# Patient Record
Sex: MALE | Race: OTHER | HISPANIC OR LATINO
[De-identification: names, ages, dates, MRNs, and addresses within clinical notes are randomized per-mention and may not be internally consistent; named-entity substitution may affect disease eponyms.]

---

## 2017-02-03 ENCOUNTER — APPOINTMENT (OUTPATIENT)
Dept: CARDIOLOGY | Facility: CLINIC | Age: 62
End: 2017-02-03

## 2017-02-03 ENCOUNTER — NON-APPOINTMENT (OUTPATIENT)
Age: 62
End: 2017-02-03

## 2017-02-03 VITALS
WEIGHT: 179 LBS | SYSTOLIC BLOOD PRESSURE: 101 MMHG | BODY MASS INDEX: 30.56 KG/M2 | OXYGEN SATURATION: 96 % | HEIGHT: 64 IN | DIASTOLIC BLOOD PRESSURE: 68 MMHG | HEART RATE: 84 BPM

## 2017-02-14 ENCOUNTER — APPOINTMENT (OUTPATIENT)
Dept: CARDIOLOGY | Facility: CLINIC | Age: 62
End: 2017-02-14

## 2017-02-16 ENCOUNTER — APPOINTMENT (OUTPATIENT)
Dept: CARDIOLOGY | Facility: CLINIC | Age: 62
End: 2017-02-16

## 2017-02-17 ENCOUNTER — OUTPATIENT (OUTPATIENT)
Dept: OUTPATIENT SERVICES | Facility: HOSPITAL | Age: 62
LOS: 1 days | End: 2017-02-17
Payer: COMMERCIAL

## 2017-02-17 DIAGNOSIS — I25.10 ATHEROSCLEROTIC HEART DISEASE OF NATIVE CORONARY ARTERY WITHOUT ANGINA PECTORIS: ICD-10-CM

## 2017-02-17 DIAGNOSIS — Z95.1 PRESENCE OF AORTOCORONARY BYPASS GRAFT: Chronic | ICD-10-CM

## 2017-02-17 DIAGNOSIS — R07.9 CHEST PAIN, UNSPECIFIED: ICD-10-CM

## 2017-02-17 DIAGNOSIS — R06.02 SHORTNESS OF BREATH: ICD-10-CM

## 2017-02-17 PROCEDURE — 93016 CV STRESS TEST SUPVJ ONLY: CPT

## 2017-02-17 PROCEDURE — 78452 HT MUSCLE IMAGE SPECT MULT: CPT

## 2017-02-17 PROCEDURE — 78452 HT MUSCLE IMAGE SPECT MULT: CPT | Mod: 26

## 2017-02-17 PROCEDURE — 93018 CV STRESS TEST I&R ONLY: CPT

## 2017-02-17 PROCEDURE — A9500: CPT

## 2017-02-17 PROCEDURE — 93017 CV STRESS TEST TRACING ONLY: CPT

## 2017-02-17 PROCEDURE — T1013: CPT

## 2017-02-20 DIAGNOSIS — I25.10 ATHEROSCLEROTIC HEART DISEASE OF NATIVE CORONARY ARTERY WITHOUT ANGINA PECTORIS: ICD-10-CM

## 2017-03-23 ENCOUNTER — APPOINTMENT (OUTPATIENT)
Dept: CARDIOLOGY | Facility: CLINIC | Age: 62
End: 2017-03-23

## 2017-04-29 ENCOUNTER — APPOINTMENT (OUTPATIENT)
Dept: ULTRASOUND IMAGING | Facility: CLINIC | Age: 62
End: 2017-04-29

## 2017-04-29 ENCOUNTER — OUTPATIENT (OUTPATIENT)
Dept: OUTPATIENT SERVICES | Facility: HOSPITAL | Age: 62
LOS: 1 days | End: 2017-04-29
Payer: COMMERCIAL

## 2017-04-29 DIAGNOSIS — N18.3 CHRONIC KIDNEY DISEASE, STAGE 3 (MODERATE): ICD-10-CM

## 2017-04-29 DIAGNOSIS — Z00.8 ENCOUNTER FOR OTHER GENERAL EXAMINATION: ICD-10-CM

## 2017-04-29 DIAGNOSIS — Z95.1 PRESENCE OF AORTOCORONARY BYPASS GRAFT: Chronic | ICD-10-CM

## 2017-04-29 PROCEDURE — 76775 US EXAM ABDO BACK WALL LIM: CPT

## 2017-08-08 ENCOUNTER — APPOINTMENT (OUTPATIENT)
Dept: CARDIOLOGY | Facility: CLINIC | Age: 62
End: 2017-08-08
Payer: MEDICARE

## 2017-08-08 ENCOUNTER — NON-APPOINTMENT (OUTPATIENT)
Age: 62
End: 2017-08-08

## 2017-08-08 VITALS
BODY MASS INDEX: 29.88 KG/M2 | OXYGEN SATURATION: 94 % | WEIGHT: 175 LBS | HEART RATE: 77 BPM | DIASTOLIC BLOOD PRESSURE: 72 MMHG | HEIGHT: 64 IN | SYSTOLIC BLOOD PRESSURE: 110 MMHG

## 2017-08-08 PROCEDURE — 99215 OFFICE O/P EST HI 40 MIN: CPT | Mod: 25

## 2017-08-08 PROCEDURE — 93000 ELECTROCARDIOGRAM COMPLETE: CPT

## 2017-08-21 ENCOUNTER — APPOINTMENT (OUTPATIENT)
Dept: CARDIOLOGY | Facility: CLINIC | Age: 62
End: 2017-08-21
Payer: MEDICARE

## 2017-08-21 PROCEDURE — 93880 EXTRACRANIAL BILAT STUDY: CPT

## 2018-02-06 ENCOUNTER — APPOINTMENT (OUTPATIENT)
Dept: CARDIOLOGY | Facility: CLINIC | Age: 63
End: 2018-02-06
Payer: MEDICARE

## 2018-02-06 ENCOUNTER — NON-APPOINTMENT (OUTPATIENT)
Age: 63
End: 2018-02-06

## 2018-02-06 VITALS
OXYGEN SATURATION: 94 % | BODY MASS INDEX: 30.56 KG/M2 | DIASTOLIC BLOOD PRESSURE: 67 MMHG | WEIGHT: 179 LBS | SYSTOLIC BLOOD PRESSURE: 130 MMHG | HEART RATE: 70 BPM | HEIGHT: 64 IN

## 2018-02-06 PROCEDURE — 93000 ELECTROCARDIOGRAM COMPLETE: CPT

## 2018-02-06 PROCEDURE — 99215 OFFICE O/P EST HI 40 MIN: CPT

## 2018-02-26 ENCOUNTER — APPOINTMENT (OUTPATIENT)
Dept: CARDIOLOGY | Facility: CLINIC | Age: 63
End: 2018-02-26

## 2018-03-05 ENCOUNTER — APPOINTMENT (OUTPATIENT)
Dept: PULMONOLOGY | Facility: CLINIC | Age: 63
End: 2018-03-05
Payer: MEDICARE

## 2018-03-05 VITALS
OXYGEN SATURATION: 95 % | BODY MASS INDEX: 30.56 KG/M2 | DIASTOLIC BLOOD PRESSURE: 78 MMHG | SYSTOLIC BLOOD PRESSURE: 130 MMHG | WEIGHT: 179 LBS | HEIGHT: 64 IN | HEART RATE: 74 BPM

## 2018-03-05 PROCEDURE — 99213 OFFICE O/P EST LOW 20 MIN: CPT

## 2018-04-05 ENCOUNTER — APPOINTMENT (OUTPATIENT)
Dept: CARDIOLOGY | Facility: CLINIC | Age: 63
End: 2018-04-05
Payer: MEDICARE

## 2018-04-05 PROCEDURE — 93306 TTE W/DOPPLER COMPLETE: CPT

## 2018-07-25 ENCOUNTER — EMERGENCY (EMERGENCY)
Facility: HOSPITAL | Age: 63
LOS: 1 days | Discharge: HOME | End: 2018-07-25
Attending: EMERGENCY MEDICINE
Payer: MEDICARE

## 2018-07-25 VITALS
OXYGEN SATURATION: 95 % | SYSTOLIC BLOOD PRESSURE: 126 MMHG | TEMPERATURE: 98 F | DIASTOLIC BLOOD PRESSURE: 76 MMHG | RESPIRATION RATE: 20 BRPM | HEART RATE: 67 BPM

## 2018-07-25 VITALS — WEIGHT: 179.9 LBS | HEIGHT: 65 IN

## 2018-07-25 DIAGNOSIS — Z95.1 PRESENCE OF AORTOCORONARY BYPASS GRAFT: Chronic | ICD-10-CM

## 2018-07-25 PROCEDURE — 99283 EMERGENCY DEPT VISIT LOW MDM: CPT | Mod: 25

## 2018-07-25 PROCEDURE — 96372 THER/PROPH/DIAG INJ SC/IM: CPT

## 2018-07-25 PROCEDURE — 99283 EMERGENCY DEPT VISIT LOW MDM: CPT

## 2018-07-25 RX ORDER — PRAMOXINE HCL/ZINC ACETATE 1 %-0.1 %
1 LOTION (ML) TOPICAL
Qty: 177 | Refills: 0
Start: 2018-07-25 | End: 2018-07-31

## 2018-07-25 RX ORDER — DIPHENHYDRAMINE HCL 50 MG
25 CAPSULE ORAL ONCE
Qty: 0 | Refills: 0 | Status: COMPLETED | OUTPATIENT
Start: 2018-07-25 | End: 2018-07-25

## 2018-07-25 RX ORDER — DIPHENHYDRAMINE HCL 50 MG
1 CAPSULE ORAL
Qty: 28 | Refills: 0
Start: 2018-07-25 | End: 2018-07-31

## 2018-07-25 RX ADMIN — Medication 25 MILLIGRAM(S): at 20:52

## 2018-07-25 NOTE — ED PROVIDER NOTE - CARE PLAN
Principal Discharge DX:	Contact dermatitis, unspecified contact dermatitis type, unspecified trigger

## 2018-07-25 NOTE — ED PROVIDER NOTE - OBJECTIVE STATEMENT
62 y/o M presents to the ED c/o rash which onset 4 days ago. Pt states that he was at a BBQ at his daughter's house and was outside when he thought something bit him. He notes that there was poison ivy in the area. Pt is currently experiencing itchiness or his arms and redness here at the ED. He denies fevers or chills. No further complaints at this time.

## 2018-07-25 NOTE — ED ADULT TRIAGE NOTE - CHIEF COMPLAINT QUOTE
Pt with rash to b/l arms since Saturday. Pt was outside at this daughters and thinks he may have been bit by something. No fevers, no other complaints.

## 2018-07-25 NOTE — ED PROVIDER NOTE - PMH
Acute on chronic congestive heart failure, unspecified congestive heart failure type    DM (diabetes mellitus)    High cholesterol    HTN (hypertension)    Myocardial infarction  (Coronary angiogram 2014)

## 2018-07-25 NOTE — ED PROVIDER NOTE - SKIN, MLM
Multiple erythematous papular lesions in linear distribution, some scarred and excoriated from scratching. No pus, redness, swelling, vesicles, or drainage.

## 2018-08-28 ENCOUNTER — NON-APPOINTMENT (OUTPATIENT)
Age: 63
End: 2018-08-28

## 2018-08-28 ENCOUNTER — APPOINTMENT (OUTPATIENT)
Dept: CARDIOLOGY | Facility: CLINIC | Age: 63
End: 2018-08-28
Payer: MEDICARE

## 2018-08-28 VITALS
BODY MASS INDEX: 30.9 KG/M2 | HEART RATE: 74 BPM | OXYGEN SATURATION: 90 % | DIASTOLIC BLOOD PRESSURE: 81 MMHG | HEIGHT: 64 IN | SYSTOLIC BLOOD PRESSURE: 167 MMHG | WEIGHT: 181 LBS

## 2018-08-28 VITALS — SYSTOLIC BLOOD PRESSURE: 162 MMHG | DIASTOLIC BLOOD PRESSURE: 90 MMHG

## 2018-08-28 PROCEDURE — 99215 OFFICE O/P EST HI 40 MIN: CPT

## 2018-08-28 PROCEDURE — 93000 ELECTROCARDIOGRAM COMPLETE: CPT

## 2018-09-04 ENCOUNTER — APPOINTMENT (OUTPATIENT)
Dept: PULMONOLOGY | Facility: CLINIC | Age: 63
End: 2018-09-04
Payer: MEDICARE

## 2018-09-04 VITALS
OXYGEN SATURATION: 94 % | HEART RATE: 71 BPM | DIASTOLIC BLOOD PRESSURE: 82 MMHG | BODY MASS INDEX: 31.07 KG/M2 | SYSTOLIC BLOOD PRESSURE: 138 MMHG | WEIGHT: 181 LBS

## 2018-09-04 DIAGNOSIS — E66.9 OBESITY, UNSPECIFIED: ICD-10-CM

## 2018-09-04 PROCEDURE — 99213 OFFICE O/P EST LOW 20 MIN: CPT

## 2018-09-10 ENCOUNTER — APPOINTMENT (OUTPATIENT)
Dept: CARDIOLOGY | Facility: CLINIC | Age: 63
End: 2018-09-10
Payer: MEDICARE

## 2018-09-10 PROCEDURE — 78452 HT MUSCLE IMAGE SPECT MULT: CPT

## 2018-09-10 PROCEDURE — 93015 CV STRESS TEST SUPVJ I&R: CPT

## 2018-09-10 PROCEDURE — A9500: CPT

## 2019-03-12 ENCOUNTER — APPOINTMENT (OUTPATIENT)
Dept: CARDIOLOGY | Facility: CLINIC | Age: 64
End: 2019-03-12

## 2019-03-26 ENCOUNTER — APPOINTMENT (OUTPATIENT)
Dept: NEUROLOGY | Facility: CLINIC | Age: 64
End: 2019-03-26
Payer: MEDICARE

## 2019-03-26 VITALS
DIASTOLIC BLOOD PRESSURE: 90 MMHG | HEIGHT: 64 IN | SYSTOLIC BLOOD PRESSURE: 140 MMHG | WEIGHT: 185 LBS | BODY MASS INDEX: 31.58 KG/M2

## 2019-03-26 DIAGNOSIS — G31.84 MILD COGNITIVE IMPAIRMENT, SO STATED: ICD-10-CM

## 2019-03-26 PROCEDURE — 99204 OFFICE O/P NEW MOD 45 MIN: CPT

## 2019-04-16 ENCOUNTER — APPOINTMENT (OUTPATIENT)
Dept: NEUROLOGY | Facility: CLINIC | Age: 64
End: 2019-04-16

## 2019-04-23 ENCOUNTER — APPOINTMENT (OUTPATIENT)
Dept: NEUROLOGY | Facility: CLINIC | Age: 64
End: 2019-04-23
Payer: MEDICARE

## 2019-04-23 PROCEDURE — 95819 EEG AWAKE AND ASLEEP: CPT

## 2019-04-23 PROCEDURE — 93880 EXTRACRANIAL BILAT STUDY: CPT

## 2019-04-23 PROCEDURE — 93890: CPT

## 2019-04-23 PROCEDURE — 93886 INTRACRANIAL COMPLETE STUDY: CPT

## 2019-04-23 PROCEDURE — 93040 RHYTHM ECG WITH REPORT: CPT

## 2019-04-23 NOTE — PROCEDURE
[FreeTextEntry3] : EEG   Dr. Butler\par \par Date of study:  4/23/19\par \par This tracing was performed digital 20 channel machine with additional channel devoted to monitoring of EKG rhythm. Standard 10-20 electrode placement system was used.\par \par Waking background showed well-developed posterior  Hz alpha activity along with some low voltage frontally dominant faster beta rhythms. Slowing was seen during drowsiness. There were no focal background abnormalities or epileptiform discharges seen.\par \par Photic stimulation was performed and failed to induce any abnormalities.\par \par Hyperventilation was not performed.\par \par Impression: Normal EEG without any evidence of seizure-like activity or focal background abnormalities.\par \par \par

## 2019-04-23 NOTE — PROCEDURE
[FreeTextEntry3] : Carotid duplex  Dr. Butler\par \par Date of study: 4/23/19\par \par Real-time color duplex ultrasound examination of the carotid system was performed including spectral waveform analysis and color-flow evaluation.\par \par Some plaque was seen near the bulbs bilaterally. Doppler flow characteristics showed no significant peak systolic or end-diastolic velocity elevations in either internal carotid artery to suggest any hemodynamically significant stenosis.\par \par Impression: Some plaque near the bulbs bilaterally. No ultrasound evidence for any hemodynamically significant stenosis in either internal carotid artery.\par \par Transcranial Doppler normal as well.\par

## 2019-04-30 ENCOUNTER — NON-APPOINTMENT (OUTPATIENT)
Age: 64
End: 2019-04-30

## 2019-04-30 ENCOUNTER — APPOINTMENT (OUTPATIENT)
Dept: CARDIOLOGY | Facility: CLINIC | Age: 64
End: 2019-04-30
Payer: MEDICARE

## 2019-04-30 VITALS
HEART RATE: 72 BPM | DIASTOLIC BLOOD PRESSURE: 97 MMHG | WEIGHT: 180 LBS | HEIGHT: 64 IN | OXYGEN SATURATION: 97 % | SYSTOLIC BLOOD PRESSURE: 194 MMHG | BODY MASS INDEX: 30.73 KG/M2

## 2019-04-30 VITALS — DIASTOLIC BLOOD PRESSURE: 108 MMHG | SYSTOLIC BLOOD PRESSURE: 210 MMHG

## 2019-04-30 PROCEDURE — 99215 OFFICE O/P EST HI 40 MIN: CPT

## 2019-04-30 PROCEDURE — 93000 ELECTROCARDIOGRAM COMPLETE: CPT

## 2019-04-30 NOTE — REASON FOR VISIT
[Follow-Up - Clinic] : a clinic follow-up of [FreeTextEntry2] : Multivessel CAD, ICMP, HTN, CVA [FreeTextEntry1] : Multivessel CAD, ICMP, HTN, CVA

## 2019-04-30 NOTE — DISCUSSION/SUMMARY
[Patient] : the patient [Risks] : risks [Benefits] : benefits [Alternatives] : alternatives [___ Week(s)] : [unfilled] week(s) [With ___] : with [unfilled] [FreeTextEntry1] : This is a 59 M with history of smoking, HTN,DM (on insulin), HLD, found to have 3 vessel disease and TIA post cath: s/p CABG recent PNA and now with facial swelling,.\par 1) PVD: right PTA and bilateral atherosclerosis: Walking and ct antiplatelets. life style modification. diabetes control.  aspirin and lipitor.  no intervention or testing needed. \par 2) Multivessel CAD: s/p CABG2D echo- unremarkable.   Continue Coreg 25 Q12, .ASA 81 mg,  lipitor 40 daily.\par 2) HFpEF: Stable weight. NYHA class I.. Ct lasix. f/u with Nephrologist. Dr. price. \par 3) HTN:  continue coreg 25 Q12 ct losartan 100-25 Blood pressure not ocntorllee.d unclear meds list. f/u in 1 week .initaite amldoipne 10 mg daily. \par 4) DM: HBA1C 10.1 \par 5) CKD: creatinine stable.  f/u nephrology. DM control. patient not seen the nephrologist.\par 6) palpitations: resolved. \par 7) carotid artery disease: carotid atheroscelrosuis. no stenosis. \par

## 2019-04-30 NOTE — REVIEW OF SYSTEMS
[Shortness Of Breath] : shortness of breath [Dyspnea on exertion] : dyspnea during exertion [Lower Ext Edema] : lower extremity edema [Palpitations] : no palpitations [see HPI] : see HPI [Change In The Stool] : change in stool [Negative] : Heme/Lymph

## 2019-04-30 NOTE — PHYSICAL EXAM
[General Appearance - Well Developed] : well developed [Normal Appearance] : normal appearance [Well Groomed] : well groomed [General Appearance - Well Nourished] : well nourished [No Deformities] : no deformities [General Appearance - In No Acute Distress] : no acute distress [Normal Conjunctiva] : the conjunctiva exhibited no abnormalities [Eyelids - No Xanthelasma] : the eyelids demonstrated no xanthelasmas [Normal Oral Mucosa] : normal oral mucosa [No Oral Pallor] : no oral pallor [No Oral Cyanosis] : no oral cyanosis [Normal Jugular Venous A Waves Present] : normal jugular venous A waves present [Normal Jugular Venous V Waves Present] : normal jugular venous V waves present [No Jugular Venous Mahan A Waves] : no jugular venous mahan A waves [Respiration, Rhythm And Depth] : normal respiratory rhythm and effort [Exaggerated Use Of Accessory Muscles For Inspiration] : no accessory muscle use [Auscultation Breath Sounds / Voice Sounds] : lungs were clear to auscultation bilaterally [Heart Rate And Rhythm] : heart rate and rhythm were normal [Heart Sounds] : normal S1 and S2 [Murmurs] : no murmurs present [Abdomen Soft] : soft [Abdomen Tenderness] : non-tender [Abdomen Mass (___ Cm)] : no abdominal mass palpated [Abnormal Walk] : normal gait [Gait - Sufficient For Exercise Testing] : the gait was sufficient for exercise testing [Nail Clubbing] : no clubbing of the fingernails [Cyanosis, Localized] : no localized cyanosis [Petechial Hemorrhages (___cm)] : no petechial hemorrhages [Skin Color & Pigmentation] : normal skin color and pigmentation [] : no rash [No Venous Stasis] : no venous stasis [Skin Lesions] : no skin lesions [No Skin Ulcers] : no skin ulcer [No Xanthoma] : no  xanthoma was observed [Oriented To Time, Place, And Person] : oriented to person, place, and time [Affect] : the affect was normal [Mood] : the mood was normal [No Anxiety] : not feeling anxious

## 2019-04-30 NOTE — CARDIOLOGY SUMMARY
[No Ischemia] : no Ischemia [LVEF ___%] : LVEF [unfilled]% [Enlarged] : enlarged LA size [None] : no mitral regurgitation [___] : [unfilled] [de-identified] : NANETTE: Left 0.7 right : 1.4 \par US of LE arterial: sub total occlusion right PTA. mdoerate atherosclerosis bilaterally.

## 2019-04-30 NOTE — HISTORY OF PRESENT ILLNESS
[FreeTextEntry1] : Multivessel CAD, HTN, CVA, HF pEF\par does not feel good. Lost balance. Unsteady gait.  diozziness.\par his primary doctor sent him for the carotid US and TCD ,. \par fatigeu and tired. \par \par \par old noteL: feels  good . no problem. compliant with meds. \par no smoking. need script for coreg. patient did not get the stress test done. Will order stress test agiain. checked hospital records. no stress test in [facility]. \par \par \par old note: no chest pain. no dypsnea. no headaches. feels good . complikant with meds. \par last time got NANETTE done that was abnormal. but US arterial showed run off disease on the right side,. tibiaperoneal. \par \par \par old note: No chest pain.  No dyspnea.  No LE edema.  Compliant with meds. Does not exercise.\par got NANETTE and US of LE.  found to have abnormal; NANETTE on the left and calcification on the right. US of LE arterial shows right PTA occlusion. patient does complain of feet pain on the right side. No pain on the left.

## 2019-05-07 ENCOUNTER — APPOINTMENT (OUTPATIENT)
Dept: CARDIOLOGY | Facility: CLINIC | Age: 64
End: 2019-05-07

## 2019-05-14 ENCOUNTER — APPOINTMENT (OUTPATIENT)
Dept: CARDIOLOGY | Facility: CLINIC | Age: 64
End: 2019-05-14
Payer: MEDICARE

## 2019-05-14 VITALS
HEIGHT: 64 IN | DIASTOLIC BLOOD PRESSURE: 78 MMHG | SYSTOLIC BLOOD PRESSURE: 156 MMHG | BODY MASS INDEX: 32.1 KG/M2 | WEIGHT: 188 LBS | OXYGEN SATURATION: 97 % | HEART RATE: 78 BPM

## 2019-05-14 VITALS — SYSTOLIC BLOOD PRESSURE: 150 MMHG | DIASTOLIC BLOOD PRESSURE: 79 MMHG

## 2019-05-14 PROCEDURE — 93000 ELECTROCARDIOGRAM COMPLETE: CPT

## 2019-05-14 PROCEDURE — 99214 OFFICE O/P EST MOD 30 MIN: CPT

## 2019-05-20 ENCOUNTER — NON-APPOINTMENT (OUTPATIENT)
Age: 64
End: 2019-05-20

## 2019-06-11 ENCOUNTER — APPOINTMENT (OUTPATIENT)
Dept: CARDIOLOGY | Facility: CLINIC | Age: 64
End: 2019-06-11
Payer: MEDICARE

## 2019-06-11 VITALS
OXYGEN SATURATION: 96 % | SYSTOLIC BLOOD PRESSURE: 150 MMHG | BODY MASS INDEX: 31.16 KG/M2 | WEIGHT: 187 LBS | HEIGHT: 65 IN | DIASTOLIC BLOOD PRESSURE: 78 MMHG | HEART RATE: 71 BPM

## 2019-06-11 PROCEDURE — 99215 OFFICE O/P EST HI 40 MIN: CPT

## 2019-06-11 NOTE — PHYSICAL EXAM
[Well Groomed] : well groomed [General Appearance - Well Developed] : well developed [General Appearance - Well Nourished] : well nourished [Normal Appearance] : normal appearance [General Appearance - In No Acute Distress] : no acute distress [No Deformities] : no deformities [Normal Conjunctiva] : the conjunctiva exhibited no abnormalities [Eyelids - No Xanthelasma] : the eyelids demonstrated no xanthelasmas [No Oral Pallor] : no oral pallor [Normal Oral Mucosa] : normal oral mucosa [No Oral Cyanosis] : no oral cyanosis [Normal Jugular Venous A Waves Present] : normal jugular venous A waves present [No Jugular Venous Mahan A Waves] : no jugular venous mahan A waves [Normal Jugular Venous V Waves Present] : normal jugular venous V waves present [Exaggerated Use Of Accessory Muscles For Inspiration] : no accessory muscle use [Auscultation Breath Sounds / Voice Sounds] : lungs were clear to auscultation bilaterally [Respiration, Rhythm And Depth] : normal respiratory rhythm and effort [Murmurs] : no murmurs present [Heart Sounds] : normal S1 and S2 [Heart Rate And Rhythm] : heart rate and rhythm were normal [Abdomen Soft] : soft [Abdomen Tenderness] : non-tender [Abdomen Mass (___ Cm)] : no abdominal mass palpated [Abnormal Walk] : normal gait [Cyanosis, Localized] : no localized cyanosis [Gait - Sufficient For Exercise Testing] : the gait was sufficient for exercise testing [Nail Clubbing] : no clubbing of the fingernails [Petechial Hemorrhages (___cm)] : no petechial hemorrhages [FreeTextEntry1] : 2+ LE edema.  [] : no rash [Skin Color & Pigmentation] : normal skin color and pigmentation [No Venous Stasis] : no venous stasis [No Skin Ulcers] : no skin ulcer [Skin Lesions] : no skin lesions [No Xanthoma] : no  xanthoma was observed [Oriented To Time, Place, And Person] : oriented to person, place, and time [Affect] : the affect was normal [Mood] : the mood was normal [No Anxiety] : not feeling anxious

## 2019-06-11 NOTE — REVIEW OF SYSTEMS
[Dyspnea on exertion] : dyspnea during exertion [Shortness Of Breath] : shortness of breath [Lower Ext Edema] : lower extremity edema [Palpitations] : no palpitations [see HPI] : see HPI [Change In The Stool] : change in stool [Negative] : Endocrine

## 2019-06-11 NOTE — CARDIOLOGY SUMMARY
[No Ischemia] : no Ischemia [LVEF ___%] : LVEF [unfilled]% [None] : no mitral regurgitation [Enlarged] : enlarged LA size [___] : [unfilled] [de-identified] : NANETTE: Left 0.7 right : 1.4 \par US of LE arterial: sub total occlusion right PTA. mdoerate atherosclerosis bilaterally.

## 2019-06-11 NOTE — HISTORY OF PRESENT ILLNESS
[FreeTextEntry1] : Multivessel CAD, HTN, CVA, HF pEF\par patient has been having increaese weight gain. he has been drinking too much water also as he feels its good for his kidneys. He drinks atleast 10 bottles a day.\par Compliant with  meds.  +_ dyspnea one xertion 2 + LE edema. \par \par \par \par old note: does not feel good. Lost balance. Unsteady gait.  diozziness.\par his primary doctor sent him for the carotid US and TCD ,. \par fatigeu and tired. \par \par \par old noteL: feels  good . no problem. compliant with meds. \par no smoking. need script for coreg. patient did not get the stress test done. Will order stress test agiain. checked hospital records. no stress test in [facility]. \par \par \par old note: no chest pain. no dypsnea. no headaches. feels good . complikant with meds. \par last time got NANETTE done that was abnormal. but US arterial showed run off disease on the right side,. tibiaperoneal. \par \par \par old note: No chest pain.  No dyspnea.  No LE edema.  Compliant with meds. Does not exercise.\par got NANETTE and US of LE.  found to have abnormal; NANETTE on the left and calcification on the right. US of LE arterial shows right PTA occlusion. patient does complain of feet pain on the right side. No pain on the left.

## 2019-06-11 NOTE — DISCUSSION/SUMMARY
[Patient] : the patient [___ Week(s)] : [unfilled] week(s) [Alternatives] : alternatives [Risks] : risks [Benefits] : benefits [With ___] : with [unfilled] [FreeTextEntry1] : This is a 59 M with history of smoking, HTN,DM (on insulin), HLD, found to have 3 vessel disease and TIA post cath: s/p CABG recent PNA and now with facial swelling,.\par 1) LE edema weight gain:  Elevaed legs. Compressions stockings.  CKD and Diastollic heart failure. repeat echo . decrease PO water intake. 1 L /day.  no beer. ct lasix 40 Q12,. CMP, BNP check. F/u with nephrology. lasix TID on weekends. Others days Q12. \par 2) PVD: right PTA and bilateral atherosclerosis: Walking and ct antiplatelets. life style modification. diabetes control.  aspirin and lipitor.  no intervention or testing needed. \par 3) Multivessel CAD: s/p CABG2D echo- unremarkable.   Continue Coreg 25 Q12, .ASA 81 mg,  lipitor 40 daily.\par 2) HFpEF: Stable weight. NYHA class I.. Ct lasix. f/u with Nephrologist. Dr. price. \par 3) HTN:  continue coreg 25 Q12 ct losartan 100-25 Blood pressure not ocntorllee.d unclear meds list. ct  amldoipne 5 mg daily.  low Dose due to LE edema.  will add bidil low dose for BP control. 1/2 tablet Q12. if tolerated then 1 tablet Q12. \par 4) DM: HBA1C 10.1 \par 5) CKD: creatinine stable.  f/u nephrology. DM control. patient not seen the nephrologist.\par 6) palpitations: resolved. \par 7) carotid artery disease: carotid atheroscelrosuis. no stenosis. \par

## 2019-06-25 ENCOUNTER — APPOINTMENT (OUTPATIENT)
Dept: CARDIOLOGY | Facility: CLINIC | Age: 64
End: 2019-06-25
Payer: MEDICARE

## 2019-06-25 PROCEDURE — 93970 EXTREMITY STUDY: CPT

## 2019-06-26 ENCOUNTER — APPOINTMENT (OUTPATIENT)
Dept: CARDIOLOGY | Facility: CLINIC | Age: 64
End: 2019-06-26
Payer: MEDICARE

## 2019-06-26 PROCEDURE — 93306 TTE W/DOPPLER COMPLETE: CPT

## 2019-07-03 ENCOUNTER — APPOINTMENT (OUTPATIENT)
Dept: CARDIOLOGY | Facility: CLINIC | Age: 64
End: 2019-07-03
Payer: MEDICARE

## 2019-07-03 VITALS
BODY MASS INDEX: 30.99 KG/M2 | HEART RATE: 72 BPM | SYSTOLIC BLOOD PRESSURE: 164 MMHG | DIASTOLIC BLOOD PRESSURE: 76 MMHG | OXYGEN SATURATION: 95 % | HEIGHT: 65 IN | WEIGHT: 186 LBS

## 2019-07-03 PROCEDURE — 99214 OFFICE O/P EST MOD 30 MIN: CPT

## 2019-07-03 PROCEDURE — 93000 ELECTROCARDIOGRAM COMPLETE: CPT

## 2019-07-05 ENCOUNTER — NON-APPOINTMENT (OUTPATIENT)
Age: 64
End: 2019-07-05

## 2019-07-09 LAB
ALBUMIN SERPL ELPH-MCNC: 3.7 G/DL
ALP BLD-CCNC: 109 U/L
ALT SERPL-CCNC: 16 U/L
ANION GAP SERPL CALC-SCNC: 13 MMOL/L
AST SERPL-CCNC: 14 U/L
BILIRUB SERPL-MCNC: 0.4 MG/DL
BUN SERPL-MCNC: 30 MG/DL
CALCIUM SERPL-MCNC: 8.7 MG/DL
CHLORIDE SERPL-SCNC: 104 MMOL/L
CO2 SERPL-SCNC: 23 MMOL/L
CREAT SERPL-MCNC: 2.63 MG/DL
GLUCOSE SERPL-MCNC: 291 MG/DL
MAGNESIUM SERPL-MCNC: 1.7 MG/DL
NT-PROBNP SERPL-MCNC: 1390 PG/ML
POTASSIUM SERPL-SCNC: 4.1 MMOL/L
PROT SERPL-MCNC: 6.2 G/DL
SODIUM SERPL-SCNC: 140 MMOL/L

## 2019-08-02 ENCOUNTER — APPOINTMENT (OUTPATIENT)
Dept: RADIOLOGY | Facility: CLINIC | Age: 64
End: 2019-08-02
Payer: MEDICARE

## 2019-08-02 ENCOUNTER — OUTPATIENT (OUTPATIENT)
Dept: OUTPATIENT SERVICES | Facility: HOSPITAL | Age: 64
LOS: 1 days | End: 2019-08-02
Payer: MEDICARE

## 2019-08-02 VITALS
HEART RATE: 64 BPM | SYSTOLIC BLOOD PRESSURE: 120 MMHG | WEIGHT: 189.25 LBS | OXYGEN SATURATION: 95 % | BODY MASS INDEX: 31.49 KG/M2 | DIASTOLIC BLOOD PRESSURE: 62 MMHG

## 2019-08-02 DIAGNOSIS — R06.02 SHORTNESS OF BREATH: ICD-10-CM

## 2019-08-02 DIAGNOSIS — Z00.00 ENCOUNTER FOR GENERAL ADULT MEDICAL EXAMINATION WITHOUT ABNORMAL FINDINGS: ICD-10-CM

## 2019-08-02 DIAGNOSIS — Z95.1 PRESENCE OF AORTOCORONARY BYPASS GRAFT: Chronic | ICD-10-CM

## 2019-08-02 LAB
ALBUMIN SERPL ELPH-MCNC: 3.5 G/DL
ALP BLD-CCNC: 90 U/L
ALT SERPL-CCNC: 13 U/L
ANION GAP SERPL CALC-SCNC: 12 MMOL/L
AST SERPL-CCNC: 10 U/L
BASOPHILS # BLD AUTO: 0.04 K/UL
BASOPHILS NFR BLD AUTO: 0.6 %
BILIRUB SERPL-MCNC: 0.4 MG/DL
BUN SERPL-MCNC: 61 MG/DL
CALCIUM SERPL-MCNC: 8.3 MG/DL
CHLORIDE SERPL-SCNC: 109 MMOL/L
CO2 SERPL-SCNC: 22 MMOL/L
CREAT SERPL-MCNC: 3.21 MG/DL
EOSINOPHIL # BLD AUTO: 0.15 K/UL
EOSINOPHIL NFR BLD AUTO: 2.1 %
GLUCOSE SERPL-MCNC: 171 MG/DL
HCT VFR BLD CALC: 34 %
HGB BLD-MCNC: 10.9 G/DL
IMM GRANULOCYTES NFR BLD AUTO: 0.1 %
LYMPHOCYTES # BLD AUTO: 1.82 K/UL
LYMPHOCYTES NFR BLD AUTO: 25.2 %
MAN DIFF?: NORMAL
MCHC RBC-ENTMCNC: 27.3 PG
MCHC RBC-ENTMCNC: 32.1 GM/DL
MCV RBC AUTO: 85 FL
MONOCYTES # BLD AUTO: 0.49 K/UL
MONOCYTES NFR BLD AUTO: 6.8 %
NEUTROPHILS # BLD AUTO: 4.72 K/UL
NEUTROPHILS NFR BLD AUTO: 65.2 %
NT-PROBNP SERPL-MCNC: 1225 PG/ML
PLATELET # BLD AUTO: 225 K/UL
POTASSIUM SERPL-SCNC: 3.8 MMOL/L
PROT SERPL-MCNC: 5.8 G/DL
RBC # BLD: 4 M/UL
RBC # FLD: 12.6 %
SODIUM SERPL-SCNC: 143 MMOL/L
WBC # FLD AUTO: 7.23 K/UL

## 2019-08-02 PROCEDURE — 71046 X-RAY EXAM CHEST 2 VIEWS: CPT | Mod: 26

## 2019-08-02 PROCEDURE — 71046 X-RAY EXAM CHEST 2 VIEWS: CPT

## 2019-09-03 ENCOUNTER — RX RENEWAL (OUTPATIENT)
Age: 64
End: 2019-09-03

## 2019-09-10 ENCOUNTER — APPOINTMENT (OUTPATIENT)
Dept: PULMONOLOGY | Facility: CLINIC | Age: 64
End: 2019-09-10

## 2019-10-02 ENCOUNTER — APPOINTMENT (OUTPATIENT)
Dept: CARDIOLOGY | Facility: CLINIC | Age: 64
End: 2019-10-02

## 2020-02-03 ENCOUNTER — APPOINTMENT (OUTPATIENT)
Dept: CARDIOLOGY | Facility: CLINIC | Age: 65
End: 2020-02-03
Payer: MEDICARE

## 2020-02-03 ENCOUNTER — NON-APPOINTMENT (OUTPATIENT)
Age: 65
End: 2020-02-03

## 2020-02-03 VITALS — DIASTOLIC BLOOD PRESSURE: 72 MMHG | SYSTOLIC BLOOD PRESSURE: 130 MMHG

## 2020-02-03 VITALS
WEIGHT: 180 LBS | OXYGEN SATURATION: 96 % | BODY MASS INDEX: 29.99 KG/M2 | SYSTOLIC BLOOD PRESSURE: 147 MMHG | HEART RATE: 71 BPM | HEIGHT: 65 IN | DIASTOLIC BLOOD PRESSURE: 74 MMHG

## 2020-02-03 DIAGNOSIS — G45.9 TRANSIENT CEREBRAL ISCHEMIC ATTACK, UNSPECIFIED: ICD-10-CM

## 2020-02-03 PROCEDURE — 93000 ELECTROCARDIOGRAM COMPLETE: CPT

## 2020-02-03 PROCEDURE — 99214 OFFICE O/P EST MOD 30 MIN: CPT

## 2020-02-03 RX ORDER — LOSARTAN POTASSIUM 100 MG/1
100 TABLET, FILM COATED ORAL DAILY
Qty: 1 | Refills: 3 | Status: DISCONTINUED | COMMUNITY
Start: 2019-05-14 | End: 2020-02-03

## 2020-08-04 ENCOUNTER — APPOINTMENT (OUTPATIENT)
Dept: CARDIOLOGY | Facility: CLINIC | Age: 65
End: 2020-08-04

## 2020-08-06 ENCOUNTER — INPATIENT (INPATIENT)
Facility: HOSPITAL | Age: 65
LOS: 0 days | Discharge: HOME | DRG: 291 | End: 2020-08-07
Attending: HOSPITALIST | Admitting: STUDENT IN AN ORGANIZED HEALTH CARE EDUCATION/TRAINING PROGRAM
Payer: MEDICARE

## 2020-08-06 VITALS
DIASTOLIC BLOOD PRESSURE: 76 MMHG | HEART RATE: 68 BPM | HEIGHT: 65 IN | OXYGEN SATURATION: 96 % | TEMPERATURE: 98 F | WEIGHT: 179.9 LBS | SYSTOLIC BLOOD PRESSURE: 158 MMHG | RESPIRATION RATE: 18 BRPM

## 2020-08-06 DIAGNOSIS — N17.9 ACUTE KIDNEY FAILURE, UNSPECIFIED: ICD-10-CM

## 2020-08-06 DIAGNOSIS — Z95.1 PRESENCE OF AORTOCORONARY BYPASS GRAFT: Chronic | ICD-10-CM

## 2020-08-06 LAB
ALBUMIN SERPL ELPH-MCNC: 3.8 G/DL — SIGNIFICANT CHANGE UP (ref 3.3–5.2)
ALP SERPL-CCNC: 108 U/L — SIGNIFICANT CHANGE UP (ref 40–120)
ALT FLD-CCNC: 20 U/L — SIGNIFICANT CHANGE UP
ANION GAP SERPL CALC-SCNC: 16 MMOL/L — SIGNIFICANT CHANGE UP (ref 5–17)
APTT BLD: 31.8 SEC — SIGNIFICANT CHANGE UP (ref 27.5–35.5)
AST SERPL-CCNC: 18 U/L — SIGNIFICANT CHANGE UP
BASOPHILS # BLD AUTO: 0.04 K/UL — SIGNIFICANT CHANGE UP (ref 0–0.2)
BASOPHILS NFR BLD AUTO: 0.4 % — SIGNIFICANT CHANGE UP (ref 0–2)
BILIRUB SERPL-MCNC: 0.3 MG/DL — LOW (ref 0.4–2)
BUN SERPL-MCNC: 63 MG/DL — HIGH (ref 8–20)
CALCIUM SERPL-MCNC: 8.9 MG/DL — SIGNIFICANT CHANGE UP (ref 8.6–10.2)
CHLORIDE SERPL-SCNC: 102 MMOL/L — SIGNIFICANT CHANGE UP (ref 98–107)
CO2 SERPL-SCNC: 23 MMOL/L — SIGNIFICANT CHANGE UP (ref 22–29)
CREAT SERPL-MCNC: 3.71 MG/DL — HIGH (ref 0.5–1.3)
EOSINOPHIL # BLD AUTO: 0.19 K/UL — SIGNIFICANT CHANGE UP (ref 0–0.5)
EOSINOPHIL NFR BLD AUTO: 2 % — SIGNIFICANT CHANGE UP (ref 0–6)
GLUCOSE BLDC GLUCOMTR-MCNC: 123 MG/DL — HIGH (ref 70–99)
GLUCOSE SERPL-MCNC: 87 MG/DL — SIGNIFICANT CHANGE UP (ref 70–99)
HCT VFR BLD CALC: 38.3 % — LOW (ref 39–50)
HGB BLD-MCNC: 12.5 G/DL — LOW (ref 13–17)
IMM GRANULOCYTES NFR BLD AUTO: 0.3 % — SIGNIFICANT CHANGE UP (ref 0–1.5)
INR BLD: 1 RATIO — SIGNIFICANT CHANGE UP (ref 0.88–1.16)
LYMPHOCYTES # BLD AUTO: 1.88 K/UL — SIGNIFICANT CHANGE UP (ref 1–3.3)
LYMPHOCYTES # BLD AUTO: 19.4 % — SIGNIFICANT CHANGE UP (ref 13–44)
MCHC RBC-ENTMCNC: 26.8 PG — LOW (ref 27–34)
MCHC RBC-ENTMCNC: 32.6 GM/DL — SIGNIFICANT CHANGE UP (ref 32–36)
MCV RBC AUTO: 82.2 FL — SIGNIFICANT CHANGE UP (ref 80–100)
MONOCYTES # BLD AUTO: 0.68 K/UL — SIGNIFICANT CHANGE UP (ref 0–0.9)
MONOCYTES NFR BLD AUTO: 7 % — SIGNIFICANT CHANGE UP (ref 2–14)
NEUTROPHILS # BLD AUTO: 6.86 K/UL — SIGNIFICANT CHANGE UP (ref 1.8–7.4)
NEUTROPHILS NFR BLD AUTO: 70.9 % — SIGNIFICANT CHANGE UP (ref 43–77)
NT-PROBNP SERPL-SCNC: 1512 PG/ML — HIGH (ref 0–300)
PLATELET # BLD AUTO: 269 K/UL — SIGNIFICANT CHANGE UP (ref 150–400)
POTASSIUM SERPL-MCNC: 4 MMOL/L — SIGNIFICANT CHANGE UP (ref 3.5–5.3)
POTASSIUM SERPL-SCNC: 4 MMOL/L — SIGNIFICANT CHANGE UP (ref 3.5–5.3)
PROT SERPL-MCNC: 6.9 G/DL — SIGNIFICANT CHANGE UP (ref 6.6–8.7)
PROTHROM AB SERPL-ACNC: 11.6 SEC — SIGNIFICANT CHANGE UP (ref 10.6–13.6)
RBC # BLD: 4.66 M/UL — SIGNIFICANT CHANGE UP (ref 4.2–5.8)
RBC # FLD: 13 % — SIGNIFICANT CHANGE UP (ref 10.3–14.5)
SODIUM SERPL-SCNC: 141 MMOL/L — SIGNIFICANT CHANGE UP (ref 135–145)
TROPONIN T SERPL-MCNC: 0.05 NG/ML — SIGNIFICANT CHANGE UP (ref 0–0.06)
WBC # BLD: 9.68 K/UL — SIGNIFICANT CHANGE UP (ref 3.8–10.5)
WBC # FLD AUTO: 9.68 K/UL — SIGNIFICANT CHANGE UP (ref 3.8–10.5)

## 2020-08-06 PROCEDURE — 99223 1ST HOSP IP/OBS HIGH 75: CPT

## 2020-08-06 PROCEDURE — 99285 EMERGENCY DEPT VISIT HI MDM: CPT

## 2020-08-06 PROCEDURE — 71045 X-RAY EXAM CHEST 1 VIEW: CPT | Mod: 26

## 2020-08-06 PROCEDURE — 93010 ELECTROCARDIOGRAM REPORT: CPT

## 2020-08-06 PROCEDURE — 99233 SBSQ HOSP IP/OBS HIGH 50: CPT

## 2020-08-06 RX ORDER — HEPARIN SODIUM 5000 [USP'U]/ML
5000 INJECTION INTRAVENOUS; SUBCUTANEOUS EVERY 8 HOURS
Refills: 0 | Status: DISCONTINUED | OUTPATIENT
Start: 2020-08-06 | End: 2020-08-07

## 2020-08-06 RX ORDER — GLUCAGON INJECTION, SOLUTION 0.5 MG/.1ML
1 INJECTION, SOLUTION SUBCUTANEOUS ONCE
Refills: 0 | Status: DISCONTINUED | OUTPATIENT
Start: 2020-08-06 | End: 2020-08-07

## 2020-08-06 RX ORDER — ASPIRIN/CALCIUM CARB/MAGNESIUM 324 MG
325 TABLET ORAL DAILY
Refills: 0 | Status: DISCONTINUED | OUTPATIENT
Start: 2020-08-06 | End: 2020-08-06

## 2020-08-06 RX ORDER — TAMSULOSIN HYDROCHLORIDE 0.4 MG/1
0.4 CAPSULE ORAL AT BEDTIME
Refills: 0 | Status: DISCONTINUED | OUTPATIENT
Start: 2020-08-06 | End: 2020-08-07

## 2020-08-06 RX ORDER — INSULIN LISPRO 100/ML
VIAL (ML) SUBCUTANEOUS
Refills: 0 | Status: DISCONTINUED | OUTPATIENT
Start: 2020-08-06 | End: 2020-08-07

## 2020-08-06 RX ORDER — DEXTROSE 50 % IN WATER 50 %
15 SYRINGE (ML) INTRAVENOUS ONCE
Refills: 0 | Status: DISCONTINUED | OUTPATIENT
Start: 2020-08-06 | End: 2020-08-07

## 2020-08-06 RX ORDER — DEXTROSE 50 % IN WATER 50 %
25 SYRINGE (ML) INTRAVENOUS ONCE
Refills: 0 | Status: DISCONTINUED | OUTPATIENT
Start: 2020-08-06 | End: 2020-08-07

## 2020-08-06 RX ORDER — ATORVASTATIN CALCIUM 80 MG/1
80 TABLET, FILM COATED ORAL AT BEDTIME
Refills: 0 | Status: DISCONTINUED | OUTPATIENT
Start: 2020-08-06 | End: 2020-08-07

## 2020-08-06 RX ORDER — INSULIN GLARGINE 100 [IU]/ML
20 INJECTION, SOLUTION SUBCUTANEOUS AT BEDTIME
Refills: 0 | Status: DISCONTINUED | OUTPATIENT
Start: 2020-08-06 | End: 2020-08-07

## 2020-08-06 RX ORDER — DEXTROSE 50 % IN WATER 50 %
12.5 SYRINGE (ML) INTRAVENOUS ONCE
Refills: 0 | Status: DISCONTINUED | OUTPATIENT
Start: 2020-08-06 | End: 2020-08-07

## 2020-08-06 RX ORDER — SODIUM CHLORIDE 9 MG/ML
1000 INJECTION, SOLUTION INTRAVENOUS
Refills: 0 | Status: DISCONTINUED | OUTPATIENT
Start: 2020-08-06 | End: 2020-08-07

## 2020-08-06 RX ORDER — CARVEDILOL PHOSPHATE 80 MG/1
25 CAPSULE, EXTENDED RELEASE ORAL EVERY 12 HOURS
Refills: 0 | Status: DISCONTINUED | OUTPATIENT
Start: 2020-08-06 | End: 2020-08-07

## 2020-08-06 RX ORDER — FUROSEMIDE 40 MG
80 TABLET ORAL
Refills: 0 | Status: COMPLETED | OUTPATIENT
Start: 2020-08-06 | End: 2020-08-07

## 2020-08-06 RX ORDER — PANTOPRAZOLE SODIUM 20 MG/1
40 TABLET, DELAYED RELEASE ORAL
Refills: 0 | Status: DISCONTINUED | OUTPATIENT
Start: 2020-08-06 | End: 2020-08-07

## 2020-08-06 RX ORDER — ASPIRIN/CALCIUM CARB/MAGNESIUM 324 MG
325 TABLET ORAL DAILY
Refills: 0 | Status: DISCONTINUED | OUTPATIENT
Start: 2020-08-06 | End: 2020-08-07

## 2020-08-06 RX ORDER — FUROSEMIDE 40 MG
40 TABLET ORAL ONCE
Refills: 0 | Status: COMPLETED | OUTPATIENT
Start: 2020-08-06 | End: 2020-08-06

## 2020-08-06 RX ORDER — HYDRALAZINE HCL 50 MG
25 TABLET ORAL THREE TIMES A DAY
Refills: 0 | Status: DISCONTINUED | OUTPATIENT
Start: 2020-08-06 | End: 2020-08-07

## 2020-08-06 RX ADMIN — Medication 80 MILLIGRAM(S): at 20:54

## 2020-08-06 RX ADMIN — INSULIN GLARGINE 20 UNIT(S): 100 INJECTION, SOLUTION SUBCUTANEOUS at 21:40

## 2020-08-06 RX ADMIN — ATORVASTATIN CALCIUM 80 MILLIGRAM(S): 80 TABLET, FILM COATED ORAL at 21:38

## 2020-08-06 RX ADMIN — Medication 25 MILLIGRAM(S): at 21:39

## 2020-08-06 RX ADMIN — TAMSULOSIN HYDROCHLORIDE 0.4 MILLIGRAM(S): 0.4 CAPSULE ORAL at 21:39

## 2020-08-06 RX ADMIN — HEPARIN SODIUM 5000 UNIT(S): 5000 INJECTION INTRAVENOUS; SUBCUTANEOUS at 21:37

## 2020-08-06 NOTE — H&P ADULT - HISTORY OF PRESENT ILLNESS
64 yo M w/ PMH of HTN, HLD, DM, CAD s/p CABG, HFpEF (LVEF of 60%), URVASHI, PVD, CKD presenting to the ED for chest pain. Patient states he started experiencing symptoms of chest pain starting yesteday night. He describes it as a R sided chest pain, non-radiating, 1-10, burning in sensation related to moving his body in the bed. He states hes never had the pain in the past. This morning he woke up with the pain, last for a few seconds so he presented to the ED. Endorses LE swelling and abdominal distension for a while. Also endorses orthopnea (2 pillows), denies palpitations, shortness of breath, cough. Reports compliance with medications. Reports making normal amount of urine,

## 2020-08-06 NOTE — ED PROVIDER NOTE - CLINICAL SUMMARY MEDICAL DECISION MAKING FREE TEXT BOX
66 y/o M Pt with hx of DM, HTN, HLD, CHF, CAD s/p stents x3 preseting with 1 day of r chest pain and increasing bilateral pitting edema. Will check labs, ekg, trop, cxr, bnp, 40 lasix, and sside cards consult and reeval.

## 2020-08-06 NOTE — ED ADULT NURSE NOTE - NSIMPLEMENTINTERV_GEN_ALL_ED
Implemented All Fall with Harm Risk Interventions:  [place] to call system. Call bell, personal items and telephone within reach. Instruct patient to call for assistance. Room bathroom lighting operational. Non-slip footwear when patient is off stretcher. Physically safe environment: no spills, clutter or unnecessary equipment. Stretcher in lowest position, wheels locked, appropriate side rails in place. Provide visual cue, wrist band, yellow gown, etc. Monitor gait and stability. Monitor for mental status changes and reorient to person, place, and time. Review medications for side effects contributing to fall risk. Reinforce activity limits and safety measures with patient and family. Provide visual clues: red socks.

## 2020-08-06 NOTE — CONSULT NOTE ADULT - ATTENDING COMMENTS
Pt is seen, examined, chart reviewed, d/w np/pa.  Management as outlined above.  Chest Pain: Serial CE, EKG  CHF Exacerbation: Echo   Echo 6/2019- LVEF-60%, grade II diastolic dysfunc. , mild mitral valve regurg, no pericardial effusion, mild LVH  Diuresis needed however BUN/Creatnin-63/3.71: As per nephrology team- Give Lasix 80mg IVP BID for 24hrs, will assess diuresis plan

## 2020-08-06 NOTE — H&P ADULT - NSHPREVIEWOFSYSTEMS_GEN_ALL_CORE
Review of Systems:  CONSTITUTIONAL: No weakness, fevers or chills  EYES/ENT: No visual changes;  No vertigo or throat pain   NECK: No pain or stiffness  RESPIRATORY: No cough, wheezing, hemoptysis; No shortness of breath,   CARDIOVASCULAR: No palpitations  GASTROINTESTINAL: No abdominal or epigastric pain. No nausea, vomiting, or hematemesis; No diarrhea or constipation.   GENITOURINARY: No dysuria, frequency or hematuria  NEUROLOGICAL: No numbness or weakness  SKIN: No itching, burning, rashes, or lesions   All other review of systems is negative unless indicated above

## 2020-08-06 NOTE — H&P ADULT - NSHPPHYSICALEXAM_GEN_ALL_CORE
Vital Signs Last 24 Hrs  T(F): 97.7 (06 Aug 2020 15:23), Max: 97.7 (06 Aug 2020 15:23)  HR: 68 (06 Aug 2020 15:23) (68 - 68)  BP: 158/76 (06 Aug 2020 15:23) (158/76 - 158/76)  RR: 18 (06 Aug 2020 15:23) (18 - 18)  SpO2: 96% (06 Aug 2020 15:23) (96% - 96%)    Physical Exam:  Constitutional: Obese, laying in bed comfortably, NAD, awake and alert, well-developed  HEENT: NC/AT, PERRL, EOMI  Respiratory: cta b/l no wheezing no rhonchi  Cardiovascular: Regular rate and rhyhtm, no murmurs, gallops, rubs  Gastrointestinal: Soft, distended, NT/ND, BS+  Vascular: 2+ peripheral pulses  Neurological: A/O x 3, no focal neurological deficits  Musculoskeletal: 5/5 strength b/l upper and lower extremities, 2 + edema bilaterally  Skin: normal  Psych: normal mood

## 2020-08-06 NOTE — CONSULT NOTE ADULT - ASSESSMENT
Pt is a 66 y/o male with medical history of HTN, HLD, DM, CAD s/p CABG (12/2014- LIMA-LAD reverse SVG to posterior lateral reverse SVG to OM), HFpEF(LVEF-60%), URVASHI, PVD, CKD who presents to [facility]ED for chest pain. Pt states that He woke this AM with right sided chest pain described as pressure, 1/10, intermittent, worse with movement of RUE, non-radiating, not reproduced on palpation but able to reproduce when RUE is moved. Pt also c/o of increased bilateral leg swelling over the past month, +SOB, +Orthopnea, and abd. distention. Pt states he has been compliant with medication, and has been compliant with low sodium diet. In ED, ECG- NSR HR @ 1 st degree AVB HR @ 66, BNP-1512, Trop#1-negative, BUN/Creatnin-63/3.71, Xray-no acute findings. Pt resting comfortably in stretcher, presents with bilateral lower extremity edema and bibasilar diminished breath sounds. Pt also has +abd distention.     Chest Pain  -ECG-NSR HR @ 1 st degree AVB HR @ 66  -Trop#1-negative   -Pt chest pain illicited by RUE movement, likely M/S etiology    CHF Exacerbation  -Xray-no acute findings  -BNP-1512  -Echo 6/2019- LVEF-60%, grade II diastolic dysfx. , mild mitral valve regurg, no pericardial effusion, mild LVH  -Echo-ordered and pending to assess fluid status and EF changes  -Diuresis needed however BUN/Creatnin-63/3.71   -Recommend nephrology recommendations for diuresis    Acute on Chronic Kidney Disease  -BUN/Creatnin-63/3.71  -Nephrology eval for diuresis plan in setting of MELISSA   -Pt sees Dr. Andersen outpatient Pt is a 64 y/o male with medical history of HTN, HLD, DM, CAD s/p CABG (12/2014- LIMA-LAD reverse SVG to posterior lateral reverse SVG to OM), HFpEF(LVEF-60%), URVASHI, PVD, CKD who presents [facility] for chest pain. Pt states that He woke this AM with right sided chest pain described as pressure, 1/10, intermittent, worse with movement of RUE, non-radiating, not reproduced on palpation but able to reproduce when RUE is moved. Pt also c/o of increased bilateral leg swelling over the past month, +SOB, +Orthopnea, and abd. distention. Pt states he has been compliant with medication, and has been compliant with low sodium diet. In ED, ECG- NSR HR @ 1 st degree AVB HR @ 66, BNP-1512, Trop#1-negative, BUN/Creatnin-63/3.71, Xray-no acute findings. Pt resting comfortably in stretcher, presents with bilateral lower extremity edema and bibasilar diminished breath sounds. Pt also has +abd distention.     Chest Pain  -ECG-NSR HR @ 1 st degree AVB HR @ 66  -Trop#1-negative   -Pt chest pain illicited by RUE movement, likely M/S etiology    CHF Exacerbation  -Xray-no acute findings  -BNP-1512  -Echo 6/2019- LVEF-60%, grade II diastolic dysfx. , mild mitral valve regurg, no pericardial effusion, mild LVH  -Echo-ordered and pending to assess fluid status and EF changes  -Diuresis needed however BUN/Creatnin-63/3.71   -As per nephrology team- Give Lasix 80mg IVP BID for 24hrs, will assess diuresis plan   -Monitor Creatnin    Acute on Chronic Kidney Disease  -BUN/Creatnin-63/3.71  -Nephrology eval  - As per nephrology team- Give Lasix 80mg IVP BID for 24hrs, will assess diuresis plan  -Pt sees Dr. Andersen outpatient

## 2020-08-06 NOTE — CONSULT NOTE ADULT - SUBJECTIVE AND OBJECTIVE BOX
[facility] DIVISION OF KIDNEY DISEASES AND HYPERTENSION -- INITIAL CONSULT NOTE  --------------------------------------------------------------------------------  HPI:        PAST HISTORY  --------------------------------------------------------------------------------  PAST MEDICAL & SURGICAL HISTORY:  Acute on chronic congestive heart failure, unspecified congestive heart failure type  Myocardial infarction: (Coronary angiogram 2014)  High cholesterol  HTN (hypertension)  DM (diabetes mellitus)  S/P coronary artery bypass with three autogenous grafts    FAMILY HISTORY:  Family history of heart disease (Sibling)    PAST SOCIAL HISTORY:    ALLERGIES & MEDICATIONS  --------------------------------------------------------------------------------  Allergies    No Known Allergies    Intolerances      Standing Inpatient Medications  aspirin 325 milliGRAM(s) Oral daily  atorvastatin 80 milliGRAM(s) Oral at bedtime  carvedilol 25 milliGRAM(s) Oral every 12 hours  furosemide   Injectable 80 milliGRAM(s) IV Push two times a day  hydrALAZINE 25 milliGRAM(s) Oral three times a day  multivitamin 1 Tablet(s) Oral daily  pantoprazole    Tablet 40 milliGRAM(s) Oral before breakfast  tamsulosin Oral Tab/Cap - Peds 0.4 milliGRAM(s) Oral at bedtime    PRN Inpatient Medications      REVIEW OF SYSTEMS  --------------------------------------------------------------------------------  Gen: No weight changes, fatigue, fevers/chills, weakness  Skin: No rashes  Head/Eyes/Ears/Mouth: No headache; Normal hearing; Normal vision w/o blurriness; No sinus pain/discomfort, sore throat  Respiratory: No dyspnea, cough, wheezing, hemoptysis  CV: No chest pain, PND, orthopnea  GI: No abdominal pain, diarrhea, constipation, nausea, vomiting, melena, hematochezia  : No increased frequency, dysuria, hematuria, nocturia  MSK: No joint pain/swelling; no back pain; no edema  Neuro: No dizziness/lightheadedness, weakness, seizures, numbness, tingling  Heme: No easy bruising or bleeding  Endo: No heat/cold intolerance  Psych: No significant nervousness, anxiety, stress, depression    All other systems were reviewed and are negative, except as noted.    VITALS/PHYSICAL EXAM  --------------------------------------------------------------------------------  T(C): 36.5 (08-06-20 @ 15:23), Max: 36.5 (08-06-20 @ 15:23)  HR: 68 (08-06-20 @ 15:23) (68 - 68)  BP: 158/76 (08-06-20 @ 15:23) (158/76 - 158/76)  RR: 18 (08-06-20 @ 15:23) (18 - 18)  SpO2: 96% (08-06-20 @ 15:23) (96% - 96%)  Wt(kg): --  Height (cm): 165.1 (08-06-20 @ 15:23)  Weight (kg): 81.6 (08-06-20 @ 15:23)  BMI (kg/m2): 29.9 (08-06-20 @ 15:23)  BSA (m2): 1.89 (08-06-20 @ 15:23)      Physical Exam:  	Gen: NAD, well-appearing  	HEENT: PERRL, supple neck, clear oropharynx  	Pulm: CTA B/L  	CV: RRR, S1S2; no rub  	Back: No spinal or CVA tenderness; no sacral edema  	Abd: +BS, soft, nontender/nondistended  	: No suprapubic tenderness  	UE: Warm, FROM, no clubbing, intact strength; no edema; no asterixis  	LE: Warm, FROM, no clubbing, intact strength; no edema  	Neuro: No focal deficits, intact gait  	Psych: Normal affect and mood  	Skin: Warm, without rashes  	Vascular access:    LABS/STUDIES  --------------------------------------------------------------------------------              12.5   9.68  >-----------<  269      [08-06-20 @ 16:25]              38.3     141  |  102  |  63.0  ----------------------------<  87      [08-06-20 @ 16:25]  4.0   |  23.0  |  3.71        Ca     8.9     [08-06-20 @ 16:25]    TPro  6.9  /  Alb  3.8  /  TBili  0.3  /  DBili  x   /  AST  18  /  ALT  20  /  AlkPhos  108  [08-06-20 @ 16:25]    PT/INR: PT 11.6 , INR 1.00       [08-06-20 @ 16:25]  PTT: 31.8       [08-06-20 @ 16:25]    Troponin 0.05      [08-06-20 @ 16:25]    Creatinine Trend:  SCr 3.71 [08-06 @ 16:25]    Urinalysis - [03-07-16 @ 14:43]      Color Yellow / Appearance Clear / SG 1.010 / pH 5.0      Gluc Negative / Ketone Negative  / Bili Negative / Urobili Negative       Blood Negative / Protein 30 / Leuk Est Negative / Nitrite Negative      RBC  / WBC 0-3 / Hyaline 0-2 / Gran  / Sq Epi  / Non Sq Epi Occasional / Bacteria       HbA1c 8.3      [03-07-16 @ 17:02]    HCV 0.09, Nonreact      [03-06-16 @ 13:31] [facility] DIVISION OF KIDNEY DISEASES AND HYPERTENSION -- INITIAL CONSULT NOTE  --------------------------------------------------------------------------------  HPI:    "65 year old man with PMH of HTN, HLD, DM, CAD s/p CABG, HFpEF (LVEF of 60%), URVASHI, PVD, CKD presenting to the ED for chest pain. Patient states he started experiencing symptoms of chest pain starting yesterday night. He describes it as a R sided chest pain, non-radiating, 1-10, burning in sensation related to moving his body in the bed. He states he's never had the pain in the past. This morning he woke up with the pain, last for a few seconds so he presented to the ED. Endorses LE swelling and abdominal distension for a while. Also endorses orthopnea (2 pillows), denies palpitations, shortness of breath, cough. Reports compliance with medications. Reports making normal amount of urine."    Pt seen and examined; reports he sees Dr. Andersen (Nephrology) and he was told he will be needing dialysis in the near future.  PAST HISTORY  --------------------------------------------------------------------------------  PAST MEDICAL & SURGICAL HISTORY:  Acute on chronic congestive heart failure, unspecified congestive heart failure type  Myocardial infarction: (Coronary angiogram 2014)  High cholesterol  HTN (hypertension)  DM (diabetes mellitus)  S/P coronary artery bypass with three autogenous grafts    FAMILY HISTORY:  Family history of heart disease (Sibling)    PAST SOCIAL HISTORY: lives at home    ALLERGIES & MEDICATIONS  --------------------------------------------------------------------------------  Allergies    No Known Allergies    Intolerances      Standing Inpatient Medications  aspirin 325 milliGRAM(s) Oral daily  atorvastatin 80 milliGRAM(s) Oral at bedtime  carvedilol 25 milliGRAM(s) Oral every 12 hours  furosemide   Injectable 80 milliGRAM(s) IV Push two times a day  hydrALAZINE 25 milliGRAM(s) Oral three times a day  multivitamin 1 Tablet(s) Oral daily  pantoprazole    Tablet 40 milliGRAM(s) Oral before breakfast  tamsulosin Oral Tab/Cap - Peds 0.4 milliGRAM(s) Oral at bedtime    PRN Inpatient Medications      REVIEW OF SYSTEMS  --------------------------------------------------------------------------------  Gen: No weight changes, fatigue, fevers/chills, weakness  Skin: No rashes  Head/Eyes/Ears/Mouth: No headache; Normal hearing; Normal vision w/o blurriness  Respiratory: No dyspnea, cough, wheezing, hemoptysis  CV:  chest pain+  GI: No abdominal pain, diarrhea, constipation, nausea, vomiting  : No increased frequency, dysuria, hematuria, nocturia  MSK: No joint pain/swelling; no back pain;  edema+  Neuro: No dizziness/lightheadedness, weakness  Heme: No easy bruising or bleeding  Endo: No heat/cold intolerance  Psych: No significant nervousness, anxiety, stress, depression    All other systems were reviewed and are negative, except as noted.    VITALS/PHYSICAL EXAM  --------------------------------------------------------------------------------  T(C): 36.5 (08-06-20 @ 15:23), Max: 36.5 (08-06-20 @ 15:23)  HR: 68 (08-06-20 @ 15:23) (68 - 68)  BP: 158/76 (08-06-20 @ 15:23) (158/76 - 158/76)  RR: 18 (08-06-20 @ 15:23) (18 - 18)  SpO2: 96% (08-06-20 @ 15:23) (96% - 96%)  Wt(kg): --  Height (cm): 165.1 (08-06-20 @ 15:23)  Weight (kg): 81.6 (08-06-20 @ 15:23)  BMI (kg/m2): 29.9 (08-06-20 @ 15:23)  BSA (m2): 1.89 (08-06-20 @ 15:23)      Physical Exam:  	Gen: NAD  	HEENT:  supple neck  	Pulm: decreased breath sounds +  	CV: RRR, S1S2; no rub  	Back: No spinal or CVA tenderness; no sacral edema  	Abd: +BS, soft, nontender/nondistended  	: No suprapubic tenderness  	UE: Warm, no edema  	LE: Warm,  edema+  	Neuro: No focal deficits, intact gait  	Psych: Normal affect and mood  	Skin: Warm, without rashes      LABS/STUDIES  --------------------------------------------------------------------------------              12.5   9.68  >-----------<  269      [08-06-20 @ 16:25]              38.3     141  |  102  |  63.0  ----------------------------<  87      [08-06-20 @ 16:25]  4.0   |  23.0  |  3.71        Ca     8.9     [08-06-20 @ 16:25]    TPro  6.9  /  Alb  3.8  /  TBili  0.3  /  DBili  x   /  AST  18  /  ALT  20  /  AlkPhos  108  [08-06-20 @ 16:25]    PT/INR: PT 11.6 , INR 1.00       [08-06-20 @ 16:25]  PTT: 31.8       [08-06-20 @ 16:25]    Troponin 0.05      [08-06-20 @ 16:25]    Creatinine Trend:  SCr 3.71 [08-06 @ 16:25]    Urinalysis - [03-07-16 @ 14:43]      Color Yellow / Appearance Clear / SG 1.010 / pH 5.0      Gluc Negative / Ketone Negative  / Bili Negative / Urobili Negative       Blood Negative / Protein 30 / Leuk Est Negative / Nitrite Negative      RBC  / WBC 0-3 / Hyaline 0-2 / Gran  / Sq Epi  / Non Sq Epi Occasional / Bacteria       HbA1c 8.3      [03-07-16 @ 17:02]    HCV 0.09, Nonreact      [03-06-16 @ 13:31]

## 2020-08-06 NOTE — ED PROVIDER NOTE - OBJECTIVE STATEMENT
66 y/o M pt with hx of HTN, HLD, DM, CAD s/p 3 stents, CHF presenting today with cc of R sided chest pain onset yesterday evening. Pt reports pain is non-exertional, non-pleuretic, and non-radiating. Notes increased swelling to bilateral legs. Has been taking his meds daily. Sees Dr. Pro for cards. Last visit was 4 months ago. Does not remember if he received a stress test then. Pt denies any dyspnea, fevers, n/v/d, abdominal pain, dysuria, headache, cough, congestion, sore throat, neck pain, back pain, weakness, numbness, tingling, dizziness, syncope, or other complaint.

## 2020-08-06 NOTE — H&P ADULT - NSICDXPASTMEDICALHX_GEN_ALL_CORE_FT
PAST MEDICAL HISTORY:  Acute on chronic congestive heart failure, unspecified congestive heart failure type     DM (diabetes mellitus)     High cholesterol     HTN (hypertension)     Myocardial infarction (Coronary angiogram 2014)

## 2020-08-06 NOTE — ED PROVIDER NOTE - NS_EDPROVIDERDISPOUSERTYPE_ED_A_ED
Alert & oriented; no sensory, motor or coordination deficits, normal reflexes Attending Attestation (For Attendings USE Only)...

## 2020-08-06 NOTE — H&P ADULT - NSHPLABSRESULTS_GEN_ALL_CORE
12.5   9.68  )-----------( 269      ( 06 Aug 2020 16:25 )             38.3   08-06    141  |  102  |  63.0<H>  ----------------------------<  87  4.0   |  23.0  |  3.71<H>    Ca    8.9      06 Aug 2020 16:25    TPro  6.9  /  Alb  3.8  /  TBili  0.3<L>  /  DBili  x   /  AST  18  /  ALT  20  /  AlkPhos  108  08-06

## 2020-08-06 NOTE — ED PROVIDER NOTE - ATTENDING CONTRIBUTION TO CARE
Lico: I performed a face to face evaluation of this patient and performed a full history and physical examination on the patient.  I agree with the resident's history, physical examination, and plan of the patient unless otherwise noted. My brief assessment is as follows: pmh as documented with 1 day right chest pain and worsening sob and b/l LE swelling. no f/c, no cough, no hx dvt/pe. follows with [facility] cards. ?Nl stress 4 months ago. non toxic, with htn, b/l crackles, rrr, abd benign, small edema b/l LE, neuro intact. ekg non ischemic. labs, cxr, lasix, cards, reassess.

## 2020-08-06 NOTE — ED PROVIDER NOTE - CARE PLAN
08/12/19 0900   Team Meeting   Meeting Type Daily Rounds   Team Members Present   Team Members Present Physician;Nurse;; Other (Discipline and Name)   Physician Team Member Dr Meldoy Elizalde Team Member Kandice Kenny RN   Care Management Team Member Brenda Mai   Other (Discipline and Name) Omar Beaver [place]; Soren Rather, [facility]     Patient presented attention seeking over the weekend  Somatic in complaints and still preoccupied with oxygen, stating she needed it after her shower, however, her pulse ox was WNL  Patient is isolative to her room but communicates with her roommate  Slept  Principal Discharge DX:	Acute on chronic kidney failure  Secondary Diagnosis:	CHF exacerbation

## 2020-08-06 NOTE — CONSULT NOTE ADULT - SUBJECTIVE AND OBJECTIVE BOX
[place] [facility] [place] [facility]                                                               Office:  [address] [address]                                                              Telephone: [phone]. Fax:[phone]                                                                        CARDIOLOGY CONSULTATION NOTE                                                                                             Consult requested by:  Dr. Chairez  Reason for Consultation: Chest Pain  History obtained by: Patient and medical record   obtained: No    Chief complaint:    Patient is a 65y old  Male who presents with a chief complaint of chest pain      HPI: Pt is a 66 y/o male with medical history of HTN, HLD, DM, CAD s/p CABG (12/2014- LIMA-LAD reverse SVG to posterior lateral reverse SVG to OM), HFpEF(LVEF-60%), URVASHI, PVD, CKD who presents to [facility]ED for chest pain.         REVIEW OF SYMPTOMS:     CONSTITUTIONAL: No fever, weight loss, or fatigue  ENMT:  No difficulty hearing, tinnitus, vertigo; No sinus or throat pain  NECK: No pain or stiffness  CARDIOVASCULAR: No chest pain, dyspnea, syncope, palpitations, dizziness, Orthopnea, Paroxsymal nocturnal dyspnea  RESPIRATORY: No Dyspnea on exertion, Shortness of breath, cough, wheezing  : No dysuria, no hematuria   GI: No dark color stool, no melena, no diarrhea, no constipation, no abdominal pain   NEURO: No headache, no dizziness, no slurred speech   MUSCULOSKELETAL: No joint pain or swelling; No muscle, back, or extremity pain  PSYCH: No agitation, no anxiety.    ALL OTHER REVIEW OF SYSTEMS ARE NEGATIVE.      PREVIOUS DIAGNOSTIC TESTING  ECHO FINDINGS: As per outpatient note 6/2019- LVEF-60%, grade II diastolic dysfx. , mild mitral valve regurg, no pericardial effusion, mild LVH      STRESS FINDINGS: As per outpatient note 2018- normal study, no ischemia/infarction, LVEF 75%      CATHETERIZATION FINDINGS: < from: Cardiac Cath Lab - Adult (10.31.14 @ 18:10) >  CORONARY VESSELS: The coronary circulation is right dominant.  LM:   --  Distal left main: There was a 20 % stenosis.  LAD:   --  Mid LAD: There was a 80 % stenosis. By IVUS  --  Distal LAD: There was a 70 % stenosis.  --  D2: There was a 80 % stenosis.  CX:   --  OM1: There was a 80 % stenosis.  --  OM2: There was a 90 % stenosis.  --  OM3: There was a 90 % stenosis.  RCA:   --  Mid RCA: There was a 50 % stenosis.  --  RPDA: There was a 90 % stenosis.  --  RPL3: There was a 90 % stenosis.  COMPLICATIONS: No complications occurred during the cath lab visit.  DIAGNOSTIC IMPRESSIONS: Severe multivessel disease.  Normal LV function.    < end of copied text >          ALLERGIES: Allergies    No Known Allergies    Intolerances          PAST MEDICAL HISTORY  Acute on chronic congestive heart failure, unspecified congestive heart failure type  Myocardial infarction  High cholesterol  HTN (hypertension)  DM (diabetes mellitus)      PAST SURGICAL HISTORY  S/P coronary artery bypass with three autogenous grafts  No significant past surgical history      FAMILY HISTORY:  Family history of heart disease (Sibling)      SOCIAL HISTORY:  Denies smoking/alcohol/drugs  CIGARETTES:     ALCOHOL:  DRUGS:      CURRENT MEDICATIONS:  furosemide   Injectable 40 milliGRAM(s) IV Push Once           HOME MEDICATIONS:    insulin glargine 100 units/mL subcutaneous solution: 30 unit(s) subcutaneous once a day (at bedtime) (06 Mar 2016 00:17)  insulin lispro 100 units/mL subcutaneous solution: 5 unit(s) subcutaneous 3 times a day (before meals) (17 Dec 2014 11:03)  multivitamin: 1 tab(s) orally once a day (11 Dec 2014 06:17)      Vital Signs Last 24 Hrs  T(C): 36.5 (06 Aug 2020 15:23), Max: 36.5 (06 Aug 2020 15:23)  T(F): 97.7 (06 Aug 2020 15:23), Max: 97.7 (06 Aug 2020 15:23)  HR: 68 (06 Aug 2020 15:23) (68 - 68)  BP: 158/76 (06 Aug 2020 15:23) (158/76 - 158/76)  RR: 18 (06 Aug 2020 15:23) (18 - 18)  SpO2: 96% (06 Aug 2020 15:23) (96% - 96%)      PHYSICAL EXAM:  Constitutional: Comfortable . No acute distress.   HEENT: Atraumatic and normocephalic , neck is supple . no JVD. No carotid bruit. PEERL   CNS: A&Ox3. No focal deficits. EOMI.   Lymph Nodes: Cervical : Not palpable.  Respiratory: CTAB  Cardiovascular: S1S2 RRR. No murmur/rubs or gallop.  Gastrointestinal: Soft non-tender and non distended . +Bowel sounds. negative Truong's sign.  Extremities: No edema.   Psychiatric: Calm . no agitation.  Skin: No skin rash/ulcers visualized to face, hands or feet.    Intake and output:     LABS:            ;p-BNP=        INTERPRETATION OF TELEMETRY:   ECG: SR HR @ 66, 1st degree AVB     RADIOLOGY & ADDITIONAL STUDIES:    X-ray: pending [place] [facility]                                                               Office:  [address] [address]                                                              Telephone: [phone]. Fax:[phone]                                                                        CARDIOLOGY CONSULTATION NOTE                                                                                             Consult requested by:  Dr. Chairez  Reason for Consultation: Chest Pain  History obtained by: Patient and medical record   obtained: No    Chief complaint:    Patient is a 65y old  Male who presents with a chief complaint of chest pain      HPI: Pt is a 64 y/o male with medical history of HTN, HLD, DM, CAD s/p CABG (12/2014- LIMA-LAD reverse SVG to posterior lateral reverse SVG to OM), HFpEF(LVEF-60%), URVASHI, PVD, CKD who presents to [facility]-ED for chest pain. Pt states that He woke this AM with right sided chest pain described as pressure, 1/10, intermittent, worse with movement of RUE, non-radiating, not reproduced on palpation but able to reproduce when RUE is moved. Pt also c/o of increased bilateral leg swelling over the past month, +SOB, +Orthopnea, and abd. distention. Pt states he has been compliant with medication, and has been compliant with low sodium diet. In ED, ECG- NSR HR @ 1 st degree AVB HR @ 66, BNP-1512, Trop#1-negative, BUN/Creatnin-63/3.71, Xray-no acute findings. Pt resting comfortably in stretcher, presents with bilateral lower extremity edema and bibasilar diminished breath sounds. Pt also has +adb distention.         REVIEW OF SYMPTOMS:     CONSTITUTIONAL: No fever, weight loss, or fatigue  ENMT:  No difficulty hearing, tinnitus, vertigo; No sinus or throat pain  NECK: No pain or stiffness  CARDIOVASCULAR: No chest pain, dyspnea, syncope, palpitations, dizziness, Orthopnea, Paroxsymal nocturnal dyspnea  RESPIRATORY: No Dyspnea on exertion, Shortness of breath, cough, wheezing  : No dysuria, no hematuria   GI: No dark color stool, no melena, no diarrhea, no constipation, no abdominal pain   NEURO: No headache, no dizziness, no slurred speech   MUSCULOSKELETAL: No joint pain or swelling; No muscle, back, or extremity pain  PSYCH: No agitation, no anxiety.    ALL OTHER REVIEW OF SYSTEMS ARE NEGATIVE.      PREVIOUS DIAGNOSTIC TESTING  ECHO FINDINGS: As per outpatient note 6/2019- LVEF-60%, grade II diastolic dysfx. , mild mitral valve regurg, no pericardial effusion, mild LVH      STRESS FINDINGS: As per outpatient note 2018- normal study, no ischemia/infarction, LVEF 75%      CATHETERIZATION FINDINGS: < from: Cardiac Cath Lab - Adult (10.31.14 @ 18:10) >  CORONARY VESSELS: The coronary circulation is right dominant.  LM:   --  Distal left main: There was a 20 % stenosis.  LAD:   --  Mid LAD: There was a 80 % stenosis. By IVUS  --  Distal LAD: There was a 70 % stenosis.  --  D2: There was a 80 % stenosis.  CX:   --  OM1: There was a 80 % stenosis.  --  OM2: There was a 90 % stenosis.  --  OM3: There was a 90 % stenosis.  RCA:   --  Mid RCA: There was a 50 % stenosis.--  RPDA: There was a 90 % stenosis.  --  RPL3: There was a 90 % stenosis.  COMPLICATIONS: No complications occurred during the cath lab visit.  DIAGNOSTIC IMPRESSIONS: Severe multivessel disease.  Normal LV function.    < end of copied text >          ALLERGIES: Allergies    No Known Allergies    Intolerances          PAST MEDICAL HISTORY  Acute on chronic congestive heart failure, unspecified congestive heart failure type  Myocardial infarction  High cholesterol  HTN (hypertension)  DM (diabetes mellitus)      PAST SURGICAL HISTORY  S/P coronary artery bypass with three autogenous grafts  No significant past surgical history      FAMILY HISTORY:  Family history of heart disease (Sibling)      SOCIAL HISTORY:  Denies smoking/alcohol/drugs  CIGARETTES:     ALCOHOL:  DRUGS:      CURRENT MEDICATIONS:  furosemide   Injectable 40 milliGRAM(s) IV Push Once           HOME MEDICATIONS:    insulin glargine 100 units/mL subcutaneous solution: 30 unit(s) subcutaneous once a day (at bedtime) (06 Mar 2016 00:17)  insulin lispro 100 units/mL subcutaneous solution: 5 unit(s) subcutaneous 3 times a day (before meals) (17 Dec 2014 11:03)  multivitamin: 1 tab(s) orally once a day (11 Dec 2014 06:17)      Vital Signs Last 24 Hrs  T(C): 36.5 (06 Aug 2020 15:23), Max: 36.5 (06 Aug 2020 15:23)  T(F): 97.7 (06 Aug 2020 15:23), Max: 97.7 (06 Aug 2020 15:23)  HR: 68 (06 Aug 2020 15:23) (68 - 68)  BP: 158/76 (06 Aug 2020 15:23) (158/76 - 158/76)  RR: 18 (06 Aug 2020 15:23) (18 - 18)  SpO2: 96% (06 Aug 2020 15:23) (96% - 96%)      PHYSICAL EXAM:  Constitutional: Comfortable . No acute distress.   HEENT: Atraumatic and normocephalic , neck is supple . no JVD. No carotid bruit. PEERL   CNS: A&Ox3. No focal deficits. EOMI.   Lymph Nodes: Cervical : Not palpable.  Respiratory: CTAB  Cardiovascular: S1S2 RRR. No murmur/rubs or gallop.  Gastrointestinal: Soft non-tender and non distended . +Bowel sounds. negative Truong's sign.  Extremities: No edema.   Psychiatric: Calm . no agitation.  Skin: No skin rash/ulcers visualized to face, hands or feet.    Intake and output:     LABS:            ;p-BNP=        INTERPRETATION OF TELEMETRY:   ECG: SR HR @ 66, 1st degree AVB     RADIOLOGY & ADDITIONAL STUDIES:    X-ray: pending [place] [facility]                                                               Office:  [address] [address]                                                              Telephone: [phone]. Fax:[phone]                                                                        CARDIOLOGY CONSULTATION NOTE                                                                                             Consult requested by:  Dr. Chairez  Reason for Consultation: Chest Pain  History obtained by: Patient and medical record   obtained: No    Chief complaint:    Patient is a 65y old  Male who presents with a chief complaint of chest pain      HPI: Pt is a 64 y/o male with medical history of HTN, HLD, DM, CAD s/p CABG (12/2014- LIMA-LAD reverse SVG to posterior lateral reverse SVG to OM), HFpEF(LVEF-60%), URVASHI, PVD, CKD who presents to [facility]-ED for chest pain. Pt states that He woke this AM with right sided chest pain described as pressure, 1/10, intermittent, worse with movement of RUE, non-radiating, not reproduced on palpation but able to reproduce when RUE is moved. Pt also c/o of increased bilateral leg swelling over the past month, +SOB, +Orthopnea, and abd. distention. Pt states he has been compliant with medication, and has been compliant with low sodium diet. In ED, ECG- NSR HR @ 1 st degree AVB HR @ 66, BNP-1512, Trop#1-negative, BUN/Creatnin-63/3.71, Xray-no acute findings. Pt resting comfortably in stretcher, presents with bilateral lower extremity edema and bibasilar diminished breath sounds. Pt also has +abd distention.         REVIEW OF SYMPTOMS:     CONSTITUTIONAL: No fever, weight loss, or fatigue  ENMT:  No difficulty hearing, tinnitus, vertigo; No sinus or throat pain  NECK: No pain or stiffness  CARDIOVASCULAR: No chest pain, dyspnea, syncope, palpitations, dizziness, Orthopnea, Paroxsymal nocturnal dyspnea  RESPIRATORY: No Dyspnea on exertion, Shortness of breath, cough, wheezing  : No dysuria, no hematuria   GI: No dark color stool, no melena, no diarrhea, no constipation, no abdominal pain   NEURO: No headache, no dizziness, no slurred speech   MUSCULOSKELETAL: No joint pain or swelling; No muscle, back, or extremity pain  PSYCH: No agitation, no anxiety.    ALL OTHER REVIEW OF SYSTEMS ARE NEGATIVE.      PREVIOUS DIAGNOSTIC TESTING  ECHO FINDINGS: As per outpatient note 6/2019- LVEF-60%, grade II diastolic dysfx. , mild mitral valve regurg, no pericardial effusion, mild LVH      STRESS FINDINGS: As per outpatient note 2018- normal study, no ischemia/infarction, LVEF 75%      CATHETERIZATION FINDINGS: < from: Cardiac Cath Lab - Adult (10.31.14 @ 18:10) >  CORONARY VESSELS: The coronary circulation is right dominant.  LM:   --  Distal left main: There was a 20 % stenosis.  LAD:   --  Mid LAD: There was a 80 % stenosis. By IVUS  --  Distal LAD: There was a 70 % stenosis.  --  D2: There was a 80 % stenosis.  CX:   --  OM1: There was a 80 % stenosis.  --  OM2: There was a 90 % stenosis.  --  OM3: There was a 90 % stenosis.  RCA:   --  Mid RCA: There was a 50 % stenosis.--  RPDA: There was a 90 % stenosis.  --  RPL3: There was a 90 % stenosis.  COMPLICATIONS: No complications occurred during the cath lab visit.  DIAGNOSTIC IMPRESSIONS: Severe multivessel disease.  Normal LV function.    < end of copied text >          ALLERGIES: Allergies    No Known Allergies    Intolerances          PAST MEDICAL HISTORY  Acute on chronic congestive heart failure, unspecified congestive heart failure type  Myocardial infarction  High cholesterol  HTN (hypertension)  DM (diabetes mellitus)      PAST SURGICAL HISTORY  S/P coronary artery bypass with three autogenous grafts  No significant past surgical history      FAMILY HISTORY:  Family history of heart disease (Sibling)      SOCIAL HISTORY:  Denies smoking/alcohol/drugs  CIGARETTES: former smoker quit 5 years ago    ALCOHOL: Denies  DRUGS: Denies      CURRENT MEDICATIONS:  furosemide   Injectable 40 milliGRAM(s) IV Push Once           HOME MEDICATIONS:    insulin glargine 100 units/mL subcutaneous solution: 30 unit(s) subcutaneous once a day (at bedtime) (06 Mar 2016 00:17)  insulin lispro 100 units/mL subcutaneous solution: 5 unit(s) subcutaneous 3 times a day (before meals) (17 Dec 2014 11:03)  multivitamin: 1 tab(s) orally once a day (11 Dec 2014 06:17)      Vital Signs Last 24 Hrs  T(C): 36.5 (06 Aug 2020 15:23), Max: 36.5 (06 Aug 2020 15:23)  T(F): 97.7 (06 Aug 2020 15:23), Max: 97.7 (06 Aug 2020 15:23)  HR: 68 (06 Aug 2020 15:23) (68 - 68)  BP: 158/76 (06 Aug 2020 15:23) (158/76 - 158/76)  RR: 18 (06 Aug 2020 15:23) (18 - 18)  SpO2: 96% (06 Aug 2020 15:23) (96% - 96%)      PHYSICAL EXAM:  Constitutional: Comfortable . No acute distress.   HEENT: Atraumatic and normocephalic , neck is supple . no JVD. No carotid bruit. PEERL   CNS: A&Ox3. No focal deficits. EOMI.   Lymph Nodes: Cervical : Not palpable.  Respiratory: bibasilar diminished breath sounds  Cardiovascular: S1S2 RRR. No murmur/rubs or gallop.  Gastrointestinal: Soft non-tender and +distended . +Bowel sounds. negative Truong's sign.  Extremities: bilateral lower extremity pitting +2/+2 edema.   Psychiatric: Calm . no agitation.  Skin: No skin rash/ulcers visualized to face, hands or feet.    Intake and output:     LABS:            ;p-BNP=        INTERPRETATION OF TELEMETRY: NSR HR @   ECG: SR HR @ 66, 1st degree AVB     RADIOLOGY & ADDITIONAL STUDIES:    X-ray: pending

## 2020-08-06 NOTE — H&P ADULT - ASSESSMENT
66 yo M w/ PMH of HTN, HLD, DM, CAD s/p CABG, HFpEF (LVEF of 60%), URVASHI, PVD, CKD presenting to the ED for chest pain. LE swelling likely 2/2 to worsening MELISSA on CKD and diastolic CHF exacerbation.     LE swelling 2/2 likely MELISSA on CKD and acute on chronic diastolic CHF  - Lasix 80 IV BID  - Urine studies  - Renal US  - I & Os, daily weights, salt restrict  - Nephrology consult    Atypical chest pain likely MSK as exacerbated by movement of RUE, hx of CAD s/p CABG, HFpEF  - Resolved  - EKG NSR, cycle EKG  - Troponin negative x 1, trend  - Cardiac monitoring  - C/w ASA    DM2, HLD, PVD  - Lantus 20u, sliding scale  - Lipitor 80 nightly    HTN  - C/w Coreg, Hydralazine    DVT ppx: Heparin sq 66 yo M w/ PMH of HTN, HLD, DM, CAD s/p CABG, HFpEF (LVEF of 60%), URVASHI, PVD, CKD presenting to the ED for chest pain. LE swelling likely 2/2 to worsening MELISSA on CKD and diastolic CHF exacerbation.     LE swelling 2/2 likely MELISSA on CKD and acute on chronic diastolic CHF  - Lasix 80 IV BID  - Urine studies  - Renal US  - I & Os, daily weights, salt restrict  - Nephrology consult    Atypical chest pain likely MSK as exacerbated by movement of RUE, hx of CAD s/p CABG, HFpEF  - Resolved  - EKG NSR, cycle EKG  - Troponin negative x 1, trend  - Cardiac monitoring  - C/w ASA    DM2, HLD, PVD  - Lantus 20u, sliding scale  - Lipitor 80 nightly    HTN  - C/w Coreg, Hydralazine    DVT ppx: Heparin sq  Activity: Up with assistance 64 yo M w/ PMH of HTN, HLD, DM, CAD s/p CABG, HFpEF (LVEF of 60%), URVASHI, PVD, CKD presenting to the ED for chest pain. LE swelling likely 2/2 to worsening MELISSA on CKD and diastolic CHF exacerbation.     LE swelling 2/2 likely MELISSA on CKD and acute on chronic diastolic CHF  - Lasix 80 IV BID  - Urine studies  - Renal US  - I & Os, daily weights, salt restrict  - Nephrology consult    Atypical chest pain likely MSK as exacerbated by movement of RUE, hx of CAD s/p CABG, HFpEF  - Resolved  - EKG NSR, cycle EKG  - Troponin negative x 1, trend  - Cardiac monitoring  - C/w ASA    DM2, HLD, PVD  - Lantus 20u, sliding scale  - Lipitor 80 nightly    HTN  - C/w Coreg, Hydralazine    DVT ppx: Heparin sq  Activity: Up with assistance 64 yo M w/ PMH of HTN, HLD, DM, CAD s/p CABG, HFpEF (LVEF of 60%), URVASHI, PVD, CKD presenting to the ED for chest pain. LE swelling likely 2/2 to worsening MELISSA on CKD and diastolic CHF exacerbation.     LE swelling 2/2 likely MELISSA on CKD and acute on chronic diastolic CHF  - Lasix 80 IV BID  - Urine studies  - I & Os, daily weights, salt restrict  - Nephrology consult    Atypical chest pain likely MSK as exacerbated by movement of RUE, hx of CAD s/p CABG, HFpEF  - Resolved  - EKG NSR, cycle EKG  - Troponin negative x 1, trend  - Cardiac monitoring  - C/w ASA  - Cardiology consulted, recommendations appreciated    DM2, HLD, PVD  - Lantus 20u, sliding scale  - Lipitor 80 nightly    HTN  - C/w Coreg, Hydralazine, hold ARB    DVT ppx: Heparin sq  Activity: Up as tolerated  Sanches: none  Advanced directives: Full code

## 2020-08-06 NOTE — ED ADULT NURSE NOTE - CHPI ED NUR SYMPTOMS NEG
no vomiting/no dizziness/no fever/no shortness of breath/no syncope/no nausea/no chills/no back pain/no congestion

## 2020-08-06 NOTE — CONSULT NOTE ADULT - ASSESSMENT
1) Adavnced CKD  2) CHF exacerbation    LAsix 80 mg IV bid for 24 hour and assess response  continue home dose of metolazone  Dr. nicholson to resume care from tomorrow  discussed with Cards 1) Advanced CKD  2) CHF exacerbation; Echo 6/2019- LVEF-60%, grade II diastolic dysfx. , mild mitral valve regurg, no pericardial effusion, mild LVH  3) CAD s/p CABG  4)HTN      Lasix 80 mg IV bid for 24 hour and assess response  continue home dose of metolazone  Dr. Mccarthy to resume care from tomorrow  discussed with Cards

## 2020-08-06 NOTE — ED ADULT NURSE NOTE - OBJECTIVE STATEMENT
a&ox4 c/o right chest pain non radiating onset last night while resting. pt reports similar episode 6 months ago and "never saw doctor", pt unsure of last stress test/echo - hx chf/DM.. no obvious signs of deformity, slight tenderness on right chest wall upon palpation  pt also states intermittent episodes "swelling in legs/feet", 1+ BLE edema noted   denies sob/cough/fever/sick contacts/dizziness/loc/headache/blurry vision/urinary symptoms/diarrhea/abd pain

## 2020-08-06 NOTE — ED PROVIDER NOTE - PROGRESS NOTE DETAILS
Reviewed all results with pt as well as plans for admission. Pt is comfortable with plan for admission. Questions answered. - Jose Roberto Chairez, PGY-2

## 2020-08-07 ENCOUNTER — TRANSCRIPTION ENCOUNTER (OUTPATIENT)
Age: 65
End: 2020-08-07

## 2020-08-07 VITALS
TEMPERATURE: 98 F | DIASTOLIC BLOOD PRESSURE: 74 MMHG | RESPIRATION RATE: 18 BRPM | OXYGEN SATURATION: 94 % | SYSTOLIC BLOOD PRESSURE: 146 MMHG | HEART RATE: 82 BPM

## 2020-08-07 DIAGNOSIS — E11.9 TYPE 2 DIABETES MELLITUS WITHOUT COMPLICATIONS: ICD-10-CM

## 2020-08-07 LAB
A1C WITH ESTIMATED AVERAGE GLUCOSE RESULT: 8 % — HIGH (ref 4–5.6)
ANION GAP SERPL CALC-SCNC: 17 MMOL/L — SIGNIFICANT CHANGE UP (ref 5–17)
BUN SERPL-MCNC: 67 MG/DL — HIGH (ref 8–20)
CALCIUM SERPL-MCNC: 9.2 MG/DL — SIGNIFICANT CHANGE UP (ref 8.6–10.2)
CHLORIDE SERPL-SCNC: 100 MMOL/L — SIGNIFICANT CHANGE UP (ref 98–107)
CO2 SERPL-SCNC: 25 MMOL/L — SIGNIFICANT CHANGE UP (ref 22–29)
CREAT SERPL-MCNC: 3.43 MG/DL — HIGH (ref 0.5–1.3)
ESTIMATED AVERAGE GLUCOSE: 183 MG/DL — HIGH (ref 68–114)
GLUCOSE BLDC GLUCOMTR-MCNC: 119 MG/DL — HIGH (ref 70–99)
GLUCOSE BLDC GLUCOMTR-MCNC: 76 MG/DL — SIGNIFICANT CHANGE UP (ref 70–99)
GLUCOSE SERPL-MCNC: 58 MG/DL — LOW (ref 70–99)
HCT VFR BLD CALC: 41.1 % — SIGNIFICANT CHANGE UP (ref 39–50)
HGB BLD-MCNC: 13.3 G/DL — SIGNIFICANT CHANGE UP (ref 13–17)
MCHC RBC-ENTMCNC: 26.8 PG — LOW (ref 27–34)
MCHC RBC-ENTMCNC: 32.4 GM/DL — SIGNIFICANT CHANGE UP (ref 32–36)
MCV RBC AUTO: 82.7 FL — SIGNIFICANT CHANGE UP (ref 80–100)
PHOSPHATE SERPL-MCNC: 3.2 MG/DL — SIGNIFICANT CHANGE UP (ref 2.4–4.7)
PLATELET # BLD AUTO: 264 K/UL — SIGNIFICANT CHANGE UP (ref 150–400)
POTASSIUM SERPL-MCNC: 3.5 MMOL/L — SIGNIFICANT CHANGE UP (ref 3.5–5.3)
POTASSIUM SERPL-SCNC: 3.5 MMOL/L — SIGNIFICANT CHANGE UP (ref 3.5–5.3)
RBC # BLD: 4.97 M/UL — SIGNIFICANT CHANGE UP (ref 4.2–5.8)
RBC # FLD: 13.3 % — SIGNIFICANT CHANGE UP (ref 10.3–14.5)
SARS-COV-2 IGG SERPL QL IA: NEGATIVE — SIGNIFICANT CHANGE UP
SARS-COV-2 IGM SERPL IA-ACNC: <0.1 INDEX — SIGNIFICANT CHANGE UP
SARS-COV-2 RNA SPEC QL NAA+PROBE: SIGNIFICANT CHANGE UP
SODIUM SERPL-SCNC: 142 MMOL/L — SIGNIFICANT CHANGE UP (ref 135–145)
TROPONIN T SERPL-MCNC: 0.05 NG/ML — SIGNIFICANT CHANGE UP (ref 0–0.06)
WBC # BLD: 9.47 K/UL — SIGNIFICANT CHANGE UP (ref 3.8–10.5)
WBC # FLD AUTO: 9.47 K/UL — SIGNIFICANT CHANGE UP (ref 3.8–10.5)

## 2020-08-07 PROCEDURE — 99233 SBSQ HOSP IP/OBS HIGH 50: CPT

## 2020-08-07 PROCEDURE — 99239 HOSP IP/OBS DSCHRG MGMT >30: CPT

## 2020-08-07 RX ORDER — HYDRALAZINE HCL 50 MG
5 TABLET ORAL EVERY 6 HOURS
Refills: 0 | Status: DISCONTINUED | OUTPATIENT
Start: 2020-08-07 | End: 2020-08-07

## 2020-08-07 RX ORDER — ACETAMINOPHEN 500 MG
650 TABLET ORAL EVERY 6 HOURS
Refills: 0 | Status: DISCONTINUED | OUTPATIENT
Start: 2020-08-07 | End: 2020-08-07

## 2020-08-07 RX ORDER — FUROSEMIDE 40 MG
1.5 TABLET ORAL
Qty: 90 | Refills: 0
Start: 2020-08-07 | End: 2020-09-05

## 2020-08-07 RX ORDER — FUROSEMIDE 40 MG
1 TABLET ORAL
Qty: 60 | Refills: 0
Start: 2020-08-07 | End: 2020-09-05

## 2020-08-07 RX ORDER — FUROSEMIDE 40 MG
80 TABLET ORAL EVERY 12 HOURS
Refills: 0 | Status: DISCONTINUED | OUTPATIENT
Start: 2020-08-07 | End: 2020-08-07

## 2020-08-07 RX ORDER — ONDANSETRON 8 MG/1
4 TABLET, FILM COATED ORAL EVERY 6 HOURS
Refills: 0 | Status: DISCONTINUED | OUTPATIENT
Start: 2020-08-07 | End: 2020-08-07

## 2020-08-07 RX ADMIN — PANTOPRAZOLE SODIUM 40 MILLIGRAM(S): 20 TABLET, DELAYED RELEASE ORAL at 05:03

## 2020-08-07 RX ADMIN — Medication 1 TABLET(S): at 13:44

## 2020-08-07 RX ADMIN — Medication 25 MILLIGRAM(S): at 13:44

## 2020-08-07 RX ADMIN — Medication 25 MILLIGRAM(S): at 05:03

## 2020-08-07 RX ADMIN — Medication 80 MILLIGRAM(S): at 08:55

## 2020-08-07 RX ADMIN — Medication 80 MILLIGRAM(S): at 05:03

## 2020-08-07 RX ADMIN — CARVEDILOL PHOSPHATE 25 MILLIGRAM(S): 80 CAPSULE, EXTENDED RELEASE ORAL at 05:05

## 2020-08-07 RX ADMIN — HEPARIN SODIUM 5000 UNIT(S): 5000 INJECTION INTRAVENOUS; SUBCUTANEOUS at 05:03

## 2020-08-07 RX ADMIN — HEPARIN SODIUM 5000 UNIT(S): 5000 INJECTION INTRAVENOUS; SUBCUTANEOUS at 13:43

## 2020-08-07 RX ADMIN — Medication 325 MILLIGRAM(S): at 13:44

## 2020-08-07 NOTE — DISCHARGE NOTE PROVIDER - CARE PROVIDERS DIRECT ADDRESSES
,DirectAddress_Unknown,[email or web address],DirectAddress_Unknown

## 2020-08-07 NOTE — DISCHARGE NOTE PROVIDER - NSDCFUSCHEDAPPT_GEN_ALL_CORE_FT
ANSHUL CUNNINGHAM ; 10/28/2020 ; [facility] ANSHUL CUNNINGHAM ; 10/28/2020 ; N[facility] Card[facility]

## 2020-08-07 NOTE — PROGRESS NOTE ADULT - ASSESSMENT
Patient is a 72 y.o. female with arthritis, COPD, HTN, osteoporosis, CKD presenting to thoracic clinic for follow-up for newly dx NSCLC (11/27/2018.) She was originally followed by Dr. Hager for a known RUL pulmonary nodule, noted since 11/2017 after presenting to the ED with bronchitis, due to repeat chest CT demonstrating an enlarging to 1.8 x 1.5 cm. Thus, a CT guided bx performed on 11/27/2018 with pathology concordant with squamous cell carcinoma in situ without invasion. She reports a 1 week period of weight loss (8 lb) but no shortness of breath, chest pain, hemoptysis, or continued weight loss. She remains active able to walk up a flight of stairs without stopping, METs > 4. She reports no history of chest surgeries or radiation, takes an aspirin 81 mg QD    PSH:  Hysterectomy, C/S x 3  Active tobacco use, currently in the process of quitting 57 years x < 1 PPD, retired x 19 years previously worked at [employer], as a [employer] employee, , no known exposure to asbestos.      
CKD(IV): Screat= 3.19mg/dL from May, 2019  Mild fluid overload  CXR clear  - avoid potential nephrotoxins  - adjust diuretics as needed==> will need to keep azotemic  - no ACE nor ARB
Pt is a 64 y/o male with medical history of HTN, HLD, DM, CAD s/p CABG (12/2014- LIMA-LAD reverse SVG to posterior lateral reverse SVG to OM), HFpEF(LVEF-60%), URVASHI, PVD, CKD who presents to [facility]-ED for chest pain. Pt states that He woke this AM with right sided chest pain described as pressure, 1/10, intermittent, worse with movement of RUE, non-radiating, not reproduced on palpation but able to reproduce when RUE is moved. Pt also c/o of increased bilateral leg swelling over the past month, +SOB, +Orthopnea, and abd. distention. Pt states he has been compliant with medication, and has been compliant with low sodium diet. In ED, ECG- NSR HR @ 1 st degree AVB HR @ 66, BNP-1512, Trop#1-negative, BUN/Creatnin-63/3.71, Xray-no acute findings. Pt resting comfortably in stretcher, presents with bilateral lower extremity edema and bibasilar diminished breath sounds. Pt also has +abd distention.     8/7: Pt denies chest pain, palpitations, SOB. Pt states he is feeling better and able to lie flat without feeling SOB. Pt still presents with lower extremity swelling, but resp. exam improved-clear to auscultation. Pt can be DC'd home from cardiology perspective with increased dose of Lasix on Saturday and Sunday. Pt verbalized understanding.           Chest Pain  -ECG-NSR HR @ 1 st degree AVB HR @ 66  -Trop#1-negative   -Pt chest pain illicited by RUE movement, likely M/S etiology    CHF Exacerbation  -Xray-no acute findings  -BNP-1512  -Echo 6/2019- LVEF-60%, grade II diastolic dysfx. , mild mitral valve regurg, no pericardial effusion, mild LVH  -Echo-EF 55-60%, moderate TR        Acute on Chronic Kidney Disease  -BUN/Creatnin-63/3.71  -Nephrology eval  -Pt s/p Lasix IVP 80 mg x2 doses-pt states he has been urinating frequently  -Pt will f/u Dr. Andersen outpatient

## 2020-08-07 NOTE — DISCHARGE NOTE NURSING/CASE MANAGEMENT/SOCIAL WORK - PATIENT PORTAL LINK FT
You can access the FollowMyHealth Patient Portal offered by [facility] by registering at the following website: [email or web address]. By joining [employer]’s FollowMyHealth portal, you will also be able to view your health information using other applications (apps) compatible with our system.

## 2020-08-07 NOTE — DISCHARGE NOTE PROVIDER - HOSPITAL COURSE
64 yo M w/ PMH of HTN, HLD, DM, CAD s/p CABG, HFpEF (LVEF of 60%), URVASHI, PVD, CKD (baseline CR 3.1-3.2) presenting to the ED for chest pain. Patient also Endorses LE swelling and abdominal distension for a while. Also endorses orthopnea (2 pillows), denies palpitations, shortness of breath, cough.         Patient admitted to medicine and started on iv lasix. Patient had a tte:                Summary:     1. Left ventricular ejection fraction, by visual estimation, is 55 to 60%.     2. Normal global left ventricular systolic function.     3. Mild mitral annular calcification.     4. Thickening of the anterior and posterior mitral valve leaflets.     5. Moderate tricuspid regurgitation.     6. Sclerotic aortic valve with normal opening.     7. Dilated Ao root at 3.9cm.     8. Estimated pulmonary artery systolic pressure is 35.3 mmHg assuming a right atrial pressure of 3 mmHg, which is consistent with borderline pulmonary hypertension.            Patients cr improved and cliniclly felt better. Patient cleared by renal and cardio for dc home        time spent on dc 34 minutes        pe    	Constitutional: Obese, laying in bed comfortably, NAD,     	HEENT: NC/AT, PERRL, EOMI    	Respiratory: cta b/l no wheezing no rhonchi    	Cardiovascular: Regular rate and rhyhtm, no murmurs, gallops, rubs    	Gastrointestinal: Soft, distended, NT/ND, BS+    	Vascular: 2+ peripheral pulses    	Neurological: A/O x 3, no focal neurological deficits    	Musculoskeletal: 5/5 strength b/l upper and lower extremities, 2 + edema bilaterally      Psych: normal mood

## 2020-08-07 NOTE — DISCHARGE NOTE PROVIDER - NSDCCPCAREPLAN_GEN_ALL_CORE_FT
PRINCIPAL DISCHARGE DIAGNOSIS  Diagnosis: Acute on chronic kidney failure  Assessment and Plan of Treatment:       SECONDARY DISCHARGE DIAGNOSES  Diagnosis: CHF exacerbation  Assessment and Plan of Treatment:

## 2020-08-07 NOTE — DISCHARGE NOTE PROVIDER - PROVIDER TOKENS
PROVIDER:[TOKEN:[[id number]]],PROVIDER:[TOKEN:[[id number]]],FREE:[LAST:[primary care],PHONE:[(   )    -],FAX:[(   )    -],ADDRESS:[pcp]]

## 2020-08-07 NOTE — PROGRESS NOTE ADULT - SUBJECTIVE AND OBJECTIVE BOX
NEPHROLOGY INTERVAL HPI/OVERNIGHT EVENTS:  pt comfortable lying flat  no acute distress noted    MEDICATIONS  (STANDING):  aspirin 325 milliGRAM(s) Oral daily  atorvastatin 80 milliGRAM(s) Oral at bedtime  carvedilol 25 milliGRAM(s) Oral every 12 hours  dextrose 5%. 1000 milliLiter(s) (50 mL/Hr) IV Continuous <Continuous>  dextrose 50% Injectable 12.5 Gram(s) IV Push once  dextrose 50% Injectable 25 Gram(s) IV Push once  dextrose 50% Injectable 25 Gram(s) IV Push once  furosemide   Injectable 80 milliGRAM(s) IV Push every 12 hours  heparin   Injectable 5000 Unit(s) SubCutaneous every 8 hours  hydrALAZINE 25 milliGRAM(s) Oral three times a day  insulin glargine Injectable (LANTUS) 20 Unit(s) SubCutaneous at bedtime  insulin lispro (HumaLOG) corrective regimen sliding scale   SubCutaneous three times a day before meals  multivitamin 1 Tablet(s) Oral daily  pantoprazole    Tablet 40 milliGRAM(s) Oral before breakfast  tamsulosin Oral Tab/Cap - Peds 0.4 milliGRAM(s) Oral at bedtime    MEDICATIONS  (PRN):  acetaminophen   Tablet .. 650 milliGRAM(s) Oral every 6 hours PRN Temp greater or equal to 38C (100.4F), Mild Pain (1 - 3)  dextrose 40% Gel 15 Gram(s) Oral once PRN Blood Glucose LESS THAN 70 milliGRAM(s)/deciliter  glucagon  Injectable 1 milliGRAM(s) IntraMuscular once PRN Glucose LESS THAN 70 milligrams/deciliter  hydrALAZINE Injectable 5 milliGRAM(s) IV Push every 6 hours PRN for sbp above 160 mmhg  ondansetron Injectable 4 milliGRAM(s) IV Push every 6 hours PRN Nausea and/or Vomiting      Allergies    No Known Allergies        Vital Signs Last 24 Hrs  T(C): 37 (07 Aug 2020 04:28), Max: 37 (07 Aug 2020 04:28)  T(F): 98.6 (07 Aug 2020 04:28), Max: 98.6 (07 Aug 2020 04:28)  HR: 73 (07 Aug 2020 04:28) (68 - 83)  BP: 133/53 (07 Aug 2020 04:28) (117/74 - 158/76)  BP(mean): --  RR: 18 (07 Aug 2020 04:28) (17 - 18)  SpO2: 98% (07 Aug 2020 04:28) (96% - 98%)    PHYSICAL EXAM:    GENERAL: NAD  EYES: No periorbital edema  ENMT: Dry mucous membranes  NECK: Supple, No JVD  NERVOUS SYSTEM:  Alert & Oriented X3, intact and symmetric  CHEST/LUNG: Clear to percussion bilaterally; No rales, rhonchi, wheezing, or rubs  HEART: Regular rate and rhythm; No rub  ABDOMEN: Soft, Nontender, Nondistended; +BS  EXTREMITIES:  2+ Peripheral Pulses; tr LE edema  SKIN: No rashes or lesions    LABS:                        13.3   9.47  )-----------( 264      ( 07 Aug 2020 08:42 )             41.1     08-07    142  |  100  |  67.0<H>  ----------------------------<  58<L>  3.5   |  25.0  |  3.43<H>    Ca    9.2      07 Aug 2020 08:42  Phos  3.2     08-07    TPro  6.9  /  Alb  3.8  /  TBili  0.3<L>  /  DBili  x   /  AST  18  /  ALT  20  /  AlkPhos  108  08-06    PT/INR - ( 06 Aug 2020 16:25 )   PT: 11.6 sec;   INR: 1.00 ratio         PTT - ( 06 Aug 2020 16:25 )  PTT:31.8 sec    Phosphorus Level, Serum: 3.2 mg/dL (08-07 @ 08:42)      RADIOLOGY & ADDITIONAL TESTS:  < from: Xray Chest 2 Views PA/Lat (08.02.19 @ 14:33) >  EXAM:  XR CHEST PA LAT 2V        PROCEDURE DATE:  08/02/2019           INTERPRETATION:  EXAMINATION: XR CHEST PA AND LATERAL    CLINICAL INDICATION: SOB    TECHNIQUE: PA and lateral radiographs of the chest were obtained.    COMPARISON: 3/5/2016.    FINDINGS:     Cardiac silhouette is enlarged. Sternotomy and CABG. Lungs are clear. No   pleural effusion or pneumothorax.    IMPRESSION:   Clear lungs.    < end of copied text >

## 2020-08-07 NOTE — PROGRESS NOTE ADULT - SUBJECTIVE AND OBJECTIVE BOX
[facility]                                                       [facility]                                                               Office: [address] [address]                                                              Telephone: [phone]. Fax:[phone]                                                                             PROGRESS NOTE  Reason for follow up: Renal Failure, CHF exacerbation  Overnight: No new events.   Update: 8/7: Pt denies chest pain, palpitations, SOB. Pt states he is feeling better and able to lie flat without feeling SOB. Pt still presents with lower extremity swelling, but resp. exam improved-clear to auscultation. Pt can be DC'd home from cardiology perspective with increased dose of Lasix on Saturday and Sunday. Pt verbalized understanding.     Subjective: "  I feel better than yesterday"      	  Vitals:  T(C): 36.2 (08-07-20 @ 12:26), Max: 37 (08-07-20 @ 04:28)  HR: 77 (08-07-20 @ 12:26) (68 - 83)  BP: 151/83 (08-07-20 @ 12:26) (117/74 - 158/76)  RR: 18 (08-07-20 @ 12:26) (17 - 18)  SpO2: 96% (08-07-20 @ 12:26) (96% - 98%)    I&O's Summary    Weight (kg): 81.6 (08-06 @ 15:23)      PHYSICAL EXAM:  Appearance: Comfortable. No acute distress  HEENT:  Head and neck: Atraumatic. Normocephalic.  Normal oral mucosa, PERRL, Neck is supple. No JVD, No carotid bruit.   Neurologic: A & O x 3, no focal deficits. EOMI.  Lymphatic: No cervical lymphadenopathy  Cardiovascular: Normal S1 S2, No murmur, rubs/gallops. No JVD, No edema  Respiratory: Lungs clear to auscultation  Gastrointestinal:  Soft, Non-tender, + BS, +abd distention  Lower Extremities: +2/+2 pitting edema  Psychiatry: Patient is calm. No agitation. Mood & affect appropriate  Skin: No rashes/ ecchymoses/cyanosis/ulcers visualized on the face, hands or feet      CURRENT MEDICATIONS:  carvedilol 25 milliGRAM(s) Oral every 12 hours  furosemide   Injectable 80 milliGRAM(s) IV Push every 12 hours  hydrALAZINE 25 milliGRAM(s) Oral three times a day  hydrALAZINE Injectable 5 milliGRAM(s) IV Push every 6 hours PRN  tamsulosin Oral Tab/Cap - Peds 0.4 milliGRAM(s) Oral at bedtime  aspirin  pantoprazole    Tablet  atorvastatin  dextrose 50% Injectable  dextrose 50% Injectable  dextrose 50% Injectable  insulin glargine Injectable (LANTUS)  insulin lispro (HumaLOG) corrective regimen sliding scale  dextrose 5%.  heparin   Injectable  multivitamin      DIAGNOSTIC TESTING:  [ ] Echocardiogram: < from: TTE Echo Complete w/o Contrast w/ Doppler (08.06.20 @ 18:42) >  Summary:   1. Left ventricular ejection fraction, by visual estimation, is 55 to 60%.   2. Normal global left ventricular systolic function.   3. Mild mitral annular calcification.   4. Thickening of the anterior and posterior mitral valve leaflets.   5. Moderate tricuspid regurgitation.   6. Sclerotic aortic valve with normal opening.   7. Dilated Ao root at 3.9cm.   8. Estimated pulmonary artery systolic pressure is 35.3 mmHg assuming a right atrial pressure of 3 mmHg, which is consistent with borderline pulmonary hypertension.    < end of copied text >    [ ]  Catheterization: < from: Cardiac Cath Lab - Adult (10.31.14 @ 18:10) >  CORONARY VESSELS: The coronary circulation is right dominant.  LM:   --  Distal left main: There was a 20 % stenosis.  LAD:   --  Mid LAD: There was a 80 % stenosis. By IVUS  --  Distal LAD: There was a 70 % stenosis.  --  D2: There was a 80 % stenosis.  CX:   --  OM1: There was a 80 % stenosis.  --  OM2: There was a 90 % stenosis.  --  OM3: There was a 90 % stenosis.  RCA:   --  Mid RCA: There was a 50 % stenosis.  --  RPDA: There was a 90 % stenosis.  --  RPL3: There was a 90 % stenosis.  COMPLICATIONS: No complications occurred during the cath lab visit.  DIAGNOSTIC IMPRESSIONS: Severe multivessel disease.  Normal LV function.    < end of copied text >    [ ] Stress Test:  < from: Nuclear Stress Test-Pharmacologic (02.17.17 @ 08:16) >  IMPRESSIONS:Normal Study  Unable to exercise due to weakness and unsteady gait.   No Symptom. No diagnostic ECG changes.  The left ventricle was normal LV size. No  ischemia/infarction.  Gated wall motion analysis shows normal wall motion with  LVEF of 73%.    < end of copied text >    LABS:	 	  CARDIAC MARKERS ( 07 Aug 2020 08:42 )  x     / 0.05 ng/mL / x     / x     / x      p-BNP 07 Aug 2020 08:42: x    , CARDIAC MARKERS ( 06 Aug 2020 16:25 )  x     / 0.05 ng/mL / x     / x     / x      p-BNP 06 Aug 2020 16:25: 1512 pg/mL                          13.3   9.47  )-----------( 264      ( 07 Aug 2020 08:42 )             41.1     08-07    142  |  100  |  67.0<H>  ----------------------------<  58<L>  3.5   |  25.0  |  3.43<H>    Ca    9.2      07 Aug 2020 08:42  Phos  3.2     08-07    TPro  6.9  /  Alb  3.8  /  TBili  0.3<L>  /  DBili  x   /  AST  18  /  ALT  20  /  AlkPhos  108  08-06    proBNP: Serum Pro-Brain Natriuretic Peptide: 1512 pg/mL (08-06 @ 16:25)          TELEMETRY: NSR HR @ 70s

## 2020-08-07 NOTE — PROGRESS NOTE ADULT - ATTENDING COMMENTS
left sided chest pain and dyspnea on exertion . improved with diruesis.  Stable cr.    increase direusis. add extra dose of lasix on weekends.   patietn is not compliant with diet.   last stress test in 2018. will get stress test .  ct aspirin statins,   No further in-patient cardiac work-up/management is needed.  Follow-up in cardiology office in 2 weeks.

## 2020-08-07 NOTE — DISCHARGE NOTE PROVIDER - NSDCMRMEDTOKEN_GEN_ALL_CORE_FT
aspirin 325 mg oral tablet: 1 tab(s) orally once a day  atorvastatin 80 mg oral tablet: 1 tab(s) orally once a day  Benadryl 25 mg oral capsule: 1 cap(s) orally every 6 hours MDD:4  Caladryl Clear 1%-0.1% topical lotion: Apply topically to affected area every 6 hours MDD:10  carvedilol 25 mg oral tablet: 1 tab(s) orally 2 times a day  furosemide 40 mg oral tablet: 1 tab(s) orally 2 times a day  hydrALAZINE 25 mg oral tablet: 1 tab(s) orally 3 times a day  insulin glargine 100 units/mL subcutaneous solution: 30 unit(s) subcutaneous once a day (at bedtime)  insulin lispro 100 units/mL subcutaneous solution: 5 unit(s) subcutaneous 3 times a day (before meals)  metolazone 2.5 mg oral tablet: 1 tab(s) orally once a day  multivitamin: 1 tab(s) orally once a day  pantoprazole 40 mg oral delayed release tablet: 1 tab(s) orally once a day (before a meal)  tamsulosin 0.4 mg oral capsule: 1 cap(s) orally once a day (at bedtime) aspirin 325 mg oral tablet: 1 tab(s) orally once a day  atorvastatin 80 mg oral tablet: 1 tab(s) orally once a day  Benadryl 25 mg oral capsule: 1 cap(s) orally every 6 hours MDD:4  Caladryl Clear 1%-0.1% topical lotion: Apply topically to affected area every 6 hours MDD:10  carvedilol 25 mg oral tablet: 1 tab(s) orally 2 times a day  hydrALAZINE 25 mg oral tablet: 1 tab(s) orally 3 times a day  insulin glargine 100 units/mL subcutaneous solution: 30 unit(s) subcutaneous once a day (at bedtime)  insulin lispro 100 units/mL subcutaneous solution: 5 unit(s) subcutaneous 3 times a day (before meals)  Lasix 40 mg oral tablet: 1.5 tab(s) orally 2 times a day please take only on Saturdays and Sundays   Lasix 40 mg oral tablet: 1 tab(s) orally 2 times a day daily Monday, tuesday, Wendsday, thrusday and Friday,  On Saturday and SUnday you will take 60mg Tiwce a day   multivitamin: 1 tab(s) orally once a day  pantoprazole 40 mg oral delayed release tablet: 1 tab(s) orally once a day (before a meal)  tamsulosin 0.4 mg oral capsule: 1 cap(s) orally once a day (at bedtime)

## 2020-08-07 NOTE — DISCHARGE NOTE PROVIDER - CARE PROVIDER_API CALL
Stuart Dickerson  NEPHROLOGY  [address] [place] [address]  Phone: [phone]  Fax: [phone]  Follow Up Time:     Siomara Pro  CARDIOLOGY  [address] [id number]  Phone: [phone]  Fax: [phone]  Follow Up Time:     primary care,   [facility]  Phone: (   )    -  Fax: (   )    -  Follow Up Time:

## 2020-08-13 ENCOUNTER — NON-APPOINTMENT (OUTPATIENT)
Age: 65
End: 2020-08-13

## 2020-08-13 PROCEDURE — 80048 BASIC METABOLIC PNL TOTAL CA: CPT

## 2020-08-13 PROCEDURE — 86769 SARS-COV-2 COVID-19 ANTIBODY: CPT

## 2020-08-13 PROCEDURE — U0003: CPT

## 2020-08-13 PROCEDURE — 83880 ASSAY OF NATRIURETIC PEPTIDE: CPT

## 2020-08-13 PROCEDURE — 93306 TTE W/DOPPLER COMPLETE: CPT

## 2020-08-13 PROCEDURE — 82962 GLUCOSE BLOOD TEST: CPT

## 2020-08-13 PROCEDURE — 84100 ASSAY OF PHOSPHORUS: CPT

## 2020-08-13 PROCEDURE — 93005 ELECTROCARDIOGRAM TRACING: CPT

## 2020-08-13 PROCEDURE — 85610 PROTHROMBIN TIME: CPT

## 2020-08-13 PROCEDURE — 99285 EMERGENCY DEPT VISIT HI MDM: CPT

## 2020-08-13 PROCEDURE — 83036 HEMOGLOBIN GLYCOSYLATED A1C: CPT

## 2020-08-13 PROCEDURE — 84484 ASSAY OF TROPONIN QUANT: CPT

## 2020-08-13 PROCEDURE — 85730 THROMBOPLASTIN TIME PARTIAL: CPT

## 2020-08-13 PROCEDURE — 85027 COMPLETE CBC AUTOMATED: CPT

## 2020-08-13 PROCEDURE — 80053 COMPREHEN METABOLIC PANEL: CPT

## 2020-08-13 PROCEDURE — 71045 X-RAY EXAM CHEST 1 VIEW: CPT

## 2020-08-13 PROCEDURE — 36415 COLL VENOUS BLD VENIPUNCTURE: CPT

## 2020-08-19 ENCOUNTER — NON-APPOINTMENT (OUTPATIENT)
Age: 65
End: 2020-08-19

## 2020-08-19 ENCOUNTER — APPOINTMENT (OUTPATIENT)
Dept: CARDIOLOGY | Facility: CLINIC | Age: 65
End: 2020-08-19
Payer: MEDICARE

## 2020-08-19 VITALS — SYSTOLIC BLOOD PRESSURE: 132 MMHG | DIASTOLIC BLOOD PRESSURE: 76 MMHG

## 2020-08-19 VITALS
OXYGEN SATURATION: 96 % | RESPIRATION RATE: 16 BRPM | SYSTOLIC BLOOD PRESSURE: 155 MMHG | DIASTOLIC BLOOD PRESSURE: 75 MMHG | HEIGHT: 65 IN | HEART RATE: 60 BPM | BODY MASS INDEX: 29.66 KG/M2 | WEIGHT: 178 LBS | TEMPERATURE: 97.9 F

## 2020-08-19 DIAGNOSIS — Z87.898 PERSONAL HISTORY OF OTHER SPECIFIED CONDITIONS: ICD-10-CM

## 2020-08-19 DIAGNOSIS — N42.9 DISORDER OF PROSTATE, UNSPECIFIED: ICD-10-CM

## 2020-08-19 DIAGNOSIS — Z86.69 PERSONAL HISTORY OF OTHER DISEASES OF THE NERVOUS SYSTEM AND SENSE ORGANS: ICD-10-CM

## 2020-08-19 DIAGNOSIS — Z86.79 PERSONAL HISTORY OF OTHER DISEASES OF THE CIRCULATORY SYSTEM: ICD-10-CM

## 2020-08-19 PROCEDURE — 99215 OFFICE O/P EST HI 40 MIN: CPT

## 2020-08-19 PROCEDURE — 93000 ELECTROCARDIOGRAM COMPLETE: CPT

## 2020-08-19 RX ORDER — TAMSULOSIN HYDROCHLORIDE 0.4 MG/1
0.4 CAPSULE ORAL
Qty: 30 | Refills: 1 | Status: DISCONTINUED | COMMUNITY
End: 2020-08-19

## 2020-08-19 RX ORDER — AMLODIPINE BESYLATE 10 MG/1
10 TABLET ORAL DAILY
Qty: 90 | Refills: 3 | Status: DISCONTINUED | COMMUNITY
Start: 2019-04-30 | End: 2020-08-19

## 2020-08-19 RX ORDER — ASPIRIN 325 MG/1
325 TABLET, FILM COATED ORAL
Qty: 90 | Refills: 0 | Status: DISCONTINUED | COMMUNITY
End: 2020-08-19

## 2020-08-19 RX ORDER — METHIMAZOLE 5 MG/1
5 TABLET ORAL
Refills: 0 | Status: DISCONTINUED | COMMUNITY
Start: 2019-05-14 | End: 2020-08-19

## 2020-08-19 NOTE — PHYSICAL EXAM
[General Appearance - Well Developed] : well developed [Normal Appearance] : normal appearance [Well Groomed] : well groomed [No Deformities] : no deformities [General Appearance - Well Nourished] : well nourished [Normal Conjunctiva] : the conjunctiva exhibited no abnormalities [General Appearance - In No Acute Distress] : no acute distress [No Oral Pallor] : no oral pallor [Eyelids - No Xanthelasma] : the eyelids demonstrated no xanthelasmas [Normal Oral Mucosa] : normal oral mucosa [No Oral Cyanosis] : no oral cyanosis [Normal Jugular Venous A Waves Present] : normal jugular venous A waves present [Normal Jugular Venous V Waves Present] : normal jugular venous V waves present [No Jugular Venous Mahan A Waves] : no jugular venous mahan A waves [Respiration, Rhythm And Depth] : normal respiratory rhythm and effort [Exaggerated Use Of Accessory Muscles For Inspiration] : no accessory muscle use [Heart Rate And Rhythm] : heart rate and rhythm were normal [Auscultation Breath Sounds / Voice Sounds] : lungs were clear to auscultation bilaterally [Murmurs] : no murmurs present [Heart Sounds] : normal S1 and S2 [Abdomen Tenderness] : non-tender [Abdomen Soft] : soft [Abdomen Mass (___ Cm)] : no abdominal mass palpated [Abnormal Walk] : normal gait [Gait - Sufficient For Exercise Testing] : the gait was sufficient for exercise testing [Nail Clubbing] : no clubbing of the fingernails [Cyanosis, Localized] : no localized cyanosis [Petechial Hemorrhages (___cm)] : no petechial hemorrhages [FreeTextEntry1] : 2+ LE edema.  [No Venous Stasis] : no venous stasis [Skin Color & Pigmentation] : normal skin color and pigmentation [] : no rash [No Xanthoma] : no  xanthoma was observed [Skin Lesions] : no skin lesions [No Skin Ulcers] : no skin ulcer [Oriented To Time, Place, And Person] : oriented to person, place, and time [Mood] : the mood was normal [Affect] : the affect was normal [No Anxiety] : not feeling anxious

## 2020-08-19 NOTE — DISCUSSION/SUMMARY
[Benefits] : benefits [Risks] : risks [Patient] : the patient [Alternatives] : alternatives [___ Month(s)] : [unfilled] month(s) [With ___] : with [unfilled] [FreeTextEntry1] : This is a 59 M with history of smoking, HTN,DM (on insulin), HLD, found to have 3 vessel disease and TIA post cath: s/p CABG recent PNA and now with facial swelling,.\par 1) LE edema weight gain: resolved.  CKD and Diastollic heart failure. repeat echo .   ct lasix 40 Q12,. CMP, BNP check.  \par 2) PVD: right PTA and bilateral atherosclerosis: Walking and ct antiplatelets. life style modification. diabetes control.  aspirin and lipitor.  no intervention or testing needed.  Repeat ABIO \par 3) Multivessel CAD: s/p CABG2D echo- unremarkable.   Continue Coreg 25 Q12, .ASA 81 mg,  lipitor 20 daily. nuclear stress \par 2) HFpEF: Stable weight. NYHA class I.. Ct lasix.  stress test. nuclear  takes 40 BID lasix on weekdays and extra one pill on weekends. \par 3) HTN:  continue coreg 25 Q12 ct losartan 100-25 Blood pressure not ocntorllee.d  bidil 20 Q12   amldoipne 10 mg daily. \par 4) DM: HBA1C \par 5) CKD: creatinine stable.  f/u nephrology. DM control. patient not seen the nephrologist.\par 7) carotid artery disease: carotid atheroscelrosuis. no stenosis.  asoirin statins.  \par

## 2020-08-19 NOTE — CARDIOLOGY SUMMARY
[No Ischemia] : no Ischemia [LVEF ___%] : LVEF [unfilled]% [Enlarged] : enlarged LA size [None] : no mitral regurgitation [___] : [unfilled] [de-identified] : NANETTE: Left 0.7 right : 1.4 \par US of LE arterial: sub total occlusion right PTA. mdoerate atherosclerosis bilaterally.

## 2020-08-19 NOTE — HISTORY OF PRESENT ILLNESS
[FreeTextEntry1] : Multivessel CAD, HTN, CVA, HF pEF\par \par HPI for today: : jeanette was in the hospital few days ago. he was fluid overload and LE edaem. responded to Iv lasix. CKD . chornic.  \par discharged the next day. stress test ordered and he got blood test done at Primary doctor yesterday. \par his echo was unremarkable in hospital \par \par \par old noteL: patient has been having increaese weight gain. he has been drinking too much water also as he feels its good for his kidneys. He drinks atleast 10 bottles a day.\par Compliant with  meds.  +_ dyspnea one xertion 2 + LE edema. \par \par \par \par old note: does not feel good. Lost balance. Unsteady gait.  diozziness.\par his primary doctor sent him for the carotid US and TCD ,. \par fatigeu and tired. \par \par \par old noteL: feels  good . no problem. compliant with meds. \par no smoking. need script for coreg. patient did not get the stress test done. Will order stress test agiain. checked hospital records. no stress test in sunrise. \par \par \par old note: no chest pain. no dypsnea. no headaches. feels good . complikant with meds. \par last time got NANETTE done that was abnormal. but US arterial showed run off disease on the right side,. tibiaperoneal. \par \par \par old note: No chest pain.  No dyspnea.  No LE edema.  Compliant with meds. Does not exercise.\par got NANETTE and US of LE.  found to have abnormal; NANETTE on the left and calcification on the right. US of LE arterial shows right PTA occlusion. patient does complain of feet pain on the right side. No pain on the left.

## 2020-08-19 NOTE — REVIEW OF SYSTEMS
[Shortness Of Breath] : shortness of breath [Dyspnea on exertion] : dyspnea during exertion [Lower Ext Edema] : lower extremity edema [Palpitations] : no palpitations [Change In The Stool] : change in stool [see HPI] : see HPI [Negative] : Heme/Lymph

## 2020-09-14 ENCOUNTER — APPOINTMENT (OUTPATIENT)
Dept: CARDIOLOGY | Facility: CLINIC | Age: 65
End: 2020-09-14
Payer: MEDICARE

## 2020-09-14 PROCEDURE — A9500: CPT

## 2020-09-14 PROCEDURE — 93880 EXTRACRANIAL BILAT STUDY: CPT

## 2020-09-14 PROCEDURE — 78452 HT MUSCLE IMAGE SPECT MULT: CPT

## 2020-09-14 PROCEDURE — 93015 CV STRESS TEST SUPVJ I&R: CPT

## 2020-09-21 ENCOUNTER — APPOINTMENT (OUTPATIENT)
Dept: CARDIOLOGY | Facility: CLINIC | Age: 65
End: 2020-09-21
Payer: MEDICARE

## 2020-09-21 PROCEDURE — 93923 UPR/LXTR ART STDY 3+ LVLS: CPT

## 2020-10-28 ENCOUNTER — APPOINTMENT (OUTPATIENT)
Dept: CARDIOLOGY | Facility: CLINIC | Age: 65
End: 2020-10-28
Payer: MEDICARE

## 2020-10-28 VITALS
HEIGHT: 65 IN | HEART RATE: 65 BPM | TEMPERATURE: 97.7 F | DIASTOLIC BLOOD PRESSURE: 69 MMHG | OXYGEN SATURATION: 95 % | WEIGHT: 179 LBS | BODY MASS INDEX: 29.82 KG/M2 | SYSTOLIC BLOOD PRESSURE: 132 MMHG

## 2020-10-28 PROCEDURE — 93000 ELECTROCARDIOGRAM COMPLETE: CPT

## 2020-10-28 PROCEDURE — 99214 OFFICE O/P EST MOD 30 MIN: CPT

## 2020-10-28 RX ORDER — HYDRALAZINE HYDROCHLORIDE 25 MG/1
25 TABLET ORAL 3 TIMES DAILY
Qty: 270 | Refills: 1 | Status: DISCONTINUED | COMMUNITY
End: 2020-10-28

## 2020-11-19 ENCOUNTER — NON-APPOINTMENT (OUTPATIENT)
Age: 65
End: 2020-11-19

## 2021-04-28 ENCOUNTER — APPOINTMENT (OUTPATIENT)
Dept: CARDIOLOGY | Facility: CLINIC | Age: 66
End: 2021-04-28
Payer: MEDICARE

## 2021-04-28 ENCOUNTER — NON-APPOINTMENT (OUTPATIENT)
Age: 66
End: 2021-04-28

## 2021-04-28 VITALS
HEART RATE: 74 BPM | OXYGEN SATURATION: 96 % | HEIGHT: 65 IN | SYSTOLIC BLOOD PRESSURE: 186 MMHG | TEMPERATURE: 98.9 F | BODY MASS INDEX: 29.16 KG/M2 | WEIGHT: 175 LBS | DIASTOLIC BLOOD PRESSURE: 86 MMHG

## 2021-04-28 PROCEDURE — 93000 ELECTROCARDIOGRAM COMPLETE: CPT

## 2021-04-28 PROCEDURE — 99072 ADDL SUPL MATRL&STAF TM PHE: CPT

## 2021-04-28 PROCEDURE — 99215 OFFICE O/P EST HI 40 MIN: CPT

## 2021-04-28 RX ORDER — ERGOCALCIFEROL 1.25 MG/1
1.25 MG CAPSULE, LIQUID FILLED ORAL
Refills: 0 | Status: DISCONTINUED | COMMUNITY
Start: 2019-06-11 | End: 2021-04-28

## 2021-04-28 RX ORDER — CAMPHOR 0.45 %
25 GEL (GRAM) TOPICAL
Refills: 0 | Status: DISCONTINUED | COMMUNITY
End: 2021-04-28

## 2021-04-28 RX ORDER — PANTOPRAZOLE SODIUM 40 MG/10ML
40 INJECTION, POWDER, FOR SOLUTION INTRAVENOUS
Refills: 0 | Status: DISCONTINUED | COMMUNITY
End: 2021-04-28

## 2021-04-28 RX ORDER — LINAGLIPTIN 5 MG/1
5 TABLET, FILM COATED ORAL DAILY
Refills: 0 | Status: DISCONTINUED | COMMUNITY
Start: 2019-05-14 | End: 2021-04-28

## 2021-04-28 NOTE — DISCUSSION/SUMMARY
[Patient] : the patient [Risks] : risks [Benefits] : benefits [Alternatives] : alternatives [___ Week(s)] : in [unfilled] week(s) [With ___] : with [unfilled] [FreeTextEntry1] : This is a 59 M with history of smoking, HTN,DM (on insulin), HLD, found to have 3 vessel disease and TIA post cath: s/p CABG recent PNA and now with facial swelling,.\par 1) LE edema weight gain: resolved.  CKD and Diastollic heart failure.    ct lasix 40 Q12,.   \par 2) PVD: right PTA and bilateral atherosclerosis: Walking and ct antiplatelets. life style modification. diabetes control.  aspirin and lipitor.  \par 3) Multivessel CAD: s/p CABG2D  Continue Coreg 25 Q12, .ASA 81 mg,  lipitor 20 daily.  \par 2) HFpEF: Stable weight. NYHA class I.. Ct lasix.   40 BID lasix on weekdays and extra one pill on weekends. \par 3) HTN:  continue coreg 25 Q12   bidil 20 Q12   amldoipne 10 mg daily.  \par 4) DM: HBA1C \par 5) CKD: creatinine stable.  f/u nephrology. DM control.  will reevalue for losartan if BP not controlled. \par 7) carotid artery disease: carotid atheroscelrosuis.   asoirin statins.  \par

## 2021-04-28 NOTE — CARDIOLOGY SUMMARY
[de-identified] : 4/28/2021  : Sinus  Rhythm \par First degree AV block. \par -Incomplete right bundle branch block. \par  -Old inferior infarct. \par \par ABNORMAL \par  [No Ischemia] : no Ischemia [LVEF ___%] : LVEF [unfilled]% [Enlarged] : enlarged LA size [None] : no mitral regurgitation [___] : [unfilled] [de-identified] : NANETTE: Left 0.7 right : 1.4 \par US of LE arterial: sub total occlusion right PTA. mdoerate atherosclerosis bilaterally. \par Carotid DUplexL: sept 2020  moderate atherslceorsi. no stenosis.

## 2021-04-28 NOTE — PHYSICAL EXAM
[General Appearance - Well Developed] : well developed [Normal Appearance] : normal appearance [Well Groomed] : well groomed [General Appearance - Well Nourished] : well nourished [No Deformities] : no deformities [General Appearance - In No Acute Distress] : no acute distress [Normal Conjunctiva] : the conjunctiva exhibited no abnormalities [Eyelids - No Xanthelasma] : the eyelids demonstrated no xanthelasmas [Normal Oral Mucosa] : normal oral mucosa [No Oral Pallor] : no oral pallor [No Oral Cyanosis] : no oral cyanosis [Normal Jugular Venous A Waves Present] : normal jugular venous A waves present [Normal Jugular Venous V Waves Present] : normal jugular venous V waves present [No Jugular Venous Mahan A Waves] : no jugular venous mahan A waves [Respiration, Rhythm And Depth] : normal respiratory rhythm and effort [Exaggerated Use Of Accessory Muscles For Inspiration] : no accessory muscle use [Auscultation Breath Sounds / Voice Sounds] : lungs were clear to auscultation bilaterally [Heart Rate And Rhythm] : heart rate and rhythm were normal [Heart Sounds] : normal S1 and S2 [Murmurs] : no murmurs present [Abdomen Soft] : soft [Abdomen Tenderness] : non-tender [Abdomen Mass (___ Cm)] : no abdominal mass palpated [Abnormal Walk] : normal gait [Gait - Sufficient For Exercise Testing] : the gait was sufficient for exercise testing [Nail Clubbing] : no clubbing of the fingernails [Cyanosis, Localized] : no localized cyanosis [Petechial Hemorrhages (___cm)] : no petechial hemorrhages [FreeTextEntry1] : 2+ LE edema.  [Skin Color & Pigmentation] : normal skin color and pigmentation [] : no rash [No Venous Stasis] : no venous stasis [Skin Lesions] : no skin lesions [No Skin Ulcers] : no skin ulcer [No Xanthoma] : no  xanthoma was observed [Oriented To Time, Place, And Person] : oriented to person, place, and time [Affect] : the affect was normal [Mood] : the mood was normal [No Anxiety] : not feeling anxious

## 2021-04-28 NOTE — REASON FOR VISIT
[FreeTextEntry1] : Multivessel CAD, ICMP, HTN, CVA [Follow-Up - Clinic] : a clinic follow-up of [FreeTextEntry2] : Multivessel CAD, ICMP, HTN, CVA

## 2021-04-28 NOTE — HISTORY OF PRESENT ILLNESS
[FreeTextEntry1] : Multivessel CAD, HTN, CVA, HF pEF\par \par \par HPI for today: : patient ran out of norvasc. he has not been taking it for 1 week.  \par he could not get the prescription\par no headhecs. no dizziness. No LE edema. He had stopped losartan due to CKD.  we put him on Bidil.\par he states when he takes his norvasc. his BP is good around 130. \par he has not made the appt with nephrologist yet. \par \par old note: : jeanette was in the hospital few days ago. he was fluid overload and LE edaem. responded to Iv lasix. CKD . chornic.  \par discharged the next day. stress test ordered and he got blood test done at [facility] yesterday. \par his echo was unremarkable in hospital \par \par \par old noteL: patient has been having increaese weight gain. he has been drinking too much water also as he feels its good for his kidneys. He drinks atleast 10 bottles a day.\par Compliant with  meds.  +_ dyspnea one xertion 2 + LE edema. \par \par \par \par old note: does not feel good. Lost balance. Unsteady gait.  diozziness.\par his primary doctor sent him for the carotid US and TCD ,. \par fatigeu and tired. \par \par \par old noteL: feels  good . no problem. compliant with meds. \par no smoking. need script for coreg. patient did not get the stress test done. Will order stress test agiain. checked hospital records. no stress test in sunrise. \par \par \par old note: no chest pain. no dypsnea. no headaches. feels good . complikant with meds. \par last time got NANETTE done that was abnormal. but US arterial showed run off disease on the right side,. tibiaperoneal. \par \par \par old note: No chest pain.  No dyspnea.  No LE edema.  Compliant with meds. Does not exercise.\par got NANETTE and US of LE.  found to have abnormal; NANETTE on the left and calcification on the right. US of LE arterial shows right PTA occlusion. patient does complain of feet pain on the right side. No pain on the left.

## 2021-07-30 NOTE — ED PROVIDER NOTE - PHYSICAL EXAMINATION
Gen: no acute distress  Head: normocephalic, atraumatic  Lung: no respiratory distress, crackles to bilateral bases, diminished breath sounds to R base  CV: normal s1/s2, rrr,   Abd: soft, non-tender, non-distended  MSK: pitting edema to BLE, no visible deformities, full range of motion in all 4 extremities  Neuro: No focal neurologic deficits  Skin: No rash   Psych: normal affect 30-Jul-2021 12:15

## 2021-10-19 ENCOUNTER — APPOINTMENT (OUTPATIENT)
Dept: CARDIOLOGY | Facility: CLINIC | Age: 66
End: 2021-10-19
Payer: MEDICARE

## 2021-10-19 ENCOUNTER — NON-APPOINTMENT (OUTPATIENT)
Age: 66
End: 2021-10-19

## 2021-10-19 VITALS
HEIGHT: 65 IN | HEART RATE: 73 BPM | WEIGHT: 177 LBS | TEMPERATURE: 98 F | DIASTOLIC BLOOD PRESSURE: 74 MMHG | OXYGEN SATURATION: 98 % | SYSTOLIC BLOOD PRESSURE: 140 MMHG | BODY MASS INDEX: 29.49 KG/M2

## 2021-10-19 PROCEDURE — 93000 ELECTROCARDIOGRAM COMPLETE: CPT

## 2021-10-19 PROCEDURE — 99215 OFFICE O/P EST HI 40 MIN: CPT

## 2021-10-19 NOTE — DISCUSSION/SUMMARY
[Patient] : the patient [Risks] : risks [Benefits] : benefits [Alternatives] : alternatives [With Me] : with me [___ Month(s)] : in [unfilled] month(s) [FreeTextEntry1] : This is a 59 M with history of smoking, HTN,DM (on insulin), HLD, found to have 3 vessel disease and TIA post cath: s/p CABG recent PNA and now with facial swelling,.\par 1) LE edema weight gain: resolved.  CKD and Diastollic heart failure.    ct lasix 40 Q12,.   \par 2) PVD:   bilateral atherosclerosis: Walking and ct antiplatelets. life style modification. diabetes control.  aspirin and lipitor.   repat US arterial Duplex. \par 3) Multivessel CAD: s/p CABG2D  Continue Coreg 25 Q12, .ASA 81 mg,  lipitor 20 daily.  \par 2) HFpEF: Stable weight. NYHA class I.. Ct lasix.   40 BID lasix on weekdays and extra one pill on weekends. \par 3) HTN:  continue coreg 25 Q12   bidil 20 Q12   amldoipne 10 mg daily.  \par 4) DM: HBA1C \par 5) CKD: creatinine stable.  f/u nephrology. DM control.  will re evaluate for losartan if BP not controlled. \par 7) carotid artery disease: carotid atheroscelrosuis.   aspirin statins.  \par Will order and review ECG for the above mentioned diagnosis/condition/symptoms \par

## 2021-10-19 NOTE — PHYSICAL EXAM
[General Appearance - Well Developed] : well developed [Normal Appearance] : normal appearance [Well Groomed] : well groomed [General Appearance - Well Nourished] : well nourished [No Deformities] : no deformities [General Appearance - In No Acute Distress] : no acute distress [Normal Conjunctiva] : the conjunctiva exhibited no abnormalities [Eyelids - No Xanthelasma] : the eyelids demonstrated no xanthelasmas [Normal Oral Mucosa] : normal oral mucosa [No Oral Pallor] : no oral pallor [No Oral Cyanosis] : no oral cyanosis [Normal Jugular Venous A Waves Present] : normal jugular venous A waves present [Normal Jugular Venous V Waves Present] : normal jugular venous V waves present [No Jugular Venous Mahan A Waves] : no jugular venous mahan A waves [Respiration, Rhythm And Depth] : normal respiratory rhythm and effort [Exaggerated Use Of Accessory Muscles For Inspiration] : no accessory muscle use [Auscultation Breath Sounds / Voice Sounds] : lungs were clear to auscultation bilaterally [Heart Sounds] : normal S1 and S2 [Heart Rate And Rhythm] : heart rate and rhythm were normal [Murmurs] : no murmurs present [Abdomen Soft] : soft [Abdomen Tenderness] : non-tender [Abdomen Mass (___ Cm)] : no abdominal mass palpated [Abnormal Walk] : normal gait [Gait - Sufficient For Exercise Testing] : the gait was sufficient for exercise testing [Nail Clubbing] : no clubbing of the fingernails [Cyanosis, Localized] : no localized cyanosis [Petechial Hemorrhages (___cm)] : no petechial hemorrhages [FreeTextEntry1] : 2+ LE edema.  [Skin Color & Pigmentation] : normal skin color and pigmentation [] : no rash [No Venous Stasis] : no venous stasis [Skin Lesions] : no skin lesions [No Skin Ulcers] : no skin ulcer [No Xanthoma] : no  xanthoma was observed [Oriented To Time, Place, And Person] : oriented to person, place, and time [Affect] : the affect was normal [Mood] : the mood was normal [No Anxiety] : not feeling anxious

## 2021-10-19 NOTE — CARDIOLOGY SUMMARY
[de-identified] : 10 19 2021 \par \par 4/28/2021  : Sinus  Rhythm \par First degree AV block. \par -Incomplete right bundle branch block. \par  -Old inferior infarct. \par \par ABNORMAL \par  [No Ischemia] : no Ischemia [LVEF ___%] : LVEF [unfilled]% [Enlarged] : enlarged LA size [None] : no mitral regurgitation [___] : [unfilled] [___] : [unfilled] [de-identified] : NANETTE: Left 0.7 right : 1.4 \par US of LE arterial: sub total occlusion right PTA. mdoerate atherosclerosis bilaterally. \par Carotid DUplexL: sept 2020  moderate atherslceorsi. no stenosis.

## 2021-10-19 NOTE — HISTORY OF PRESENT ILLNESS
[FreeTextEntry1] : Multivessel CAD, HTN, CVA, HF pEF\par \par \par HPI for today: :   no chest pain. no dyspnea.  feels good. complaitn with meds.  pain in the feet and legs. claudication\par \par \par old note: patient ran out of norvasc. he has not been taking it for 1 week.  \par he could not get the prescription\par no headhecs. no dizziness. No LE edema. He had stopped losartan due to CKD.  we put him on Bidil.\par he states when he takes his norvasc. his BP is good around 130. \par he has not made the appt with nephrologist yet. \par \par old note: : jeanette was in the hospital few days ago. he was fluid overload and LE edaem. responded to Iv lasix. CKD . chornic.  \par discharged the next day. stress test ordered and he got blood test done at [facility] yesterday. \par his echo was unremarkable in hospital \par \par \par old noteL: patient has been having increaese weight gain. he has been drinking too much water also as he feels its good for his kidneys. He drinks atleast 10 bottles a day.\par Compliant with  meds.  +_ dyspnea one xertion 2 + LE edema. \par \par \par \par old note: does not feel good. Lost balance. Unsteady gait.  diozziness.\par his primary doctor sent him for the carotid US and TCD ,. \par fatigeu and tired. \par \par \par old noteL: feels  good . no problem. compliant with meds. \par no smoking. need script for coreg. patient did not get the stress test done. Will order stress test agiain. checked hospital records. no stress test in sunrise. \par \par \par old note: no chest pain. no dypsnea. no headaches. feels good . complikant with meds. \par last time got NANETTE done that was abnormal. but US arterial showed run off disease on the right side,. tibiaperoneal. \par \par \par old note: No chest pain.  No dyspnea.  No LE edema.  Compliant with meds. Does not exercise.\par got NANETTE and US of LE.  found to have abnormal; NANETTE on the left and calcification on the right. US of LE arterial shows right PTA occlusion. patient does complain of feet pain on the right side. No pain on the left.

## 2021-11-01 ENCOUNTER — APPOINTMENT (OUTPATIENT)
Dept: CARDIOLOGY | Facility: CLINIC | Age: 66
End: 2021-11-01
Payer: MEDICARE

## 2021-11-01 PROCEDURE — 93925 LOWER EXTREMITY STUDY: CPT

## 2021-11-04 ENCOUNTER — NON-APPOINTMENT (OUTPATIENT)
Age: 66
End: 2021-11-04

## 2021-11-12 ENCOUNTER — APPOINTMENT (OUTPATIENT)
Dept: CARDIOLOGY | Facility: CLINIC | Age: 66
End: 2021-11-12

## 2021-11-30 ENCOUNTER — TRANSCRIPTION ENCOUNTER (OUTPATIENT)
Age: 66
End: 2021-11-30

## 2021-11-30 ENCOUNTER — OUTPATIENT (OUTPATIENT)
Dept: OUTPATIENT SERVICES | Facility: HOSPITAL | Age: 66
LOS: 1 days | Discharge: HOME | End: 2021-11-30
Payer: MEDICARE

## 2021-11-30 VITALS
HEIGHT: 65 IN | SYSTOLIC BLOOD PRESSURE: 149 MMHG | TEMPERATURE: 98 F | DIASTOLIC BLOOD PRESSURE: 75 MMHG | OXYGEN SATURATION: 100 % | RESPIRATION RATE: 17 BRPM | HEART RATE: 61 BPM | WEIGHT: 177.25 LBS

## 2021-11-30 VITALS — SYSTOLIC BLOOD PRESSURE: 168 MMHG | RESPIRATION RATE: 18 BRPM | HEART RATE: 79 BPM | DIASTOLIC BLOOD PRESSURE: 69 MMHG

## 2021-11-30 DIAGNOSIS — Z95.1 PRESENCE OF AORTOCORONARY BYPASS GRAFT: Chronic | ICD-10-CM

## 2021-11-30 DIAGNOSIS — I73.9 PERIPHERAL VASCULAR DISEASE, UNSPECIFIED: ICD-10-CM

## 2021-11-30 LAB
A1C WITH ESTIMATED AVERAGE GLUCOSE RESULT: 7.2 % — HIGH (ref 4–5.6)
ANION GAP SERPL CALC-SCNC: 16 MMOL/L — SIGNIFICANT CHANGE UP (ref 5–17)
BASOPHILS # BLD AUTO: 0.07 K/UL — SIGNIFICANT CHANGE UP (ref 0–0.2)
BASOPHILS NFR BLD AUTO: 0.7 % — SIGNIFICANT CHANGE UP (ref 0–2)
BLD GP AB SCN SERPL QL: SIGNIFICANT CHANGE UP
BUN SERPL-MCNC: 44.9 MG/DL — HIGH (ref 8–20)
CALCIUM SERPL-MCNC: 7.6 MG/DL — LOW (ref 8.6–10.2)
CHLORIDE SERPL-SCNC: 106 MMOL/L — SIGNIFICANT CHANGE UP (ref 98–107)
CHOLEST SERPL-MCNC: 133 MG/DL — SIGNIFICANT CHANGE UP
CO2 SERPL-SCNC: 21 MMOL/L — LOW (ref 22–29)
CREAT SERPL-MCNC: 3.99 MG/DL — HIGH (ref 0.5–1.3)
EOSINOPHIL # BLD AUTO: 0.22 K/UL — SIGNIFICANT CHANGE UP (ref 0–0.5)
EOSINOPHIL NFR BLD AUTO: 2.2 % — SIGNIFICANT CHANGE UP (ref 0–6)
ESTIMATED AVERAGE GLUCOSE: 160 MG/DL — HIGH (ref 68–114)
GLUCOSE SERPL-MCNC: 63 MG/DL — LOW (ref 70–99)
HCT VFR BLD CALC: 38.7 % — LOW (ref 39–50)
HDLC SERPL-MCNC: 27 MG/DL — LOW
HGB BLD-MCNC: 12.9 G/DL — LOW (ref 13–17)
IMM GRANULOCYTES NFR BLD AUTO: 0.3 % — SIGNIFICANT CHANGE UP (ref 0–1.5)
LIPID PNL WITH DIRECT LDL SERPL: 81 MG/DL — SIGNIFICANT CHANGE UP
LYMPHOCYTES # BLD AUTO: 1.45 K/UL — SIGNIFICANT CHANGE UP (ref 1–3.3)
LYMPHOCYTES # BLD AUTO: 14.4 % — SIGNIFICANT CHANGE UP (ref 13–44)
MCHC RBC-ENTMCNC: 26.9 PG — LOW (ref 27–34)
MCHC RBC-ENTMCNC: 33.3 GM/DL — SIGNIFICANT CHANGE UP (ref 32–36)
MCV RBC AUTO: 80.6 FL — SIGNIFICANT CHANGE UP (ref 80–100)
MONOCYTES # BLD AUTO: 0.54 K/UL — SIGNIFICANT CHANGE UP (ref 0–0.9)
MONOCYTES NFR BLD AUTO: 5.4 % — SIGNIFICANT CHANGE UP (ref 2–14)
NEUTROPHILS # BLD AUTO: 7.73 K/UL — HIGH (ref 1.8–7.4)
NEUTROPHILS NFR BLD AUTO: 77 % — SIGNIFICANT CHANGE UP (ref 43–77)
NON HDL CHOLESTEROL: 106 MG/DL — SIGNIFICANT CHANGE UP
PLATELET # BLD AUTO: 258 K/UL — SIGNIFICANT CHANGE UP (ref 150–400)
POTASSIUM SERPL-MCNC: 4.2 MMOL/L — SIGNIFICANT CHANGE UP (ref 3.5–5.3)
POTASSIUM SERPL-SCNC: 4.2 MMOL/L — SIGNIFICANT CHANGE UP (ref 3.5–5.3)
RBC # BLD: 4.8 M/UL — SIGNIFICANT CHANGE UP (ref 4.2–5.8)
RBC # FLD: 13.1 % — SIGNIFICANT CHANGE UP (ref 10.3–14.5)
SODIUM SERPL-SCNC: 142 MMOL/L — SIGNIFICANT CHANGE UP (ref 135–145)
TRIGL SERPL-MCNC: 127 MG/DL — SIGNIFICANT CHANGE UP
WBC # BLD: 10.04 K/UL — SIGNIFICANT CHANGE UP (ref 3.8–10.5)
WBC # FLD AUTO: 10.04 K/UL — SIGNIFICANT CHANGE UP (ref 3.8–10.5)

## 2021-11-30 PROCEDURE — 86901 BLOOD TYPING SEROLOGIC RH(D): CPT

## 2021-11-30 PROCEDURE — 93005 ELECTROCARDIOGRAM TRACING: CPT

## 2021-11-30 PROCEDURE — 36247 INS CATH ABD/L-EXT ART 3RD: CPT

## 2021-11-30 PROCEDURE — 86850 RBC ANTIBODY SCREEN: CPT

## 2021-11-30 PROCEDURE — 85025 COMPLETE CBC W/AUTO DIFF WBC: CPT

## 2021-11-30 PROCEDURE — 86900 BLOOD TYPING SEROLOGIC ABO: CPT

## 2021-11-30 PROCEDURE — 75716 ARTERY X-RAYS ARMS/LEGS: CPT | Mod: 26

## 2021-11-30 PROCEDURE — 75716 ARTERY X-RAYS ARMS/LEGS: CPT

## 2021-11-30 PROCEDURE — 93010 ELECTROCARDIOGRAM REPORT: CPT

## 2021-11-30 PROCEDURE — 80048 BASIC METABOLIC PNL TOTAL CA: CPT

## 2021-11-30 PROCEDURE — 80061 LIPID PANEL: CPT

## 2021-11-30 PROCEDURE — 83036 HEMOGLOBIN GLYCOSYLATED A1C: CPT

## 2021-11-30 PROCEDURE — C1894: CPT

## 2021-11-30 PROCEDURE — 36415 COLL VENOUS BLD VENIPUNCTURE: CPT

## 2021-11-30 PROCEDURE — C1887: CPT

## 2021-11-30 PROCEDURE — C1769: CPT

## 2021-11-30 RX ORDER — SODIUM CHLORIDE 9 MG/ML
250 INJECTION INTRAMUSCULAR; INTRAVENOUS; SUBCUTANEOUS ONCE
Refills: 0 | Status: COMPLETED | OUTPATIENT
Start: 2021-11-30 | End: 2021-11-30

## 2021-11-30 RX ORDER — CHLORHEXIDINE GLUCONATE 213 G/1000ML
1 SOLUTION TOPICAL ONCE
Refills: 0 | Status: DISCONTINUED | OUTPATIENT
Start: 2021-11-30 | End: 2021-12-14

## 2021-11-30 RX ORDER — SODIUM CHLORIDE 9 MG/ML
450 INJECTION INTRAMUSCULAR; INTRAVENOUS; SUBCUTANEOUS
Refills: 0 | Status: DISCONTINUED | OUTPATIENT
Start: 2021-11-30 | End: 2021-12-14

## 2021-11-30 RX ORDER — ASPIRIN/CALCIUM CARB/MAGNESIUM 324 MG
81 TABLET ORAL DAILY
Refills: 0 | Status: DISCONTINUED | OUTPATIENT
Start: 2021-11-30 | End: 2021-12-14

## 2021-11-30 RX ADMIN — SODIUM CHLORIDE 250 MILLILITER(S): 9 INJECTION INTRAMUSCULAR; INTRAVENOUS; SUBCUTANEOUS at 14:24

## 2021-11-30 RX ADMIN — SODIUM CHLORIDE 150 MILLILITER(S): 9 INJECTION INTRAMUSCULAR; INTRAVENOUS; SUBCUTANEOUS at 14:24

## 2021-11-30 RX ADMIN — Medication 81 MILLIGRAM(S): at 14:24

## 2021-11-30 NOTE — H&P PST ADULT - HISTORY OF PRESENT ILLNESS
Department of Cardiology                                                                  [facility]/[place] [address]                                                            Telephone: [phone]. Fax:[phone]                                                                             Pre- Procedure H + P/Progress Note      HPI:        Symptoms:        Angina (Class):        Ischemic Symptoms:     Heart Failure:        Systolic/Diastolic/Combined:        NYHA Class (within 2 weeks):     Assessment of LVEF:       EF:        Assessed by:        Date:     Prior Cardiac Interventions:         Noninvasive Testing:   Stress Test: Date:        Protocol:        Duration of Exercise:        Symptoms:        EKG Changes:        DTS:        Myocardial Imaging:        Risk Assessment (Low, Medium, High):     Echo:       Risk Assessments:  ASA:  Mallampati:  Bleeding Risk:  Creatinine:  GFR:    Associated Risk Factors:        Cerebrovascular Disease: N/A       Chronic Lung Disease: N/A       Peripheral Arterial Disease: N/A       Chronic Kidney Disease (if yes, what is GFR): N/A       Uncontrolled Diabetes (if yes, what is HgbA1C or FBS): N/A       Poorly Controlled Hypertension (if yes, what is SBP): N/A       Morbid Obesity (if yes, what is BMI): N/A       History of Recent Ventricular Arrhythmia: N/A       Inability to Ambulate Safely: N/A       Need for Therapeutic Anticoagulation: N/A       Antiplatelet or Contrast Allergy: N/A    Antianginal Therapies:        Beta Blockers:         Calcium Channel Blockers:        Long Acting Nitrates:        Ranexa:     	  MEDICATIONS:              chlorhexidine 4% Liquid 1 Application(s) Topical once        ROS: as stated above, otherwise negative    PHYSICAL EXAM:  Constitutional: A & O x 3  HEENT:   Normal oral mucosa, PERRL, EOMI	  Cardiovascular: Normal S1 S2, No JVD, No murmurs, No edema  Respiratory: Lungs clear to auscultation	  Gastrointestinal:  Soft, Non-tender, + BS	  Skin: No rashes, No ecchymoses, No cyanosis  Neurologic: Non-focal  Extremities: Normal range of motion, No clubbing, cyanosis or edema  Vascular: Peripheral pulses palpable 2+ bilaterally      T(C): 36.4 (11-30-21 @ 08:18), Max: 36.4 (11-30-21 @ 08:18)  HR: 61 (11-30-21 @ 08:18) (61 - 61)  BP: 149/75 (11-30-21 @ 08:18) (149/75 - 149/75)  RR: 17 (11-30-21 @ 08:18) (17 - 17)  SpO2: 100% (11-30-21 @ 08:18) (100% - 100%)  Wt(kg): --      I&O's Summary      Daily Height in cm: 165.1 (30 Nov 2021 08:18)    Daily     TELEMETRY: 	      ECG:  	    LABS:	 	                                    12.9   10.04 )-----------( 258      ( 30 Nov 2021 08:19 )             38.7             Tnl:    Lipid Profile:   TC  TG  LDL  HDL    HgA1c:     proBNP:     TSH:     Impression:      Plan:  -plan for LHC via RA vs FA  -patient seen and examined  -confirmed appropriate NPO duration  -ECG and Labs reviewed  -Aspirin 81mg po pre-cath  -procedure discussed with patient; risks and benefits explained, questions answered  -consent obtained by attending IC                                                                               Department of Cardiology                                                                  [facility]/[place] [address] [place] [address]                                                            Telephone: [phone]. Fax:[phone]                                                                             Pre- Procedure H + P/Progress Note      HPI:  67yo male with h/o HTN, HLD, DM, ex-smoker, TIA, CAD with prior 3V CABG (LIMA to LAD, SVG to PDA, SVG to OM), HFpEF, CKD stage 4-5, presents with c/o B/L LE claudication Arvind class IIb/[place] class 2, LE duplex (R severe 90-99%, L 50-74%), NANETTE R 0.74, L 0.68, now plan for B/L LE peripheral angio +/- PTA to be performed by Dr. Mohan.       Symptoms:        Arvind IIb       Brooke II    Heart Failure:        Systolic/Diastolic/Combined: HFpEF       NYHA Class (within 2 weeks): no    Assessment of LVEF:       EF: 66%       Assessed by: TTE       Date: 2020    Prior Cardiac Interventions:  3V CABG (LIMA to LAD, SVG to PDA, SVG to OM)         LE duplex 11/1/21:  B/L SFA disease, ST RSFA 90-99%, L 50-74%    NANETTE 9/2020:  R: 1.34/1.26/0.74  L: 1.39/1.08/0.68  Echo:       Risk Assessments:  ASA: 3  Mallampati: 2  Creatinine: 3.9  GFR: 14    Associated Risk Factors:        Cerebrovascular Disease: N/A       Chronic Lung Disease: N/A       Peripheral Arterial Disease: yes       Chronic Kidney Disease (if yes, what is GFR): yes, GFR 14       Uncontrolled Diabetes (if yes, what is HgbA1C or FBS): N/A       Poorly Controlled Hypertension (if yes, what is SBP): N/A       Morbid Obesity (if yes, what is BMI): N/A       History of Recent Ventricular Arrhythmia: N/A       Inability to Ambulate Safely: N/A       Need for Therapeutic Anticoagulation: N/A       Antiplatelet or Contrast Allergy: N/A      ROS: as stated above, otherwise negative    PHYSICAL EXAM:  Constitutional: A & O x 3  HEENT:   Normal oral mucosa, PERRL, EOMI	  Cardiovascular: Normal S1 S2, No JVD, No murmurs, No edema  Respiratory: Lungs clear to auscultation	  Gastrointestinal:  Soft, Non-tender, + BS	  Skin: No rashes, No ecchymoses, No cyanosis  Neurologic: Non-focal  Extremities: Normal range of motion, No clubbing, cyanosis or edema  Vascular: Peripheral pulses palpable + by doppler, FA pulses + bilaterally      T(C): 36.4 (11-30-21 @ 08:18), Max: 36.4 (11-30-21 @ 08:18)  HR: 61 (11-30-21 @ 08:18) (61 - 61)  BP: 149/75 (11-30-21 @ 08:18) (149/75 - 149/75)  RR: 17 (11-30-21 @ 08:18) (17 - 17)  SpO2: 100% (11-30-21 @ 08:18) (100% - 100%)    Daily Height in cm: 165.1 (30 Nov 2021 08:18)      TELEMETRY: SR 60's 	      ECG:  SR/1st degree AVB    LABS:	 	                   12.9   10.04 )-----------( 258      ( 30 Nov 2021 08:19 )             38.7             Tnl:    Lipid Profile:   TC  TG  LDL  HDL    HgA1c:     proBNP:     TSH:     Impression:      Plan:  -plan for LHC via RA vs FA  -patient seen and examined  -confirmed appropriate NPO duration  -ECG and Labs reviewed  -Aspirin 81mg po pre-cath  -procedure discussed with patient; risks and benefits explained, questions answered  -consent obtained by attending IC                                                                               Department of Cardiology                                                                  [facility]/[place] [address] [address]                                                            Telephone: [phone]. Fax:[phone]                                                                             Pre- Procedure H + P/Progress Note      HPI:  67yo male with h/o HTN, HLD, DM, ex-smoker, TIA, CAD with prior 3V CABG (LIMA to LAD, SVG to PDA, SVG to OM), HFpEF, CKD stage 4-5, presents with c/o B/L LE claudication L > R, Arvind class IIb/Charlo class 2, LE duplex (R severe 90-99%, L 50-74%), TBI R 0.74, L 0.68, now plan for B/L LE peripheral angio, if + PAD requiring intervention, will stage given CKD 4-5.        Symptoms:        Arvind IIb       Charlo II    Heart Failure:        Systolic/Diastolic/Combined: HFpEF       NYHA Class (within 2 weeks): no    Assessment of LVEF:       EF: 66%       Assessed by: TTE       Date: 2020    Prior Cardiac Interventions:  3V CABG (LIMA to LAD, SVG to PDA, SVG to OM)         LE duplex 11/1/21:  B/L SFA disease, ST RSFA 90-99%, L 50-74%    NANETTE 9/2020:  R: 1.34/1.26/0.74  L: 1.39/1.08/0.68    Echo: 7/2020  EF 66%      Risk Assessments:  ASA: 3  Mallampati: 2  Creatinine: 3.9  GFR: 14    Associated Risk Factors:        Cerebrovascular Disease: N/A       Chronic Lung Disease: N/A       Peripheral Arterial Disease: yes       Chronic Kidney Disease (if yes, what is GFR): yes, GFR 14       Uncontrolled Diabetes (if yes, what is HgbA1C or FBS): N/A       Poorly Controlled Hypertension (if yes, what is SBP): N/A       Morbid Obesity (if yes, what is BMI): N/A       History of Recent Ventricular Arrhythmia: N/A       Inability to Ambulate Safely: N/A       Need for Therapeutic Anticoagulation: N/A       Antiplatelet or Contrast Allergy: N/A      ROS: as stated above, otherwise negative    PHYSICAL EXAM:  Constitutional: A & O x 3  HEENT:   Normal oral mucosa, PERRL, EOMI	  Cardiovascular: Normal S1 S2, No JVD, No murmurs, No edema  Respiratory: Lungs clear to auscultation	  Gastrointestinal:  Soft, Non-tender, + BS	  Skin: No rashes, No ecchymoses, No cyanosis  Neurologic: Non-focal  Extremities: Normal range of motion, No clubbing, cyanosis or edema  Vascular: Peripheral pulses palpable + by doppler, FA pulses + bilaterally      T(C): 36.4 (11-30-21 @ 08:18), Max: 36.4 (11-30-21 @ 08:18)  HR: 61 (11-30-21 @ 08:18) (61 - 61)  BP: 149/75 (11-30-21 @ 08:18) (149/75 - 149/75)  RR: 17 (11-30-21 @ 08:18) (17 - 17)  SpO2: 100% (11-30-21 @ 08:18) (100% - 100%)    Daily Height in cm: 165.1 (30 Nov 2021 08:18)      TELEMETRY: SR 60's 	      ECG:  SR/1st degree AVB 61BPM    LABS:	 	                   12.9   10.04 )-----------( 258      ( 30 Nov 2021 08:19 )             38.7       Lipid Profile:       LDL 81  HDL 27    HgA1c: 7.2

## 2021-11-30 NOTE — DISCHARGE NOTE PROVIDER - NSDCCPTREATMENT_GEN_ALL_CORE_FT
PRINCIPAL PROCEDURE  Procedure: Angio fluoro peripheral vessels w contrast  Findings and Treatment: You do have some peripheral artery disease and this is more significant on the right lower extremity. We did not open any blockages at this time. You should follow up with Dr. Pro to determine if this should be treated with medical management or if you should return for an intervention to open the blockage.   No heavy lifting, driving, sex, tub baths, swimming, or any activity that submerges the lower half of the body in water for 48 hours.  Limited walking and stairs for 48 hours.    Change the bandaid after 24 hours and every 24 hours after that.  Keep the puncture site dry and covered with a bandaid until a scab forms.    Observe the site frequently.  If bleeding or a large lump (the size of a golf ball or bigger) occurs lie flat, apply continuous direct pressure just above the puncture site for at least 10 minutes, and notify your physician immediately.  If the bleeding cannot be controlled, call 911 immediately for assistance.  Notify your physician of pain, swelling or any drainage.    Notify your physician immediately if coldness, numbness, discoloration or pain in your foot occurs.

## 2021-11-30 NOTE — H&P PST ADULT - BIRTH SEX
- Try zezorb powder to keep the groin area dry    Please call central scheduling to schedule your mammogram and bone density scan :  [phone]    
Male

## 2021-11-30 NOTE — DISCHARGE NOTE NURSING/CASE MANAGEMENT/SOCIAL WORK - NSDCPEFALRISK_GEN_ALL_CORE
For information on Fall & Injury Prevention, visit: [email or web address] OR  [email or web address] OR  https://www.cdc.gov/steadi/patient.html

## 2021-11-30 NOTE — DISCHARGE NOTE PROVIDER - HOSPITAL COURSE
HPI:  65yo male with h/o HTN, HLD, DM, ex-smoker, TIA, CAD with prior 3V CABG (LIMA to LAD, SVG to PDA, SVG to OM), HFpEF, CKD stage 4-5, presents with c/o B/L LE claudication L > R, Arvind class IIb/[place] class 2, LE duplex (R severe 90-99%, L 50-74%), TBI R 0.74, L 0.68, now plan for B/L LE peripheral angio, if + PAD requiring intervention, will stage given CKD 4-5.        Plan:  -plan for peripheral angio via FA  -patient seen and examined  -confirmed appropriate NPO duration  -ECG and Labs reviewed  -Aspirin 81mg po pre-cath  -NS 250mL bolus for CKD 4-5 pre procedure  -procedure discussed with patient; risks and benefits explained, questions answered  -consent obtained by attending IC    Now s/p peripheral angio via RFA with LSFA ~50%, + below the knee disease, RSFA 80% lesion, no iliac/CFA/profunda disease, procedure performed by Dr. Mohan, recieved IV sedation intraprocedurally, arrived to recovery in NAD and HDS, RFA sheath removed, hemostasis achieved and no bleed/hematoma, distal pulse + by doppler, plan for D/C home after recovery period completed.      Plan:  -Formal cath report pending  -Post procedure management/monitoring per protocol  -Access site precautions  -Bedrest x 3hours post sheath removal  -NS 150mL/hr until D/C  -Continue current medical therapy  -Educated regarding post procedure management and care  -Discussed the importance of RF modification  -Repeat BMP 12/3/21 to monitor YVES/Cr level post contrast (results to be faxed to Dr. Pro)  -F/U outpt in 1-2 weeks with Cardiologist Dr. Pro   -Medical management vs stage PTA  -DISPO: Plan for D/C after recovery period completed in am if remains HDS, ECG and labs in am stable and without complications

## 2021-11-30 NOTE — DISCHARGE NOTE PROVIDER - NSDCFUADDINST_GEN_ALL_CORE_FT
You do have some peripheral artery disease and this is more significant on the right lower extremity. We did not open any blockages at this time. You should follow up with Dr. Pro to determine if this should be treated with medical management or if you should return for an intervention to open the blockage.  No heavy lifting, driving, sex, tub baths, swimming, or any activity that submerges the lower half of the body in water for 48 hours.  Limited walking and stairs for 48 hours.   Change the bandaid after 24 hours and every 24 hours after that.  Keep the puncture site dry and covered with a bandaid until a scab forms.   Observe the site frequently.  If bleeding or a large lump (the size of a golf ball or bigger) occurs lie flat, apply continuous direct pressure just above the puncture site for at least 10 minutes, and notify your physician immediately.  If the bleeding cannot be controlled, call 911 immediately for assistance.  Notify your physician of pain, swelling or any drainage.   Notify your physician immediately if coldness, numbness, discoloration or pain in your foot occurs.

## 2021-11-30 NOTE — DISCHARGE NOTE NURSING/CASE MANAGEMENT/SOCIAL WORK - PATIENT PORTAL LINK FT
You can access the FollowMyHealth Patient Portal offered by [facility] by registering at the following website: [email or web address]. By joining [employer]’s FollowMyHealth portal, you will also be able to view your health information using other applications (apps) compatible with our system.

## 2021-11-30 NOTE — DISCHARGE NOTE PROVIDER - NSDCCPCAREPLAN_GEN_ALL_CORE_FT
PRINCIPAL DISCHARGE DIAGNOSIS  Diagnosis: PAD (peripheral artery disease)  Assessment and Plan of Treatment: Peripheral artery disease is the buildup of plaque in the arteries that supply oxygen-rich blood to your body including your lower exteremties. Plaque causes a narrowing or blockage that could result in reduced blood flow to your body. Symptoms include cramping, burning, sharp pain or discomfort, numbness, tingling, pale or cool extremities,  that may occur with ambulation and could progress to resting pain. Sometimes these blockages require interventions such as balloooning or placement of a stent to open the blockage and restore blood flow.   Go to the ED with any acute onset of lower extremity pain, numbness, tingling, cold or pale color, chest pain, palpitations, shortness of breath or dizziness.   Managing risk factors will help keep your circulation open and prevent future blockages, risk factors may include: high blood pressure, high cholesterol, obesity, sedentary life style and smoking.    Your diet should be low in fat, cholesterol, salt and carbohydrates, increase fruits (caution if diabetic), vegetables and whole grains/fiber rich foods.   Take all your cardiac  medications as prescribed.    Exercise is a very important factor in heart health. Once your post  procedure restrictions have passed, you should engage in heart healthy, aerobic exercise. Be sure to have clearance from your cardiologist. Cardiac rehab programs could be extremely beneficial and your cardiologist could help set this up.   Follow up with your cardiologist within 1-2 weeks after your procedure.   Call your cardiologist or our unit ([phone]) with any questions or concerns that may arise. procedure restric      SECONDARY DISCHARGE DIAGNOSES  Diagnosis: HTN (hypertension)  Assessment and Plan of Treatment: Continue to take antihypertensive medications as prescribed. Obtain a home blood pressure monitor (at any pharmacy or medical supply store) and monitor blood pressure trends. Call your doctor if you note you blood pressure to be running much higher or lower than usual. Limit salt intake.    Diagnosis: HLD (hyperlipidemia)  Assessment and Plan of Treatment: Your diet should be low in fat, cholesterol, salt and carbohydrates, increase fruits (caution if diabetic), vegetables and whole grains/fiber rich foods. Take your cholesterol lowering medications as prescribed. Routine follow up lipid panels    Diagnosis: DM (diabetes mellitus)  Assessment and Plan of Treatment: Follow a carbohydrate controlled diet. Monitor your blood sugar levels before meals and at bedtime and keep a log of your values. Follow up for routine HbA1C levels which indicate your sugar control over the previous few months. You should ensure you have a provider that is closely monitoring your diabetes and keeping your sugar levels well controlled. Weight loss, carb controlled healthy diet and exercise can help improve your glucose control. It is also improtant to monitor for low sugar levels as levels too low (below 70) can be very dangerous. You should always carry sugar tablets or something to help bring the sugar level up if it drops too low. Symptoms of low sugar levls are: fatigue, lightheaded, mental confusion, fainting, sweating, shaking and excessive hunger.    Diagnosis: Stage 5 chronic kidney disease not on chronic dialysis  Assessment and Plan of Treatment: Repeat kidney levels on 12/3/21 to evaluate stability. Monitor urine output and notify your doctor of any changes. Routine follow up to monitor your kidney function. Avoid excessive use of ibuprofen, NSAIDs and other medications that can impact your kidney function where appropriate. Ensure good hydration before and after any dye/contrast studies and have follow up labs after these types of studies.    Diagnosis: CAD (coronary artery disease)  Assessment and Plan of Treatment: Coronary artery disease is the buildup of plaque in the arteries that supply oxygen-rich blood to your heart. Plaque causes a narrowing or blockage that could result in a heart attack. Symptoms include chest pain or discomfort, shortness of breath, dizziness, palpitations, fatigue or reduced exercise tolerance. .  You had prior bypass and your last stress text was normal. Continue to follow up routinely with your cardiologist.   Go to the ED with any acute onset of chest pain, palpitations, shortness of breath or dizziness.   Managing risk factors will help keep your stent open and prevent future blockages, risk factors may include: high blood pressure, high cholesterol, diabetes, obesity, sedentary life style and smoking.    Your diet should be low in fat, cholesterol, salt and carbohydrates, increase fruits (caution if diabetic), vegetables and whole grains/fiber rich foods.   Take all your cardiac  medications as prescribed.    Exercise is a very important factor in heart health. Once your post procedure restrictions have passed, you should engage in heart healthy, aerobic exercise. Be sure to have clearance from your cardiologist. Cardiac rehab programs could be extremely beneficial and your cardiologist could help set this up.   Follow up with your cardiologist within 1-2 weeks after your procedure.   Call your cardiologist or our unit ([phone]) with any questions or concerns that may arise.

## 2021-11-30 NOTE — DISCHARGE NOTE PROVIDER - CARE PROVIDER_API CALL
Siomara Pro)  Cardiology; Internal Medicine  [address] [place] [address], [address] [id number]  Phone: [phone]  Fax: [phone]  Follow Up Time:

## 2021-11-30 NOTE — DISCHARGE NOTE PROVIDER - NSDCMRMEDTOKEN_GEN_ALL_CORE_FT
amLODIPine 10 mg oral tablet: 1 tab(s) orally once a day  aspirin 81 mg oral tablet: orally once a day  atorvastatin 20 mg oral tablet: 1 tab(s) orally once a day  BiDil 20 mg-37.5 mg oral tablet: 1 tab(s) orally 3 times a day  BMP: 1 unit(s) intravenous once a day   carvedilol 25 mg oral tablet: 1 tab(s) orally 2 times a day  insulin glargine 100 units/mL subcutaneous solution: 30 unit(s) subcutaneous once a day (at bedtime)  Lasix 40 mg oral tablet: 1.5 tab(s) orally 2 times a day please take only on Saturdays and Sundays   Lasix 40 mg oral tablet: 1 tab(s) orally 2 times a day daily Monday, tuesday, Wendsday, thrusday and Friday,  On Saturday and SUnday you will take 60mg Tiwce a day

## 2021-11-30 NOTE — H&P PST ADULT - ASSESSMENT
Impression:  65yo male with multiple CVD risk factors, c/o B/L LE claudication L > R, Arvind class IIb/[place] class 2, LE duplex (R severe 90-99%, L 50-74%), TBI R 0.74, L 0.68, now plan for B/L LE peripheral angio, if + PAD requiring intervention, will stage given CKD 4-5.      Plan:  -plan for peripheral angio via FA  -patient seen and examined  -confirmed appropriate NPO duration  -ECG and Labs reviewed  -Aspirin 81mg po pre-cath  -procedure discussed with patient; risks and benefits explained, questions answered  -consent obtained by attending IC         Impression:  67yo male with multiple CVD risk factors, c/o B/L LE claudication L > R, Arvind class IIb/[place] class 2, LE duplex (R severe 90-99%, L 50-74%), TBI R 0.74, L 0.68, now plan for B/L LE peripheral angio, if + PAD requiring intervention, will stage given CKD 4-5.      Plan:  -plan for peripheral angio via FA  -patient seen and examined  -confirmed appropriate NPO duration  -ECG and Labs reviewed  -Aspirin 81mg po pre-cath  -NS bolus 250mL pre cath for CKD  -procedure discussed with patient; risks and benefits explained, questions answered  -consent obtained by attending IC

## 2022-01-11 ENCOUNTER — APPOINTMENT (OUTPATIENT)
Dept: CARDIOLOGY | Facility: CLINIC | Age: 67
End: 2022-01-11
Payer: MEDICARE

## 2022-01-11 ENCOUNTER — NON-APPOINTMENT (OUTPATIENT)
Age: 67
End: 2022-01-11

## 2022-01-11 VITALS
WEIGHT: 181 LBS | OXYGEN SATURATION: 95 % | SYSTOLIC BLOOD PRESSURE: 140 MMHG | HEART RATE: 70 BPM | DIASTOLIC BLOOD PRESSURE: 66 MMHG | TEMPERATURE: 97.4 F | RESPIRATION RATE: 18 BRPM | HEIGHT: 65 IN | BODY MASS INDEX: 30.16 KG/M2

## 2022-01-11 DIAGNOSIS — R06.02 SHORTNESS OF BREATH: ICD-10-CM

## 2022-01-11 DIAGNOSIS — R53.1 WEAKNESS: ICD-10-CM

## 2022-01-11 DIAGNOSIS — R53.83 OTHER FATIGUE: ICD-10-CM

## 2022-01-11 PROCEDURE — 99215 OFFICE O/P EST HI 40 MIN: CPT

## 2022-01-11 PROCEDURE — 93000 ELECTROCARDIOGRAM COMPLETE: CPT

## 2022-01-11 NOTE — REASON FOR VISIT
[Follow-Up - Clinic] : a clinic follow-up of [FreeTextEntry1] : Multivessel CAD, ICMP, HTN, CVA [FreeTextEntry2] : Multivessel CAD, ICMP, HTN, CVA

## 2022-01-11 NOTE — HISTORY OF PRESENT ILLNESS
[FreeTextEntry1] : Multivessel CAD, HTN, CVA, HF pEF\par \par \par HPI for today: :    he still have caludfication symptoms.    we never pursued intervention due to CKD.  \par if symptoms are persistent and worse, then jared plan for CO2 angiography\par no chest paion. no dyspnea. nfagitue and tired.\par \par \par old note:  no chest pain. no dyspnea.  feels good. complaitn with meds.  pain in the feet and legs. claudication\par \par \par old note: patient ran out of norvasc. he has not been taking it for 1 week.  \par he could not get the prescription\par no headhecs. no dizziness. No LE edema. He had stopped losartan due to CKD.  we put him on Bidil.\par he states when he takes his norvasc. his BP is good around 130. \par he has not made the appt with nephrologist yet. \par \par old note: : jeanette was in the hospital few days ago. he was fluid overload and LE edaem. responded to Iv lasix. CKD . chornic.  \par discharged the next day. stress test ordered and he got blood test done at Primary doctor yesterday. \par his echo was unremarkable in hospital \par \par \par old noteL: patient has been having increaese weight gain. he has been drinking too much water also as he feels its good for his kidneys. He drinks atleast 10 bottles a day.\par Compliant with  meds.  +_ dyspnea one xertion 2 + LE edema. \par \par \par \par old note: does not feel good. Lost balance. Unsteady gait.  diozziness.\par his primary doctor sent him for the carotid US and TCD ,. \par fatigeu and tired. \par \par \par old noteL: feels  good . no problem. compliant with meds. \par no smoking. need script for coreg. patient did not get the stress test done. Will order stress test agiain. checked hospital records. no stress test in sunrise. \par \par \par old note: no chest pain. no dypsnea. no headaches. feels good . complikant with meds. \par last time got NANETTE done that was abnormal. but US arterial showed run off disease on the right side,. tibiaperoneal. \par \par \par old note: No chest pain.  No dyspnea.  No LE edema.  Compliant with meds. Does not exercise.\par got NANETTE and US of LE.  found to have abnormal; NANETTE on the left and calcification on the right. US of LE arterial shows right PTA occlusion. patient does complain of feet pain on the right side. No pain on the left.

## 2022-01-11 NOTE — DISCUSSION/SUMMARY
[Patient] : the patient [Risks] : risks [Benefits] : benefits [Alternatives] : alternatives [With Me] : with me [___ Month(s)] : in [unfilled] month(s) [FreeTextEntry1] : This is a 59 M with history of smoking, HTN,DM (on insulin), HLD, found to have 3 vessel disease and TIA post cath: s/p CABG recent PNA and now with facial swelling,.\par 1) Peripheral vascular disease : bilateral sfa disease. right owrse then left. no intervention due to CKD. if symptoms persist or worsen then will plan fro CO2 angiography or stenting.\par 2) LE edema resolved.  CKD and Diastollic heart failure.    ct lasix 40 Q12,.   \par 3) PVD:   bilateral atherosclerosis: Walking and ct antiplatelets. life style modification. diabetes control.  aspirin and lipitor.   repat US arterial Duplex. \par 4) Multivessel CAD: s/p CABG2D  Continue Coreg 25 Q12, .ASA 81 mg,  lipitor 20 daily.  \par 5) HFpEF: Stable weight. NYHA class I.. Ct lasix.   40 BID lasix on weekdays and extra one pill on weekends. \par 6) HTN:  continue coreg 25 Q12   bidil 20 Q12   amldoipne 10 mg daily.  \par 7) DM: f/u PCP \par 8) CKD: creatinine stable.  f/u nephrology. dr. price.  DM control.  will re evaluate for losartan if BP not controlled. \par 9) carotid artery disease: carotid atheroscelrosuis.   aspirin statins.  \par Will order and review ECG for the above mentioned diagnosis/condition/symptoms  a\par

## 2022-01-11 NOTE — PHYSICAL EXAM
[General Appearance - Well Developed] : well developed [Normal Appearance] : normal appearance [Well Groomed] : well groomed [General Appearance - Well Nourished] : well nourished [No Deformities] : no deformities [General Appearance - In No Acute Distress] : no acute distress [Normal Conjunctiva] : the conjunctiva exhibited no abnormalities [Eyelids - No Xanthelasma] : the eyelids demonstrated no xanthelasmas [Normal Oral Mucosa] : normal oral mucosa [No Oral Pallor] : no oral pallor [No Oral Cyanosis] : no oral cyanosis [Normal Jugular Venous A Waves Present] : normal jugular venous A waves present [Normal Jugular Venous V Waves Present] : normal jugular venous V waves present [No Jugular Venous Mahan A Waves] : no jugular venous mahan A waves [Respiration, Rhythm And Depth] : normal respiratory rhythm and effort [Exaggerated Use Of Accessory Muscles For Inspiration] : no accessory muscle use [Auscultation Breath Sounds / Voice Sounds] : lungs were clear to auscultation bilaterally [Heart Rate And Rhythm] : heart rate and rhythm were normal [Heart Sounds] : normal S1 and S2 [Murmurs] : no murmurs present [Abdomen Soft] : soft [Abdomen Tenderness] : non-tender [Abdomen Mass (___ Cm)] : no abdominal mass palpated [Abnormal Walk] : normal gait [Gait - Sufficient For Exercise Testing] : the gait was sufficient for exercise testing [Nail Clubbing] : no clubbing of the fingernails [Cyanosis, Localized] : no localized cyanosis [Petechial Hemorrhages (___cm)] : no petechial hemorrhages [Skin Color & Pigmentation] : normal skin color and pigmentation [] : no rash [No Venous Stasis] : no venous stasis [Skin Lesions] : no skin lesions [No Skin Ulcers] : no skin ulcer [No Xanthoma] : no  xanthoma was observed [Oriented To Time, Place, And Person] : oriented to person, place, and time [Affect] : the affect was normal [Mood] : the mood was normal [No Anxiety] : not feeling anxious [FreeTextEntry1] : 2+ LE edema.

## 2022-01-11 NOTE — CARDIOLOGY SUMMARY
[No Ischemia] : no Ischemia [LVEF ___%] : LVEF [unfilled]% [Enlarged] : enlarged LA size [None] : no mitral regurgitation [___] : [unfilled] [de-identified] : 1 11 2022  Sinus  Rhythm  -First degree A-V block \par -Incomplete right bundle branch block. \par  \par \par ABNORMAL \par \par \par 10 19 2021 \par \par 4/28/2021  : Sinus  Rhythm \par First degree AV block. \par -Incomplete right bundle branch block. \par  -Old inferior infarct. \par \par ABNORMAL \par  [de-identified] : NANETTE: Left 0.7 right : 1.4 \par US of LE arterial: sub total occlusion right PTA. mdoerate atherosclerosis bilaterally. \par Carotid DUplexL: sept 2020  moderate atherslceorsi. no stenosis.

## 2022-01-13 ENCOUNTER — NON-APPOINTMENT (OUTPATIENT)
Age: 67
End: 2022-01-13

## 2022-01-13 ENCOUNTER — INPATIENT (INPATIENT)
Facility: HOSPITAL | Age: 67
LOS: 4 days | Discharge: HOME | DRG: 291 | End: 2022-01-18
Attending: STUDENT IN AN ORGANIZED HEALTH CARE EDUCATION/TRAINING PROGRAM | Admitting: FAMILY MEDICINE
Payer: MEDICARE

## 2022-01-13 VITALS
RESPIRATION RATE: 18 BRPM | WEIGHT: 175.05 LBS | HEART RATE: 78 BPM | TEMPERATURE: 97 F | DIASTOLIC BLOOD PRESSURE: 67 MMHG | OXYGEN SATURATION: 97 % | SYSTOLIC BLOOD PRESSURE: 146 MMHG | HEIGHT: 65 IN

## 2022-01-13 DIAGNOSIS — I25.10 ATHEROSCLEROTIC HEART DISEASE OF NATIVE CORONARY ARTERY WITHOUT ANGINA PECTORIS: ICD-10-CM

## 2022-01-13 DIAGNOSIS — E11.9 TYPE 2 DIABETES MELLITUS WITHOUT COMPLICATIONS: ICD-10-CM

## 2022-01-13 DIAGNOSIS — N17.9 ACUTE KIDNEY FAILURE, UNSPECIFIED: ICD-10-CM

## 2022-01-13 DIAGNOSIS — I50.33 ACUTE ON CHRONIC DIASTOLIC (CONGESTIVE) HEART FAILURE: ICD-10-CM

## 2022-01-13 DIAGNOSIS — I10 ESSENTIAL (PRIMARY) HYPERTENSION: ICD-10-CM

## 2022-01-13 DIAGNOSIS — Z95.1 PRESENCE OF AORTOCORONARY BYPASS GRAFT: Chronic | ICD-10-CM

## 2022-01-13 DIAGNOSIS — U07.1 COVID-19: ICD-10-CM

## 2022-01-13 DIAGNOSIS — E78.00 PURE HYPERCHOLESTEROLEMIA, UNSPECIFIED: ICD-10-CM

## 2022-01-13 DIAGNOSIS — I50.23 ACUTE ON CHRONIC SYSTOLIC (CONGESTIVE) HEART FAILURE: ICD-10-CM

## 2022-01-13 LAB
ALBUMIN SERPL ELPH-MCNC: 3.8 G/DL — SIGNIFICANT CHANGE UP (ref 3.3–5.2)
ALP SERPL-CCNC: 109 U/L — SIGNIFICANT CHANGE UP (ref 40–120)
ALT FLD-CCNC: 15 U/L — SIGNIFICANT CHANGE UP
ANION GAP SERPL CALC-SCNC: 15 MMOL/L — SIGNIFICANT CHANGE UP (ref 5–17)
AST SERPL-CCNC: 18 U/L — SIGNIFICANT CHANGE UP
BASOPHILS # BLD AUTO: 0.04 K/UL — SIGNIFICANT CHANGE UP (ref 0–0.2)
BASOPHILS NFR BLD AUTO: 0.5 % — SIGNIFICANT CHANGE UP (ref 0–2)
BILIRUB SERPL-MCNC: 0.3 MG/DL — LOW (ref 0.4–2)
BUN SERPL-MCNC: 64.2 MG/DL — HIGH (ref 8–20)
CALCIUM SERPL-MCNC: 8.4 MG/DL — LOW (ref 8.6–10.2)
CHLORIDE SERPL-SCNC: 105 MMOL/L — SIGNIFICANT CHANGE UP (ref 98–107)
CO2 SERPL-SCNC: 17 MMOL/L — LOW (ref 22–29)
CREAT SERPL-MCNC: 4.78 MG/DL — HIGH (ref 0.5–1.3)
EOSINOPHIL # BLD AUTO: 0.1 K/UL — SIGNIFICANT CHANGE UP (ref 0–0.5)
EOSINOPHIL NFR BLD AUTO: 1.2 % — SIGNIFICANT CHANGE UP (ref 0–6)
FLUAV AG NPH QL: SIGNIFICANT CHANGE UP
FLUBV AG NPH QL: SIGNIFICANT CHANGE UP
GLUCOSE BLDC GLUCOMTR-MCNC: 117 MG/DL — HIGH (ref 70–99)
GLUCOSE SERPL-MCNC: 140 MG/DL — HIGH (ref 70–99)
HCT VFR BLD CALC: 34.5 % — LOW (ref 39–50)
HGB BLD-MCNC: 11.1 G/DL — LOW (ref 13–17)
HIV 1 & 2 AB SERPL IA.RAPID: SIGNIFICANT CHANGE UP
IMM GRANULOCYTES NFR BLD AUTO: 0.4 % — SIGNIFICANT CHANGE UP (ref 0–1.5)
LYMPHOCYTES # BLD AUTO: 1.5 K/UL — SIGNIFICANT CHANGE UP (ref 1–3.3)
LYMPHOCYTES # BLD AUTO: 17.7 % — SIGNIFICANT CHANGE UP (ref 13–44)
MAGNESIUM SERPL-MCNC: 1.9 MG/DL — SIGNIFICANT CHANGE UP (ref 1.8–2.6)
MCHC RBC-ENTMCNC: 26.4 PG — LOW (ref 27–34)
MCHC RBC-ENTMCNC: 32.2 GM/DL — SIGNIFICANT CHANGE UP (ref 32–36)
MCV RBC AUTO: 82.1 FL — SIGNIFICANT CHANGE UP (ref 80–100)
MONOCYTES # BLD AUTO: 0.63 K/UL — SIGNIFICANT CHANGE UP (ref 0–0.9)
MONOCYTES NFR BLD AUTO: 7.4 % — SIGNIFICANT CHANGE UP (ref 2–14)
NEUTROPHILS # BLD AUTO: 6.17 K/UL — SIGNIFICANT CHANGE UP (ref 1.8–7.4)
NEUTROPHILS NFR BLD AUTO: 72.8 % — SIGNIFICANT CHANGE UP (ref 43–77)
NT-PROBNP SERPL-SCNC: 2874 PG/ML — HIGH (ref 0–300)
PLATELET # BLD AUTO: 289 K/UL — SIGNIFICANT CHANGE UP (ref 150–400)
POTASSIUM SERPL-MCNC: 4.3 MMOL/L — SIGNIFICANT CHANGE UP (ref 3.5–5.3)
POTASSIUM SERPL-SCNC: 4.3 MMOL/L — SIGNIFICANT CHANGE UP (ref 3.5–5.3)
PROT SERPL-MCNC: 6.9 G/DL — SIGNIFICANT CHANGE UP (ref 6.6–8.7)
RBC # BLD: 4.2 M/UL — SIGNIFICANT CHANGE UP (ref 4.2–5.8)
RBC # FLD: 13.8 % — SIGNIFICANT CHANGE UP (ref 10.3–14.5)
RSV RNA NPH QL NAA+NON-PROBE: SIGNIFICANT CHANGE UP
SARS-COV-2 RNA SPEC QL NAA+PROBE: DETECTED
SODIUM SERPL-SCNC: 137 MMOL/L — SIGNIFICANT CHANGE UP (ref 135–145)
TROPONIN T SERPL-MCNC: 0.05 NG/ML — SIGNIFICANT CHANGE UP (ref 0–0.06)
WBC # BLD: 8.47 K/UL — SIGNIFICANT CHANGE UP (ref 3.8–10.5)
WBC # FLD AUTO: 8.47 K/UL — SIGNIFICANT CHANGE UP (ref 3.8–10.5)

## 2022-01-13 PROCEDURE — 99223 1ST HOSP IP/OBS HIGH 75: CPT

## 2022-01-13 PROCEDURE — 99285 EMERGENCY DEPT VISIT HI MDM: CPT

## 2022-01-13 PROCEDURE — 93970 EXTREMITY STUDY: CPT | Mod: 26

## 2022-01-13 PROCEDURE — 71046 X-RAY EXAM CHEST 2 VIEWS: CPT | Mod: 26

## 2022-01-13 RX ORDER — SODIUM BICARBONATE 1 MEQ/ML
650 SYRINGE (ML) INTRAVENOUS ONCE
Refills: 0 | Status: COMPLETED | OUTPATIENT
Start: 2022-01-13 | End: 2022-01-13

## 2022-01-13 RX ORDER — DEXTROSE 50 % IN WATER 50 %
25 SYRINGE (ML) INTRAVENOUS ONCE
Refills: 0 | Status: DISCONTINUED | OUTPATIENT
Start: 2022-01-13 | End: 2022-01-18

## 2022-01-13 RX ORDER — SODIUM CHLORIDE 9 MG/ML
1000 INJECTION, SOLUTION INTRAVENOUS
Refills: 0 | Status: DISCONTINUED | OUTPATIENT
Start: 2022-01-13 | End: 2022-01-18

## 2022-01-13 RX ORDER — INSULIN GLARGINE 100 [IU]/ML
18 INJECTION, SOLUTION SUBCUTANEOUS AT BEDTIME
Refills: 0 | Status: DISCONTINUED | OUTPATIENT
Start: 2022-01-13 | End: 2022-01-13

## 2022-01-13 RX ORDER — CARVEDILOL PHOSPHATE 80 MG/1
25 CAPSULE, EXTENDED RELEASE ORAL EVERY 12 HOURS
Refills: 0 | Status: DISCONTINUED | OUTPATIENT
Start: 2022-01-13 | End: 2022-01-18

## 2022-01-13 RX ORDER — AMLODIPINE BESYLATE 2.5 MG/1
10 TABLET ORAL DAILY
Refills: 0 | Status: DISCONTINUED | OUTPATIENT
Start: 2022-01-13 | End: 2022-01-18

## 2022-01-13 RX ORDER — INSULIN LISPRO 100/ML
VIAL (ML) SUBCUTANEOUS
Refills: 0 | Status: DISCONTINUED | OUTPATIENT
Start: 2022-01-13 | End: 2022-01-18

## 2022-01-13 RX ORDER — ATORVASTATIN CALCIUM 80 MG/1
20 TABLET, FILM COATED ORAL AT BEDTIME
Refills: 0 | Status: DISCONTINUED | OUTPATIENT
Start: 2022-01-13 | End: 2022-01-18

## 2022-01-13 RX ORDER — DEXTROSE 50 % IN WATER 50 %
15 SYRINGE (ML) INTRAVENOUS ONCE
Refills: 0 | Status: DISCONTINUED | OUTPATIENT
Start: 2022-01-13 | End: 2022-01-18

## 2022-01-13 RX ORDER — GLUCAGON INJECTION, SOLUTION 0.5 MG/.1ML
1 INJECTION, SOLUTION SUBCUTANEOUS ONCE
Refills: 0 | Status: DISCONTINUED | OUTPATIENT
Start: 2022-01-13 | End: 2022-01-18

## 2022-01-13 RX ORDER — ASPIRIN/CALCIUM CARB/MAGNESIUM 324 MG
81 TABLET ORAL DAILY
Refills: 0 | Status: DISCONTINUED | OUTPATIENT
Start: 2022-01-13 | End: 2022-01-18

## 2022-01-13 RX ORDER — INSULIN LISPRO 100/ML
VIAL (ML) SUBCUTANEOUS AT BEDTIME
Refills: 0 | Status: DISCONTINUED | OUTPATIENT
Start: 2022-01-13 | End: 2022-01-18

## 2022-01-13 RX ORDER — HYDRALAZINE HCL 50 MG
25 TABLET ORAL EVERY 8 HOURS
Refills: 0 | Status: DISCONTINUED | OUTPATIENT
Start: 2022-01-13 | End: 2022-01-18

## 2022-01-13 RX ORDER — ALBUTEROL 90 UG/1
2 AEROSOL, METERED ORAL EVERY 6 HOURS
Refills: 0 | Status: DISCONTINUED | OUTPATIENT
Start: 2022-01-13 | End: 2022-01-18

## 2022-01-13 RX ORDER — FUROSEMIDE 40 MG
40 TABLET ORAL ONCE
Refills: 0 | Status: DISCONTINUED | OUTPATIENT
Start: 2022-01-13 | End: 2022-01-18

## 2022-01-13 RX ORDER — HEPARIN SODIUM 5000 [USP'U]/ML
5000 INJECTION INTRAVENOUS; SUBCUTANEOUS EVERY 8 HOURS
Refills: 0 | Status: DISCONTINUED | OUTPATIENT
Start: 2022-01-13 | End: 2022-01-18

## 2022-01-13 RX ORDER — ISOSORBIDE DINITRATE 5 MG/1
20 TABLET ORAL THREE TIMES A DAY
Refills: 0 | Status: DISCONTINUED | OUTPATIENT
Start: 2022-01-13 | End: 2022-01-18

## 2022-01-13 RX ORDER — INSULIN GLARGINE 100 [IU]/ML
12 INJECTION, SOLUTION SUBCUTANEOUS AT BEDTIME
Refills: 0 | Status: DISCONTINUED | OUTPATIENT
Start: 2022-01-13 | End: 2022-01-14

## 2022-01-13 RX ORDER — DEXTROSE 50 % IN WATER 50 %
12.5 SYRINGE (ML) INTRAVENOUS ONCE
Refills: 0 | Status: DISCONTINUED | OUTPATIENT
Start: 2022-01-13 | End: 2022-01-18

## 2022-01-13 RX ORDER — FUROSEMIDE 40 MG
40 TABLET ORAL ONCE
Refills: 0 | Status: COMPLETED | OUTPATIENT
Start: 2022-01-14 | End: 2022-01-14

## 2022-01-13 NOTE — H&P ADULT - HISTORY OF PRESENT ILLNESS
pt. is a 65 y/o male with h/o htn, CHF, hld, ex-smoker, CAD s/p CABG CKD presenting with 1 week of increasing leg edema and feels more water retention in his belly. no abd. pain. no cp. no n/v/d. pt. reports sob with activity ok at rest, orthopnea. Patient does not use home O2. Normally takes Lasix 40mg BID and reports taking medications as prescribed. Also reports low salt diet .After wife noticed periorbital edema she asked him to come to ED for evaluation. no recent sick contacts. Vaccinated for COVID but covid-19 positive in the ER. pt. does not have any cough and o2 sat 97 % RA. no fever.

## 2022-01-13 NOTE — H&P ADULT - PROBLEM SELECTOR PLAN 1
will keep on 40 mg iv lasix daily x 2 days and further as per clinical response and per renal function, discussed with cardiologist who is in agreement, wt. daily, monitor I/O. follow echo.

## 2022-01-13 NOTE — ED PROVIDER NOTE - PHYSICAL EXAMINATION
Gen: no acute distress  Head: normocephalic, atraumatic  EENT: EOMI  Lung: +b/l wheezing  CV: normal s1/s2, 2+ radial pulses b/l; left leg pitting edema  Abd: soft, non-tender, non-distended, no rebound tenderness or guarding  MSK: no visible deformities, full range of motion in all 4 extremities  Neuro: No focal neurologic deficits

## 2022-01-13 NOTE — CONSULT NOTE ADULT - ATTENDING COMMENTS
Pt is seen, examined, chart reviewed, d/w np/pa.  Management as outlined above.:  Acute on chronic diastolic congestive heart failure:  Lasix 40 mg IVP BID x 2 and re-assess. Pt will need a consult with nephrologist   TTE to assess functional and structural status. Ischemia NAVARRO.

## 2022-01-13 NOTE — CONSULT NOTE ADULT - PROBLEM SELECTOR RECOMMENDATION 9
EF 55-60%  Pt co SOSA, Orthopnea. Physical exam: +2 pitting edema L>R,  + crackles R>L at base, + distended Abdomen.   US duplex LE is negative for deep venous thrombosis b/l  p-BNP elevated: 2874  Continue tele-monitor overnight  Lasix 40 mg IVP BID x 2 and re-assess. Pt will need a consult with nephrologist   Continue BiDil 20 mg-37.5 mg oral tablet: 1 tab(s) orally 3 times a day and carvedilol 25 milliGRAM(s) Oral every 12 hours  Trend p-BNP  Maintain K+>4 and mag >2  DASH Diet  Daily weight and strict I&Os  TTE to assess functional and structural status   Elevate swollen legs

## 2022-01-13 NOTE — CONSULT NOTE ADULT - PROBLEM SELECTOR RECOMMENDATION 2
sp CABG >3 years ago. +SOSA. Denies chest pain  Trop x 1 negative. Trend trops  Stress test in AM  Continue carvedilol 25 milliGRAM(s) Oral every 12 hours  aspirin 81 mg oral tablet: orally once a day   atorvastatin 20 mg oral tablet: 1 tab(s) orally once a day. Monitor LFTs   TTE to assess functional and structural status   Continue DASH diet

## 2022-01-13 NOTE — CONSULT NOTE ADULT - PROBLEM SELECTOR RECOMMENDATION 4
Continue statin home dose. Monitor LFTs  DASH diet    Further recommendations base on above findings  Case discussed with Dr Montes

## 2022-01-13 NOTE — H&P ADULT - PROBLEM SELECTOR PLAN 2
h/o CAbg, no cp , trop negative x1. pt. has reported dyspnea, cardiologist plans to do nuclear stress test in am. continue b.blk, asa, statin. no

## 2022-01-13 NOTE — CONSULT NOTE ADULT - SUBJECTIVE AND OBJECTIVE BOX
[place] [facility]                                                        Office: [address] [address]                                                       Telephone: [phone]. Fax:[phone]    CARDIOLOGY CONSULTATION NOTE                                                                                             History obtained by: Patient and medical record   obtained: No    HPI: 65 y/o male with PMHx of HTN, CAD (s/p CABG), ex-smoker (quit 3-4 years ago) and CKD presenting to the ED co increasing leg edema and abdominal bloating x 1 week. Pt reports SOSA that improves at rest and orthopnea. Normally takes Lasix 40mg BID and reports taking medications as prescribed and having low salt diet.  After wife noticed periorbital edema she asked him to come to ED for evaluation. No recent sick contacts. Vaccinated for COVID but covid-19 positive in the ER. Pt does not have any cough and O2 sat 97 % RA. No fever. Denies CP, palpitations, syncope or dizziness.    Cardiologist: Dr Pro     REVIEW OF SYMPTOMS: See HPI.  Cardiovascular:  No chest pain, No fatigue, No syncope,  No palpitations, No dizziness, + Orthopnea, No Paroxsymal nocturnal dyspnea.  Respiratory:  +SOSA, No cough.  Genitourinary:  No dysuria, no hematuria.  Gastrointestinal: + abdominal bloating. No nausea, no vomiting. No diarrhea.  No abdominal pain.   Neurological: No headache, no dizziness, no slurred speech.  Psychiatric: No agitation, no anxiety.    ALL OTHER REVIEW OF SYSTEMS ARE NEGATIVE.    Allergies  No Known Allergies    CURRENT MEDICATIONS:  amLODIPine   Tablet 10 milliGRAM(s) Oral daily  carvedilol 25 milliGRAM(s) Oral every 12 hours  furosemide   Injectable 40 milliGRAM(s) IV Push Once  hydrALAZINE 25 milliGRAM(s) Oral every 8 hours  isosorbide   dinitrate Tablet (ISORDIL) 20 milliGRAM(s) Oral three times a day  aspirin enteric coated  atorvastatin  dextrose 40% Gel  dextrose 5%.  dextrose 50% Injectable  glucagon  Injectable  heparin   Injectable  insulin glargine Injectable (LANTUS)  insulin lispro (ADMELOG) corrective regimen sliding scale  insulin lispro (ADMELOG) corrective regimen sliding scale  sodium bicarbonate    Home Medications:  amLODIPine 10 mg oral tablet: 1 tab(s) orally once a day (30 Nov 2021 07:53)  Lasix 40 mg BID  carvedilol 25 milliGRAM(s) Oral every 12 hours  aspirin 81 mg oral tablet: orally once a day (30 Nov 2021 07:53)  atorvastatin 20 mg oral tablet: 1 tab(s) orally once a day (30 Nov 2021 07:53)  BiDil 20 mg-37.5 mg oral tablet: 1 tab(s) orally 3 times a day (30 Nov 2021 07:53)  insulin glargine 100 units/mL subcutaneous solution: 30 unit(s) subcutaneous once a day (at bedtime) (30 Nov 2021 07:53)    Past Medical History  DM (diabetes mellitus)  HTN (hypertension)  High cholesterol  Myocardial infarction  Acute on chronic congestive heart failure, unspecified congestive heart failure type    Past Surgical History  S/P coronary artery bypass with three autogenous grafts    FAMILY HISTORY:  Family history of heart disease (Sibling)    Social History:  + former smoker (quit 3-4 years ago). Denies alcohol or drugs use:    Vital Signs Last 24 Hrs  T(C): 36.3 (13 Jan 2022 14:54), Max: 36.3 (13 Jan 2022 14:54)  T(F): 97.4 (13 Jan 2022 14:54), Max: 97.4 (13 Jan 2022 14:54)  HR: 78 (13 Jan 2022 14:54) (78 - 78)  BP: 146/67 (13 Jan 2022 14:54) (146/67 - 146/67)  BP(mean): --  RR: 18 (13 Jan 2022 14:54) (18 - 18)  SpO2: 97% (13 Jan 2022 14:54) (97% - 97%)    PHYSICAL EXAM:  Constitutional: Comfortable . No acute distress.   HEENT: Atraumatic and normcephalic , neck is supple . no JVD.  PEERL   CNS: A&O x3. No focal neurologic deficits.   Respiratory: + crackles L.R at base.  No wheezing or rhonchi   Cardiovascular:  S1S2 RRR. No murmur or rubs  Gastrointestinal: Soft non-tender. + distended . +Bowel sounds.   Extremities: + 2 pitting  edema L>R  Psychiatric: Calm . No agitation.    LABS:                        11.1   8.47  )-----------( 289      ( 13 Jan 2022 16:18 )             34.5     01-13    137  |  105  |  64.2<H>  ----------------------------<  140<H>  4.3   |  17.0<L>  |  4.78<H>    Ca    8.4<L>      13 Jan 2022 16:18  Mg     1.9     01-13    TPro  6.9  /  Alb  3.8  /  TBili  0.3<L>  /  DBili  x   /  AST  18  /  ALT  15  /  AlkPhos  109  01-13    CARDIAC MARKERS ( 13 Jan 2022 16:18 )  x     / 0.05 ng/mL / x     / x     / x        ;p-BNP=Serum Pro-Brain Natriuretic Peptide: 2874 pg/mL (01-13 @ 16:18)    EKG: Still pending    RADIOLOGY & ADDITIONAL STUDIES:    US Duplex Venous Lower Ext Complete, Bilateral (01.13.22 @ 18:54) >  IMPRESSION:  No evidence of deep venous thrombosis in either lower extremity.  < end of copied text >    Xray Chest 2 Views PA/Lat (01.13.22 @ 16:23) >  IMPRESSION:  No acute radiographic findings and no change  < end of copied text >    Cardiac Catheterization (11.30.21 @ 09:56) >  DIAGNOSTIC IMPRESSIONS: Right SFA severe stenosis 80%  Left SFA moderate stenosis with 12 mmHg gradient  DIAGNOSTIC RECOMMENDATIONS: continue medical therapy for PVD for now  if patient has significant RLE claudication despite medical and exercise  therapy will consider PTA of the right SFA using CO2 angiography ( due to  CKD stage V)  Prepared and signed by  Jabari Mohan MD  < end of copied text >    TTE Echo Complete w/o Contrast w/ Doppler (08.06.20 @ 18:42) >  Summary:   1. Left ventricular ejection fraction, by visual estimation, is 55 to 60%.   2. Normal global left ventricular systolic function.   3. Mild mitral annular calcification.   4. Thickening of the anterior and posterior mitral valve leaflets.   5. Moderate tricuspid regurgitation.   6. Sclerotic aortic valve with normal opening.   7. Dilated Ao root at 3.9cm.   8. Estimated pulmonary artery systolic pressure is 35.3 mmHg assuming a right atrial pressure of 3 mmHg, which is consistent with borderline pulmonary hypertension.  MD Anna Electronically signed on 8/7/2020 at 11:16:33 AM  < end of copied text >    Preliminary evaluation, please await official recommendations     Assessment and recommendations are final when note is signed by the attending.                                                                      [place] [facility]                                                        Office: [address] [address]                                                       Telephone: [phone]. Fax:[phone]    CARDIOLOGY CONSULTATION NOTE                                                                                             History obtained by: Patient and medical record   obtained: No    HPI: 67 y/o male with PMHx of HTN, CAD (s/p CABG), ex-smoker (quit 3-4 years ago) and CKD presenting to the ED co increasing leg edema and abdominal bloating x 1 week. Pt reports SOSA that improves at rest and orthopnea. Normally takes Lasix 40mg BID and reports taking medications as prescribed and having low salt diet.  After wife noticed periorbital edema she asked him to come to ED for evaluation. No recent sick contacts. Vaccinated for COVID but covid-19 positive in the ER. Pt does not have any cough and O2 sat 97 % RA. No fever. Denies CP, palpitations, syncope or dizziness.    Cardiologist: Dr Pro     REVIEW OF SYMPTOMS: See HPI.  Cardiovascular:  No chest pain, No fatigue, No syncope,  No palpitations, No dizziness, + Orthopnea, No Paroxsymal nocturnal dyspnea.  Respiratory:  +SOSA, No cough.  Genitourinary:  No dysuria, no hematuria.  Gastrointestinal: + abdominal bloating. No nausea, no vomiting. No diarrhea.  No abdominal pain.   Neurological: No headache, no dizziness, no slurred speech.  Psychiatric: No agitation, no anxiety.    ALL OTHER REVIEW OF SYSTEMS ARE NEGATIVE.    Allergies  No Known Allergies    CURRENT MEDICATIONS:  amLODIPine   Tablet 10 milliGRAM(s) Oral daily  carvedilol 25 milliGRAM(s) Oral every 12 hours  furosemide   Injectable 40 milliGRAM(s) IV Push Once  hydrALAZINE 25 milliGRAM(s) Oral every 8 hours  isosorbide   dinitrate Tablet (ISORDIL) 20 milliGRAM(s) Oral three times a day  aspirin enteric coated  atorvastatin  dextrose 40% Gel  dextrose 5%.  dextrose 50% Injectable  glucagon  Injectable  heparin   Injectable  insulin glargine Injectable (LANTUS)  insulin lispro (ADMELOG) corrective regimen sliding scale  insulin lispro (ADMELOG) corrective regimen sliding scale  sodium bicarbonate    Home Medications:  amLODIPine 10 mg oral tablet: 1 tab(s) orally once a day (30 Nov 2021 07:53)  Lasix 40 mg BID  carvedilol 25 milliGRAM(s) Oral every 12 hours  aspirin 81 mg oral tablet: orally once a day (30 Nov 2021 07:53)  atorvastatin 20 mg oral tablet: 1 tab(s) orally once a day (30 Nov 2021 07:53)  BiDil 20 mg-37.5 mg oral tablet: 1 tab(s) orally 3 times a day (30 Nov 2021 07:53)  insulin glargine 100 units/mL subcutaneous solution: 30 unit(s) subcutaneous once a day (at bedtime) (30 Nov 2021 07:53)    Past Medical History  DM (diabetes mellitus)  HTN (hypertension)  High cholesterol  Myocardial infarction  Acute on chronic congestive heart failure, unspecified congestive heart failure type    Past Surgical History  S/P coronary artery bypass with three autogenous grafts    FAMILY HISTORY:  Family history of heart disease (Sibling)    Social History:  + former smoker (quit 3-4 years ago). Denies alcohol or drugs use:    Vital Signs Last 24 Hrs  T(C): 36.3 (13 Jan 2022 14:54), Max: 36.3 (13 Jan 2022 14:54)  T(F): 97.4 (13 Jan 2022 14:54), Max: 97.4 (13 Jan 2022 14:54)  HR: 78 (13 Jan 2022 14:54) (78 - 78)  BP: 146/67 (13 Jan 2022 14:54) (146/67 - 146/67)  BP(mean): --  RR: 18 (13 Jan 2022 14:54) (18 - 18)  SpO2: 97% (13 Jan 2022 14:54) (97% - 97%)    PHYSICAL EXAM:  Constitutional: Comfortable . No acute distress.   HEENT: Atraumatic and normcephalic , neck is supple . no JVD.  PEERL   CNS: A&O x3. No focal neurologic deficits.   Respiratory: + crackles R>L at base.  No wheezing or rhonchi   Cardiovascular:  S1S2 RRR. No murmur or rubs  Gastrointestinal: Soft non-tender. + distended . +Bowel sounds.   Extremities: + 2 pitting  edema L>R  Psychiatric: Calm . No agitation.    LABS:                        11.1   8.47  )-----------( 289      ( 13 Jan 2022 16:18 )             34.5     01-13    137  |  105  |  64.2<H>  ----------------------------<  140<H>  4.3   |  17.0<L>  |  4.78<H>    Ca    8.4<L>      13 Jan 2022 16:18  Mg     1.9     01-13    TPro  6.9  /  Alb  3.8  /  TBili  0.3<L>  /  DBili  x   /  AST  18  /  ALT  15  /  AlkPhos  109  01-13    CARDIAC MARKERS ( 13 Jan 2022 16:18 )  x     / 0.05 ng/mL / x     / x     / x        ;p-BNP=Serum Pro-Brain Natriuretic Peptide: 2874 pg/mL (01-13 @ 16:18)    EKG: Still pending    RADIOLOGY & ADDITIONAL STUDIES:    US Duplex Venous Lower Ext Complete, Bilateral (01.13.22 @ 18:54) >  IMPRESSION:  No evidence of deep venous thrombosis in either lower extremity.  < end of copied text >    Xray Chest 2 Views PA/Lat (01.13.22 @ 16:23) >  IMPRESSION:  No acute radiographic findings and no change  < end of copied text >    Cardiac Catheterization (11.30.21 @ 09:56) >  DIAGNOSTIC IMPRESSIONS: Right SFA severe stenosis 80%  Left SFA moderate stenosis with 12 mmHg gradient  DIAGNOSTIC RECOMMENDATIONS: continue medical therapy for PVD for now  if patient has significant RLE claudication despite medical and exercise  therapy will consider PTA of the right SFA using CO2 angiography ( due to  CKD stage V)  Prepared and signed by  Jabari Mohan MD  < end of copied text >    TTE Echo Complete w/o Contrast w/ Doppler (08.06.20 @ 18:42) >  Summary:   1. Left ventricular ejection fraction, by visual estimation, is 55 to 60%.   2. Normal global left ventricular systolic function.   3. Mild mitral annular calcification.   4. Thickening of the anterior and posterior mitral valve leaflets.   5. Moderate tricuspid regurgitation.   6. Sclerotic aortic valve with normal opening.   7. Dilated Ao root at 3.9cm.   8. Estimated pulmonary artery systolic pressure is 35.3 mmHg assuming a right atrial pressure of 3 mmHg, which is consistent with borderline pulmonary hypertension.  MD Anna Electronically signed on 8/7/2020 at 11:16:33 AM  < end of copied text >    Preliminary evaluation, please await official recommendations     Assessment and recommendations are final when note is signed by the attending.

## 2022-01-13 NOTE — ED PROVIDER NOTE - CLINICAL SUMMARY MEDICAL DECISION MAKING FREE TEXT BOX
3/14/2018       RE: Annamarie Oneil  [address] [facility] [address]     Dear Colleague,    Thank you for referring your patient, Annamarie Oneil, to the EYE CLINIC at [facility]. Please see a copy of my visit note below.    HPI  Annamarie Oneil is a 17 year old female here for evaluation of vision changes and optic disc drusen. She has been diagnosed with postural orthostatic tachycardia syndrome (POTS) possibly secondary to mast cell disorder. She has had a few episodes of pain behind the left eye when doing head stands during yoga. Her vision remains stable. Her nurse has also noted that in the morning, her right eye can be slower to open than the left. This is just on the first time when she wakes up. In addition, she has been fatigued and sometimes has a hard time keeping her eyes open because they are tired. Lastly, she has had conjunctivitis in both eyes, diagnosed by school nurse as viral, but she has had persistent crusting of the lashes and mild redness.    Assessment & Plan    (H47.323) Drusen of both optic discs  (primary encounter diagnosis)  Comment: Full visual field testing today. Drusen visible on exam and confirmed on previous autofluorescence. No signicant nerve fiber layer defects on prior nerve OCT.  Plan: Discussed that with crowded optic discs, she is at increased risk of decreased blood flow and ischemia of the optic nerve head, especially in the setting of hypotension and POTS. Recommend she continue treatment for this with her medical team and call the clinic if she develops any new blind spots or loss of vision. She should try to keep hydrated. Avoid nighttime alcohol or anti-hypertensives before bed.    (H01.021,  H01.022,  H01.024,  H01.025) Squamous blepharitis of upper and lower eyelids of both eyes  Comment: Likely underlying her eye redness and mild crusting.  Plan: Warm compresses OU BID, start ATs QAM and gel tears QHS    (H57.12) Pain around left  eye  Comment: No primary ocular cause of pain identified.  Plan: Offered reassurance that eye exam appears normal. If inversion during yoga triggers the pain, recommend avoiding this.     (H52.13) Myopia of both eyes  Comment: Very mild refractive error.  Plan: She is interested in possibly trying glasses. Given updated glasses Rx - optional to fill.      -----------------------------------------------------------------------------------    Patient disposition:   Return in about 1 year (around 3/14/2019). or sooner as needed.      Again, thank you for allowing me to participate in the care of your patient.      Sincerely,    Jena Mckeon MD       66yM htn, CHF, hld, ex-smoker, TIA, PVD, CAD s/p CABG x3, CKD presenting with 1 week of edema. Patient with b/l cardiac wheezing, lower leg edema and periorbital edema. Concern for acute on chronic heart failure despite lasix 40mg BID. [facility] consulted. Patient to be

## 2022-01-13 NOTE — ED PROVIDER NOTE - NSICDXFAMILYHX_GEN_ALL_CORE_FT
FAMILY HISTORY:  Sibling  Still living? Yes, Estimated age: Age Unknown  Family history of heart disease, Age at diagnosis: Age Unknown

## 2022-01-13 NOTE — ED PROVIDER NOTE - OBJECTIVE STATEMENT
66yM htn, CHF, hld, ex-smoker, TIA, PVD, CAD s/p CABG x3, CKD presenting with 1 week of edema. Patient with 1 week of dyspnea on exertion, orthopnea, and 2 days of periorbital edema. Patient does not use home O2. Normally takes Lasix 40mg BID and reports taking medications as prescribed. After wife noticed periorbital edema she asked him to come to ED for evaluation. Denies chest pain, nausea, vomiting, diarrhea, fevers, fatigue, body aches or recent sick contacts. Vaccinated for COVID.  Patient sees [facility] cards Clement and nephpetar Andersen.

## 2022-01-13 NOTE — H&P ADULT - PROBLEM SELECTOR PLAN 6
pt's covid-19 is positive but pt. is asymptomatic and not hypoxic. will keep on continuous pulses ox to monitor o2, prn albuterol. isolation.

## 2022-01-13 NOTE — H&P ADULT - PROBLEM SELECTOR PLAN 4
pt. will be npo after mid night for nuclear stress test, will use and admelog,  lantus 12 units at this point. adjust insulin as per response.

## 2022-01-13 NOTE — H&P ADULT - PROBLEM SELECTOR PLAN 3
close follow up on renal function while on iv lasix, , avoid nephrotoxic meds, nephrology consult dr. price called.

## 2022-01-13 NOTE — ED ADULT TRIAGE NOTE - CHIEF COMPLAINT QUOTE
pt states was told my cardiologist to come to ed for fluid retention. pt states worsening edema to BLE x 3 days. denies redness/drainage. ambulating independently

## 2022-01-13 NOTE — CONSULT NOTE ADULT - ASSESSMENT
DM (diabetes mellitus)  HTN (hypertension)  High cholesterol  Myocardial infarction  Acute on chronic congestive heart failure, unspecified congestive heart failure type    Home Medications:  amLODIPine 10 mg oral tablet: 1 tab(s) orally once a day (30 Nov 2021 07:53)  Lasix 40 mg BID  carvedilol 25 milliGRAM(s) Oral every 12 hours  aspirin 81 mg oral tablet: orally once a day (30 Nov 2021 07:53)  atorvastatin 20 mg oral tablet: 1 tab(s) orally once a day (30 Nov 2021 07:53)  BiDil 20 mg-37.5 mg oral tablet: 1 tab(s) orally 3 times a day (30 Nov 2021 07:53)  insulin glargine 100 units/mL subcutaneous solution: 30 unit(s) subcutaneous once a day (at bedtime) (30 Nov 2021 07:53)     Problem/Plan - 1:  ·  Problem: Acute on chronic diastolic congestive heart failure.   ·  Plan: will keep on 40 mg iv lasix daily x 2 days and further as per clinical response and per renal function, discussed with cardiologist who is in agreement, wt. daily, monitor I/O. follow echo.     Problem/Plan - 2:  ·  Problem: CAD (coronary artery disease).   ·  Plan: h/o CAbg, no cp , trop negative x1. pt. has reported dyspnea, cardiologist plans to do nuclear stress test in am. continue b.jimenezk, asa, statin.     67 y/o male presenting to the ED co increasing leg edema and abdominal bloating x 1 week. Pt reports SOSA that improves at rest and orthopnea.  In the ED pt was positive for covid-19

## 2022-01-13 NOTE — ED PROVIDER NOTE - ATTENDING CONTRIBUTION TO CARE
Lico: I performed a face to face evaluation of this patient and performed a full history and physical examination on the patient.  I agree with the resident's history, physical examination, and plan of the patient unless otherwise noted. My brief assessment is as follows: worsening edema, hx chf, some orthopnea. no chest pain, no f/c. no other complaints. sent by cardiology. +pitting edema L>R, expiratory wheeze, nl sats, rrr. abd benign. ekg, labs, cards and admission

## 2022-01-13 NOTE — H&P ADULT - NSHPPHYSICALEXAM_GEN_ALL_CORE
Vital Signs Last 24 Hrs  T(C): 36.3 (13 Jan 2022 14:54), Max: 36.3 (13 Jan 2022 14:54)  T(F): 97.4 (13 Jan 2022 14:54), Max: 97.4 (13 Jan 2022 14:54)  HR: 78 (13 Jan 2022 14:54) (78 - 78)  BP: 146/67 (13 Jan 2022 14:54) (146/67 - 146/67)  BP(mean): --  RR: 18 (13 Jan 2022 14:54) (18 - 18)  SpO2: 97% (13 Jan 2022 14:54) (97% - 97%)  General: pt. in bed not in distress.  HEENT: AT, NC. PERRL. intact EOM. no throat erythema or exudate.   Neck: supple. mild JVD.   Chest: basal crackles bilaterally. no inter costal retractions.  Heart: S1,S2. RRR. no heart murmur. + edema of lower ext.  Abdomen: soft. non-tender in all abd. quadrants, distended, + BS.   Ext: no calf tenderness. ROM of all ext. intact. distal pulses 2 +.  Neuro: AAO x3. no focal weakness. no speech disorder, cns ii to xii intact. m /r/s intact.  Skin: warm and dry, no obvious rash, no pallor.  lymphatic system : no lymphadenopathy.  psych : normal affect, no si/hi.

## 2022-01-13 NOTE — H&P ADULT - NSHPSOCIALHISTORY_GEN_ALL_CORE
stopped smoking 4 years ago , prior to that smoked 2 ppd for many years. stopped using etoh 4 years ago.

## 2022-01-14 LAB
ANION GAP SERPL CALC-SCNC: 16 MMOL/L — SIGNIFICANT CHANGE UP (ref 5–17)
BUN SERPL-MCNC: 63.4 MG/DL — HIGH (ref 8–20)
CALCIUM SERPL-MCNC: 8.7 MG/DL — SIGNIFICANT CHANGE UP (ref 8.6–10.2)
CHLORIDE SERPL-SCNC: 108 MMOL/L — HIGH (ref 98–107)
CO2 SERPL-SCNC: 18 MMOL/L — LOW (ref 22–29)
CREAT SERPL-MCNC: 5.08 MG/DL — HIGH (ref 0.5–1.3)
GLUCOSE BLDC GLUCOMTR-MCNC: 106 MG/DL — HIGH (ref 70–99)
GLUCOSE BLDC GLUCOMTR-MCNC: 56 MG/DL — LOW (ref 70–99)
GLUCOSE BLDC GLUCOMTR-MCNC: 67 MG/DL — LOW (ref 70–99)
GLUCOSE BLDC GLUCOMTR-MCNC: 73 MG/DL — SIGNIFICANT CHANGE UP (ref 70–99)
GLUCOSE BLDC GLUCOMTR-MCNC: 98 MG/DL — SIGNIFICANT CHANGE UP (ref 70–99)
GLUCOSE SERPL-MCNC: 80 MG/DL — SIGNIFICANT CHANGE UP (ref 70–99)
MAGNESIUM SERPL-MCNC: 1.8 MG/DL — SIGNIFICANT CHANGE UP (ref 1.6–2.6)
POTASSIUM SERPL-MCNC: 3.7 MMOL/L — SIGNIFICANT CHANGE UP (ref 3.5–5.3)
POTASSIUM SERPL-SCNC: 3.7 MMOL/L — SIGNIFICANT CHANGE UP (ref 3.5–5.3)
SODIUM SERPL-SCNC: 142 MMOL/L — SIGNIFICANT CHANGE UP (ref 135–145)

## 2022-01-14 PROCEDURE — 93010 ELECTROCARDIOGRAM REPORT: CPT

## 2022-01-14 PROCEDURE — 93306 TTE W/DOPPLER COMPLETE: CPT | Mod: 26

## 2022-01-14 PROCEDURE — 99233 SBSQ HOSP IP/OBS HIGH 50: CPT

## 2022-01-14 RX ORDER — FUROSEMIDE 40 MG
40 TABLET ORAL
Refills: 0 | Status: DISCONTINUED | OUTPATIENT
Start: 2022-01-14 | End: 2022-01-14

## 2022-01-14 RX ORDER — FUROSEMIDE 40 MG
80 TABLET ORAL DAILY
Refills: 0 | Status: DISCONTINUED | OUTPATIENT
Start: 2022-01-14 | End: 2022-01-16

## 2022-01-14 RX ORDER — FUROSEMIDE 40 MG
40 TABLET ORAL
Refills: 0 | Status: DISCONTINUED | OUTPATIENT
Start: 2022-01-14 | End: 2022-01-16

## 2022-01-14 RX ADMIN — Medication 650 MILLIGRAM(S): at 00:00

## 2022-01-14 RX ADMIN — ISOSORBIDE DINITRATE 20 MILLIGRAM(S): 5 TABLET ORAL at 07:20

## 2022-01-14 RX ADMIN — HEPARIN SODIUM 5000 UNIT(S): 5000 INJECTION INTRAVENOUS; SUBCUTANEOUS at 12:23

## 2022-01-14 RX ADMIN — Medication 80 MILLIGRAM(S): at 17:57

## 2022-01-14 RX ADMIN — Medication 25 MILLIGRAM(S): at 12:23

## 2022-01-14 RX ADMIN — ATORVASTATIN CALCIUM 20 MILLIGRAM(S): 80 TABLET, FILM COATED ORAL at 22:46

## 2022-01-14 RX ADMIN — HEPARIN SODIUM 5000 UNIT(S): 5000 INJECTION INTRAVENOUS; SUBCUTANEOUS at 07:21

## 2022-01-14 RX ADMIN — ATORVASTATIN CALCIUM 20 MILLIGRAM(S): 80 TABLET, FILM COATED ORAL at 00:01

## 2022-01-14 RX ADMIN — ISOSORBIDE DINITRATE 20 MILLIGRAM(S): 5 TABLET ORAL at 17:56

## 2022-01-14 RX ADMIN — CARVEDILOL PHOSPHATE 25 MILLIGRAM(S): 80 CAPSULE, EXTENDED RELEASE ORAL at 07:19

## 2022-01-14 RX ADMIN — INSULIN GLARGINE 12 UNIT(S): 100 INJECTION, SOLUTION SUBCUTANEOUS at 00:06

## 2022-01-14 RX ADMIN — HEPARIN SODIUM 5000 UNIT(S): 5000 INJECTION INTRAVENOUS; SUBCUTANEOUS at 22:52

## 2022-01-14 RX ADMIN — AMLODIPINE BESYLATE 10 MILLIGRAM(S): 2.5 TABLET ORAL at 07:19

## 2022-01-14 RX ADMIN — Medication 40 MILLIGRAM(S): at 00:02

## 2022-01-14 RX ADMIN — Medication 25 MILLIGRAM(S): at 22:47

## 2022-01-14 RX ADMIN — Medication 81 MILLIGRAM(S): at 12:24

## 2022-01-14 RX ADMIN — ISOSORBIDE DINITRATE 20 MILLIGRAM(S): 5 TABLET ORAL at 12:23

## 2022-01-14 RX ADMIN — HEPARIN SODIUM 5000 UNIT(S): 5000 INJECTION INTRAVENOUS; SUBCUTANEOUS at 00:05

## 2022-01-14 RX ADMIN — CARVEDILOL PHOSPHATE 25 MILLIGRAM(S): 80 CAPSULE, EXTENDED RELEASE ORAL at 17:55

## 2022-01-14 RX ADMIN — Medication 25 MILLIGRAM(S): at 00:01

## 2022-01-14 RX ADMIN — Medication 25 MILLIGRAM(S): at 07:19

## 2022-01-14 NOTE — CONSULT NOTE ADULT - ASSESSMENT
pt. is a 65 y/o male with h/o htn, CHF, hld, ex-smoker, CAD s/p CABG CKD presenting with 1 week of increasing leg edema and feels more water retention in his belly. no abd. pain. no cp. no n/v/d. pt. reports sob with activity ok at rest, orthopnea. Patient does not use home O2. Normally takes Lasix 40mg BID and reports taking medications as prescribed. Also reports low salt diet .After wife noticed periorbital edema she asked him to come to ED for evaluation. no recent sick contacts. Vaccinated for COVID but covid-19 positive in the ER. pt. does not have any cough and o2 sat 97 % RA. no fever.     Known CKD IV - DM / HTN  , baseline creat 3.5   Feels better with diuresis in the ER   Cardio planning on stress test and repeat ECHO   Check renal sonogram   Resume home Lasix dose 40 po bid   Hydral / Imdur ALR   Will need eventual AVF placed     Will follow

## 2022-01-14 NOTE — CONSULT NOTE ADULT - SUBJECTIVE AND OBJECTIVE BOX
Patient is a 66y old  Male who presents with a chief complaint of acute on chronic diastolic chf (13 Jan 2022 22:09)      HPI:  pt. is a 65 y/o male with h/o htn, CHF, hld, ex-smoker, CAD s/p CABG CKD presenting with 1 week of increasing leg edema and feels more water retention in his belly. no abd. pain. no cp. no n/v/d. pt. reports sob with activity ok at rest, orthopnea. Patient does not use home O2. Normally takes Lasix 40mg BID and reports taking medications as prescribed. Also reports low salt diet .After wife noticed periorbital edema she asked him to come to ED for evaluation. no recent sick contacts. Vaccinated for COVID but covid-19 positive in the ER. pt. does not have any cough and o2 sat 97 % RA. no fever.  (13 Jan 2022 18:29)      PAST MEDICAL & SURGICAL HISTORY:  DM (diabetes mellitus)    HTN (hypertension)    High cholesterol    Myocardial infarction  (Coronary angiogram 2014)    Acute on chronic congestive heart failure, unspecified congestive heart failure type    S/P coronary artery bypass with three autogenous grafts        FAMILY HISTORY:  Family history of heart disease (Sibling)        Social History:    MEDICATIONS  (STANDING):  amLODIPine   Tablet 10 milliGRAM(s) Oral daily  aspirin enteric coated 81 milliGRAM(s) Oral daily  atorvastatin 20 milliGRAM(s) Oral at bedtime  carvedilol 25 milliGRAM(s) Oral every 12 hours  dextrose 40% Gel 15 Gram(s) Oral once  dextrose 5%. 1000 milliLiter(s) (50 mL/Hr) IV Continuous <Continuous>  dextrose 5%. 1000 milliLiter(s) (100 mL/Hr) IV Continuous <Continuous>  dextrose 50% Injectable 25 Gram(s) IV Push once  dextrose 50% Injectable 12.5 Gram(s) IV Push once  dextrose 50% Injectable 25 Gram(s) IV Push once  furosemide   Injectable 40 milliGRAM(s) IV Push Once  glucagon  Injectable 1 milliGRAM(s) IntraMuscular once  heparin   Injectable 5000 Unit(s) SubCutaneous every 8 hours  hydrALAZINE 25 milliGRAM(s) Oral every 8 hours  insulin lispro (ADMELOG) corrective regimen sliding scale   SubCutaneous three times a day before meals  insulin lispro (ADMELOG) corrective regimen sliding scale   SubCutaneous at bedtime  isosorbide   dinitrate Tablet (ISORDIL) 20 milliGRAM(s) Oral three times a day    MEDICATIONS  (PRN):  ALBUTerol    90 MICROgram(s) HFA Inhaler 2 Puff(s) Inhalation every 6 hours PRN Shortness of Breath and/or Wheezing      Allergies    No Known Allergies    Intolerances          Vital Signs Last 24 Hrs  T(C): 36.4 (14 Jan 2022 12:16), Max: 37 (14 Jan 2022 05:03)  T(F): 97.5 (14 Jan 2022 12:16), Max: 98.6 (14 Jan 2022 05:03)  HR: 77 (14 Jan 2022 12:16) (73 - 80)  BP: 155/81 (14 Jan 2022 12:16) (133/71 - 163/78)  BP(mean): --  RR: 18 (14 Jan 2022 12:16) (18 - 18)  SpO2: 95% (14 Jan 2022 12:16) (95% - 96%)  Daily     Daily   I&O's Detail    I&O's Summary      PHYSICAL EXAM:    GENERAL: NAD,   HEAD:  Atraumatic, Normocephalic  EYES: EOMI, PERRLA, conjunctiva and sclera clear  ENMT: No tonsillar erythema, exudates, or enlargement; Moist mucous membranes, Good dentition, No lesions  NECK: Supple, No JVD, Normal thyroid  CHEST/LUNG: Clear to percussion bilaterally; No rales, rhonchi, wheezing, or rubs  HEART: Regular rate and rhythm; No murmurs, rubs, or gallops  ABDOMEN: Soft, Nontender, Nondistended; Bowel sounds present  EXTREMITIES:  2+ Peripheral Pulses, No clubbing, cyanosis, or edema      LABS:                        11.1   8.47  )-----------( 289      ( 13 Jan 2022 16:18 )             34.5     01-14    142  |  108<H>  |  63.4<H>  ----------------------------<  80  3.7   |  18.0<L>  |  5.08<H>    Ca    8.7      14 Jan 2022 03:10  Mg     1.8     01-14    TPro  6.9  /  Alb  3.8  /  TBili  0.3<L>  /  DBili  x   /  AST  18  /  ALT  15  /  AlkPhos  109  01-13        Magnesium, Serum: 1.8 mg/dL (01-14 @ 03:10)  Magnesium, Serum: 1.9 mg/dL (01-13 @ 16:18)        < from: TTE Echo Complete w/o Contrast w/ Doppler (08.06.20 @ 18:42) >    Summary:   1. Left ventricular ejection fraction, by visual estimation, is 55 to 60%.   2. Normal global left ventricular systolic function.   3. Mild mitral annular calcification.   4. Thickening of the anterior and posterior mitral valve leaflets.   5. Moderate tricuspid regurgitation.   6. Sclerotic aortic valve with normal opening.   7. Dilated Ao root at 3.9cm.   8. Estimated pulmonary artery systolic pressure is 35.3 mmHg assuming a right atrial pressure of 3 mmHg, which is consistent with borderline pulmonary hypertension.    MD Anna Electronically signed on 8/7/2020 at 11:16:33 AM          < end of copied text >                RADIOLOGY & ADDITIONAL TESTS:   Patient is a 66y old  Male who presents with a chief complaint of acute on chronic diastolic chf (13 Jan 2022 22:09)      HPI:  pt. is a 65 y/o male with h/o htn, CHF, hld, ex-smoker, CAD s/p CABG CKD presenting with 1 week of increasing leg edema and feels more water retention in his belly. no abd. pain. no cp. no n/v/d. pt. reports sob with activity ok at rest, orthopnea. Patient does not use home O2. Normally takes Lasix 40mg BID and reports taking medications as prescribed. Also reports low salt diet .After wife noticed periorbital edema she asked him to come to ED for evaluation. no recent sick contacts. Vaccinated for COVID but covid-19 positive in the ER. pt. does not have any cough and o2 sat 97 % RA. no fever.  (13 Jan 2022 18:29)    Known CKD IV -- Baseline creat @ 3.5   Follows with Dr Andersen   cardio follow up noted   Tested + Covid in ER   Sats 97 % on RA       PAST MEDICAL & SURGICAL HISTORY:  DM (diabetes mellitus)    HTN (hypertension)    High cholesterol    Myocardial infarction  (Coronary angiogram 2014)    Acute on chronic congestive heart failure, unspecified congestive heart failure type    S/P coronary artery bypass with three autogenous grafts        FAMILY HISTORY:  Family history of heart disease (Sibling)        Social History:    MEDICATIONS  (STANDING):  amLODIPine   Tablet 10 milliGRAM(s) Oral daily  aspirin enteric coated 81 milliGRAM(s) Oral daily  atorvastatin 20 milliGRAM(s) Oral at bedtime  carvedilol 25 milliGRAM(s) Oral every 12 hours  dextrose 40% Gel 15 Gram(s) Oral once  dextrose 5%. 1000 milliLiter(s) (50 mL/Hr) IV Continuous <Continuous>  dextrose 5%. 1000 milliLiter(s) (100 mL/Hr) IV Continuous <Continuous>  dextrose 50% Injectable 25 Gram(s) IV Push once  dextrose 50% Injectable 12.5 Gram(s) IV Push once  dextrose 50% Injectable 25 Gram(s) IV Push once  furosemide   Injectable 40 milliGRAM(s) IV Push Once  glucagon  Injectable 1 milliGRAM(s) IntraMuscular once  heparin   Injectable 5000 Unit(s) SubCutaneous every 8 hours  hydrALAZINE 25 milliGRAM(s) Oral every 8 hours  insulin lispro (ADMELOG) corrective regimen sliding scale   SubCutaneous three times a day before meals  insulin lispro (ADMELOG) corrective regimen sliding scale   SubCutaneous at bedtime  isosorbide   dinitrate Tablet (ISORDIL) 20 milliGRAM(s) Oral three times a day    MEDICATIONS  (PRN):  ALBUTerol    90 MICROgram(s) HFA Inhaler 2 Puff(s) Inhalation every 6 hours PRN Shortness of Breath and/or Wheezing      Allergies    No Known Allergies    Intolerances          Vital Signs Last 24 Hrs  T(C): 36.4 (14 Jan 2022 12:16), Max: 37 (14 Jan 2022 05:03)  T(F): 97.5 (14 Jan 2022 12:16), Max: 98.6 (14 Jan 2022 05:03)  HR: 77 (14 Jan 2022 12:16) (73 - 80)  BP: 155/81 (14 Jan 2022 12:16) (133/71 - 163/78)  BP(mean): --  RR: 18 (14 Jan 2022 12:16) (18 - 18)  SpO2: 95% (14 Jan 2022 12:16) (95% - 96%)  Daily     Daily   I&O's Detail    I&O's Summary      PHYSICAL EXAM:    GENERAL: NAD, laying flat , Sats 97 % on RA   HEAD:  no edema , anicteric   NECK: Supple, No JVD,   CHEST/LUNG: EAE , few basilar crackles   HEART: Regular rate and rhythm; No gallop or rub   ABDOMEN: Soft, Nontender,   EXTREMITIES: warm , no edema      LABS:                        11.1   8.47  )-----------( 289      ( 13 Jan 2022 16:18 )             34.5     01-14    142  |  108<H>  |  63.4<H>  ----------------------------<  80  3.7   |  18.0<L>  |  5.08<H>    Ca    8.7      14 Jan 2022 03:10  Mg     1.8     01-14    TPro  6.9  /  Alb  3.8  /  TBili  0.3<L>  /  DBili  x   /  AST  18  /  ALT  15  /  AlkPhos  109  01-13        Magnesium, Serum: 1.8 mg/dL (01-14 @ 03:10)  Magnesium, Serum: 1.9 mg/dL (01-13 @ 16:18)        < from: TTE Echo Complete w/o Contrast w/ Doppler (08.06.20 @ 18:42) >    Summary:   1. Left ventricular ejection fraction, by visual estimation, is 55 to 60%.   2. Normal global left ventricular systolic function.   3. Mild mitral annular calcification.   4. Thickening of the anterior and posterior mitral valve leaflets.   5. Moderate tricuspid regurgitation.   6. Sclerotic aortic valve with normal opening.   7. Dilated Ao root at 3.9cm.   8. Estimated pulmonary artery systolic pressure is 35.3 mmHg assuming a right atrial pressure of 3 mmHg, which is consistent with borderline pulmonary hypertension.    MD Anna Electronically signed on 8/7/2020 at 11:16:33 AM          < end of copied text >      < from: US Duplex Venous Lower Ext Complete, Bilateral (01.13.22 @ 18:54) >  ACC: [id number] EXAM:  US DPLX LWR EXT VEINS COMPL BI                          PROCEDURE DATE:  01/13/2022          INTERPRETATION:  CLINICAL INFORMATION: Bilateral leg swelling.    COMPARISON: None available.    TECHNIQUE: Duplex sonography of the BILATERAL LOWER extremity veins with   color and spectral Doppler, with and without compression.    FINDINGS:    RIGHT:  Normal compressibility of the RIGHT common femoral, femoral and popliteal   veins.  Doppler examination shows normal spontaneous and phasic flow.  No RIGHT calf vein thrombosis is detected.    LEFT:  Normal compressibility of the LEFT common femoral, femoral and popliteal   veins.  Doppler examination shows normal spontaneous and phasic flow.  No LEFT calf vein thrombosis is detected.    IMPRESSION:  No evidence of deep venous thrombosis in either lower extremity.          --- End of Report ---            KAIRS BROWN MD; Attending Radiologist  This document has been electronically signed. Jan 13 2022  7:22PM    < end of copied text >  < from: Xray Chest 2 Views PA/Lat (01.13.22 @ 16:23) >  ACC: [id number] EXAM:  XR CHEST PA LAT 2V                          PROCEDURE DATE:  01/13/2022          INTERPRETATION:  Clinical history: 66 year old male, chest pain.    Two views of the chest are compared to 8/2/2019 and demonstrate a normal   cardiac silhouette and normal pulmonary vasculature with no   consolidation, effusion, gross adenopathy, pneumothorax or acute osseous   finding.    Post sternotomy/CABG.    IMPRESSION:  No acute radiographic findings and no change    --- End of Report ---            PILAR XIONG DO; Attending Radiologist  This document has been electronically signed. Jan 13 2022  4:37PM    < end of copied text >            RADIOLOGY & ADDITIONAL TESTS:

## 2022-01-14 NOTE — PROGRESS NOTE ADULT - ASSESSMENT
65 y/o male presenting to the ED co increasing leg edema and abdominal bloating x 1 week. Pt reports SOSA that improves at rest and orthopnea.  In the ED pt was positive for covid-19      1) DYSPNEA:  multifactorial.  COVID + Diastolic dsyfunction underlying, coronary artery disease with ischemia.   stable symptoms. improved with diuresis.   BP control.  stress test deferred for outpatient due to covid positive status and patient has stable symptoms. medcial managemtn in the interim  increase PO diuresis from Lasix 40 BID and TID on weekends to lasix 80 in am and 40 in evening every tday.    Diet and BP counselling.  If BP not controlled then increase BIDIL to TID.  Since patient has congestive heart failure and renal disease without significant LE edema, will plan for cardiomems for better evaluation of underlyign left atrial pressures.  will be done as outpaitent    2) congestive heart failure : as above.   3) coronary artery disease : aspirin statins. antianaginal beta-blocker . isordil. (in bidil()   4) Hypertension : ct current meds.  Low salt diet.  increase bidil if needed lorena TID ct CCBs.  5) Deep venous thrombosis prophylaxis: heparin  SQ.   6) Hypoglycemia: to bee address by Hosopitalist  No further in-patient cardiac work-up/management is needed.  Follow-up in cardiology office in 2 weeks.   .disp[

## 2022-01-14 NOTE — PROGRESS NOTE ADULT - ASSESSMENT
66 year old male with PMH HTN, T2DM, CKD 4, CAD s/p CABG presented with leg and periorbital  swelling and noted to have SARS-CoV2 PCR(+).  Asymptomatic, diuresed with IV Lasix    CHFpEF, acute on chronic. Asymptomatic. Elevated BNP  - Monitor   - I/O, daily weight, restriction fluid and salt.  - Continue Furosemide  - TTE  - Cardiology noted    CKD4 with progression  - Monitor renal function  - Avoid Nephrotoxins  - ?HD for fluid homeostasis    COVID-19 (+); asymptomatic  - Monitor for hypoxia.  - consider acute phase reactants if symptoms    T2DM with hypoglycemia  - BGM with SSI  - Discontinued basal  - Monitor for hypo- and hyperglycemia    CAD s/p CABG. No symptoms. Unable to have stress test due to COVID(+).  - ASA, Statin, BB, Nitrate    HTN  - Antihypertensives as noted    VTEp - Heparin    Acutely ill; Pending clinical improvement

## 2022-01-15 LAB
ANION GAP SERPL CALC-SCNC: 17 MMOL/L — SIGNIFICANT CHANGE UP (ref 5–17)
BUN SERPL-MCNC: 71.1 MG/DL — HIGH (ref 8–20)
CALCIUM SERPL-MCNC: 8.5 MG/DL — LOW (ref 8.6–10.2)
CHLORIDE SERPL-SCNC: 106 MMOL/L — SIGNIFICANT CHANGE UP (ref 98–107)
CO2 SERPL-SCNC: 19 MMOL/L — LOW (ref 22–29)
CREAT SERPL-MCNC: 4.96 MG/DL — HIGH (ref 0.5–1.3)
GLUCOSE BLDC GLUCOMTR-MCNC: 109 MG/DL — HIGH (ref 70–99)
GLUCOSE BLDC GLUCOMTR-MCNC: 116 MG/DL — HIGH (ref 70–99)
GLUCOSE BLDC GLUCOMTR-MCNC: 146 MG/DL — HIGH (ref 70–99)
GLUCOSE BLDC GLUCOMTR-MCNC: 71 MG/DL — SIGNIFICANT CHANGE UP (ref 70–99)
GLUCOSE SERPL-MCNC: 73 MG/DL — SIGNIFICANT CHANGE UP (ref 70–99)
HCT VFR BLD CALC: 30.7 % — LOW (ref 39–50)
HGB BLD-MCNC: 9.7 G/DL — LOW (ref 13–17)
MAGNESIUM SERPL-MCNC: 2 MG/DL — SIGNIFICANT CHANGE UP (ref 1.6–2.6)
MCHC RBC-ENTMCNC: 26.1 PG — LOW (ref 27–34)
MCHC RBC-ENTMCNC: 31.6 GM/DL — LOW (ref 32–36)
MCV RBC AUTO: 82.7 FL — SIGNIFICANT CHANGE UP (ref 80–100)
NT-PROBNP SERPL-SCNC: 2233 PG/ML — HIGH (ref 0–300)
PLATELET # BLD AUTO: 227 K/UL — SIGNIFICANT CHANGE UP (ref 150–400)
POTASSIUM SERPL-MCNC: 3.9 MMOL/L — SIGNIFICANT CHANGE UP (ref 3.5–5.3)
POTASSIUM SERPL-SCNC: 3.9 MMOL/L — SIGNIFICANT CHANGE UP (ref 3.5–5.3)
RBC # BLD: 3.71 M/UL — LOW (ref 4.2–5.8)
RBC # FLD: 13.8 % — SIGNIFICANT CHANGE UP (ref 10.3–14.5)
SODIUM SERPL-SCNC: 142 MMOL/L — SIGNIFICANT CHANGE UP (ref 135–145)
WBC # BLD: 7.11 K/UL — SIGNIFICANT CHANGE UP (ref 3.8–10.5)
WBC # FLD AUTO: 7.11 K/UL — SIGNIFICANT CHANGE UP (ref 3.8–10.5)

## 2022-01-15 PROCEDURE — 99233 SBSQ HOSP IP/OBS HIGH 50: CPT

## 2022-01-15 RX ORDER — DEXAMETHASONE 0.5 MG/5ML
5 ELIXIR ORAL DAILY
Refills: 0 | Status: DISCONTINUED | OUTPATIENT
Start: 2022-01-15 | End: 2022-01-18

## 2022-01-15 RX ADMIN — Medication 25 MILLIGRAM(S): at 12:21

## 2022-01-15 RX ADMIN — ISOSORBIDE DINITRATE 20 MILLIGRAM(S): 5 TABLET ORAL at 12:15

## 2022-01-15 RX ADMIN — ATORVASTATIN CALCIUM 20 MILLIGRAM(S): 80 TABLET, FILM COATED ORAL at 22:01

## 2022-01-15 RX ADMIN — Medication 25 MILLIGRAM(S): at 05:54

## 2022-01-15 RX ADMIN — CARVEDILOL PHOSPHATE 25 MILLIGRAM(S): 80 CAPSULE, EXTENDED RELEASE ORAL at 05:54

## 2022-01-15 RX ADMIN — ISOSORBIDE DINITRATE 20 MILLIGRAM(S): 5 TABLET ORAL at 05:54

## 2022-01-15 RX ADMIN — CARVEDILOL PHOSPHATE 25 MILLIGRAM(S): 80 CAPSULE, EXTENDED RELEASE ORAL at 17:34

## 2022-01-15 RX ADMIN — AMLODIPINE BESYLATE 10 MILLIGRAM(S): 2.5 TABLET ORAL at 05:54

## 2022-01-15 RX ADMIN — ISOSORBIDE DINITRATE 20 MILLIGRAM(S): 5 TABLET ORAL at 17:34

## 2022-01-15 RX ADMIN — Medication 81 MILLIGRAM(S): at 12:26

## 2022-01-15 RX ADMIN — HEPARIN SODIUM 5000 UNIT(S): 5000 INJECTION INTRAVENOUS; SUBCUTANEOUS at 12:21

## 2022-01-15 RX ADMIN — Medication 5 MILLIGRAM(S): at 17:35

## 2022-01-15 RX ADMIN — Medication 80 MILLIGRAM(S): at 06:32

## 2022-01-15 RX ADMIN — HEPARIN SODIUM 5000 UNIT(S): 5000 INJECTION INTRAVENOUS; SUBCUTANEOUS at 05:57

## 2022-01-15 RX ADMIN — Medication 25 MILLIGRAM(S): at 22:01

## 2022-01-15 RX ADMIN — Medication 40 MILLIGRAM(S): at 17:36

## 2022-01-15 RX ADMIN — HEPARIN SODIUM 5000 UNIT(S): 5000 INJECTION INTRAVENOUS; SUBCUTANEOUS at 22:01

## 2022-01-15 NOTE — PROGRESS NOTE ADULT - ASSESSMENT
66 year old male with PMH HTN, T2DM, CKD 4, CAD s/p CABG presented with leg and periorbital  swelling and noted to have SARS-CoV2 PCR(+).  Asymptomatic, diuresed with IV Lasix    1. Acute on chronic CHFpEF exacerbation   On PO furosemide, dose adjusted   c/w Carvedilol, hydralazine and Imdur   Monitor daily  I/O, daily weight, restriction fluid and salt.   TTE done and results reviewed       2. MELISSA on CKD4 with progression  Baseline Cr 3.9-4  - Monitor renal function  - Avoid Nephrotoxins      3. COVID-19 (+)  - Monitor for hypoxia.  Started on Dexamethasone       4. T2DM with hypoglycemia  - BGM with SSI  - Discontinued basal  - Monitor for hypo- and hyperglycemia    5. CAD s/p CABG. No symptoms. Unable to have stress test due to COVID(+).  - ASA, Statin, BB, Nitrate    6. HTN  c/w Amlodipine, Carvedilol and Hydralazine and Imdur       VTEp - Heparin    Acutely ill; Pending clinical improvement

## 2022-01-15 NOTE — PROGRESS NOTE ADULT - ASSESSMENT
pt. is a 67 y/o male with h/o htn, CHF, hld, ex-smoker, CAD s/p CABG CKD presenting with 1 week of increasing leg edema and feels more water retention in his belly. no abd. pain. no cp. no n/v/d. pt. reports sob with activity ok at rest, orthopnea. Patient does not use home O2. Normally takes Lasix 40mg BID and reports taking medications as prescribed. Also reports low salt diet .After wife noticed periorbital edema she asked him to come to ED for evaluation. no recent sick contacts. Vaccinated for COVID but covid-19 positive in the ER. pt. does not have any cough and o2 sat 97 % RA. no fever.     Known CKD IV - DM / HTN  , baseline creat 3.5   Feels better with diuresis in the ER   Cardio planning on future Mems   Follow up renal sonogram   Oral Lasix adjusted   Hydral / Imdur ALR   Will need eventual AVF placed     Will follow

## 2022-01-16 LAB
ANION GAP SERPL CALC-SCNC: 20 MMOL/L — HIGH (ref 5–17)
BUN SERPL-MCNC: 80.1 MG/DL — HIGH (ref 8–20)
CALCIUM SERPL-MCNC: 9 MG/DL — SIGNIFICANT CHANGE UP (ref 8.6–10.2)
CHLORIDE SERPL-SCNC: 100 MMOL/L — SIGNIFICANT CHANGE UP (ref 98–107)
CO2 SERPL-SCNC: 16 MMOL/L — LOW (ref 22–29)
CREAT SERPL-MCNC: 5.5 MG/DL — HIGH (ref 0.5–1.3)
GLUCOSE BLDC GLUCOMTR-MCNC: 170 MG/DL — HIGH (ref 70–99)
GLUCOSE BLDC GLUCOMTR-MCNC: 182 MG/DL — HIGH (ref 70–99)
GLUCOSE BLDC GLUCOMTR-MCNC: 189 MG/DL — HIGH (ref 70–99)
GLUCOSE BLDC GLUCOMTR-MCNC: 214 MG/DL — HIGH (ref 70–99)
GLUCOSE SERPL-MCNC: 187 MG/DL — HIGH (ref 70–99)
POTASSIUM SERPL-MCNC: 4.6 MMOL/L — SIGNIFICANT CHANGE UP (ref 3.5–5.3)
POTASSIUM SERPL-SCNC: 4.6 MMOL/L — SIGNIFICANT CHANGE UP (ref 3.5–5.3)
SODIUM SERPL-SCNC: 136 MMOL/L — SIGNIFICANT CHANGE UP (ref 135–145)

## 2022-01-16 PROCEDURE — 99233 SBSQ HOSP IP/OBS HIGH 50: CPT

## 2022-01-16 PROCEDURE — 76775 US EXAM ABDO BACK WALL LIM: CPT | Mod: 26

## 2022-01-16 RX ADMIN — ISOSORBIDE DINITRATE 20 MILLIGRAM(S): 5 TABLET ORAL at 17:33

## 2022-01-16 RX ADMIN — Medication 81 MILLIGRAM(S): at 11:43

## 2022-01-16 RX ADMIN — ATORVASTATIN CALCIUM 20 MILLIGRAM(S): 80 TABLET, FILM COATED ORAL at 22:46

## 2022-01-16 RX ADMIN — CARVEDILOL PHOSPHATE 25 MILLIGRAM(S): 80 CAPSULE, EXTENDED RELEASE ORAL at 17:33

## 2022-01-16 RX ADMIN — ISOSORBIDE DINITRATE 20 MILLIGRAM(S): 5 TABLET ORAL at 05:37

## 2022-01-16 RX ADMIN — Medication 2: at 09:32

## 2022-01-16 RX ADMIN — CARVEDILOL PHOSPHATE 25 MILLIGRAM(S): 80 CAPSULE, EXTENDED RELEASE ORAL at 05:37

## 2022-01-16 RX ADMIN — HEPARIN SODIUM 5000 UNIT(S): 5000 INJECTION INTRAVENOUS; SUBCUTANEOUS at 22:45

## 2022-01-16 RX ADMIN — ISOSORBIDE DINITRATE 20 MILLIGRAM(S): 5 TABLET ORAL at 11:42

## 2022-01-16 RX ADMIN — Medication 4: at 17:33

## 2022-01-16 RX ADMIN — Medication 25 MILLIGRAM(S): at 22:46

## 2022-01-16 RX ADMIN — Medication 25 MILLIGRAM(S): at 05:38

## 2022-01-16 RX ADMIN — HEPARIN SODIUM 5000 UNIT(S): 5000 INJECTION INTRAVENOUS; SUBCUTANEOUS at 11:43

## 2022-01-16 RX ADMIN — Medication 2: at 13:24

## 2022-01-16 RX ADMIN — HEPARIN SODIUM 5000 UNIT(S): 5000 INJECTION INTRAVENOUS; SUBCUTANEOUS at 05:37

## 2022-01-16 RX ADMIN — AMLODIPINE BESYLATE 10 MILLIGRAM(S): 2.5 TABLET ORAL at 05:38

## 2022-01-16 RX ADMIN — Medication 40 MILLIGRAM(S): at 11:42

## 2022-01-16 RX ADMIN — Medication 80 MILLIGRAM(S): at 05:37

## 2022-01-16 RX ADMIN — Medication 25 MILLIGRAM(S): at 11:42

## 2022-01-16 RX ADMIN — Medication 5 MILLIGRAM(S): at 05:38

## 2022-01-16 NOTE — PROGRESS NOTE ADULT - ASSESSMENT
66 year old male with PMH HTN, T2DM, CKD 4, CAD s/p CABG presented with leg and periorbital  swelling and noted to have SARS-CoV2 PCR(+).  Asymptomatic, diuresed with IV Lasix    1. Acute on chronic CHFpEF exacerbation   On PO furosemide, dose adjusted   c/w Carvedilol, hydralazine and Imdur   Monitor daily  I/O, daily weight, restriction fluid and salt.   TTE done and results reviewed       2. MELISSA on CKD4 with progression  Baseline Cr 3.9-4  - Monitor renal function  - Avoid Nephrotoxins  Likely will need eventually HD        3. COVID-19 (+)  - Monitor for hypoxia.  c/w Dexamethasone for today   Check SpO2 on ambulation and on rest       4. T2DM with hypoglycemia  BG controlled now   - BGM with SSI  - Discontinued basal  - Monitor for hypo- and hyperglycemia    5. CAD s/p CABG. No symptoms. Unable to have stress test due to COVID(+).  - ASA, Statin, BB, Nitrate    6. HTN  c/w Amlodipine, Carvedilol and Hydralazine and Imdur       VTEp - Heparin    Discharge in AM         66 year old male with PMH HTN, T2DM, CKD 4, CAD s/p CABG presented with leg and periorbital  swelling and noted to have SARS-CoV2 PCR(+).  Asymptomatic, diuresed with IV Lasix    1. Acute on chronic CHFpEF exacerbation   On PO furosemide, dose adjusted   c/w Carvedilol, hydralazine and Imdur   Monitor daily  I/O, daily weight, restriction fluid and salt.   TTE done and results reviewed       2. MELISSA on CKD4 with progression  Baseline Cr 3.9-4  - Monitor renal function  - Avoid Nephrotoxins  Likely will need eventually HD    Hold Furosemide dose for today, worsening serum Cr.       3. COVID-19 (+)  - Monitor for hypoxia.  c/w Dexamethasone for today   Check SpO2 on ambulation and on rest       4. T2DM with hypoglycemia  BG controlled now   - BGM with SSI  - Discontinued basal  - Monitor for hypo- and hyperglycemia    5. CAD s/p CABG. No symptoms. Unable to have stress test due to COVID(+).  - ASA, Statin, BB, Nitrate    6. HTN  c/w Amlodipine, Carvedilol and Hydralazine and Imdur       VTEp - Heparin    Discharge in AM

## 2022-01-16 NOTE — PROGRESS NOTE ADULT - ASSESSMENT
pt. is a 67 y/o male with h/o htn, CHF, hld, ex-smoker, CAD s/p CABG CKD presenting with 1 week of increasing leg edema and feels more water retention in his belly. no abd. pain. no cp. no n/v/d. pt. reports sob with activity ok at rest, orthopnea. Patient does not use home O2. Normally takes Lasix 40mg BID and reports taking medications as prescribed. Also reports low salt diet .After wife noticed periorbital edema she asked him to come to ED for evaluation. no recent sick contacts. Vaccinated for COVID but covid-19 positive in the ER. pt. does not have any cough and o2 sat 97 % RA. no fever.     Known CKD IV - DM / HTN  , baseline creat 3.5   Feels better with diuresis   Cardio planning on future Mems   Follow up renal sonogram   Oral Lasix adjusted   Hydral / Imdur ALR   Will need eventual AVF placed     Anemia - follow up am labs   If Hgb < 10 , will add Epogen

## 2022-01-17 ENCOUNTER — TRANSCRIPTION ENCOUNTER (OUTPATIENT)
Age: 67
End: 2022-01-17

## 2022-01-17 LAB
ANION GAP SERPL CALC-SCNC: 18 MMOL/L — HIGH (ref 5–17)
BUN SERPL-MCNC: 99.3 MG/DL — HIGH (ref 8–20)
CALCIUM SERPL-MCNC: 8.5 MG/DL — LOW (ref 8.6–10.2)
CHLORIDE SERPL-SCNC: 99 MMOL/L — SIGNIFICANT CHANGE UP (ref 98–107)
CO2 SERPL-SCNC: 19 MMOL/L — LOW (ref 22–29)
CREAT SERPL-MCNC: 5.5 MG/DL — HIGH (ref 0.5–1.3)
GLUCOSE BLDC GLUCOMTR-MCNC: 152 MG/DL — HIGH (ref 70–99)
GLUCOSE BLDC GLUCOMTR-MCNC: 190 MG/DL — HIGH (ref 70–99)
GLUCOSE BLDC GLUCOMTR-MCNC: 202 MG/DL — HIGH (ref 70–99)
GLUCOSE BLDC GLUCOMTR-MCNC: 284 MG/DL — HIGH (ref 70–99)
GLUCOSE SERPL-MCNC: 177 MG/DL — HIGH (ref 70–99)
HCT VFR BLD CALC: 32.1 % — LOW (ref 39–50)
HGB BLD-MCNC: 10.4 G/DL — LOW (ref 13–17)
MAGNESIUM SERPL-MCNC: 2.2 MG/DL — SIGNIFICANT CHANGE UP (ref 1.8–2.6)
MCHC RBC-ENTMCNC: 26.5 PG — LOW (ref 27–34)
MCHC RBC-ENTMCNC: 32.4 GM/DL — SIGNIFICANT CHANGE UP (ref 32–36)
MCV RBC AUTO: 81.9 FL — SIGNIFICANT CHANGE UP (ref 80–100)
PLATELET # BLD AUTO: 252 K/UL — SIGNIFICANT CHANGE UP (ref 150–400)
POTASSIUM SERPL-MCNC: 4.7 MMOL/L — SIGNIFICANT CHANGE UP (ref 3.5–5.3)
POTASSIUM SERPL-SCNC: 4.7 MMOL/L — SIGNIFICANT CHANGE UP (ref 3.5–5.3)
RBC # BLD: 3.92 M/UL — LOW (ref 4.2–5.8)
RBC # FLD: 13.7 % — SIGNIFICANT CHANGE UP (ref 10.3–14.5)
SODIUM SERPL-SCNC: 136 MMOL/L — SIGNIFICANT CHANGE UP (ref 135–145)
WBC # BLD: 7.66 K/UL — SIGNIFICANT CHANGE UP (ref 3.8–10.5)
WBC # FLD AUTO: 7.66 K/UL — SIGNIFICANT CHANGE UP (ref 3.8–10.5)

## 2022-01-17 PROCEDURE — 99233 SBSQ HOSP IP/OBS HIGH 50: CPT

## 2022-01-17 RX ORDER — SODIUM CHLORIDE 9 MG/ML
1000 INJECTION INTRAMUSCULAR; INTRAVENOUS; SUBCUTANEOUS ONCE
Refills: 0 | Status: COMPLETED | OUTPATIENT
Start: 2022-01-17 | End: 2022-01-17

## 2022-01-17 RX ADMIN — Medication 5 MILLIGRAM(S): at 05:06

## 2022-01-17 RX ADMIN — SODIUM CHLORIDE 1000 MILLILITER(S): 9 INJECTION INTRAMUSCULAR; INTRAVENOUS; SUBCUTANEOUS at 11:39

## 2022-01-17 RX ADMIN — ATORVASTATIN CALCIUM 20 MILLIGRAM(S): 80 TABLET, FILM COATED ORAL at 22:17

## 2022-01-17 RX ADMIN — AMLODIPINE BESYLATE 10 MILLIGRAM(S): 2.5 TABLET ORAL at 05:04

## 2022-01-17 RX ADMIN — HEPARIN SODIUM 5000 UNIT(S): 5000 INJECTION INTRAVENOUS; SUBCUTANEOUS at 11:39

## 2022-01-17 RX ADMIN — Medication 2: at 17:38

## 2022-01-17 RX ADMIN — ISOSORBIDE DINITRATE 20 MILLIGRAM(S): 5 TABLET ORAL at 05:05

## 2022-01-17 RX ADMIN — Medication 25 MILLIGRAM(S): at 05:04

## 2022-01-17 RX ADMIN — CARVEDILOL PHOSPHATE 25 MILLIGRAM(S): 80 CAPSULE, EXTENDED RELEASE ORAL at 17:38

## 2022-01-17 RX ADMIN — Medication 2: at 08:43

## 2022-01-17 RX ADMIN — Medication 81 MILLIGRAM(S): at 11:39

## 2022-01-17 RX ADMIN — ISOSORBIDE DINITRATE 20 MILLIGRAM(S): 5 TABLET ORAL at 11:39

## 2022-01-17 RX ADMIN — Medication 25 MILLIGRAM(S): at 22:17

## 2022-01-17 RX ADMIN — CARVEDILOL PHOSPHATE 25 MILLIGRAM(S): 80 CAPSULE, EXTENDED RELEASE ORAL at 05:07

## 2022-01-17 RX ADMIN — HEPARIN SODIUM 5000 UNIT(S): 5000 INJECTION INTRAVENOUS; SUBCUTANEOUS at 05:06

## 2022-01-17 RX ADMIN — ISOSORBIDE DINITRATE 20 MILLIGRAM(S): 5 TABLET ORAL at 17:38

## 2022-01-17 RX ADMIN — HEPARIN SODIUM 5000 UNIT(S): 5000 INJECTION INTRAVENOUS; SUBCUTANEOUS at 22:16

## 2022-01-17 RX ADMIN — Medication 6: at 11:40

## 2022-01-17 RX ADMIN — Medication 25 MILLIGRAM(S): at 11:39

## 2022-01-17 NOTE — PROGRESS NOTE ADULT - ASSESSMENT
pt. is a 65 y/o male with h/o htn, CHF, hld, ex-smoker, CAD s/p CABG CKD presenting with 1 week of increasing leg edema and feels more water retention in his belly. no abd. pain. no cp. no n/v/d. pt. reports sob with activity ok at rest, orthopnea. Patient does not use home O2. Normally takes Lasix 40mg BID and reports taking medications as prescribed. Also reports low salt diet .After wife noticed periorbital edema she asked him to come to ED for evaluation. no recent sick contacts. Vaccinated for COVID but covid-19 positive in the ER. pt. does not have any cough and o2 sat 97 % RA. no fever.     Known CKD IV - DM / HTN  , baseline creat 3.5   Feels better with diuresis   Cardio planning on future Mems   Follow up renal sonogram 1/16 no hydro   Hydral / Imdur ALR   Lasix held today   Component  of elevated BUN related to steroids   Needs to be kept in some degree of augmented pre-renal azotemia or gets very fluid overloaded   Repeat labs in the am       Will need eventual AVF placed   I discussed with , he is agreeable in the future     Will follow

## 2022-01-17 NOTE — PROVIDER CONTACT NOTE (OTHER) - ACTION/TREATMENT ORDERED:
MD made aware of concerns. MD states to still give Bolus of NS because pt has an elevated Anion gap. Will correct HTN after. Hold discharge until tomorrow pending improved kidney labs.

## 2022-01-17 NOTE — DISCHARGE NOTE PROVIDER - NSDCMRMEDTOKEN_GEN_ALL_CORE_FT
amLODIPine 10 mg oral tablet: 1 tab(s) orally once a day  aspirin 81 mg oral tablet: orally once a day  atorvastatin 20 mg oral tablet: 1 tab(s) orally once a day  BiDil 20 mg-37.5 mg oral tablet: 1 tab(s) orally 3 times a day  BMP: 1 unit(s) intravenous once a day   carvedilol 25 mg oral tablet: 1 tab(s) orally 2 times a day  insulin glargine 100 units/mL subcutaneous solution: 30 unit(s) subcutaneous once a day (at bedtime)  Lasix 40 mg oral tablet: 1.5 tab(s) orally 2 times a day please take only on Saturdays and Sundays   Lasix 40 mg oral tablet: 1 tab(s) orally 2 times a day daily Monday, tuesday, Wendsday, thrusday and Friday,  On Saturday and SUnday you will take 60mg Tiwce a day    amLODIPine 10 mg oral tablet: 1 tab(s) orally once a day  aspirin 81 mg oral tablet: orally once a day  atorvastatin 20 mg oral tablet: 1 tab(s) orally once a day  BiDil 20 mg-37.5 mg oral tablet: 1 tab(s) orally 3 times a day  carvedilol 25 mg oral tablet: 1 tab(s) orally 2 times a day  hydrALAZINE 25 mg oral tablet: 1 tab(s) orally every 8 hours  insulin glargine 100 units/mL subcutaneous solution: 30 unit(s) subcutaneous once a day (at bedtime)  isosorbide dinitrate 20 mg oral tablet: 1 tab(s) orally 3 times a day  Lasix 40 mg oral tablet:    amLODIPine 10 mg oral tablet: 1 tab(s) orally once a day  aspirin 81 mg oral tablet: orally once a day  atorvastatin 20 mg oral tablet: 1 tab(s) orally once a day  BiDil 20 mg-37.5 mg oral tablet: 1 tab(s) orally 3 times a day  carvedilol 25 mg oral tablet: 1 tab(s) orally 2 times a day  hydrALAZINE 25 mg oral tablet: 1 tab(s) orally every 8 hours  insulin glargine 100 units/mL subcutaneous solution: 30 unit(s) subcutaneous once a day (at bedtime)  isosorbide dinitrate 20 mg oral tablet: 1 tab(s) orally 3 times a day  Lasix 40 mg oral tablet:   sodium bicarbonate 650 mg oral tablet: 1 tab(s) orally 3 times a day

## 2022-01-17 NOTE — DISCHARGE NOTE PROVIDER - NSDCCPCAREPLAN_GEN_ALL_CORE_FT
PRINCIPAL DISCHARGE DIAGNOSIS  Diagnosis: Acute on chronic systolic heart failure  Assessment and Plan of Treatment: - Improving with diuresis, low salt diet, fluid restriction.   - Echo showed a normal ejection fraction with mild pulmonary hypertension.   - Follow up outpatient with cardiology for stress test and cardiomems.      SECONDARY DISCHARGE DIAGNOSES  Diagnosis: Acute renal failure superimposed on chronic kidney disease  Assessment and Plan of Treatment: - Diuresis was held due to worsening kidney function.   - Renal ultrasound showed no evidence of acute pathology.   - Evaluated by nephrology, please follow up outpatient with both nephrology and cardiology for monitoring and testing.    Diagnosis: 2019 novel coronavirus disease (COVID-19)  Assessment and Plan of Treatment: - You tested positive for COVID.  - Remained stable on room air, did not require supplemental oxygen or antiviral medication.   - Treated and improved with steroids and symptomatic management.  - Quarantine per [facility] guidelines.    Diagnosis: DM (diabetes mellitus)  Assessment and Plan of Treatment: - Blood sugars closely monitored and medications optimized  - Please take all medications as prescribed and follow up with your PCP for further management.

## 2022-01-17 NOTE — DISCHARGE NOTE PROVIDER - CARE PROVIDER_API CALL
William Butler)  Emergency Medicine  [address]  [place] [address]  Phone: [phone]  Fax: [phone]  Follow Up Time: 1-3 days    Arcenio Neal (DO)  Medicine  [address], [place] [address]  Phone: [phone]  Fax: [phone]  Follow Up Time: 1 week    Siomara Pro)  Cardiology; Internal Medicine  [address], [address]  [place], [place] [id number]  Phone: [phone]  Fax: [phone]  Follow Up Time: 1 week   Arcenio Neal (DO)  Medicine  [address], [place] [address]  Phone: [phone]  Fax: [phone]  Follow Up Time: 1 week    Siomara Pro)  Cardiology; Internal Medicine  [address] [place], [address] [id number]  Phone: [phone]  Fax: [phone]  Follow Up Time: 1 week

## 2022-01-17 NOTE — DISCHARGE NOTE PROVIDER - CARE PROVIDERS DIRECT ADDRESSES
,DirectAddress_Unknown,DirectAddress_Unknown,[email or web address] ,[email or web address],[email or web address]

## 2022-01-17 NOTE — DISCHARGE NOTE PROVIDER - HOSPITAL COURSE
66 year old male with PMHx of HTN, T2DM, CKD 4, CAD s/p CABG who presented to [facility] ED with c/o leg and periorbital swelling. In the ED, noted to have SARS-CoV2 PCR(+) though was asymptomatic. Patient was diuresed with IV lasix and admitted for management of acute on chronic HFpEF exacerbation. Cardiology was consulted, patient underwent TTE which revealed LVEF of 55-60% with mild pHTN. Patient will f/u outpatient for stress test and cardiomems. Noted to have MELISSA on CKD 4 on admission. Evaluated by nephrology. Renal US without acute pathology or hydronephrosis. Lasix was held due to worsening serum Cr. Will likely require HD, will f/u with nephrology outpatient. Patient did not require supplemental O2, no indication for remdesivir. Continued on dexamethasone and symptomatic management. BGL closely monitored and corrected as needed.        66 year old male with PMHx of HTN, T2DM, CKD 4, CAD s/p CABG who presented to [facility] ED with c/o leg and periorbital swelling. In the ED, noted to have SARS-CoV2 PCR(+) though was asymptomatic. Patient was diuresed with IV lasix and admitted for management of acute on chronic HFpEF exacerbation. Cardiology was consulted, patient underwent TTE which revealed LVEF of 55-60% with mild pHTN. Patient will f/u outpatient for stress test and cardiomems. Noted to have MELISSA on CKD 4 on admission. Evaluated by nephrology. Renal US without acute pathology or hydronephrosis. Lasix was held due to worsening serum Cr. Will likely require HD in the future. will f/u with nephrology outpatient. Patient did not require supplemental O2, no indication for remdesivir. BGL closely monitored and corrected as needed.   Noted to have worsening kidney function, but asymptomatic  without uremic symptoms. He is stable for discharge and to follow up with his nephrologist Terri Andersen next week .

## 2022-01-17 NOTE — PROVIDER CONTACT NOTE (OTHER) - SITUATION
Pt ordered for a bolus of NS, PA and RN both unsure as to the reason why. Pt with a BP of 179/69.  Requesting clarification if MD still wants bolus to be administered.

## 2022-01-17 NOTE — DISCHARGE NOTE PROVIDER - PROVIDER TOKENS
PROVIDER:[TOKEN:[[id number]],FOLLOWUP:[1-3 days]],PROVIDER:[TOKEN:[[id number]],FOLLOWUP:[1 week]],PROVIDER:[TOKEN:[[id number]],FOLLOWUP:[1 week]] PROVIDER:[TOKEN:[[id number]],FOLLOWUP:[1 week]],PROVIDER:[TOKEN:[[id number]],FOLLOWUP:[1 week]]

## 2022-01-17 NOTE — PROGRESS NOTE ADULT - ASSESSMENT
66 year old male with PMH HTN, T2DM, CKD 4, CAD s/p CABG presented with leg and periorbital  swelling and noted to have SARS-CoV2 PCR(+).  Asymptomatic, diuresed with IV Lasix      1. Metabolic acidosis secondary to worsening kidney function   Furosemide on hold   GIve one bolus of IV fluids   Repeat CBC      1. Acute on chronic CHFpEF exacerbation   furosemide dose adjusted,  currently on hold   c/w Carvedilol, hydralazine and Imdur   Monitor daily  I/O, daily weight, restriction fluid and salt.   TTE done and results reviewed       2. MELISSA on CKD4 with progression  Baseline Cr 3.9-4  - Monitor renal function  - Avoid Nephrotoxins  Likely will need eventually HD    Hold Furosemide dose for today, worsening serum Cr.       3. COVID-19 (+)  - Monitor for hypoxia.  c/w Dexamethasone for today   Check SpO2 on ambulation and on rest       4. T2DM with hypoglycemia  BG controlled now   - BGM with SSI  - Discontinued basal  - Monitor for hypo- and hyperglycemia    5. CAD s/p CABG. No symptoms. Unable to have stress test due to COVID(+).  - ASA, Statin, BB, Nitrate    6. HTN  c/w Amlodipine, Carvedilol and Hydralazine and Imdur       VTEp - Heparin    Discharge postponed

## 2022-01-18 ENCOUNTER — TRANSCRIPTION ENCOUNTER (OUTPATIENT)
Age: 67
End: 2022-01-18

## 2022-01-18 VITALS
DIASTOLIC BLOOD PRESSURE: 72 MMHG | HEART RATE: 85 BPM | RESPIRATION RATE: 18 BRPM | OXYGEN SATURATION: 96 % | SYSTOLIC BLOOD PRESSURE: 163 MMHG | TEMPERATURE: 98 F

## 2022-01-18 LAB
ANION GAP SERPL CALC-SCNC: 20 MMOL/L — HIGH (ref 5–17)
BASOPHILS # BLD AUTO: 0 K/UL — SIGNIFICANT CHANGE UP (ref 0–0.2)
BASOPHILS NFR BLD AUTO: 0 % — SIGNIFICANT CHANGE UP (ref 0–2)
BUN SERPL-MCNC: 111.7 MG/DL — HIGH (ref 8–20)
CALCIUM SERPL-MCNC: 8.7 MG/DL — SIGNIFICANT CHANGE UP (ref 8.6–10.2)
CHLORIDE SERPL-SCNC: 100 MMOL/L — SIGNIFICANT CHANGE UP (ref 98–107)
CO2 SERPL-SCNC: 16 MMOL/L — LOW (ref 22–29)
CREAT SERPL-MCNC: 5.7 MG/DL — HIGH (ref 0.5–1.3)
EOSINOPHIL # BLD AUTO: 0 K/UL — SIGNIFICANT CHANGE UP (ref 0–0.5)
EOSINOPHIL NFR BLD AUTO: 0 % — SIGNIFICANT CHANGE UP (ref 0–6)
GIANT PLATELETS BLD QL SMEAR: PRESENT — SIGNIFICANT CHANGE UP
GLUCOSE BLDC GLUCOMTR-MCNC: 188 MG/DL — HIGH (ref 70–99)
GLUCOSE BLDC GLUCOMTR-MCNC: 205 MG/DL — HIGH (ref 70–99)
GLUCOSE SERPL-MCNC: 213 MG/DL — HIGH (ref 70–99)
HCT VFR BLD CALC: 33 % — LOW (ref 39–50)
HGB BLD-MCNC: 10.6 G/DL — LOW (ref 13–17)
LYMPHOCYTES # BLD AUTO: 0.36 K/UL — LOW (ref 1–3.3)
LYMPHOCYTES # BLD AUTO: 4.5 % — LOW (ref 13–44)
MAGNESIUM SERPL-MCNC: 2.3 MG/DL — SIGNIFICANT CHANGE UP (ref 1.6–2.6)
MANUAL SMEAR VERIFICATION: SIGNIFICANT CHANGE UP
MCHC RBC-ENTMCNC: 26.6 PG — LOW (ref 27–34)
MCHC RBC-ENTMCNC: 32.1 GM/DL — SIGNIFICANT CHANGE UP (ref 32–36)
MCV RBC AUTO: 82.7 FL — SIGNIFICANT CHANGE UP (ref 80–100)
METAMYELOCYTES # FLD: 0.9 % — HIGH (ref 0–0)
MONOCYTES # BLD AUTO: 0.44 K/UL — SIGNIFICANT CHANGE UP (ref 0–0.9)
MONOCYTES NFR BLD AUTO: 5.5 % — SIGNIFICANT CHANGE UP (ref 2–14)
MYELOCYTES NFR BLD: 0.9 % — HIGH (ref 0–0)
NEUTROPHILS # BLD AUTO: 7.04 K/UL — SIGNIFICANT CHANGE UP (ref 1.8–7.4)
NEUTROPHILS NFR BLD AUTO: 87.3 % — HIGH (ref 43–77)
PLAT MORPH BLD: NORMAL — SIGNIFICANT CHANGE UP
PLATELET # BLD AUTO: 255 K/UL — SIGNIFICANT CHANGE UP (ref 150–400)
POTASSIUM SERPL-MCNC: 4.6 MMOL/L — SIGNIFICANT CHANGE UP (ref 3.5–5.3)
POTASSIUM SERPL-SCNC: 4.6 MMOL/L — SIGNIFICANT CHANGE UP (ref 3.5–5.3)
RBC # BLD: 3.99 M/UL — LOW (ref 4.2–5.8)
RBC # FLD: 13.8 % — SIGNIFICANT CHANGE UP (ref 10.3–14.5)
RBC BLD AUTO: NORMAL — SIGNIFICANT CHANGE UP
SODIUM SERPL-SCNC: 136 MMOL/L — SIGNIFICANT CHANGE UP (ref 135–145)
VARIANT LYMPHS # BLD: 0.9 % — SIGNIFICANT CHANGE UP (ref 0–6)
WBC # BLD: 8.06 K/UL — SIGNIFICANT CHANGE UP (ref 3.8–10.5)
WBC # FLD AUTO: 8.06 K/UL — SIGNIFICANT CHANGE UP (ref 3.8–10.5)

## 2022-01-18 PROCEDURE — 93005 ELECTROCARDIOGRAM TRACING: CPT

## 2022-01-18 PROCEDURE — 99285 EMERGENCY DEPT VISIT HI MDM: CPT

## 2022-01-18 PROCEDURE — 85027 COMPLETE CBC AUTOMATED: CPT

## 2022-01-18 PROCEDURE — 36415 COLL VENOUS BLD VENIPUNCTURE: CPT

## 2022-01-18 PROCEDURE — 99239 HOSP IP/OBS DSCHRG MGMT >30: CPT

## 2022-01-18 PROCEDURE — 71046 X-RAY EXAM CHEST 2 VIEWS: CPT

## 2022-01-18 PROCEDURE — 87637 SARSCOV2&INF A&B&RSV AMP PRB: CPT

## 2022-01-18 PROCEDURE — 84484 ASSAY OF TROPONIN QUANT: CPT

## 2022-01-18 PROCEDURE — 80048 BASIC METABOLIC PNL TOTAL CA: CPT

## 2022-01-18 PROCEDURE — 93970 EXTREMITY STUDY: CPT

## 2022-01-18 PROCEDURE — 80053 COMPREHEN METABOLIC PANEL: CPT

## 2022-01-18 PROCEDURE — 86703 HIV-1/HIV-2 1 RESULT ANTBDY: CPT

## 2022-01-18 PROCEDURE — 93306 TTE W/DOPPLER COMPLETE: CPT

## 2022-01-18 PROCEDURE — 83880 ASSAY OF NATRIURETIC PEPTIDE: CPT

## 2022-01-18 PROCEDURE — 82962 GLUCOSE BLOOD TEST: CPT

## 2022-01-18 PROCEDURE — 85025 COMPLETE CBC W/AUTO DIFF WBC: CPT

## 2022-01-18 PROCEDURE — 83735 ASSAY OF MAGNESIUM: CPT

## 2022-01-18 PROCEDURE — 76775 US EXAM ABDO BACK WALL LIM: CPT

## 2022-01-18 RX ORDER — SODIUM BICARBONATE 1 MEQ/ML
1 SYRINGE (ML) INTRAVENOUS
Qty: 21 | Refills: 0
Start: 2022-01-18 | End: 2022-01-24

## 2022-01-18 RX ORDER — HYDRALAZINE HCL 50 MG
5 TABLET ORAL ONCE
Refills: 0 | Status: COMPLETED | OUTPATIENT
Start: 2022-01-18 | End: 2022-01-18

## 2022-01-18 RX ORDER — FUROSEMIDE 40 MG
1 TABLET ORAL
Qty: 60 | Refills: 0
Start: 2022-01-18 | End: 2022-02-16

## 2022-01-18 RX ORDER — ISOSORBIDE DINITRATE 5 MG/1
1 TABLET ORAL
Qty: 0 | Refills: 0 | DISCHARGE
Start: 2022-01-18

## 2022-01-18 RX ORDER — SODIUM BICARBONATE 1 MEQ/ML
650 SYRINGE (ML) INTRAVENOUS
Refills: 0 | Status: DISCONTINUED | OUTPATIENT
Start: 2022-01-18 | End: 2022-01-18

## 2022-01-18 RX ORDER — FUROSEMIDE 40 MG
1 TABLET ORAL
Qty: 0 | Refills: 0 | DISCHARGE
Start: 2022-01-18 | End: 2022-02-16

## 2022-01-18 RX ORDER — HYDRALAZINE HCL 50 MG
1 TABLET ORAL
Qty: 90 | Refills: 0
Start: 2022-01-18 | End: 2022-02-16

## 2022-01-18 RX ADMIN — Medication 25 MILLIGRAM(S): at 12:20

## 2022-01-18 RX ADMIN — Medication 5 MILLIGRAM(S): at 05:08

## 2022-01-18 RX ADMIN — ISOSORBIDE DINITRATE 20 MILLIGRAM(S): 5 TABLET ORAL at 05:08

## 2022-01-18 RX ADMIN — Medication 4: at 08:45

## 2022-01-18 RX ADMIN — ISOSORBIDE DINITRATE 20 MILLIGRAM(S): 5 TABLET ORAL at 16:27

## 2022-01-18 RX ADMIN — HEPARIN SODIUM 5000 UNIT(S): 5000 INJECTION INTRAVENOUS; SUBCUTANEOUS at 12:20

## 2022-01-18 RX ADMIN — Medication 5 MILLIGRAM(S): at 10:47

## 2022-01-18 RX ADMIN — CARVEDILOL PHOSPHATE 25 MILLIGRAM(S): 80 CAPSULE, EXTENDED RELEASE ORAL at 16:28

## 2022-01-18 RX ADMIN — Medication 81 MILLIGRAM(S): at 12:20

## 2022-01-18 RX ADMIN — Medication 650 MILLIGRAM(S): at 16:27

## 2022-01-18 RX ADMIN — HEPARIN SODIUM 5000 UNIT(S): 5000 INJECTION INTRAVENOUS; SUBCUTANEOUS at 05:08

## 2022-01-18 RX ADMIN — ISOSORBIDE DINITRATE 20 MILLIGRAM(S): 5 TABLET ORAL at 12:20

## 2022-01-18 RX ADMIN — CARVEDILOL PHOSPHATE 25 MILLIGRAM(S): 80 CAPSULE, EXTENDED RELEASE ORAL at 05:09

## 2022-01-18 RX ADMIN — AMLODIPINE BESYLATE 10 MILLIGRAM(S): 2.5 TABLET ORAL at 05:08

## 2022-01-18 RX ADMIN — Medication 2: at 12:21

## 2022-01-18 RX ADMIN — Medication 25 MILLIGRAM(S): at 05:08

## 2022-01-18 NOTE — PROGRESS NOTE ADULT - ASSESSMENT
66 year old male with PMH HTN, T2DM, CKD 4, CAD s/p CABG presented with leg and periorbital  swelling and noted to have SARS-CoV2 PCR(+).  Asymptomatic, diuresed with IV Lasix      1. Metabolic acidosis secondary to worsening kidney function   Asymptomatic, does not have uremic symptoms.    Furosemide on hold   s/p  bolus of IV fluids   Discussed with Dr. Neal, agree with his to discharge pt, he will follow up in the office next week.       1. Acute on chronic CHFpEF exacerbation   furosemide dose adjusted,  currently on hold   c/w Carvedilol, hydralazine and Imdur   Monitor daily  I/O, daily weight, restriction fluid and salt.   TTE done and results reviewed       2. MELISSA on CKD4 with progression  Baseline Cr 3.9-4  - Monitor renal function  - Avoid Nephrotoxins  Likely will need eventually HD    Hold Furosemide dose for today, worsening serum Cr.       3. COVID-19 (+), not on supplemental O2   - Monitor for hypoxia.  d/c Dexamethasone         4. T2DM with hypoglycemia  BG controlled now   - BGM with SSI  - Discontinued basal  - Monitor for hypo- and hyperglycemia    5. CAD s/p CABG. No symptoms. Unable to have stress test due to COVID(+).  - ASA, Statin, BB, Nitrate    6. HTN  c/w Amlodipine, Carvedilol and Hydralazine and Imdur       VTEp -   Stable for discharge

## 2022-01-18 NOTE — PROGRESS NOTE ADULT - NUTRITIONAL ASSESSMENT
pt. is a 67 y/o male with h/o htn, CHF, hld, ex-smoker, CAD s/p CABG CKD presenting with 1 week of increasing leg edema and feels more water retention in his belly. no abd. pain. no cp. no n/v/d. pt. reports sob with activity ok at rest, orthopnea. Patient does not use home O2. Normally takes Lasix 40mg BID and reports taking medications as prescribed. Also reports low salt diet .After wife noticed periorbital edema she asked him to come to ED for evaluation. no recent sick contacts. Vaccinated for COVID but covid-19 positive in the ER. pt. does not have any cough and o2 sat 97 % RA. no fever.     Known CKD IV - DM / HTN  , baseline creat 3.5   Feels better with diuresis   Cardio planning on future Mems   Follow up renal sonogram 1/16 no hydro   Hydral / Imdur ALR   Lasix held today   Component  of elevated BUN related to steroids , theses have been stopped   Needs to be kept in some degree of augmented pre-renal azotemia or gets very fluid overloaded   currently Lasix held due to degree of overdiuresis     Will need eventual AVF placed   I discussed with , he is agreeable in the future , when fully recuperated from Covid     MA - Add oral Bicarb bid     Currently volume status , potassium stable , with no uremic SSx   No pressing need for acute HD     He is cleared from a renal perspective for D/C home , with close outpatient follow up   Will arrange this in one week  I also discussed on the phone with his wife

## 2022-01-18 NOTE — DISCHARGE NOTE NURSING/CASE MANAGEMENT/SOCIAL WORK - PATIENT PORTAL LINK FT
You can access the FollowMyHealth Patient Portal offered by [facility] by registering at the following website: [email or web address]. By joining [employer]’s FollowMyHealth portal, you will also be able to view your health information using other applications (apps) compatible with our system.

## 2022-01-18 NOTE — DISCHARGE NOTE NURSING/CASE MANAGEMENT/SOCIAL WORK - NSDCPEFALRISK_GEN_ALL_CORE
For information on Fall & Injury Prevention, visit: [email or web address] OR  [email or web address] OR  https://www.cdc.gov/steadi/patient.html

## 2022-01-18 NOTE — PROGRESS NOTE ADULT - SUBJECTIVE AND OBJECTIVE BOX
[facility]                                                        Office: [address] [address]                                                       Telephone: [phone]. Fax:[phone]                                                                             PROGRESS NOTE   Reason for follow up: congestive heart failure and coronary artery disease prior CABG.                             Overnight: No new events.   Update:   patient received lasix yesterday. improved .  laying flat. found to be covid positive.    Subjective: "i am feelign ok "   Complains of:  no new symptoms.   Review of symptoms: Cardiac:  No chest pain.  + dyspnea. No palpitations.  Respiratory:no cough.  + dyspnea  Gastrointestinal: No diarrhea. No abdominal pain. No bleeding.     Past medical history: No updates.   Chronic conditions:  Hypertension: controlled. CHF: Stable. CAD: Stable ischemic heart disease. DM: Stable;    	  Vitals:  T(C): 36.9 (01-14-22 @ 16:13), Max: 37 (01-14-22 @ 05:03)  HR: 76 (01-14-22 @ 16:13) (73 - 80)  BP: 145/70 (01-14-22 @ 16:13) (133/71 - 163/78)  RR: 18 (01-14-22 @ 16:13) (18 - 18)  SpO2: 95% (01-14-22 @ 16:13) (95% - 96%)  Wt(kg): --  I&O's Summary    Weight (kg): 79.4 (01-13 @ 14:54)    PHYSICAL EXAM:  Appearance: Comfortable. No acute distress  HEENT:  Head and neck: Atraumatic. Normocephalic.  Normal oral mucosa, PERRL, Neck is supple. No JVD, No carotid bruit.  some puffiness around the eyes.  Neurologic: A & O x 3, no focal deficits. EOMI , Cranial nerves are intact.  Lymphatic: No cervical lymphadenopathy  Cardiovascular: Normal S1 S2, No murmur, rubs/gallops. No JVD, trivial leg edema  Respiratory: Lungs clear to auscultation  Gastrointestinal:  Soft, Non-tender, + BS  ; distended protuberants. obesity  Lower Extremities :" trivial  edema  Psychiatry: Patient is calm. No agitation. Mood & affect appropriate  Skin: No rashes/ ecchymoses/cyanosis/ulcers visualized on the face, hands or feet.    CURRENT MEDICATIONS:  amLODIPine   Tablet 10 milliGRAM(s) Oral daily  carvedilol 25 milliGRAM(s) Oral every 12 hours  furosemide    Tablet 40 milliGRAM(s) Oral two times a day  furosemide   Injectable 40 milliGRAM(s) IV Push Once  hydrALAZINE 25 milliGRAM(s) Oral every 8 hours  isosorbide   dinitrate Tablet (ISORDIL) 20 milliGRAM(s) Oral three times a day    atorvastatin  dextrose 40% Gel  dextrose 50% Injectable  dextrose 50% Injectable  dextrose 50% Injectable  glucagon  Injectable  insulin lispro (ADMELOG) corrective regimen sliding scale  insulin lispro (ADMELOG) corrective regimen sliding scale  aspirin enteric coated  dextrose 5%.  dextrose 5%.  heparin   Injectable      LABS:	 	  CARDIAC MARKERS ( 13 Jan 2022 16:18 )  x     / 0.05 ng/mL / x     / x     / x      p-BNP 13 Jan 2022 16:18: 2874 pg/mL                            11.1   8.47  )-----------( 289      ( 13 Jan 2022 16:18 )             34.5     01-14    142  |  108<H>  |  63.4<H>  ----------------------------<  80  3.7   |  18.0<L>  |  5.08<H>    Ca    8.7      14 Jan 2022 03:10  Mg     1.8     01-14    TPro  6.9  /  Alb  3.8  /  TBili  0.3<L>  /  DBili  x   /  AST  18  /  ALT  15  /  AlkPhos  109  01-13    proBNP: Serum Pro-Brain Natriuretic Peptide: 2874 pg/mL (01-13 @ 16:18)    Lipid Profile:   HgA1c: TSH:     TELEMETRY: Reviewed    unremarkable   ECG:  Reviewed by me. 	  Sinus rhythm with 1st degree A-V block  Incomplete right bundle branch block        	
  HOSPITALIST PROGRESS NOTE    ANSHUL CUNNINGHAM  [id number]  66yMale    Patient is a 66y old  Male who presents with a chief complaint of acute on chronic diastolic chf (14 Jan 2022 17:45)      SUBJECTIVE:   Chart reviewed since admission  Patient seen and examined at bedside for CHFpEF, Worsening CKD, COVID-19  Denies any dyspnea, chest pain, fever, chills, dizziness.  Swelling x 2 weeks      OBJECTIVE:  Vital Signs Last 24 Hrs  T(C): 36.9 (14 Jan 2022 16:13), Max: 37 (14 Jan 2022 05:03)  T(F): 98.4 (14 Jan 2022 16:13), Max: 98.6 (14 Jan 2022 05:03)  HR: 76 (14 Jan 2022 16:13) (73 - 80)  BP: 145/70 (14 Jan 2022 16:13) (133/71 - 163/78)   RR: 18 (14 Jan 2022 16:13) (18 - 18)  SpO2: 95% (14 Jan 2022 16:13) (95% - 96%)    PHYSICAL EXAMINATION  General: Middle aged male lying in bed, NAD  HEENT:  periorbital edema  NECK:  Supple  CVS: regular rate and rhythm S1 S2 JVD, anasarca  RESP:  CTAB  GI:  Soft fluid filled nontender BS+  : No suprapubic tenderness  MSK:  FROM.  CNS:  AOx3, follows commands, moves all extremities  INTEG:  edema LE  PSYCH:  Fair mood    MONITOR:  CAPILLARY BLOOD GLUCOSE      POCT Blood Glucose.: 98 mg/dL (14 Jan 2022 16:49)  POCT Blood Glucose.: 73 mg/dL (14 Jan 2022 11:20)  POCT Blood Glucose.: 67 mg/dL (14 Jan 2022 09:49)  POCT Blood Glucose.: 56 mg/dL (14 Jan 2022 08:16)  POCT Blood Glucose.: 117 mg/dL (13 Jan 2022 23:36)    A1C with Estimated Average Glucose Result: 7.2 % (11.30.21 @ 08:19)    I&O's Summary                          11.1   8.47  )-----------( 289      ( 13 Jan 2022 16:18 )             34.5       01-14    142  |  108<H>  |  63.4<H>  ----------------------------<  80  3.7   |  18.0<L>  |  5.08<H>    Ca    8.7      14 Jan 2022 03:10  Mg     1.8     01-14    TPro  6.9  /  Alb  3.8  /  TBili  0.3<L>  /  DBili  x   /  AST  18  /  ALT  15  /  AlkPhos  109  01-13    CARDIAC MARKERS ( 13 Jan 2022 16:18 )  x     / 0.05 ng/mL / x     / x     / x              Culture:    TTE:    RADIOLOGY  < from: Xray Chest 2 Views PA/Lat (01.13.22 @ 16:23) >  INTERPRETATION:  Clinical history: 66 year old male, chest pain.    Two views of the chest are compared to 8/2/2019 and demonstrate a normal   cardiac silhouette and normal pulmonary vasculature with no   consolidation, effusion, gross adenopathy, pneumothorax or acute osseous   finding.    Post sternotomy/CABG.    IMPRESSION:  No acute radiographic findings and no change    --- End of Report ---    < end of copied text >  < from: US Duplex Venous Lower Ext Complete, Bilateral (01.13.22 @ 18:54) >  FINDINGS:    RIGHT:  Normal compressibility of the RIGHT common femoral, femoral and popliteal   veins.  Doppler examination shows normal spontaneous and phasic flow.  No RIGHT calf vein thrombosis is detected.    LEFT:  Normal compressibility of the LEFT common femoral, femoral and popliteal   veins.  Doppler examination shows normal spontaneous and phasic flow.  No LEFT calf vein thrombosis is detected.    IMPRESSION:  No evidence of deep venous thrombosis in either lower extremity.          --- End of Report ---            KARIS BROWN MD; Attending Radiologist  This document has been electronically signed. Jan 13 2022  7:22PM    < end of copied text >        MEDICATIONS  (STANDING):  amLODIPine   Tablet 10 milliGRAM(s) Oral daily  aspirin enteric coated 81 milliGRAM(s) Oral daily  atorvastatin 20 milliGRAM(s) Oral at bedtime  carvedilol 25 milliGRAM(s) Oral every 12 hours  dextrose 40% Gel 15 Gram(s) Oral once  dextrose 5%. 1000 milliLiter(s) (50 mL/Hr) IV Continuous <Continuous>  dextrose 5%. 1000 milliLiter(s) (100 mL/Hr) IV Continuous <Continuous>  dextrose 50% Injectable 25 Gram(s) IV Push once  dextrose 50% Injectable 12.5 Gram(s) IV Push once  dextrose 50% Injectable 25 Gram(s) IV Push once  furosemide    Tablet 80 milliGRAM(s) Oral daily  furosemide    Tablet 40 milliGRAM(s) Oral <User Schedule>  furosemide   Injectable 40 milliGRAM(s) IV Push Once  glucagon  Injectable 1 milliGRAM(s) IntraMuscular once  heparin   Injectable 5000 Unit(s) SubCutaneous every 8 hours  hydrALAZINE 25 milliGRAM(s) Oral every 8 hours  insulin lispro (ADMELOG) corrective regimen sliding scale   SubCutaneous three times a day before meals  insulin lispro (ADMELOG) corrective regimen sliding scale   SubCutaneous at bedtime  isosorbide   dinitrate Tablet (ISORDIL) 20 milliGRAM(s) Oral three times a day      MEDICATIONS  (PRN):  ALBUTerol    90 MICROgram(s) HFA Inhaler 2 Puff(s) Inhalation every 6 hours PRN Shortness of Breath and/or Wheezing  
NEPHROLOGY INTERVAL HPI/OVERNIGHT EVENTS:    Feels better   No new events   More azotemic   Diuretics held     MEDICATIONS  (STANDING):  amLODIPine   Tablet 10 milliGRAM(s) Oral daily  aspirin enteric coated 81 milliGRAM(s) Oral daily  atorvastatin 20 milliGRAM(s) Oral at bedtime  carvedilol 25 milliGRAM(s) Oral every 12 hours  dexAMETHasone  Injectable 5 milliGRAM(s) IV Push daily  dextrose 40% Gel 15 Gram(s) Oral once  dextrose 5%. 1000 milliLiter(s) (50 mL/Hr) IV Continuous <Continuous>  dextrose 5%. 1000 milliLiter(s) (100 mL/Hr) IV Continuous <Continuous>  dextrose 50% Injectable 25 Gram(s) IV Push once  dextrose 50% Injectable 12.5 Gram(s) IV Push once  dextrose 50% Injectable 25 Gram(s) IV Push once  furosemide   Injectable 40 milliGRAM(s) IV Push Once  glucagon  Injectable 1 milliGRAM(s) IntraMuscular once  heparin   Injectable 5000 Unit(s) SubCutaneous every 8 hours  hydrALAZINE 25 milliGRAM(s) Oral every 8 hours  insulin lispro (ADMELOG) corrective regimen sliding scale   SubCutaneous three times a day before meals  insulin lispro (ADMELOG) corrective regimen sliding scale   SubCutaneous at bedtime  isosorbide   dinitrate Tablet (ISORDIL) 20 milliGRAM(s) Oral three times a day    MEDICATIONS  (PRN):  ALBUTerol    90 MICROgram(s) HFA Inhaler 2 Puff(s) Inhalation every 6 hours PRN Shortness of Breath and/or Wheezing      Allergies    No Known Allergies    Intolerances      Vital Signs Last 24 Hrs  T(C): 36.8 (17 Jan 2022 10:50), Max: 36.9 (17 Jan 2022 04:22)  T(F): 98.2 (17 Jan 2022 10:50), Max: 98.5 (17 Jan 2022 04:22)  HR: 77 (17 Jan 2022 10:50) (69 - 98)  BP: 179/68 (17 Jan 2022 10:50) (155/68 - 179/68)  BP(mean): --  RR: 18 (17 Jan 2022 10:50) (18 - 18)  SpO2: 96% (17 Jan 2022 10:50) (92% - 98%)  Daily     Daily   I&O's Detail    I&O's Summary      HEAD:  no edema , anicteric   NECK: Supple, No JVD,   CHEST/LUNG: EAE , few basilar crackles   HEART: Regular rate and rhythm; No gallop or rub   ABDOMEN: Soft, Nontender,   EXTREMITIES: warm , no edema        LABS:                        10.4   7.66  )-----------( 252      ( 17 Jan 2022 08:36 )             32.1     01-17    136  |  99  |  99.3<H>  ----------------------------<  177<H>  4.7   |  19.0<L>  |  5.50<H>    Ca    8.5<L>      17 Jan 2022 08:36  Mg     2.2     01-17          Magnesium, Serum: 2.2 mg/dL (01-17 @ 08:36)    < from: US Renal (01.16.22 @ 16:19) >  ACC: [id number] EXAM:  US KIDNEY(S)                          PROCEDURE DATE:  01/16/2022          INTERPRETATION:  CLINICAL INFORMATION: A catheter on CT D. Renal failure.   Evaluate obstruction.    COMPARISON: Renal ultrasound 4/29/2017.    TECHNIQUE: Sonography of the kidneys and bladder.    FINDINGS:    Right kidney: 10.0 cm. No renal mass, hydronephrosis or calculi.    Left kidney: 10.6 cm. No renal mass, hydronephrosis or calculi.    Urinary bladder: Within normal limits.    IMPRESSION:    No hydronephrosis.        --- End of Report ---            < end of copied text >        RADIOLOGY & ADDITIONAL TESTS:  
NEPHROLOGY INTERVAL HPI/OVERNIGHT EVENTS:    feels well   Wants to go home   Sats 96 % , VSS, Afebrile   No SOB or CP       MEDICATIONS  (STANDING):  amLODIPine   Tablet 10 milliGRAM(s) Oral daily  aspirin enteric coated 81 milliGRAM(s) Oral daily  atorvastatin 20 milliGRAM(s) Oral at bedtime  carvedilol 25 milliGRAM(s) Oral every 12 hours  dextrose 40% Gel 15 Gram(s) Oral once  dextrose 5%. 1000 milliLiter(s) (50 mL/Hr) IV Continuous <Continuous>  dextrose 5%. 1000 milliLiter(s) (100 mL/Hr) IV Continuous <Continuous>  dextrose 50% Injectable 25 Gram(s) IV Push once  dextrose 50% Injectable 12.5 Gram(s) IV Push once  dextrose 50% Injectable 25 Gram(s) IV Push once  furosemide   Injectable 40 milliGRAM(s) IV Push Once  glucagon  Injectable 1 milliGRAM(s) IntraMuscular once  heparin   Injectable 5000 Unit(s) SubCutaneous every 8 hours  hydrALAZINE 25 milliGRAM(s) Oral every 8 hours  insulin lispro (ADMELOG) corrective regimen sliding scale   SubCutaneous three times a day before meals  insulin lispro (ADMELOG) corrective regimen sliding scale   SubCutaneous at bedtime  isosorbide   dinitrate Tablet (ISORDIL) 20 milliGRAM(s) Oral three times a day    MEDICATIONS  (PRN):  ALBUTerol    90 MICROgram(s) HFA Inhaler 2 Puff(s) Inhalation every 6 hours PRN Shortness of Breath and/or Wheezing      Allergies    No Known Allergies    Intolerances            Vital Signs Last 24 Hrs  T(C): 36.7 (18 Jan 2022 10:05), Max: 36.9 (17 Jan 2022 17:15)  T(F): 98.1 (18 Jan 2022 10:05), Max: 98.4 (17 Jan 2022 17:15)  HR: 75 (18 Jan 2022 10:30) (75 - 89)  BP: 164/74 (18 Jan 2022 10:05) (144/86 - 190/83)  BP(mean): --  RR: 18 (18 Jan 2022 10:30) (18 - 18)  SpO2: 96% (18 Jan 2022 10:30) (93% - 98%)  Daily     Daily   I&O's Detail    17 Jan 2022 07:01  -  18 Jan 2022 07:00  --------------------------------------------------------  IN:  Total IN: 0 mL    OUT:    Voided (mL): 400 mL  Total OUT: 400 mL    Total NET: -400 mL        I&O's Summary    17 Jan 2022 07:01  -  18 Jan 2022 07:00  --------------------------------------------------------  IN: 0 mL / OUT: 400 mL / NET: -400 mL    HEAD:  no edema , anicteric   NECK: Supple, No JVD,   CHEST/LUNG: EAE , few basilar crackles   HEART: Regular rate and rhythm; No gallop or rub   ABDOMEN: Soft, Nontender,   EXTREMITIES: warm , no edema    LABS:                        10.6   8.06  )-----------( 255      ( 18 Jan 2022 09:36 )             33.0     01-18    136  |  100  |  111.7<H>  ----------------------------<  213<H>  4.6   |  16.0<L>  |  5.70<H>    Ca    8.7      18 Jan 2022 09:36  Mg     2.3     01-18          Magnesium, Serum: 2.3 mg/dL (01-18 @ 09:36)          RADIOLOGY & ADDITIONAL TESTS:  
Patient is a 66y old  Male who presents with a chief complaint of acute on chronic diastolic chf (15 Guillermo 2022 07:52)      INTERVAL HPI/OVERNIGHT EVENTS: seen and examined. He is on 2 Lt of O2, reports feeling better.   COVID positive     MEDICATIONS  (STANDING):  amLODIPine   Tablet 10 milliGRAM(s) Oral daily  aspirin enteric coated 81 milliGRAM(s) Oral daily  atorvastatin 20 milliGRAM(s) Oral at bedtime  carvedilol 25 milliGRAM(s) Oral every 12 hours  dexAMETHasone  Injectable 5 milliGRAM(s) IV Push daily  dextrose 40% Gel 15 Gram(s) Oral once  dextrose 5%. 1000 milliLiter(s) (50 mL/Hr) IV Continuous <Continuous>  dextrose 5%. 1000 milliLiter(s) (100 mL/Hr) IV Continuous <Continuous>  dextrose 50% Injectable 25 Gram(s) IV Push once  dextrose 50% Injectable 12.5 Gram(s) IV Push once  dextrose 50% Injectable 25 Gram(s) IV Push once  furosemide    Tablet 80 milliGRAM(s) Oral daily  furosemide    Tablet 40 milliGRAM(s) Oral <User Schedule>  furosemide   Injectable 40 milliGRAM(s) IV Push Once  glucagon  Injectable 1 milliGRAM(s) IntraMuscular once  heparin   Injectable 5000 Unit(s) SubCutaneous every 8 hours  hydrALAZINE 25 milliGRAM(s) Oral every 8 hours  insulin lispro (ADMELOG) corrective regimen sliding scale   SubCutaneous three times a day before meals  insulin lispro (ADMELOG) corrective regimen sliding scale   SubCutaneous at bedtime  isosorbide   dinitrate Tablet (ISORDIL) 20 milliGRAM(s) Oral three times a day    MEDICATIONS  (PRN):  ALBUTerol    90 MICROgram(s) HFA Inhaler 2 Puff(s) Inhalation every 6 hours PRN Shortness of Breath and/or Wheezing      Allergies    No Known Allergies    Intolerances        REVIEW OF SYSTEMS:  CONSTITUTIONAL: No fever, weight loss, or fatigue  RESPIRATORY: No cough, wheezing, chills or hemoptysis; No shortness of breath  CARDIOVASCULAR: No chest pain, palpitations, dizziness, or leg swelling  GASTROINTESTINAL: No abdominal or epigastric pain. No nausea, vomiting, or hematemesis; No diarrhea or constipation. No melena or hematochezia.  NEUROLOGICAL: No headaches, memory loss, loss of strength, numbness, or tremors  MUSCULOSKELETAL: No joint pain or swelling; No muscle, back, or extremity pain      Vital Signs Last 24 Hrs  T(C): 36.8 (15 Guillermo 2022 14:22), Max: 36.9 (14 Jan 2022 16:13)  T(F): 98.2 (15 Guillermo 2022 14:22), Max: 98.4 (14 Jan 2022 16:13)  HR: 76 (15 Guillermo 2022 14:22) (69 - 76)  BP: 165/76 (15 Guillermo 2022 14:22) (128/62 - 165/76)  BP(mean): --  RR: 18 (15 Guillermo 2022 14:22) (18 - 18)  SpO2: 95% (15 Guillermo 2022 14:22) (95% - 100%)    PHYSICAL EXAM:  GENERAL: NAD,   HEAD:  Atraumatic, Normocephalic  EYES: EOMI, PERRLA, conjunctiva and sclera clear  NECK: Supple, No JVD, Normal thyroid  NERVOUS SYSTEM:  Alert & Oriented X3, No gross focal deficits  CHEST/LUNG: Clear to percussion bilaterally; No rales, rhonchi, wheezing, or rubs  HEART: Regular rate and rhythm; No murmurs, rubs, or gallops  ABDOMEN: Soft, Nontender, Nondistended; Bowel sounds present  EXTREMITIES:  No clubbing, cyanosis, or edema  SKIN: No rashes or lesions    LABS:                        9.7    7.11  )-----------( 227      ( 15 Guillermo 2022 03:20 )             30.7     01-15    142  |  106  |  71.1<H>  ----------------------------<  73  3.9   |  19.0<L>  |  4.96<H>    Ca    8.5<L>      15 Guillermo 2022 03:20  Mg     2.0     01-15    TPro  6.9  /  Alb  3.8  /  TBili  0.3<L>  /  DBili  x   /  AST  18  /  ALT  15  /  AlkPhos  109  01-13        CAPILLARY BLOOD GLUCOSE      POCT Blood Glucose.: 116 mg/dL (15 Guillermo 2022 12:13)  POCT Blood Glucose.: 71 mg/dL (15 Guillermo 2022 09:31)  POCT Blood Glucose.: 106 mg/dL (14 Jan 2022 22:48)  POCT Blood Glucose.: 98 mg/dL (14 Jan 2022 16:49)      RADIOLOGY & ADDITIONAL TESTS:    Imaging Personally Reviewed:  [ ] YES  [ ] NO    Consultant(s) Notes Reviewed:  [ ] YES  [ ] NO    Care Discussed with Consultants/Other Providers [ ] YES  [ ] NO    Plan of Care discussed with Housestaff [ ]YES [ ] NO
Patient is a 66y old  Male who presents with a chief complaint of acute on chronic diastolic chf (18 Jan 2022 13:08)      INTERVAL HPI/OVERNIGHT EVENTS: seen and examined. No complains, felling ok.   Serum Cr and AG trending up     MEDICATIONS  (STANDING):  amLODIPine   Tablet 10 milliGRAM(s) Oral daily  aspirin enteric coated 81 milliGRAM(s) Oral daily  atorvastatin 20 milliGRAM(s) Oral at bedtime  carvedilol 25 milliGRAM(s) Oral every 12 hours  dextrose 40% Gel 15 Gram(s) Oral once  dextrose 5%. 1000 milliLiter(s) (50 mL/Hr) IV Continuous <Continuous>  dextrose 5%. 1000 milliLiter(s) (100 mL/Hr) IV Continuous <Continuous>  dextrose 50% Injectable 25 Gram(s) IV Push once  dextrose 50% Injectable 12.5 Gram(s) IV Push once  dextrose 50% Injectable 25 Gram(s) IV Push once  furosemide   Injectable 40 milliGRAM(s) IV Push Once  glucagon  Injectable 1 milliGRAM(s) IntraMuscular once  heparin   Injectable 5000 Unit(s) SubCutaneous every 8 hours  hydrALAZINE 25 milliGRAM(s) Oral every 8 hours  insulin lispro (ADMELOG) corrective regimen sliding scale   SubCutaneous three times a day before meals  insulin lispro (ADMELOG) corrective regimen sliding scale   SubCutaneous at bedtime  isosorbide   dinitrate Tablet (ISORDIL) 20 milliGRAM(s) Oral three times a day  sodium bicarbonate 650 milliGRAM(s) Oral two times a day    MEDICATIONS  (PRN):  ALBUTerol    90 MICROgram(s) HFA Inhaler 2 Puff(s) Inhalation every 6 hours PRN Shortness of Breath and/or Wheezing      Allergies    No Known Allergies    Intolerances        REVIEW OF SYSTEMS:  CONSTITUTIONAL: No fever, weight loss, or fatigue  RESPIRATORY: No cough, wheezing, chills or hemoptysis; No shortness of breath  CARDIOVASCULAR: No chest pain, palpitations, dizziness, or leg swelling  GASTROINTESTINAL: No abdominal or epigastric pain. No nausea, vomiting, or hematemesis; No diarrhea or constipation. No melena or hematochezia.  NEUROLOGICAL: No headaches, memory loss, loss of strength, numbness, or tremors  MUSCULOSKELETAL: No joint pain or swelling; No muscle, back, or extremity pain      Vital Signs Last 24 Hrs  T(C): 36.7 (18 Jan 2022 10:05), Max: 36.9 (17 Jan 2022 17:15)  T(F): 98.1 (18 Jan 2022 10:05), Max: 98.4 (17 Jan 2022 17:15)  HR: 75 (18 Jan 2022 10:30) (75 - 89)  BP: 164/74 (18 Jan 2022 10:05) (144/86 - 190/83)  BP(mean): --  RR: 18 (18 Jan 2022 10:30) (18 - 18)  SpO2: 96% (18 Jan 2022 10:30) (93% - 98%)    PHYSICAL EXAM:  GENERAL: NAD,   HEAD:  Atraumatic, Normocephalic  EYES: EOMI, PERRLA, conjunctiva and sclera clear  NECK: Supple, No JVD, Normal thyroid  NERVOUS SYSTEM:  Alert & Oriented X3, No gross focal deficits  CHEST/LUNG: Clear to percussion bilaterally; No rales, rhonchi, wheezing, or rubs  HEART: Regular rate and rhythm; No murmurs, rubs, or gallops  ABDOMEN: Soft, Nontender, Nondistended; Bowel sounds present  EXTREMITIES:  No clubbing, cyanosis, or edema  SKIN: No rashes or lesions    LABS:                        10.6   8.06  )-----------( 255      ( 18 Jan 2022 09:36 )             33.0     01-18    136  |  100  |  111.7<H>  ----------------------------<  213<H>  4.6   |  16.0<L>  |  5.70<H>    Ca    8.7      18 Jan 2022 09:36  Mg     2.3     01-18          CAPILLARY BLOOD GLUCOSE      POCT Blood Glucose.: 188 mg/dL (18 Jan 2022 12:19)  POCT Blood Glucose.: 205 mg/dL (18 Jan 2022 08:44)  POCT Blood Glucose.: 202 mg/dL (17 Jan 2022 22:13)  POCT Blood Glucose.: 152 mg/dL (17 Jan 2022 17:37)      RADIOLOGY & ADDITIONAL TESTS:    Imaging Personally Reviewed:  [ ] YES  [ ] NO    Consultant(s) Notes Reviewed:  [ ] YES  [ ] NO    Care Discussed with Consultants/Other Providers [ ] YES  [ ] NO    Plan of Care discussed with Housestaff [ ]YES [ ] NO
NEPHROLOGY INTERVAL HPI/OVERNIGHT EVENTS:    feels better   laying flat   Cardio planning future Mems     MEDICATIONS  (STANDING):  amLODIPine   Tablet 10 milliGRAM(s) Oral daily  aspirin enteric coated 81 milliGRAM(s) Oral daily  atorvastatin 20 milliGRAM(s) Oral at bedtime  carvedilol 25 milliGRAM(s) Oral every 12 hours  dextrose 40% Gel 15 Gram(s) Oral once  dextrose 5%. 1000 milliLiter(s) (50 mL/Hr) IV Continuous <Continuous>  dextrose 5%. 1000 milliLiter(s) (100 mL/Hr) IV Continuous <Continuous>  dextrose 50% Injectable 25 Gram(s) IV Push once  dextrose 50% Injectable 12.5 Gram(s) IV Push once  dextrose 50% Injectable 25 Gram(s) IV Push once  furosemide    Tablet 80 milliGRAM(s) Oral daily  furosemide    Tablet 40 milliGRAM(s) Oral <User Schedule>  furosemide   Injectable 40 milliGRAM(s) IV Push Once  glucagon  Injectable 1 milliGRAM(s) IntraMuscular once  heparin   Injectable 5000 Unit(s) SubCutaneous every 8 hours  hydrALAZINE 25 milliGRAM(s) Oral every 8 hours  insulin lispro (ADMELOG) corrective regimen sliding scale   SubCutaneous three times a day before meals  insulin lispro (ADMELOG) corrective regimen sliding scale   SubCutaneous at bedtime  isosorbide   dinitrate Tablet (ISORDIL) 20 milliGRAM(s) Oral three times a day    MEDICATIONS  (PRN):  ALBUTerol    90 MICROgram(s) HFA Inhaler 2 Puff(s) Inhalation every 6 hours PRN Shortness of Breath and/or Wheezing      Allergies    No Known Allergies    Intolerances    Vital Signs Last 24 Hrs  T(C): 36.3 (15 Guillermo 2022 05:34), Max: 36.9 (14 Jan 2022 16:13)  T(F): 97.3 (15 Guillermo 2022 05:34), Max: 98.4 (14 Jan 2022 16:13)  HR: 69 (15 Guillermo 2022 05:34) (69 - 77)  BP: 132/65 (15 Guillermo 2022 05:34) (128/62 - 155/81)  BP(mean): --  RR: 18 (15 Guillermo 2022 05:34) (18 - 18)  SpO2: 100% (15 Guillermo 2022 05:34) (95% - 100%)  Daily     Daily   I&O's Detail    I&O's Summary    GENERAL: NAD, laying flat , Sats 97 % on RA   HEAD:  no edema , anicteric   NECK: Supple, No JVD,   CHEST/LUNG: EAE , few basilar crackles   HEART: Regular rate and rhythm; No gallop or rub   ABDOMEN: Soft, Nontender,   EXTREMITIES: warm , no edema    LABS:                        9.7    7.11  )-----------( 227      ( 15 Guillermo 2022 03:20 )             30.7     01-15    142  |  106  |  71.1<H>  ----------------------------<  73  3.9   |  19.0<L>  |  4.96<H>    Ca    8.5<L>      15 Guillermo 2022 03:20  Mg     2.0     01-15    TPro  6.9  /  Alb  3.8  /  TBili  0.3<L>  /  DBili  x   /  AST  18  /  ALT  15  /  AlkPhos  109  01-13        Magnesium, Serum: 2.0 mg/dL (01-15 @ 03:20)      < from: US Duplex Venous Lower Ext Complete, Bilateral (01.13.22 @ 18:54) >  INTERPRETATION:  CLINICAL INFORMATION: Bilateral leg swelling.    COMPARISON: None available.    TECHNIQUE: Duplex sonography of the BILATERAL LOWER extremity veins with   color and spectral Doppler, with and without compression.    FINDINGS:    RIGHT:  Normal compressibility of the RIGHT common femoral, femoral and popliteal   veins.  Doppler examination shows normal spontaneous and phasic flow.  No RIGHT calf vein thrombosis is detected.    LEFT:  Normal compressibility of the LEFT common femoral, femoral and popliteal   veins.  Doppler examination shows normal spontaneous and phasic flow.  No LEFT calf vein thrombosis is detected.    IMPRESSION:  No evidence of deep venous thrombosis in either lower extremity.          --- End of Report ---            KARIS BROWN MD; Attending Radiologist  This document has been electronically signed. Jan 13 2022  7:22PM    < end of copied text >      RADIOLOGY & ADDITIONAL TESTS:  
NEPHROLOGY INTERVAL HPI/OVERNIGHT EVENTS:    Feels better   No new events   Sats 95%    MEDICATIONS  (STANDING):  amLODIPine   Tablet 10 milliGRAM(s) Oral daily  aspirin enteric coated 81 milliGRAM(s) Oral daily  atorvastatin 20 milliGRAM(s) Oral at bedtime  carvedilol 25 milliGRAM(s) Oral every 12 hours  dexAMETHasone  Injectable 5 milliGRAM(s) IV Push daily  dextrose 40% Gel 15 Gram(s) Oral once  dextrose 5%. 1000 milliLiter(s) (50 mL/Hr) IV Continuous <Continuous>  dextrose 5%. 1000 milliLiter(s) (100 mL/Hr) IV Continuous <Continuous>  dextrose 50% Injectable 25 Gram(s) IV Push once  dextrose 50% Injectable 12.5 Gram(s) IV Push once  dextrose 50% Injectable 25 Gram(s) IV Push once  furosemide    Tablet 80 milliGRAM(s) Oral daily  furosemide    Tablet 40 milliGRAM(s) Oral <User Schedule>  furosemide   Injectable 40 milliGRAM(s) IV Push Once  glucagon  Injectable 1 milliGRAM(s) IntraMuscular once  heparin   Injectable 5000 Unit(s) SubCutaneous every 8 hours  hydrALAZINE 25 milliGRAM(s) Oral every 8 hours  insulin lispro (ADMELOG) corrective regimen sliding scale   SubCutaneous three times a day before meals  insulin lispro (ADMELOG) corrective regimen sliding scale   SubCutaneous at bedtime  isosorbide   dinitrate Tablet (ISORDIL) 20 milliGRAM(s) Oral three times a day    MEDICATIONS  (PRN):  ALBUTerol    90 MICROgram(s) HFA Inhaler 2 Puff(s) Inhalation every 6 hours PRN Shortness of Breath and/or Wheezing      Allergies    No Known Allergies    Intolerances            Vital Signs Last 24 Hrs  T(C): 36.7 (16 Jan 2022 04:59), Max: 37 (15 Guillermo 2022 22:03)  T(F): 98.1 (16 Jan 2022 04:59), Max: 98.6 (15 Guillermo 2022 22:03)  HR: 90 (16 Jan 2022 04:59) (72 - 90)  BP: 164/75 (16 Jan 2022 04:59) (150/70 - 176/80)  BP(mean): --  RR: 18 (16 Jan 2022 04:59) (18 - 18)  SpO2: 95% (16 Jan 2022 04:59) (95% - 95%)  Daily     Daily   I&O's Detail      GENERAL: NAD, laying flat , Sats 95 % on RA   HEAD:  no edema , anicteric   NECK: Supple, No JVD,   CHEST/LUNG: EAE , few basilar crackles   HEART: Regular rate and rhythm; No gallop or rub   ABDOMEN: Soft, Nontender,   EXTREMITIES: warm , no edema      LABS:                        9.7    7.11  )-----------( 227      ( 15 Guillermo 2022 03:20 )             30.7     01-15    142  |  106  |  71.1<H>  ----------------------------<  73  3.9   |  19.0<L>  |  4.96<H>    Ca    8.5<L>      15 Guillermo 2022 03:20  Mg     2.0     01-15                  RADIOLOGY & ADDITIONAL TESTS:  
Patient is a 66y old  Male who presents with a chief complaint of acute on chronic diastolic chf (16 Jan 2022 09:59)      INTERVAL HPI/OVERNIGHT EVENTS: seen and examined. Reports improvement of edema.   No labs done this morning     MEDICATIONS  (STANDING):  amLODIPine   Tablet 10 milliGRAM(s) Oral daily  aspirin enteric coated 81 milliGRAM(s) Oral daily  atorvastatin 20 milliGRAM(s) Oral at bedtime  carvedilol 25 milliGRAM(s) Oral every 12 hours  dexAMETHasone  Injectable 5 milliGRAM(s) IV Push daily  dextrose 40% Gel 15 Gram(s) Oral once  dextrose 5%. 1000 milliLiter(s) (50 mL/Hr) IV Continuous <Continuous>  dextrose 5%. 1000 milliLiter(s) (100 mL/Hr) IV Continuous <Continuous>  dextrose 50% Injectable 25 Gram(s) IV Push once  dextrose 50% Injectable 12.5 Gram(s) IV Push once  dextrose 50% Injectable 25 Gram(s) IV Push once  furosemide    Tablet 80 milliGRAM(s) Oral daily  furosemide    Tablet 40 milliGRAM(s) Oral <User Schedule>  furosemide   Injectable 40 milliGRAM(s) IV Push Once  glucagon  Injectable 1 milliGRAM(s) IntraMuscular once  heparin   Injectable 5000 Unit(s) SubCutaneous every 8 hours  hydrALAZINE 25 milliGRAM(s) Oral every 8 hours  insulin lispro (ADMELOG) corrective regimen sliding scale   SubCutaneous three times a day before meals  insulin lispro (ADMELOG) corrective regimen sliding scale   SubCutaneous at bedtime  isosorbide   dinitrate Tablet (ISORDIL) 20 milliGRAM(s) Oral three times a day    MEDICATIONS  (PRN):  ALBUTerol    90 MICROgram(s) HFA Inhaler 2 Puff(s) Inhalation every 6 hours PRN Shortness of Breath and/or Wheezing      Allergies    No Known Allergies    Intolerances        REVIEW OF SYSTEMS:  CONSTITUTIONAL: No fever, weight loss, or fatigue  RESPIRATORY: No cough, wheezing, chills or hemoptysis; No shortness of breath  CARDIOVASCULAR: No chest pain, palpitations, dizziness, or leg swelling  GASTROINTESTINAL: No abdominal or epigastric pain. No nausea, vomiting, or hematemesis; No diarrhea or constipation. No melena or hematochezia.  NEUROLOGICAL: No headaches, memory loss, loss of strength, numbness, or tremors  MUSCULOSKELETAL: No joint pain or swelling; No muscle, back, or extremity pain      Vital Signs Last 24 Hrs  T(C): 36.6 (16 Jan 2022 07:29), Max: 37 (15 Guillermo 2022 22:03)  T(F): 97.8 (16 Jan 2022 07:29), Max: 98.6 (15 Guillermo 2022 22:03)  HR: 84 (16 Jan 2022 07:29) (72 - 90)  BP: 142/63 (16 Jan 2022 07:29) (142/63 - 176/80)  BP(mean): --  RR: 18 (16 Jan 2022 07:29) (18 - 18)  SpO2: 97% (16 Jan 2022 07:29) (95% - 97%)    PHYSICAL EXAM:  GENERAL: NAD,   HEAD:  Atraumatic, Normocephalic  EYES: EOMI, PERRLA, conjunctiva and sclera clear  NECK: Supple, No JVD, Normal thyroid  NERVOUS SYSTEM:  Alert & Oriented X3, No gross focal deficits  CHEST/LUNG: Clear to percussion bilaterally; No rales, rhonchi, wheezing, or rubs  HEART: Regular rate and rhythm; No murmurs, rubs, or gallops  ABDOMEN: Soft, Nontender, Nondistended; Bowel sounds present  EXTREMITIES:  No clubbing, cyanosis, or edema  SKIN: No rashes or lesions    LABS:                        9.7    7.11  )-----------( 227      ( 15 Guillermo 2022 03:20 )             30.7     01-15    142  |  106  |  71.1<H>  ----------------------------<  73  3.9   |  19.0<L>  |  4.96<H>    Ca    8.5<L>      15 Guillermo 2022 03:20  Mg     2.0     01-15          CAPILLARY BLOOD GLUCOSE      POCT Blood Glucose.: 170 mg/dL (16 Jan 2022 09:31)  POCT Blood Glucose.: 146 mg/dL (15 Guillermo 2022 21:58)  POCT Blood Glucose.: 109 mg/dL (15 Guillermo 2022 16:46)  POCT Blood Glucose.: 116 mg/dL (15 Guillermo 2022 12:13)      RADIOLOGY & ADDITIONAL TESTS:    Imaging Personally Reviewed:  [ ] YES  [ ] NO    Consultant(s) Notes Reviewed:  [ x] YES  [ ] NO    Care Discussed with Consultants/Other Providers [ x] YES  [ ] NO    Plan of Care discussed with Housestaff [ ]YES [ ] NO
Patient is a 66y old  Male who presents with a chief complaint of acute on chronic diastolic chf (17 Jan 2022 08:31)      INTERVAL HPI/OVERNIGHT EVENTS: seen and examined. Edema improved. Labs reviewed and noted to have elevated AG.     MEDICATIONS  (STANDING):  amLODIPine   Tablet 10 milliGRAM(s) Oral daily  aspirin enteric coated 81 milliGRAM(s) Oral daily  atorvastatin 20 milliGRAM(s) Oral at bedtime  carvedilol 25 milliGRAM(s) Oral every 12 hours  dexAMETHasone  Injectable 5 milliGRAM(s) IV Push daily  dextrose 40% Gel 15 Gram(s) Oral once  dextrose 5%. 1000 milliLiter(s) (50 mL/Hr) IV Continuous <Continuous>  dextrose 5%. 1000 milliLiter(s) (100 mL/Hr) IV Continuous <Continuous>  dextrose 50% Injectable 25 Gram(s) IV Push once  dextrose 50% Injectable 12.5 Gram(s) IV Push once  dextrose 50% Injectable 25 Gram(s) IV Push once  furosemide   Injectable 40 milliGRAM(s) IV Push Once  glucagon  Injectable 1 milliGRAM(s) IntraMuscular once  heparin   Injectable 5000 Unit(s) SubCutaneous every 8 hours  hydrALAZINE 25 milliGRAM(s) Oral every 8 hours  insulin lispro (ADMELOG) corrective regimen sliding scale   SubCutaneous three times a day before meals  insulin lispro (ADMELOG) corrective regimen sliding scale   SubCutaneous at bedtime  isosorbide   dinitrate Tablet (ISORDIL) 20 milliGRAM(s) Oral three times a day  sodium chloride 0.9% Bolus 1000 milliLiter(s) IV Bolus once    MEDICATIONS  (PRN):  ALBUTerol    90 MICROgram(s) HFA Inhaler 2 Puff(s) Inhalation every 6 hours PRN Shortness of Breath and/or Wheezing      Allergies    No Known Allergies    Intolerances        REVIEW OF SYSTEMS:  CONSTITUTIONAL: No fever, weight loss, or fatigue  RESPIRATORY: No cough, wheezing, chills or hemoptysis; No shortness of breath  CARDIOVASCULAR: No chest pain, palpitations, dizziness, or leg swelling  GASTROINTESTINAL: No abdominal or epigastric pain. No nausea, vomiting, or hematemesis; No diarrhea or constipation. No melena or hematochezia.  NEUROLOGICAL: No headaches, memory loss, loss of strength, numbness, or tremors  MUSCULOSKELETAL: No joint pain or swelling; No muscle, back, or extremity pain      Vital Signs Last 24 Hrs  T(C): 36.8 (17 Jan 2022 10:50), Max: 36.9 (17 Jan 2022 04:22)  T(F): 98.2 (17 Jan 2022 10:50), Max: 98.5 (17 Jan 2022 04:22)  HR: 77 (17 Jan 2022 10:50) (69 - 98)  BP: 179/68 (17 Jan 2022 10:50) (155/68 - 179/68)  BP(mean): --  RR: 18 (17 Jan 2022 10:50) (18 - 18)  SpO2: 96% (17 Jan 2022 10:50) (92% - 98%)    PHYSICAL EXAM:  GENERAL: NAD,   HEAD:  Atraumatic, Normocephalic  EYES: EOMI, PERRLA, conjunctiva and sclera clear  NECK: Supple, No JVD, Normal thyroid  NERVOUS SYSTEM:  Alert & Oriented X3, No gross focal deficits  CHEST/LUNG: Clear to percussion bilaterally; No rales, rhonchi, wheezing, or rubs  HEART: Regular rate and rhythm; No murmurs, rubs, or gallops  ABDOMEN: Soft, Nontender, Nondistended; Bowel sounds present  EXTREMITIES:  No clubbing, cyanosis, or edema  SKIN: No rashes or lesions    LABS:                        10.4   7.66  )-----------( 252      ( 17 Jan 2022 08:36 )             32.1     01-17    136  |  99  |  99.3<H>  ----------------------------<  177<H>  4.7   |  19.0<L>  |  5.50<H>    Ca    8.5<L>      17 Jan 2022 08:36  Mg     2.2     01-17          CAPILLARY BLOOD GLUCOSE      POCT Blood Glucose.: 190 mg/dL (17 Jan 2022 08:42)  POCT Blood Glucose.: 189 mg/dL (16 Jan 2022 22:35)  POCT Blood Glucose.: 214 mg/dL (16 Jan 2022 17:32)  POCT Blood Glucose.: 182 mg/dL (16 Jan 2022 13:23)      RADIOLOGY & ADDITIONAL TESTS:    Imaging Personally Reviewed:  [ ] YES  [ ] NO    Consultant(s) Notes Reviewed:  [ ] YES  [ ] NO    Care Discussed with Consultants/Other Providers [ ] YES  [ ] NO    Plan of Care discussed with Housestaff [ ]YES [ ] NO

## 2022-01-18 NOTE — PROGRESS NOTE ADULT - PROVIDER SPECIALTY LIST ADULT
Hospitalist
Nephrology
Nephrology
Hospitalist
Nephrology
Hospitalist
Nephrology
Cardiology

## 2022-01-18 NOTE — PROGRESS NOTE ADULT - REASON FOR ADMISSION
acute on chronic diastolic chf

## 2022-01-19 ENCOUNTER — TRANSCRIPTION ENCOUNTER (OUTPATIENT)
Age: 67
End: 2022-01-19

## 2022-02-01 ENCOUNTER — APPOINTMENT (OUTPATIENT)
Dept: CARDIOLOGY | Facility: CLINIC | Age: 67
End: 2022-02-01
Payer: MEDICARE

## 2022-02-01 VITALS
SYSTOLIC BLOOD PRESSURE: 138 MMHG | WEIGHT: 183 LBS | HEART RATE: 73 BPM | OXYGEN SATURATION: 97 % | DIASTOLIC BLOOD PRESSURE: 70 MMHG | TEMPERATURE: 97.3 F | BODY MASS INDEX: 30.49 KG/M2 | RESPIRATION RATE: 16 BRPM | HEIGHT: 65 IN

## 2022-02-01 DIAGNOSIS — R60.0 LOCALIZED EDEMA: ICD-10-CM

## 2022-02-01 PROCEDURE — 99215 OFFICE O/P EST HI 40 MIN: CPT

## 2022-02-01 PROCEDURE — 93000 ELECTROCARDIOGRAM COMPLETE: CPT

## 2022-02-09 NOTE — CARDIOLOGY SUMMARY
[No Ischemia] : no Ischemia [LVEF ___%] : LVEF [unfilled]% [Enlarged] : enlarged LA size [None] : no mitral regurgitation [___] : [unfilled] [de-identified] :  \par \par 1 11 2022  Sinus  Rhythm  -First degree A-V block \par -Incomplete right bundle branch block. \par  \par \par ABNORMAL \par \par \par 10 19 2021 \par \par 4/28/2021  : Sinus  Rhythm \par First degree AV block. \par -Incomplete right bundle branch block. \par  -Old inferior infarct. \par \par ABNORMAL \par  [de-identified] : NANETTE: Left 0.7 right : 1.4 \par US of LE arterial: sub total occlusion right PTA. mdoerate atherosclerosis bilaterally. \par Carotid DUplexL: sept 2020  moderate atherslceorsi. no stenosis.

## 2022-02-09 NOTE — HISTORY OF PRESENT ILLNESS
[FreeTextEntry1] : Multivessel CAD, HTN, CVA, HF pEF\par \par \par HPI for today: :   no chestpain. no dyspnea.  he was in the ER. for dyspnea.  and he ahd recent covid.  we had give him some diuresis. he was send him on small dose.  \par he came back with worsening renal funciton and was given iv fluids later.\par \par \par old niote:   he still have caludfication symptoms.    we never pursued intervention due to CKD.  \par if symptoms are persistent and worse, then jared plan for CO2 angiography\par no chest paion. no dyspnea. nfagitue and tired.\par \par \par old note:  no chest pain. no dyspnea.  feels good. complaitn with meds.  pain in the feet and legs. claudication\par \par \par old note: patient ran out of norvasc. he has not been taking it for 1 week.  \par he could not get the prescription\par no headhecs. no dizziness. No LE edema. He had stopped losartan due to CKD.  we put him on Bidil.\par he states when he takes his norvasc. his BP is good around 130. \par he has not made the appt with nephrologist yet. \par \par old note: : jeanette was in the hospital few days ago. he was fluid overload and LE edaem. responded to Iv lasix. CKD . chornic.  \par discharged the next day. stress test ordered and he got blood test done at Primary doctor yesterday. \par his echo was unremarkable in hospital \par \par \par old noteL: patient has been having increaese weight gain. he has been drinking too much water also as he feels its good for his kidneys. He drinks atleast 10 bottles a day.\par Compliant with  meds.  +_ dyspnea one xertion 2 + LE edema. \par \par \par \par old note: does not feel good. Lost balance. Unsteady gait.  diozziness.\par his primary doctor sent him for the carotid US and TCD ,. \par fatigeu and tired. \par \par \par old noteL: feels  good . no problem. compliant with meds. \par no smoking. need script for coreg. patient did not get the stress test done. Will order stress test phillip. checked hospital records. no stress test in [facility]. \par \par \par old note: no chest pain. no dypsnea. no headaches. feels good . complikant with meds. \par last time got NANETTE done that was abnormal. but US arterial showed run off disease on the right side,. tibiaperoneal. \par \par \par old note: No chest pain.  No dyspnea.  No LE edema.  Compliant with meds. Does not exercise.\par got NANETTE and US of LE.  found to have abnormal; NANETTE on the left and calcification on the right. US of LE arterial shows right PTA occlusion. patient does complain of feet pain on the right side. No pain on the left.

## 2022-02-09 NOTE — DISCUSSION/SUMMARY
[Patient] : the patient [Risks] : risks [Benefits] : benefits [Alternatives] : alternatives [With Me] : with me [___ Week(s)] : in [unfilled] week(s) [FreeTextEntry1] : This is a 59 M with history of smoking, HTN,DM (on insulin), HLD, found to have 3 vessel disease and TIA post cath: s/p CABG recent PNA and now with facial swelling,.\par 1) 5) HFpEF: Stable weight.  history of problesm due to CKD . managing heart failure has been challangeing as patient gets diuresis based on minimal leg edema and then received IV fluids after few days,.  40 mg Q12 \par 2) Peripheral vascular disease : bilateral sfa disease. right owrse then left. no intervention due to CKD. if symptoms persist or worsen then will plan fro CO2 angiography or stenting.\par 3) LE edema resolved.  CKD and Diastollic heart failure.     .   \par 4) PVD:   bilateral atherosclerosis: Walking and ct antiplatelets. life style modification. diabetes control.  aspirin and lipitor.   repat US arterial Duplex. \par 50  Multivessel CAD: s/p CABG2D  Continue Coreg 25 Q12, .ASA 81 mg,  lipitor 20 daily.  \par 6) HTN:  continue coreg 25 Q12   bidil 20 Q12   amldoipne 10 mg daily.  \par 7) DM: f/u PCP \par 8) CKD: creatinine stable.  f/u nephrology. dr. price.  DM control.  will re evaluate for losartan if BP not controlled.  will get cardiomems for managing heart failure with preserve ejection fraction \par 9) carotid artery disease: carotid atheroscelrosuis.   aspirin statins.  \par Will order and review ECG for the above mentioned diagnosis/condition/symptoms  a\par az\par

## 2022-02-17 ENCOUNTER — APPOINTMENT (OUTPATIENT)
Dept: CARDIOLOGY | Facility: CLINIC | Age: 67
End: 2022-02-17
Payer: MEDICARE

## 2022-02-17 PROCEDURE — A9500: CPT

## 2022-02-17 PROCEDURE — 93015 CV STRESS TEST SUPVJ I&R: CPT

## 2022-02-17 PROCEDURE — 78452 HT MUSCLE IMAGE SPECT MULT: CPT

## 2022-02-28 ENCOUNTER — APPOINTMENT (OUTPATIENT)
Dept: UROLOGY | Facility: CLINIC | Age: 67
End: 2022-02-28
Payer: MEDICARE

## 2022-02-28 VITALS
HEART RATE: 91 BPM | SYSTOLIC BLOOD PRESSURE: 183 MMHG | HEIGHT: 65 IN | BODY MASS INDEX: 30.49 KG/M2 | DIASTOLIC BLOOD PRESSURE: 100 MMHG | WEIGHT: 183 LBS

## 2022-02-28 DIAGNOSIS — Z87.442 PERSONAL HISTORY OF URINARY CALCULI: ICD-10-CM

## 2022-02-28 DIAGNOSIS — Z12.5 ENCOUNTER FOR SCREENING FOR MALIGNANT NEOPLASM OF PROSTATE: ICD-10-CM

## 2022-02-28 LAB
BILIRUB UR QL STRIP: NORMAL
CLARITY UR: CLEAR
COLLECTION METHOD: NORMAL
GLUCOSE UR-MCNC: NORMAL
HCG UR QL: 0.2 EU/DL
HGB UR QL STRIP.AUTO: ABNORMAL
KETONES UR-MCNC: NORMAL
LEUKOCYTE ESTERASE UR QL STRIP: NORMAL
NITRITE UR QL STRIP: NORMAL
PH UR STRIP: 5.5
PROT UR STRIP-MCNC: ABNORMAL
SP GR UR STRIP: 1.02

## 2022-02-28 PROCEDURE — 51798 US URINE CAPACITY MEASURE: CPT

## 2022-02-28 PROCEDURE — 99204 OFFICE O/P NEW MOD 45 MIN: CPT

## 2022-02-28 PROCEDURE — 81003 URINALYSIS AUTO W/O SCOPE: CPT | Mod: QW

## 2022-02-28 NOTE — HISTORY OF PRESENT ILLNESS
[None] : no symptoms [FreeTextEntry1] : Followed by Nephrology, Dr. Andersen for CKD x 4 years. Pt states CKD developed after CABG 4 years ago. Pt thought this appt was for his kidneys. His previous nephrologist no longer accepts his insurance. Denies frequency, urgency, dysuria, hematuria, slow stream, back pain.\par \par \par Former smoker x 50 years, smoked 1.5 packs/day, quit 4 years ago

## 2022-02-28 NOTE — PHYSICAL EXAM
[General Appearance - Well Developed] : well developed [General Appearance - Well Nourished] : well nourished [Normal Appearance] : normal appearance [Well Groomed] : well groomed [General Appearance - In No Acute Distress] : no acute distress [] : no respiratory distress [Respiration, Rhythm And Depth] : normal respiratory rhythm and effort [Exaggerated Use Of Accessory Muscles For Inspiration] : no accessory muscle use [Abdomen Soft] : soft [Abdomen Tenderness] : non-tender [Urethral Meatus] : meatus normal [Penis Abnormality] : normal uncircumcised penis [Scrotum] : the scrotum was normal [Epididymis] : the epididymides were normal [Testes Tenderness] : no tenderness of the testes [Testes Mass (___cm)] : there were no testicular masses [Anus Abnormality] : the anus and perineum were normal [Rectal Exam - Rectum] : digital rectal exam was normal [Prostate Tenderness] : the prostate was not tender [No Prostate Nodules] : no prostate nodules [FreeTextEntry1] : Moderate prostate enlargement, symmetric. B/l small inguinal hernias

## 2022-02-28 NOTE — REVIEW OF SYSTEMS
[Arthralgias] : arthralgias [Joint Pain] : joint pain [Dizziness] : dizziness [Negative] : Heme/Lymph

## 2022-02-28 NOTE — ASSESSMENT
[FreeTextEntry1] : Microscopic Hematuria\par \par Urine dip> small blood\par Lab: Cytology\par RTO 2 weeks for Cysto\par Will send for CT renal stone hunt. Pt denies h/o of kidney stones. Renal US from 2016 1.1 cm left renal stone noted. \par Records reviewed: Renal US 1/16/22> no hydro, mass or stone.\par \par CKD\par \par Creatinine 5.70 1/18/22, creatinine 4.46 12/3/21\par Will refer to Nephrology, Dr. Barajas\par \par Prostate cancer screening\par \par ENOCH complete, moderate prostate enlargement, asymptomatic\par Lab: PSA total/free\par PVR 7 ml\par

## 2022-02-28 NOTE — LETTER BODY
[Dear  ___] : Dear  [unfilled], [Courtesy Letter:] : I had the pleasure of seeing your patient, [unfilled], in my office today. [Please see my note below.] : Please see my note below. [Sincerely,] : Sincerely, [FreeTextEntry3] : Ed\par \par Sae España MD\par [facility]\par  of Urology\par [employer] [facility]\par

## 2022-03-04 LAB — URINE CYTOLOGY: NORMAL

## 2022-03-08 ENCOUNTER — APPOINTMENT (OUTPATIENT)
Dept: CARDIOLOGY | Facility: CLINIC | Age: 67
End: 2022-03-08
Payer: MEDICARE

## 2022-03-08 VITALS
RESPIRATION RATE: 16 BRPM | HEART RATE: 62 BPM | SYSTOLIC BLOOD PRESSURE: 140 MMHG | HEIGHT: 65 IN | DIASTOLIC BLOOD PRESSURE: 80 MMHG | BODY MASS INDEX: 30.82 KG/M2 | WEIGHT: 185 LBS | OXYGEN SATURATION: 98 %

## 2022-03-08 PROCEDURE — 99215 OFFICE O/P EST HI 40 MIN: CPT

## 2022-03-10 ENCOUNTER — INPATIENT (INPATIENT)
Facility: HOSPITAL | Age: 67
LOS: 10 days | Discharge: HOME HEALTH | DRG: 65 | End: 2022-03-21
Admitting: STUDENT IN AN ORGANIZED HEALTH CARE EDUCATION/TRAINING PROGRAM
Payer: MEDICARE

## 2022-03-10 VITALS
DIASTOLIC BLOOD PRESSURE: 83 MMHG | HEART RATE: 106 BPM | OXYGEN SATURATION: 98 % | HEIGHT: 65 IN | WEIGHT: 164.91 LBS | SYSTOLIC BLOOD PRESSURE: 159 MMHG | TEMPERATURE: 98 F | RESPIRATION RATE: 18 BRPM

## 2022-03-10 DIAGNOSIS — Z95.1 PRESENCE OF AORTOCORONARY BYPASS GRAFT: Chronic | ICD-10-CM

## 2022-03-10 LAB
ALBUMIN SERPL ELPH-MCNC: 3.2 G/DL — LOW (ref 3.3–5.2)
ALP SERPL-CCNC: 127 U/L — HIGH (ref 40–120)
ALT FLD-CCNC: 19 U/L — SIGNIFICANT CHANGE UP
ANION GAP SERPL CALC-SCNC: 10 MMOL/L — SIGNIFICANT CHANGE UP (ref 5–17)
APTT BLD: 29.1 SEC — SIGNIFICANT CHANGE UP (ref 27.5–35.5)
AST SERPL-CCNC: 15 U/L — SIGNIFICANT CHANGE UP
BASOPHILS # BLD AUTO: 0.06 K/UL — SIGNIFICANT CHANGE UP (ref 0–0.2)
BASOPHILS NFR BLD AUTO: 0.5 % — SIGNIFICANT CHANGE UP (ref 0–2)
BILIRUB SERPL-MCNC: 0.3 MG/DL — LOW (ref 0.4–2)
BUN SERPL-MCNC: 45.2 MG/DL — HIGH (ref 8–20)
CALCIUM SERPL-MCNC: 8.6 MG/DL — SIGNIFICANT CHANGE UP (ref 8.6–10.2)
CHLORIDE SERPL-SCNC: 103 MMOL/L — SIGNIFICANT CHANGE UP (ref 98–107)
CO2 SERPL-SCNC: 22 MMOL/L — SIGNIFICANT CHANGE UP (ref 22–29)
CREAT SERPL-MCNC: 4.32 MG/DL — HIGH (ref 0.5–1.3)
EGFR: 14 ML/MIN/1.73M2 — LOW
EOSINOPHIL # BLD AUTO: 0.18 K/UL — SIGNIFICANT CHANGE UP (ref 0–0.5)
EOSINOPHIL NFR BLD AUTO: 1.4 % — SIGNIFICANT CHANGE UP (ref 0–6)
GLUCOSE SERPL-MCNC: 228 MG/DL — HIGH (ref 70–99)
HCT VFR BLD CALC: 39.4 % — SIGNIFICANT CHANGE UP (ref 39–50)
HGB BLD-MCNC: 12.7 G/DL — LOW (ref 13–17)
IMM GRANULOCYTES NFR BLD AUTO: 0.6 % — SIGNIFICANT CHANGE UP (ref 0–1.5)
INR BLD: 1.09 RATIO — SIGNIFICANT CHANGE UP (ref 0.88–1.16)
LYMPHOCYTES # BLD AUTO: 1.61 K/UL — SIGNIFICANT CHANGE UP (ref 1–3.3)
LYMPHOCYTES # BLD AUTO: 12.7 % — LOW (ref 13–44)
MAGNESIUM SERPL-MCNC: 1.4 MG/DL — LOW (ref 1.6–2.6)
MCHC RBC-ENTMCNC: 26.4 PG — LOW (ref 27–34)
MCHC RBC-ENTMCNC: 32.2 GM/DL — SIGNIFICANT CHANGE UP (ref 32–36)
MCV RBC AUTO: 81.9 FL — SIGNIFICANT CHANGE UP (ref 80–100)
MONOCYTES # BLD AUTO: 0.6 K/UL — SIGNIFICANT CHANGE UP (ref 0–0.9)
MONOCYTES NFR BLD AUTO: 4.7 % — SIGNIFICANT CHANGE UP (ref 2–14)
NEUTROPHILS # BLD AUTO: 10.14 K/UL — HIGH (ref 1.8–7.4)
NEUTROPHILS NFR BLD AUTO: 80.1 % — HIGH (ref 43–77)
NT-PROBNP SERPL-SCNC: 1136 PG/ML — HIGH (ref 0–300)
PLATELET # BLD AUTO: 254 K/UL — SIGNIFICANT CHANGE UP (ref 150–400)
POTASSIUM SERPL-MCNC: 4.7 MMOL/L — SIGNIFICANT CHANGE UP (ref 3.5–5.3)
POTASSIUM SERPL-SCNC: 4.7 MMOL/L — SIGNIFICANT CHANGE UP (ref 3.5–5.3)
PROT SERPL-MCNC: 6.2 G/DL — LOW (ref 6.6–8.7)
PROTHROM AB SERPL-ACNC: 12.6 SEC — SIGNIFICANT CHANGE UP (ref 10.5–13.4)
RBC # BLD: 4.81 M/UL — SIGNIFICANT CHANGE UP (ref 4.2–5.8)
RBC # FLD: 14 % — SIGNIFICANT CHANGE UP (ref 10.3–14.5)
SODIUM SERPL-SCNC: 135 MMOL/L — SIGNIFICANT CHANGE UP (ref 135–145)
TROPONIN T SERPL-MCNC: 0.05 NG/ML — SIGNIFICANT CHANGE UP (ref 0–0.06)
WBC # BLD: 12.67 K/UL — HIGH (ref 3.8–10.5)
WBC # FLD AUTO: 12.67 K/UL — HIGH (ref 3.8–10.5)

## 2022-03-10 PROCEDURE — G1004: CPT

## 2022-03-10 PROCEDURE — 93010 ELECTROCARDIOGRAM REPORT: CPT

## 2022-03-10 PROCEDURE — 71045 X-RAY EXAM CHEST 1 VIEW: CPT | Mod: 26

## 2022-03-10 PROCEDURE — 99285 EMERGENCY DEPT VISIT HI MDM: CPT

## 2022-03-10 PROCEDURE — 70450 CT HEAD/BRAIN W/O DYE: CPT | Mod: 26,MG

## 2022-03-10 RX ORDER — MAGNESIUM SULFATE 500 MG/ML
2 VIAL (ML) INJECTION ONCE
Refills: 0 | Status: COMPLETED | OUTPATIENT
Start: 2022-03-10 | End: 2022-03-10

## 2022-03-10 RX ADMIN — Medication 25 GRAM(S): at 21:10

## 2022-03-10 NOTE — ED PROVIDER NOTE - NS ED ROS FT
Constitutional: no fever, no chills  Eyes: no vision changes  ENT: no nasal congestion, no sore throat  CV: no chest pain, +syncope  Resp: no cough, no shortness of breath  GI: no abdominal pain, no vomiting, no diarrhea  : no dysuria, +incontinence  MSK: no joint pain  Skin: no rash  Neuro: no headache, no focal weakness, no paresthesias

## 2022-03-10 NOTE — ED PROVIDER NOTE - CLINICAL SUMMARY MEDICAL DECISION MAKING FREE TEXT BOX
67y M w/ hx CAD, CHF, DM, HTN, CKD, presenting for syncopal episode. Pt currently asymptomatic and back to baseline. Will check EKG, CXR, CT head, labs, UA. Plan for admission and cardiology consult.

## 2022-03-10 NOTE — ED PROVIDER NOTE - OBJECTIVE STATEMENT
67y M w/ hx HTN, T2DM, CKD 4, CAD s/p CABG, CHF; presenting for syncope. Pt states that he was sitting at the counter eating dinner at 7PM today, when he suddenly felt weak and slumped over on the counter. Per wife, pt was unresponsive with eyes rolled back. Pt states that he was awake during the episode but just felt very weak. Episode lasted ~10 minutes. Pt currently back to baseline. Denies prodromal chest pain, palpitations, shortness of breath. No recent illness; however notes that he has had intermittent dizziness upon standing up over the past week. Cardiologist is Dr. Pro.

## 2022-03-10 NOTE — ED PROVIDER NOTE - ATTENDING CONTRIBUTION TO CARE
David: I performed a face to face bedside interview with patient regarding history of present illness, review of symptoms and past medical history. I completed an independent physical exam.  I have discussed patient's plan of care with resident.   I agree with note as stated above including HISTORY OF PRESENT ILLNESS, HIV, PAST MEDICAL/SURGICAL/FAMILY/SOCIAL HISTORY, ALLERGIES AND HOME MEDICATIONS, REVIEW OF SYSTEMS, PHYSICAL EXAM, MEDICAL DECISION MAKING and any PROGRESS NOTES during the time I functioned as the attending physician for this patient unless otherwise noted. My brief assessment is as follows: 67y M w/ hx CAD, CHF, DM, HTN, CKD, presenting for syncopal episode. Pt currently asymptomatic and back to baseline. Will check EKG, CXR, CT head, labs, UA. Plan for admission and cardiology consult.

## 2022-03-10 NOTE — ED PROVIDER NOTE - DISPOSITION TYPE
PLEASE CALL PATIENT BACK REGARDING THE KIDNEY DOC CHANGING HER BLOOD PRESSURE MEDS     [phone]  
PT B/P IS     150/65  827 AM TODAY 148/84 THIS WAS  1045 AM  . PULSE IS 80 BOTH TIMES                . HydroCHLOROthiazide WAS STOPPED BY KIDNEY DOC . PT SAID SHE FEELS SOB . PT WAS STARTED ON HYDRALAZINE . PT IS STILL SWELLING IN FEET. PT IS TAKING LASIX 20 MG . PLEASE ADVISE?        
Patient returned your call and coming in for her appt in the morning   
She needs to come over. To be seen 07/06/21 or tomorrow  
ADMIT

## 2022-03-10 NOTE — ED ADULT NURSE NOTE - OBJECTIVE STATEMENT
Hospital  at bedside, Pt reports to the ED for syncope, per pt and wife, pt was eating dinner, felt weak and per wife pt became unresponsive and his eyes rolled back. Pt denies any chest pain/discomfort before incident. Per wife, pt passed out for 10 minutes. PT is awake, oriented, answering questions, pt denies feeling lightheaded/dizzy, chest pain/SOB.

## 2022-03-10 NOTE — ED PROVIDER NOTE - PHYSICAL EXAMINATION
Constitutional: Awake, alert, in no acute distress  Eyes: no scleral icterus, PERRL EOMI  HENT: normocephalic, atraumatic, moist oral mucosa  Neck: supple  CV: RRR, no murmur, 2+ distal pulses in all extremities  Pulm: non-labored respirations, CTAB  Abdomen: soft, non-tender, non-distended  Extremities: no edema, no deformity  Skin: no rash, no jaundice  Neuro: AAOx3, no facial asymmetry, moving all extremities equally, no focal deficits

## 2022-03-10 NOTE — ED ADULT TRIAGE NOTE - CHIEF COMPLAINT QUOTE
Pt BIBEMS s/p witnessed syncopal episode at home. States he was sitting down to eat dinner and then fell to his side. As per EMSthe patient's family said his eyes rolled to the back of his head and he was pale. Denies seizure hx. Patient did not lose consciousness or hit his head. Hx of DM and CHF.

## 2022-03-11 DIAGNOSIS — E78.5 HYPERLIPIDEMIA, UNSPECIFIED: ICD-10-CM

## 2022-03-11 DIAGNOSIS — R55 SYNCOPE AND COLLAPSE: ICD-10-CM

## 2022-03-11 DIAGNOSIS — I25.10 ATHEROSCLEROTIC HEART DISEASE OF NATIVE CORONARY ARTERY WITHOUT ANGINA PECTORIS: ICD-10-CM

## 2022-03-11 DIAGNOSIS — I10 ESSENTIAL (PRIMARY) HYPERTENSION: ICD-10-CM

## 2022-03-11 DIAGNOSIS — I50.9 HEART FAILURE, UNSPECIFIED: ICD-10-CM

## 2022-03-11 LAB
A1C WITH ESTIMATED AVERAGE GLUCOSE RESULT: 7.5 % — HIGH (ref 4–5.6)
ALBUMIN SERPL ELPH-MCNC: 3.4 G/DL — SIGNIFICANT CHANGE UP (ref 3.3–5.2)
ALP SERPL-CCNC: 125 U/L — HIGH (ref 40–120)
ALT FLD-CCNC: 17 U/L — SIGNIFICANT CHANGE UP
ANION GAP SERPL CALC-SCNC: 15 MMOL/L — SIGNIFICANT CHANGE UP (ref 5–17)
APPEARANCE UR: CLEAR — SIGNIFICANT CHANGE UP
AST SERPL-CCNC: 14 U/L — SIGNIFICANT CHANGE UP
BACTERIA # UR AUTO: ABNORMAL
BASOPHILS # BLD AUTO: 0.07 K/UL — SIGNIFICANT CHANGE UP (ref 0–0.2)
BASOPHILS NFR BLD AUTO: 0.7 % — SIGNIFICANT CHANGE UP (ref 0–2)
BILIRUB SERPL-MCNC: 0.4 MG/DL — SIGNIFICANT CHANGE UP (ref 0.4–2)
BILIRUB UR-MCNC: NEGATIVE — SIGNIFICANT CHANGE UP
BUN SERPL-MCNC: 50.1 MG/DL — HIGH (ref 8–20)
CALCIUM SERPL-MCNC: 8.4 MG/DL — LOW (ref 8.6–10.2)
CHLORIDE SERPL-SCNC: 103 MMOL/L — SIGNIFICANT CHANGE UP (ref 98–107)
CO2 SERPL-SCNC: 21 MMOL/L — LOW (ref 22–29)
COLOR SPEC: YELLOW — SIGNIFICANT CHANGE UP
CREAT SERPL-MCNC: 4.6 MG/DL — HIGH (ref 0.5–1.3)
DIFF PNL FLD: NEGATIVE — SIGNIFICANT CHANGE UP
EGFR: 13 ML/MIN/1.73M2 — LOW
EOSINOPHIL # BLD AUTO: 0.17 K/UL — SIGNIFICANT CHANGE UP (ref 0–0.5)
EOSINOPHIL NFR BLD AUTO: 1.6 % — SIGNIFICANT CHANGE UP (ref 0–6)
EPI CELLS # UR: SIGNIFICANT CHANGE UP
ESTIMATED AVERAGE GLUCOSE: 169 MG/DL — HIGH (ref 68–114)
GLUCOSE BLDC GLUCOMTR-MCNC: 142 MG/DL — HIGH (ref 70–99)
GLUCOSE BLDC GLUCOMTR-MCNC: 157 MG/DL — HIGH (ref 70–99)
GLUCOSE BLDC GLUCOMTR-MCNC: 186 MG/DL — HIGH (ref 70–99)
GLUCOSE BLDC GLUCOMTR-MCNC: 195 MG/DL — HIGH (ref 70–99)
GLUCOSE SERPL-MCNC: 194 MG/DL — HIGH (ref 70–99)
GLUCOSE UR QL: 50 MG/DL
HCT VFR BLD CALC: 39.4 % — SIGNIFICANT CHANGE UP (ref 39–50)
HGB BLD-MCNC: 12.6 G/DL — LOW (ref 13–17)
HYALINE CASTS # UR AUTO: ABNORMAL /LPF
IMM GRANULOCYTES NFR BLD AUTO: 0.4 % — SIGNIFICANT CHANGE UP (ref 0–1.5)
INR BLD: 1.01 RATIO — SIGNIFICANT CHANGE UP (ref 0.88–1.16)
KETONES UR-MCNC: NEGATIVE — SIGNIFICANT CHANGE UP
LEUKOCYTE ESTERASE UR-ACNC: NEGATIVE — SIGNIFICANT CHANGE UP
LYMPHOCYTES # BLD AUTO: 2.25 K/UL — SIGNIFICANT CHANGE UP (ref 1–3.3)
LYMPHOCYTES # BLD AUTO: 21.8 % — SIGNIFICANT CHANGE UP (ref 13–44)
MAGNESIUM SERPL-MCNC: 2.1 MG/DL — SIGNIFICANT CHANGE UP (ref 1.6–2.6)
MCHC RBC-ENTMCNC: 26.5 PG — LOW (ref 27–34)
MCHC RBC-ENTMCNC: 32 GM/DL — SIGNIFICANT CHANGE UP (ref 32–36)
MCV RBC AUTO: 82.9 FL — SIGNIFICANT CHANGE UP (ref 80–100)
MONOCYTES # BLD AUTO: 0.58 K/UL — SIGNIFICANT CHANGE UP (ref 0–0.9)
MONOCYTES NFR BLD AUTO: 5.6 % — SIGNIFICANT CHANGE UP (ref 2–14)
NEUTROPHILS # BLD AUTO: 7.23 K/UL — SIGNIFICANT CHANGE UP (ref 1.8–7.4)
NEUTROPHILS NFR BLD AUTO: 69.9 % — SIGNIFICANT CHANGE UP (ref 43–77)
NITRITE UR-MCNC: NEGATIVE — SIGNIFICANT CHANGE UP
PH UR: 6 — SIGNIFICANT CHANGE UP (ref 5–8)
PLATELET # BLD AUTO: 277 K/UL — SIGNIFICANT CHANGE UP (ref 150–400)
POTASSIUM SERPL-MCNC: 3.7 MMOL/L — SIGNIFICANT CHANGE UP (ref 3.5–5.3)
POTASSIUM SERPL-SCNC: 3.7 MMOL/L — SIGNIFICANT CHANGE UP (ref 3.5–5.3)
PROT SERPL-MCNC: 6.2 G/DL — LOW (ref 6.6–8.7)
PROT UR-MCNC: 100
PROTHROM AB SERPL-ACNC: 11.7 SEC — SIGNIFICANT CHANGE UP (ref 10.5–13.4)
RBC # BLD: 4.75 M/UL — SIGNIFICANT CHANGE UP (ref 4.2–5.8)
RBC # FLD: 14.2 % — SIGNIFICANT CHANGE UP (ref 10.3–14.5)
RBC CASTS # UR COMP ASSIST: SIGNIFICANT CHANGE UP /HPF (ref 0–4)
SARS-COV-2 RNA SPEC QL NAA+PROBE: SIGNIFICANT CHANGE UP
SODIUM SERPL-SCNC: 139 MMOL/L — SIGNIFICANT CHANGE UP (ref 135–145)
SP GR SPEC: 1.02 — SIGNIFICANT CHANGE UP (ref 1.01–1.02)
TROPONIN T SERPL-MCNC: 0.08 NG/ML — HIGH (ref 0–0.06)
UROBILINOGEN FLD QL: NEGATIVE MG/DL — SIGNIFICANT CHANGE UP
WBC # BLD: 10.34 K/UL — SIGNIFICANT CHANGE UP (ref 3.8–10.5)
WBC # FLD AUTO: 10.34 K/UL — SIGNIFICANT CHANGE UP (ref 3.8–10.5)
WBC UR QL: SIGNIFICANT CHANGE UP /HPF (ref 0–5)

## 2022-03-11 PROCEDURE — 99223 1ST HOSP IP/OBS HIGH 75: CPT

## 2022-03-11 PROCEDURE — 93880 EXTRACRANIAL BILAT STUDY: CPT | Mod: 26

## 2022-03-11 RX ORDER — AMLODIPINE BESYLATE 2.5 MG/1
1 TABLET ORAL
Qty: 0 | Refills: 0 | DISCHARGE

## 2022-03-11 RX ORDER — GLUCAGON INJECTION, SOLUTION 0.5 MG/.1ML
1 INJECTION, SOLUTION SUBCUTANEOUS ONCE
Refills: 0 | Status: DISCONTINUED | OUTPATIENT
Start: 2022-03-11 | End: 2022-03-21

## 2022-03-11 RX ORDER — AMLODIPINE BESYLATE 2.5 MG/1
5 TABLET ORAL DAILY
Refills: 0 | Status: DISCONTINUED | OUTPATIENT
Start: 2022-03-11 | End: 2022-03-21

## 2022-03-11 RX ORDER — HEPARIN SODIUM 5000 [USP'U]/ML
5000 INJECTION INTRAVENOUS; SUBCUTANEOUS EVERY 12 HOURS
Refills: 0 | Status: DISCONTINUED | OUTPATIENT
Start: 2022-03-11 | End: 2022-03-21

## 2022-03-11 RX ORDER — ATORVASTATIN CALCIUM 80 MG/1
20 TABLET, FILM COATED ORAL AT BEDTIME
Refills: 0 | Status: DISCONTINUED | OUTPATIENT
Start: 2022-03-11 | End: 2022-03-21

## 2022-03-11 RX ORDER — CILOSTAZOL 100 MG/1
100 TABLET ORAL
Refills: 0 | Status: DISCONTINUED | OUTPATIENT
Start: 2022-03-11 | End: 2022-03-17

## 2022-03-11 RX ORDER — FUROSEMIDE 40 MG
40 TABLET ORAL
Refills: 0 | Status: DISCONTINUED | OUTPATIENT
Start: 2022-03-11 | End: 2022-03-14

## 2022-03-11 RX ORDER — SODIUM BICARBONATE 1 MEQ/ML
650 SYRINGE (ML) INTRAVENOUS
Refills: 0 | Status: DISCONTINUED | OUTPATIENT
Start: 2022-03-11 | End: 2022-03-19

## 2022-03-11 RX ORDER — HYDRALAZINE HYDROCHLORIDE AND ISOSORBIDE DINITRATE 37.5; 2 MG/1; MG/1
1 TABLET, FILM COATED ORAL
Qty: 0 | Refills: 0 | DISCHARGE

## 2022-03-11 RX ORDER — DEXTROSE 50 % IN WATER 50 %
15 SYRINGE (ML) INTRAVENOUS ONCE
Refills: 0 | Status: DISCONTINUED | OUTPATIENT
Start: 2022-03-11 | End: 2022-03-21

## 2022-03-11 RX ORDER — SODIUM CHLORIDE 9 MG/ML
1000 INJECTION, SOLUTION INTRAVENOUS
Refills: 0 | Status: DISCONTINUED | OUTPATIENT
Start: 2022-03-11 | End: 2022-03-21

## 2022-03-11 RX ORDER — CARVEDILOL PHOSPHATE 80 MG/1
25 CAPSULE, EXTENDED RELEASE ORAL EVERY 12 HOURS
Refills: 0 | Status: DISCONTINUED | OUTPATIENT
Start: 2022-03-11 | End: 2022-03-21

## 2022-03-11 RX ORDER — INSULIN LISPRO 100/ML
VIAL (ML) SUBCUTANEOUS
Refills: 0 | Status: DISCONTINUED | OUTPATIENT
Start: 2022-03-11 | End: 2022-03-21

## 2022-03-11 RX ORDER — ASPIRIN/CALCIUM CARB/MAGNESIUM 324 MG
81 TABLET ORAL DAILY
Refills: 0 | Status: DISCONTINUED | OUTPATIENT
Start: 2022-03-11 | End: 2022-03-21

## 2022-03-11 RX ORDER — ISOSORBIDE DINITRATE 5 MG/1
20 TABLET ORAL THREE TIMES A DAY
Refills: 0 | Status: DISCONTINUED | OUTPATIENT
Start: 2022-03-11 | End: 2022-03-21

## 2022-03-11 RX ORDER — ACETAMINOPHEN 500 MG
650 TABLET ORAL EVERY 6 HOURS
Refills: 0 | Status: DISCONTINUED | OUTPATIENT
Start: 2022-03-11 | End: 2022-03-21

## 2022-03-11 RX ORDER — INSULIN LISPRO 100/ML
5 VIAL (ML) SUBCUTANEOUS
Refills: 0 | Status: DISCONTINUED | OUTPATIENT
Start: 2022-03-11 | End: 2022-03-20

## 2022-03-11 RX ORDER — HYDRALAZINE HCL 50 MG
25 TABLET ORAL EVERY 8 HOURS
Refills: 0 | Status: DISCONTINUED | OUTPATIENT
Start: 2022-03-11 | End: 2022-03-21

## 2022-03-11 RX ORDER — DEXTROSE 50 % IN WATER 50 %
25 SYRINGE (ML) INTRAVENOUS ONCE
Refills: 0 | Status: DISCONTINUED | OUTPATIENT
Start: 2022-03-11 | End: 2022-03-21

## 2022-03-11 RX ORDER — INSULIN GLARGINE 100 [IU]/ML
15 INJECTION, SOLUTION SUBCUTANEOUS EVERY MORNING
Refills: 0 | Status: DISCONTINUED | OUTPATIENT
Start: 2022-03-11 | End: 2022-03-21

## 2022-03-11 RX ADMIN — ISOSORBIDE DINITRATE 20 MILLIGRAM(S): 5 TABLET ORAL at 05:20

## 2022-03-11 RX ADMIN — Medication 25 MILLIGRAM(S): at 21:46

## 2022-03-11 RX ADMIN — ATORVASTATIN CALCIUM 20 MILLIGRAM(S): 80 TABLET, FILM COATED ORAL at 21:47

## 2022-03-11 RX ADMIN — Medication 40 MILLIGRAM(S): at 17:44

## 2022-03-11 RX ADMIN — Medication 5 UNIT(S): at 08:31

## 2022-03-11 RX ADMIN — INSULIN GLARGINE 15 UNIT(S): 100 INJECTION, SOLUTION SUBCUTANEOUS at 08:33

## 2022-03-11 RX ADMIN — Medication 81 MILLIGRAM(S): at 13:43

## 2022-03-11 RX ADMIN — CILOSTAZOL 100 MILLIGRAM(S): 100 TABLET ORAL at 05:20

## 2022-03-11 RX ADMIN — HEPARIN SODIUM 5000 UNIT(S): 5000 INJECTION INTRAVENOUS; SUBCUTANEOUS at 17:44

## 2022-03-11 RX ADMIN — AMLODIPINE BESYLATE 5 MILLIGRAM(S): 2.5 TABLET ORAL at 05:20

## 2022-03-11 RX ADMIN — CILOSTAZOL 100 MILLIGRAM(S): 100 TABLET ORAL at 17:44

## 2022-03-11 RX ADMIN — Medication 40 MILLIGRAM(S): at 05:20

## 2022-03-11 RX ADMIN — CARVEDILOL PHOSPHATE 25 MILLIGRAM(S): 80 CAPSULE, EXTENDED RELEASE ORAL at 05:20

## 2022-03-11 RX ADMIN — Medication 5 UNIT(S): at 13:38

## 2022-03-11 RX ADMIN — CARVEDILOL PHOSPHATE 25 MILLIGRAM(S): 80 CAPSULE, EXTENDED RELEASE ORAL at 17:44

## 2022-03-11 RX ADMIN — Medication 650 MILLIGRAM(S): at 05:20

## 2022-03-11 RX ADMIN — HEPARIN SODIUM 5000 UNIT(S): 5000 INJECTION INTRAVENOUS; SUBCUTANEOUS at 05:21

## 2022-03-11 RX ADMIN — Medication 1: at 13:38

## 2022-03-11 RX ADMIN — Medication 650 MILLIGRAM(S): at 17:55

## 2022-03-11 RX ADMIN — Medication 5 UNIT(S): at 17:44

## 2022-03-11 RX ADMIN — Medication 25 MILLIGRAM(S): at 05:20

## 2022-03-11 RX ADMIN — Medication 25 MILLIGRAM(S): at 13:43

## 2022-03-11 RX ADMIN — Medication 1: at 08:31

## 2022-03-11 RX ADMIN — ISOSORBIDE DINITRATE 20 MILLIGRAM(S): 5 TABLET ORAL at 17:55

## 2022-03-11 RX ADMIN — ISOSORBIDE DINITRATE 20 MILLIGRAM(S): 5 TABLET ORAL at 13:38

## 2022-03-11 NOTE — H&P ADULT - HISTORY OF PRESENT ILLNESS
66 yo male with PMH of CAD s/p CABG, PAD, DM2,  TIA, HLD, HTN, renal Ca s/p L nephrectomy 10/2020, CKD 4, ischemic CMP, was brought ot he ED by EMS for syncope. Pt states that he was sitting at the counter eating dinner at 7PM today, when he suddenly felt weak and slumped over on the counter. Per wife, pt was unresponsive with eyes rolled back. Pt states that he was awake during the episode but just felt very weak. Episode lasted ~10 minutes. Pt currently back to baseline. Denies prodromal chest pain, palpitations, shortness of breath. No recent illness; however notes that he has had intermittent dizziness upon standing up over the past week.     In the ED  vitals stable  EKG unchanged  CT head neagtive

## 2022-03-11 NOTE — CONSULT NOTE ADULT - ASSESSMENT
HOME MEDICATIONS:  aspirin 81 mg oral tablet: orally once a day (11 Mar 2022 01:46)  atorvastatin 20 mg oral tablet: 1 tab(s) orally once a day (11 Mar 2022 01:46)  BiDil 20 mg-37.5 mg oral tablet: 1 tab(s) orally 3 times a day (11 Mar 2022 01:46)  CILOSTAZOL  100 MG TABS:  (11 Mar 2022 01:46)  Norvasc 5 mg oral tablet: 1 tab(s) orally once a day (11 Mar 2022 01:46)  TRESIBA FLEXTOUCH  100 UNIT/ML SOPN:  (11 Mar 2022 01:46)      CURRENT CARDIAC MEDICATIONS:  amLODIPine   Tablet 5 milliGRAM(s) Oral daily  carvedilol 25 milliGRAM(s) Oral every 12 hours  furosemide    Tablet 40 milliGRAM(s) Oral two times a day  hydrALAZINE 25 milliGRAM(s) Oral every 8 hours  isosorbide   dinitrate Tablet (ISORDIL) 20 milliGRAM(s) Oral three times a day      CURRENT OTHER MEDICATIONS:  acetaminophen     Tablet .. 650 milliGRAM(s) Oral every 6 hours PRN Temp greater or equal to 38C (100.4F), Mild Pain (1 - 3)  aspirin  chewable 81 milliGRAM(s) Oral daily  atorvastatin 20 milliGRAM(s) Oral at bedtime  cilostazol 100 milliGRAM(s) Oral two times a day  dextrose 40% Gel 15 Gram(s) Oral once, Stop order after: 1 Doses  dextrose 5%. 1000 milliLiter(s) (50 mL/Hr) IV Continuous <Continuous>  dextrose 50% Injectable 25 Gram(s) IV Push once, Stop order after: 1 Doses  glucagon  Injectable 1 milliGRAM(s) IntraMuscular once, Stop order after: 1 Doses  heparin   Injectable 5000 Unit(s) SubCutaneous every 12 hours  chf  Neurology evaluation  68 yo male was brought ot he ED by EMS for syncope. Pt states that he was sitting at the counter eating dinner at 7PM today, when he suddenly felt weak and slumped over on the counter.

## 2022-03-11 NOTE — H&P ADULT - NSHPPHYSICALEXAM_GEN_ALL_CORE
Vital Signs Last 24 Hrs  T(C): 36.7 (10 Mar 2022 19:30), Max: 36.7 (10 Mar 2022 19:30)  T(F): 98 (10 Mar 2022 19:30), Max: 98 (10 Mar 2022 19:30)  HR: 72 (11 Mar 2022 00:51) (72 - 106)  BP: 139/68 (11 Mar 2022 00:51) (133/66 - 159/83)  BP(mean): --  RR: 15 (11 Mar 2022 00:51) (15 - 18)  SpO2: 97% (11 Mar 2022 00:51) (97% - 98%)      PHYSICAL EXAM:  GENERAL: NAD, well-developed  HEAD:  Atraumatic, Normocephalic  EYES: EOMI, PERRLA, conjunctiva and sclera clear  NECK: Supple, No JVD  CHEST/LUNG: Clear to auscultation bilaterally; No wheeze  HEART: Regular rate and rhythm; No murmurs, rubs, or gallops  ABDOMEN: Soft, Nontender, Nondistended; Bowel sounds present  EXTREMITIES:  2+ Peripheral Pulses, No clubbing, cyanosis, or edema  PSYCH: AAOx3  NEUROLOGY: non-focal  SKIN: No rashes or lesions

## 2022-03-11 NOTE — CONSULT NOTE ADULT - PROBLEM SELECTOR RECOMMENDATION 3
Well controlled  Continue home meds with strict parameters  DASH diet  Monitor BP s/p CABG 12/2014- LIMA-LAD reverse SVG to posterior lateral reverse SVG to OM  Continue home meds  Lipid panel fasting   Repeat EKG in AM   DASH diet

## 2022-03-11 NOTE — CONSULT NOTE ADULT - PROBLEM SELECTOR RECOMMENDATION 4
Lipid panel fasting  Continue statin. Monitor LFTs  Dash diet    Further recommendations base on above findings Well controlled  Continue home meds with strict parameters  DASH diet  Monitor BP

## 2022-03-11 NOTE — ED ADULT NURSE REASSESSMENT NOTE - NS ED NURSE REASSESS COMMENT FT1
Report given to INES Vazquez.  VSS, pt In no distress, denies feeling dizzy/lightheaded. Denies chest pain/SOB. PT resting comfortably.

## 2022-03-11 NOTE — PROGRESS NOTE ADULT - ASSESSMENT
68 yo male with PMH of CAD s/p CABG, PAD, DM2,  TIA, HLD, HTN, renal Ca s/p L nephrectomy 10/2020, CKD 4, ischemic CMP, was brought ot he ED by EMS for syncope. Pt states that he was sitting at the counter eating dinner at 7PM today, when he suddenly felt weak and slumped over on the counter. Per wife, pt was unresponsive with eyes rolled back. Pt states that he was awake during the episode but just felt very weak. Episode lasted ~10 minutes. Pt currently back to baseline. Denies prodromal chest pain, palpitations, shortness of breath. No recent illness; however notes that he has had intermittent dizziness upon standing up over the past week.    Presyncop  most likely Vasovagal  DDs include orthostasis vs arrythmia  Telemetry monitoring  continue home meds  Cardio recs appreciated  TTE pending  Carotid doppler pending  Trend troponins    Chronic CHFpEF exacerbation   furosemide BID  c/w Carvedilol, BIDIL  Monitor daily  I/O, daily weight, restriction fluid and salt.    CAD s/p CABG  continue BB, BIDIL  aspirin and cilostazole  statin    CKD4   Baseline Cr 3.9-4  Monitor renal function  Avoid Nephrotoxins  Likely will need eventually HD      T2DM with hypoglycemia  BGM lantus 15, lispro 5/5/5/ ISs, adjust as per FS values  Monitor for hypo- and hyperglycemia    HTN  c/w Amlodipine, Carvedilol and Hydralazine and Imdur     DVT ppx  Heparin SQ

## 2022-03-11 NOTE — H&P ADULT - ASSESSMENT
68 yo male with PMH of CAD s/p CABG, PAD, DM2,  TIA, HLD, HTN, renal Ca s/p L nephrectomy 10/2020, CKD 4, ischemic CMP, was brought ot he ED by EMS for syncope. Pt states that he was sitting at the counter eating dinner at 7PM today, when he suddenly felt weak and slumped over on the counter. Per wife, pt was unresponsive with eyes rolled back. Pt states that he was awake during the episode but just felt very weak. Episode lasted ~10 minutes. Pt currently back to baseline. Denies prodromal chest pain, palpitations, shortness of breath. No recent illness; however notes that he has had intermittent dizziness upon standing up over the past week.    Syncope:   68 yo male with PMH of CAD s/p CABG, PAD, DM2,  TIA, HLD, HTN, renal Ca s/p L nephrectomy 10/2020, CKD 4, ischemic CMP, was brought ot he ED by EMS for syncope. Pt states that he was sitting at the counter eating dinner at 7PM today, when he suddenly felt weak and slumped over on the counter. Per wife, pt was unresponsive with eyes rolled back. Pt states that he was awake during the episode but just felt very weak. Episode lasted ~10 minutes. Pt currently back to baseline. Denies prodromal chest pain, palpitations, shortness of breath. No recent illness; however notes that he has had intermittent dizziness upon standing up over the past week.    Syncope:  most likely Vasovagal  DDs include orthostasis vs arrythmia  admit to telemetry  check orthostatic BP once  continue home meds  cardiology consulted  order ECHO  repeat cardiac enzyme    chronic CHFpEF exacerbation   furosemide BID  c/w Carvedilol, BIDIL  Monitor daily  I/O, daily weight, restriction fluid and salt.    CAD s/p CABG  continue BB, BIDIL  aspirin and cilostazole  statin      CKD4   Baseline Cr 3.9-4  Monitor renal function  Avoid Nephrotoxins  Likely will need eventually HD        T2DM with hypoglycemia  BG controlled now   BGM with SSI  Monitor for hypo- and hyperglycemia    HTN  c/w Amlodipine, Carvedilol and Hydralazine and Imdur       VTEp -            66 yo male with PMH of CAD s/p CABG, PAD, DM2,  TIA, HLD, HTN, renal Ca s/p L nephrectomy 10/2020, CKD 4, ischemic CMP, was brought ot he ED by EMS for syncope. Pt states that he was sitting at the counter eating dinner at 7PM today, when he suddenly felt weak and slumped over on the counter. Per wife, pt was unresponsive with eyes rolled back. Pt states that he was awake during the episode but just felt very weak. Episode lasted ~10 minutes. Pt currently back to baseline. Denies prodromal chest pain, palpitations, shortness of breath. No recent illness; however notes that he has had intermittent dizziness upon standing up over the past week.    Syncope:  most likely Vasovagal  DDs include orthostasis vs arrythmia  admit to telemetry  check orthostatic BP once  continue home meds  cardiology consulted  order ECHO  repeat cardiac enzyme    chronic CHFpEF exacerbation   furosemide BID  c/w Carvedilol, BIDIL  Monitor daily  I/O, daily weight, restriction fluid and salt.    CAD s/p CABG  continue BB, BIDIL  aspirin and cilostazole  statin      CKD4   Baseline Cr 3.9-4  Monitor renal function  Avoid Nephrotoxins  Likely will need eventually HD      T2DM with hypoglycemia  BGM lantus 15, lispro 5/5/5/ ISs, adjust as per FS values  Monitor for hypo- and hyperglycemia    HTN  c/w Amlodipine, Carvedilol and Hydralazine and Imdur       VTEp - heparin      Medication pulled from [employer] as patient does not remember his medication, please confirm with [facility] [address] in am  especially ciostazole + aspirin vs only aspirin           68 yo male with PMH of CAD s/p CABG, PAD, DM2,  TIA, HLD, HTN, renal Ca s/p L nephrectomy 10/2020, CKD 4, ischemic CMP, was brought ot he ED by EMS for syncope. Pt states that he was sitting at the counter eating dinner at 7PM today, when he suddenly felt weak and slumped over on the counter. Per wife, pt was unresponsive with eyes rolled back. Pt states that he was awake during the episode but just felt very weak. Episode lasted ~10 minutes. Pt currently back to baseline. Denies prodromal chest pain, palpitations, shortness of breath. No recent illness; however notes that he has had intermittent dizziness upon standing up over the past week.    Syncope:  most likely Vasovagal  DDs include orthostasis vs arrythmia  admit to telemetry  check orthostatic BP once  continue home meds  cardiology consulted  order ECHO  repeat cardiac enzyme    chronic CHFpEF exacerbation   furosemide BID  c/w Carvedilol, BIDIL  Monitor daily  I/O, daily weight, restriction fluid and salt.    CAD s/p CABG  continue BB, BIDIL  aspirin and cilostazole  statin      CKD4   Baseline Cr 3.9-4  Monitor renal function  Avoid Nephrotoxins  Likely will need eventually HD      T2DM with hypoglycemia  BGM lantus 15, lispro 5/5/5/ ISs, adjust as per FS values  Monitor for hypo- and hyperglycemia    HTN  c/w Amlodipine, Carvedilol and Hydralazine and Imdur     VTEppx - heparin    low  mag    repleted      ****Medication pulled from [employer] as patient does not remember his medication, please confirm with [facility] [address] in am,  especially ciostazole + aspirin vs only cilostazole           66 yo male with PMH of CAD s/p CABG, PAD, DM2,  TIA, HLD, HTN, renal Ca s/p L nephrectomy 10/2020, CKD 4, ischemic CMP, was brought ot he ED by EMS for syncope. Pt states that he was sitting at the counter eating dinner at 7PM today, when he suddenly felt weak and slumped over on the counter. Per wife, pt was unresponsive with eyes rolled back. Pt states that he was awake during the episode but just felt very weak. Episode lasted ~10 minutes. Pt currently back to baseline. Denies prodromal chest pain, palpitations, shortness of breath. No recent illness; however notes that he has had intermittent dizziness upon standing up over the past week.    Pre-Syncope:  most likely Vasovagal  DDs include orthostasis vs arrythmia  admit to telemetry  check orthostatic BP once  continue home meds  cardiology consulted  order ECHO, doppler carotid  repeat cardiac enzyme    chronic CHFpEF exacerbation   furosemide BID  c/w Carvedilol, BIDIL  Monitor daily  I/O, daily weight, restriction fluid and salt.    CAD s/p CABG  continue BB, BIDIL  aspirin and cilostazole  statin      CKD4   Baseline Cr 3.9-4  Monitor renal function  Avoid Nephrotoxins  Likely will need eventually HD      T2DM with hypoglycemia  BGM lantus 15, lispro 5/5/5/ ISs, adjust as per FS values  Monitor for hypo- and hyperglycemia    HTN  c/w Amlodipine, Carvedilol and Hydralazine and Imdur     VTEppx - heparin    low  mag    repleted      ****Medication pulled from [employer] as patient does not remember his medication, please confirm with [facility] [address] in am,  especially ciostazole + aspirin vs only cilostazole

## 2022-03-11 NOTE — H&P ADULT - NSHPREVIEWOFSYSTEMS_GEN_ALL_CORE
67y M w/ hx HTN, T2DM, CKD 4, CAD s/p CABG, CHF; presenting for syncope. Pt states that he was sitting at the counter eating dinner at 7PM today, when he suddenly felt weak and slumped over on the counter. Per wife, pt was unresponsive with eyes rolled back. Pt states that he was awake during the episode but just felt very weak. Episode lasted ~10 minutes. Pt currently back to baseline. Denies prodromal chest pain, palpitations, shortness of breath. No recent illness; however notes that he has had intermittent dizziness upon standing up over the past week. Cardiologist is Dr. Pro.    pt. is a 67 y/o male with h/o htn, CHF, hld, ex-smoker, CAD s/p CABG, CKD presents for RHC/Cardiomems. Recent admit for facial swelling, and fluid overload.  no abd. pain. no cp. no n/v/d. pt. reports sob with activity ok at rest, orthopnea.     66 year old male with PMHx of HTN, T2DM, CKD 4, CAD s/p CABG who presented to [facility] ED with c/o leg and periorbital swelling. In the ED, noted to have SARS-CoV2 PCR(+) though was asymptomatic. Patient was diuresed with IV lasix and admitted for management of acute on chronic HFpEF exacerbation. Cardiology was consulted, patient underwent TTE which revealed LVEF of 55-60% with mild pHTN. Patient will f/u outpatient for stress test and cardiomems. Noted to have MELISSA on CKD 4 on admission. Evaluated by nephrology. Renal US without acute pathology or hydronephrosis. Lasix was held due to worsening serum Cr. Will likely require HD in the future. will f/u with nephrology outpatient. Patient did not require supplemental O2, no indication for remdesivir. BGL closely monitored and corrected as needed.   Noted to have worsening kidney function, but asymptomatic  without uremic symptoms. He is stable for discharge and to follow up with his nephrologist Terri Andersen next week .

## 2022-03-11 NOTE — CONSULT NOTE ADULT - PROBLEM SELECTOR RECOMMENDATION 2
Well controlled  Continue home meds with strict parameters  DASH diet  Monitor BP Pt is sp CABG   Continue home meds HFpEF (LVEF-60%) with p-BNP 1136 however euvolemic on exam. Chest x-ray unremarkable  Continue home meds. Monitor kidney fx  Strict I&Os. Daily weight monitor   Repeat p-BNP in AM   Maintain K+~4 and mag ~ 2  DASH diet

## 2022-03-11 NOTE — PROGRESS NOTE ADULT - SUBJECTIVE AND OBJECTIVE BOX
Chief complaint: Syncope    Patient seen and examined at bedside. No acute overnight events reported. The patient states he is feeling well today, no fever, chills, cough, chest pain, nausea or vomiting.     Vital Signs Last 24 Hrs  T(F): 98.2 (11 Mar 2022 09:37), Max: 98.2 (11 Mar 2022 09:37)  HR: 85 (11 Mar 2022 09:37) (70 - 106)  BP: 116/68 (11 Mar 2022 09:37) (116/68 - 159/83)  RR: 18 (11 Mar 2022 09:37) (15 - 18)  SpO2: 94% (11 Mar 2022 09:37) (94% - 98%)    Physical Exam:  Constitutional: alert and oriented, in no acute distress   Neck: Soft and supple  Respiratory: Clear to auscultation bilaterally, no wheezes or crackles  Cardiovascular: Regular rate and rhyhtm, no murmurs, gallops, rubs  Gastrointestinal: Soft, non-tender to palpation, +bs  Vascular: 2+ peripheral pulses  Neurological: A/O x 3, no focal neurological deficits  Musculoskeletal: 5/5 strength b/l upper and lower extremities, no lower extremity edema bilaterally    Labs;                        12.6   10.34 )-----------( 277      ( 11 Mar 2022 07:03 )             39.4   03-11    139  |  103  |  50.1<H>  ----------------------------<  194<H>  3.7   |  21.0<L>  |  4.60<H>    Ca    8.4<L>      11 Mar 2022 07:03  Mg     2.1     03-11    TPro  6.2<L>  /  Alb  3.4  /  TBili  0.4  /  DBili  x   /  AST  14  /  ALT  17  /  AlkPhos  125<H>  03-11

## 2022-03-11 NOTE — CONSULT NOTE ADULT - PROBLEM SELECTOR RECOMMENDATION 9
pre-syncopal episode while eating. Pt was alert however unable to answer questions. Pt is asymptomatic at this time. No murmur noted on physical exam   Asymptomatic  Telemetry   Trend trops x 3 q6. Trend CE. Serial EKG   A1C, TFTs, lipid panel fasting to ro comorbidities   Maintain K+~4 and mag ~2   Eating well and drinking plenty of fluids  Orthostatic BP  Echo to assess structural and functional status   Meds, carotid doppler,dehadration Pre-syncopal episode while eating. Pt was alert however unable to answer questions. Pt is asymptomatic at this time. Eating well and drinking plenty of fluids however states getting dizzy and loosing balance when changing positions from sitting to standing over the past week.  CT head from today shows  multiple chronic lacunar  infarcts and chronic periventricular white matter small vessel ischemic disease. Neurology consults x follow up  Pt started a new medication yesterday. He took first pill of Losartan 1-2 hours prior to pre-syncopal episode   No murmur noted on physical exam. Pt was found COVID + in the ED  Diagnostic multifactorial most likely postprandial hypotension and/or orthostatic hypotension   Telemetry   Trend trops x 3 q6. Trend CE. Serial EKG   A1C, TFTs, lipid panel fasting to ro comorbidities   Maintain K+~4 and mag ~2   Eating well and drinking plenty of fluids  Orthostatic BP  Limit Echo to assess structural and functional status   Carotid doppler Pre-syncopal episode while eating. Pt was alert however unable to answer questions. Pt is asymptomatic at this time. Eating well and drinking plenty of fluids however states getting dizzy and loosing balance when changing positions from sitting to standing over the past week.  CT head from today shows  multiple chronic lacunar  infarcts and chronic periventricular white matter small vessel ischemic disease. Neurology consults x follow up  Pt started a new medication yesterday. He took first pill of Losartan 1-2 hours prior to pre-syncopal episode   No murmur noted on physical exam.  Diagnostic multifactorial most likely postprandial hypotension and/or orthostatic hypotension   Telemetry   Trend trops x 3 q6. Trend CE. Serial EKG   A1C, TFTs, lipid panel fasting to ro comorbidities   Maintain K+~4 and mag ~2   Eating well and drinking plenty of fluids  Orthostatic BP  Limit Echo to assess structural and functional status   Carotid doppler Pre-syncopal episode while eating. Pt was alert however unable to answer questions. Pt is asymptomatic at this time. Eating well and drinking plenty of fluids however states getting dizzy and loosing balance when changing positions from sitting to standing over the past week.  CT head from today shows  multiple chronic lacunar  infarcts and chronic periventricular white matter small vessel ischemic disease. Neurology consults x follow up  Pt started a new medication yesterday. He took first pill of Losartan 1-2 hours prior to pre-syncopal episode   No murmur noted on physical exam.  Diagnostic multifactorial most likely postprandial hypotension and/or orthostatic hypotension   Telemetry   Trend trops x 3 q6. Trend CE. Serial EKG   A1C, TFTs, lipid panel fasting to ro comorbidities   Maintain K+~4 and mag ~2   Eating well and drinking plenty of fluids  Orthostatic BP

## 2022-03-11 NOTE — CONSULT NOTE ADULT - PROBLEM SELECTOR RECOMMENDATION 5
Lipid panel fasting  Continue statin. Monitor LFTs  Dash diet    Further recommendations base on above findings Lipid panel fasting  Continue statin. Monitor LFTs  Dash diet    Further recommendations base on above findings  Case discussed with Dr Mohan

## 2022-03-11 NOTE — CONSULT NOTE ADULT - SUBJECTIVE AND OBJECTIVE BOX
[facility]                                              CARDIOLOGY AT [address], [place] [address]                                             Telephone: [phone]. Fax:[phone]    CARDIOLOGY CONSULTATION NOTE                                                                                             History obtained by: Patient and medical record  Community Cardiologist: Dr Pro    obtained: No   Reason for Consultation: Syncope    Chief complaint:  Patient is a 67 y old Male who presents with a chief complaint of Syncope (11 Mar 2022 00:45)    HPI: 68 yo male with PMH of CAD s/p CABG, CHF, PAD, DM2,  TIA, HLD, HTN, renal Ca s/p L nephrectomy 10/2020, CKD 4, ischemic CMP, was brought ot he ED by EMS for syncope. Pt states that he was sitting at the counter eating dinner at 7PM today, when he suddenly felt weak and slumped over on the counter. Per wife, pt was unresponsive with eyes rolled back. Pt states that he was awake during the episode but just felt very weak. Episode lasted ~10 minutes. Pt currently back to baseline. Denies prodromal chest pain, palpitations, shortness of breath. No recent illness; however notes that he has had intermittent dizziness upon standing up over the past week.     CARDIAC TESTING   ECHO:  TTE Echo Complete w/o Contrast w/ Doppler (01.14.22 @ 19:06) >  Summary:   1. Technically difficult study.   2. Normal global left ventricular systolic function.   3. Left ventricular ejection fraction, by visual estimation, is 55 to   60%.   4. Mildly enlarged left atrium.   5. Normal right atrial size.   6.Mild mitral annular calcification.   7. Mild mitral valve regurgitation.   8. Thickening and calcification of the anterior and posterior mitral   valve leaflets.   9. Moderate tricuspid regurgitation.  10. Sclerotic aortic valve with normal opening.  11. Estimated pulmonary artery systolic pressure is 40.3 mmHg assuming a   right atrial pressure of 5 mmHg, which is consistent with mild pulmonary   hypertension.  12. Trivial pericardial effusion.  MD Junie Electronically signed on 1/15/2022 at 10:13:45 AM  < end of copied text >    Nuclear Stress Test-Pharmacologic (02.17.17 @ 08:16) >  IMPRESSIONS:Normal Study  Unable to exercise due to weakness and unsteady gait.   No Symptom. No diagnostic ECG changes.  The left ventricle was normal LV size. No  ischemia/infarction.  Gated wall motion analysis shows normal wall motion with  LVEF of 73%.  < end of copied text >    Cardiac Cath Lab - Adult (10.31.14 @ 18:10) >  VENTRICLES: Global left ventricular function was normal. EF calculated by  contrast ventriculography was 70 %.  VALVES: AORTIC VALVE: There was no aortic stenosis.  CORONARY VESSELS: The coronary circulation is right dominant.  LM:   --  Distal left main: There was a 20 % stenosis.  LAD:   --  Mid LAD: There was a 80 % stenosis. By IVUS  --  Distal LAD: There was a 70 % stenosis.  --  D2: There was a 80 % stenosis.  CX:   --  OM1: There was a 80 % stenosis.  --  OM2: There was a 90 % stenosis.  --  OM3: There was a 90 % stenosis.  RCA:   --  Mid RCA: There was a 50 % stenosis.  --  RPDA: There was a 90 % stenosis.  --  RPL3: There was a 90 % stenosis.  DIAGNOSTIC IMPRESSIONS: Severe multivessel disease.  Normal LV function.  DIAGNOSTIC RECOMMENDATIONS: Referral for CABG.  < end of copied text >    PAST MEDICAL HISTORY  DM (diabetes mellitus)  HTN (hypertension)  High cholesterol  Myocardial infarction  Acute on chronic congestive heart failure, unspecified congestive heart failure type    PAST SURGICAL HISTORY  S/P coronary artery bypass with three autogenous grafts  SOCIAL HISTORY:  Denies smoking/alcohol/drugs    FAMILY HISTORY:  Family history of heart disease (Sibling)    Family History of Cardiovascular Disease:  Yes   Sudden Cardiac Death in First degree relative: No     HOME MEDICATIONS:  aspirin 81 mg oral tablet: orally once a day (11 Mar 2022 01:46)  atorvastatin 20 mg oral tablet: 1 tab(s) orally once a day (11 Mar 2022 01:46)  BiDil 20 mg-37.5 mg oral tablet: 1 tab(s) orally 3 times a day (11 Mar 2022 01:46)  CILOSTAZOL  100 MG TABS:  (11 Mar 2022 01:46)  Norvasc 5 mg oral tablet: 1 tab(s) orally once a day (11 Mar 2022 01:46)  TRESIBA FLEXTOUCH  100 UNIT/ML SOPN:  (11 Mar 2022 01:46)      CURRENT CARDIAC MEDICATIONS:  amLODIPine   Tablet 5 milliGRAM(s) Oral daily  carvedilol 25 milliGRAM(s) Oral every 12 hours  furosemide    Tablet 40 milliGRAM(s) Oral two times a day  hydrALAZINE 25 milliGRAM(s) Oral every 8 hours  isosorbide   dinitrate Tablet (ISORDIL) 20 milliGRAM(s) Oral three times a day      CURRENT OTHER MEDICATIONS:  acetaminophen     Tablet .. 650 milliGRAM(s) Oral every 6 hours PRN Temp greater or equal to 38C (100.4F), Mild Pain (1 - 3)  aspirin  chewable 81 milliGRAM(s) Oral daily  atorvastatin 20 milliGRAM(s) Oral at bedtime  cilostazol 100 milliGRAM(s) Oral two times a day  dextrose 40% Gel 15 Gram(s) Oral once, Stop order after: 1 Doses  dextrose 5%. 1000 milliLiter(s) (50 mL/Hr) IV Continuous <Continuous>  dextrose 50% Injectable 25 Gram(s) IV Push once, Stop order after: 1 Doses  glucagon  Injectable 1 milliGRAM(s) IntraMuscular once, Stop order after: 1 Doses  heparin   Injectable 5000 Unit(s) SubCutaneous every 12 hours  insulin glargine Injectable (LANTUS) 15 Unit(s) SubCutaneous every morning  insulin lispro (ADMELOG) corrective regimen sliding scale   SubCutaneous three times a day before meals  insulin lispro Injectable (ADMELOG) 5 Unit(s) SubCutaneous three times a day before meals  sodium bicarbonate 650 milliGRAM(s) Oral two times a day    ALLERGIES:   No Known Allergies    REVIEW OF SYMPTOMS:   CONSTITUTIONAL: No fever, no chills, no weight loss, no weight gain, no fatigue   ENMT:  No vertigo; No sinus or throat pain  NECK: No pain or stiffness  CARDIOVASCULAR: No chest pain, no dyspnea, no syncope. + presyncope, no palpitations, no dizziness, no Orthopnea, no Paroxsymal nocturnal dyspnea  RESPIRATORY: no Shortness of breath, no cough, no wheezing  : No dysuria, no hematuria   GI: stool, no nausea, no diarrhea, no constipation, no abdominal pain   NEURO: No headache, no slurred speech   MUSCULOSKELETAL: No joint pain or swelling; No muscle, back, or extremity pain  PSYCH: No agitation, no anxiety.    ALL OTHER REVIEW OF SYSTEMS ARE NEGATIVE.    VITAL SIGNS:  T(C): 36.6 (03-11-22 @ 01:56), Max: 36.7 (03-10-22 @ 19:30)  T(F): 97.8 (03-11-22 @ 01:56), Max: 98 (03-10-22 @ 19:30)  HR: 70 (03-11-22 @ 01:56) (70 - 106)  BP: 150/78 (03-11-22 @ 01:56) (133/66 - 159/83)  RR: 18 (03-11-22 @ 01:56) (15 - 18)  SpO2: 96% (03-11-22 @ 01:56) (96% - 98%)    PHYSICAL EXAM:  Constitutional: Comfortable . No acute distress.   HEENT: Atraumatic and normocephalic , neck is supple . no JVD. No carotid bruit.  CNS: A&Ox3. No focal deficits.   Respiratory: CTAB, unlabored   Cardiovascular: RRR normal s1 s2. No murmur. No rubs or gallop.  Gastrointestinal: Soft, non-tender. +Bowel sounds.   Extremities: 2+ Peripheral Pulses, No clubbing, cyanosis, or edema  Psychiatric: Calm . no agitation.   Skin: Warm and dry    LABS:  ( 10 Mar 2022 20:19 )  Troponin T  0.05 ,  CPK  X    , CKMB  X    , BNP 1136<H>               12.7   12.67 )-----------( 254      ( 10 Mar 2022 20:19 )             39.4     03-10    135  |  103  |  45.2<H>  ----------------------------<  228<H>  4.7   |  22.0  |  4.32<H>    Ca    8.6      10 Mar 2022 20:19  Mg     1.4     03-10    TPro  6.2<L>  /  Alb  3.2<L>  /  TBili  0.3<L>  /  DBili  x   /  AST  15  /  ALT  19  /  AlkPhos  127<H>  03-10    PT/INR - ( 10 Mar 2022 20:19 )   PT: 12.6 sec;   INR: 1.09 ratio    PTT - ( 10 Mar 2022 20:19 )  PTT:29.1 sec    ECG:   Prior ECG: Yes [  ] No [  ]    RADIOLOGY & ADDITIONAL STUDIES:   Chest x-ray: unremarkable. Final reading / result pending     CT Head No Cont (03.10.22 @ 23:08) >  IMPRESSION: Mild diffuse cerebral volume loss, multiple chronic lacunar   infarcts and chronic periventricular white matter small vessel ischemic   disease.  No acute intracranial hemorrhage, mass effect or acute territorial   infarcts seen.  < end of copied text >    MRI:   US:                                                [facility]                                              CARDIOLOGY AT [address], [place] [address]                                             Telephone: [phone]. Fax:[phone]    CARDIOLOGY CONSULTATION NOTE                                                                                             History obtained by: Patient and medical record  Community Cardiologist: Dr Pro    obtained: No   Reason for Consultation: Syncope    Chief complaint:  Patient is a 67 y old Male who presents with a chief complaint of Syncope (11 Mar 2022 00:45)    HPI: 66 yo male with PMH of CAD s/p CABG, CHF, PAD, DM2,  TIA, HLD, HTN, renal Ca s/p L nephrectomy 10/2020, CKD 4, ischemic CMP, was brought ot he ED by EMS for syncope. Pt states that he was sitting at the counter eating dinner at 7PM today, when he suddenly felt weak and slumped over on the counter. Per wife, pt was unresponsive with eyes rolled back. Pt states that he was awake during the episode but just felt very weak. Episode lasted ~10 minutes. Pt currently back to baseline. Denies prodromal chest pain, palpitations, shortness of breath. No recent illness; however notes that he has had intermittent dizziness upon standing up over the past week.     CARDIAC TESTING   ECHO:  TTE Echo Complete w/o Contrast w/ Doppler (01.14.22 @ 19:06) >  Summary:   1. Technically difficult study.   2. Normal global left ventricular systolic function.   3. Left ventricular ejection fraction, by visual estimation, is 55 to   60%.   4. Mildly enlarged left atrium.   5. Normal right atrial size.   6.Mild mitral annular calcification.   7. Mild mitral valve regurgitation.   8. Thickening and calcification of the anterior and posterior mitral   valve leaflets.   9. Moderate tricuspid regurgitation.  10. Sclerotic aortic valve with normal opening.  11. Estimated pulmonary artery systolic pressure is 40.3 mmHg assuming a   right atrial pressure of 5 mmHg, which is consistent with mild pulmonary   hypertension.  12. Trivial pericardial effusion.  MD Junie Electronically signed on 1/15/2022 at 10:13:45 AM  < end of copied text >    Nuclear Stress Test-Pharmacologic (02.17.17 @ 08:16) >  IMPRESSIONS:Normal Study  Unable to exercise due to weakness and unsteady gait.   No Symptom. No diagnostic ECG changes.  The left ventricle was normal LV size. No  ischemia/infarction.  Gated wall motion analysis shows normal wall motion with  LVEF of 73%.  < end of copied text >    Cardiac Cath Lab - Adult (10.31.14 @ 18:10) >  VENTRICLES: Global left ventricular function was normal. EF calculated by  contrast ventriculography was 70 %.  VALVES: AORTIC VALVE: There was no aortic stenosis.  CORONARY VESSELS: The coronary circulation is right dominant.  LM:   --  Distal left main: There was a 20 % stenosis.  LAD:   --  Mid LAD: There was a 80 % stenosis. By IVUS  --  Distal LAD: There was a 70 % stenosis.  --  D2: There was a 80 % stenosis.  CX:   --  OM1: There was a 80 % stenosis.  --  OM2: There was a 90 % stenosis.  --  OM3: There was a 90 % stenosis.  RCA:   --  Mid RCA: There was a 50 % stenosis.  --  RPDA: There was a 90 % stenosis.  --  RPL3: There was a 90 % stenosis.  DIAGNOSTIC IMPRESSIONS: Severe multivessel disease.  Normal LV function.  DIAGNOSTIC RECOMMENDATIONS: Referral for CABG.  < end of copied text >    PAST MEDICAL HISTORY  DM (diabetes mellitus)  HTN (hypertension)  High cholesterol  Myocardial infarction  Acute on chronic congestive heart failure, unspecified congestive heart failure type    PAST SURGICAL HISTORY  S/P coronary artery bypass with three autogenous grafts  SOCIAL HISTORY:  Denies smoking/alcohol/drugs    FAMILY HISTORY:  Family history of heart disease (Sibling)    Family History of Cardiovascular Disease:  Yes   Sudden Cardiac Death in First degree relative: No     HOME MEDICATIONS:  aspirin 81 mg oral tablet: orally once a day (11 Mar 2022 01:46)  atorvastatin 20 mg oral tablet: 1 tab(s) orally once a day (11 Mar 2022 01:46)  BiDil 20 mg-37.5 mg oral tablet: 1 tab(s) orally 3 times a day (11 Mar 2022 01:46)  CILOSTAZOL  100 MG TABS:  (11 Mar 2022 01:46)  Norvasc 5 mg oral tablet: 1 tab(s) orally once a day (11 Mar 2022 01:46)  TRESIBA FLEXTOUCH  100 UNIT/ML SOPN:  (11 Mar 2022 01:46)    CURRENT CARDIAC MEDICATIONS:  amLODIPine   Tablet 5 milliGRAM(s) Oral daily  carvedilol 25 milliGRAM(s) Oral every 12 hours  furosemide    Tablet 40 milliGRAM(s) Oral two times a day  hydrALAZINE 25 milliGRAM(s) Oral every 8 hours  isosorbide   dinitrate Tablet (ISORDIL) 20 milliGRAM(s) Oral three times a day    CURRENT OTHER MEDICATIONS:  acetaminophen     Tablet .. 650 milliGRAM(s) Oral every 6 hours PRN Temp greater or equal to 38C (100.4F), Mild Pain (1 - 3)  aspirin  chewable 81 milliGRAM(s) Oral daily  atorvastatin 20 milliGRAM(s) Oral at bedtime  cilostazol 100 milliGRAM(s) Oral two times a day  dextrose 40% Gel 15 Gram(s) Oral once, Stop order after: 1 Doses  dextrose 5%. 1000 milliLiter(s) (50 mL/Hr) IV Continuous <Continuous>  dextrose 50% Injectable 25 Gram(s) IV Push once, Stop order after: 1 Doses  glucagon  Injectable 1 milliGRAM(s) IntraMuscular once, Stop order after: 1 Doses  heparin   Injectable 5000 Unit(s) SubCutaneous every 12 hours  insulin glargine Injectable (LANTUS) 15 Unit(s) SubCutaneous every morning  insulin lispro (ADMELOG) corrective regimen sliding scale   SubCutaneous three times a day before meals  insulin lispro Injectable (ADMELOG) 5 Unit(s) SubCutaneous three times a day before meals  sodium bicarbonate 650 milliGRAM(s) Oral two times a day    ALLERGIES:   No Known Allergies    Social hx: + former smoker. Quit in 2014. Used to smoke 2 PPD since age of 10 years old.     REVIEW OF SYMPTOMS: Asymptomatic   CONSTITUTIONAL: No fever, no chills, no weight loss, no weight gain, no fatigue   ENMT:  No vertigo; No sinus or throat pain  NECK: No pain or stiffness  CARDIOVASCULAR: No chest pain, no dyspnea, no syncope. + presyncope, no palpitations, no dizziness, no Orthopnea, no Paroxsymal nocturnal dyspnea  RESPIRATORY: no Shortness of breath, no cough, no wheezing  : No dysuria, no hematuria   GI: stool, no nausea, no diarrhea, no constipation, no abdominal pain   NEURO: No headache, no slurred speech   MUSCULOSKELETAL: No joint pain or swelling; No muscle, back, or extremity pain  PSYCH: No agitation, no anxiety.    ALL OTHER REVIEW OF SYSTEMS ARE NEGATIVE.    VITAL SIGNS:  T(C): 36.6 (03-11-22 @ 01:56), Max: 36.7 (03-10-22 @ 19:30)  T(F): 97.8 (03-11-22 @ 01:56), Max: 98 (03-10-22 @ 19:30)  HR: 70 (03-11-22 @ 01:56) (70 - 106)  BP: 150/78 (03-11-22 @ 01:56) (133/66 - 159/83)  RR: 18 (03-11-22 @ 01:56) (15 - 18)  SpO2: 96% (03-11-22 @ 01:56) (96% - 98%)    PHYSICAL EXAM:  Constitutional: Comfortable . No acute distress.   HEENT: Atraumatic and normocephalic , neck is supple . no JVD. No carotid bruit.  CNS: A&Ox3. No focal deficits.   Respiratory: CTAB, unlabored   Cardiovascular: RRR normal s1 s2. No murmur. No rubs or gallop.  Gastrointestinal: Soft, non-tender. +Bowel sounds.   Extremities: 2+ Peripheral Pulses, No clubbing, cyanosis, or edema  Psychiatric: Calm . no agitation.   Skin: Warm and dry    LABS:  ( 10 Mar 2022 20:19 )  Troponin T  0.05 ,  CPK  X    , CKMB  X    , BNP 1136<H>               12.7   12.67 )-----------( 254      ( 10 Mar 2022 20:19 )             39.4     03-10    135  |  103  |  45.2<H>  ----------------------------<  228<H>  4.7   |  22.0  |  4.32<H>    Ca    8.6      10 Mar 2022 20:19  Mg     1.4     03-10    TPro  6.2<L>  /  Alb  3.2<L>  /  TBili  0.3<L>  /  DBili  x   /  AST  15  /  ALT  19  /  AlkPhos  127<H>  03-10    PT/INR - ( 10 Mar 2022 20:19 )   PT: 12.6 sec;   INR: 1.09 ratio    PTT - ( 10 Mar 2022 20:19 )  PTT:29.1 sec    ECG:   Prior ECG: Yes [  ] No [  ]    RADIOLOGY & ADDITIONAL STUDIES:   Chest x-ray: unremarkable. Final reading / result pending     CT Head No Cont (03.10.22 @ 23:08) >  IMPRESSION: Mild diffuse cerebral volume loss, multiple chronic lacunar   infarcts and chronic periventricular white matter small vessel ischemic   disease.  No acute intracranial hemorrhage, mass effect or acute territorial   infarcts seen.  < end of copied text >    MRI:   US:                                                [facility]                                              CARDIOLOGY AT [address], [place] [address]                                             Telephone: [phone]. Fax:[phone]    CARDIOLOGY CONSULTATION NOTE                                                                                             History obtained by: Patient and medical record  Community Cardiologist: Dr Pro    obtained: No   Reason for Consultation: Syncope    Chief complaint:  Patient is a 67 y old Male who presents with a chief complaint of Syncope (11 Mar 2022 00:45)    HPI: 68 yo male with PMHx of CAD ( s/p CABG 12/2014- LIMA-LAD reverse SVG to posterior lateral reverse SVG to OM), HFpEF (LVEF-60%), PAD, DM2, TIA, HLD, HTN, renal Ca s/p L nephrectomy 10/2020, CKD 4, ischemic CMP, was brought ot he ED by EMS for syncope. Pt states that he was sitting at the counter eating dinner at 7PM today, when he suddenly felt weak and slumped over on the counter. Per wife, pt was unresponsive with eyes rolled back. Pt states that he was awake during the episode but just felt very weak. Episode lasted ~10 minutes. Pt currently back to baseline. Denies prodromal chest pain, palpitations, shortness of breath. No recent illness; however notes that he has had intermittent dizziness upon standing up over the past week.     CARDIAC TESTING   ECHO:  TTE Echo Complete w/o Contrast w/ Doppler (01.14.22 @ 19:06) >  Summary:   1. Technically difficult study.   2. Normal global left ventricular systolic function.   3. Left ventricular ejection fraction, by visual estimation, is 55 to   60%.   4. Mildly enlarged left atrium.   5. Normal right atrial size.   6.Mild mitral annular calcification.   7. Mild mitral valve regurgitation.   8. Thickening and calcification of the anterior and posterior mitral   valve leaflets.   9. Moderate tricuspid regurgitation.  10. Sclerotic aortic valve with normal opening.  11. Estimated pulmonary artery systolic pressure is 40.3 mmHg assuming a   right atrial pressure of 5 mmHg, which is consistent with mild pulmonary   hypertension.  12. Trivial pericardial effusion.  MD Junie Electronically signed on 1/15/2022 at 10:13:45 AM  < end of copied text >    Nuclear Stress Test-Pharmacologic (02.17.17 @ 08:16) >  IMPRESSIONS: Normal Study  Unable to exercise due to weakness and unsteady gait.   No Symptom. No diagnostic ECG changes.  The left ventricle was normal LV size. No  ischemia/infarction.  Gated wall motion analysis shows normal wall motion with  LVEF of 73%.  < end of copied text >    Cardiac Cath Lab - Adult (10.31.14 @ 18:10) >  VENTRICLES: Global left ventricular function was normal. EF calculated by  contrast ventriculography was 70 %.  VALVES: AORTIC VALVE: There was no aortic stenosis.  CORONARY VESSELS: The coronary circulation is right dominant.  LM:   --  Distal left main: There was a 20 % stenosis.  LAD:   --  Mid LAD: There was a 80 % stenosis. By IVUS  --  Distal LAD: There was a 70 % stenosis.  --  D2: There was a 80 % stenosis.  CX:   --  OM1: There was a 80 % stenosis.  --  OM2: There was a 90 % stenosis.  --  OM3: There was a 90 % stenosis.  RCA:   --  Mid RCA: There was a 50 % stenosis.  --  RPDA: There was a 90 % stenosis.  --  RPL3: There was a 90 % stenosis.  DIAGNOSTIC IMPRESSIONS: Severe multivessel disease.  Normal LV function.  DIAGNOSTIC RECOMMENDATIONS: Referral for CABG.  < end of copied text >    PAST MEDICAL HISTORY  DM (diabetes mellitus)  HTN (hypertension)  High cholesterol  Myocardial infarction  Acute on chronic congestive heart failure, unspecified congestive heart failure type    PAST SURGICAL HISTORY  S/P coronary artery bypass with three autogenous grafts  SOCIAL HISTORY:  Denies smoking/alcohol/drugs    FAMILY HISTORY:  Family history of heart disease (Sibling)    Family History of Cardiovascular Disease:  Yes   Sudden Cardiac Death in First degree relative: No     HOME MEDICATIONS:  aspirin 81 mg oral tablet: orally once a day (11 Mar 2022 01:46)  atorvastatin 20 mg oral tablet: 1 tab(s) orally once a day (11 Mar 2022 01:46)  BiDil 20 mg-37.5 mg oral tablet: 1 tab(s) orally 3 times a day (11 Mar 2022 01:46)  CILOSTAZOL  100 MG TABS:  (11 Mar 2022 01:46)  Norvasc 5 mg oral tablet: 1 tab(s) orally once a day (11 Mar 2022 01:46)  TRESIBA FLEXTOUCH  100 UNIT/ML SOPN:  (11 Mar 2022 01:46)  aspirin  chewable 81 milliGRAM(s) Oral daily    CURRENT CARDIAC MEDICATIONS:  amLODIPine   Tablet 5 milliGRAM(s) Oral daily  carvedilol 25 milliGRAM(s) Oral every 12 hours  furosemide    Tablet 40 milliGRAM(s) Oral two times a day  hydrALAZINE 25 milliGRAM(s) Oral every 8 hours  isosorbide   dinitrate Tablet (ISORDIL) 20 milliGRAM(s) Oral three times a day  aspirin  chewable 81 milliGRAM(s) Oral daily  atorvastatin 20 milliGRAM(s) Oral at bedtime    CURRENT OTHER MEDICATIONS:  acetaminophen     Tablet .. 650 milliGRAM(s) Oral every 6 hours PRN Temp greater or equal to 38C (100.4F), Mild Pain (1 - 3)  aspirin  chewable 81 milliGRAM(s) Oral daily  atorvastatin 20 milliGRAM(s) Oral at bedtime  cilostazol 100 milliGRAM(s) Oral two times a day  dextrose 40% Gel 15 Gram(s) Oral once, Stop order after: 1 Doses  dextrose 5%. 1000 milliLiter(s) (50 mL/Hr) IV Continuous <Continuous>  dextrose 50% Injectable 25 Gram(s) IV Push once, Stop order after: 1 Doses  glucagon  Injectable 1 milliGRAM(s) IntraMuscular once, Stop order after: 1 Doses  heparin   Injectable 5000 Unit(s) SubCutaneous every 12 hours  insulin glargine Injectable (LANTUS) 15 Unit(s) SubCutaneous every morning  insulin lispro (ADMELOG) corrective regimen sliding scale   SubCutaneous three times a day before meals  insulin lispro Injectable (ADMELOG) 5 Unit(s) SubCutaneous three times a day before meals  sodium bicarbonate 650 milliGRAM(s) Oral two times a day    ALLERGIES:   No Known Allergies    Social hx: + former smoker. Quit in 2014. Used to smoke 2 PPD since age of 10 years old.     REVIEW OF SYMPTOMS: Asymptomatic   CONSTITUTIONAL: No fever, no chills, no weight loss, no weight gain, no fatigue   ENMT:  No vertigo; No sinus or throat pain  NECK: No pain or stiffness  CARDIOVASCULAR: No chest pain, no dyspnea, no syncope. + presyncope, no palpitations, no dizziness, no Orthopnea, no Paroxsymal nocturnal dyspnea  RESPIRATORY: no Shortness of breath, no cough, no wheezing  : No dysuria, no hematuria   GI: stool, no nausea, no diarrhea, no constipation, no abdominal pain   NEURO: No headache, no slurred speech   MUSCULOSKELETAL: No joint pain or swelling; No muscle, back, or extremity pain  PSYCH: No agitation, no anxiety.    ALL OTHER REVIEW OF SYSTEMS ARE NEGATIVE.    VITAL SIGNS:  T(C): 36.6 (03-11-22 @ 01:56), Max: 36.7 (03-10-22 @ 19:30)  T(F): 97.8 (03-11-22 @ 01:56), Max: 98 (03-10-22 @ 19:30)  HR: 70 (03-11-22 @ 01:56) (70 - 106)  BP: 150/78 (03-11-22 @ 01:56) (133/66 - 159/83)  RR: 18 (03-11-22 @ 01:56) (15 - 18)  SpO2: 96% (03-11-22 @ 01:56) (96% - 98%)    PHYSICAL EXAM:  Constitutional: Comfortable . No acute distress.   HEENT: Atraumatic and normocephalic , neck is supple . no JVD. No carotid bruit.  CNS: A&Ox3. No focal deficits.   Respiratory: CTAB, unlabored   Cardiovascular: RRR normal s1 s2. No murmur. No rubs or gallop.  Gastrointestinal: Soft, non-tender. +Bowel sounds.   Extremities: 2+ Peripheral Pulses, No clubbing, cyanosis, or edema  Psychiatric: Calm . no agitation.   Skin: Warm and dry    LABS:  ( 10 Mar 2022 20:19 )  Troponin T  0.05 ,  CPK  X    , CKMB  X    , BNP 1136<H>               12.7   12.67 )-----------( 254      ( 10 Mar 2022 20:19 )             39.4     03-10    135  |  103  |  45.2<H>  ----------------------------<  228<H>  4.7   |  22.0  |  4.32<H>    Ca    8.6      10 Mar 2022 20:19  Mg     1.4     03-10    TPro  6.2<L>  /  Alb  3.2<L>  /  TBili  0.3<L>  /  DBili  x   /  AST  15  /  ALT  19  /  AlkPhos  127<H>  03-10    PT/INR - ( 10 Mar 2022 20:19 )   PT: 12.6 sec;   INR: 1.09 ratio    PTT - ( 10 Mar 2022 20:19 )  PTT:29.1 sec    ECG:   Prior ECG: Yes [  ] No [  ]    RADIOLOGY & ADDITIONAL STUDIES:   Chest x-ray: unremarkable. Final reading / result pending     CT Head No Cont (03.10.22 @ 23:08) >  IMPRESSION: Mild diffuse cerebral volume loss, multiple chronic lacunar   infarcts and chronic periventricular white matter small vessel ischemic   disease.  No acute intracranial hemorrhage, mass effect or acute territorial   infarcts seen.  < end of copied text >                                              [facility]                                              CARDIOLOGY AT [address], [place] [address]                                             Telephone: [phone]. Fax:[phone]    CARDIOLOGY CONSULTATION NOTE                                                                                             History obtained by: Patient and medical record  Community Cardiologist: Dr Pro    obtained: No   Reason for Consultation: Syncope    Chief complaint:  Patient is a 67 y old Male who presents with a chief complaint of Syncope (11 Mar 2022 00:45)    HPI: 68 yo male with PMHx of CAD ( s/p CABG 12/2014- LIMA-LAD reverse SVG to posterior lateral reverse SVG to OM), HFpEF (LVEF-60%), PAD, DM2, TIA, HLD, HTN, renal Ca s/p L nephrectomy 10/2020, CKD 4, ischemic CMP, was brought ot he ED by EMS for syncope. Pt states that he was sitting at the counter eating dinner at 7PM today, when he suddenly felt weak and slumped over on the counter. Per wife, pt was unresponsive with eyes rolled back. Pt states that he was awake during the episode but just felt very weak. Episode lasted ~10 minutes. Pt currently back to baseline. Denies prodromal chest pain, palpitations, shortness of breath. No recent illness; however notes that he has had intermittent dizziness upon standing up over the past week.     Of Note:  Pt was found COVID + in the ED 01/13/2022. Today Covid test was negative     CARDIAC TESTING   ECHO:  TTE Echo Complete w/o Contrast w/ Doppler (01.14.22 @ 19:06) >  Summary:   1. Technically difficult study.   2. Normal global left ventricular systolic function.   3. Left ventricular ejection fraction, by visual estimation, is 55 to   60%.   4. Mildly enlarged left atrium.   5. Normal right atrial size.   6.Mild mitral annular calcification.   7. Mild mitral valve regurgitation.   8. Thickening and calcification of the anterior and posterior mitral   valve leaflets.   9. Moderate tricuspid regurgitation.  10. Sclerotic aortic valve with normal opening.  11. Estimated pulmonary artery systolic pressure is 40.3 mmHg assuming a   right atrial pressure of 5 mmHg, which is consistent with mild pulmonary   hypertension.  12. Trivial pericardial effusion.  MD Junie Electronically signed on 1/15/2022 at 10:13:45 AM  < end of copied text >    Nuclear Stress Test-Pharmacologic (02.17.17 @ 08:16) >  IMPRESSIONS: Normal Study  Unable to exercise due to weakness and unsteady gait.   No Symptom. No diagnostic ECG changes.  The left ventricle was normal LV size. No  ischemia/infarction.  Gated wall motion analysis shows normal wall motion with  LVEF of 73%.  < end of copied text >    Cardiac Cath Lab - Adult (10.31.14 @ 18:10) >  VENTRICLES: Global left ventricular function was normal. EF calculated by  contrast ventriculography was 70 %.  VALVES: AORTIC VALVE: There was no aortic stenosis.  CORONARY VESSELS: The coronary circulation is right dominant.  LM:   --  Distal left main: There was a 20 % stenosis.  LAD:   --  Mid LAD: There was a 80 % stenosis. By IVUS  --  Distal LAD: There was a 70 % stenosis.  --  D2: There was a 80 % stenosis.  CX:   --  OM1: There was a 80 % stenosis.  --  OM2: There was a 90 % stenosis.  --  OM3: There was a 90 % stenosis.  RCA:   --  Mid RCA: There was a 50 % stenosis.  --  RPDA: There was a 90 % stenosis.  --  RPL3: There was a 90 % stenosis.  DIAGNOSTIC IMPRESSIONS: Severe multivessel disease.  Normal LV function.  DIAGNOSTIC RECOMMENDATIONS: Referral for CABG.  < end of copied text >    PAST MEDICAL HISTORY  DM (diabetes mellitus)  HTN (hypertension)  High cholesterol  Myocardial infarction  Acute on chronic congestive heart failure, unspecified congestive heart failure type    PAST SURGICAL HISTORY  S/P coronary artery bypass with three autogenous grafts  SOCIAL HISTORY:  Denies smoking/alcohol/drugs    FAMILY HISTORY:  Family history of heart disease (Sibling)    Family History of Cardiovascular Disease:  Yes   Sudden Cardiac Death in First degree relative: No     HOME MEDICATIONS:  aspirin 81 mg oral tablet: orally once a day (11 Mar 2022 01:46)  atorvastatin 20 mg oral tablet: 1 tab(s) orally once a day (11 Mar 2022 01:46)  BiDil 20 mg-37.5 mg oral tablet: 1 tab(s) orally 3 times a day (11 Mar 2022 01:46)  CILOSTAZOL  100 MG TABS:  (11 Mar 2022 01:46)  Norvasc 5 mg oral tablet: 1 tab(s) orally once a day (11 Mar 2022 01:46)  TRESIBA FLEXTOUCH  100 UNIT/ML SOPN:  (11 Mar 2022 01:46)  aspirin  chewable 81 milliGRAM(s) Oral daily    CURRENT CARDIAC MEDICATIONS:  amLODIPine   Tablet 5 milliGRAM(s) Oral daily  carvedilol 25 milliGRAM(s) Oral every 12 hours  furosemide    Tablet 40 milliGRAM(s) Oral two times a day  hydrALAZINE 25 milliGRAM(s) Oral every 8 hours  isosorbide   dinitrate Tablet (ISORDIL) 20 milliGRAM(s) Oral three times a day  aspirin  chewable 81 milliGRAM(s) Oral daily  atorvastatin 20 milliGRAM(s) Oral at bedtime    CURRENT OTHER MEDICATIONS:  acetaminophen     Tablet .. 650 milliGRAM(s) Oral every 6 hours PRN Temp greater or equal to 38C (100.4F), Mild Pain (1 - 3)  aspirin  chewable 81 milliGRAM(s) Oral daily  atorvastatin 20 milliGRAM(s) Oral at bedtime  cilostazol 100 milliGRAM(s) Oral two times a day  dextrose 40% Gel 15 Gram(s) Oral once, Stop order after: 1 Doses  dextrose 5%. 1000 milliLiter(s) (50 mL/Hr) IV Continuous <Continuous>  dextrose 50% Injectable 25 Gram(s) IV Push once, Stop order after: 1 Doses  glucagon  Injectable 1 milliGRAM(s) IntraMuscular once, Stop order after: 1 Doses  heparin   Injectable 5000 Unit(s) SubCutaneous every 12 hours  insulin glargine Injectable (LANTUS) 15 Unit(s) SubCutaneous every morning  insulin lispro (ADMELOG) corrective regimen sliding scale   SubCutaneous three times a day before meals  insulin lispro Injectable (ADMELOG) 5 Unit(s) SubCutaneous three times a day before meals  sodium bicarbonate 650 milliGRAM(s) Oral two times a day    ALLERGIES:   No Known Allergies    Social hx: + former smoker. Quit in 2014. Used to smoke 2 PPD since age of 10 years old.     REVIEW OF SYMPTOMS: Asymptomatic   CONSTITUTIONAL: No fever, no chills, no weight loss, no weight gain, no fatigue   ENMT:  No vertigo; No sinus or throat pain  NECK: No pain or stiffness  CARDIOVASCULAR: No chest pain, no dyspnea, no syncope. + presyncope, no palpitations, no dizziness, no Orthopnea, no Paroxsymal nocturnal dyspnea  RESPIRATORY: no Shortness of breath, no cough, no wheezing  : No dysuria, no hematuria   GI: stool, no nausea, no diarrhea, no constipation, no abdominal pain   NEURO: No headache, no slurred speech   MUSCULOSKELETAL: No joint pain or swelling; No muscle, back, or extremity pain  PSYCH: No agitation, no anxiety.    ALL OTHER REVIEW OF SYSTEMS ARE NEGATIVE.    VITAL SIGNS:  T(C): 36.6 (03-11-22 @ 01:56), Max: 36.7 (03-10-22 @ 19:30)  T(F): 97.8 (03-11-22 @ 01:56), Max: 98 (03-10-22 @ 19:30)  HR: 70 (03-11-22 @ 01:56) (70 - 106)  BP: 150/78 (03-11-22 @ 01:56) (133/66 - 159/83)  RR: 18 (03-11-22 @ 01:56) (15 - 18)  SpO2: 96% (03-11-22 @ 01:56) (96% - 98%)    PHYSICAL EXAM:  Constitutional: Comfortable . No acute distress.   HEENT: Atraumatic and normocephalic , neck is supple . no JVD. No carotid bruit.  CNS: A&Ox3. No focal deficits.   Respiratory: CTAB, unlabored   Cardiovascular: RRR normal s1 s2. No murmur. No rubs or gallop.  Gastrointestinal: Soft, non-tender. +Bowel sounds.   Extremities: 2+ Peripheral Pulses, No clubbing, cyanosis, or edema  Psychiatric: Calm . no agitation.   Skin: Warm and dry    LABS:  ( 10 Mar 2022 20:19 )  Troponin T  0.05 ,  CPK  X    , CKMB  X    , BNP 1136<H>               12.7   12.67 )-----------( 254      ( 10 Mar 2022 20:19 )             39.4     03-10    135  |  103  |  45.2<H>  ----------------------------<  228<H>  4.7   |  22.0  |  4.32<H>    Ca    8.6      10 Mar 2022 20:19  Mg     1.4     03-10    TPro  6.2<L>  /  Alb  3.2<L>  /  TBili  0.3<L>  /  DBili  x   /  AST  15  /  ALT  19  /  AlkPhos  127<H>  03-10    PT/INR - ( 10 Mar 2022 20:19 )   PT: 12.6 sec;   INR: 1.09 ratio    PTT - ( 10 Mar 2022 20:19 )  PTT:29.1 sec    ECG:   Prior ECG: Yes [  ] No [  ]    RADIOLOGY & ADDITIONAL STUDIES:   Chest x-ray: unremarkable. Final reading / result pending     CT Head No Cont (03.10.22 @ 23:08) >  IMPRESSION: Mild diffuse cerebral volume loss, multiple chronic lacunar   infarcts and chronic periventricular white matter small vessel ischemic   disease.  No acute intracranial hemorrhage, mass effect or acute territorial   infarcts seen.  < end of copied text >                                              [facility]                                              CARDIOLOGY AT [address], [place] [address]                                             Telephone: [phone]. Fax:[phone]    CARDIOLOGY CONSULTATION NOTE                                                                                             History obtained by: Patient and medical record  Community Cardiologist: Dr Pro    obtained: No   Reason for Consultation: Syncope    Chief complaint:  Patient is a 67 y old Male who presents with a chief complaint of Syncope (11 Mar 2022 00:45)    HPI: 66 yo male with PMHx of CAD ( s/p CABG 12/2014- LIMA-LAD reverse SVG to posterior lateral reverse SVG to OM), HFpEF (LVEF-60%), PAD, DM2, TIA, HLD, HTN, renal Ca s/p L nephrectomy 10/2020, CKD 4, ischemic CMP, was brought ot he ED by EMS for syncope. Pt states that he was sitting at the counter eating dinner at 7PM today, when he suddenly felt weak and slumped over on the counter. Per wife, pt was unresponsive with eyes rolled back. Pt states that he was awake during the episode but just felt very weak. Episode lasted ~10 minutes. Pt currently back to baseline. Denies prodromal chest pain, palpitations, shortness of breath. No recent illness; however notes that he has had intermittent dizziness upon standing up over the past week.     Of Note:  Pt was found COVID + in the ED 01/13/2022. Today Covid test was negative     CARDIAC TESTING   ECHO:  TTE Echo Complete w/o Contrast w/ Doppler (01.14.22 @ 19:06) >  Summary:   1. Technically difficult study.   2. Normal global left ventricular systolic function.   3. Left ventricular ejection fraction, by visual estimation, is 55 to   60%.   4. Mildly enlarged left atrium.   5. Normal right atrial size.   6.Mild mitral annular calcification.   7. Mild mitral valve regurgitation.   8. Thickening and calcification of the anterior and posterior mitral   valve leaflets.   9. Moderate tricuspid regurgitation.  10. Sclerotic aortic valve with normal opening.  11. Estimated pulmonary artery systolic pressure is 40.3 mmHg assuming a   right atrial pressure of 5 mmHg, which is consistent with mild pulmonary   hypertension.  12. Trivial pericardial effusion.  MD Junie Electronically signed on 1/15/2022 at 10:13:45 AM  < end of copied text >    Nuclear Stress Test-Pharmacologic (02.17.17 @ 08:16) >  IMPRESSIONS: Normal Study  Unable to exercise due to weakness and unsteady gait.   No Symptom. No diagnostic ECG changes.  The left ventricle was normal LV size. No  ischemia/infarction.  Gated wall motion analysis shows normal wall motion with  LVEF of 73%.  < end of copied text >    Cardiac Cath Lab - Adult (10.31.14 @ 18:10) >  VENTRICLES: Global left ventricular function was normal. EF calculated by  contrast ventriculography was 70 %.  VALVES: AORTIC VALVE: There was no aortic stenosis.  CORONARY VESSELS: The coronary circulation is right dominant.  LM:   --  Distal left main: There was a 20 % stenosis.  LAD:   --  Mid LAD: There was a 80 % stenosis. By IVUS  --  Distal LAD: There was a 70 % stenosis.  --  D2: There was a 80 % stenosis.  CX:   --  OM1: There was a 80 % stenosis.  --  OM2: There was a 90 % stenosis.  --  OM3: There was a 90 % stenosis.  RCA:   --  Mid RCA: There was a 50 % stenosis.  --  RPDA: There was a 90 % stenosis.  --  RPL3: There was a 90 % stenosis.  DIAGNOSTIC IMPRESSIONS: Severe multivessel disease.  Normal LV function.  DIAGNOSTIC RECOMMENDATIONS: Referral for CABG.  < end of copied text >    PAST MEDICAL HISTORY  DM (diabetes mellitus)  HTN (hypertension)  High cholesterol  Myocardial infarction  Acute on chronic congestive heart failure, unspecified congestive heart failure type    PAST SURGICAL HISTORY  S/P coronary artery bypass with three autogenous grafts  SOCIAL HISTORY:  Denies smoking/alcohol/drugs    FAMILY HISTORY:  Family history of heart disease (Sibling)    Family History of Cardiovascular Disease:  Yes   Sudden Cardiac Death in First degree relative: No     HOME MEDICATIONS:  aspirin 81 mg oral tablet: orally once a day (11 Mar 2022 01:46)  atorvastatin 20 mg oral tablet: 1 tab(s) orally once a day (11 Mar 2022 01:46)  BiDil 20 mg-37.5 mg oral tablet: 1 tab(s) orally 3 times a day (11 Mar 2022 01:46)  CILOSTAZOL  100 MG TABS:  (11 Mar 2022 01:46)  Norvasc 5 mg oral tablet: 1 tab(s) orally once a day (11 Mar 2022 01:46)  TRESIBA FLEXTOUCH  100 UNIT/ML SOPN:  (11 Mar 2022 01:46)  aspirin  chewable 81 milliGRAM(s) Oral daily    CURRENT CARDIAC MEDICATIONS:  amLODIPine   Tablet 5 milliGRAM(s) Oral daily  carvedilol 25 milliGRAM(s) Oral every 12 hours  furosemide    Tablet 40 milliGRAM(s) Oral two times a day  hydrALAZINE 25 milliGRAM(s) Oral every 8 hours  isosorbide   dinitrate Tablet (ISORDIL) 20 milliGRAM(s) Oral three times a day  aspirin  chewable 81 milliGRAM(s) Oral daily  atorvastatin 20 milliGRAM(s) Oral at bedtime    CURRENT OTHER MEDICATIONS:  acetaminophen     Tablet .. 650 milliGRAM(s) Oral every 6 hours PRN Temp greater or equal to 38C (100.4F), Mild Pain (1 - 3)  aspirin  chewable 81 milliGRAM(s) Oral daily  atorvastatin 20 milliGRAM(s) Oral at bedtime  cilostazol 100 milliGRAM(s) Oral two times a day  dextrose 40% Gel 15 Gram(s) Oral once, Stop order after: 1 Doses  dextrose 5%. 1000 milliLiter(s) (50 mL/Hr) IV Continuous <Continuous>  dextrose 50% Injectable 25 Gram(s) IV Push once, Stop order after: 1 Doses  glucagon  Injectable 1 milliGRAM(s) IntraMuscular once, Stop order after: 1 Doses  heparin   Injectable 5000 Unit(s) SubCutaneous every 12 hours  insulin glargine Injectable (LANTUS) 15 Unit(s) SubCutaneous every morning  insulin lispro (ADMELOG) corrective regimen sliding scale   SubCutaneous three times a day before meals  insulin lispro Injectable (ADMELOG) 5 Unit(s) SubCutaneous three times a day before meals  sodium bicarbonate 650 milliGRAM(s) Oral two times a day    ALLERGIES:   No Known Allergies    Social hx: + former smoker. Quit in 2014. Used to smoke 2 PPD since age of 10 years old.     REVIEW OF SYMPTOMS: Asymptomatic   CONSTITUTIONAL: No fever, no chills, no weight loss, no weight gain, no fatigue   ENMT:  No vertigo; No sinus or throat pain  NECK: No pain or stiffness  CARDIOVASCULAR: No chest pain, no dyspnea, no syncope. + presyncope, no palpitations, no dizziness, no Orthopnea, no Paroxsymal nocturnal dyspnea  RESPIRATORY: no Shortness of breath, no cough, no wheezing  : No dysuria, no hematuria   GI: stool, no nausea, no diarrhea, no constipation, no abdominal pain   NEURO: No headache, no slurred speech   MUSCULOSKELETAL: No joint pain or swelling; No muscle, back, or extremity pain  PSYCH: No agitation, no anxiety.    ALL OTHER REVIEW OF SYSTEMS ARE NEGATIVE.    VITAL SIGNS:  T(C): 36.6 (03-11-22 @ 01:56), Max: 36.7 (03-10-22 @ 19:30)  T(F): 97.8 (03-11-22 @ 01:56), Max: 98 (03-10-22 @ 19:30)  HR: 70 (03-11-22 @ 01:56) (70 - 106)  BP: 150/78 (03-11-22 @ 01:56) (133/66 - 159/83)  RR: 18 (03-11-22 @ 01:56) (15 - 18)  SpO2: 96% (03-11-22 @ 01:56) (96% - 98%)    PHYSICAL EXAM:  Constitutional: Comfortable . No acute distress.   HEENT: Atraumatic and normocephalic , neck is supple . no JVD. No carotid bruit.  CNS: A&Ox3. No focal deficits.   Respiratory: CTAB, unlabored   Cardiovascular: RRR normal s1 s2. No murmur. No rubs or gallop.  Gastrointestinal: Soft, non-tender. +Bowel sounds.   Extremities: 2+ Peripheral Pulses, No clubbing, cyanosis, or edema  Psychiatric: Calm . no agitation.   Skin: Warm and dry    LABS:  ( 10 Mar 2022 20:19 )  Troponin T  0.05 ,  CPK  X    , CKMB  X    , BNP 1136<H>               12.7   12.67 )-----------( 254      ( 10 Mar 2022 20:19 )             39.4     03-10    135  |  103  |  45.2<H>  ----------------------------<  228<H>  4.7   |  22.0  |  4.32<H>    Ca    8.6      10 Mar 2022 20:19  Mg     1.4     03-10    TPro  6.2<L>  /  Alb  3.2<L>  /  TBili  0.3<L>  /  DBili  x   /  AST  15  /  ALT  19  /  AlkPhos  127<H>  03-10    PT/INR - ( 10 Mar 2022 20:19 )   PT: 12.6 sec;   INR: 1.09 ratio    PTT - ( 10 Mar 2022 20:19 )  PTT:29.1 sec    ECG: Sinus Bradycardia with 1er grade degree a-v block  Prior ECG: Yes.    RADIOLOGY & ADDITIONAL STUDIES:   Chest x-ray: unremarkable. Final reading / result pending     CT Head No Cont (03.10.22 @ 23:08) >  IMPRESSION: Mild diffuse cerebral volume loss, multiple chronic lacunar   infarcts and chronic periventricular white matter small vessel ischemic   disease.  No acute intracranial hemorrhage, mass effect or acute territorial   infarcts seen.  < end of copied text >

## 2022-03-12 LAB
A1C WITH ESTIMATED AVERAGE GLUCOSE RESULT: 7.4 % — HIGH (ref 4–5.6)
ANION GAP SERPL CALC-SCNC: 15 MMOL/L — SIGNIFICANT CHANGE UP (ref 5–17)
BUN SERPL-MCNC: 60.1 MG/DL — HIGH (ref 8–20)
CALCIUM SERPL-MCNC: 8.3 MG/DL — LOW (ref 8.6–10.2)
CHLORIDE SERPL-SCNC: 105 MMOL/L — SIGNIFICANT CHANGE UP (ref 98–107)
CO2 SERPL-SCNC: 19 MMOL/L — LOW (ref 22–29)
CREAT SERPL-MCNC: 5.92 MG/DL — HIGH (ref 0.5–1.3)
EGFR: 10 ML/MIN/1.73M2 — LOW
ESTIMATED AVERAGE GLUCOSE: 166 MG/DL — HIGH (ref 68–114)
GLUCOSE BLDC GLUCOMTR-MCNC: 106 MG/DL — HIGH (ref 70–99)
GLUCOSE BLDC GLUCOMTR-MCNC: 143 MG/DL — HIGH (ref 70–99)
GLUCOSE BLDC GLUCOMTR-MCNC: 159 MG/DL — HIGH (ref 70–99)
GLUCOSE BLDC GLUCOMTR-MCNC: 79 MG/DL — SIGNIFICANT CHANGE UP (ref 70–99)
GLUCOSE SERPL-MCNC: 150 MG/DL — HIGH (ref 70–99)
HCT VFR BLD CALC: 32.2 % — LOW (ref 39–50)
HGB BLD-MCNC: 10.7 G/DL — LOW (ref 13–17)
MCHC RBC-ENTMCNC: 27 PG — SIGNIFICANT CHANGE UP (ref 27–34)
MCHC RBC-ENTMCNC: 33.2 GM/DL — SIGNIFICANT CHANGE UP (ref 32–36)
MCV RBC AUTO: 81.3 FL — SIGNIFICANT CHANGE UP (ref 80–100)
NT-PROBNP SERPL-SCNC: 925 PG/ML — HIGH (ref 0–300)
PLATELET # BLD AUTO: 226 K/UL — SIGNIFICANT CHANGE UP (ref 150–400)
POTASSIUM SERPL-MCNC: 4.1 MMOL/L — SIGNIFICANT CHANGE UP (ref 3.5–5.3)
POTASSIUM SERPL-SCNC: 4.1 MMOL/L — SIGNIFICANT CHANGE UP (ref 3.5–5.3)
RBC # BLD: 3.96 M/UL — LOW (ref 4.2–5.8)
RBC # FLD: 14.1 % — SIGNIFICANT CHANGE UP (ref 10.3–14.5)
SODIUM SERPL-SCNC: 139 MMOL/L — SIGNIFICANT CHANGE UP (ref 135–145)
WBC # BLD: 10.03 K/UL — SIGNIFICANT CHANGE UP (ref 3.8–10.5)
WBC # FLD AUTO: 10.03 K/UL — SIGNIFICANT CHANGE UP (ref 3.8–10.5)

## 2022-03-12 PROCEDURE — 99233 SBSQ HOSP IP/OBS HIGH 50: CPT

## 2022-03-12 RX ADMIN — CILOSTAZOL 100 MILLIGRAM(S): 100 TABLET ORAL at 18:33

## 2022-03-12 RX ADMIN — Medication 5 UNIT(S): at 08:35

## 2022-03-12 RX ADMIN — CARVEDILOL PHOSPHATE 25 MILLIGRAM(S): 80 CAPSULE, EXTENDED RELEASE ORAL at 05:39

## 2022-03-12 RX ADMIN — ISOSORBIDE DINITRATE 20 MILLIGRAM(S): 5 TABLET ORAL at 18:34

## 2022-03-12 RX ADMIN — Medication 40 MILLIGRAM(S): at 18:33

## 2022-03-12 RX ADMIN — Medication 1: at 08:35

## 2022-03-12 RX ADMIN — CILOSTAZOL 100 MILLIGRAM(S): 100 TABLET ORAL at 05:39

## 2022-03-12 RX ADMIN — Medication 25 MILLIGRAM(S): at 13:29

## 2022-03-12 RX ADMIN — HEPARIN SODIUM 5000 UNIT(S): 5000 INJECTION INTRAVENOUS; SUBCUTANEOUS at 18:34

## 2022-03-12 RX ADMIN — Medication 25 MILLIGRAM(S): at 22:35

## 2022-03-12 RX ADMIN — AMLODIPINE BESYLATE 5 MILLIGRAM(S): 2.5 TABLET ORAL at 05:39

## 2022-03-12 RX ADMIN — ISOSORBIDE DINITRATE 20 MILLIGRAM(S): 5 TABLET ORAL at 05:39

## 2022-03-12 RX ADMIN — CARVEDILOL PHOSPHATE 25 MILLIGRAM(S): 80 CAPSULE, EXTENDED RELEASE ORAL at 18:34

## 2022-03-12 RX ADMIN — ISOSORBIDE DINITRATE 20 MILLIGRAM(S): 5 TABLET ORAL at 13:29

## 2022-03-12 RX ADMIN — Medication 81 MILLIGRAM(S): at 13:29

## 2022-03-12 RX ADMIN — Medication 5 UNIT(S): at 18:00

## 2022-03-12 RX ADMIN — Medication 5 UNIT(S): at 13:31

## 2022-03-12 RX ADMIN — Medication 650 MILLIGRAM(S): at 05:40

## 2022-03-12 RX ADMIN — Medication 650 MILLIGRAM(S): at 14:00

## 2022-03-12 RX ADMIN — INSULIN GLARGINE 15 UNIT(S): 100 INJECTION, SOLUTION SUBCUTANEOUS at 08:36

## 2022-03-12 RX ADMIN — ATORVASTATIN CALCIUM 20 MILLIGRAM(S): 80 TABLET, FILM COATED ORAL at 22:35

## 2022-03-12 RX ADMIN — Medication 650 MILLIGRAM(S): at 13:28

## 2022-03-12 RX ADMIN — Medication 650 MILLIGRAM(S): at 18:33

## 2022-03-12 RX ADMIN — HEPARIN SODIUM 5000 UNIT(S): 5000 INJECTION INTRAVENOUS; SUBCUTANEOUS at 05:38

## 2022-03-12 RX ADMIN — Medication 25 MILLIGRAM(S): at 05:39

## 2022-03-12 RX ADMIN — Medication 40 MILLIGRAM(S): at 05:40

## 2022-03-12 NOTE — PROGRESS NOTE ADULT - ASSESSMENT
68 yo male was brought ot he ED by EMS for syncope. Pt states that he was sitting at the counter eating dinner at 7PM today, when he suddenly felt weak and slumped over on the counter.     3/12/22:  Patient sitting up in bed eating breakfast, denies any further dizziness. appearing euvolemic on Lasix

## 2022-03-12 NOTE — PROGRESS NOTE ADULT - PROBLEM SELECTOR PLAN 5
HFpEF (LVEF-60%) with p-BNP 1136 however euvolemic on exam. Chest x-ray unremarkable  Continue home meds. Monitor kidney fx  Strict I&Os.   On lasix 40mg - continue  IN: 400 mL / OUT: 350 mL / NET: 50 mL  Daily weight monitor   Repeat p-BNP pending  Maintain K+~4 and mag ~ 2  DASH diet.

## 2022-03-12 NOTE — PROGRESS NOTE ADULT - ASSESSMENT
67 yr old male with CAD s/p CABG, peripheral arterial disease, diabetes mellitus, TIA, hypertension, hyperlipidemia, renal cancer s/p nephrectomy, CKD stage 4, ischemic cardiomyopathy admitted after a pre syncopal episode at home. Patient reported feeling weak while at the dinner table and slumped over. In ED, labs and imaging done, troponin negative, CT head without acute changes. Cardiology was consulted. ECHO and carotid US was ordered.  67 yr old male with CAD s/p CABG, peripheral arterial disease, diabetes mellitus, TIA, hypertension, hyperlipidemia, renal cancer s/p nephrectomy, CKD stage 4, ischemic cardiomyopathy admitted after a pre syncopal episode at home. Patient reported feeling weak while at the dinner table and slumped over. In ED, labs and imaging done, troponin negative, CT head without acute changes. Cardiology was consulted. ECHO and carotid US was ordered.     1. Pre syncope:  CT on admission negative  Check orthostatsis  ECHO pending  continue telemetry  Check MRI brain.    2. Falls:  Unclear etiology  MRI brain  orthostasis  PT evaluation  fall precautions    3. CAD s/p CABG:  Continue aspirin, statin, coreg and nitrates    4. Hypertension:  Continue Coreg and nitrates.    5. CKD stage 4:  Monitor renal function  continue sodium bicarbonate.    6. Diabetes mellitus:  Monitor fingersticks  sliding scale coverage.    7.PVD:  Continue Cilostazol.    8. DVT ppx:  Heparin.    Discussed with patient, wife and RN.

## 2022-03-12 NOTE — PATIENT PROFILE ADULT - NSTRANSFERBELONGINGSDISPO_GEN_A_NUR
with patient

## 2022-03-12 NOTE — PATIENT PROFILE ADULT - FALL HARM RISK - HARM RISK INTERVENTIONS

## 2022-03-12 NOTE — PROGRESS NOTE ADULT - PROBLEM SELECTOR PLAN 2
s/p CABG 12/2014- LIMA-LAD reverse SVG to posterior lateral reverse SVG to OM  Continue stating, bb, and Norvasc  Continue Imdur  Continue ASA  Dash diet  Lipid panel fasting   DASH diet.

## 2022-03-12 NOTE — PROGRESS NOTE ADULT - PROBLEM SELECTOR PLAN 4
Lipid panel in am  Continue Lipitor 20mg po qhs  LFTs - AST/ALT wnl slight elevated alkaline phosphatese - routine checks as per guidelines.

## 2022-03-12 NOTE — PROGRESS NOTE ADULT - PROBLEM SELECTOR PLAN 1
CT head from today shows  multiple chronic lacunar  infarcts and chronic periventricular white matter small vessel ischemic disease. Neurology consult recommended.   Orthostatic BP. recommended. - Pt started a new medication yesterday. He took first pill of Losartan 1-2 hours prior to pre-syncopal episode   Telemetry continued   Trend trops x 3 q6. Trend CE. Serial EKG   Maintain K+~4 and mag ~2   Carotid doppler with - 50-69% left internal carotid artery stenosis.

## 2022-03-12 NOTE — PROGRESS NOTE ADULT - SUBJECTIVE AND OBJECTIVE BOX
[facility]                                                         CARDIOLOGY AT [facility]                                                                  [address], [place] [address]                                                         Telephone: [phone]. Fax:[phone]                                                                             PROGRESS NOTE    Reason for follow up:     Update:   Patient sitting up in bed eating breakfast, denies any further dizziness. appearing euvolemic on Lasix    Review of symptoms:   Cardiac:  No chest pain. No dyspnea. No palpitations.  Respiratory: no cough. No dyspnea  Gastrointestinal: No diarrhea. No abdominal pain. No bleeding.   Neuro: No focal neuro complaints.    Vitals:  T(C): 36.9 (03-12-22 @ 04:00), Max: 36.9 (03-12-22 @ 04:00)  HR: 90 (03-12-22 @ 04:00) (82 - 90)  BP: 123/64 (03-12-22 @ 04:00) (123/64 - 145/80)  RR: 18 (03-12-22 @ 04:00) (18 - 18)  SpO2: 93% (03-12-22 @ 04:00) (93% - 94%)    I&O's Summary  11 Mar 2022 07:01  -  12 Mar 2022 07:00  --------------------------------------------------------  IN: 400 mL / OUT: 350 mL / NET: 50 mL    Weight (kg): 80.1 (03-11 @ 21:09)    PHYSICAL EXAM:  Appearance: Comfortable. No acute distress  HEENT:  Atraumatic. Normocephalic.  Normal oral mucosa  Neurologic: A & O x 3, no gross focal deficits.  Cardiovascular: RRR S1 S2, No murmur, no rubs/gallops. No JVD  Respiratory: Lungs clear to auscultation, unlabored   Gastrointestinal:  Soft, Non-tender, + BS  Lower Extremities: 2+ Peripheral Pulses, No clubbing, cyanosis, or edema  Psychiatry: Patient is calm. No agitation.   Skin: warm and dry.    CURRENT CARDIAC MEDICATIONS:  amLODIPine   Tablet 5 milliGRAM(s) Oral daily  carvedilol 25 milliGRAM(s) Oral every 12 hours  furosemide    Tablet 40 milliGRAM(s) Oral two times a day  hydrALAZINE 25 milliGRAM(s) Oral every 8 hours  isosorbide   dinitrate Tablet (ISORDIL) 20 milliGRAM(s) Oral three times a day      CURRENT OTHER MEDICATIONS:  acetaminophen     Tablet .. 650 milliGRAM(s) Oral every 6 hours PRN Temp greater or equal to 38C (100.4F), Mild Pain (1 - 3)  atorvastatin 20 milliGRAM(s) Oral at bedtime  dextrose 40% Gel 15 Gram(s) Oral once, Stop order after: 1 Doses  dextrose 50% Injectable 25 Gram(s) IV Push once, Stop order after: 1 Doses  glucagon  Injectable 1 milliGRAM(s) IntraMuscular once, Stop order after: 1 Doses  insulin glargine Injectable (LANTUS) 15 Unit(s) SubCutaneous every morning  insulin lispro (ADMELOG) corrective regimen sliding scale   SubCutaneous three times a day before meals  insulin lispro Injectable (ADMELOG) 5 Unit(s) SubCutaneous three times a day before meals  aspirin  chewable 81 milliGRAM(s) Oral daily  cilostazol 100 milliGRAM(s) Oral two times a day  dextrose 5%. 1000 milliLiter(s) (50 mL/Hr) IV Continuous <Continuous>  heparin   Injectable 5000 Unit(s) SubCutaneous every 12 hours  sodium bicarbonate 650 milliGRAM(s) Oral two times a day      LABS:	 	  ( 11 Mar 2022 07:03 )  Troponin T  0.08<H>,  CPK  X    , CKMB  X    , BNP X        , ( 10 Mar 2022 20:19 )  Troponin T  0.05 ,  CPK  X    , CKMB  X    , BNP 1136<H>                          10.7   10.03 )-----------( 226      ( 12 Mar 2022 08:37 )             32.2     03-12    139  |  105  |  60.1<H>  ----------------------------<  150<H>  4.1   |  19.0<L>  |  5.92<H>    Ca    8.3<L>      12 Mar 2022 08:37  Mg     2.1     03-11    TPro  6.2<L>  /  Alb  3.4  /  TBili  0.4  /  DBili  x   /  AST  14  /  ALT  17  /  AlkPhos  125<H>  03-11    PT/INR/PTT ( 11 Mar 2022 07:03 )                       :                       :      11.7         :       X                     .        .                   .              .           .       1.01        .                                       TELEMETRY:   SR no acute alarms noted

## 2022-03-12 NOTE — PROGRESS NOTE ADULT - SUBJECTIVE AND OBJECTIVE BOX
INTERVAL HPI/OVERNIGHT EVENTS:    CC:    Vital Signs Last 24 Hrs  T(C): 36.9 (12 Mar 2022 04:00), Max: 36.9 (12 Mar 2022 04:00)  T(F): 98.4 (12 Mar 2022 04:00), Max: 98.4 (12 Mar 2022 04:00)  HR: 90 (12 Mar 2022 04:00) (82 - 90)  BP: 123/64 (12 Mar 2022 04:00) (116/68 - 145/80)  BP(mean): --  RR: 18 (12 Mar 2022 04:00) (18 - 18)  SpO2: 93% (12 Mar 2022 04:00) (93% - 94%)    PHYSICAL EXAM:    GENERAL:   CHEST/LUNG:   HEART:   ABDOMEN:   EXTREMITIES:      MEDICATIONS  (STANDING):  amLODIPine   Tablet 5 milliGRAM(s) Oral daily  aspirin  chewable 81 milliGRAM(s) Oral daily  atorvastatin 20 milliGRAM(s) Oral at bedtime  carvedilol 25 milliGRAM(s) Oral every 12 hours  cilostazol 100 milliGRAM(s) Oral two times a day  dextrose 40% Gel 15 Gram(s) Oral once  dextrose 5%. 1000 milliLiter(s) (50 mL/Hr) IV Continuous <Continuous>  dextrose 50% Injectable 25 Gram(s) IV Push once  furosemide    Tablet 40 milliGRAM(s) Oral two times a day  glucagon  Injectable 1 milliGRAM(s) IntraMuscular once  heparin   Injectable 5000 Unit(s) SubCutaneous every 12 hours  hydrALAZINE 25 milliGRAM(s) Oral every 8 hours  insulin glargine Injectable (LANTUS) 15 Unit(s) SubCutaneous every morning  insulin lispro (ADMELOG) corrective regimen sliding scale   SubCutaneous three times a day before meals  insulin lispro Injectable (ADMELOG) 5 Unit(s) SubCutaneous three times a day before meals  isosorbide   dinitrate Tablet (ISORDIL) 20 milliGRAM(s) Oral three times a day  sodium bicarbonate 650 milliGRAM(s) Oral two times a day    MEDICATIONS  (PRN):  acetaminophen     Tablet .. 650 milliGRAM(s) Oral every 6 hours PRN Temp greater or equal to 38C (100.4F), Mild Pain (1 - 3)      Allergies    No Known Allergies    Intolerances          LABS:                          10.7   10.03 )-----------( 226      ( 12 Mar 2022 08:37 )             32.2     03-11    139  |  103  |  50.1<H>  ----------------------------<  194<H>  3.7   |  21.0<L>  |  4.60<H>    Ca    8.4<L>      11 Mar 2022 07:03  Mg     2.1         TPro  6.2<L>  /  Alb  3.4  /  TBili  0.4  /  DBili  x   /  AST  14  /  ALT  17  /  AlkPhos  125<H>  03-11    PT/INR - ( 11 Mar 2022 07:03 )   PT: 11.7 sec;   INR: 1.01 ratio         PTT - ( 10 Mar 2022 20:19 )  PTT:29.1 sec  Urinalysis Basic - ( 11 Mar 2022 20:50 )    Color: Yellow / Appearance: Clear / S.020 / pH: x  Gluc: x / Ketone: Negative  / Bili: Negative / Urobili: Negative mg/dL   Blood: x / Protein: 100 / Nitrite: Negative   Leuk Esterase: Negative / RBC: 0-2 /HPF / WBC 0-2 /HPF   Sq Epi: x / Non Sq Epi: Occasional / Bacteria: Occasional        RADIOLOGY & ADDITIONAL TESTS:   INTERVAL HPI/OVERNIGHT EVENTS:    CC: syncope, ischemic cardiomyopathy, hypertension, CAD, hyperlipidemia, stage 4 CKD    Chart and course reviewed. Patient seen and examined at bedside.  Wife at bedside, reports patient has had 3 falls in the past month and has been very unsteady.  Also concomitant poor appetite and trouble with stairs.  Patient feels weak and does not have an appetite today    Vital Signs Last 24 Hrs  T(C): 36.9 (12 Mar 2022 04:00), Max: 36.9 (12 Mar 2022 04:00)  T(F): 98.4 (12 Mar 2022 04:00), Max: 98.4 (12 Mar 2022 04:00)  HR: 90 (12 Mar 2022 04:00) (82 - 90)  BP: 123/64 (12 Mar 2022 04:00) (116/68 - 145/80)  BP(mean): --  RR: 18 (12 Mar 2022 04:00) (18 - 18)  SpO2: 93% (12 Mar 2022 04:00) (93% - 94%)    PHYSICAL EXAM:    GENERAL: alert, not in distress  CHEST/LUNG: b/l air entry  HEART: reg  ABDOMEN: soft, bs+. non tender  EXTREMITIES:  no edema, tenderness    MEDICATIONS  (STANDING):  amLODIPine   Tablet 5 milliGRAM(s) Oral daily  aspirin  chewable 81 milliGRAM(s) Oral daily  atorvastatin 20 milliGRAM(s) Oral at bedtime  carvedilol 25 milliGRAM(s) Oral every 12 hours  cilostazol 100 milliGRAM(s) Oral two times a day  dextrose 40% Gel 15 Gram(s) Oral once  dextrose 5%. 1000 milliLiter(s) (50 mL/Hr) IV Continuous <Continuous>  dextrose 50% Injectable 25 Gram(s) IV Push once  furosemide    Tablet 40 milliGRAM(s) Oral two times a day  glucagon  Injectable 1 milliGRAM(s) IntraMuscular once  heparin   Injectable 5000 Unit(s) SubCutaneous every 12 hours  hydrALAZINE 25 milliGRAM(s) Oral every 8 hours  insulin glargine Injectable (LANTUS) 15 Unit(s) SubCutaneous every morning  insulin lispro (ADMELOG) corrective regimen sliding scale   SubCutaneous three times a day before meals  insulin lispro Injectable (ADMELOG) 5 Unit(s) SubCutaneous three times a day before meals  isosorbide   dinitrate Tablet (ISORDIL) 20 milliGRAM(s) Oral three times a day  sodium bicarbonate 650 milliGRAM(s) Oral two times a day    MEDICATIONS  (PRN):  acetaminophen     Tablet .. 650 milliGRAM(s) Oral every 6 hours PRN Temp greater or equal to 38C (100.4F), Mild Pain (1 - 3)      Allergies    No Known Allergies    Intolerances          LABS:                          10.7   10.03 )-----------( 226      ( 12 Mar 2022 08:37 )             32.2     -    139  |  103  |  50.1<H>  ----------------------------<  194<H>  3.7   |  21.0<L>  |  4.60<H>    Ca    8.4<L>      11 Mar 2022 07:03  Mg     2.1         TPro  6.2<L>  /  Alb  3.4  /  TBili  0.4  /  DBili  x   /  AST  14  /  ALT  17  /  AlkPhos  125<H>  03-11    PT/INR - ( 11 Mar 2022 07:03 )   PT: 11.7 sec;   INR: 1.01 ratio         PTT - ( 10 Mar 2022 20:19 )  PTT:29.1 sec  Urinalysis Basic - ( 11 Mar 2022 20:50 )    Color: Yellow / Appearance: Clear / S.020 / pH: x  Gluc: x / Ketone: Negative  / Bili: Negative / Urobili: Negative mg/dL   Blood: x / Protein: 100 / Nitrite: Negative   Leuk Esterase: Negative / RBC: 0-2 /HPF / WBC 0-2 /HPF   Sq Epi: x / Non Sq Epi: Occasional / Bacteria: Occasional        RADIOLOGY & ADDITIONAL TESTS:

## 2022-03-13 LAB
ANION GAP SERPL CALC-SCNC: 18 MMOL/L — HIGH (ref 5–17)
BASOPHILS # BLD AUTO: 0.05 K/UL — SIGNIFICANT CHANGE UP (ref 0–0.2)
BASOPHILS NFR BLD AUTO: 0.5 % — SIGNIFICANT CHANGE UP (ref 0–2)
BUN SERPL-MCNC: 65.2 MG/DL — HIGH (ref 8–20)
CALCIUM SERPL-MCNC: 8.2 MG/DL — LOW (ref 8.6–10.2)
CHLORIDE SERPL-SCNC: 102 MMOL/L — SIGNIFICANT CHANGE UP (ref 98–107)
CHOLEST SERPL-MCNC: 140 MG/DL — SIGNIFICANT CHANGE UP
CO2 SERPL-SCNC: 20 MMOL/L — LOW (ref 22–29)
CREAT SERPL-MCNC: 6.31 MG/DL — HIGH (ref 0.5–1.3)
EGFR: 9 ML/MIN/1.73M2 — LOW
EOSINOPHIL # BLD AUTO: 0.15 K/UL — SIGNIFICANT CHANGE UP (ref 0–0.5)
EOSINOPHIL NFR BLD AUTO: 1.6 % — SIGNIFICANT CHANGE UP (ref 0–6)
GLUCOSE BLDC GLUCOMTR-MCNC: 111 MG/DL — HIGH (ref 70–99)
GLUCOSE BLDC GLUCOMTR-MCNC: 163 MG/DL — HIGH (ref 70–99)
GLUCOSE BLDC GLUCOMTR-MCNC: 165 MG/DL — HIGH (ref 70–99)
GLUCOSE BLDC GLUCOMTR-MCNC: 169 MG/DL — HIGH (ref 70–99)
GLUCOSE SERPL-MCNC: 113 MG/DL — HIGH (ref 70–99)
HCT VFR BLD CALC: 32.7 % — LOW (ref 39–50)
HDLC SERPL-MCNC: 24 MG/DL — LOW
HGB BLD-MCNC: 10.7 G/DL — LOW (ref 13–17)
IMM GRANULOCYTES NFR BLD AUTO: 0.5 % — SIGNIFICANT CHANGE UP (ref 0–1.5)
LIPID PNL WITH DIRECT LDL SERPL: 80 MG/DL — SIGNIFICANT CHANGE UP
LYMPHOCYTES # BLD AUTO: 1.83 K/UL — SIGNIFICANT CHANGE UP (ref 1–3.3)
LYMPHOCYTES # BLD AUTO: 19.9 % — SIGNIFICANT CHANGE UP (ref 13–44)
MAGNESIUM SERPL-MCNC: 1.8 MG/DL — SIGNIFICANT CHANGE UP (ref 1.8–2.6)
MCHC RBC-ENTMCNC: 26.6 PG — LOW (ref 27–34)
MCHC RBC-ENTMCNC: 32.7 GM/DL — SIGNIFICANT CHANGE UP (ref 32–36)
MCV RBC AUTO: 81.3 FL — SIGNIFICANT CHANGE UP (ref 80–100)
MONOCYTES # BLD AUTO: 0.51 K/UL — SIGNIFICANT CHANGE UP (ref 0–0.9)
MONOCYTES NFR BLD AUTO: 5.5 % — SIGNIFICANT CHANGE UP (ref 2–14)
NEUTROPHILS # BLD AUTO: 6.61 K/UL — SIGNIFICANT CHANGE UP (ref 1.8–7.4)
NEUTROPHILS NFR BLD AUTO: 72 % — SIGNIFICANT CHANGE UP (ref 43–77)
NON HDL CHOLESTEROL: 116 MG/DL — SIGNIFICANT CHANGE UP
PHOSPHATE SERPL-MCNC: 4.3 MG/DL — SIGNIFICANT CHANGE UP (ref 2.4–4.7)
PLATELET # BLD AUTO: 244 K/UL — SIGNIFICANT CHANGE UP (ref 150–400)
POTASSIUM SERPL-MCNC: 4 MMOL/L — SIGNIFICANT CHANGE UP (ref 3.5–5.3)
POTASSIUM SERPL-SCNC: 4 MMOL/L — SIGNIFICANT CHANGE UP (ref 3.5–5.3)
RBC # BLD: 4.02 M/UL — LOW (ref 4.2–5.8)
RBC # FLD: 14.1 % — SIGNIFICANT CHANGE UP (ref 10.3–14.5)
SODIUM SERPL-SCNC: 140 MMOL/L — SIGNIFICANT CHANGE UP (ref 135–145)
TRIGL SERPL-MCNC: 179 MG/DL — HIGH
WBC # BLD: 9.2 K/UL — SIGNIFICANT CHANGE UP (ref 3.8–10.5)
WBC # FLD AUTO: 9.2 K/UL — SIGNIFICANT CHANGE UP (ref 3.8–10.5)

## 2022-03-13 PROCEDURE — 99233 SBSQ HOSP IP/OBS HIGH 50: CPT

## 2022-03-13 RX ADMIN — Medication 1: at 17:22

## 2022-03-13 RX ADMIN — ATORVASTATIN CALCIUM 20 MILLIGRAM(S): 80 TABLET, FILM COATED ORAL at 21:54

## 2022-03-13 RX ADMIN — Medication 650 MILLIGRAM(S): at 06:01

## 2022-03-13 RX ADMIN — Medication 1: at 12:09

## 2022-03-13 RX ADMIN — CILOSTAZOL 100 MILLIGRAM(S): 100 TABLET ORAL at 17:19

## 2022-03-13 RX ADMIN — ISOSORBIDE DINITRATE 20 MILLIGRAM(S): 5 TABLET ORAL at 06:02

## 2022-03-13 RX ADMIN — ISOSORBIDE DINITRATE 20 MILLIGRAM(S): 5 TABLET ORAL at 17:19

## 2022-03-13 RX ADMIN — Medication 40 MILLIGRAM(S): at 17:20

## 2022-03-13 RX ADMIN — Medication 25 MILLIGRAM(S): at 21:54

## 2022-03-13 RX ADMIN — INSULIN GLARGINE 15 UNIT(S): 100 INJECTION, SOLUTION SUBCUTANEOUS at 09:06

## 2022-03-13 RX ADMIN — Medication 650 MILLIGRAM(S): at 17:19

## 2022-03-13 RX ADMIN — HEPARIN SODIUM 5000 UNIT(S): 5000 INJECTION INTRAVENOUS; SUBCUTANEOUS at 17:20

## 2022-03-13 RX ADMIN — Medication 5 UNIT(S): at 09:07

## 2022-03-13 RX ADMIN — Medication 81 MILLIGRAM(S): at 12:09

## 2022-03-13 RX ADMIN — Medication 5 UNIT(S): at 17:22

## 2022-03-13 RX ADMIN — Medication 25 MILLIGRAM(S): at 15:26

## 2022-03-13 RX ADMIN — Medication 40 MILLIGRAM(S): at 06:01

## 2022-03-13 RX ADMIN — Medication 650 MILLIGRAM(S): at 06:00

## 2022-03-13 RX ADMIN — Medication 5 UNIT(S): at 12:08

## 2022-03-13 RX ADMIN — AMLODIPINE BESYLATE 5 MILLIGRAM(S): 2.5 TABLET ORAL at 06:01

## 2022-03-13 RX ADMIN — CARVEDILOL PHOSPHATE 25 MILLIGRAM(S): 80 CAPSULE, EXTENDED RELEASE ORAL at 17:20

## 2022-03-13 RX ADMIN — ISOSORBIDE DINITRATE 20 MILLIGRAM(S): 5 TABLET ORAL at 12:29

## 2022-03-13 RX ADMIN — HEPARIN SODIUM 5000 UNIT(S): 5000 INJECTION INTRAVENOUS; SUBCUTANEOUS at 06:00

## 2022-03-13 RX ADMIN — CARVEDILOL PHOSPHATE 25 MILLIGRAM(S): 80 CAPSULE, EXTENDED RELEASE ORAL at 06:01

## 2022-03-13 RX ADMIN — CILOSTAZOL 100 MILLIGRAM(S): 100 TABLET ORAL at 06:01

## 2022-03-13 RX ADMIN — Medication 25 MILLIGRAM(S): at 06:01

## 2022-03-13 NOTE — PROGRESS NOTE ADULT - ASSESSMENT
67 yr old male with CAD s/p CABG, peripheral arterial disease, diabetes mellitus, TIA, hypertension, hyperlipidemia, renal cancer s/p nephrectomy, CKD stage 4, ischemic cardiomyopathy admitted after a pre syncopal episode at home. Patient reported feeling weak while at the dinner table and slumped over. In ED, labs and imaging done, troponin negative, CT head without acute changes. Cardiology was consulted. ECHO and carotid US was ordered.     1. Pre syncope:  CT on admission negative  orthostasis negative  pending MRI brain and ECHO    2. Falls:  Unclear etiology  MRI brain  PT evaluation  fall precautions    3. CAD s/p CABG:  Continue aspirin, statin, coreg and nitrates    4. Hypertension:  Continue Coreg and nitrates.    5. CKD stage 4:  noted to have worsening renal function  ? cause of fatigue  will consult nephrology    6. Diabetes mellitus:  Monitor fingersticks  sliding scale coverage.    7.PVD:  Continue Cilostazol.    8. DVT ppx:  Heparin.    Discussed with patient, wife and RN.

## 2022-03-13 NOTE — PROGRESS NOTE ADULT - SUBJECTIVE AND OBJECTIVE BOX
INTERVAL HPI/OVERNIGHT EVENTS:    CC: syncope, ischemic cardiomyopathy, hypertension, CAD, hyperlipidemia, stage 4 CKD    No overnight events, feels the same, appetite better today.    Vital Signs Last 24 Hrs  T(C): 36.4 (13 Mar 2022 11:45), Max: 37 (13 Mar 2022 05:24)  T(F): 97.6 (13 Mar 2022 11:45), Max: 98.6 (13 Mar 2022 05:24)  HR: 83 (13 Mar 2022 11:45) (67 - 98)  BP: 127/69 (13 Mar 2022 11:45) (111/67 - 127/69)  BP(mean): --  RR: 19 (13 Mar 2022 11:45) (18 - 19)  SpO2: 95% (13 Mar 2022 11:45) (92% - 97%)    PHYSICAL EXAM:    GENERAL: alert, not in distress  CHEST/LUNG: b/l air entry  HEART: reg  ABDOMEN: soft, bs+  EXTREMITIES:  no edema, tenderness    MEDICATIONS  (STANDING):  amLODIPine   Tablet 5 milliGRAM(s) Oral daily  aspirin  chewable 81 milliGRAM(s) Oral daily  atorvastatin 20 milliGRAM(s) Oral at bedtime  carvedilol 25 milliGRAM(s) Oral every 12 hours  cilostazol 100 milliGRAM(s) Oral two times a day  dextrose 40% Gel 15 Gram(s) Oral once  dextrose 5%. 1000 milliLiter(s) (50 mL/Hr) IV Continuous <Continuous>  dextrose 50% Injectable 25 Gram(s) IV Push once  furosemide    Tablet 40 milliGRAM(s) Oral two times a day  glucagon  Injectable 1 milliGRAM(s) IntraMuscular once  heparin   Injectable 5000 Unit(s) SubCutaneous every 12 hours  hydrALAZINE 25 milliGRAM(s) Oral every 8 hours  insulin glargine Injectable (LANTUS) 15 Unit(s) SubCutaneous every morning  insulin lispro (ADMELOG) corrective regimen sliding scale   SubCutaneous three times a day before meals  insulin lispro Injectable (ADMELOG) 5 Unit(s) SubCutaneous three times a day before meals  isosorbide   dinitrate Tablet (ISORDIL) 20 milliGRAM(s) Oral three times a day  sodium bicarbonate 650 milliGRAM(s) Oral two times a day    MEDICATIONS  (PRN):  acetaminophen     Tablet .. 650 milliGRAM(s) Oral every 6 hours PRN Temp greater or equal to 38C (100.4F), Mild Pain (1 - 3)      Allergies    No Known Allergies    Intolerances          LABS:                          10.7   9.20  )-----------( 244      ( 13 Mar 2022 07:30 )             32.7     03-13    140  |  102  |  65.2<H>  ----------------------------<  113<H>  4.0   |  20.0<L>  |  6.31<H>    Ca    8.2<L>      13 Mar 2022 07:30  Phos  4.3     03-13  Mg     1.8     03-13        Urinalysis Basic - ( 11 Mar 2022 20:50 )    Color: Yellow / Appearance: Clear / S.020 / pH: x  Gluc: x / Ketone: Negative  / Bili: Negative / Urobili: Negative mg/dL   Blood: x / Protein: 100 / Nitrite: Negative   Leuk Esterase: Negative / RBC: 0-2 /HPF / WBC 0-2 /HPF   Sq Epi: x / Non Sq Epi: Occasional / Bacteria: Occasional        RADIOLOGY & ADDITIONAL TESTS:

## 2022-03-14 LAB
ALBUMIN SERPL ELPH-MCNC: 3 G/DL — LOW (ref 3.3–5.2)
ALP SERPL-CCNC: 102 U/L — SIGNIFICANT CHANGE UP (ref 40–120)
ALT FLD-CCNC: 9 U/L — SIGNIFICANT CHANGE UP
ANION GAP SERPL CALC-SCNC: 17 MMOL/L — SIGNIFICANT CHANGE UP (ref 5–17)
AST SERPL-CCNC: 9 U/L — SIGNIFICANT CHANGE UP
BASOPHILS # BLD AUTO: 0.06 K/UL — SIGNIFICANT CHANGE UP (ref 0–0.2)
BASOPHILS NFR BLD AUTO: 0.7 % — SIGNIFICANT CHANGE UP (ref 0–2)
BILIRUB SERPL-MCNC: 0.2 MG/DL — LOW (ref 0.4–2)
BUN SERPL-MCNC: 72.9 MG/DL — HIGH (ref 8–20)
CALCIUM SERPL-MCNC: 8.1 MG/DL — LOW (ref 8.6–10.2)
CHLORIDE SERPL-SCNC: 103 MMOL/L — SIGNIFICANT CHANGE UP (ref 98–107)
CO2 SERPL-SCNC: 21 MMOL/L — LOW (ref 22–29)
CREAT SERPL-MCNC: 7.24 MG/DL — HIGH (ref 0.5–1.3)
EGFR: 8 ML/MIN/1.73M2 — LOW
EOSINOPHIL # BLD AUTO: 0.27 K/UL — SIGNIFICANT CHANGE UP (ref 0–0.5)
EOSINOPHIL NFR BLD AUTO: 3.1 % — SIGNIFICANT CHANGE UP (ref 0–6)
GLUCOSE BLDC GLUCOMTR-MCNC: 100 MG/DL — HIGH (ref 70–99)
GLUCOSE BLDC GLUCOMTR-MCNC: 127 MG/DL — HIGH (ref 70–99)
GLUCOSE BLDC GLUCOMTR-MCNC: 143 MG/DL — HIGH (ref 70–99)
GLUCOSE BLDC GLUCOMTR-MCNC: 262 MG/DL — HIGH (ref 70–99)
GLUCOSE SERPL-MCNC: 128 MG/DL — HIGH (ref 70–99)
HCT VFR BLD CALC: 31.8 % — LOW (ref 39–50)
HGB BLD-MCNC: 10.4 G/DL — LOW (ref 13–17)
IMM GRANULOCYTES NFR BLD AUTO: 0.6 % — SIGNIFICANT CHANGE UP (ref 0–1.5)
LYMPHOCYTES # BLD AUTO: 1.75 K/UL — SIGNIFICANT CHANGE UP (ref 1–3.3)
LYMPHOCYTES # BLD AUTO: 20.2 % — SIGNIFICANT CHANGE UP (ref 13–44)
MAGNESIUM SERPL-MCNC: 1.8 MG/DL — SIGNIFICANT CHANGE UP (ref 1.8–2.6)
MCHC RBC-ENTMCNC: 26.7 PG — LOW (ref 27–34)
MCHC RBC-ENTMCNC: 32.7 GM/DL — SIGNIFICANT CHANGE UP (ref 32–36)
MCV RBC AUTO: 81.7 FL — SIGNIFICANT CHANGE UP (ref 80–100)
MONOCYTES # BLD AUTO: 0.6 K/UL — SIGNIFICANT CHANGE UP (ref 0–0.9)
MONOCYTES NFR BLD AUTO: 6.9 % — SIGNIFICANT CHANGE UP (ref 2–14)
NEUTROPHILS # BLD AUTO: 5.95 K/UL — SIGNIFICANT CHANGE UP (ref 1.8–7.4)
NEUTROPHILS NFR BLD AUTO: 68.5 % — SIGNIFICANT CHANGE UP (ref 43–77)
PHOSPHATE SERPL-MCNC: 3.9 MG/DL — SIGNIFICANT CHANGE UP (ref 2.4–4.7)
PLATELET # BLD AUTO: 218 K/UL — SIGNIFICANT CHANGE UP (ref 150–400)
POTASSIUM SERPL-MCNC: 3.6 MMOL/L — SIGNIFICANT CHANGE UP (ref 3.5–5.3)
POTASSIUM SERPL-SCNC: 3.6 MMOL/L — SIGNIFICANT CHANGE UP (ref 3.5–5.3)
PROT SERPL-MCNC: 5.5 G/DL — LOW (ref 6.6–8.7)
RBC # BLD: 3.89 M/UL — LOW (ref 4.2–5.8)
RBC # FLD: 14.1 % — SIGNIFICANT CHANGE UP (ref 10.3–14.5)
SODIUM SERPL-SCNC: 141 MMOL/L — SIGNIFICANT CHANGE UP (ref 135–145)
WBC # BLD: 8.68 K/UL — SIGNIFICANT CHANGE UP (ref 3.8–10.5)
WBC # FLD AUTO: 8.68 K/UL — SIGNIFICANT CHANGE UP (ref 3.8–10.5)

## 2022-03-14 PROCEDURE — 76775 US EXAM ABDO BACK WALL LIM: CPT | Mod: 26

## 2022-03-14 PROCEDURE — 99233 SBSQ HOSP IP/OBS HIGH 50: CPT

## 2022-03-14 RX ADMIN — Medication 25 MILLIGRAM(S): at 13:29

## 2022-03-14 RX ADMIN — Medication 5 UNIT(S): at 17:58

## 2022-03-14 RX ADMIN — HEPARIN SODIUM 5000 UNIT(S): 5000 INJECTION INTRAVENOUS; SUBCUTANEOUS at 17:58

## 2022-03-14 RX ADMIN — ISOSORBIDE DINITRATE 20 MILLIGRAM(S): 5 TABLET ORAL at 11:43

## 2022-03-14 RX ADMIN — Medication 650 MILLIGRAM(S): at 17:58

## 2022-03-14 RX ADMIN — CARVEDILOL PHOSPHATE 25 MILLIGRAM(S): 80 CAPSULE, EXTENDED RELEASE ORAL at 05:27

## 2022-03-14 RX ADMIN — Medication 650 MILLIGRAM(S): at 05:27

## 2022-03-14 RX ADMIN — Medication 5 UNIT(S): at 12:49

## 2022-03-14 RX ADMIN — ISOSORBIDE DINITRATE 20 MILLIGRAM(S): 5 TABLET ORAL at 05:27

## 2022-03-14 RX ADMIN — Medication 40 MILLIGRAM(S): at 05:27

## 2022-03-14 RX ADMIN — ISOSORBIDE DINITRATE 20 MILLIGRAM(S): 5 TABLET ORAL at 17:58

## 2022-03-14 RX ADMIN — INSULIN GLARGINE 15 UNIT(S): 100 INJECTION, SOLUTION SUBCUTANEOUS at 08:51

## 2022-03-14 RX ADMIN — Medication 81 MILLIGRAM(S): at 11:42

## 2022-03-14 RX ADMIN — Medication 650 MILLIGRAM(S): at 12:31

## 2022-03-14 RX ADMIN — Medication 25 MILLIGRAM(S): at 22:12

## 2022-03-14 RX ADMIN — CILOSTAZOL 100 MILLIGRAM(S): 100 TABLET ORAL at 05:27

## 2022-03-14 RX ADMIN — CARVEDILOL PHOSPHATE 25 MILLIGRAM(S): 80 CAPSULE, EXTENDED RELEASE ORAL at 17:58

## 2022-03-14 RX ADMIN — Medication 5 UNIT(S): at 08:42

## 2022-03-14 RX ADMIN — ATORVASTATIN CALCIUM 20 MILLIGRAM(S): 80 TABLET, FILM COATED ORAL at 22:12

## 2022-03-14 RX ADMIN — CILOSTAZOL 100 MILLIGRAM(S): 100 TABLET ORAL at 17:58

## 2022-03-14 RX ADMIN — HEPARIN SODIUM 5000 UNIT(S): 5000 INJECTION INTRAVENOUS; SUBCUTANEOUS at 05:32

## 2022-03-14 RX ADMIN — Medication 25 MILLIGRAM(S): at 05:27

## 2022-03-14 RX ADMIN — AMLODIPINE BESYLATE 5 MILLIGRAM(S): 2.5 TABLET ORAL at 05:28

## 2022-03-14 NOTE — PROGRESS NOTE ADULT - ASSESSMENT
67 yr old male with CAD s/p CABG, peripheral arterial disease, diabetes mellitus, TIA, hypertension, hyperlipidemia, renal cancer s/p nephrectomy, CKD stage 4, ischemic cardiomyopathy admitted after a pre syncopal episode at home. Patient reported feeling weak while at the dinner table and slumped over. In ED, labs and imaging done, troponin negative, CT head without acute changes. Cardiology was consulted. ECHO and carotid US was ordered.     1. Pre syncope:  CT on admission negative  orthostasis negative  pending MRI brain and ECHO    2. Falls:  Unclear etiology  MRI brain  PT evaluation  fall precautions    3. CAD s/p CABG:  Continue aspirin, statin, coreg and nitrates    4. Hypertension:  Continue Coreg and nitrates.    5. CKD stage 4:  worsening renal function  nephrology consulted.    6. Diabetes mellitus:  Monitor fingersticks  sliding scale coverage.    7.PVD:  Continue Cilostazol.    8. DVT ppx:  Heparin.    Discussed with patient and RN.   67 yr old male with CAD s/p CABG, peripheral arterial disease, diabetes mellitus, TIA, hypertension, hyperlipidemia, renal cancer s/p nephrectomy, CKD stage 4, ischemic cardiomyopathy admitted after a pre syncopal episode at home. Patient reported feeling weak while at the dinner table and slumped over. In ED, labs and imaging done, troponin negative, CT head without acute changes. Cardiology was consulted. ECHO and carotid US was ordered.     1. Pre syncope:  CT on admission negative  orthostasis negative  pending MRI brain and ECHO    2. Falls:  Unclear etiology  MRI brain  PT evaluation  fall precautions    3. CAD s/p CABG:  Continue aspirin, statin, coreg and nitrates    4. Hypertension:  Continue Coreg and nitrates.    5. CKD stage 4:  worsening renal function  nephrology consulted.    6. Diabetes mellitus:  Monitor fingersticks  sliding scale coverage.    7.PVD:  Continue Cilostazol.    8. DVT ppx:  Heparin.    Discussed with patient and RN.  Called to update wife, no answer.

## 2022-03-14 NOTE — PHYSICAL THERAPY INITIAL EVALUATION ADULT - GAIT DEVIATIONS NOTED, PT EVAL
LOB x 2 with turns, assist to right self/decreased step length/decreased stride length/decreased weight-shifting ability

## 2022-03-14 NOTE — PROGRESS NOTE ADULT - ASSESSMENT
66 yo male was brought ot he ED by EMS for syncope. Pt states that he was sitting at the counter eating dinner at 7PM today, when he suddenly felt weak and slumped over on the counter.     3/14/22:  Patient sitting up in bed eating breakfast, denies any further dizziness or chest pain

## 2022-03-14 NOTE — CONSULT NOTE ADULT - SUBJECTIVE AND OBJECTIVE BOX
Patient is a 67y old  Male who presents with a chief complaint of Syncope (13 Mar 2022 13:49)      HPI:  68 yo male with PMH of CAD s/p CABG, PAD, DM2,  TIA, HLD, HTN, renal Ca s/p L nephrectomy 10/2020, CKD 4, ischemic CMP, was brought ot he ED by EMS for syncope. Pt states that he was sitting at the counter eating dinner at 7PM today, when he suddenly felt weak and slumped over on the counter. Per wife, pt was unresponsive with eyes rolled back. Pt states that he was awake during the episode but just felt very weak. Episode lasted ~10 minutes. Pt currently back to baseline. Denies prodromal chest pain, palpitations, shortness of breath. No recent illness; however notes that he has had intermittent dizziness upon standing up over the past week.     In the ED  vitals stable  EKG unchanged  CT head negative (11 Mar 2022 00:45)    Interim noted . Cardio follow up noted .   Patient has known CKD IV transitioning to V  Last seen by us in the hospital Jan 2022  Baseline creat @ 5.5 - 5.7   It was recommended that he get an AVF placed , but was deferred due to vein trauma in hospital  Syncope w/u ongoing , awaits MRI   Wife at bedside   + Malaise , Poor sleeping , lethargy , poor appetite   Needs to be kept on Lasix or goes into CHF       PAST MEDICAL & SURGICAL HISTORY:  DM (diabetes mellitus)    HTN (hypertension)    High cholesterol    Myocardial infarction  (Coronary angiogram 2014)    Acute on chronic congestive heart failure, unspecified congestive heart failure type    S/P coronary artery bypass with three autogenous grafts        FAMILY HISTORY:  Family history of heart disease (Sibling)        Social History:    MEDICATIONS  (STANDING):  amLODIPine   Tablet 5 milliGRAM(s) Oral daily  aspirin  chewable 81 milliGRAM(s) Oral daily  atorvastatin 20 milliGRAM(s) Oral at bedtime  carvedilol 25 milliGRAM(s) Oral every 12 hours  cilostazol 100 milliGRAM(s) Oral two times a day  dextrose 40% Gel 15 Gram(s) Oral once  dextrose 5%. 1000 milliLiter(s) (50 mL/Hr) IV Continuous <Continuous>  dextrose 50% Injectable 25 Gram(s) IV Push once  furosemide    Tablet 40 milliGRAM(s) Oral two times a day  glucagon  Injectable 1 milliGRAM(s) IntraMuscular once  heparin   Injectable 5000 Unit(s) SubCutaneous every 12 hours  hydrALAZINE 25 milliGRAM(s) Oral every 8 hours  insulin glargine Injectable (LANTUS) 15 Unit(s) SubCutaneous every morning  insulin lispro (ADMELOG) corrective regimen sliding scale   SubCutaneous three times a day before meals  insulin lispro Injectable (ADMELOG) 5 Unit(s) SubCutaneous three times a day before meals  isosorbide   dinitrate Tablet (ISORDIL) 20 milliGRAM(s) Oral three times a day  sodium bicarbonate 650 milliGRAM(s) Oral two times a day    MEDICATIONS  (PRN):  acetaminophen     Tablet .. 650 milliGRAM(s) Oral every 6 hours PRN Temp greater or equal to 38C (100.4F), Mild Pain (1 - 3)      Allergies    No Known Allergies    Intolerances      Vital Signs Last 24 Hrs  T(C): 36.7 (14 Mar 2022 10:46), Max: 36.7 (14 Mar 2022 04:05)  T(F): 98 (14 Mar 2022 10:46), Max: 98 (14 Mar 2022 04:05)  HR: 80 (14 Mar 2022 10:46) (80 - 95)  BP: 130/67 (14 Mar 2022 10:46) (130/62 - 158/67)  BP(mean): --  RR: 18 (14 Mar 2022 10:46) (18 - 19)  SpO2: 96% (14 Mar 2022 10:46) (93% - 96%)  Daily     Daily   I&O's Detail    I&O's Summary      PHYSICAL EXAM:    HEAD:  no edema , anicteric   NECK: Supple, No JVD,   CHEST/LUNG: EAE , few basilar crackles   HEART: Regular rate and rhythm; No gallop or rub   ABDOMEN: Soft, Nontender,   EXTREMITIES: warm , no edema     LABS:                        10.4   8.68  )-----------( 218      ( 14 Mar 2022 06:48 )             31.8     03-14    141  |  103  |  72.9<H>  ----------------------------<  128<H>  3.6   |  21.0<L>  |  7.24<H>    Ca    8.1<L>      14 Mar 2022 06:48  Phos  3.9     03-14  Mg     1.8     03-14    TPro  5.5<L>  /  Alb  3.0<L>  /  TBili  0.2<L>  /  DBili  x   /  AST  9   /  ALT  9   /  AlkPhos  102  03-14        Magnesium, Serum: 1.8 mg/dL (03-14 @ 06:48)  Phosphorus Level, Serum: 3.9 mg/dL (03-14 @ 06:48)      RADIOLOGY & ADDITIONAL TESTS:

## 2022-03-14 NOTE — PHYSICAL THERAPY INITIAL EVALUATION ADULT - PERTINENT HX OF CURRENT PROBLEM, REHAB EVAL
Pt presents to [facility] with reports of syncopal episode and increased frequency of falls.

## 2022-03-14 NOTE — PHYSICAL THERAPY INITIAL EVALUATION ADULT - ADDITIONAL COMMENTS
Pt seen for PT, Czech speaking - wife presents and pt requests wife assist in interpretation as needed. Pt has ~2STE and does not use an AD at baseline. per wife, pt has been losing balance and falling at home, requires assistance to get up from the ground.

## 2022-03-14 NOTE — CONSULT NOTE ADULT - ASSESSMENT
Advanced CKD - CAD / CABG , ICM , TIA   DM Nephropathy / HTN   Possibly some subtle uremic ssx   Normal renal sonogram 1/22  I had a long discussion with the wife and daughter Angelica on phone ( [phone] ) .   May have to consider initiation of HD   Clear CXR 3/10/ 22   Continue Hydral / Imdur ALR   can hold Lasx for now , as currently does not appear to be fluid overloaded   Check bladder scan and Repeat renal sonogram   If labs don't improve , may need to consider permacat placement and HD

## 2022-03-14 NOTE — PROGRESS NOTE ADULT - PROBLEM SELECTOR PLAN 1
-  - CT head from today shows multiple chronic lacunar  infarcts and chronic periventricular white matter small vessel ischemic disease.   - Neurology consult recommended.   - pending MR head   - Patient had started new medication Losartan 1-2 hours prior to pre-syncopal episode   - continue telemetry  - Maintain K+>4 and mag >2   - Carotid doppler with - 50-69% left internal carotid artery stenosis  - pending TTE for evaluation of cardiac function and any valvular abnormalities  - if TTE with preserved LV systolic function, no need for further inpatient cardiac workup

## 2022-03-14 NOTE — PROGRESS NOTE ADULT - SUBJECTIVE AND OBJECTIVE BOX
INTERVAL HPI/OVERNIGHT EVENTS:    CC:  syncope, ischemic cardiomyopathy, hypertension, CAD, hyperlipidemia, stage 4 CKD      No overnight events, reports fatigue.     Vital Signs Last 24 Hrs  T(C): 36.7 (14 Mar 2022 10:46), Max: 36.7 (14 Mar 2022 04:05)  T(F): 98 (14 Mar 2022 10:46), Max: 98 (14 Mar 2022 04:05)  HR: 80 (14 Mar 2022 10:46) (80 - 95)  BP: 130/67 (14 Mar 2022 10:46) (130/62 - 158/67)  BP(mean): --  RR: 18 (14 Mar 2022 10:46) (18 - 19)  SpO2: 96% (14 Mar 2022 10:46) (93% - 96%)    PHYSICAL EXAM:    GENERAL: alert, not in distress  CHEST/LUNG: b/l air entry  HEART: reg  ABDOMEN: soft, bs+  EXTREMITIES:  no edema, tenderness    MEDICATIONS  (STANDING):  amLODIPine   Tablet 5 milliGRAM(s) Oral daily  aspirin  chewable 81 milliGRAM(s) Oral daily  atorvastatin 20 milliGRAM(s) Oral at bedtime  carvedilol 25 milliGRAM(s) Oral every 12 hours  cilostazol 100 milliGRAM(s) Oral two times a day  dextrose 40% Gel 15 Gram(s) Oral once  dextrose 5%. 1000 milliLiter(s) (50 mL/Hr) IV Continuous <Continuous>  dextrose 50% Injectable 25 Gram(s) IV Push once  furosemide    Tablet 40 milliGRAM(s) Oral two times a day  glucagon  Injectable 1 milliGRAM(s) IntraMuscular once  heparin   Injectable 5000 Unit(s) SubCutaneous every 12 hours  hydrALAZINE 25 milliGRAM(s) Oral every 8 hours  insulin glargine Injectable (LANTUS) 15 Unit(s) SubCutaneous every morning  insulin lispro (ADMELOG) corrective regimen sliding scale   SubCutaneous three times a day before meals  insulin lispro Injectable (ADMELOG) 5 Unit(s) SubCutaneous three times a day before meals  isosorbide   dinitrate Tablet (ISORDIL) 20 milliGRAM(s) Oral three times a day  sodium bicarbonate 650 milliGRAM(s) Oral two times a day    MEDICATIONS  (PRN):  acetaminophen     Tablet .. 650 milliGRAM(s) Oral every 6 hours PRN Temp greater or equal to 38C (100.4F), Mild Pain (1 - 3)      Allergies    No Known Allergies    Intolerances          LABS:                          10.4   8.68  )-----------( 218      ( 14 Mar 2022 06:48 )             31.8     03-14    141  |  103  |  72.9<H>  ----------------------------<  128<H>  3.6   |  21.0<L>  |  7.24<H>    Ca    8.1<L>      14 Mar 2022 06:48  Phos  3.9     03-14  Mg     1.8     03-14    TPro  5.5<L>  /  Alb  3.0<L>  /  TBili  0.2<L>  /  DBili  x   /  AST  9   /  ALT  9   /  AlkPhos  102  03-14          RADIOLOGY & ADDITIONAL TESTS:

## 2022-03-14 NOTE — PROGRESS NOTE ADULT - PROBLEM SELECTOR PLAN 5
-  - HFpEF (LVEF-60%)  - p-BNP 1136 on admission  - Chest x-ray unremarkable  - euvolemic on exam  - Losartan and diuretics on hold secondary to Cr 7.24  - CKD IV/V  - appreciate nephrology consult  - Monitor on telemetry.    - Strict i/o and daily weights.   - Keep K > 4, Mg > 2.    - Monitor renal function

## 2022-03-14 NOTE — PROGRESS NOTE ADULT - PROBLEM SELECTOR PLAN 2
-   - s/p CABG 12/2014- LIMA-LAD reverse SVG to posterior lateral reverse SVG to OM  - Continue statin, bb, and Norvasc and hydralazine  - Continue Isordil  - Continue ASA  - lipid panel checked

## 2022-03-14 NOTE — PROGRESS NOTE ADULT - ATTENDING COMMENTS
-complaining for feeling lack of energy and fatigue even laying down  -BP stable no orthostatic documented yet, off losartan  -continue ASA/statin  -TTE and carotid Duplex pending  -will peripherally follow and no need further inpatient testing if Echo with preserved LV systolic function  -PT eval. discharge planning
near syncoope: never complete loss conciousness as per patient. Orthostatic check.  likely related to low BP and new meds. hold ARb for now.   old cerebrovascular accident . carotid stenosis. left ICA 50--69%.   MRi head pending.  repeat Orthostaic check.   No events on telemetry. outpatient 4 weeks event mmonitor.   recent  outpatient stress test : no significant ischemias     No further in-patient cardiac work-up/management is needed.  Follow-up in cardiology office in 2 weeks.

## 2022-03-14 NOTE — PROGRESS NOTE ADULT - SUBJECTIVE AND OBJECTIVE BOX
[facility]                                                         CARDIOLOGY AT [facility]                                                                  [address], [place] [address]                                                         Telephone: [phone]. Fax:[phone]                                                                             PROGRESS NOTE    Reason for follow up:   Update:       Review of symptoms:   Cardiac:  No chest pain. No dyspnea. No palpitations.  Respiratory: no cough. No dyspnea  Gastrointestinal: No diarrhea. No abdominal pain. No bleeding.   Neuro: No focal neuro complaints.    Vitals:  T(C): 36.7 (03-14-22 @ 10:46), Max: 36.7 (03-14-22 @ 04:05)  HR: 80 (03-14-22 @ 10:46) (80 - 95)  BP: 130/67 (03-14-22 @ 10:46) (130/62 - 158/67)  RR: 18 (03-14-22 @ 10:46) (18 - 19)  SpO2: 96% (03-14-22 @ 10:46) (93% - 96%)  Wt(kg): --  I&O's Summary    Weight (kg): 80.2 (03-12 @ 20:00)    PHYSICAL EXAM:  Appearance: Comfortable. No acute distress  HEENT:  Atraumatic. Normocephalic.  Normal oral mucosa  Neurologic: A & O x 3, no gross focal deficits.  Cardiovascular: RRR S1 S2, No murmur, no rubs/gallops. No JVD  Respiratory: Lungs clear to auscultation, unlabored   Gastrointestinal:  Soft, Non-tender, + BS  Lower Extremities: 2+ Peripheral Pulses, No clubbing, cyanosis, or edema  Psychiatry: Patient is calm. No agitation.   Skin: warm and dry.    CURRENT CARDIAC MEDICATIONS:  amLODIPine   Tablet 5 milliGRAM(s) Oral daily  carvedilol 25 milliGRAM(s) Oral every 12 hours  hydrALAZINE 25 milliGRAM(s) Oral every 8 hours  isosorbide   dinitrate Tablet (ISORDIL) 20 milliGRAM(s) Oral three times a day      CURRENT OTHER MEDICATIONS:  acetaminophen     Tablet .. 650 milliGRAM(s) Oral every 6 hours PRN Temp greater or equal to 38C (100.4F), Mild Pain (1 - 3)  atorvastatin 20 milliGRAM(s) Oral at bedtime  dextrose 40% Gel 15 Gram(s) Oral once, Stop order after: 1 Doses  dextrose 50% Injectable 25 Gram(s) IV Push once, Stop order after: 1 Doses  glucagon  Injectable 1 milliGRAM(s) IntraMuscular once, Stop order after: 1 Doses  insulin glargine Injectable (LANTUS) 15 Unit(s) SubCutaneous every morning  insulin lispro (ADMELOG) corrective regimen sliding scale   SubCutaneous three times a day before meals  insulin lispro Injectable (ADMELOG) 5 Unit(s) SubCutaneous three times a day before meals  aspirin  chewable 81 milliGRAM(s) Oral daily  cilostazol 100 milliGRAM(s) Oral two times a day  dextrose 5%. 1000 milliLiter(s) (50 mL/Hr) IV Continuous <Continuous>  heparin   Injectable 5000 Unit(s) SubCutaneous every 12 hours  sodium bicarbonate 650 milliGRAM(s) Oral two times a day      LABS:	 	  ( 12 Mar 2022 08:37 )  Troponin T  X    ,  CPK  X    , CKMB  X    , <H>    , ( 11 Mar 2022 07:03 )  Troponin T  0.08<H>,  CPK  X    , CKMB  X    , BNP X        , ( 10 Mar 2022 20:19 )  Troponin T  0.05 ,  CPK  X    , CKMB  X    , BNP 1136<H>                              10.4   8.68  )-----------( 218      ( 14 Mar 2022 06:48 )             31.8     03-14    141  |  103  |  72.9<H>  ----------------------------<  128<H>  3.6   |  21.0<L>  |  7.24<H>    Ca    8.1<L>      14 Mar 2022 06:48  Phos  3.9     03-14  Mg     1.8     03-14    TPro  5.5<L>  /  Alb  3.0<L>  /  TBili  0.2<L>  /  DBili  x   /  AST  9   /  ALT  9   /  AlkPhos  102  03-14    PT/INR/PTT ( 11 Mar 2022 07:03 )                       :                       :      11.7         :       X                     .        .                   .              .           .       1.01        .                                       Lipid Profile: Date: 03-13 @ 07:30  Total cholesterol 140; Direct LDL: --; HDL: 24; Triglycerides:179    HgA1c:   TSH:     TELEMETRY:   ECG:    DIAGNOSTIC TESTING:  [ ] Echocardiogram:   [ ]  Catheterization:  [ ] Stress Test:    OTHER: 	                                                                [facility]                                                         CARDIOLOGY AT [facility]                                                                  [address] [place] [address]                                                         Telephone: [phone]. Fax:[phone]                                                                             PROGRESS NOTE    Reason for follow up: Syncope  Update: sitting comfortably in bed eating breakfast      Review of symptoms:   Cardiac:  No chest pain. No dyspnea. No palpitations.  Respiratory: no cough. No dyspnea  Gastrointestinal: No diarrhea. No abdominal pain. No bleeding.   Neuro: No focal neuro complaints.    Vitals:  T(C): 36.7 (03-14-22 @ 10:46), Max: 36.7 (03-14-22 @ 04:05)  HR: 80 (03-14-22 @ 10:46) (80 - 95)  BP: 130/67 (03-14-22 @ 10:46) (130/62 - 158/67)  RR: 18 (03-14-22 @ 10:46) (18 - 19)  SpO2: 96% (03-14-22 @ 10:46) (93% - 96%)  Wt(kg): --  I&O's Summary    Weight (kg): 80.2 (03-12 @ 20:00)    PHYSICAL EXAM:  Appearance: Comfortable. No acute distress  HEENT:  Atraumatic. Normocephalic.  Normal oral mucosa  Neurologic: A & O x 3, no gross focal deficits.  Cardiovascular: RRR S1 S2, No murmur, no rubs/gallops. No JVD  Respiratory: Lungs clear to auscultation, unlabored   Gastrointestinal:  Soft, Non-tender, + BS  Lower Extremities: 2+ Peripheral Pulses, No clubbing, cyanosis, or edema  Psychiatry: Patient is calm. No agitation.   Skin: warm and dry.    CURRENT CARDIAC MEDICATIONS:  amLODIPine   Tablet 5 milliGRAM(s) Oral daily  carvedilol 25 milliGRAM(s) Oral every 12 hours  hydrALAZINE 25 milliGRAM(s) Oral every 8 hours  isosorbide   dinitrate Tablet (ISORDIL) 20 milliGRAM(s) Oral three times a day      CURRENT OTHER MEDICATIONS:  acetaminophen     Tablet .. 650 milliGRAM(s) Oral every 6 hours PRN Temp greater or equal to 38C (100.4F), Mild Pain (1 - 3)  atorvastatin 20 milliGRAM(s) Oral at bedtime  dextrose 40% Gel 15 Gram(s) Oral once, Stop order after: 1 Doses  dextrose 50% Injectable 25 Gram(s) IV Push once, Stop order after: 1 Doses  glucagon  Injectable 1 milliGRAM(s) IntraMuscular once, Stop order after: 1 Doses  insulin glargine Injectable (LANTUS) 15 Unit(s) SubCutaneous every morning  insulin lispro (ADMELOG) corrective regimen sliding scale   SubCutaneous three times a day before meals  insulin lispro Injectable (ADMELOG) 5 Unit(s) SubCutaneous three times a day before meals  aspirin  chewable 81 milliGRAM(s) Oral daily  cilostazol 100 milliGRAM(s) Oral two times a day  dextrose 5%. 1000 milliLiter(s) (50 mL/Hr) IV Continuous <Continuous>  heparin   Injectable 5000 Unit(s) SubCutaneous every 12 hours  sodium bicarbonate 650 milliGRAM(s) Oral two times a day      LABS:	 	  ( 12 Mar 2022 08:37 )  Troponin T  X    ,  CPK  X    , CKMB  X    , <H>    , ( 11 Mar 2022 07:03 )  Troponin T  0.08<H>,  CPK  X    , CKMB  X    , BNP X        , ( 10 Mar 2022 20:19 )  Troponin T  0.05 ,  CPK  X    , CKMB  X    , BNP 1136<H>                              10.4   8.68  )-----------( 218      ( 14 Mar 2022 06:48 )             31.8     03-14    141  |  103  |  72.9<H>  ----------------------------<  128<H>  3.6   |  21.0<L>  |  7.24<H>    Ca    8.1<L>      14 Mar 2022 06:48  Phos  3.9     03-14  Mg     1.8     03-14    TPro  5.5<L>  /  Alb  3.0<L>  /  TBili  0.2<L>  /  DBili  x   /  AST  9   /  ALT  9   /  AlkPhos  102  03-14    PT/INR/PTT ( 11 Mar 2022 07:03 )                       :                       :      11.7         :       X                     .        .                   .              .           .       1.01        .                                       Lipid Profile: Date: 03-13 @ 07:30  Total cholesterol 140; Direct LDL: --; HDL: 24; Triglycerides:179    HgA1c:   TSH:     TELEMETRY: NSR no events overnight  ECG:    DIAGNOSTIC TESTING:  [ ] Echocardiogram:   < from: TTE Echo Complete w/o Contrast w/ Doppler (01.14.22 @ 19:06) >  PHYSICIAN INTERPRETATION:  Left Ventricle: The left ventricular internal cavity size is normal.  Global LV systolic function was normal. Left ventricular ejection   fraction, by visual estimation, is 55 to 60%. Spectral Doppler shows   normal pattern of LV diastolic filling.  Right Ventricle: The right ventricular size is normal. RV systolic   function is normal.  Left Atrium: Mildly enlarged left atrium.  Right Atrium: Normal right atrial size.  Pericardium: Trivial pericardial effusion is present. The pericardial   effusion is surrounding the apex.  Mitral Valve: Thickening and calcification of the anterior and posterior   mitral valve leaflets. There is mild mitral annular calcification. Mild   mitral valve regurgitation is seen.  Tricuspid Valve: The tricuspid valve is normal in structure. Moderate   tricuspid regurgitation is visualized. Estimated pulmonary artery   systolic pressure is 40.3 mmHg assuming a right atrial pressure of 5   mmHg, which is consistent with mild pulmonary hypertension.  Aortic Valve: The aortic valve is trileaflet. Sclerotic aortic valve with   normal opening. Trivial aortic valve regurgitation is seen.  Pulmonic Valve: The pulmonic valve was not well visualized. Trace   pulmonic valve regurgitation.  Aorta: The aortic root is normal in size and structure.  Pulmonary Artery: The pulmonary artery is of normal size and origin.  Venous: The inferior vena cava is normal. The inferior vena cava was   normal sized, with respiratory size variation greater than 50%.      Summary:   1. Technically difficult study.   2. Normal global left ventricular systolic function.   3. Left ventricular ejection fraction, by visual estimation, is 55 to   60%.   4. Mildly enlarged left atrium.   5. Normal right atrial size.   6.Mild mitral annular calcification.   7. Mild mitral valve regurgitation.   8. Thickening and calcification of the anterior and posterior mitral   valve leaflets.   9. Moderate tricuspid regurgitation.  10. Sclerotic aortic valve with normal opening.  11. Estimated pulmonary artery systolic pressure is 40.3 mmHg assuming a   right atrial pressure of 5 mmHg, which is consistent with mild pulmonary   hypertension.  12. Trivial pericardial effusion.    MD Junie Electronically signed on 1/15/2022 at 10:13:45 AM    < end of copied text >    [ ]  Catheterization:  [ ] Stress Test:    OTHER:

## 2022-03-14 NOTE — PHYSICAL THERAPY INITIAL EVALUATION ADULT - IMPAIRMENTS CONTRIBUTING TO GAIT DEVIATIONS, PT EVAL
Assisted with pelvic exam with Dr Huerta   
Pt given DC instructions. Pt verbalized understanding. Pt denies any needs at this time. Pt ambulates to lobby with out any difficulty. Vss.   
Pt updated on lab results/plan of care. No needs at this time. LIZETH US  
US called for long wait times. PT updated.   
impaired balance/decreased strength

## 2022-03-15 DIAGNOSIS — I63.9 CEREBRAL INFARCTION, UNSPECIFIED: ICD-10-CM

## 2022-03-15 LAB
ANION GAP SERPL CALC-SCNC: 14 MMOL/L — SIGNIFICANT CHANGE UP (ref 5–17)
BUN SERPL-MCNC: 74.1 MG/DL — HIGH (ref 8–20)
CALCIUM SERPL-MCNC: 8.6 MG/DL — SIGNIFICANT CHANGE UP (ref 8.6–10.2)
CHLORIDE SERPL-SCNC: 103 MMOL/L — SIGNIFICANT CHANGE UP (ref 98–107)
CO2 SERPL-SCNC: 22 MMOL/L — SIGNIFICANT CHANGE UP (ref 22–29)
CREAT SERPL-MCNC: 7.16 MG/DL — HIGH (ref 0.5–1.3)
EGFR: 8 ML/MIN/1.73M2 — LOW
GLUCOSE BLDC GLUCOMTR-MCNC: 131 MG/DL — HIGH (ref 70–99)
GLUCOSE BLDC GLUCOMTR-MCNC: 175 MG/DL — HIGH (ref 70–99)
GLUCOSE BLDC GLUCOMTR-MCNC: 180 MG/DL — HIGH (ref 70–99)
GLUCOSE BLDC GLUCOMTR-MCNC: 232 MG/DL — HIGH (ref 70–99)
GLUCOSE SERPL-MCNC: 152 MG/DL — HIGH (ref 70–99)
HCT VFR BLD CALC: 32.1 % — LOW (ref 39–50)
HGB BLD-MCNC: 10.7 G/DL — LOW (ref 13–17)
MCHC RBC-ENTMCNC: 26.9 PG — LOW (ref 27–34)
MCHC RBC-ENTMCNC: 33.3 GM/DL — SIGNIFICANT CHANGE UP (ref 32–36)
MCV RBC AUTO: 80.7 FL — SIGNIFICANT CHANGE UP (ref 80–100)
PHOSPHATE SERPL-MCNC: 3.9 MG/DL — SIGNIFICANT CHANGE UP (ref 2.4–4.7)
PLATELET # BLD AUTO: 238 K/UL — SIGNIFICANT CHANGE UP (ref 150–400)
POTASSIUM SERPL-MCNC: 3.8 MMOL/L — SIGNIFICANT CHANGE UP (ref 3.5–5.3)
POTASSIUM SERPL-SCNC: 3.8 MMOL/L — SIGNIFICANT CHANGE UP (ref 3.5–5.3)
RBC # BLD: 3.98 M/UL — LOW (ref 4.2–5.8)
RBC # FLD: 14.1 % — SIGNIFICANT CHANGE UP (ref 10.3–14.5)
SODIUM SERPL-SCNC: 139 MMOL/L — SIGNIFICANT CHANGE UP (ref 135–145)
WBC # BLD: 8.34 K/UL — SIGNIFICANT CHANGE UP (ref 3.8–10.5)
WBC # FLD AUTO: 8.34 K/UL — SIGNIFICANT CHANGE UP (ref 3.8–10.5)

## 2022-03-15 PROCEDURE — 99222 1ST HOSP IP/OBS MODERATE 55: CPT

## 2022-03-15 PROCEDURE — 70551 MRI BRAIN STEM W/O DYE: CPT | Mod: 26

## 2022-03-15 PROCEDURE — 99233 SBSQ HOSP IP/OBS HIGH 50: CPT

## 2022-03-15 RX ORDER — TUBERCULIN PURIFIED PROTEIN DERIVATIVE 5 [IU]/.1ML
5 INJECTION, SOLUTION INTRADERMAL ONCE
Refills: 0 | Status: COMPLETED | OUTPATIENT
Start: 2022-03-15 | End: 2022-03-15

## 2022-03-15 RX ADMIN — INSULIN GLARGINE 15 UNIT(S): 100 INJECTION, SOLUTION SUBCUTANEOUS at 09:52

## 2022-03-15 RX ADMIN — HEPARIN SODIUM 5000 UNIT(S): 5000 INJECTION INTRAVENOUS; SUBCUTANEOUS at 05:30

## 2022-03-15 RX ADMIN — Medication 1: at 13:04

## 2022-03-15 RX ADMIN — Medication 25 MILLIGRAM(S): at 13:07

## 2022-03-15 RX ADMIN — Medication 5 UNIT(S): at 13:03

## 2022-03-15 RX ADMIN — ATORVASTATIN CALCIUM 20 MILLIGRAM(S): 80 TABLET, FILM COATED ORAL at 22:09

## 2022-03-15 RX ADMIN — ISOSORBIDE DINITRATE 20 MILLIGRAM(S): 5 TABLET ORAL at 12:05

## 2022-03-15 RX ADMIN — ISOSORBIDE DINITRATE 20 MILLIGRAM(S): 5 TABLET ORAL at 05:30

## 2022-03-15 RX ADMIN — Medication 81 MILLIGRAM(S): at 12:05

## 2022-03-15 RX ADMIN — CILOSTAZOL 100 MILLIGRAM(S): 100 TABLET ORAL at 18:28

## 2022-03-15 RX ADMIN — Medication 650 MILLIGRAM(S): at 19:19

## 2022-03-15 RX ADMIN — Medication 25 MILLIGRAM(S): at 22:10

## 2022-03-15 RX ADMIN — Medication 25 MILLIGRAM(S): at 05:30

## 2022-03-15 RX ADMIN — AMLODIPINE BESYLATE 5 MILLIGRAM(S): 2.5 TABLET ORAL at 05:31

## 2022-03-15 RX ADMIN — Medication 5 UNIT(S): at 09:54

## 2022-03-15 RX ADMIN — ISOSORBIDE DINITRATE 20 MILLIGRAM(S): 5 TABLET ORAL at 18:28

## 2022-03-15 RX ADMIN — TUBERCULIN PURIFIED PROTEIN DERIVATIVE 5 UNIT(S): 5 INJECTION, SOLUTION INTRADERMAL at 18:29

## 2022-03-15 RX ADMIN — Medication 1: at 18:33

## 2022-03-15 RX ADMIN — Medication 5 UNIT(S): at 18:33

## 2022-03-15 RX ADMIN — Medication 650 MILLIGRAM(S): at 18:31

## 2022-03-15 RX ADMIN — HEPARIN SODIUM 5000 UNIT(S): 5000 INJECTION INTRAVENOUS; SUBCUTANEOUS at 18:32

## 2022-03-15 RX ADMIN — CARVEDILOL PHOSPHATE 25 MILLIGRAM(S): 80 CAPSULE, EXTENDED RELEASE ORAL at 05:31

## 2022-03-15 RX ADMIN — CARVEDILOL PHOSPHATE 25 MILLIGRAM(S): 80 CAPSULE, EXTENDED RELEASE ORAL at 18:29

## 2022-03-15 RX ADMIN — Medication 650 MILLIGRAM(S): at 05:30

## 2022-03-15 RX ADMIN — CILOSTAZOL 100 MILLIGRAM(S): 100 TABLET ORAL at 05:30

## 2022-03-15 NOTE — PROGRESS NOTE ADULT - PROBLEM SELECTOR PLAN 5
-  - HFpEF (LVEF-60%)  - p-BNP 1136 on admission  - Chest x-ray unremarkable  - euvolemic on exam  - Losartan and diuretics on hold secondary to Cr 7.24  - CKD IV/V  - appreciate nephrology consult  - Monitor on telemetry.    - Strict i/o and daily weights.   - Keep K > 4, Mg > 2.    - Monitor renal function -  - continue statin

## 2022-03-15 NOTE — PROGRESS NOTE ADULT - PROBLEM SELECTOR PLAN 4
-  - continue statin -  - normal renal sono from 1/22  - continue BB, isordil, nor vasc, BB and hydralazine

## 2022-03-15 NOTE — PROGRESS NOTE ADULT - SUBJECTIVE AND OBJECTIVE BOX
INTERVAL HPI/OVERNIGHT EVENTS:    CC:  syncope, ischemic cardiomyopathy, hypertension, CAD, hyperlipidemia, stage 4 CKD, CVA.    No overnight events, discussed MRI findings with patient.  He is undecided about HD.    Vital Signs Last 24 Hrs  T(C): 36.4 (15 Mar 2022 11:19), Max: 36.7 (14 Mar 2022 17:12)  T(F): 97.6 (15 Mar 2022 11:19), Max: 98 (14 Mar 2022 17:12)  HR: 87 (15 Mar 2022 11:19) (76 - 87)  BP: 144/66 (15 Mar 2022 11:19) (103/71 - 159/75)  BP(mean): --  RR: 18 (15 Mar 2022 11:19) (18 - 18)  SpO2: 93% (15 Mar 2022 11:19) (93% - 95%)    PHYSICAL EXAM:    GENERAL: alert, not in distress  CHEST/LUNG: b/l air entry  HEART: reg  ABDOMEN: soft, bs+  EXTREMITIES:  no edema, tenderness    MEDICATIONS  (STANDING):  amLODIPine   Tablet 5 milliGRAM(s) Oral daily  aspirin  chewable 81 milliGRAM(s) Oral daily  atorvastatin 20 milliGRAM(s) Oral at bedtime  carvedilol 25 milliGRAM(s) Oral every 12 hours  cilostazol 100 milliGRAM(s) Oral two times a day  dextrose 40% Gel 15 Gram(s) Oral once  dextrose 5%. 1000 milliLiter(s) (50 mL/Hr) IV Continuous <Continuous>  dextrose 50% Injectable 25 Gram(s) IV Push once  glucagon  Injectable 1 milliGRAM(s) IntraMuscular once  heparin   Injectable 5000 Unit(s) SubCutaneous every 12 hours  hydrALAZINE 25 milliGRAM(s) Oral every 8 hours  insulin glargine Injectable (LANTUS) 15 Unit(s) SubCutaneous every morning  insulin lispro (ADMELOG) corrective regimen sliding scale   SubCutaneous three times a day before meals  insulin lispro Injectable (ADMELOG) 5 Unit(s) SubCutaneous three times a day before meals  isosorbide   dinitrate Tablet (ISORDIL) 20 milliGRAM(s) Oral three times a day  PPD  5 Tuberculin Unit(s) Injectable 5 Unit(s) IntraDermal once  sodium bicarbonate 650 milliGRAM(s) Oral two times a day    MEDICATIONS  (PRN):  acetaminophen     Tablet .. 650 milliGRAM(s) Oral every 6 hours PRN Temp greater or equal to 38C (100.4F), Mild Pain (1 - 3)      Allergies    No Known Allergies    Intolerances          LABS:                          10.7   8.34  )-----------( 238      ( 15 Mar 2022 07:03 )             32.1     03-15    139  |  103  |  74.1<H>  ----------------------------<  152<H>  3.8   |  22.0  |  7.16<H>    Ca    8.6      15 Mar 2022 07:03  Phos  3.9     03-15  Mg     1.8     03-14    TPro  5.5<L>  /  Alb  3.0<L>  /  TBili  0.2<L>  /  DBili  x   /  AST  9   /  ALT  9   /  AlkPhos  102  03-14          RADIOLOGY & ADDITIONAL TESTS:

## 2022-03-15 NOTE — PROGRESS NOTE ADULT - SUBJECTIVE AND OBJECTIVE BOX
[facility]YS[facility]                                                         CARDIOLOGY AT [facility]                                                                  [address] [facility] [place]-[id number] [place] [address]- [facility]                                                         Telephone: [phone]. Fax:[phone]                                                                             PROGRESS NOTE    Reason for follow up: Syncope  Update:  MRi shows left parietal and frontal infarcts.  there is left carotid stneosis 50-69%.   Subj: I am ok.         Review of symptoms:   Cardiac:  No chest pain. No dyspnea. No palpitations.  Respiratory: no cough. No dyspnea  Gastrointestinal: No diarrhea. No abdominal pain. No bleeding.   Neuro: No focal neuro complaints.    Vitals:  T Vital Signs Last 24 Hrs  T(C): 36.6 (03-15-22 @ 17:02), Max: 36.6 (03-15-22 @ 17:02)  T(F): 97.8 (03-15-22 @ 17:02), Max: 97.8 (03-15-22 @ 17:02)  HR: 89 (03-15-22 @ 17:02) (81 - 89)  BP: 136/62 (03-15-22 @ 17:02) (103/71 - 153/72)  BP(mean): --  RR: 18 (03-15-22 @ 17:02) (18 - 18)  SpO2: 95% (03-15-22 @ 17:02) (93% - 95%)      PHYSICAL EXAM:  Appearance: Comfortable. No acute distress  HEENT:  Atraumatic. Normocephalic.  Normal oral mucosa  Neurologic: A & O x 3, no gross focal deficits.  Cardiovascular: RRR S1 S2, No murmur, no rubs/gallops. No JVD  Respiratory: Lungs clear to auscultation, unlabored   Gastrointestinal:  Soft, Non-tender, + BS  Lower Extremities: 2+ Peripheral Pulses, No clubbing, cyanosis, or edema  Psychiatry: Patient is calm. No agitation.   Skin: warm and dry.    CURRENT CARDIAC MEDICATIONS:   MEDICATIONS  (STANDING):  amLODIPine   Tablet 5 milliGRAM(s) Oral daily  carvedilol 25 milliGRAM(s) Oral every 12 hours  hydrALAZINE 25 milliGRAM(s) Oral every 8 hours  isosorbide   dinitrate Tablet (ISORDIL) 20 milliGRAM(s) Oral three times a day    aspirin  chewable  atorvastatin  cilostazol  dextrose 40% Gel  dextrose 5%.  dextrose 50% Injectable  glucagon  Injectable  heparin   Injectable  insulin glargine Injectable (LANTUS)  insulin lispro (ADMELOG) corrective regimen sliding scale  insulin lispro Injectable (ADMELOG)  sodium bicarbonate    PRN: acetaminophen     Tablet .. PRN        LABS:	 	                         10.7   8.34  )-----------( 238      ( 15 Mar 2022 07:03 )             32.1   N=x    ; L=x        15 Mar 2022 07:03    139    |  103    |  74.1   ----------------------------<  152    3.8     |  22.0   |  7.16     Ca    8.6        15 Mar 2022 07:03  Phos  3.9       15 Mar 2022 07:03  Mg     1.8       14 Mar 2022 06:48    TPro  5.5    /  Alb  3.0    /  TBili  0.2    /  DBili  x      /  AST  9      /  ALT  9      /  AlkPhos  102    14 Mar 2022 06:48      Hepatic panel: 14 Mar 2022 06:48  5.5   | 3.0                            0.2   | 0.2  /x                              9     | 9                                 /102  \par                                          .       1.01        .                                       Lipid Profile: Date: 03-13 @ 07:30  Total cholesterol 140; Direct LDL: --; HDL: 24; Triglycerides:179    HgA1c:   TSH:     TELEMETRY: NSR no events overnight  ECG:    DIAGNOSTIC TESTING:  [ ] Echocardiogram:   < from: TTE Echo Complete w/o Contrast w/ Doppler (01.14.22 @ 19:06) >  PHYSICIAN INTERPRETATION:  Left Ventricle: The left ventricular internal cavity size is normal.  Global LV systolic function was normal. Left ventricular ejection   fraction, by visual estimation, is 55 to 60%. Spectral Doppler shows   normal pattern of LV diastolic filling.  Right Ventricle: The right ventricular size is normal. RV systolic   function is normal.  Left Atrium: Mildly enlarged left atrium.  Right Atrium: Normal right atrial size.  Pericardium: Trivial pericardial effusion is present. The pericardial   effusion is surrounding the apex.  Mitral Valve: Thickening and calcification of the anterior and posterior   mitral valve leaflets. There is mild mitral annular calcification. Mild   mitral valve regurgitation is seen.  Tricuspid Valve: The tricuspid valve is normal in structure. Moderate   tricuspid regurgitation is visualized. Estimated pulmonary artery   systolic pressure is 40.3 mmHg assuming a right atrial pressure of 5   mmHg, which is consistent with mild pulmonary hypertension.  Aortic Valve: The aortic valve is trileaflet. Sclerotic aortic valve with   normal opening. Trivial aortic valve regurgitation is seen.  Pulmonic Valve: The pulmonic valve was not well visualized. Trace   pulmonic valve regurgitation.  Aorta: The aortic root is normal in size and structure.  Pulmonary Artery: The pulmonary artery is of normal size and origin.  Venous: The inferior vena cava is normal. The inferior vena cava was   normal sized, with respiratory size variation greater than 50%.      Summary:   1. Technically difficult study.   2. Normal global left ventricular systolic function.   3. Left ventricular ejection fraction, by visual estimation, is 55 to   60%.   4. Mildly enlarged left atrium.   5. Normal right atrial size.   6.Mild mitral annular calcification.   7. Mild mitral valve regurgitation.   8. Thickening and calcification of the anterior and posterior mitral   valve leaflets.   9. Moderate tricuspid regurgitation.  10. Sclerotic aortic valve with normal opening.  11. Estimated pulmonary artery systolic pressure is 40.3 mmHg assuming a   right atrial pressure of 5 mmHg, which is consistent with mild pulmonary   hypertension.  12. Trivial pericardial effusion.    MD Junie Electronically signed on 1/15/2022 at 10:13:45 AM    < end of copied text >    [ ]  Catheterization:  [ ] Stress Test:    OTHER: 	                                                                [facility]YS[facility]                                                         CARDIOLOGY AT [facility]                                                                  [address] [facility] [place]-[address] [place] [address]- [facility]                                                         Telephone: [phone]. Fax:[phone]                                                                             PROGRESS NOTE    Reason for follow up: Syncope  Update:  MRi shows left parietal and frontal infarcts.  there is left carotid stneosis 50-69%.   Subj: I am ok.         Review of symptoms:   Cardiac:  No chest pain. No dyspnea. No palpitations.  Respiratory: no cough. No dyspnea  Gastrointestinal: No diarrhea. No abdominal pain. No bleeding.   Neuro: No focal neuro complaints.    Vitals:  T Vital Signs Last 24 Hrs  T(C): 36.6 (03-15-22 @ 17:02), Max: 36.6 (03-15-22 @ 17:02)  T(F): 97.8 (03-15-22 @ 17:02), Max: 97.8 (03-15-22 @ 17:02)  HR: 89 (03-15-22 @ 17:02) (81 - 89)  BP: 136/62 (03-15-22 @ 17:02) (103/71 - 153/72)  BP(mean): --  RR: 18 (03-15-22 @ 17:02) (18 - 18)  SpO2: 95% (03-15-22 @ 17:02) (93% - 95%)      PHYSICAL EXAM:  Appearance: Comfortable. No acute distress  HEENT:  Atraumatic. Normocephalic.  Normal oral mucosa  Neurologic: A & O x 3,  left sided tremors and weakness.   Cardiovascular: RRR S1 S2, No murmur, no rubs/gallops. No JVD  Respiratory: Lungs clear to auscultation, unlabored   Gastrointestinal:  Soft, Non-tender, + BS  Lower Extremities: 2+ Peripheral Pulses, No clubbing, cyanosis, or edema  Psychiatry: Patient is calm. No agitation.   Skin: warm and dry.    CURRENT CARDIAC MEDICATIONS:   MEDICATIONS  (STANDING):  amLODIPine   Tablet 5 milliGRAM(s) Oral daily  carvedilol 25 milliGRAM(s) Oral every 12 hours  hydrALAZINE 25 milliGRAM(s) Oral every 8 hours  isosorbide   dinitrate Tablet (ISORDIL) 20 milliGRAM(s) Oral three times a day    aspirin  chewable  atorvastatin  cilostazol  dextrose 40% Gel  dextrose 5%.  dextrose 50% Injectable  glucagon  Injectable  heparin   Injectable  insulin glargine Injectable (LANTUS)  insulin lispro (ADMELOG) corrective regimen sliding scale  insulin lispro Injectable (ADMELOG)  sodium bicarbonate    PRN: acetaminophen     Tablet .. PRN        LABS:	 	                         10.7   8.34  )-----------( 238      ( 15 Mar 2022 07:03 )             32.1   N=x    ; L=x        15 Mar 2022 07:03    139    |  103    |  74.1   ----------------------------<  152    3.8     |  22.0   |  7.16     Ca    8.6        15 Mar 2022 07:03  Phos  3.9       15 Mar 2022 07:03  Mg     1.8       14 Mar 2022 06:48    TPro  5.5    /  Alb  3.0    /  TBili  0.2    /  DBili  x      /  AST  9      /  ALT  9      /  AlkPhos  102    14 Mar 2022 06:48      Hepatic panel: 14 Mar 2022 06:48  5.5   | 3.0                            0.2   | 0.2  /x                              9     | 9                                 /102  \par                                          .       1.01        .                                       Lipid Profile: Date: 03-13 @ 07:30  Total cholesterol 140; Direct LDL: --; HDL: 24; Triglycerides:179    HgA1c:   TSH:     TELEMETRY: NSR no events overnight  ECG:    DIAGNOSTIC TESTING:  [ ] Echocardiogram:   < from: TTE Echo Complete w/o Contrast w/ Doppler (01.14.22 @ 19:06) >  PHYSICIAN INTERPRETATION:  Left Ventricle: The left ventricular internal cavity size is normal.  Global LV systolic function was normal. Left ventricular ejection   fraction, by visual estimation, is 55 to 60%. Spectral Doppler shows   normal pattern of LV diastolic filling.  Right Ventricle: The right ventricular size is normal. RV systolic   function is normal.  Left Atrium: Mildly enlarged left atrium.  Right Atrium: Normal right atrial size.  Pericardium: Trivial pericardial effusion is present. The pericardial   effusion is surrounding the apex.  Mitral Valve: Thickening and calcification of the anterior and posterior   mitral valve leaflets. There is mild mitral annular calcification. Mild   mitral valve regurgitation is seen.  Tricuspid Valve: The tricuspid valve is normal in structure. Moderate   tricuspid regurgitation is visualized. Estimated pulmonary artery   systolic pressure is 40.3 mmHg assuming a right atrial pressure of 5   mmHg, which is consistent with mild pulmonary hypertension.  Aortic Valve: The aortic valve is trileaflet. Sclerotic aortic valve with   normal opening. Trivial aortic valve regurgitation is seen.  Pulmonic Valve: The pulmonic valve was not well visualized. Trace   pulmonic valve regurgitation.  Aorta: The aortic root is normal in size and structure.  Pulmonary Artery: The pulmonary artery is of normal size and origin.  Venous: The inferior vena cava is normal. The inferior vena cava was   normal sized, with respiratory size variation greater than 50%.      Summary:   1. Technically difficult study.   2. Normal global left ventricular systolic function.   3. Left ventricular ejection fraction, by visual estimation, is 55 to   60%.   4. Mildly enlarged left atrium.   5. Normal right atrial size.   6.Mild mitral annular calcification.   7. Mild mitral valve regurgitation.   8. Thickening and calcification of the anterior and posterior mitral   valve leaflets.   9. Moderate tricuspid regurgitation.  10. Sclerotic aortic valve with normal opening.  11. Estimated pulmonary artery systolic pressure is 40.3 mmHg assuming a   right atrial pressure of 5 mmHg, which is consistent with mild pulmonary   hypertension.  12. Trivial pericardial effusion.    MD Junie Electronically signed on 1/15/2022 at 10:13:45 AM    < end of copied text >    [ ]  Catheterization:  [ ] Stress Test:    OTHER: 	   MRi head   IMPRESSION:  FEW SMALL ACUTE INFARCTS LEFT PARIETAL GREATER THAN FRONTAL LOBES.    < end of copied text >

## 2022-03-15 NOTE — PROGRESS NOTE ADULT - ASSESSMENT
67 yr old male with CAD s/p CABG, peripheral arterial disease, diabetes mellitus, TIA, hypertension, hyperlipidemia, renal cancer s/p nephrectomy, CKD stage 4, ischemic cardiomyopathy admitted after a pre syncopal episode at home. Patient reported feeling weak while at the dinner table and slumped over. In ED, labs and imaging done, troponin negative, CT head without acute changes. Cardiology was consulted. MRI brain was done, noted to have acute frontal and parietal infarcts. Patient noted to have worsening renal function, uremia, nephrology was consulted to evaluate possible need to start HD.    1. Acute stroke:  MRI noted  continue aspirin and statin  PT eval was done  neuro eval.    2. Falls:  sec stroke  PT follwo up    3. CAD s/p CABG:  Continue aspirin, statin, coreg and nitrates    4. Hypertension:  Continue Coreg and nitrates.    5. CKD stage 4:  worsening renal function  nephrology evaluating for possible HD.    6. Diabetes mellitus:  Monitor fingersticks  sliding scale coverage.    7.PVD:  Continue Cilostazol.    8. DVT ppx:  Heparin.    Discussed with patient and RN.     67 yr old male with CAD s/p CABG, peripheral arterial disease, diabetes mellitus, TIA, hypertension, hyperlipidemia, renal cancer s/p nephrectomy, CKD stage 4, ischemic cardiomyopathy admitted after a pre syncopal episode at home. Patient reported feeling weak while at the dinner table and slumped over. In ED, labs and imaging done, troponin negative, CT head without acute changes. Cardiology was consulted. MRI brain was done, noted to have acute frontal and parietal infarcts. Patient noted to have worsening renal function, uremia, nephrology was consulted to evaluate possible need to start HD.    1. Acute stroke:  MRI noted  continue aspirin and statin  PT eval was done  neuro eval.    2. Falls:  sec stroke  PT follwo up    3. CAD s/p CABG:  Continue aspirin, statin, coreg and nitrates    4. Hypertension:  Continue Coreg and nitrates.    5. CKD stage 4:  worsening renal function  nephrology evaluating for possible HD.    6. Diabetes mellitus:  Monitor fingersticks  sliding scale coverage.    7.PVD:  Continue Cilostazol.    8. DVT ppx:  Heparin.    Discussed with patient and RN.  updated daughter Angelica.

## 2022-03-15 NOTE — CONSULT NOTE ADULT - ASSESSMENT
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  Risk Factors. DM HTN HLD    ASSESSMENT/ PLAN:     - Neuro checks and vital signs Q 2 hours.  - SBP goal permissive upto 200/100 for next 24 hours.  - ASA 81 mg PO or 300 NM QD.  - Lipitor for LDL goal of < 70 .  - Telemetry monitoring.  -- MRI Brain - images and report were reviewed by me.  - Will need to get MRA of Head and Neck ( No contrast) to further corroborate the L ICA stenosis.  - Check fasting Lipid panel and HbA1c  - TTE with bubble study.  - PT OT SLP evaluation.  - SCD/ SQ Lovenox for DVT prophylaxis.

## 2022-03-15 NOTE — PROGRESS NOTE ADULT - PROBLEM SELECTOR PLAN 1
- acute stroke. left side weakness - acute stroke. left side weakness.  left  aprietal and frontal infarcts.   left carotid 50-69% stenosis.     Outpaitent followu pwith vascualr to evaluate other diagnositc modality if carotid stenosi sis worse then reported on US.  Also evaluate for atrial fibrillation .   Outpatient 4 weeks MCOt monitor.   stop cilastoazole.  ct aspirin 81. initaite eliquis. renal dose.

## 2022-03-15 NOTE — PROGRESS NOTE ADULT - PROBLEM SELECTOR PLAN 2
-   - s/p CABG 12/2014- LIMA-LAD reverse SVG to posterior lateral reverse SVG to OM  - Continue statin, bb, and Norvasc and hydralazine  - Continue Isordil  - Continue ASA  - lipid panel checked due to cerebrovascular accident

## 2022-03-15 NOTE — PROGRESS NOTE ADULT - ASSESSMENT
< from: MR Head No Cont (03.15.22 @ 04:14) >              Advanced CKD - CAD / CABG , ICM , TIA   DM Nephropathy / HTN   Recent MRI noted --> Need for NOE ?   Possibly developing some subtle uremic ssx   Normal renal sonogram 1/22  I had a long discussion with the wife and daughter Angelica on phone ( [phone] ) .   May have to consider initiation of HD   Clear CXR 3/10/ 22   Continue Hydral / Imdur ALR   Holding Lasix for now , as currently does not appear to be fluid overloaded   Repeat renal sonogram without hydro 3/14   If labs don't improve , may need to consider permacath placement and HD     I spoke again with Angelica on phone   She is coming in tonite to talk to Dad , and she will be here in am          < from: MR Head No Cont (03.15.22 @ 04:14) >              Advanced CKD - CAD / CABG , ICM , TIA   DM Nephropathy / HTN   Recent MRI noted --> New ECHO to be done    Possibly developing some subtle uremic ssx   Normal renal sonogram 1/22  I had a long discussion with the wife and daughter Angelica on phone ( [phone] ) .   May have to consider initiation of HD   Clear CXR 3/10/ 22   Continue Hydral / Imdur ALR   Holding Lasix for now , as currently does not appear to be fluid overloaded   Repeat renal sonogram without hydro 3/14   If labs don't improve , may need to consider permacath placement and HD     I spoke again with Angelica on phone   She is coming in tonite to talk to Dad , and she will be here in am

## 2022-03-15 NOTE — PROGRESS NOTE ADULT - SUBJECTIVE AND OBJECTIVE BOX
NEPHROLOGY INTERVAL HPI/OVERNIGHT EVENTS:    Interim noted   New MRI findings noted   I stopped his Lasix   NO SOB or CP   Cardio follow up noted     MEDICATIONS  (STANDING):  amLODIPine   Tablet 5 milliGRAM(s) Oral daily  aspirin  chewable 81 milliGRAM(s) Oral daily  atorvastatin 20 milliGRAM(s) Oral at bedtime  carvedilol 25 milliGRAM(s) Oral every 12 hours  cilostazol 100 milliGRAM(s) Oral two times a day  dextrose 40% Gel 15 Gram(s) Oral once  dextrose 5%. 1000 milliLiter(s) (50 mL/Hr) IV Continuous <Continuous>  dextrose 50% Injectable 25 Gram(s) IV Push once  glucagon  Injectable 1 milliGRAM(s) IntraMuscular once  heparin   Injectable 5000 Unit(s) SubCutaneous every 12 hours  hydrALAZINE 25 milliGRAM(s) Oral every 8 hours  insulin glargine Injectable (LANTUS) 15 Unit(s) SubCutaneous every morning  insulin lispro (ADMELOG) corrective regimen sliding scale   SubCutaneous three times a day before meals  insulin lispro Injectable (ADMELOG) 5 Unit(s) SubCutaneous three times a day before meals  isosorbide   dinitrate Tablet (ISORDIL) 20 milliGRAM(s) Oral three times a day  PPD  5 Tuberculin Unit(s) Injectable 5 Unit(s) IntraDermal once  sodium bicarbonate 650 milliGRAM(s) Oral two times a day    MEDICATIONS  (PRN):  acetaminophen     Tablet .. 650 milliGRAM(s) Oral every 6 hours PRN Temp greater or equal to 38C (100.4F), Mild Pain (1 - 3)      Allergies    No Known Allergies    Intolerances            Vital Signs Last 24 Hrs  T(C): 36.4 (15 Mar 2022 11:19), Max: 36.7 (14 Mar 2022 17:12)  T(F): 97.6 (15 Mar 2022 11:19), Max: 98 (14 Mar 2022 17:12)  HR: 87 (15 Mar 2022 11:19) (76 - 87)  BP: 144/66 (15 Mar 2022 11:19) (103/71 - 159/75)  BP(mean): --  RR: 18 (15 Mar 2022 11:19) (18 - 18)  SpO2: 93% (15 Mar 2022 11:19) (93% - 95%)  Daily     Daily   I&O's Detail    I&O's Summary      PHYSICAL EXAM:    HEAD:  no edema , anicteric   NECK: Supple, No JVD,   CHEST/LUNG: EAE , few basilar crackles   HEART: Regular rate and rhythm; No gallop or rub   ABDOMEN: Soft, Nontender,   EXTREMITIES: warm , no edema     LABS:                        10.7   8.34  )-----------( 238      ( 15 Mar 2022 07:03 )             32.1     03-15    139  |  103  |  74.1<H>  ----------------------------<  152<H>  3.8   |  22.0  |  7.16<H>    Ca    8.6      15 Mar 2022 07:03  Phos  3.9     03-15  Mg     1.8     03-14    TPro  5.5<L>  /  Alb  3.0<L>  /  TBili  0.2<L>  /  DBili  x   /  AST  9   /  ALT  9   /  AlkPhos  102  03-14        Phosphorus Level, Serum: 3.9 mg/dL (03-15 @ 07:03)    < from: MR Head No Cont (03.15.22 @ 04:14) >      INTERPRETATION:  MR BRAIN    Clinical information: multiple falls    A noncontrast MRI of the brain was performed.    TECHNIQUE:  In the sagittal plane a [id number] sequence was performed.  In the axial   plane T1, T2, FLAIR, GRE, and diffusion weighted imaging was obtained. In   the coronal plane T2 weighted imaging was utilized.    COMPARISON:  Exam is compared to recent head CT of 3/10/2022 and priorMRI of   10/31/2014.    FINDINGS:  BRAIN PARENCHYMA:  There is mild to moderate atrophy. There are a few small foci of   diffusion restriction within the left parietal greater than frontal lobes   compatible with small acute infarcts. Chronic lacunarinfarcts involve   the right thalamus and right greater than left basal ganglia. Chronic   lacunar infarct posterior aspect of the right side of the kinga. Mild to   moderate chronic periventricular and subcortical white matter ischemic   changes are seen. No parenchymal mass or mass effect. Mild hemosiderin   within the right globus pallidus. Corpus callosum well formed. No   cerebellar tonsillar ectopia.    VENTRICLES:  No hydrocephalus. No midline shift..    EXTRA-AXIAL:  No extra-axial mass.  No evidence of a subdural or epidural collection.    Patent basal cisterns.    OTHER:  Mild mucosal thickening is present within the right greater than left   maxillary and bilateral ethmoid sinuses    IMPRESSION:  FEW SMALL ACUTE INFARCTS LEFT PARIETAL GREATER THAN FRONTAL LOBES.    --- End of Report ---            JULISA ALCALA MD; Attending Radiologist  This document has been electronically signed. Mar 15 2022  8:42AM    < end of copied text >      < from: US Renal (03.14.22 @ 14:16) >    ACC: [id number] EXAM:  US KIDNEY(S)                          PROCEDURE DATE:  03/14/2022          INTERPRETATION:  CLINICAL INFORMATION: Acute on chronic kidney disease    COMPARISON: 1/16/2022    TECHNIQUE: Sonography of the kidneys and bladder.    FINDINGS: The kidneys are echogenic suggestive of medical renal disease.    Right kidney: 10.4 cm. No renal mass, hydronephrosis or calculi.    Left kidney: 9.8 cm. No renal mass, hydronephrosis or calculi.    Urinary bladder: Within normal limits.    IMPRESSION:  Echogenic kidneys suggestive of medical renal disease.  No hydronephrosis or urolithiasis    --- End of Report ---            FAYE BAIRD MD; Attending Radiologist  This document has been electronically signed. Mar 14 2022  3:25PM    < end of copied text >    RADIOLOGY & ADDITIONAL TESTS:

## 2022-03-15 NOTE — CONSULT NOTE ADULT - SUBJECTIVE AND OBJECTIVE BOX
Neurology consult    ANSHUL CUNNINGHAM 67y Male     Patient is a 67y old  Male who presents with a chief complaint of Syncope (15 Mar 2022 19:57)      HPI:  68 yo male with PMH of CAD s/p CABG, PAD, DM2,  TIA, HLD, HTN, renal Ca s/p L nephrectomy 10/2020, CKD 4, ischemic CMP, was brought ot he ED by EMS for syncope. Pt states that he was sitting at the counter eating dinner at 7PM today, when he suddenly felt weak and slumped over on the counter. Per wife, pt was unresponsive with eyes rolled back. Pt states that he was awake during the episode but just felt very weak. Episode lasted ~10 minutes. Pt currently back to baseline. Denies prodromal chest pain, palpitations, shortness of breath. No recent illness; however notes that he has had intermittent dizziness upon standing up over the past week.     In the ED  vitals stable  EKG unchanged  CT head neagtive (11 Mar 2022 00:45)      PMH: DM (diabetes mellitus)    HTN (hypertension)    High cholesterol    Myocardial infarction    Acute on chronic congestive heart failure, unspecified congestive heart failure type         PSH: No significant past surgical history    S/P coronary artery bypass with three autogenous grafts        FAMILY HISTORY:  Family history of heart disease (Sibling)      SOCIAL HISTORY:  No history of tobacco or alcohol use     Allergies    No Known Allergies    Intolerances      Vital Signs Last 24 Hrs  T(C): 36.9 (15 Mar 2022 20:52), Max: 36.9 (15 Mar 2022 20:52)  T(F): 98.4 (15 Mar 2022 20:52), Max: 98.4 (15 Mar 2022 20:52)  HR: 82 (15 Mar 2022 20:52) (81 - 89)  BP: 141/57 (15 Mar 2022 20:52) (103/71 - 153/72)  BP(mean): --  RR: 18 (15 Mar 2022 20:52) (18 - 18)  SpO2: 93% (15 Mar 2022 20:52) (93% - 95%)      MEDICATIONS    acetaminophen     Tablet .. 650 milliGRAM(s) Oral every 6 hours PRN  amLODIPine   Tablet 5 milliGRAM(s) Oral daily  aspirin  chewable 81 milliGRAM(s) Oral daily  atorvastatin 20 milliGRAM(s) Oral at bedtime  carvedilol 25 milliGRAM(s) Oral every 12 hours  cilostazol 100 milliGRAM(s) Oral two times a day  dextrose 40% Gel 15 Gram(s) Oral once  dextrose 5%. 1000 milliLiter(s) IV Continuous <Continuous>  dextrose 50% Injectable 25 Gram(s) IV Push once  glucagon  Injectable 1 milliGRAM(s) IntraMuscular once  heparin   Injectable 5000 Unit(s) SubCutaneous every 12 hours  hydrALAZINE 25 milliGRAM(s) Oral every 8 hours  insulin glargine Injectable (LANTUS) 15 Unit(s) SubCutaneous every morning  insulin lispro (ADMELOG) corrective regimen sliding scale   SubCutaneous three times a day before meals  insulin lispro Injectable (ADMELOG) 5 Unit(s) SubCutaneous three times a day before meals  isosorbide   dinitrate Tablet (ISORDIL) 20 milliGRAM(s) Oral three times a day  sodium bicarbonate 650 milliGRAM(s) Oral two times a day        LABS:  CBC Full  -  ( 15 Mar 2022 07:03 )  WBC Count : 8.34 K/uL  RBC Count : 3.98 M/uL  Hemoglobin : 10.7 g/dL  Hematocrit : 32.1 %  Platelet Count - Automated : 238 K/uL  Mean Cell Volume : 80.7 fl  Mean Cell Hemoglobin : 26.9 pg  Mean Cell Hemoglobin Concentration : 33.3 gm/dL  Auto Neutrophil # : x  Auto Lymphocyte # : x  Auto Monocyte # : x  Auto Eosinophil # : x  Auto Basophil # : x  Auto Neutrophil % : x  Auto Lymphocyte % : x  Auto Monocyte % : x  Auto Eosinophil % : x  Auto Basophil % : x      03-15    139  |  103  |  74.1<H>  ----------------------------<  152<H>  3.8   |  22.0  |  7.16<H>    Ca    8.6      15 Mar 2022 07:03  Phos  3.9     03-15  Mg     1.8     03-14    TPro  5.5<L>  /  Alb  3.0<L>  /  TBili  0.2<L>  /  DBili  x   /  AST  9   /  ALT  9   /  AlkPhos  102  03-14    LIVER FUNCTIONS - ( 14 Mar 2022 06:48 )  Alb: 3.0 g/dL / Pro: 5.5 g/dL / ALK PHOS: 102 U/L / ALT: 9 U/L / AST: 9 U/L / GGT: x           Hemoglobin A1C:       On Neurological Examination:    Mental Status - Patient is  awake, alert and oriented X3.  Speech is fluent. Patient can name, repeat and follow commands correctly  There is no dysarthria.    Cranial Nerves - PERRL, EOMI,  Visual fields are full to finger counting, no gross facial asymmetry, tongue/uvula midline    Motor Exam -   Right upper ---5/5 No drift  Left upper ---5/5 No drift  Right lower ---5/5 No drift  Left lower  ---5/5 No drift     nml bulk/tone    Sensory    Intact to light touch and pinprick bilaterally    Coord: FTN intact bilaterally     Gait -  Not assessed.     [facility] SS:   Date: 03-15-22  Time: 1605  1a) Level of consciousness (0-3):   1b) Questions (0-2):   1c) Commands (0-2):   2  ) Gaze (0-2):   3  ) Visual field (0-3):   4  ) Facial palsy (0-3): -----------------------1  Motor  5a) Left arm (0-4): ----------------------1    5b) Right arm (0-4):   6a) Left leg (0-4): ----------------------1    6b) Right leg (0-4):   7  ) Ataxia (0-2):   Sensory.  8  ) Sensory (0-2):   Speech  9  ) Language (0-3):   10) Dysarthria (0-2):   Extinction  11) Extinction/inattention (0-2):     Total score: = 3    Patient was last seen well at 3-14-22 7 PM    Prestroke Modified [place]: =0        RADIOLOGY ( All neurological imaging studies were independently reviewed and interpreted by me)  CTH   CTA  CTP    CT Head No Cont (03.10.22 @ 23:08) >    IMPRESSION: Mild diffuse cerebral volume loss, multiple chronic lacunar   infarcts and chronic periventricular white matter small vessel ischemic   disease.    No acute intracranial hemorrhage, mass effect or acute territorial   infarcts seen.    LAKESHA LOPEZ MD; Attending Radiologis      MRI:     MR Head No Cont (03.15.22 @ 04:14) >    IMPRESSION:  FEW SMALL ACUTE INFARCTS LEFT PARIETAL GREATER THAN FRONTAL LOBES.     US Duplex Carotid Arteries Complete, Bilateral (03.11.22 @ 11:52) >    IMPRESSION: 50-69% left internal carotid artery stenosis.    HANSEL MARTIN MD; Attending Radiologist        TTE    TTE Echo Complete w/o Contrast w/ Doppler (01.14.22 @ 19:06) >    Summary:   1. Technically difficult study.   2. Normal global left ventricular systolic function.   3. Left ventricular ejection fraction, by visual estimation, is 55 to   60%.   4. Mildly enlarged left atrium.   5. Normal right atrial size.   6.Mild mitral annular calcification.   7. Mild mitral valve regurgitation.   8. Thickening and calcification of the anterior and posterior mitral   valve leaflets.   9. Moderate tricuspid regurgitation.  10. Sclerotic aortic valve with normal opening.  11. Estimated pulmonary artery systolic pressure is 40.3 mmHg assuming a   right atrial pressure of 5 mmHg, which is consistent with mild pulmonary   hypertension.  12. Trivial pericardial effusion.    MD Junie Electronically signed on 1/15/2022 at 10:13:45 AM                   Neurology consult    ANSHUL CUNNINGHAM 67y Male     Patient is a 67y old  Male who presents with a chief complaint of Syncope (15 Mar 2022 19:57)      HPI:  68 yo male with PMH of CAD s/p CABG, PAD, DM2,  TIA, HLD, HTN, renal Ca s/p L nephrectomy 10/2020, CKD 4, ischemic CMP, was brought ot he ED by EMS for syncope. Pt states that he was sitting at the counter eating dinner at 7PM today, when he suddenly felt weak and slumped over on the counter. Per wife, pt was unresponsive with eyes rolled back. Pt states that he was awake during the episode but just felt very weak. Episode lasted ~10 minutes. Pt currently back to baseline. Denies prodromal chest pain, palpitations, shortness of breath. No recent illness; however notes that he has had intermittent dizziness upon standing up over the past week.     In the ED  vitals stable  EKG unchanged  CT head neagtive (11 Mar 2022 00:45)      PMH: DM (diabetes mellitus)    HTN (hypertension)    High cholesterol    Myocardial infarction    Acute on chronic congestive heart failure, unspecified congestive heart failure type         PSH: No significant past surgical history    S/P coronary artery bypass with three autogenous grafts        FAMILY HISTORY:  Family history of heart disease (Sibling)      SOCIAL HISTORY:  No history of tobacco or alcohol use     Allergies    No Known Allergies    Intolerances      Vital Signs Last 24 Hrs  T(C): 36.9 (15 Mar 2022 20:52), Max: 36.9 (15 Mar 2022 20:52)  T(F): 98.4 (15 Mar 2022 20:52), Max: 98.4 (15 Mar 2022 20:52)  HR: 82 (15 Mar 2022 20:52) (81 - 89)  BP: 141/57 (15 Mar 2022 20:52) (103/71 - 153/72)  BP(mean): --  RR: 18 (15 Mar 2022 20:52) (18 - 18)  SpO2: 93% (15 Mar 2022 20:52) (93% - 95%)      MEDICATIONS    acetaminophen     Tablet .. 650 milliGRAM(s) Oral every 6 hours PRN  amLODIPine   Tablet 5 milliGRAM(s) Oral daily  aspirin  chewable 81 milliGRAM(s) Oral daily  atorvastatin 20 milliGRAM(s) Oral at bedtime  carvedilol 25 milliGRAM(s) Oral every 12 hours  cilostazol 100 milliGRAM(s) Oral two times a day  dextrose 40% Gel 15 Gram(s) Oral once  dextrose 5%. 1000 milliLiter(s) IV Continuous <Continuous>  dextrose 50% Injectable 25 Gram(s) IV Push once  glucagon  Injectable 1 milliGRAM(s) IntraMuscular once  heparin   Injectable 5000 Unit(s) SubCutaneous every 12 hours  hydrALAZINE 25 milliGRAM(s) Oral every 8 hours  insulin glargine Injectable (LANTUS) 15 Unit(s) SubCutaneous every morning  insulin lispro (ADMELOG) corrective regimen sliding scale   SubCutaneous three times a day before meals  insulin lispro Injectable (ADMELOG) 5 Unit(s) SubCutaneous three times a day before meals  isosorbide   dinitrate Tablet (ISORDIL) 20 milliGRAM(s) Oral three times a day  sodium bicarbonate 650 milliGRAM(s) Oral two times a day        LABS:  CBC Full  -  ( 15 Mar 2022 07:03 )  WBC Count : 8.34 K/uL  RBC Count : 3.98 M/uL  Hemoglobin : 10.7 g/dL  Hematocrit : 32.1 %  Platelet Count - Automated : 238 K/uL  Mean Cell Volume : 80.7 fl  Mean Cell Hemoglobin : 26.9 pg  Mean Cell Hemoglobin Concentration : 33.3 gm/dL  Auto Neutrophil # : x  Auto Lymphocyte # : x  Auto Monocyte # : x  Auto Eosinophil # : x  Auto Basophil # : x  Auto Neutrophil % : x  Auto Lymphocyte % : x  Auto Monocyte % : x  Auto Eosinophil % : x  Auto Basophil % : x      03-15    139  |  103  |  74.1<H>  ----------------------------<  152<H>  3.8   |  22.0  |  7.16<H>    Ca    8.6      15 Mar 2022 07:03  Phos  3.9     03-15  Mg     1.8     03-14    TPro  5.5<L>  /  Alb  3.0<L>  /  TBili  0.2<L>  /  DBili  x   /  AST  9   /  ALT  9   /  AlkPhos  102  03-14    LIVER FUNCTIONS - ( 14 Mar 2022 06:48 )  Alb: 3.0 g/dL / Pro: 5.5 g/dL / ALK PHOS: 102 U/L / ALT: 9 U/L / AST: 9 U/L / GGT: x           Hemoglobin A1C:       On Neurological Examination:    Mental Status - Patient is  awake, alert and oriented X3.  Speech is fluent. Patient can name, repeat and follow commands correctly  There is no dysarthria.    Cranial Nerves - PERRL, EOMI,  Visual fields are full to finger counting, no gross facial asymmetry, tongue/uvula midline    Motor Exam -   Right upper ---5/5 with slight drift  Left upper ---5/5 No drift.  Right lower ---5/5  with slight drift  Left lower  ---5/5  No drift.     nml bulk/tone    Sensory    Intact to light touch and pinprick bilaterally    Coord: FTN intact bilaterally     Gait -  Not assessed.     [facility] SS:   Date: 03-15-22  Time: 1605  1a) Level of consciousness (0-3):   1b) Questions (0-2):   1c) Commands (0-2):   2  ) Gaze (0-2):   3  ) Visual field (0-3):   4  ) Facial palsy (0-3): -----------------------1  Motor  5a) Left arm (0-4):   5b) Right arm (0-4): ----------------------1    6a) Left leg (0-4):   6b) Right leg (0-4): ----------------------1    7  ) Ataxia (0-2):   Sensory.  8  ) Sensory (0-2):   Speech  9  ) Language (0-3):   10) Dysarthria (0-2):   Extinction  11) Extinction/inattention (0-2):     Total score: = 3    Patient was last seen well at 3-14-22 7 PM    Prestroke Modified [place]: =0        RADIOLOGY ( All neurological imaging studies were independently reviewed and interpreted by me)  CTH   CTA  CTP    CT Head No Cont (03.10.22 @ 23:08) >    IMPRESSION: Mild diffuse cerebral volume loss, multiple chronic lacunar   infarcts and chronic periventricular white matter small vessel ischemic   disease.    No acute intracranial hemorrhage, mass effect or acute territorial   infarcts seen.    LAKESHA LOPEZ MD; Attending Radiologis      MRI:     MR Head No Cont (03.15.22 @ 04:14) >    IMPRESSION:  FEW SMALL ACUTE INFARCTS LEFT PARIETAL GREATER THAN FRONTAL LOBES.     US Duplex Carotid Arteries Complete, Bilateral (03.11.22 @ 11:52) >    IMPRESSION: 50-69% left internal carotid artery stenosis.    HANSEL MARTIN MD; Attending Radiologist        TTE    TTE Echo Complete w/o Contrast w/ Doppler (01.14.22 @ 19:06) >    Summary:   1. Technically difficult study.   2. Normal global left ventricular systolic function.   3. Left ventricular ejection fraction, by visual estimation, is 55 to   60%.   4. Mildly enlarged left atrium.   5. Normal right atrial size.   6.Mild mitral annular calcification.   7. Mild mitral valve regurgitation.   8. Thickening and calcification of the anterior and posterior mitral   valve leaflets.   9. Moderate tricuspid regurgitation.  10. Sclerotic aortic valve with normal opening.  11. Estimated pulmonary artery systolic pressure is 40.3 mmHg assuming a   right atrial pressure of 5 mmHg, which is consistent with mild pulmonary   hypertension.  12. Trivial pericardial effusion.    MD Junie Electronically signed on 1/15/2022 at 10:13:45 AM

## 2022-03-15 NOTE — PROGRESS NOTE ADULT - PROBLEM SELECTOR PLAN 3
-  - normal renal sono from 1/22  - continue BB, isordil, nor vasc, BB and hydralazine -   - s/p CABG 12/2014- LIMA-LAD reverse SVG to posterior lateral reverse SVG to OM  - Continue statin, bb, and Norvasc and hydralazine  - Continue Isordil  - Continue ASA  - lipid panel checked

## 2022-03-16 LAB
ANION GAP SERPL CALC-SCNC: 15 MMOL/L — SIGNIFICANT CHANGE UP (ref 5–17)
BUN SERPL-MCNC: 72.6 MG/DL — HIGH (ref 8–20)
CALCIUM SERPL-MCNC: 8.4 MG/DL — LOW (ref 8.6–10.2)
CHLORIDE SERPL-SCNC: 103 MMOL/L — SIGNIFICANT CHANGE UP (ref 98–107)
CO2 SERPL-SCNC: 21 MMOL/L — LOW (ref 22–29)
CREAT SERPL-MCNC: 7.28 MG/DL — HIGH (ref 0.5–1.3)
EGFR: 8 ML/MIN/1.73M2 — LOW
GLUCOSE BLDC GLUCOMTR-MCNC: 143 MG/DL — HIGH (ref 70–99)
GLUCOSE BLDC GLUCOMTR-MCNC: 161 MG/DL — HIGH (ref 70–99)
GLUCOSE BLDC GLUCOMTR-MCNC: 203 MG/DL — HIGH (ref 70–99)
GLUCOSE BLDC GLUCOMTR-MCNC: 234 MG/DL — HIGH (ref 70–99)
GLUCOSE SERPL-MCNC: 131 MG/DL — HIGH (ref 70–99)
HAV IGM SER-ACNC: SIGNIFICANT CHANGE UP
HBV CORE IGM SER-ACNC: SIGNIFICANT CHANGE UP
HBV SURFACE AB SER-ACNC: SIGNIFICANT CHANGE UP
HBV SURFACE AG SER-ACNC: SIGNIFICANT CHANGE UP
HCT VFR BLD CALC: 31.3 % — LOW (ref 39–50)
HCV AB S/CO SERPL IA: 0.11 S/CO — SIGNIFICANT CHANGE UP (ref 0–0.99)
HCV AB SERPL-IMP: SIGNIFICANT CHANGE UP
HGB BLD-MCNC: 10.4 G/DL — LOW (ref 13–17)
MAGNESIUM SERPL-MCNC: 1.7 MG/DL — SIGNIFICANT CHANGE UP (ref 1.6–2.6)
MCHC RBC-ENTMCNC: 27.2 PG — SIGNIFICANT CHANGE UP (ref 27–34)
MCHC RBC-ENTMCNC: 33.2 GM/DL — SIGNIFICANT CHANGE UP (ref 32–36)
MCV RBC AUTO: 81.7 FL — SIGNIFICANT CHANGE UP (ref 80–100)
PHOSPHATE SERPL-MCNC: 4.2 MG/DL — SIGNIFICANT CHANGE UP (ref 2.4–4.7)
PLATELET # BLD AUTO: 227 K/UL — SIGNIFICANT CHANGE UP (ref 150–400)
POTASSIUM SERPL-MCNC: 4 MMOL/L — SIGNIFICANT CHANGE UP (ref 3.5–5.3)
POTASSIUM SERPL-SCNC: 4 MMOL/L — SIGNIFICANT CHANGE UP (ref 3.5–5.3)
RBC # BLD: 3.83 M/UL — LOW (ref 4.2–5.8)
RBC # FLD: 14.3 % — SIGNIFICANT CHANGE UP (ref 10.3–14.5)
SODIUM SERPL-SCNC: 139 MMOL/L — SIGNIFICANT CHANGE UP (ref 135–145)
WBC # BLD: 8.14 K/UL — SIGNIFICANT CHANGE UP (ref 3.8–10.5)
WBC # FLD AUTO: 8.14 K/UL — SIGNIFICANT CHANGE UP (ref 3.8–10.5)

## 2022-03-16 PROCEDURE — 99233 SBSQ HOSP IP/OBS HIGH 50: CPT

## 2022-03-16 PROCEDURE — 70547 MR ANGIOGRAPHY NECK W/O DYE: CPT | Mod: 26

## 2022-03-16 PROCEDURE — 70544 MR ANGIOGRAPHY HEAD W/O DYE: CPT | Mod: 26

## 2022-03-16 RX ORDER — SODIUM CHLORIDE 9 MG/ML
1000 INJECTION, SOLUTION INTRAVENOUS
Refills: 0 | Status: DISCONTINUED | OUTPATIENT
Start: 2022-03-16 | End: 2022-03-18

## 2022-03-16 RX ADMIN — INSULIN GLARGINE 15 UNIT(S): 100 INJECTION, SOLUTION SUBCUTANEOUS at 11:18

## 2022-03-16 RX ADMIN — Medication 5 UNIT(S): at 12:34

## 2022-03-16 RX ADMIN — ISOSORBIDE DINITRATE 20 MILLIGRAM(S): 5 TABLET ORAL at 05:19

## 2022-03-16 RX ADMIN — Medication 650 MILLIGRAM(S): at 00:19

## 2022-03-16 RX ADMIN — ISOSORBIDE DINITRATE 20 MILLIGRAM(S): 5 TABLET ORAL at 12:38

## 2022-03-16 RX ADMIN — CARVEDILOL PHOSPHATE 25 MILLIGRAM(S): 80 CAPSULE, EXTENDED RELEASE ORAL at 17:21

## 2022-03-16 RX ADMIN — HEPARIN SODIUM 5000 UNIT(S): 5000 INJECTION INTRAVENOUS; SUBCUTANEOUS at 17:21

## 2022-03-16 RX ADMIN — Medication 25 MILLIGRAM(S): at 21:17

## 2022-03-16 RX ADMIN — SODIUM CHLORIDE 80 MILLILITER(S): 9 INJECTION, SOLUTION INTRAVENOUS at 20:51

## 2022-03-16 RX ADMIN — ATORVASTATIN CALCIUM 20 MILLIGRAM(S): 80 TABLET, FILM COATED ORAL at 21:16

## 2022-03-16 RX ADMIN — Medication 650 MILLIGRAM(S): at 05:18

## 2022-03-16 RX ADMIN — Medication 5 UNIT(S): at 08:43

## 2022-03-16 RX ADMIN — Medication 25 MILLIGRAM(S): at 05:19

## 2022-03-16 RX ADMIN — ISOSORBIDE DINITRATE 20 MILLIGRAM(S): 5 TABLET ORAL at 17:20

## 2022-03-16 RX ADMIN — Medication 650 MILLIGRAM(S): at 00:49

## 2022-03-16 RX ADMIN — AMLODIPINE BESYLATE 5 MILLIGRAM(S): 2.5 TABLET ORAL at 05:19

## 2022-03-16 RX ADMIN — Medication 25 MILLIGRAM(S): at 12:37

## 2022-03-16 RX ADMIN — Medication 2: at 12:30

## 2022-03-16 RX ADMIN — Medication 81 MILLIGRAM(S): at 12:38

## 2022-03-16 RX ADMIN — Medication 2: at 17:57

## 2022-03-16 RX ADMIN — HEPARIN SODIUM 5000 UNIT(S): 5000 INJECTION INTRAVENOUS; SUBCUTANEOUS at 05:18

## 2022-03-16 RX ADMIN — CILOSTAZOL 100 MILLIGRAM(S): 100 TABLET ORAL at 17:21

## 2022-03-16 RX ADMIN — Medication 5 UNIT(S): at 17:57

## 2022-03-16 RX ADMIN — CARVEDILOL PHOSPHATE 25 MILLIGRAM(S): 80 CAPSULE, EXTENDED RELEASE ORAL at 05:18

## 2022-03-16 RX ADMIN — CILOSTAZOL 100 MILLIGRAM(S): 100 TABLET ORAL at 05:18

## 2022-03-16 RX ADMIN — Medication 650 MILLIGRAM(S): at 17:21

## 2022-03-16 NOTE — PROGRESS NOTE ADULT - ASSESSMENT
67 yr old male with CAD s/p CABG, peripheral arterial disease, diabetes mellitus, TIA, hypertension, hyperlipidemia, renal cancer s/p nephrectomy, CKD stage 4, ischemic cardiomyopathy admitted after a pre syncopal episode at home. Patient reported feeling weak while at the dinner table and slumped over. In ED, labs and imaging done, troponin negative, CT head without acute changes. Cardiology was consulted. MRI brain was done, noted to have acute frontal and parietal infarcts. Patient noted to have worsening renal function, uremia, nephrology was consulted to evaluate possible need to start HD.    1. Acute stroke:  neuro eval noted  MRA brain and ECHO with bubble study ordered  PT follow up    2. Falls:  sec stroke  as above    3. CAD s/p CABG:  Continue aspirin, statin, coreg and nitrates  cardiology eval noted  will hold off Eliquis for now given possibility of needing access for HD.    4. Hypertension:  Continue Coreg and nitrates.    5. CKD stage 4:  worsening renal function  family in agreement with HD    6. Diabetes mellitus:  Monitor fingersticks  sliding scale coverage.    7.PVD:  continue aspirin and statin.    8. DVT ppx:  Heparin.    Discussed with patient and RN.  spoke with daughter and wife at bedside.

## 2022-03-16 NOTE — PROGRESS NOTE ADULT - SUBJECTIVE AND OBJECTIVE BOX
Patient seen and examined    Feels fine, awake, comfortable; Family present  no c/o CP SOB NV   mild swelling feet    Vital Signs Last 24 Hrs  T(C): 36.3 (16 Mar 2022 11:20), Max: 36.9 (15 Mar 2022 20:52)  T(F): 97.4 (16 Mar 2022 11:20), Max: 98.4 (15 Mar 2022 20:52)  HR: 82 (16 Mar 2022 11:20) (77 - 89)  BP: 137/61 (16 Mar 2022 11:20) (120/64 - 141/57)  BP(mean): --  RR: 18 (16 Mar 2022 11:20) (18 - 18)  SpO2: 94% (16 Mar 2022 11:20) (93% - 95%)    PHYSICAL EXAM    GENERAL: NAD,   EYES:  conjunctiva and sclera clear  NECK: Supple, No JVD/Bruit  NERVOUS SYSTEM:  A/O x3,   CHEST:  No rales, No rhonchi  HEART:  RRR, No murmur  ABDOMEN: Soft, NT/ND BS+Bladder area NT  EXTREMITIES:  mild Edema;  SKIN: No rashes    16 Mar 2022 07:13    139    |  103    |  72.6   ----------------------------<  131    4.0     |  21.0   |  7.28     Ca    8.4        16 Mar 2022 07:13  Phos  4.2       16 Mar 2022 07:13  Mg     1.7       16 Mar 2022 07:13                            10.4   8.14  )-----------( 227      ( 16 Mar 2022 07:13 )             31.3          MR Head No Cont (03.15.22 @ 04:14) >    IMPRESSION:  FEW SMALL ACUTE INFARCTS LEFT PARIETAL GREATER THAN FRONTAL LOBES.     US Duplex Carotid Arteries Complete, Bilateral (03.11.22 @ 11:52) >    IMPRESSION: 50-69% left internal carotid artery stenosis.

## 2022-03-16 NOTE — PROGRESS NOTE ADULT - PROBLEM SELECTOR PLAN 1
- acute stroke. left side weakness.  left  aprietal and frontal infarcts.   left carotid 50-69% stenosis.     Outpaitent followu pwith mikealr to evaluate other diagnositc modality if carotid stenosi sis worse then reported on US.  Also evaluate for atrial fibrillation .   Outpatient 4 weeks MCOt monitor.   stop cilastoazole.  ct aspirin 81. initaite eliquis renal dose once discussed with nephrology the plan about AV fistula or HD outpatient vs inpatient.

## 2022-03-16 NOTE — PROGRESS NOTE ADULT - ASSESSMENT
68 yo male with PMH of CAD s/p CABG, PAD, DM2,  TIA, HLD, HTN, renal Ca s/p L nephrectomy 10/2020, CKD 4, ischemic CMP, was brought ot he ED by EMS for syncope. Pt states that he was sitting at the counter eating dinner at 7PM today, when he suddenly felt weak and slumped over on the counter. Per wife, pt was unresponsive with eyes rolled back. Pt states that he was awake during the episode but just felt very weak. Episode lasted ~10 minutes. Pt currently back to baseline. Denies prodromal chest pain, palpitations, shortness of breath. No recent illness; however notes that he has had intermittent dizziness upon standing up over the past week.     Advanced CKD - CAD / CABG , ICM , TIA   DM Nephropathy / HTN   Recent MRI noted --> New ECHO to be done    Possibly developing some subtle uremic ssx   Normal renal sonogram 1/22  I had a long discussion with the wife and daughter Angelica on phone ( [phone] ) .   May have to consider initiation of HD   Clear CXR 3/10/ 22   Continue Hydral / Imdur ALR   Holding Lasix for now , as currently does not appear to be fluid overloaded - hope that creat may improve but not so far  Repeat renal sonogram without hydro 3/14   If labs don't improve , may need to consider permacath placement and HD     I spoke again with Angelica today - explained he has severe renal failure now and may need HD - has only 1 K, no hydro  Willing for HD  P/I/ R/B explained at length - willing for HD ; consent obtained  Creat was 4.6 on 3/11> 7.27 - cause unclear - no NTx meds or IV contrast used  recheck labs am off lasix  Bladder scan now   if all -ve then may need to start Hd in 1-2 days  d/w Dr. Pro

## 2022-03-16 NOTE — PROGRESS NOTE ADULT - SUBJECTIVE AND OBJECTIVE BOX
INTERVAL HPI/OVERNIGHT EVENTS:    CC: yncope, ischemic cardiomyopathy, hypertension, CAD, hyperlipidemia, stage 4 CKD, CVA.    No overnight events, denies new complaints.     Vital Signs Last 24 Hrs  T(C): 36.3 (16 Mar 2022 11:20), Max: 36.9 (15 Mar 2022 20:52)  T(F): 97.4 (16 Mar 2022 11:20), Max: 98.4 (15 Mar 2022 20:52)  HR: 82 (16 Mar 2022 11:20) (77 - 89)  BP: 137/61 (16 Mar 2022 11:20) (120/64 - 141/57)  BP(mean): --  RR: 18 (16 Mar 2022 11:20) (18 - 18)  SpO2: 94% (16 Mar 2022 11:20) (93% - 95%)    PHYSICAL EXAM:    GENERAL: alert, not in distress  CHEST/LUNG: b/l air entry  HEART: reg  ABDOMEN: soft, bs+  EXTREMITIES:  no edema, tenderness    MEDICATIONS  (STANDING):  amLODIPine   Tablet 5 milliGRAM(s) Oral daily  aspirin  chewable 81 milliGRAM(s) Oral daily  atorvastatin 20 milliGRAM(s) Oral at bedtime  carvedilol 25 milliGRAM(s) Oral every 12 hours  cilostazol 100 milliGRAM(s) Oral two times a day  dextrose 40% Gel 15 Gram(s) Oral once  dextrose 5%. 1000 milliLiter(s) (50 mL/Hr) IV Continuous <Continuous>  dextrose 50% Injectable 25 Gram(s) IV Push once  glucagon  Injectable 1 milliGRAM(s) IntraMuscular once  heparin   Injectable 5000 Unit(s) SubCutaneous every 12 hours  hydrALAZINE 25 milliGRAM(s) Oral every 8 hours  insulin glargine Injectable (LANTUS) 15 Unit(s) SubCutaneous every morning  insulin lispro (ADMELOG) corrective regimen sliding scale   SubCutaneous three times a day before meals  insulin lispro Injectable (ADMELOG) 5 Unit(s) SubCutaneous three times a day before meals  isosorbide   dinitrate Tablet (ISORDIL) 20 milliGRAM(s) Oral three times a day  sodium bicarbonate 650 milliGRAM(s) Oral two times a day    MEDICATIONS  (PRN):  acetaminophen     Tablet .. 650 milliGRAM(s) Oral every 6 hours PRN Temp greater or equal to 38C (100.4F), Mild Pain (1 - 3)      Allergies    No Known Allergies    Intolerances          LABS:                          10.4   8.14  )-----------( 227      ( 16 Mar 2022 07:13 )             31.3     03-16    139  |  103  |  72.6<H>  ----------------------------<  131<H>  4.0   |  21.0<L>  |  7.28<H>    Ca    8.4<L>      16 Mar 2022 07:13  Phos  4.2     03-16  Mg     1.7     03-16            RADIOLOGY & ADDITIONAL TESTS:

## 2022-03-16 NOTE — PROGRESS NOTE ADULT - SUBJECTIVE AND OBJECTIVE BOX
[facility]                                                         CARDIOLOGY AT [facility]                                                                  [address], [place] [address]                                                         Telephone: [phone]. Fax:[phone]                                                                             PROGRESS NOTE    Reason for follow up: Syncope  Update:   plan accordingto nephrology regarding future or current plans about hemodialysis   Subj: I am ok.         Review of symptoms:   Cardiac:  No chest pain. No dyspnea. No palpitations.  Respiratory: no cough. No dyspnea  Gastrointestinal: No diarrhea. No abdominal pain. No bleeding.   Neuro: No focal neuro complaints.    Vitals:   Vital Signs Last 24 Hrs  T(C): 33.9 (03-16-22 @ 16:35), Max: 36.9 (03-15-22 @ 20:52)  T(F): 93 (03-16-22 @ 16:35), Max: 98.4 (03-15-22 @ 20:52)  HR: 78 (03-16-22 @ 16:35) (77 - 82)  BP: 155/74 (03-16-22 @ 16:35) (120/64 - 155/74)  BP(mean): --  RR: 18 (03-16-22 @ 16:35) (18 - 18)  SpO2: 78% (03-16-22 @ 16:35) (78% - 94%)      PHYSICAL EXAM:  Appearance: Comfortable. No acute distress  HEENT:  Atraumatic. Normocephalic.  Normal oral mucosa  Neurologic: A & O x 3,  left sided tremors and weakness.   Cardiovascular: RRR S1 S2, No murmur, no rubs/gallops. No JVD  Respiratory: Lungs clear to auscultation, unlabored   Gastrointestinal:  Soft, Non-tender, + BS  Lower Extremities: 2+ Peripheral Pulses, No clubbing, cyanosis, or edema  Psychiatry: Patient is calm. No agitation.   Skin: warm and dry.    CURRENT CARDIAC MEDICATIONS:      MEDICATIONS  (STANDING):  amLODIPine   Tablet 5 milliGRAM(s) Oral daily  carvedilol 25 milliGRAM(s) Oral every 12 hours  hydrALAZINE 25 milliGRAM(s) Oral every 8 hours  isosorbide   dinitrate Tablet (ISORDIL) 20 milliGRAM(s) Oral three times a day    aspirin  chewable  atorvastatin  cilostazol  dextrose 40% Gel  dextrose 5%.  dextrose 50% Injectable  glucagon  Injectable  heparin   Injectable  insulin glargine Injectable (LANTUS)  insulin lispro (ADMELOG) corrective regimen sliding scale  insulin lispro Injectable (ADMELOG)  sodium bicarbonate  sodium chloride 0.45%.    PRN: acetaminophen     Tablet .. PRN          LABS:	 	                                            10.4   8.14  )-----------( 227      ( 16 Mar 2022 07:13 )             31.3   N=x    ; L=x        16 Mar 2022 07:13    139    |  103    |  72.6   ----------------------------<  131    4.0     |  21.0   |  7.28     Ca    8.4        16 Mar 2022 07:13  Phos  4.2       16 Mar 2022 07:13  Mg     1.7       16 Mar 2022 07:13               .       1.01        .                                       Lipid Profile: Date: 03-13 @ 07:30  Total cholesterol 140; Direct LDL: --; HDL: 24; Triglycerides:179    HgA1c:   TSH:     TELEMETRY: NSR no events overnight  ECG:    DIAGNOSTIC TESTING:  [ ] Echocardiogram:   < from: TTE Echo Complete w/o Contrast w/ Doppler (01.14.22 @ 19:06) >  PHYSICIAN INTERPRETATION:  Left Ventricle: The left ventricular internal cavity size is normal.  Global LV systolic function was normal. Left ventricular ejection   fraction, by visual estimation, is 55 to 60%. Spectral Doppler shows   normal pattern of LV diastolic filling.  Right Ventricle: The right ventricular size is normal. RV systolic   function is normal.  Left Atrium: Mildly enlarged left atrium.  Right Atrium: Normal right atrial size.  Pericardium: Trivial pericardial effusion is present. The pericardial   effusion is surrounding the apex.  Mitral Valve: Thickening and calcification of the anterior and posterior   mitral valve leaflets. There is mild mitral annular calcification. Mild   mitral valve regurgitation is seen.  Tricuspid Valve: The tricuspid valve is normal in structure. Moderate   tricuspid regurgitation is visualized. Estimated pulmonary artery   systolic pressure is 40.3 mmHg assuming a right atrial pressure of 5   mmHg, which is consistent with mild pulmonary hypertension.  Aortic Valve: The aortic valve is trileaflet. Sclerotic aortic valve with   normal opening. Trivial aortic valve regurgitation is seen.  Pulmonic Valve: The pulmonic valve was not well visualized. Trace   pulmonic valve regurgitation.  Aorta: The aortic root is normal in size and structure.  Pulmonary Artery: The pulmonary artery is of normal size and origin.  Venous: The inferior vena cava is normal. The inferior vena cava was   normal sized, with respiratory size variation greater than 50%.      Summary:   1. Technically difficult study.   2. Normal global left ventricular systolic function.   3. Left ventricular ejection fraction, by visual estimation, is 55 to   60%.   4. Mildly enlarged left atrium.   5. Normal right atrial size.   6.Mild mitral annular calcification.   7. Mild mitral valve regurgitation.   8. Thickening and calcification of the anterior and posterior mitral   valve leaflets.   9. Moderate tricuspid regurgitation.  10. Sclerotic aortic valve with normal opening.  11. Estimated pulmonary artery systolic pressure is 40.3 mmHg assuming a   right atrial pressure of 5 mmHg, which is consistent with mild pulmonary   hypertension.  12. Trivial pericardial effusion.    MD Junie Electronically signed on 1/15/2022 at 10:13:45 AM    < end of copied text >    [ ]  Catheterization:  [ ] Stress Test:    OTHER: 	   MRi head   IMPRESSION:  FEW SMALL ACUTE INFARCTS LEFT PARIETAL GREATER THAN FRONTAL LOBES.    < end of copied text >

## 2022-03-17 LAB
ALBUMIN SERPL ELPH-MCNC: 3.2 G/DL — LOW (ref 3.3–5.2)
ALP SERPL-CCNC: 106 U/L — SIGNIFICANT CHANGE UP (ref 40–120)
ALT FLD-CCNC: 15 U/L — SIGNIFICANT CHANGE UP
ANION GAP SERPL CALC-SCNC: 16 MMOL/L — SIGNIFICANT CHANGE UP (ref 5–17)
AST SERPL-CCNC: 13 U/L — SIGNIFICANT CHANGE UP
BILIRUB SERPL-MCNC: 0.3 MG/DL — LOW (ref 0.4–2)
BUN SERPL-MCNC: 73.8 MG/DL — HIGH (ref 8–20)
CALCIUM SERPL-MCNC: 8.3 MG/DL — LOW (ref 8.6–10.2)
CHLORIDE SERPL-SCNC: 102 MMOL/L — SIGNIFICANT CHANGE UP (ref 98–107)
CO2 SERPL-SCNC: 20 MMOL/L — LOW (ref 22–29)
CREAT SERPL-MCNC: 6.76 MG/DL — HIGH (ref 0.5–1.3)
EGFR: 8 ML/MIN/1.73M2 — LOW
GLUCOSE BLDC GLUCOMTR-MCNC: 131 MG/DL — HIGH (ref 70–99)
GLUCOSE BLDC GLUCOMTR-MCNC: 146 MG/DL — HIGH (ref 70–99)
GLUCOSE BLDC GLUCOMTR-MCNC: 173 MG/DL — HIGH (ref 70–99)
GLUCOSE SERPL-MCNC: 120 MG/DL — HIGH (ref 70–99)
HCT VFR BLD CALC: 31.7 % — LOW (ref 39–50)
HGB BLD-MCNC: 10.5 G/DL — LOW (ref 13–17)
MCHC RBC-ENTMCNC: 27.3 PG — SIGNIFICANT CHANGE UP (ref 27–34)
MCHC RBC-ENTMCNC: 33.1 GM/DL — SIGNIFICANT CHANGE UP (ref 32–36)
MCV RBC AUTO: 82.6 FL — SIGNIFICANT CHANGE UP (ref 80–100)
PHOSPHATE SERPL-MCNC: 3.9 MG/DL — SIGNIFICANT CHANGE UP (ref 2.4–4.7)
PLATELET # BLD AUTO: 256 K/UL — SIGNIFICANT CHANGE UP (ref 150–400)
POTASSIUM SERPL-MCNC: 4.1 MMOL/L — SIGNIFICANT CHANGE UP (ref 3.5–5.3)
POTASSIUM SERPL-SCNC: 4.1 MMOL/L — SIGNIFICANT CHANGE UP (ref 3.5–5.3)
PROT SERPL-MCNC: 5.9 G/DL — LOW (ref 6.6–8.7)
RBC # BLD: 3.84 M/UL — LOW (ref 4.2–5.8)
RBC # FLD: 14.5 % — SIGNIFICANT CHANGE UP (ref 10.3–14.5)
SARS-COV-2 RNA SPEC QL NAA+PROBE: SIGNIFICANT CHANGE UP
SODIUM SERPL-SCNC: 138 MMOL/L — SIGNIFICANT CHANGE UP (ref 135–145)
WBC # BLD: 8.13 K/UL — SIGNIFICANT CHANGE UP (ref 3.8–10.5)
WBC # FLD AUTO: 8.13 K/UL — SIGNIFICANT CHANGE UP (ref 3.8–10.5)

## 2022-03-17 PROCEDURE — 99233 SBSQ HOSP IP/OBS HIGH 50: CPT

## 2022-03-17 PROCEDURE — 99232 SBSQ HOSP IP/OBS MODERATE 35: CPT

## 2022-03-17 RX ADMIN — Medication 25 MILLIGRAM(S): at 21:35

## 2022-03-17 RX ADMIN — Medication 650 MILLIGRAM(S): at 05:54

## 2022-03-17 RX ADMIN — CILOSTAZOL 100 MILLIGRAM(S): 100 TABLET ORAL at 05:54

## 2022-03-17 RX ADMIN — INSULIN GLARGINE 15 UNIT(S): 100 INJECTION, SOLUTION SUBCUTANEOUS at 09:20

## 2022-03-17 RX ADMIN — HEPARIN SODIUM 5000 UNIT(S): 5000 INJECTION INTRAVENOUS; SUBCUTANEOUS at 05:54

## 2022-03-17 RX ADMIN — ATORVASTATIN CALCIUM 20 MILLIGRAM(S): 80 TABLET, FILM COATED ORAL at 21:35

## 2022-03-17 RX ADMIN — AMLODIPINE BESYLATE 5 MILLIGRAM(S): 2.5 TABLET ORAL at 05:55

## 2022-03-17 RX ADMIN — HEPARIN SODIUM 5000 UNIT(S): 5000 INJECTION INTRAVENOUS; SUBCUTANEOUS at 17:42

## 2022-03-17 RX ADMIN — CARVEDILOL PHOSPHATE 25 MILLIGRAM(S): 80 CAPSULE, EXTENDED RELEASE ORAL at 17:43

## 2022-03-17 RX ADMIN — Medication 5 UNIT(S): at 17:49

## 2022-03-17 RX ADMIN — Medication 25 MILLIGRAM(S): at 05:54

## 2022-03-17 RX ADMIN — Medication 5 UNIT(S): at 09:20

## 2022-03-17 RX ADMIN — ISOSORBIDE DINITRATE 20 MILLIGRAM(S): 5 TABLET ORAL at 05:55

## 2022-03-17 RX ADMIN — Medication 81 MILLIGRAM(S): at 12:10

## 2022-03-17 RX ADMIN — ISOSORBIDE DINITRATE 20 MILLIGRAM(S): 5 TABLET ORAL at 17:42

## 2022-03-17 RX ADMIN — Medication 1: at 12:58

## 2022-03-17 RX ADMIN — CARVEDILOL PHOSPHATE 25 MILLIGRAM(S): 80 CAPSULE, EXTENDED RELEASE ORAL at 05:55

## 2022-03-17 RX ADMIN — Medication 650 MILLIGRAM(S): at 17:43

## 2022-03-17 RX ADMIN — Medication 25 MILLIGRAM(S): at 12:09

## 2022-03-17 RX ADMIN — ISOSORBIDE DINITRATE 20 MILLIGRAM(S): 5 TABLET ORAL at 12:09

## 2022-03-17 RX ADMIN — Medication 5 UNIT(S): at 12:58

## 2022-03-17 NOTE — PROGRESS NOTE ADULT - ASSESSMENT
67 yr old male with CAD s/p CABG, peripheral arterial disease, diabetes mellitus, TIA, hypertension, hyperlipidemia, renal cancer s/p nephrectomy, CKD stage 4, ischemic cardiomyopathy admitted after a pre syncopal episode at home. Patient reported feeling weak while at the dinner table and slumped over. In ED, labs and imaging done, troponin negative, CT head without acute changes. Cardiology was consulted. MRI brain was done, noted to have acute frontal and parietal infarcts. Patient noted to have worsening renal function, uremia, nephrology was consulted to evaluate possible need to start HD.    1. Acute stroke:  MRA brain noted.  PT follow up  continue aspirin and statin.    2. Falls:  sec stroke  as above    3. CAD s/p CABG:  Continue aspirin, statin, coreg and nitrates  cardiology eval noted  will hold off Eliquis for now given possibility of needing access for HD.    4. Hypertension:  Continue Coreg and nitrates.    5. CKD stage 4:  worsening renal function  family in agreement with HD    6. Diabetes mellitus:  Monitor fingersticks  sliding scale coverage.    7.PVD:  continue aspirin and statin.    8. DVT ppx:  Heparin.    Discussed with patient and RN.  course pending decision regarding HD.     67 yr old male with CAD s/p CABG, peripheral arterial disease, diabetes mellitus, TIA, hypertension, hyperlipidemia, renal cancer s/p nephrectomy, CKD stage 4, ischemic cardiomyopathy admitted after a pre syncopal episode at home. Patient reported feeling weak while at the dinner table and slumped over. In ED, labs and imaging done, troponin negative, CT head without acute changes. Cardiology was consulted. MRI brain was done, noted to have acute frontal and parietal infarcts. Patient noted to have worsening renal function, uremia, nephrology was consulted to evaluate possible need to start HD.    1. Acute stroke:  MRA brain noted.  PT follow up  continue aspirin and statin.    2. Falls:  sec stroke  as above    3. CAD s/p CABG:  Continue aspirin, statin, coreg and nitrates  cardiology eval noted  will hold off Eliquis for now given possibility of needing access for HD.    4. Hypertension:  Continue Coreg and nitrates.    5. CKD stage 4:  mild improvement with IVF  continue for now  HD on hold    6. Diabetes mellitus:  Monitor fingersticks  sliding scale coverage.    7.PVD:  continue aspirin and statin.    8. DVT ppx:  Heparin.    Discussed with patient and RN.  discussed with nephrology and wife.

## 2022-03-17 NOTE — PROGRESS NOTE ADULT - SUBJECTIVE AND OBJECTIVE BOX
INTERVAL HPI/OVERNIGHT EVENTS:    CC: s yncope, ischemic cardiomyopathy, hypertension, CAD, hyperlipidemia, stage 4 CKD, CVA.    No overnight events, denies new complaints.    Vital Signs Last 24 Hrs  T(C): 36.8 (17 Mar 2022 11:16), Max: 36.8 (17 Mar 2022 11:16)  T(F): 98.3 (17 Mar 2022 11:16), Max: 98.3 (17 Mar 2022 11:16)  HR: 94 (17 Mar 2022 11:16) (78 - 94)  BP: 157/64 (17 Mar 2022 11:16) (147/67 - 157/64)  BP(mean): --  RR: 18 (17 Mar 2022 11:16) (18 - 18)  SpO2: 96% (17 Mar 2022 11:16) (78% - 96%)    PHYSICAL EXAM:    GENERAL: alert,not in distress  CHEST/LUNG: b/l air entry  HEART: reg  ABDOMEN: soft, bs+  EXTREMITIES:  no edema, tenderness    MEDICATIONS  (STANDING):  amLODIPine   Tablet 5 milliGRAM(s) Oral daily  aspirin  chewable 81 milliGRAM(s) Oral daily  atorvastatin 20 milliGRAM(s) Oral at bedtime  carvedilol 25 milliGRAM(s) Oral every 12 hours  cilostazol 100 milliGRAM(s) Oral two times a day  dextrose 40% Gel 15 Gram(s) Oral once  dextrose 5%. 1000 milliLiter(s) (50 mL/Hr) IV Continuous <Continuous>  dextrose 50% Injectable 25 Gram(s) IV Push once  glucagon  Injectable 1 milliGRAM(s) IntraMuscular once  heparin   Injectable 5000 Unit(s) SubCutaneous every 12 hours  hydrALAZINE 25 milliGRAM(s) Oral every 8 hours  insulin glargine Injectable (LANTUS) 15 Unit(s) SubCutaneous every morning  insulin lispro (ADMELOG) corrective regimen sliding scale   SubCutaneous three times a day before meals  insulin lispro Injectable (ADMELOG) 5 Unit(s) SubCutaneous three times a day before meals  isosorbide   dinitrate Tablet (ISORDIL) 20 milliGRAM(s) Oral three times a day  sodium bicarbonate 650 milliGRAM(s) Oral two times a day  sodium chloride 0.45%. 1000 milliLiter(s) (80 mL/Hr) IV Continuous <Continuous>    MEDICATIONS  (PRN):  acetaminophen     Tablet .. 650 milliGRAM(s) Oral every 6 hours PRN Temp greater or equal to 38C (100.4F), Mild Pain (1 - 3)      Allergies    No Known Allergies    Intolerances          LABS:                          10.5   8.13  )-----------( 256      ( 17 Mar 2022 06:51 )             31.7     03-17    138  |  102  |  73.8<H>  ----------------------------<  120<H>  4.1   |  20.0<L>  |  6.76<H>    Ca    8.3<L>      17 Mar 2022 06:51  Phos  3.9     03-17  Mg     1.7     03-16    TPro  5.9<L>  /  Alb  3.2<L>  /  TBili  0.3<L>  /  DBili  x   /  AST  13  /  ALT  15  /  AlkPhos  106  03-17          RADIOLOGY & ADDITIONAL TESTS:

## 2022-03-17 NOTE — PROGRESS NOTE ADULT - SUBJECTIVE AND OBJECTIVE BOX
Patient seen and examined    Feels fine, more interactive, comfortable  good UO  no c/o CP SOB NV   No swelling feet    Vital Signs Last 24 Hrs  T(C): 36.8 (17 Mar 2022 11:16), Max: 36.8 (17 Mar 2022 11:16)  T(F): 98.3 (17 Mar 2022 11:16), Max: 98.3 (17 Mar 2022 11:16)  HR: 94 (17 Mar 2022 11:16) (78 - 94)  BP: 157/64 (17 Mar 2022 11:16) (147/67 - 157/64)  BP(mean): --  RR: 18 (17 Mar 2022 11:16) (18 - 18)  SpO2: 96% (17 Mar 2022 11:16) (78% - 96%)    PHYSICAL EXAM    GENERAL: NAD,   EYES:  conjunctiva and sclera clear  NECK: Supple, No JVD/Bruit  NERVOUS SYSTEM:  A/O x3,   CHEST:  No rales, No rhonchi  HEART:  RRR, No murmur  ABDOMEN: Soft, NT/ND BS+  EXTREMITIES:  No Edema;  SKIN: No rashes    17 Mar 2022 06:51    138    |  102    |  73.8   ----------------------------<  120    4.1     |  20.0   |  6.76     Ca    8.3        17 Mar 2022 06:51  Phos  3.9       17 Mar 2022 06:51  Mg     1.7       16 Mar 2022 07:13    TPro  5.9    /  Alb  3.2    /  TBili  0.3    /  DBili  x      /  AST  13     /  ALT  15     /  AlkPhos  106    17 Mar 2022 06:51                          10.5   8.13  )-----------( 256      ( 17 Mar 2022 06:51 )             31.7         Continue present treatment

## 2022-03-17 NOTE — PROGRESS NOTE ADULT - PROBLEM SELECTOR PLAN 1
- acute stroke. left side weakness.  left  prietal and frontal infarcts.   left carotid 50-69% stenosis.     Outpaitent follow up with vascualr    Also evaluate for atrial fibrillation .   Outpatient 4 weeks MCOt monitor.    t aspirin 81.   eliquis renal dose

## 2022-03-17 NOTE — PROGRESS NOTE ADULT - ASSESSMENT
68 yo male was brought ot he ED by EMS for syncope. Pt states that he was sitting at the counter eating dinner at 7PM today, when he suddenly felt weak and slumped over on the counter.     3/14/22:  Patient sitting up in bed eating breakfast, denies any further dizziness or chest pain

## 2022-03-17 NOTE — PROGRESS NOTE ADULT - PROBLEM SELECTOR PLAN 3
-   - s/p CABG 12/2014- LIMA-LAD reverse SVG to posterior lateral reverse SVG to OM  - Continue statin, bb, and Norvasc and hydralazine  - Continue Isordil   Aspirin

## 2022-03-17 NOTE — PROGRESS NOTE ADULT - SUBJECTIVE AND OBJECTIVE BOX
[facility]                                                         CARDIOLOGY AT [facility]                                                                  [address], [place] [address]                                                         Telephone: [phone]. Fax:[phone]                                                                             PROGRESS NOTE    Reason for follow up: Syncope  Update:    NO new updates   Subj: I am ok.         Review of symptoms:   Cardiac:  No chest pain. No dyspnea. No palpitations.  Respiratory: no cough. No dyspnea  Gastrointestinal: No diarrhea. No abdominal pain. No bleeding.   Neuro: No focal neuro complaints.    Vitals:    Vital Signs Last 24 Hrs  T(C): 36.8 (03-17-22 @ 11:16), Max: 36.8 (03-17-22 @ 11:16)  T(F): 98.3 (03-17-22 @ 11:16), Max: 98.3 (03-17-22 @ 11:16)  HR: 94 (03-17-22 @ 11:16) (78 - 94)  BP: 157/64 (03-17-22 @ 11:16) (147/67 - 157/64)  BP(mean): --  RR: 18 (03-17-22 @ 11:16) (18 - 18)  SpO2: 96% (03-17-22 @ 11:16) (94% - 96%)    PHYSICAL EXAM:  Appearance: Comfortable. No acute distress  HEENT:  Atraumatic. Normocephalic.  Normal oral mucosa  Neurologic: A & O x 3,  left sided tremors and weakness.   Cardiovascular: RRR S1 S2, No murmur, no rubs/gallops. No JVD  Respiratory: Lungs clear to auscultation, unlabored   Gastrointestinal:  Soft, Non-tender, + BS  Lower Extremities: 2+ Peripheral Pulses, No clubbing, cyanosis, or edema  Psychiatry: Patient is calm. No agitation.   Skin: warm and dry.    CURRENT CARDIAC MEDICATIONS:      MEDICATIONS  (STANDING):   MEDICATIONS  (STANDING):  amLODIPine   Tablet 5 milliGRAM(s) Oral daily  carvedilol 25 milliGRAM(s) Oral every 12 hours  hydrALAZINE 25 milliGRAM(s) Oral every 8 hours  isosorbide   dinitrate Tablet (ISORDIL) 20 milliGRAM(s) Oral three times a day    aspirin  chewable  atorvastatin  dextrose 40% Gel  dextrose 5%.  dextrose 50% Injectable  glucagon  Injectable  heparin   Injectable  insulin glargine Injectable (LANTUS)  insulin lispro (ADMELOG) corrective regimen sliding scale  insulin lispro Injectable (ADMELOG)  sodium bicarbonate  sodium chloride 0.45%.    PRN: acetaminophen     Tablet .. PRN          LABS:	 	                                     10.5   8.13  )-----------( 256      ( 17 Mar 2022 06:51 )             31.7   N=x    ; L=x        17 Mar 2022 06:51    138    |  102    |  73.8   ----------------------------<  120    4.1     |  20.0   |  6.76     Ca    8.3        17 Mar 2022 06:51  Phos  3.9       17 Mar 2022 06:51  Mg     1.7       16 Mar 2022 07:13    TPro  5.9    /  Alb  3.2    /  TBili  0.3    /  DBili  x      /  AST  13     /  ALT  15     /  AlkPhos  106    17 Mar 2022 06:51      Hepatic panel: 17 Mar 2022 06:51  5.9   | 3.2                            0.3   | 0.3  /x                              13    | 15                                /106  \par                                 TSH:     TELEMETRY: NSR no events overnight  ECG:    DIAGNOSTIC TESTING:  [ ] Echocardiogram:   < from: TTE Echo Complete w/o Contrast w/ Doppler (01.14.22 @ 19:06) >  PHYSICIAN INTERPRETATION:  Left Ventricle: The left ventricular internal cavity size is normal.  Global LV systolic function was normal. Left ventricular ejection   fraction, by visual estimation, is 55 to 60%. Spectral Doppler shows   normal pattern of LV diastolic filling.  Right Ventricle: The right ventricular size is normal. RV systolic   function is normal.  Left Atrium: Mildly enlarged left atrium.  Right Atrium: Normal right atrial size.  Pericardium: Trivial pericardial effusion is present. The pericardial   effusion is surrounding the apex.  Mitral Valve: Thickening and calcification of the anterior and posterior   mitral valve leaflets. There is mild mitral annular calcification. Mild   mitral valve regurgitation is seen.  Tricuspid Valve: The tricuspid valve is normal in structure. Moderate   tricuspid regurgitation is visualized. Estimated pulmonary artery   systolic pressure is 40.3 mmHg assuming a right atrial pressure of 5   mmHg, which is consistent with mild pulmonary hypertension.  Aortic Valve: The aortic valve is trileaflet. Sclerotic aortic valve with   normal opening. Trivial aortic valve regurgitation is seen.  Pulmonic Valve: The pulmonic valve was not well visualized. Trace   pulmonic valve regurgitation.  Aorta: The aortic root is normal in size and structure.  Pulmonary Artery: The pulmonary artery is of normal size and origin.  Venous: The inferior vena cava is normal. The inferior vena cava was   normal sized, with respiratory size variation greater than 50%.      Summary:   1. Technically difficult study.   2. Normal global left ventricular systolic function.   3. Left ventricular ejection fraction, by visual estimation, is 55 to   60%.   4. Mildly enlarged left atrium.   5. Normal right atrial size.   6.Mild mitral annular calcification.   7. Mild mitral valve regurgitation.   8. Thickening and calcification of the anterior and posterior mitral   valve leaflets.   9. Moderate tricuspid regurgitation.  10. Sclerotic aortic valve with normal opening.  11. Estimated pulmonary artery systolic pressure is 40.3 mmHg assuming a   right atrial pressure of 5 mmHg, which is consistent with mild pulmonary   hypertension.  12. Trivial pericardial effusion.    MD Junie Electronically signed on 1/15/2022 at 10:13:45 AM    < end of copied text >    [ ]  Catheterization:  [ ] Stress Test:    OTHER: 	   MRi head   IMPRESSION:  FEW SMALL ACUTE INFARCTS LEFT PARIETAL GREATER THAN FRONTAL LOBES.    < end of copied text >

## 2022-03-17 NOTE — PROGRESS NOTE ADULT - ASSESSMENT
66 yo male with PMH of CAD s/p CABG, PAD, DM2,  TIA, HLD, HTN, renal Ca s/p L nephrectomy 10/2020, CKD 4, ischemic CMP, was brought ot he ED by EMS for syncope. Pt states that he was sitting at the counter eating dinner at 7PM today, when he suddenly felt weak and slumped over on the counter. Per wife, pt was unresponsive with eyes rolled back. Pt states that he was awake during the episode but just felt very weak. Episode lasted ~10 minutes. Pt currently back to baseline. Denies prodromal chest pain, palpitations, shortness of breath. No recent illness; however notes that he has had intermittent dizziness upon standing up over the past week.     Advanced CKD - CAD / CABG , ICM , TIA   DM Nephropathy / HTN   Recent MRI noted --> New ECHO to be done    Possibly developing some subtle uremic ssx   Normal renal sonogram 1/22  I had a long discussion with the wife and daughter Angelica on phone ( [phone] ) .   May have to consider initiation of HD   Clear CXR 3/10/ 22   Continue Hydral / Imdur ALR   Holding Lasix for now , as currently does not appear to be fluid overloaded - hope that creat may improve but not so far  Repeat renal sonogram without hydro 3/14      I spoke again with Angelica yesterday- explained he has severe renal failure now and may need HD - has only 1 K, no hydro  Willing for HD  P/I/ R/B explained at length - willing for HD ; consent obtained  Creat was 4.6 on 3/11> 7.27 - cause unclear - no NTx meds or IV contrast used  recheck labs am off lasix  Given gentle hydration - melany well - Creat sl lower 6.76; Continue IVFs and Labs am  if all -ve then may need to start Hd in 1-2 days  d/w Dr. Pro

## 2022-03-17 NOTE — PROGRESS NOTE ADULT - ASSESSMENT
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  Risk Factors. DM HTN HLD    ASSESSMENT/ PLAN:     - Neuro checks and vital signs Q 2 hours.  - SBP goal normotensive.  - Continue medical management with ASA 81 mg PO  QD.  -  and Lipitor for LDL goal of < 70 .  - Telemetry monitoring.  -- MRI Brain and MRA of head and neck- images and report were reviewed by me.  - Check fasting Lipid panel and HbA1c  - TTE report as above noted.  - PT OT following.  - SCD/ SQ Heparin for DVT prophylaxis.

## 2022-03-17 NOTE — PROGRESS NOTE ADULT - PROBLEM SELECTOR PLAN 6
-  - HFpEF (LVEF-60%)      received IV fluids  cr improved with IV fluids
-  - HFpEF (LVEF-60%)  - Not on diuresis due to dehydration.   may receive rich elow dose IV fluids to see if cr improves.   patient has good urine output.
-  - HFpEF (LVEF-60%)  -  restart low dose diuresis on discharge.

## 2022-03-17 NOTE — PROGRESS NOTE ADULT - SUBJECTIVE AND OBJECTIVE BOX
Neurology     ANSHUL CUNNINGHAM 67y Male     HPI:  66 yo male with PMH of CAD s/p CABG, PAD, DM2,  TIA, HLD, HTN, renal Ca s/p L nephrectomy 10/2020, CKD 4, ischemic CMP, was brought ot he ED by EMS for syncope. Pt states that he was sitting at the counter eating dinner at 7PM today, when he suddenly felt weak and slumped over on the counter. Per wife, pt was unresponsive with eyes rolled back. Pt states that he was awake during the episode but just felt very weak. Episode lasted ~10 minutes. Pt currently back to baseline. Denies prodromal chest pain, palpitations, shortness of breath. No recent illness; however notes that he has had intermittent dizziness upon standing up over the past week.     In the ED  vitals stable  EKG unchanged  CT head neagtive (11 Mar 2022 00:45)      PMH: DM (diabetes mellitus)    HTN (hypertension)    High cholesterol    Myocardial infarction    Acute on chronic congestive heart failure, unspecified congestive heart failure type         PSH: No significant past surgical history    S/P coronary artery bypass with three autogenous grafts        FAMILY HISTORY:  Family history of heart disease (Sibling)      SOCIAL HISTORY:  No history of tobacco or alcohol use     Allergies    No Known Allergies    Intolerances  3-17-22  No new complaints.      Vital Signs Last 24 Hrs  T(C): 36.3 (17 Mar 2022 19:46), Max: 36.8 (17 Mar 2022 11:16)  T(F): 97.4 (17 Mar 2022 19:46), Max: 98.3 (17 Mar 2022 11:16)  HR: 89 (17 Mar 2022 19:46) (78 - 94)  BP: 151/67 (17 Mar 2022 19:46) (147/67 - 157/64)  BP(mean): --  RR: 18 (17 Mar 2022 19:46) (18 - 18)  SpO2: 95% (17 Mar 2022 19:46) (94% - 96%)    MEDICATIONS    acetaminophen     Tablet .. 650 milliGRAM(s) Oral every 6 hours PRN  amLODIPine   Tablet 5 milliGRAM(s) Oral daily  aspirin  chewable 81 milliGRAM(s) Oral daily  atorvastatin 20 milliGRAM(s) Oral at bedtime  carvedilol 25 milliGRAM(s) Oral every 12 hours  dextrose 40% Gel 15 Gram(s) Oral once  dextrose 5%. 1000 milliLiter(s) IV Continuous <Continuous>  dextrose 50% Injectable 25 Gram(s) IV Push once  glucagon  Injectable 1 milliGRAM(s) IntraMuscular once  heparin   Injectable 5000 Unit(s) SubCutaneous every 12 hours  hydrALAZINE 25 milliGRAM(s) Oral every 8 hours  insulin glargine Injectable (LANTUS) 15 Unit(s) SubCutaneous every morning  insulin lispro (ADMELOG) corrective regimen sliding scale   SubCutaneous three times a day before meals  insulin lispro Injectable (ADMELOG) 5 Unit(s) SubCutaneous three times a day before meals  isosorbide   dinitrate Tablet (ISORDIL) 20 milliGRAM(s) Oral three times a day  sodium bicarbonate 650 milliGRAM(s) Oral two times a day  sodium chloride 0.45%. 1000 milliLiter(s) IV Continuous <Continuous>     LABS:  CBC Full  -  ( 17 Mar 2022 06:51 )  WBC Count : 8.13 K/uL  RBC Count : 3.84 M/uL  Hemoglobin : 10.5 g/dL  Hematocrit : 31.7 %  Platelet Count - Automated : 256 K/uL  Mean Cell Volume : 82.6 fl  Mean Cell Hemoglobin : 27.3 pg  Mean Cell Hemoglobin Concentration : 33.1 gm/dL  Auto Neutrophil # : x  Auto Lymphocyte # : x  Auto Monocyte # : x  Auto Eosinophil # : x  Auto Basophil # : x  Auto Neutrophil % : x  Auto Lymphocyte % : x  Auto Monocyte % : x  Auto Eosinophil % : x  Auto Basophil % : x      03-17    138  |  102  |  73.8<H>  ----------------------------<  120<H>  4.1   |  20.0<L>  |  6.76<H>    Ca    8.3<L>      17 Mar 2022 06:51  Phos  3.9     03-17  Mg     1.7     03-16    TPro  5.9<L>  /  Alb  3.2<L>  /  TBili  0.3<L>  /  DBili  x   /  AST  13  /  ALT  15  /  AlkPhos  106  03-17    LIVER FUNCTIONS - ( 17 Mar 2022 06:51 )  Alb: 3.2 g/dL / Pro: 5.9 g/dL / ALK PHOS: 106 U/L / ALT: 15 U/L / AST: 13 U/L / GGT: x           Hemoglobin A1C:     On Neurological Examination:    Mental Status - Patient is  awake, alert and oriented X3.  Speech is fluent. Patient can name, repeat and follow commands correctly  There is no dysarthria.    Cranial Nerves - PERRL, EOMI,  Visual fields are full to finger counting, no gross facial asymmetry, tongue/uvula midline    Motor Exam -   Right upper ---5/5 No drift  Left upper ---5/5 No drift  Right lower ---5/5 No drift  Left lower  ---5/5 No drift     nml bulk/tone    Sensory    Intact to light touch and pinprick bilaterally    Coord: FTN intact bilaterally     Gait -  Not assessed.     RADIOLOGY ( All neurological imaging studies were independently reviewed and interpreted by me)  CTH   CTA  CTP    CT Head No Cont (03.10.22 @ 23:08) >    IMPRESSION: Mild diffuse cerebral volume loss, multiple chronic lacunar   infarcts and chronic periventricular white matter small vessel ischemic   disease.    No acute intracranial hemorrhage, mass effect or acute territorial   infarcts seen.    LAKESHA LOPEZ MD; Attending Radiologis      MRI:     MR Head No Cont (03.15.22 @ 04:14) >    IMPRESSION:  FEW SMALL ACUTE INFARCTS LEFT PARIETAL GREATER THAN FRONTAL LOBES.       MR Angio Neck No Cont (03.16.22 @ 23:08) >  IMPRESSION:    1. Suboptimal but grossly diagnostic study, degraded by motion.  2. moderate narrowing of both carotid bifurcations and proximal internal   carotid arteries, which is consistent with ultrasound findings of   3/11/2022. No dissection or occlusion. Patent vertebrals.  3. Atherosclerotic changes noted intracranially, most prevalent in the   posterior cerebral arteries but also visible in the middle cerebral   arteries are proximally. No occlusion identified.    AMARJIT SAINI MD; Attending Radiologist         US Duplex Carotid Arteries Complete, Bilateral (03.11.22 @ 11:52) >    IMPRESSION: 50-69% left internal carotid artery stenosis.    HANSEL MARTIN MD; Attending Radiologist        TTE    TTE Echo Complete w/o Contrast w/ Doppler (01.14.22 @ 19:06) >    Summary:   1. Technically difficult study.   2. Normal global left ventricular systolic function.   3. Left ventricular ejection fraction, by visual estimation, is 55 to   60%.   4. Mildly enlarged left atrium.   5. Normal right atrial size.   6.Mild mitral annular calcification.   7. Mild mitral valve regurgitation.   8. Thickening and calcification of the anterior and posterior mitral   valve leaflets.   9. Moderate tricuspid regurgitation.  10. Sclerotic aortic valve with normal opening.  11. Estimated pulmonary artery systolic pressure is 40.3 mmHg assuming a   right atrial pressure of 5 mmHg, which is consistent with mild pulmonary   hypertension.  12. Trivial pericardial effusion.    MD Junie Electronically signed on 1/15/2022 at 10:13:45 AM

## 2022-03-18 LAB
ANION GAP SERPL CALC-SCNC: 15 MMOL/L — SIGNIFICANT CHANGE UP (ref 5–17)
BASOPHILS # BLD AUTO: 0.07 K/UL — SIGNIFICANT CHANGE UP (ref 0–0.2)
BASOPHILS NFR BLD AUTO: 0.8 % — SIGNIFICANT CHANGE UP (ref 0–2)
BUN SERPL-MCNC: 75.6 MG/DL — HIGH (ref 8–20)
CALCIUM SERPL-MCNC: 8.2 MG/DL — LOW (ref 8.6–10.2)
CHLORIDE SERPL-SCNC: 104 MMOL/L — SIGNIFICANT CHANGE UP (ref 98–107)
CO2 SERPL-SCNC: 20 MMOL/L — LOW (ref 22–29)
CREAT SERPL-MCNC: 6.63 MG/DL — HIGH (ref 0.5–1.3)
EGFR: 8 ML/MIN/1.73M2 — LOW
EOSINOPHIL # BLD AUTO: 0.23 K/UL — SIGNIFICANT CHANGE UP (ref 0–0.5)
EOSINOPHIL NFR BLD AUTO: 2.6 % — SIGNIFICANT CHANGE UP (ref 0–6)
GLUCOSE BLDC GLUCOMTR-MCNC: 110 MG/DL — HIGH (ref 70–99)
GLUCOSE BLDC GLUCOMTR-MCNC: 111 MG/DL — HIGH (ref 70–99)
GLUCOSE BLDC GLUCOMTR-MCNC: 116 MG/DL — HIGH (ref 70–99)
GLUCOSE BLDC GLUCOMTR-MCNC: 119 MG/DL — HIGH (ref 70–99)
GLUCOSE SERPL-MCNC: 96 MG/DL — SIGNIFICANT CHANGE UP (ref 70–99)
HCT VFR BLD CALC: 32.4 % — LOW (ref 39–50)
HGB BLD-MCNC: 10.3 G/DL — LOW (ref 13–17)
IMM GRANULOCYTES NFR BLD AUTO: 0.5 % — SIGNIFICANT CHANGE UP (ref 0–1.5)
LYMPHOCYTES # BLD AUTO: 1.77 K/UL — SIGNIFICANT CHANGE UP (ref 1–3.3)
LYMPHOCYTES # BLD AUTO: 20.1 % — SIGNIFICANT CHANGE UP (ref 13–44)
MAGNESIUM SERPL-MCNC: 1.8 MG/DL — SIGNIFICANT CHANGE UP (ref 1.6–2.6)
MCHC RBC-ENTMCNC: 26.5 PG — LOW (ref 27–34)
MCHC RBC-ENTMCNC: 31.8 GM/DL — LOW (ref 32–36)
MCV RBC AUTO: 83.5 FL — SIGNIFICANT CHANGE UP (ref 80–100)
MONOCYTES # BLD AUTO: 0.69 K/UL — SIGNIFICANT CHANGE UP (ref 0–0.9)
MONOCYTES NFR BLD AUTO: 7.8 % — SIGNIFICANT CHANGE UP (ref 2–14)
NEUTROPHILS # BLD AUTO: 5.99 K/UL — SIGNIFICANT CHANGE UP (ref 1.8–7.4)
NEUTROPHILS NFR BLD AUTO: 68.2 % — SIGNIFICANT CHANGE UP (ref 43–77)
PLATELET # BLD AUTO: 245 K/UL — SIGNIFICANT CHANGE UP (ref 150–400)
POTASSIUM SERPL-MCNC: 4.1 MMOL/L — SIGNIFICANT CHANGE UP (ref 3.5–5.3)
POTASSIUM SERPL-SCNC: 4.1 MMOL/L — SIGNIFICANT CHANGE UP (ref 3.5–5.3)
RBC # BLD: 3.88 M/UL — LOW (ref 4.2–5.8)
RBC # FLD: 14.5 % — SIGNIFICANT CHANGE UP (ref 10.3–14.5)
SODIUM SERPL-SCNC: 139 MMOL/L — SIGNIFICANT CHANGE UP (ref 135–145)
WBC # BLD: 8.79 K/UL — SIGNIFICANT CHANGE UP (ref 3.8–10.5)
WBC # FLD AUTO: 8.79 K/UL — SIGNIFICANT CHANGE UP (ref 3.8–10.5)

## 2022-03-18 PROCEDURE — 99233 SBSQ HOSP IP/OBS HIGH 50: CPT

## 2022-03-18 RX ORDER — SODIUM CHLORIDE 9 MG/ML
1000 INJECTION, SOLUTION INTRAVENOUS
Refills: 0 | Status: DISCONTINUED | OUTPATIENT
Start: 2022-03-18 | End: 2022-03-19

## 2022-03-18 RX ORDER — APIXABAN 2.5 MG/1
1 TABLET, FILM COATED ORAL
Qty: 60 | Refills: 0
Start: 2022-03-18 | End: 2022-04-16

## 2022-03-18 RX ADMIN — HEPARIN SODIUM 5000 UNIT(S): 5000 INJECTION INTRAVENOUS; SUBCUTANEOUS at 17:25

## 2022-03-18 RX ADMIN — ATORVASTATIN CALCIUM 20 MILLIGRAM(S): 80 TABLET, FILM COATED ORAL at 21:37

## 2022-03-18 RX ADMIN — Medication 81 MILLIGRAM(S): at 13:15

## 2022-03-18 RX ADMIN — ISOSORBIDE DINITRATE 20 MILLIGRAM(S): 5 TABLET ORAL at 06:25

## 2022-03-18 RX ADMIN — Medication 650 MILLIGRAM(S): at 17:24

## 2022-03-18 RX ADMIN — Medication 5 UNIT(S): at 09:05

## 2022-03-18 RX ADMIN — SODIUM CHLORIDE 80 MILLILITER(S): 9 INJECTION, SOLUTION INTRAVENOUS at 06:30

## 2022-03-18 RX ADMIN — Medication 25 MILLIGRAM(S): at 21:37

## 2022-03-18 RX ADMIN — ISOSORBIDE DINITRATE 20 MILLIGRAM(S): 5 TABLET ORAL at 17:24

## 2022-03-18 RX ADMIN — Medication 5 UNIT(S): at 13:07

## 2022-03-18 RX ADMIN — ISOSORBIDE DINITRATE 20 MILLIGRAM(S): 5 TABLET ORAL at 13:15

## 2022-03-18 RX ADMIN — Medication 650 MILLIGRAM(S): at 06:25

## 2022-03-18 RX ADMIN — CARVEDILOL PHOSPHATE 25 MILLIGRAM(S): 80 CAPSULE, EXTENDED RELEASE ORAL at 06:25

## 2022-03-18 RX ADMIN — Medication 5 UNIT(S): at 18:19

## 2022-03-18 RX ADMIN — Medication 25 MILLIGRAM(S): at 13:15

## 2022-03-18 RX ADMIN — Medication 25 MILLIGRAM(S): at 06:25

## 2022-03-18 RX ADMIN — SODIUM CHLORIDE 100 MILLILITER(S): 9 INJECTION, SOLUTION INTRAVENOUS at 21:36

## 2022-03-18 RX ADMIN — CARVEDILOL PHOSPHATE 25 MILLIGRAM(S): 80 CAPSULE, EXTENDED RELEASE ORAL at 17:24

## 2022-03-18 RX ADMIN — HEPARIN SODIUM 5000 UNIT(S): 5000 INJECTION INTRAVENOUS; SUBCUTANEOUS at 06:24

## 2022-03-18 RX ADMIN — TUBERCULIN PURIFIED PROTEIN DERIVATIVE 5 UNIT(S): 5 INJECTION, SOLUTION INTRADERMAL at 18:39

## 2022-03-18 RX ADMIN — INSULIN GLARGINE 15 UNIT(S): 100 INJECTION, SOLUTION SUBCUTANEOUS at 09:06

## 2022-03-18 RX ADMIN — AMLODIPINE BESYLATE 5 MILLIGRAM(S): 2.5 TABLET ORAL at 06:28

## 2022-03-18 NOTE — PROGRESS NOTE ADULT - ASSESSMENT
66 yo male with PMH of CAD s/p CABG, PAD, DM2,  TIA, HLD, HTN, renal Ca s/p L nephrectomy 10/2020, CKD 4, ischemic CMP, was brought ot he ED by EMS for syncope. Pt states that he was sitting at the counter eating dinner at 7PM today, when he suddenly felt weak and slumped over on the counter. Per wife, pt was unresponsive with eyes rolled back. Pt states that he was awake during the episode but just felt very weak. Episode lasted ~10 minutes. Pt currently back to baseline. Denies prodromal chest pain, palpitations, shortness of breath. No recent illness; however notes that he has had intermittent dizziness upon standing up over the past week.     Advanced CKD - CAD / CABG , ICM , TIA   DM Nephropathy / HTN   Recent MRI noted --> New ECHO to be done    Possibly developing some subtle uremic ssx   Normal renal sonogram 1/22  I had a long discussion with the wife and daughter Angelica on phone ( [phone] ) .   May have to consider initiation of HD   Clear CXR 3/10/ 22   Continue Hydral / Imdur ALR   Holding Lasix for now , as currently does not appear to be fluid overloaded - hope that creat may improve but not so far  Repeat renal sonogram without hydro 3/14      I spoke again with Angelica yesterday- explained he has severe renal failure now and may need HD - has only 1 K, no hydro  Willing for HD  P/I/ R/B explained at length - willing for HD ; consent obtained  Creat was 4.6 on 3/11> 7.27 - cause unclear - no NTx meds or IV contrast used  recheck labs am off lasix  Given gentle hydration - melany well - Creat sl lower 6.76; Continue IVFs and Labs am  if all -ve then may need to start Hd in 1-2 days  d/w Dr. Pro 68 yo male with PMH of CAD s/p CABG, PAD, DM2,  TIA, HLD, HTN, renal Ca s/p L nephrectomy 10/2020, CKD 4, ischemic CMP, was brought ot he ED by EMS for syncope. Pt states that he was sitting at the counter eating dinner at 7PM today, when he suddenly felt weak and slumped over on the counter. Per wife, pt was unresponsive with eyes rolled back. Pt states that he was awake during the episode but just felt very weak. Episode lasted ~10 minutes. Pt currently back to baseline. Denies prodromal chest pain, palpitations, shortness of breath. No recent illness; however notes that he has had intermittent dizziness upon standing up over the past week.     Advanced CKD - CAD / CABG , ICM , TIA   DM Nephropathy / HTN   Recent MRI noted --> New ECHO to be done    Possibly developing some subtle uremic ssx   Normal renal sonogram 1/22  - daughter Angelica phone ( [phone] ) .   May have to consider initiation of HD depending on coarse  Clear CXR 3/10/ 22   Holding Lasix for now , as currently does not appear to be fluid overloaded - hope that creat may improve   Repeat renal sonogram without hydro 3/14      I discussed RRT w pt no urgent need currently but may need to start on this admission depending on coarse   HD ; consent obtained in chart - should need arise  Creat was 4.6 on 3/11> 7.27 - cause unclear - no NTx meds or IV contrast used  recheck labs am off lasix  Given gentle hydration - melany well - Creat sl lower 6.76; Continue IVFs    AM labs will follow

## 2022-03-18 NOTE — DIETITIAN INITIAL EVALUATION ADULT. - PERTINENT LABORATORY DATA
03-18 Na139 mmol/L Glu 96 mg/dL K+ 4.1 mmol/L Cr  6.63 mg/dL<H> BUN 75.6 mg/dL<H> Phos n/a   Alb n/a   PAB n/a

## 2022-03-18 NOTE — PROGRESS NOTE ADULT - ASSESSMENT
67 yr old male with CAD s/p CABG, peripheral arterial disease, diabetes mellitus, TIA, hypertension, hyperlipidemia, renal cancer s/p nephrectomy, CKD stage 4, ischemic cardiomyopathy admitted after a pre syncopal episode at home. Patient reported feeling weak while at the dinner table and slumped over. In ED, labs and imaging done, troponin negative, CT head without acute changes. Cardiology was consulted. MRI brain was done, noted to have acute frontal and parietal infarcts. Patient noted to have worsening renal function, uremia, nephrology was consulted to evaluate possible need to start HD. Lasix was held, trial of IVF was ordered by nephrology to assess improvement in renal function. Cardiology advised outpatient [facility] for atrial fibrillation monitoring. Eliquis was recommended given CVA, to be initiated once decision for HD made.    1. Acute stroke:  MRA brain noted.  continue aspirin and statin.  will start AC as per cardiology recommendations once final decision for HD made  needs [facility]  outpatient.    2. Falls:  sec stroke  as above    3. CAD s/p CABG:  Continue aspirin, statin, coreg and nitrates  will hold off Eliquis for now until final decision for HD is made.    4. Hypertension:  Continue Coreg and nitrates.    5. CKD stage 4:  mild improvement with IVF  continue for now  HD on hold  nephrology to follow to decide if HD warranted now or not.    6. Diabetes mellitus:  Monitor fingersticks  sliding scale coverage.    7.PVD:  continue aspirin and statin.    8. DVT ppx:  Heparin.    Discussed with patient and RN.       67 yr old male with CAD s/p CABG, peripheral arterial disease, diabetes mellitus, TIA, hypertension, hyperlipidemia, renal cancer s/p nephrectomy, CKD stage 4, ischemic cardiomyopathy admitted after a pre syncopal episode at home. Patient reported feeling weak while at the dinner table and slumped over. In ED, labs and imaging done, troponin negative, CT head without acute changes. Cardiology was consulted. MRI brain was done, noted to have acute frontal and parietal infarcts. Patient noted to have worsening renal function, uremia, nephrology was consulted to evaluate possible need to start HD. Lasix was held, trial of IVF was ordered by nephrology to assess improvement in renal function. Cardiology advised outpatient [facility] for atrial fibrillation monitoring. Eliquis was recommended given CVA, to be initiated once decision for HD made.    1. Acute stroke:  MRA brain noted.  continue aspirin and statin.  will start AC as per cardiology recommendations once final decision for HD made  needs [facility]  outpatient.    2. Falls:  sec stroke  as above    3. CAD s/p CABG:  Continue aspirin, statin, coreg and nitrates  will hold off Eliquis for now until final decision for HD is made.    4. Hypertension:  Continue Coreg and nitrates.    5. CKD stage 4:  mild improvement with IVF  continue for now  HD on hold  nephrology to follow to decide if HD warranted now or not.    6. Diabetes mellitus:  Monitor fingersticks  sliding scale coverage.    7.PVD:  continue aspirin and statin.    8. DVT ppx:  Heparin.    Discussed with patient and RN.  Called to update wife, no answer.

## 2022-03-18 NOTE — PROGRESS NOTE ADULT - SUBJECTIVE AND OBJECTIVE BOX
INTERVAL HPI/OVERNIGHT EVENTS:    CC:  syncope, ischemic cardiomyopathy, hypertension, CAD, hyperlipidemia, stage 4 CKD, CVA    No overnight events  receiving IVF as per nephrology  no shortness of breath    Vital Signs Last 24 Hrs  T(C): 36.7 (18 Mar 2022 04:09), Max: 36.8 (17 Mar 2022 11:16)  T(F): 98.1 (18 Mar 2022 04:09), Max: 98.3 (17 Mar 2022 11:16)  HR: 98 (18 Mar 2022 04:09) (82 - 98)  BP: 144/77 (18 Mar 2022 04:09) (144/77 - 157/64)  BP(mean): --  RR: 20 (18 Mar 2022 04:09) (18 - 20)  SpO2: 94% (18 Mar 2022 04:09) (94% - 96%)    PHYSICAL EXAM:    GENERAL: alert, not in distress  CHEST/LUNG: b/l air entry  HEART: reg  ABDOMEN: soft, bs+, non tender  EXTREMITIES:  no edema, tenderness    MEDICATIONS  (STANDING):  amLODIPine   Tablet 5 milliGRAM(s) Oral daily  aspirin  chewable 81 milliGRAM(s) Oral daily  atorvastatin 20 milliGRAM(s) Oral at bedtime  carvedilol 25 milliGRAM(s) Oral every 12 hours  dextrose 40% Gel 15 Gram(s) Oral once  dextrose 5%. 1000 milliLiter(s) (50 mL/Hr) IV Continuous <Continuous>  dextrose 50% Injectable 25 Gram(s) IV Push once  glucagon  Injectable 1 milliGRAM(s) IntraMuscular once  heparin   Injectable 5000 Unit(s) SubCutaneous every 12 hours  hydrALAZINE 25 milliGRAM(s) Oral every 8 hours  insulin glargine Injectable (LANTUS) 15 Unit(s) SubCutaneous every morning  insulin lispro (ADMELOG) corrective regimen sliding scale   SubCutaneous three times a day before meals  insulin lispro Injectable (ADMELOG) 5 Unit(s) SubCutaneous three times a day before meals  isosorbide   dinitrate Tablet (ISORDIL) 20 milliGRAM(s) Oral three times a day  sodium bicarbonate 650 milliGRAM(s) Oral two times a day  sodium chloride 0.45%. 1000 milliLiter(s) (80 mL/Hr) IV Continuous <Continuous>    MEDICATIONS  (PRN):  acetaminophen     Tablet .. 650 milliGRAM(s) Oral every 6 hours PRN Temp greater or equal to 38C (100.4F), Mild Pain (1 - 3)      Allergies    No Known Allergies    Intolerances          LABS:                          10.3   8.79  )-----------( 245      ( 18 Mar 2022 07:10 )             32.4     03-18    139  |  104  |  75.6<H>  ----------------------------<  96  4.1   |  20.0<L>  |  6.63<H>    Ca    8.2<L>      18 Mar 2022 07:10  Phos  3.9     03-17  Mg     1.8     03-18    TPro  5.9<L>  /  Alb  3.2<L>  /  TBili  0.3<L>  /  DBili  x   /  AST  13  /  ALT  15  /  AlkPhos  106  03-17          RADIOLOGY & ADDITIONAL TESTS:

## 2022-03-18 NOTE — PROGRESS NOTE ADULT - SUBJECTIVE AND OBJECTIVE BOX
NEPHROLOGY INTERVAL HPI/OVERNIGHT EVENTS:    Examined  No distress  Feeling better    MEDICATIONS  (STANDING):  amLODIPine   Tablet 5 milliGRAM(s) Oral daily  aspirin  chewable 81 milliGRAM(s) Oral daily  atorvastatin 20 milliGRAM(s) Oral at bedtime  carvedilol 25 milliGRAM(s) Oral every 12 hours  dextrose 40% Gel 15 Gram(s) Oral once  dextrose 5%. 1000 milliLiter(s) (50 mL/Hr) IV Continuous <Continuous>  dextrose 50% Injectable 25 Gram(s) IV Push once  glucagon  Injectable 1 milliGRAM(s) IntraMuscular once  heparin   Injectable 5000 Unit(s) SubCutaneous every 12 hours  hydrALAZINE 25 milliGRAM(s) Oral every 8 hours  insulin glargine Injectable (LANTUS) 15 Unit(s) SubCutaneous every morning  insulin lispro (ADMELOG) corrective regimen sliding scale   SubCutaneous three times a day before meals  insulin lispro Injectable (ADMELOG) 5 Unit(s) SubCutaneous three times a day before meals  isosorbide   dinitrate Tablet (ISORDIL) 20 milliGRAM(s) Oral three times a day  sodium bicarbonate 650 milliGRAM(s) Oral two times a day  sodium chloride 0.45%. 1000 milliLiter(s) (80 mL/Hr) IV Continuous <Continuous>    MEDICATIONS  (PRN):  acetaminophen     Tablet .. 650 milliGRAM(s) Oral every 6 hours PRN Temp greater or equal to 38C (100.4F), Mild Pain (1 - 3)      Allergies    No Known Allergies    Intolerances        Vital Signs Last 24 Hrs  T(C): 36.3 (18 Mar 2022 10:47), Max: 36.7 (17 Mar 2022 17:06)  T(F): 97.3 (18 Mar 2022 10:47), Max: 98.1 (17 Mar 2022 17:06)  HR: 86 (18 Mar 2022 10:47) (82 - 98)  BP: 146/69 (18 Mar 2022 10:47) (144/77 - 155/75)  BP(mean): --  RR: 18 (18 Mar 2022 10:47) (18 - 20)  SpO2: 93% (18 Mar 2022 10:47) (93% - 95%)  Daily     Daily     PHYSICAL EXAM:  GENERAL: NAD,   EYES:  conjunctiva and sclera clear  NECK: Supple, No JVD/Bruit  NERVOUS SYSTEM:  A/O x3,   CHEST:  No rales, No rhonchi  HEART:  RRR, No murmur  ABDOMEN: Soft, NT/ND BS+  EXTREMITIES:  No Edema    LABS:                        10.3   8.79  )-----------( 245      ( 18 Mar 2022 07:10 )             32.4     03-18    139  |  104  |  75.6<H>  ----------------------------<  96  4.1   |  20.0<L>  |  6.63<H>    Ca    8.2<L>      18 Mar 2022 07:10  Phos  3.9     03-17  Mg     1.8     03-18    TPro  5.9<L>  /  Alb  3.2<L>  /  TBili  0.3<L>  /  DBili  x   /  AST  13  /  ALT  15  /  AlkPhos  106  03-17        Magnesium, Serum: 1.8 mg/dL (03-18 @ 07:10)          RADIOLOGY & ADDITIONAL TESTS:   NEPHROLOGY INTERVAL HPI/OVERNIGHT EVENTS:    Examined  No distress  Feeling better  reports good UOP  No edema  No uremic symptoms    MEDICATIONS  (STANDING):  amLODIPine   Tablet 5 milliGRAM(s) Oral daily  aspirin  chewable 81 milliGRAM(s) Oral daily  atorvastatin 20 milliGRAM(s) Oral at bedtime  carvedilol 25 milliGRAM(s) Oral every 12 hours  dextrose 40% Gel 15 Gram(s) Oral once  dextrose 5%. 1000 milliLiter(s) (50 mL/Hr) IV Continuous <Continuous>  dextrose 50% Injectable 25 Gram(s) IV Push once  glucagon  Injectable 1 milliGRAM(s) IntraMuscular once  heparin   Injectable 5000 Unit(s) SubCutaneous every 12 hours  hydrALAZINE 25 milliGRAM(s) Oral every 8 hours  insulin glargine Injectable (LANTUS) 15 Unit(s) SubCutaneous every morning  insulin lispro (ADMELOG) corrective regimen sliding scale   SubCutaneous three times a day before meals  insulin lispro Injectable (ADMELOG) 5 Unit(s) SubCutaneous three times a day before meals  isosorbide   dinitrate Tablet (ISORDIL) 20 milliGRAM(s) Oral three times a day  sodium bicarbonate 650 milliGRAM(s) Oral two times a day  sodium chloride 0.45%. 1000 milliLiter(s) (80 mL/Hr) IV Continuous <Continuous>    MEDICATIONS  (PRN):  acetaminophen     Tablet .. 650 milliGRAM(s) Oral every 6 hours PRN Temp greater or equal to 38C (100.4F), Mild Pain (1 - 3)      Allergies    No Known Allergies    Intolerances        Vital Signs Last 24 Hrs  T(C): 36.3 (18 Mar 2022 10:47), Max: 36.7 (17 Mar 2022 17:06)  T(F): 97.3 (18 Mar 2022 10:47), Max: 98.1 (17 Mar 2022 17:06)  HR: 86 (18 Mar 2022 10:47) (82 - 98)  BP: 146/69 (18 Mar 2022 10:47) (144/77 - 155/75)  BP(mean): --  RR: 18 (18 Mar 2022 10:47) (18 - 20)  SpO2: 93% (18 Mar 2022 10:47) (93% - 95%)  Daily     Daily     PHYSICAL EXAM:  GENERAL: NAD,   EYES:  conjunctiva and sclera clear  NECK: Supple, No JVD/Bruit  NERVOUS SYSTEM:  A/O x3,   CHEST:  No rales, No rhonchi  HEART:  RRR, No murmur  ABDOMEN: Soft, NT/ND BS+  EXTREMITIES:  No Edema    LABS:                        10.3   8.79  )-----------( 245      ( 18 Mar 2022 07:10 )             32.4     03-18    139  |  104  |  75.6<H>  ----------------------------<  96  4.1   |  20.0<L>  |  6.63<H>    Ca    8.2<L>      18 Mar 2022 07:10  Phos  3.9     03-17  Mg     1.8     03-18    TPro  5.9<L>  /  Alb  3.2<L>  /  TBili  0.3<L>  /  DBili  x   /  AST  13  /  ALT  15  /  AlkPhos  106  03-17        Magnesium, Serum: 1.8 mg/dL (03-18 @ 07:10)          RADIOLOGY & ADDITIONAL TESTS:

## 2022-03-18 NOTE — DIETITIAN INITIAL EVALUATION ADULT. - OTHER INFO
67 yr old male with CAD s/p CABG, peripheral arterial disease, diabetes mellitus, TIA, hypertension, hyperlipidemia, renal cancer s/p nephrectomy, CKD stage 4, ischemic cardiomyopathy admitted after a pre syncopal episode at home. Patient reported feeling weak while at the dinner table and slumped over. In ED, labs and imaging done, troponin negative, CT head without acute changes. Cardiology was consulted. MRI brain was done, noted to have acute frontal and parietal infarcts.

## 2022-03-19 LAB
ANION GAP SERPL CALC-SCNC: 14 MMOL/L — SIGNIFICANT CHANGE UP (ref 5–17)
BUN SERPL-MCNC: 70.1 MG/DL — HIGH (ref 8–20)
CALCIUM SERPL-MCNC: 7.8 MG/DL — LOW (ref 8.6–10.2)
CHLORIDE SERPL-SCNC: 105 MMOL/L — SIGNIFICANT CHANGE UP (ref 98–107)
CO2 SERPL-SCNC: 19 MMOL/L — LOW (ref 22–29)
CREAT SERPL-MCNC: 5.69 MG/DL — HIGH (ref 0.5–1.3)
EGFR: 10 ML/MIN/1.73M2 — LOW
GLUCOSE BLDC GLUCOMTR-MCNC: 109 MG/DL — HIGH (ref 70–99)
GLUCOSE BLDC GLUCOMTR-MCNC: 143 MG/DL — HIGH (ref 70–99)
GLUCOSE BLDC GLUCOMTR-MCNC: 196 MG/DL — HIGH (ref 70–99)
GLUCOSE BLDC GLUCOMTR-MCNC: 85 MG/DL — SIGNIFICANT CHANGE UP (ref 70–99)
GLUCOSE SERPL-MCNC: 82 MG/DL — SIGNIFICANT CHANGE UP (ref 70–99)
PHOSPHATE SERPL-MCNC: 4 MG/DL — SIGNIFICANT CHANGE UP (ref 2.4–4.7)
POTASSIUM SERPL-MCNC: 4.2 MMOL/L — SIGNIFICANT CHANGE UP (ref 3.5–5.3)
POTASSIUM SERPL-SCNC: 4.2 MMOL/L — SIGNIFICANT CHANGE UP (ref 3.5–5.3)
SODIUM SERPL-SCNC: 138 MMOL/L — SIGNIFICANT CHANGE UP (ref 135–145)

## 2022-03-19 PROCEDURE — 99232 SBSQ HOSP IP/OBS MODERATE 35: CPT

## 2022-03-19 RX ORDER — SODIUM CHLORIDE 9 MG/ML
1000 INJECTION, SOLUTION INTRAVENOUS
Refills: 0 | Status: COMPLETED | OUTPATIENT
Start: 2022-03-19 | End: 2022-03-21

## 2022-03-19 RX ORDER — SODIUM BICARBONATE 1 MEQ/ML
650 SYRINGE (ML) INTRAVENOUS THREE TIMES A DAY
Refills: 0 | Status: DISCONTINUED | OUTPATIENT
Start: 2022-03-19 | End: 2022-03-20

## 2022-03-19 RX ADMIN — Medication 1: at 11:26

## 2022-03-19 RX ADMIN — Medication 650 MILLIGRAM(S): at 21:05

## 2022-03-19 RX ADMIN — Medication 650 MILLIGRAM(S): at 11:28

## 2022-03-19 RX ADMIN — Medication 5 UNIT(S): at 11:26

## 2022-03-19 RX ADMIN — INSULIN GLARGINE 15 UNIT(S): 100 INJECTION, SOLUTION SUBCUTANEOUS at 08:48

## 2022-03-19 RX ADMIN — SODIUM CHLORIDE 100 MILLILITER(S): 9 INJECTION, SOLUTION INTRAVENOUS at 14:39

## 2022-03-19 RX ADMIN — AMLODIPINE BESYLATE 5 MILLIGRAM(S): 2.5 TABLET ORAL at 05:11

## 2022-03-19 RX ADMIN — HEPARIN SODIUM 5000 UNIT(S): 5000 INJECTION INTRAVENOUS; SUBCUTANEOUS at 05:11

## 2022-03-19 RX ADMIN — ISOSORBIDE DINITRATE 20 MILLIGRAM(S): 5 TABLET ORAL at 11:28

## 2022-03-19 RX ADMIN — Medication 25 MILLIGRAM(S): at 21:04

## 2022-03-19 RX ADMIN — ATORVASTATIN CALCIUM 20 MILLIGRAM(S): 80 TABLET, FILM COATED ORAL at 21:04

## 2022-03-19 RX ADMIN — HEPARIN SODIUM 5000 UNIT(S): 5000 INJECTION INTRAVENOUS; SUBCUTANEOUS at 18:16

## 2022-03-19 RX ADMIN — ISOSORBIDE DINITRATE 20 MILLIGRAM(S): 5 TABLET ORAL at 05:11

## 2022-03-19 RX ADMIN — ISOSORBIDE DINITRATE 20 MILLIGRAM(S): 5 TABLET ORAL at 18:16

## 2022-03-19 RX ADMIN — Medication 25 MILLIGRAM(S): at 11:28

## 2022-03-19 RX ADMIN — CARVEDILOL PHOSPHATE 25 MILLIGRAM(S): 80 CAPSULE, EXTENDED RELEASE ORAL at 18:16

## 2022-03-19 RX ADMIN — Medication 81 MILLIGRAM(S): at 11:28

## 2022-03-19 RX ADMIN — CARVEDILOL PHOSPHATE 25 MILLIGRAM(S): 80 CAPSULE, EXTENDED RELEASE ORAL at 05:11

## 2022-03-19 RX ADMIN — Medication 25 MILLIGRAM(S): at 05:11

## 2022-03-19 RX ADMIN — Medication 650 MILLIGRAM(S): at 05:11

## 2022-03-19 NOTE — PROGRESS NOTE ADULT - ASSESSMENT
68 yo male with PMH of CAD s/p CABG, PAD, DM2,  TIA, HLD, HTN, renal Ca s/p L nephrectomy 10/2020, CKD 4, ischemic CMP, was brought ot he ED by EMS for syncope. Pt states that he was sitting at the counter eating dinner at 7PM today, when he suddenly felt weak and slumped over on the counter. Per wife, pt was unresponsive with eyes rolled back. Pt states that he was awake during the episode but just felt very weak. Episode lasted ~10 minutes. Pt currently back to baseline. Denies prodromal chest pain, palpitations, shortness of breath. No recent illness; however notes that he has had intermittent dizziness upon standing up over the past week.     Advanced CKD - CAD / CABG , ICM , TIA   DM Nephropathy / HTN   Renal sono 3/14 Echogenic kidneys no hydronephrosis  No nephrotoxic meds or IV contrast used  Serum Cr 7.2 on 3/16 Cr today 5.6 seems to be slowly improving w IVF --> will cont    Clear CXR 3/10/ 22   Holding Lasix for now , as currently does not appear to be fluid overloaded - hope that creat may improve     I discussed RRT w pt no urgent need currently but may need to start on this admission depending on coarse  HD ; consent obtained in chart - should need arise    daughter Angelica phone ( [phone] ) --> giving periodic updates    AM labs will follow

## 2022-03-19 NOTE — PROGRESS NOTE ADULT - ASSESSMENT
67 yr old male with CAD s/p CABG, peripheral arterial disease, diabetes mellitus, TIA, hypertension, hyperlipidemia, renal cancer s/p nephrectomy, CKD stage 4, ischemic cardiomyopathy admitted after a pre syncopal episode at home. Patient reported feeling weak while at the dinner table and slumped over. In ED, labs and imaging done, troponin negative, CT head without acute changes. Cardiology was consulted. MRI brain was done, noted to have acute frontal and parietal infarcts. Patient noted to have worsening renal function, uremia, nephrology was consulted to evaluate possible need to start HD. Lasix was held, trial of IVF was ordered by nephrology to assess improvement in renal function. Cardiology advised outpatient [facility] for atrial fibrillation monitoring. Eliquis was recommended given CVA, to be initiated once decision for HD made.    1. Acute stroke  - MRA brain noted.  - continue aspirin and statin.  - will start AC as per cardiology recommendations once final decision for HD made  - needs [facility] outpatient.    2. Falls  - sec stroke    3. CAD s/p CABG  - Continue aspirin, statin, coreg and nitrates  - will hold off Eliquis for now until final decision for HD is made    4. Hypertension  - Continue Coreg and nitrates    5. CKD stage 4  - mild improvement with IVF  - continue for now  - Nephrology to follow to decide if HD warranted now or not    6. Diabetes mellitus  - Monitor fingersticks  - sliding scale coverage.    7.PVD  - continue aspirin and statin.    8. DVT ppx  - Heparin SQ

## 2022-03-19 NOTE — PROGRESS NOTE ADULT - SUBJECTIVE AND OBJECTIVE BOX
NEPHROLOGY INTERVAL HPI/OVERNIGHT EVENTS:    Examined  No distress no Uremic symptoms  No edema  Denies HA CP no SOB      MEDICATIONS  (STANDING):  amLODIPine   Tablet 5 milliGRAM(s) Oral daily  aspirin  chewable 81 milliGRAM(s) Oral daily  atorvastatin 20 milliGRAM(s) Oral at bedtime  carvedilol 25 milliGRAM(s) Oral every 12 hours  dextrose 40% Gel 15 Gram(s) Oral once  dextrose 5%. 1000 milliLiter(s) (50 mL/Hr) IV Continuous <Continuous>  dextrose 50% Injectable 25 Gram(s) IV Push once  glucagon  Injectable 1 milliGRAM(s) IntraMuscular once  heparin   Injectable 5000 Unit(s) SubCutaneous every 12 hours  hydrALAZINE 25 milliGRAM(s) Oral every 8 hours  insulin glargine Injectable (LANTUS) 15 Unit(s) SubCutaneous every morning  insulin lispro (ADMELOG) corrective regimen sliding scale   SubCutaneous three times a day before meals  insulin lispro Injectable (ADMELOG) 5 Unit(s) SubCutaneous three times a day before meals  isosorbide   dinitrate Tablet (ISORDIL) 20 milliGRAM(s) Oral three times a day  sodium bicarbonate 650 milliGRAM(s) Oral two times a day  sodium chloride 0.45%. 1000 milliLiter(s) (100 mL/Hr) IV Continuous <Continuous>    MEDICATIONS  (PRN):  acetaminophen     Tablet .. 650 milliGRAM(s) Oral every 6 hours PRN Temp greater or equal to 38C (100.4F), Mild Pain (1 - 3)      Allergies    No Known Allergies    Intolerances        Vital Signs Last 24 Hrs  T(C): 36.7 (19 Mar 2022 04:53), Max: 36.7 (19 Mar 2022 04:53)  T(F): 98 (19 Mar 2022 04:53), Max: 98 (19 Mar 2022 04:53)  HR: 75 (19 Mar 2022 04:53) (75 - 86)  BP: 149/69 (19 Mar 2022 04:53) (146/69 - 162/74)  BP(mean): --  RR: 18 (19 Mar 2022 04:53) (18 - 18)  SpO2: 94% (19 Mar 2022 04:53) (93% - 96%)  Daily     Daily     PHYSICAL EXAM:  GENERAL: NAD,   NECK: Supple, No JVD  NERVOUS SYSTEM:  A/O x3,   CHEST:  No rales, No rhonchi  HEART:  RRR, No murmur  ABDOMEN: Soft, NT/ND BS+  EXTREMITIES:  No Edema    LABS:                        10.3   8.79  )-----------( 245      ( 18 Mar 2022 07:10 )             32.4     03-19    138  |  105  |  70.1<H>  ----------------------------<  82  4.2   |  19.0<L>  |  5.69<H>    Ca    7.8<L>      19 Mar 2022 07:27  Phos  4.0     03-19  Mg     1.8     03-18          Phosphorus Level, Serum: 4.0 mg/dL (03-19 @ 07:27)          RADIOLOGY & ADDITIONAL TESTS:

## 2022-03-19 NOTE — PROGRESS NOTE ADULT - SUBJECTIVE AND OBJECTIVE BOX
Chief complaint: Syncope    Patient seen and examined at bedside. No acute overnight events reported. Patient states he is doing well, currently denies headache, fever, chills, cough, chest pain, shortness of breath, nausea or vomiting.     Vital Signs Last 24 Hrs  T(F): 98 (19 Mar 2022 04:53), Max: 98 (19 Mar 2022 04:53)  HR: 75 (19 Mar 2022 04:53) (75 - 82)  BP: 149/69 (19 Mar 2022 04:53) (149/69 - 162/74)  RR: 18 (19 Mar 2022 04:53) (18 - 18)  SpO2: 94% (19 Mar 2022 04:53) (94% - 96%)    Physical Exam:  Constitutional: alert and oriented, in no acute distress   Neck: Soft and supple  Respiratory: Clear to auscultation bilaterally, no wheezes or crackles  Cardiovascular: Regular rate and rhyhtm, no murmurs, gallops, rubs  Gastrointestinal: Soft, non-tender to palpation, +bs  Vascular: 2+ peripheral pulses  Neurological: A/O x 3, no focal neurological deficits  Musculoskeletal: 5/5 strength b/l upper and lower extremities, no lower extremity edema bilaterally    Labs:                        10.3   8.79  )-----------( 245      ( 18 Mar 2022 07:10 )             32.4   03-19    138  |  105  |  70.1<H>  ----------------------------<  82  4.2   |  19.0<L>  |  5.69<H>    Ca    7.8<L>      19 Mar 2022 07:27  Phos  4.0     03-19  Mg     1.8     03-18

## 2022-03-20 LAB
ANION GAP SERPL CALC-SCNC: 14 MMOL/L — SIGNIFICANT CHANGE UP (ref 5–17)
BUN SERPL-MCNC: 65.9 MG/DL — HIGH (ref 8–20)
CALCIUM SERPL-MCNC: 8.2 MG/DL — LOW (ref 8.6–10.2)
CHLORIDE SERPL-SCNC: 106 MMOL/L — SIGNIFICANT CHANGE UP (ref 98–107)
CO2 SERPL-SCNC: 19 MMOL/L — LOW (ref 22–29)
CREAT SERPL-MCNC: 5.25 MG/DL — HIGH (ref 0.5–1.3)
EGFR: 11 ML/MIN/1.73M2 — LOW
GLUCOSE BLDC GLUCOMTR-MCNC: 118 MG/DL — HIGH (ref 70–99)
GLUCOSE BLDC GLUCOMTR-MCNC: 128 MG/DL — HIGH (ref 70–99)
GLUCOSE BLDC GLUCOMTR-MCNC: 215 MG/DL — HIGH (ref 70–99)
GLUCOSE BLDC GLUCOMTR-MCNC: 93 MG/DL — SIGNIFICANT CHANGE UP (ref 70–99)
GLUCOSE SERPL-MCNC: 82 MG/DL — SIGNIFICANT CHANGE UP (ref 70–99)
HCT VFR BLD CALC: 31.3 % — LOW (ref 39–50)
HGB BLD-MCNC: 10.1 G/DL — LOW (ref 13–17)
MCHC RBC-ENTMCNC: 26.9 PG — LOW (ref 27–34)
MCHC RBC-ENTMCNC: 32.3 GM/DL — SIGNIFICANT CHANGE UP (ref 32–36)
MCV RBC AUTO: 83.2 FL — SIGNIFICANT CHANGE UP (ref 80–100)
PLATELET # BLD AUTO: 208 K/UL — SIGNIFICANT CHANGE UP (ref 150–400)
POTASSIUM SERPL-MCNC: 4.2 MMOL/L — SIGNIFICANT CHANGE UP (ref 3.5–5.3)
POTASSIUM SERPL-SCNC: 4.2 MMOL/L — SIGNIFICANT CHANGE UP (ref 3.5–5.3)
RBC # BLD: 3.76 M/UL — LOW (ref 4.2–5.8)
RBC # FLD: 14.6 % — HIGH (ref 10.3–14.5)
SODIUM SERPL-SCNC: 138 MMOL/L — SIGNIFICANT CHANGE UP (ref 135–145)
SODIUM UR-SCNC: 51 MMOL/L — SIGNIFICANT CHANGE UP
WBC # BLD: 7.13 K/UL — SIGNIFICANT CHANGE UP (ref 3.8–10.5)
WBC # FLD AUTO: 7.13 K/UL — SIGNIFICANT CHANGE UP (ref 3.8–10.5)

## 2022-03-20 PROCEDURE — 99232 SBSQ HOSP IP/OBS MODERATE 35: CPT

## 2022-03-20 RX ORDER — SODIUM BICARBONATE 1 MEQ/ML
1300 SYRINGE (ML) INTRAVENOUS THREE TIMES A DAY
Refills: 0 | Status: DISCONTINUED | OUTPATIENT
Start: 2022-03-20 | End: 2022-03-21

## 2022-03-20 RX ADMIN — CARVEDILOL PHOSPHATE 25 MILLIGRAM(S): 80 CAPSULE, EXTENDED RELEASE ORAL at 17:51

## 2022-03-20 RX ADMIN — ISOSORBIDE DINITRATE 20 MILLIGRAM(S): 5 TABLET ORAL at 17:50

## 2022-03-20 RX ADMIN — Medication 650 MILLIGRAM(S): at 05:22

## 2022-03-20 RX ADMIN — Medication 25 MILLIGRAM(S): at 22:05

## 2022-03-20 RX ADMIN — Medication 1300 MILLIGRAM(S): at 22:07

## 2022-03-20 RX ADMIN — HEPARIN SODIUM 5000 UNIT(S): 5000 INJECTION INTRAVENOUS; SUBCUTANEOUS at 17:50

## 2022-03-20 RX ADMIN — ATORVASTATIN CALCIUM 20 MILLIGRAM(S): 80 TABLET, FILM COATED ORAL at 22:05

## 2022-03-20 RX ADMIN — ISOSORBIDE DINITRATE 20 MILLIGRAM(S): 5 TABLET ORAL at 11:31

## 2022-03-20 RX ADMIN — Medication 2: at 11:32

## 2022-03-20 RX ADMIN — INSULIN GLARGINE 15 UNIT(S): 100 INJECTION, SOLUTION SUBCUTANEOUS at 08:43

## 2022-03-20 RX ADMIN — Medication 25 MILLIGRAM(S): at 05:22

## 2022-03-20 RX ADMIN — CARVEDILOL PHOSPHATE 25 MILLIGRAM(S): 80 CAPSULE, EXTENDED RELEASE ORAL at 05:22

## 2022-03-20 RX ADMIN — ISOSORBIDE DINITRATE 20 MILLIGRAM(S): 5 TABLET ORAL at 05:29

## 2022-03-20 RX ADMIN — Medication 81 MILLIGRAM(S): at 11:31

## 2022-03-20 RX ADMIN — HEPARIN SODIUM 5000 UNIT(S): 5000 INJECTION INTRAVENOUS; SUBCUTANEOUS at 05:22

## 2022-03-20 RX ADMIN — SODIUM CHLORIDE 100 MILLILITER(S): 9 INJECTION, SOLUTION INTRAVENOUS at 05:22

## 2022-03-20 RX ADMIN — AMLODIPINE BESYLATE 5 MILLIGRAM(S): 2.5 TABLET ORAL at 05:28

## 2022-03-20 RX ADMIN — Medication 25 MILLIGRAM(S): at 11:31

## 2022-03-20 RX ADMIN — SODIUM CHLORIDE 100 MILLILITER(S): 9 INJECTION, SOLUTION INTRAVENOUS at 15:14

## 2022-03-20 RX ADMIN — Medication 1300 MILLIGRAM(S): at 12:07

## 2022-03-20 NOTE — PROGRESS NOTE ADULT - ASSESSMENT
66 y/o M with CAD s/p CABG, peripheral arterial disease, diabetes mellitus, TIA, hypertension, hyperlipidemia, renal cancer s/p nephrectomy, CKD stage 4, ischemic cardiomyopathy admitted after a pre syncopal episode at home. Patient reported feeling weak while at the dinner table and slumped over. In ED, labs and imaging done, troponin negative, CT head without acute changes. Cardiology was consulted. MRI brain was done, noted to have acute frontal and parietal infarcts. Patient noted to have worsening renal function, uremia, nephrology was consulted to evaluate possible need to start HD. Lasix was held, trial of IVF was ordered by nephrology to assess improvement in renal function. Cardiology advised outpatient [facility] for atrial fibrillation monitoring. Eliquis was recommended given CVA, to be initiated once decision for HD made.    1. Acute stroke  - MRA brain noted.  - continue aspirin and statin.  - will start AC as per cardiology recommendations once final decision for HD made  - Outpatient [facility]    2. Falls  - sec stroke    3. CAD s/p CABG  - Continue aspirin, statin, coreg and nitrates  - will hold off Eliquis for now until final decision for HD is made    4. Hypertension  - Continue Coreg and nitrates    5. CKD stage 4  - mild improvement with IVF  - continue for now  - Nephrology to follow to decide if HD warranted now or not    6. Diabetes mellitus  - Monitor fingersticks  - sliding scale coverage.    7.PVD  - continue aspirin and statin    8. DVT ppx  - Heparin SQ    Dispo: pending clinical course, decision to proceed with HD or monitor

## 2022-03-20 NOTE — PROGRESS NOTE ADULT - SUBJECTIVE AND OBJECTIVE BOX
NEPHROLOGY INTERVAL HPI/OVERNIGHT EVENTS:    Examined  No distress no Uremic symptoms  No edema  Denies HA CP no SOB    MEDICATIONS  (STANDING):  amLODIPine   Tablet 5 milliGRAM(s) Oral daily  aspirin  chewable 81 milliGRAM(s) Oral daily  atorvastatin 20 milliGRAM(s) Oral at bedtime  carvedilol 25 milliGRAM(s) Oral every 12 hours  dextrose 40% Gel 15 Gram(s) Oral once  dextrose 5%. 1000 milliLiter(s) (50 mL/Hr) IV Continuous <Continuous>  dextrose 50% Injectable 25 Gram(s) IV Push once  glucagon  Injectable 1 milliGRAM(s) IntraMuscular once  heparin   Injectable 5000 Unit(s) SubCutaneous every 12 hours  hydrALAZINE 25 milliGRAM(s) Oral every 8 hours  insulin glargine Injectable (LANTUS) 15 Unit(s) SubCutaneous every morning  insulin lispro (ADMELOG) corrective regimen sliding scale   SubCutaneous three times a day before meals  isosorbide   dinitrate Tablet (ISORDIL) 20 milliGRAM(s) Oral three times a day  sodium bicarbonate 650 milliGRAM(s) Oral three times a day  sodium chloride 0.45%. 1000 milliLiter(s) (100 mL/Hr) IV Continuous <Continuous>    MEDICATIONS  (PRN):  acetaminophen     Tablet .. 650 milliGRAM(s) Oral every 6 hours PRN Temp greater or equal to 38C (100.4F), Mild Pain (1 - 3)      Allergies    No Known Allergies    Intolerances        Vital Signs Last 24 Hrs  T(C): 37.2 (20 Mar 2022 05:05), Max: 37.2 (20 Mar 2022 05:05)  T(F): 98.9 (20 Mar 2022 05:05), Max: 98.9 (20 Mar 2022 05:05)  HR: 83 (20 Mar 2022 05:05) (75 - 93)  BP: 169/69 (20 Mar 2022 05:05) (145/74 - 170/82)  BP(mean): --  RR: 18 (20 Mar 2022 05:05) (18 - 18)  SpO2: 96% (20 Mar 2022 05:05) (95% - 96%)  Daily     Daily     PHYSICAL EXAM:  GENERAL: NAD,   NECK: Supple, No JVD  NERVOUS SYSTEM:  A/O x3,   CHEST:  No rales, No rhonchi  HEART:  RRR, No murmur  ABDOMEN: Soft, NT/ND BS+  EXTREMITIES:  No Edema      LABS:                        10.1   7.13  )-----------( 208      ( 20 Mar 2022 09:00 )             31.3     03-20    138  |  106  |  65.9<H>  ----------------------------<  82  4.2   |  19.0<L>  |  5.25<H>    Ca    8.2<L>      20 Mar 2022 09:00  Phos  4.0     03-19                  RADIOLOGY & ADDITIONAL TESTS:

## 2022-03-20 NOTE — PROGRESS NOTE ADULT - ASSESSMENT
66 yo male with PMH of CAD s/p CABG, PAD, DM2,  TIA, HLD, HTN, renal Ca s/p L nephrectomy 10/2020, CKD 4, ischemic CMP, was brought ot he ED by EMS for syncope. Pt states that he was sitting at the counter eating dinner at 7PM today, when he suddenly felt weak and slumped over on the counter. Per wife, pt was unresponsive with eyes rolled back. Pt states that he was awake during the episode but just felt very weak. Episode lasted ~10 minutes. Pt currently back to baseline. Denies prodromal chest pain, palpitations, shortness of breath. No recent illness; however notes that he has had intermittent dizziness upon standing up over the past week.     Advanced CKD - CAD / CABG , ICM , TIA   DM Nephropathy / HTN   Renal sono 3/14 Echogenic kidneys no hydronephrosis  No nephrotoxic meds or IV contrast used  Serum Cr 7.2 on 3/16 Cr today 5.2 seems to be slowly improving w IVF --> will cont no sign of dyspnea or edema    Clear CXR 3/10/ 22   Cont to hold Lasix for now    I discussed RRT w pt no urgent need currently   HD ; consent obtained in chart - should need arise    daughter Angelica phone ( [phone] ) --> I called today gave update    AM labs will follow

## 2022-03-20 NOTE — PROGRESS NOTE ADULT - SUBJECTIVE AND OBJECTIVE BOX
Chief complaint: Syncope    Patient seen and examined at bedside. No acute overnight events reports. Patient states he is doing well and denies headache, fever, chills, cough, chset pain, shortness of breath, nausea or vomiting.     Vital Signs Last 24 Hrs  T(F): 98.9 (20 Mar 2022 05:05), Max: 98.9 (20 Mar 2022 05:05)  HR: 83 (20 Mar 2022 05:05) (75 - 93)  BP: 169/69 (20 Mar 2022 05:05) (145/74 - 170/82)  RR: 18 (20 Mar 2022 05:05) (18 - 18)  SpO2: 96% (20 Mar 2022 05:05) (95% - 96%)    Physical Exam:  Constitutional: alert and oriented, in no acute distress   Neck: Soft and supple  Respiratory: Clear to auscultation bilaterally, no wheezes or crackles  Cardiovascular: Regular rate and rhyhtm, no murmurs, gallops, rubs  Gastrointestinal: Soft, non-tender to palpation, +bs  Vascular: 2+ peripheral pulses  Neurological: A/O x 3, no focal neurological deficits  Musculoskeletal: 5/5 strength b/l upper and lower extremities, no lower extremity edema bilaterally    Labs:                        10.1   7.13  )-----------( 208      ( 20 Mar 2022 09:00 )             31.3   03-19    138  |  105  |  70.1<H>  ----------------------------<  82  4.2   |  19.0<L>  |  5.69<H>    Ca    7.8<L>      19 Mar 2022 07:27  Phos  4.0     03-19

## 2022-03-21 ENCOUNTER — APPOINTMENT (OUTPATIENT)
Dept: UROLOGY | Facility: CLINIC | Age: 67
End: 2022-03-21

## 2022-03-21 ENCOUNTER — TRANSCRIPTION ENCOUNTER (OUTPATIENT)
Age: 67
End: 2022-03-21

## 2022-03-21 VITALS
SYSTOLIC BLOOD PRESSURE: 164 MMHG | HEART RATE: 69 BPM | DIASTOLIC BLOOD PRESSURE: 70 MMHG | OXYGEN SATURATION: 96 % | TEMPERATURE: 98 F | RESPIRATION RATE: 18 BRPM

## 2022-03-21 LAB
ANION GAP SERPL CALC-SCNC: 14 MMOL/L — SIGNIFICANT CHANGE UP (ref 5–17)
BUN SERPL-MCNC: 60.9 MG/DL — HIGH (ref 8–20)
CALCIUM SERPL-MCNC: 8 MG/DL — LOW (ref 8.6–10.2)
CHLORIDE SERPL-SCNC: 108 MMOL/L — HIGH (ref 98–107)
CO2 SERPL-SCNC: 19 MMOL/L — LOW (ref 22–29)
CREAT SERPL-MCNC: 4.81 MG/DL — HIGH (ref 0.5–1.3)
EGFR: 12 ML/MIN/1.73M2 — LOW
GLUCOSE BLDC GLUCOMTR-MCNC: 135 MG/DL — HIGH (ref 70–99)
GLUCOSE BLDC GLUCOMTR-MCNC: 74 MG/DL — SIGNIFICANT CHANGE UP (ref 70–99)
GLUCOSE SERPL-MCNC: 78 MG/DL — SIGNIFICANT CHANGE UP (ref 70–99)
HCT VFR BLD CALC: 29.9 % — LOW (ref 39–50)
HGB BLD-MCNC: 9.6 G/DL — LOW (ref 13–17)
MCHC RBC-ENTMCNC: 26.5 PG — LOW (ref 27–34)
MCHC RBC-ENTMCNC: 32.1 GM/DL — SIGNIFICANT CHANGE UP (ref 32–36)
MCV RBC AUTO: 82.6 FL — SIGNIFICANT CHANGE UP (ref 80–100)
PLATELET # BLD AUTO: 222 K/UL — SIGNIFICANT CHANGE UP (ref 150–400)
POTASSIUM SERPL-MCNC: 4.6 MMOL/L — SIGNIFICANT CHANGE UP (ref 3.5–5.3)
POTASSIUM SERPL-SCNC: 4.6 MMOL/L — SIGNIFICANT CHANGE UP (ref 3.5–5.3)
RBC # BLD: 3.62 M/UL — LOW (ref 4.2–5.8)
RBC # FLD: 14.6 % — HIGH (ref 10.3–14.5)
SODIUM SERPL-SCNC: 140 MMOL/L — SIGNIFICANT CHANGE UP (ref 135–145)
WBC # BLD: 7.97 K/UL — SIGNIFICANT CHANGE UP (ref 3.8–10.5)
WBC # FLD AUTO: 7.97 K/UL — SIGNIFICANT CHANGE UP (ref 3.8–10.5)

## 2022-03-21 PROCEDURE — 70551 MRI BRAIN STEM W/O DYE: CPT

## 2022-03-21 PROCEDURE — 96374 THER/PROPH/DIAG INJ IV PUSH: CPT

## 2022-03-21 PROCEDURE — 80053 COMPREHEN METABOLIC PANEL: CPT

## 2022-03-21 PROCEDURE — 81001 URINALYSIS AUTO W/SCOPE: CPT

## 2022-03-21 PROCEDURE — 99285 EMERGENCY DEPT VISIT HI MDM: CPT

## 2022-03-21 PROCEDURE — G1004: CPT

## 2022-03-21 PROCEDURE — 99239 HOSP IP/OBS DSCHRG MGMT >30: CPT

## 2022-03-21 PROCEDURE — 85610 PROTHROMBIN TIME: CPT

## 2022-03-21 PROCEDURE — 84300 ASSAY OF URINE SODIUM: CPT

## 2022-03-21 PROCEDURE — 70547 MR ANGIOGRAPHY NECK W/O DYE: CPT

## 2022-03-21 PROCEDURE — U0003: CPT

## 2022-03-21 PROCEDURE — 36415 COLL VENOUS BLD VENIPUNCTURE: CPT

## 2022-03-21 PROCEDURE — 97530 THERAPEUTIC ACTIVITIES: CPT

## 2022-03-21 PROCEDURE — 85027 COMPLETE CBC AUTOMATED: CPT

## 2022-03-21 PROCEDURE — 83036 HEMOGLOBIN GLYCOSYLATED A1C: CPT

## 2022-03-21 PROCEDURE — 70544 MR ANGIOGRAPHY HEAD W/O DYE: CPT

## 2022-03-21 PROCEDURE — 86706 HEP B SURFACE ANTIBODY: CPT

## 2022-03-21 PROCEDURE — 83880 ASSAY OF NATRIURETIC PEPTIDE: CPT

## 2022-03-21 PROCEDURE — 93880 EXTRACRANIAL BILAT STUDY: CPT

## 2022-03-21 PROCEDURE — 80048 BASIC METABOLIC PNL TOTAL CA: CPT

## 2022-03-21 PROCEDURE — 85730 THROMBOPLASTIN TIME PARTIAL: CPT

## 2022-03-21 PROCEDURE — 83735 ASSAY OF MAGNESIUM: CPT

## 2022-03-21 PROCEDURE — 70450 CT HEAD/BRAIN W/O DYE: CPT | Mod: MG

## 2022-03-21 PROCEDURE — 93005 ELECTROCARDIOGRAM TRACING: CPT

## 2022-03-21 PROCEDURE — 80061 LIPID PANEL: CPT

## 2022-03-21 PROCEDURE — 76775 US EXAM ABDO BACK WALL LIM: CPT

## 2022-03-21 PROCEDURE — 84100 ASSAY OF PHOSPHORUS: CPT

## 2022-03-21 PROCEDURE — 71045 X-RAY EXAM CHEST 1 VIEW: CPT

## 2022-03-21 PROCEDURE — 82962 GLUCOSE BLOOD TEST: CPT

## 2022-03-21 PROCEDURE — 84484 ASSAY OF TROPONIN QUANT: CPT

## 2022-03-21 PROCEDURE — 80074 ACUTE HEPATITIS PANEL: CPT

## 2022-03-21 PROCEDURE — 97116 GAIT TRAINING THERAPY: CPT

## 2022-03-21 PROCEDURE — 85025 COMPLETE CBC W/AUTO DIFF WBC: CPT

## 2022-03-21 PROCEDURE — U0005: CPT

## 2022-03-21 RX ORDER — AMLODIPINE BESYLATE 2.5 MG/1
1 TABLET ORAL
Qty: 0 | Refills: 0 | DISCHARGE

## 2022-03-21 RX ORDER — HYDRALAZINE HCL 50 MG
1 TABLET ORAL
Qty: 90 | Refills: 0
Start: 2022-03-21 | End: 2022-04-19

## 2022-03-21 RX ORDER — SODIUM BICARBONATE 1 MEQ/ML
2 SYRINGE (ML) INTRAVENOUS
Qty: 180 | Refills: 0
Start: 2022-03-21 | End: 2022-04-19

## 2022-03-21 RX ORDER — AMLODIPINE BESYLATE 2.5 MG/1
1 TABLET ORAL
Qty: 30 | Refills: 0
Start: 2022-03-21 | End: 2022-04-19

## 2022-03-21 RX ORDER — APIXABAN 2.5 MG/1
1 TABLET, FILM COATED ORAL
Qty: 60 | Refills: 0
Start: 2022-03-21 | End: 2022-04-19

## 2022-03-21 RX ORDER — ISOSORBIDE DINITRATE 5 MG/1
1 TABLET ORAL
Qty: 90 | Refills: 0
Start: 2022-03-21 | End: 2022-04-19

## 2022-03-21 RX ORDER — ASPIRIN/CALCIUM CARB/MAGNESIUM 324 MG
1 TABLET ORAL
Qty: 30 | Refills: 0
Start: 2022-03-21 | End: 2022-04-19

## 2022-03-21 RX ORDER — HYDRALAZINE HYDROCHLORIDE AND ISOSORBIDE DINITRATE 37.5; 2 MG/1; MG/1
1 TABLET, FILM COATED ORAL
Qty: 90 | Refills: 0
Start: 2022-03-21 | End: 2022-04-19

## 2022-03-21 RX ORDER — ASPIRIN/CALCIUM CARB/MAGNESIUM 324 MG
0 TABLET ORAL
Qty: 0 | Refills: 0 | DISCHARGE

## 2022-03-21 RX ORDER — ATORVASTATIN CALCIUM 80 MG/1
1 TABLET, FILM COATED ORAL
Qty: 0 | Refills: 0 | DISCHARGE

## 2022-03-21 RX ORDER — HYDRALAZINE HYDROCHLORIDE AND ISOSORBIDE DINITRATE 37.5; 2 MG/1; MG/1
1 TABLET, FILM COATED ORAL
Qty: 0 | Refills: 0 | DISCHARGE

## 2022-03-21 RX ORDER — ATORVASTATIN CALCIUM 80 MG/1
1 TABLET, FILM COATED ORAL
Qty: 30 | Refills: 0
Start: 2022-03-21 | End: 2022-04-19

## 2022-03-21 RX ORDER — CARVEDILOL PHOSPHATE 80 MG/1
1 CAPSULE, EXTENDED RELEASE ORAL
Qty: 60 | Refills: 0
Start: 2022-03-21 | End: 2022-04-19

## 2022-03-21 RX ADMIN — HEPARIN SODIUM 5000 UNIT(S): 5000 INJECTION INTRAVENOUS; SUBCUTANEOUS at 05:21

## 2022-03-21 RX ADMIN — Medication 25 MILLIGRAM(S): at 05:20

## 2022-03-21 RX ADMIN — Medication 25 MILLIGRAM(S): at 12:14

## 2022-03-21 RX ADMIN — SODIUM CHLORIDE 100 MILLILITER(S): 9 INJECTION, SOLUTION INTRAVENOUS at 01:58

## 2022-03-21 RX ADMIN — Medication 1300 MILLIGRAM(S): at 12:15

## 2022-03-21 RX ADMIN — AMLODIPINE BESYLATE 5 MILLIGRAM(S): 2.5 TABLET ORAL at 05:20

## 2022-03-21 RX ADMIN — Medication 81 MILLIGRAM(S): at 12:13

## 2022-03-21 RX ADMIN — ISOSORBIDE DINITRATE 20 MILLIGRAM(S): 5 TABLET ORAL at 12:15

## 2022-03-21 RX ADMIN — Medication 1300 MILLIGRAM(S): at 05:22

## 2022-03-21 RX ADMIN — INSULIN GLARGINE 15 UNIT(S): 100 INJECTION, SOLUTION SUBCUTANEOUS at 08:48

## 2022-03-21 RX ADMIN — SODIUM CHLORIDE 100 MILLILITER(S): 9 INJECTION, SOLUTION INTRAVENOUS at 13:45

## 2022-03-21 RX ADMIN — CARVEDILOL PHOSPHATE 25 MILLIGRAM(S): 80 CAPSULE, EXTENDED RELEASE ORAL at 05:20

## 2022-03-21 RX ADMIN — ISOSORBIDE DINITRATE 20 MILLIGRAM(S): 5 TABLET ORAL at 05:20

## 2022-03-21 NOTE — PROGRESS NOTE ADULT - SUBJECTIVE AND OBJECTIVE BOX
Chief complaint: Syncope    Patient seen and examined at bedside. No acute overnight events reported. No headache, fever, chills, cough, chest pain, nausea or vomiting.     Vital Signs Last 24 Hrs  T(F): 97.9 (21 Mar 2022 04:26), Max: 97.9 (20 Mar 2022 22:01)  HR: 71 (21 Mar 2022 04:26) (71 - 78)  BP: 145/53 (21 Mar 2022 04:26) (145/53 - 180/85)  RR: 18 (21 Mar 2022 04:26) (18 - 18)  SpO2: 95% (21 Mar 2022 04:26) (94% - 95%)    Physical Exam:  Constitutional: alert and oriented, in no acute distress   Neck: Soft and supple  Respiratory: Clear to auscultation bilaterally, no wheezes or crackles  Cardiovascular: Regular rate and rhyhtm, no murmurs, gallops, rubs  Gastrointestinal: Soft, non-tender to palpation, +bs  Vascular: 2+ peripheral pulses  Neurological: A/O x 3, no focal neurological deficits  Musculoskeletal: 5/5 strength b/l upper and lower extremities, no lower extremity edema bilaterally    Labs:                        9.6    7.97  )-----------( 222      ( 21 Mar 2022 07:16 )             29.9   03-21    140  |  108<H>  |  60.9<H>  ----------------------------<  78  4.6   |  19.0<L>  |  4.81<H>    Ca    8.0<L>      21 Mar 2022 07:16

## 2022-03-21 NOTE — DISCHARGE NOTE PROVIDER - CARE PROVIDER_API CALL
Travis Neff (DO)  Internal Medicine; Nephrology  [address] [facility], [place] [address]  Phone: [phone]  Fax: [phone]  Follow Up Time: 2 weeks    Siomara Pro)  Cardiology; Internal Medicine  [address] [facility], [address] [id number]  Phone: [phone]  Fax: [phone]  Follow Up Time: 2 weeks    Fam Cesar)  EEGEpilepsy; Neurology; Neuromuscular Medicine; Sleep Medicine; Vascular Neurology  [address] [place] [id number]  Phone: [phone]  Fax: [phone]  Follow Up Time: 2 weeks   Travis Neff (DO)  Internal Medicine; Nephrology  [address] [facility], [place] [address]  Phone: [phone]  Fax: [phone]  Follow Up Time: 2 weeks    Siomara Pro)  Cardiology; Internal Medicine  [address] [facility], [address]  [place], [place] [id number]  Phone: [phone]  Fax: [phone]  Follow Up Time: 2 weeks    Fam Cesar)  EEGEpilepsy; Neurology; Neuromuscular Medicine; Sleep Medicine; Vascular Neurology  [address] [place], [place] [id number]  Phone: [phone]  Fax: [phone]  Follow Up Time: 2 weeks    James Barclay)  Surgery  [address] [facility], [address] [place] [address]  Phone: [phone]  Fax: [phone]  Follow Up Time: 1 week

## 2022-03-21 NOTE — DISCHARGE NOTE NURSING/CASE MANAGEMENT/SOCIAL WORK - PATIENT PORTAL LINK FT
You can access the FollowMyHealth Patient Portal offered by [facility] by registering at the following website: [email or web address]. By joining [facility]’s FollowMyHealth portal, you will also be able to view your health information using other applications (apps) compatible with our system.

## 2022-03-21 NOTE — PROGRESS NOTE ADULT - ASSESSMENT
66 yo male with PMH of CAD s/p CABG, PAD, DM2,  TIA, HLD, HTN, renal Ca s/p L nephrectomy 10/2020, CKD 4, ischemic CMP, was brought ot he ED by EMS for syncope. Pt states that he was sitting at the counter eating dinner at 7PM today, when he suddenly felt weak and slumped over on the counter. Per wife, pt was unresponsive with eyes rolled back. Pt states that he was awake during the episode but just felt very weak. Episode lasted ~10 minutes. Pt currently back to baseline. Denies prodromal chest pain, palpitations, shortness of breath. No recent illness; however notes that he has had intermittent dizziness upon standing up over the past week. Now ambulting without LH Dz    Advanced CKD - CAD / CABG , ICM , TIA   DM Nephropathy / HTN   Renal sono 3/14 Echogenic kidneys no hydronephrosis  No nephrotoxic meds or IV contrast used  Serum Cr 7.2 on 3/16 Cr today 5.2 >4,81 seems to be slowly improving   Off IVF    Clear CXR 3/10/ 22   Cont to hold Lasix for now  D/w patient re getting an AVF/AVG now - agreeable  s/w daughter Angelica - wants to hold off for now but will d/w her dad  Shall at least get vascular consult and initial w/u    daughter Angelica phone ( [phone] ) --> I called today gave update    AM labs will follow

## 2022-03-21 NOTE — PROGRESS NOTE ADULT - ASSESSMENT
67 yr old male with CAD s/p CABG, peripheral arterial disease, diabetes mellitus, TIA, hypertension, hyperlipidemia, renal cancer s/p nephrectomy, CKD stage 4, ischemic cardiomyopathy admitted after a pre syncopal episode at home. Patient reported feeling weak while at the dinner table and slumped over. In ED, labs and imaging done, troponin negative, CT head without acute changes. Cardiology was consulted. MRI brain was done, noted to have acute frontal and parietal infarcts. Patient noted to have worsening renal function, uremia, nephrology was consulted to evaluate possible need to start HD. Lasix was held, trial of IVF was ordered by nephrology to assess improvement in renal function. Cardiology advised outpatient [facility] for atrial fibrillation monitoring. Eliquis was recommended given CVA, to be initiated once decision for HD made.    1. Acute stroke  - MRA brain noted  - continue aspirin and statin  - needs [facility] outpatient  - Will discharge home on Eliquis (renal dosing)    2. Falls  - sec stroke    3. CAD s/p CABG  - Continue aspirin, statin, coreg and nitrates  - will discharge home on Eliquis     4. Hypertension  - Continue Coreg and nitrates    5. CKD stage 4  - improved  - continue for now  - Nephrology recs appreciated    6. Diabetes mellitus  - Monitor fingersticks  - sliding scale coverage.    7.PVD  - continue aspirin and statin.    8. DVT ppx  - Heparin SQ    Dispo: likely d/c 1-2 days

## 2022-03-21 NOTE — DISCHARGE NOTE PROVIDER - NSDCMRMEDTOKEN_GEN_ALL_CORE_FT
amLODIPine 5 mg oral tablet: 1 tab(s) orally once a day  aspirin 81 mg oral tablet, chewable: 1 tab(s) orally once a day  atorvastatin 20 mg oral tablet: 1 tab(s) orally once a day (at bedtime)  BiDil 20 mg-37.5 mg oral tablet: 1 tab(s) orally 3 times a day  carvedilol 25 mg oral tablet: 1 tab(s) orally every 12 hours  CILOSTAZOL  100 MG TABS:   Eliquis 2.5 mg oral tablet: 1 tab(s) orally 2 times a day   hydrALAZINE 25 mg oral tablet: 1 tab(s) orally every 8 hours  isosorbide dinitrate 20 mg oral tablet: 1 tab(s) orally 3 times a day  Lasix 40 mg oral tablet:   sodium bicarbonate 650 mg oral tablet: 2 tab(s) orally 3 times a day  TRESIBA FLEXTOUCH  100 UNIT/ML SOPN:    amLODIPine 5 mg oral tablet: 1 tab(s) orally once a day  aspirin 81 mg oral tablet, chewable: 1 tab(s) orally once a day  atorvastatin 20 mg oral tablet: 1 tab(s) orally once a day (at bedtime)  carvedilol 25 mg oral tablet: 1 tab(s) orally every 12 hours  CILOSTAZOL  100 MG TABS:   Eliquis 2.5 mg oral tablet: 1 tab(s) orally 2 times a day   hydrALAZINE 25 mg oral tablet: 1 tab(s) orally every 8 hours  isosorbide dinitrate 20 mg oral tablet: 1 tab(s) orally 3 times a day  Lasix 40 mg oral tablet:   sodium bicarbonate 650 mg oral tablet: 2 tab(s) orally 3 times a day  TRESIBA FLEXTOUCH  100 UNIT/ML SOPN:    amLODIPine 5 mg oral tablet: 1 tab(s) orally once a day  aspirin 81 mg oral tablet, chewable: 1 tab(s) orally once a day  atorvastatin 20 mg oral tablet: 1 tab(s) orally once a day (at bedtime)  carvedilol 25 mg oral tablet: 1 tab(s) orally every 12 hours  CILOSTAZOL  100 MG TABS:   Eliquis 2.5 mg oral tablet: 1 tab(s) orally 2 times a day   hydrALAZINE 25 mg oral tablet: 1 tab(s) orally every 8 hours  isosorbide dinitrate 20 mg oral tablet: 1 tab(s) orally 3 times a day  Lasix 40 mg oral tablet:   rolling walker: 1 rolling Walker   sodium bicarbonate 650 mg oral tablet: 2 tab(s) orally 3 times a day  TRESIBA FLEXTOUCH  100 UNIT/ML SOPN:

## 2022-03-21 NOTE — DISCHARGE NOTE PROVIDER - CARE PROVIDERS DIRECT ADDRESSES
,DirectAddress_Unknown,[email or web address] ,DirectAddress_Unknown,[email or web address] [email or web address]

## 2022-03-21 NOTE — DISCHARGE NOTE PROVIDER - NSDCCPCAREPLAN_GEN_ALL_CORE_FT
PRINCIPAL DISCHARGE DIAGNOSIS  Diagnosis: Stroke  Assessment and Plan of Treatment: Continue Aspirin and Atorvastin daily.  You are being sent home with Eliquis.  Follow up with Neurology nad Cardiology for further outpt management.      SECONDARY DISCHARGE DIAGNOSES  Diagnosis: Fall  Assessment and Plan of Treatment: Likely due from stroke.  Fall precautions stressed.  Monitor for any changes.    Diagnosis: CAD (coronary artery disease)  Assessment and Plan of Treatment: Continue with Aspirin, Atorvastation and Coreg.  Continue Eliquis.    Diagnosis: Hypertension  Assessment and Plan of Treatment: Continue Coreg as instructed.    Diagnosis: Stage 4 chronic kidney disease  Assessment and Plan of Treatment: Follow up with ephrology in 1-2 weeks.    Diagnosis: Diabetes mellitus  Assessment and Plan of Treatment: Low carb diet stressed.  Follow up with PCP in 1 week.     PRINCIPAL DISCHARGE DIAGNOSIS  Diagnosis: Stroke  Assessment and Plan of Treatment: Continue Aspirin and Atorvastin daily.  You are being sent home with Eliquis.  Follow up with Neurology and Cardiology for further outpt management.      SECONDARY DISCHARGE DIAGNOSES  Diagnosis: Fall  Assessment and Plan of Treatment: Likely due from stroke.  Fall precautions stressed.  Monitor for any changes.    Diagnosis: CAD (coronary artery disease)  Assessment and Plan of Treatment: Continue with Aspirin, Atorvastation and Coreg.  Continue Eliquis.    Diagnosis: Hypertension  Assessment and Plan of Treatment: Continue Coreg as instructed.    Diagnosis: Stage 4 chronic kidney disease  Assessment and Plan of Treatment: Follow up with Nephrology in 1-2 weeks.  Follow up with vascular surgery in 1-2 weeks    Diagnosis: Diabetes mellitus  Assessment and Plan of Treatment: Low carb diet stressed.  Follow up with PCP in 1 week.

## 2022-03-21 NOTE — DISCHARGE NOTE PROVIDER - NSDCFUSCHEDAPPT_GEN_ALL_CORE_FT
ANSHUL CUNNINGHAM ; 04[facility]05/2022 ; [facility] [address]   ANSHUL CUNNINGHAM ; 05/24/2022 ; NPP Cardio [address] [facility]

## 2022-03-21 NOTE — PROGRESS NOTE ADULT - PROVIDER SPECIALTY LIST ADULT
Hospitalist
Hospitalist
Nephrology
Hospitalist
Nephrology
Nephrology
Cardiology
Cardiology
Hospitalist
Nephrology
Neurology
Hospitalist
Hospitalist
Cardiology

## 2022-03-21 NOTE — DISCHARGE NOTE NURSING/CASE MANAGEMENT/SOCIAL WORK - NSDCPEFALRISK_GEN_ALL_CORE
For information on Fall & Injury Prevention, visit: [email or web address] OR  [email or web address] OR  https://www.cdc.gov/steadi/patient.html

## 2022-03-21 NOTE — DISCHARGE NOTE PROVIDER - PROVIDER TOKENS
PROVIDER:[TOKEN:[[id number]],FOLLOWUP:[2 weeks]],PROVIDER:[TOKEN:[[id number]],FOLLOWUP:[2 weeks]],PROVIDER:[TOKEN:[[id number]],FOLLOWUP:[2 weeks]] PROVIDER:[TOKEN:[[id number]],FOLLOWUP:[2 weeks]],PROVIDER:[TOKEN:[[id number]],FOLLOWUP:[2 weeks]],PROVIDER:[TOKEN:[[id number]],FOLLOWUP:[2 weeks]],PROVIDER:[TOKEN:[[id number]],FOLLOWUP:[1 week]]

## 2022-03-21 NOTE — DISCHARGE NOTE PROVIDER - HOSPITAL COURSE
67 yr old male with CAD s/p CABG, peripheral arterial disease, diabetes mellitus, TIA, hypertension, hyperlipidemia, renal cancer s/p nephrectomy, CKD stage 4, ischemic cardiomyopathy admitted after a pre syncopal episode at home. Patient reported feeling weak while at the dinner table and slumped over. In ED, labs and imaging done, troponin negative, CT head without acute changes. Cardiology was consulted. MRI brain was done, noted to have acute frontal and parietal infarcts. Patient noted to have worsening renal function, uremia, nephrology was consulted to evaluate possible need to start HD. Lasix was held, trial of IVF was ordered by nephrology to assess improvement in renal function. Cardiology advised outpatient [facility] for atrial fibrillation monitoring. Eliquis was recommended given CVA, and was renally dosed.  Pt continued to improve through remainder for hospitalization and now stable for discharge.      Length of Discharge:35 minutes

## 2022-03-21 NOTE — DISCHARGE NOTE PROVIDER - ATTENDING DISCHARGE PHYSICAL EXAMINATION:
Vital Signs Last 24 Hrs  T(F): 97 (21 Mar 2022 10:20), Max: 97.9 (20 Mar 2022 22:01)  HR: 81 (21 Mar 2022 10:20) (71 - 81)  BP: 162/79 (21 Mar 2022 10:20) (145/53 - 180/85)  RR: 18 (21 Mar 2022 10:20) (18 - 18)  SpO2: 95% (21 Mar 2022 10:20) (94% - 95%)    Physical Exam:  Constitutional: alert and oriented, in no acute distress   Neck: Soft and supple  Respiratory: Clear to auscultation bilaterally, no wheezes or crackles  Cardiovascular: Regular rate and rhythm, no murmurs, gallops, rubs  Gastrointestinal: Soft, non-tender to palpation, +bs  Vascular: 2+ peripheral pulses  Neurological: A/O x 3, no focal neurological deficits  Musculoskeletal: 5/5 strength b/l upper and lower extremities, no lower extremity edema bilaterally

## 2022-03-21 NOTE — PROGRESS NOTE ADULT - REASON FOR ADMISSION
Syncope

## 2022-03-28 ENCOUNTER — NON-APPOINTMENT (OUTPATIENT)
Age: 67
End: 2022-03-28

## 2022-03-28 ENCOUNTER — INPATIENT (INPATIENT)
Facility: HOSPITAL | Age: 67
LOS: 3 days | Discharge: HOME | DRG: 92 | End: 2022-04-01
Attending: STUDENT IN AN ORGANIZED HEALTH CARE EDUCATION/TRAINING PROGRAM | Admitting: FAMILY MEDICINE
Payer: MEDICARE

## 2022-03-28 VITALS
OXYGEN SATURATION: 97 % | SYSTOLIC BLOOD PRESSURE: 165 MMHG | TEMPERATURE: 98 F | HEIGHT: 65 IN | HEART RATE: 75 BPM | DIASTOLIC BLOOD PRESSURE: 81 MMHG | RESPIRATION RATE: 18 BRPM | WEIGHT: 181.88 LBS

## 2022-03-28 DIAGNOSIS — I63.9 CEREBRAL INFARCTION, UNSPECIFIED: ICD-10-CM

## 2022-03-28 DIAGNOSIS — I50.30 UNSPECIFIED DIASTOLIC (CONGESTIVE) HEART FAILURE: ICD-10-CM

## 2022-03-28 DIAGNOSIS — Z95.1 PRESENCE OF AORTOCORONARY BYPASS GRAFT: Chronic | ICD-10-CM

## 2022-03-28 DIAGNOSIS — N18.9 CHRONIC KIDNEY DISEASE, UNSPECIFIED: ICD-10-CM

## 2022-03-28 DIAGNOSIS — I10 ESSENTIAL (PRIMARY) HYPERTENSION: ICD-10-CM

## 2022-03-28 DIAGNOSIS — R77.8 OTHER SPECIFIED ABNORMALITIES OF PLASMA PROTEINS: ICD-10-CM

## 2022-03-28 DIAGNOSIS — E78.5 HYPERLIPIDEMIA, UNSPECIFIED: ICD-10-CM

## 2022-03-28 DIAGNOSIS — E11.9 TYPE 2 DIABETES MELLITUS WITHOUT COMPLICATIONS: ICD-10-CM

## 2022-03-28 DIAGNOSIS — I25.10 ATHEROSCLEROTIC HEART DISEASE OF NATIVE CORONARY ARTERY WITHOUT ANGINA PECTORIS: ICD-10-CM

## 2022-03-28 DIAGNOSIS — R27.0 ATAXIA, UNSPECIFIED: ICD-10-CM

## 2022-03-28 LAB
A1C WITH ESTIMATED AVERAGE GLUCOSE RESULT: 7.1 % — HIGH (ref 4–5.6)
ALBUMIN SERPL ELPH-MCNC: 3.5 G/DL — SIGNIFICANT CHANGE UP (ref 3.3–5.2)
ALP SERPL-CCNC: 122 U/L — HIGH (ref 40–120)
ALT FLD-CCNC: 18 U/L — SIGNIFICANT CHANGE UP
ANION GAP SERPL CALC-SCNC: 13 MMOL/L — SIGNIFICANT CHANGE UP (ref 5–17)
APPEARANCE UR: CLEAR — SIGNIFICANT CHANGE UP
APTT BLD: 39 SEC — HIGH (ref 27.5–35.5)
AST SERPL-CCNC: 15 U/L — SIGNIFICANT CHANGE UP
BASOPHILS # BLD AUTO: 0.05 K/UL — SIGNIFICANT CHANGE UP (ref 0–0.2)
BASOPHILS NFR BLD AUTO: 0.7 % — SIGNIFICANT CHANGE UP (ref 0–2)
BILIRUB SERPL-MCNC: 0.4 MG/DL — SIGNIFICANT CHANGE UP (ref 0.4–2)
BILIRUB UR-MCNC: NEGATIVE — SIGNIFICANT CHANGE UP
BUN SERPL-MCNC: 49.7 MG/DL — HIGH (ref 8–20)
CALCIUM SERPL-MCNC: 8.8 MG/DL — SIGNIFICANT CHANGE UP (ref 8.6–10.2)
CHLORIDE SERPL-SCNC: 108 MMOL/L — HIGH (ref 98–107)
CO2 SERPL-SCNC: 19 MMOL/L — LOW (ref 22–29)
COLOR SPEC: YELLOW — SIGNIFICANT CHANGE UP
CREAT SERPL-MCNC: 4.17 MG/DL — HIGH (ref 0.5–1.3)
DIFF PNL FLD: ABNORMAL
EGFR: 15 ML/MIN/1.73M2 — LOW
EOSINOPHIL # BLD AUTO: 0.17 K/UL — SIGNIFICANT CHANGE UP (ref 0–0.5)
EOSINOPHIL NFR BLD AUTO: 2.3 % — SIGNIFICANT CHANGE UP (ref 0–6)
EPI CELLS # UR: SIGNIFICANT CHANGE UP
ESTIMATED AVERAGE GLUCOSE: 157 MG/DL — HIGH (ref 68–114)
FLUAV AG NPH QL: SIGNIFICANT CHANGE UP
FLUBV AG NPH QL: SIGNIFICANT CHANGE UP
GLUCOSE BLDC GLUCOMTR-MCNC: 139 MG/DL — HIGH (ref 70–99)
GLUCOSE BLDC GLUCOMTR-MCNC: 144 MG/DL — HIGH (ref 70–99)
GLUCOSE SERPL-MCNC: 111 MG/DL — HIGH (ref 70–99)
GLUCOSE UR QL: NEGATIVE MG/DL — SIGNIFICANT CHANGE UP
HCT VFR BLD CALC: 32.7 % — LOW (ref 39–50)
HGB BLD-MCNC: 10.4 G/DL — LOW (ref 13–17)
HYALINE CASTS # UR AUTO: ABNORMAL /LPF
IMM GRANULOCYTES NFR BLD AUTO: 0.4 % — SIGNIFICANT CHANGE UP (ref 0–1.5)
INR BLD: 1.37 RATIO — HIGH (ref 0.88–1.16)
KETONES UR-MCNC: NEGATIVE — SIGNIFICANT CHANGE UP
LEUKOCYTE ESTERASE UR-ACNC: NEGATIVE — SIGNIFICANT CHANGE UP
LYMPHOCYTES # BLD AUTO: 1.35 K/UL — SIGNIFICANT CHANGE UP (ref 1–3.3)
LYMPHOCYTES # BLD AUTO: 18.6 % — SIGNIFICANT CHANGE UP (ref 13–44)
MCHC RBC-ENTMCNC: 27.3 PG — SIGNIFICANT CHANGE UP (ref 27–34)
MCHC RBC-ENTMCNC: 31.8 GM/DL — LOW (ref 32–36)
MCV RBC AUTO: 85.8 FL — SIGNIFICANT CHANGE UP (ref 80–100)
MONOCYTES # BLD AUTO: 0.54 K/UL — SIGNIFICANT CHANGE UP (ref 0–0.9)
MONOCYTES NFR BLD AUTO: 7.4 % — SIGNIFICANT CHANGE UP (ref 2–14)
NEUTROPHILS # BLD AUTO: 5.11 K/UL — SIGNIFICANT CHANGE UP (ref 1.8–7.4)
NEUTROPHILS NFR BLD AUTO: 70.6 % — SIGNIFICANT CHANGE UP (ref 43–77)
NITRITE UR-MCNC: NEGATIVE — SIGNIFICANT CHANGE UP
PH UR: 7 — SIGNIFICANT CHANGE UP (ref 5–8)
PLATELET # BLD AUTO: 268 K/UL — SIGNIFICANT CHANGE UP (ref 150–400)
POTASSIUM SERPL-MCNC: 4.8 MMOL/L — SIGNIFICANT CHANGE UP (ref 3.5–5.3)
POTASSIUM SERPL-SCNC: 4.8 MMOL/L — SIGNIFICANT CHANGE UP (ref 3.5–5.3)
PROT SERPL-MCNC: 6.7 G/DL — SIGNIFICANT CHANGE UP (ref 6.6–8.7)
PROT UR-MCNC: 500 MG/DL
PROTHROM AB SERPL-ACNC: 16 SEC — HIGH (ref 10.5–13.4)
RBC # BLD: 3.81 M/UL — LOW (ref 4.2–5.8)
RBC # FLD: 14.5 % — SIGNIFICANT CHANGE UP (ref 10.3–14.5)
RBC CASTS # UR COMP ASSIST: SIGNIFICANT CHANGE UP /HPF (ref 0–4)
RSV RNA NPH QL NAA+NON-PROBE: SIGNIFICANT CHANGE UP
SARS-COV-2 RNA SPEC QL NAA+PROBE: SIGNIFICANT CHANGE UP
SODIUM SERPL-SCNC: 140 MMOL/L — SIGNIFICANT CHANGE UP (ref 135–145)
SP GR SPEC: 1.01 — SIGNIFICANT CHANGE UP (ref 1.01–1.02)
TROPONIN T SERPL-MCNC: 0.07 NG/ML — HIGH (ref 0–0.06)
TROPONIN T SERPL-MCNC: 0.07 NG/ML — HIGH (ref 0–0.06)
UROBILINOGEN FLD QL: NEGATIVE MG/DL — SIGNIFICANT CHANGE UP
WBC # BLD: 7.25 K/UL — SIGNIFICANT CHANGE UP (ref 3.8–10.5)
WBC # FLD AUTO: 7.25 K/UL — SIGNIFICANT CHANGE UP (ref 3.8–10.5)
WBC UR QL: SIGNIFICANT CHANGE UP /HPF (ref 0–5)

## 2022-03-28 PROCEDURE — 99222 1ST HOSP IP/OBS MODERATE 55: CPT

## 2022-03-28 PROCEDURE — 71045 X-RAY EXAM CHEST 1 VIEW: CPT | Mod: 26

## 2022-03-28 PROCEDURE — 93010 ELECTROCARDIOGRAM REPORT: CPT

## 2022-03-28 PROCEDURE — 70450 CT HEAD/BRAIN W/O DYE: CPT | Mod: 26,MA

## 2022-03-28 PROCEDURE — 99285 EMERGENCY DEPT VISIT HI MDM: CPT

## 2022-03-28 PROCEDURE — 70551 MRI BRAIN STEM W/O DYE: CPT | Mod: 26

## 2022-03-28 PROCEDURE — 99223 1ST HOSP IP/OBS HIGH 75: CPT

## 2022-03-28 PROCEDURE — 99232 SBSQ HOSP IP/OBS MODERATE 35: CPT

## 2022-03-28 RX ORDER — DEXTROSE 50 % IN WATER 50 %
12.5 SYRINGE (ML) INTRAVENOUS ONCE
Refills: 0 | Status: DISCONTINUED | OUTPATIENT
Start: 2022-03-28 | End: 2022-04-01

## 2022-03-28 RX ORDER — SODIUM CHLORIDE 9 MG/ML
1000 INJECTION, SOLUTION INTRAVENOUS
Refills: 0 | Status: DISCONTINUED | OUTPATIENT
Start: 2022-03-28 | End: 2022-04-01

## 2022-03-28 RX ORDER — ATORVASTATIN CALCIUM 80 MG/1
20 TABLET, FILM COATED ORAL AT BEDTIME
Refills: 0 | Status: DISCONTINUED | OUTPATIENT
Start: 2022-03-28 | End: 2022-03-29

## 2022-03-28 RX ORDER — INSULIN GLARGINE 100 [IU]/ML
10 INJECTION, SOLUTION SUBCUTANEOUS AT BEDTIME
Refills: 0 | Status: DISCONTINUED | OUTPATIENT
Start: 2022-03-28 | End: 2022-03-30

## 2022-03-28 RX ORDER — DEXTROSE 50 % IN WATER 50 %
25 SYRINGE (ML) INTRAVENOUS ONCE
Refills: 0 | Status: DISCONTINUED | OUTPATIENT
Start: 2022-03-28 | End: 2022-04-01

## 2022-03-28 RX ORDER — AMLODIPINE BESYLATE 2.5 MG/1
10 TABLET ORAL DAILY
Refills: 0 | Status: DISCONTINUED | OUTPATIENT
Start: 2022-03-28 | End: 2022-04-01

## 2022-03-28 RX ORDER — HYDRALAZINE HCL 50 MG
25 TABLET ORAL EVERY 8 HOURS
Refills: 0 | Status: DISCONTINUED | OUTPATIENT
Start: 2022-03-28 | End: 2022-04-01

## 2022-03-28 RX ORDER — GLUCAGON INJECTION, SOLUTION 0.5 MG/.1ML
1 INJECTION, SOLUTION SUBCUTANEOUS ONCE
Refills: 0 | Status: DISCONTINUED | OUTPATIENT
Start: 2022-03-28 | End: 2022-04-01

## 2022-03-28 RX ORDER — SODIUM BICARBONATE 1 MEQ/ML
1300 SYRINGE (ML) INTRAVENOUS THREE TIMES A DAY
Refills: 0 | Status: DISCONTINUED | OUTPATIENT
Start: 2022-03-28 | End: 2022-04-01

## 2022-03-28 RX ORDER — INSULIN LISPRO 100/ML
VIAL (ML) SUBCUTANEOUS AT BEDTIME
Refills: 0 | Status: DISCONTINUED | OUTPATIENT
Start: 2022-03-28 | End: 2022-04-01

## 2022-03-28 RX ORDER — CILOSTAZOL 100 MG/1
0 TABLET ORAL
Qty: 0 | Refills: 0 | DISCHARGE

## 2022-03-28 RX ORDER — ASPIRIN/CALCIUM CARB/MAGNESIUM 324 MG
81 TABLET ORAL DAILY
Refills: 0 | Status: DISCONTINUED | OUTPATIENT
Start: 2022-03-28 | End: 2022-04-01

## 2022-03-28 RX ORDER — CARVEDILOL PHOSPHATE 80 MG/1
25 CAPSULE, EXTENDED RELEASE ORAL EVERY 12 HOURS
Refills: 0 | Status: DISCONTINUED | OUTPATIENT
Start: 2022-03-28 | End: 2022-04-01

## 2022-03-28 RX ORDER — APIXABAN 2.5 MG/1
2.5 TABLET, FILM COATED ORAL
Refills: 0 | Status: DISCONTINUED | OUTPATIENT
Start: 2022-03-28 | End: 2022-04-01

## 2022-03-28 RX ORDER — DEXTROSE 50 % IN WATER 50 %
15 SYRINGE (ML) INTRAVENOUS ONCE
Refills: 0 | Status: DISCONTINUED | OUTPATIENT
Start: 2022-03-28 | End: 2022-04-01

## 2022-03-28 RX ORDER — ISOSORBIDE DINITRATE 5 MG/1
20 TABLET ORAL THREE TIMES A DAY
Refills: 0 | Status: DISCONTINUED | OUTPATIENT
Start: 2022-03-28 | End: 2022-04-01

## 2022-03-28 RX ORDER — INSULIN LISPRO 100/ML
VIAL (ML) SUBCUTANEOUS
Refills: 0 | Status: DISCONTINUED | OUTPATIENT
Start: 2022-03-28 | End: 2022-04-01

## 2022-03-28 RX ADMIN — Medication 25 MILLIGRAM(S): at 22:25

## 2022-03-28 RX ADMIN — Medication 1300 MILLIGRAM(S): at 22:24

## 2022-03-28 RX ADMIN — APIXABAN 2.5 MILLIGRAM(S): 2.5 TABLET, FILM COATED ORAL at 18:56

## 2022-03-28 RX ADMIN — ATORVASTATIN CALCIUM 20 MILLIGRAM(S): 80 TABLET, FILM COATED ORAL at 22:25

## 2022-03-28 RX ADMIN — INSULIN GLARGINE 10 UNIT(S): 100 INJECTION, SOLUTION SUBCUTANEOUS at 22:48

## 2022-03-28 RX ADMIN — AMLODIPINE BESYLATE 10 MILLIGRAM(S): 2.5 TABLET ORAL at 22:25

## 2022-03-28 RX ADMIN — Medication 81 MILLIGRAM(S): at 22:25

## 2022-03-28 NOTE — CONSULT NOTE ADULT - PROBLEM SELECTOR RECOMMENDATION 9
Troponin 0.07 in the setting of renal insufficiency  Patient denies cardiac symptoms    EKG is unchanged from previous study  Recent negative NST in 2/2022  Repeat troponin   If there is no significant change, would not pursue further workup

## 2022-03-28 NOTE — ED PROVIDER NOTE - PHYSICAL EXAMINATION
Const: Awake, alert and oriented. In no acute distress. Well appearing.  HEENT: NC/AT. Moist mucous membranes.  Eyes: No scleral icterus. EOMI.  Neck:. Soft and supple. Full ROM without pain.  Cardiac: Regular rate and regular rhythm. +S1/S2. Peripheral pulses 2+ and symmetric. No LE edema.  Resp: Speaking in full sentences. No evidence of respiratory distress. No wheezes, rales or rhonchi.  Abd: Soft, non-tender, non-distended. Normal bowel sounds in all 4 quadrants. No guarding or rebound.  Back: Spine midline and non-tender. No CVAT.  Skin: No rashes, abrasions or lacerations.  Lymph: No cervical lymphadenopathy.  Neuro: A&Ox3, moving all extremities symmetrically, follows commands, motor gael upper and lower ext 5/5, sensory symm and intact CN 2-12 grossly intact, no nystagmus, no dysmetria, no ddk, symm gael, no pronator drift

## 2022-03-28 NOTE — H&P ADULT - PROBLEM SELECTOR PLAN 4
admit to tele, trop mildly elevated, no cp, will trend, ekg no sig. change from last, renally related elevation of trop ? spoke to cardiologist Dr. Trujillo who recommends the same. admit to tele, trop mildly elevated, no cp, will trend, ekg no sig. change from last, renally related elevation of trop ? spoke to cardiologist Dr. Trujillo who recommends the same. h/o CAD, continue asa, b.blk and statin.

## 2022-03-28 NOTE — H&P ADULT - NSHPPHYSICALEXAM_GEN_ALL_CORE
Vital Signs Last 24 Hrs  T(C): 36.5 (28 Mar 2022 16:27), Max: 36.5 (28 Mar 2022 13:22)  T(F): 97.7 (28 Mar 2022 16:27), Max: 97.7 (28 Mar 2022 13:22)  HR: 69 (28 Mar 2022 16:49) (65 - 76)  BP: 183/89 (28 Mar 2022 16:49) (151/82 - 183/89)  BP(mean): --  RR: 16 (28 Mar 2022 16:49) (16 - 18)  SpO2: 96% (28 Mar 2022 16:49) (95% - 99%)    General: pt. in bed not in distress.  HEENT: AT, NC. PERRL. intact EOM. no throat erythema or exudate.   Neck: supple. no JVD.   Chest: CTA bilaterally, no w /r/r.   Heart: S1,S2. RRR. no heart murmur. no edema.   Abdomen: soft. non-tender. non-distended. + BS.   Ext: no calf tenderness. distal pulses 2 +.   Neuro: AAO x3. no focal weakness. no speech disorder, cns ii to xii intact. heel to shin appears intact B/l. sensory intact b/L, motor 5/5 b/ L.   Skin: normal tone, no diaphoresis, no cyanosis.   lymphatic system ; no lymphadenopathy.   psych : mood ok, no si/ hi.

## 2022-03-28 NOTE — CONSULT NOTE ADULT - NSCONSULTADDITIONALINFOA_GEN_ALL_CORE
If there is no significant change in patient's troponin or clinical status, no further inpatient cardiac workup will be needed.   Please contact me with any further questions     Plan discussed with Dr. High

## 2022-03-28 NOTE — H&P ADULT - PROBLEM SELECTOR PLAN 1
reported ataxia ? may be vertigo ? pt. stated that was sent in by his nurse as his bp was high, clinically non focal, discussed with neurologist Dr. Gill and is ok to start bp meds with parameters, mri ordered by ER team, will follow.

## 2022-03-28 NOTE — ED ADULT NURSE NOTE - NSIMPLEMENTINTERV_GEN_ALL_ED
Implemented All Fall Risk Interventions:  [place] to call system. Call bell, personal items and telephone within reach. Instruct patient to call for assistance. Room bathroom lighting operational. Non-slip footwear when patient is off stretcher. Physically safe environment: no spills, clutter or unnecessary equipment. Stretcher in lowest position, wheels locked, appropriate side rails in place. Provide visual cue, wrist band, yellow gown, etc. Monitor gait and stability. Monitor for mental status changes and reorient to person, place, and time. Review medications for side effects contributing to fall risk. Reinforce activity limits and safety measures with patient and family.

## 2022-03-28 NOTE — CONSULT NOTE ADULT - SUBJECTIVE AND OBJECTIVE BOX
Neurology consult    ANSHUL CUNNINGHAM 67y Male     Patient is a 67y old  Male who presents with a chief complaint of ataxia (28 Mar 2022 17:46)      HPI:  68 y/o male came in for reported ataxia per triage note but pt. reporting he has been ok, some dizziness ? As per pt. home visiting nurse found his bp to be high and send the pt. to the ER.  pt. was discharged from [facility] 1 week ago and was admitted for pre syncope and during his work up was found to have cva on mri brain. pt. was followed by neurology, cardiology ( was started on eliquis for cva and MCOT monitoring as an out-pt for afib  ) and by nephrology team for his ckd. pt. reports no focal weakness, no cp, no sob. no abd. pain, no n/v/d. mo HA reported. mo speech/ swallow difficulty. no hemoptysis, hematemesis no blood per rectum.  (28 Mar 2022 16:52)    3-28-22  Wife at bedside reports patient was having dizziness and difficulty ambulating.    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          FAMILY HISTORY:  Family history of heart disease (Sibling)        SOCIAL HISTORY:  No history of tobacco or alcohol use     Allergies    No Known Allergies    Intolerances        Height (cm): 165.1 ( @ 13:22)  Weight (kg): 82.5 ( @ 13:22)  BMI (kg/m2): 30.3 ( @ 13:22)    Vital Signs Last 24 Hrs  T(C): 36.5 (28 Mar 2022 16:27), Max: 36.5 (28 Mar 2022 13:22)  T(F): 97.7 (28 Mar 2022 16:27), Max: 97.7 (28 Mar 2022 13:22)  HR: 69 (28 Mar 2022 16:49) (65 - 76)  BP: 183/89 (28 Mar 2022 16:49) (151/82 - 183/89)  BP(mean): --  RR: 16 (28 Mar 2022 16:49) (16 - 18)  SpO2: 96% (28 Mar 2022 16:49) (95% - 99%)    MEDICATIONS    amLODIPine   Tablet 10 milliGRAM(s) Oral daily  apixaban 2.5 milliGRAM(s) Oral two times a day  aspirin enteric coated 81 milliGRAM(s) Oral daily  atorvastatin 20 milliGRAM(s) Oral at bedtime  carvedilol 25 milliGRAM(s) Oral every 12 hours  dextrose 5%. 1000 milliLiter(s) IV Continuous <Continuous>  dextrose 5%. 1000 milliLiter(s) IV Continuous <Continuous>  dextrose 50% Injectable 25 Gram(s) IV Push once  dextrose 50% Injectable 12.5 Gram(s) IV Push once  dextrose 50% Injectable 25 Gram(s) IV Push once  dextrose Oral Gel 15 Gram(s) Oral once PRN  glucagon  Injectable 1 milliGRAM(s) IntraMuscular once  hydrALAZINE 25 milliGRAM(s) Oral every 8 hours  insulin glargine Injectable (LANTUS) 10 Unit(s) SubCutaneous at bedtime  insulin lispro (ADMELOG) corrective regimen sliding scale   SubCutaneous three times a day before meals  insulin lispro (ADMELOG) corrective regimen sliding scale   SubCutaneous at bedtime  isosorbide   dinitrate Tablet (ISORDIL) 20 milliGRAM(s) Oral three times a day  sodium bicarbonate 1300 milliGRAM(s) Oral three times a day        LABS:  CBC Full  -  ( 28 Mar 2022 13:54 )  WBC Count : 7.25 K/uL  RBC Count : 3.81 M/uL  Hemoglobin : 10.4 g/dL  Hematocrit : 32.7 %  Platelet Count - Automated : 268 K/uL  Mean Cell Volume : 85.8 fl  Mean Cell Hemoglobin : 27.3 pg  Mean Cell Hemoglobin Concentration : 31.8 gm/dL  Auto Neutrophil # : 5.11 K/uL  Auto Lymphocyte # : 1.35 K/uL  Auto Monocyte # : 0.54 K/uL  Auto Eosinophil # : 0.17 K/uL  Auto Basophil # : 0.05 K/uL  Auto Neutrophil % : 70.6 %  Auto Lymphocyte % : 18.6 %  Auto Monocyte % : 7.4 %  Auto Eosinophil % : 2.3 %  Auto Basophil % : 0.7 %    Urinalysis Basic - ( 28 Mar 2022 17:22 )    Color: Yellow / Appearance: Clear / S.010 / pH: x  Gluc: x / Ketone: Negative  / Bili: Negative / Urobili: Negative mg/dL   Blood: x / Protein: 500 mg/dL / Nitrite: Negative   Leuk Esterase: Negative / RBC: 0-2 /HPF / WBC 0-2 /HPF   Sq Epi: x / Non Sq Epi: Few / Bacteria: x          140  |  108<H>  |  49.7<H>  ----------------------------<  111<H>  4.8   |  19.0<L>  |  4.17<H>    Ca    8.8      28 Mar 2022 13:54    TPro  6.7  /  Alb  3.5  /  TBili  0.4  /  DBili  x   /  AST  15  /  ALT  18  /  AlkPhos  122<H>      LIVER FUNCTIONS - ( 28 Mar 2022 13:54 )  Alb: 3.5 g/dL / Pro: 6.7 g/dL / ALK PHOS: 122 U/L / ALT: 18 U/L / AST: 15 U/L / GGT: x           Hemoglobin A1C:       PT/INR - ( 28 Mar 2022 13:54 )   PT: 16.0 sec;   INR: 1.37 ratio         PTT - ( 28 Mar 2022 13:54 )  PTT:39.0 sec      On Neurological Examination:    Mental Status - Patient is  awake, alert and oriented X3.  Speech is fluent. Patient can name, repeat and follow commands correctly  There is no dysarthria.    Cranial Nerves - PERRL, EOMI,  Visual fields are full to finger counting, no gross facial asymmetry, tongue/uvula midline    Motor Exam -   Right upper ---5/5 No drift  Left upper ---5/5 No drift  Right lower ---5/5 No drift  Left lower  ---5/5 No drift     nml bulk/tone    Sensory    Intact to light touch and pinprick bilaterally    Coord: FTN intact bilaterally     Gait -  Not assessed.                 [facility] SS:   Date: 22  Time:  1345  1a) Level of consciousness (0-3):   1b) Questions (0-2):   1c) Commands (0-2):   2  ) Gaze (0-2):   3  ) Visual field (0-3):   4  ) Facial palsy (0-3):   Motor  5a) Left arm (0-4):   5b) Right arm (0-4):   6a) Left leg (0-4):   6b) Right leg (0-4):   7  ) Ataxia (0-2):   Sensory  8  ) Sensory (0-2):   Speech  9  ) Language (0-3):   10) Dysarthria (0-2):   Extinction  11) Extinction/inattention (0-2):     Total score: = 0    Patient was last seen well at last night.  Prestroke Modified [place]: = 2           RADIOLOGY ( All neurological imaging studies were independently reviewed and interpreted by me)  [facility]   CTA  CTP  CT Brain Stroke Protocol (22 @ 13:49) >    IMPRESSION:  HEAD CT: Mild volume loss, moderate microvascular disease, no acute   hemorrhage or midline shift.  Chronic lacunar infarcts, unchanged.    Discussed with Dr. Keating in the ED at 1:53 PM. CTA and perfusion not   ordered due to elevated creatinine. MRI may provide helpful additional   evaluation, if clinically indicated.    MIGDALIA DOUGLAS MD; Attending Radiologist      MRI:    MR Head No Cont (03.15.22 @ 04:14) >  IMPRESSION:  FEW SMALL ACUTE INFARCTS LEFT PARIETAL GREATER THAN FRONTAL LOBES.      JULISA ALCALA MD; Attending Radiologist      MR Angio Head No Cont (22 @ 23:08) >  PRESSION:    1. Suboptimal but grossly diagnostic study, degraded by motion.  2. moderate narrowing of both carotid bifurcations and proximal internal   carotid arteries, which is consistent with ultrasound findings of   3/11/2022. No dissection or occlusion. Patent vertebrals.  3. Atherosclerotic changes noted intracranially, most prevalent in the   posterior cerebral arteries but also visible in the middle cerebral   arteries are proximally. No occlusion identified.    AMARJIT SAINI MD; Attending Radiologist    TTE

## 2022-03-28 NOTE — CONSULT NOTE ADULT - PROBLEM SELECTOR RECOMMENDATION 2
Increase norvasc to 10mg daily   If BP remains uncontrolled, increase hydralazine to 50mg q8   Continue carvedilol

## 2022-03-28 NOTE — H&P ADULT - NSHPLABSRESULTS_GEN_ALL_CORE
ekg reviewed by me, nsr 66, irbbb, inf, infarct age undetermined. no sig. change from prior, cardiologist read pending.

## 2022-03-28 NOTE — ED PROVIDER NOTE - CLINICAL SUMMARY MEDICAL DECISION MAKING FREE TEXT BOX
39yo female with pmh of HA, depression and anxiety presents with headache. 68yo male with pmh of CVA no residual deficit, ACS, CHF, HTN, HLD presents with dizziness. No acute findings on CTH, pt seen by Dr. Cesar, neuro intact, not a TPA candidate, due to CKD CTA not performed and no neuro deficits unlikely LVO, will keep in obs for MRI head 66yo male with pmh of CVA no residual deficit, ACS, CHF, HTN, HLD presents with dizziness. No acute findings on CTH, pt seen by Dr. Cesar, neuro intact, not a TPA candidate, due to CKD CTA not performed and no neuro deficits unlikely LVO, 68yo male with pmh of CVA no residual deficit, ACS, CHF, HTN, HLD presents with dizziness. No acute findings on CTH, pt seen by Dr. Cesar, NIHSS 0, not a TPA candidate, due to CKD CTA not performed and  unlikely LVO, will admit for MRI

## 2022-03-28 NOTE — H&P ADULT - ASSESSMENT
66 y/o male came in for reported ataxia per triage note but pt. reporting he has been ok, some dizziness ? As per pt. home visiting nurse found his bp to be high and send the pt. to the ER.  pt. was discharged from [facility] 1 week ago and was admitted for pre syncope and during his work up was found to have cva on mri brain. pt. was followed by neurology, cardiology ( was started on eliquis for cva and MCOT monitoring as an out-pt for afib  ) and by nephrology team for his ckd. pt. reports no focal weakness, no cp, no sob. no abd. pain, no n/v/d. mo HA reported. mo speech/ swallow difficulty. no hemoptysis, hematemesis no blood per rectum.     - Ataxia ,     - primary htn    -

## 2022-03-28 NOTE — CONSULT NOTE ADULT - SUBJECTIVE AND OBJECTIVE BOX
[facility]                                              CARDIOLOGY AT [place] [address]                                             Telephone: [phone]. Fax:[phone]                                                       CARDIOLOGY CONSULTATION NOTE                                                                                             Consult requested by:    History obtained by: Patient and medical record  Community Cardiologist:    obtained: Yes [  ] No [  ]  Reason for Consultation:   Avialable out pt records reviewed: Yes [  ] No [  ]    Chief complaint:    Patient is a 67y old  Male who presents with a chief complaint of ataxia (28 Mar 2022 17:46)        HPI:  68 y/o male came in for reported ataxia per triage note but pt. reporting he has been ok, some dizziness ? As per pt. home visiting nurse found his bp to be high and send the pt. to the ER.  pt. was discharged from [facility] 1 week ago and was admitted for pre syncope and during his work up was found to have cva on mri brain. pt. was followed by neurology, cardiology ( was started on eliquis for cva and [facility]OT monitoring as an out-pt for afib  ) and by nephrology team for his ckd. pt. reports no focal weakness, no cp, no sob. no abd. pain, no n/v/d. mo HA reported. mo speech/ swallow difficulty. no hemoptysis, hematemesis no blood per rectum.  (28 Mar 2022 16:52)        CARDIAC TESTING   ECHO:    STRESS:    CATH:     ELECTROPHYSIOLOGY:       PAST MEDICAL HISTORY  DM (diabetes mellitus)    HTN (hypertension)    High cholesterol    Myocardial infarction    Acute on chronic congestive heart failure, unspecified congestive heart failure type        PAST SURGICAL HISTORY  No significant past surgical history    S/P coronary artery bypass with three autogenous grafts        SOCIAL HISTORY:  Denies smoking/alcohol/drugs  CIGARETTES:     ALCOHOL:  DRUGS:      FAMILY HISTORY:  Family history of heart disease (Sibling)        Family History of Cardiovascular Disease:  Yes [  ] No [  ]  Coronary Artery Disease in first degree relative: Yes [  ] No [  ]  Sudden Cardiac Death in First degree relative: Yes [  ] No [  ]      HOME MEDICATIONS:      CURRENT MEDICATIONS:     apixaban  aspirin enteric coated  atorvastatin  sodium bicarbonate        ALLERGIES: Allergies    No Known Allergies    Intolerances          REVIEW OF SYMPTOMS:   CONSTITUTIONAL: No fever, no chills, no weight loss, no weight gain, no fatigue   ENMT:  No vertigo; No sinus or throat pain  NECK: No pain or stiffness  CARDIOVASCULAR: No chest pain, no dyspnea, no syncope/presyncope, no palpitations, no dizziness, no Orthopnea, no Paroxsymal nocturnal dyspnea  RESPIRATORY: no Shortness of breath, no cough, no wheezing  : No dysuria, no hematuria   GI: No dark color stool, no nausea, no diarrhea, no constipation, no abdominal pain   NEURO: No headache, no slurred speech   MUSCULOSKELETAL: No joint pain or swelling; No muscle, back, or extremity pain  PSYCH: No agitation, no anxiety.    ALL OTHER REVIEW OF SYSTEMS ARE NEGATIVE.      Vital Signs Last 24 Hrs  T(C): 36.5 (28 Mar 2022 16:27), Max: 36.5 (28 Mar 2022 13:22)  T(F): 97.7 (28 Mar 2022 16:27), Max: 97.7 (28 Mar 2022 13:22)  HR: 69 (28 Mar 2022 16:49) (65 - 76)  BP: 183/89 (28 Mar 2022 16:49) (151/82 - 183/89)  BP(mean): --  RR: 16 (28 Mar 2022 16:49) (16 - 18)  SpO2: 96% (28 Mar 2022 16:49) (95% - 99%)  INTAKE AND OUTPUT:     PHYSICAL EXAM:  Constitutional: Comfortable . No acute distress.   HEENT: Atraumatic and normocephalic , neck is supple . no JVD. No carotid bruit.  CNS: A&Ox3. No focal deficits.   Respiratory: CTAB, unlabored   Cardiovascular: RRR normal s1 s2. No murmur. No rubs or gallop.  Gastrointestinal: Soft, non-tender. +Bowel sounds.   MSK: full ROM extremities x 4  Extremities: No edema. No cyanosis   Psychiatric: Calm . no agitation.   Skin: Warm and dry, no ulcers on extremities       LABS:                        10.4   7.25  )-----------( 268      ( 28 Mar 2022 13:54 )             32.7     -    140  |  108<H>  |  49.7<H>  ----------------------------<  111<H>  4.8   |  19.0<L>  |  4.17<H>    Ca    8.8      28 Mar 2022 13:54    TPro  6.7  /  Alb  3.5  /  TBili  0.4  /  DBili  x   /  AST  15  /  ALT  18  /  AlkPhos  122<H>  03-28    CARDIAC MARKERS ( 28 Mar 2022 13:54 )  x     / 0.07 ng/mL / x     / x     / x        ;p-BNP=  PT/INR - ( 28 Mar 2022 13:54 )   PT: 16.0 sec;   INR: 1.37 ratio         PTT - ( 28 Mar 2022 13:54 )  PTT:39.0 sec  Urinalysis Basic - ( 28 Mar 2022 17:22 )    Color: Yellow / Appearance: Clear / S.010 / pH: x  Gluc: x / Ketone: Negative  / Bili: Negative / Urobili: Negative mg/dL   Blood: x / Protein: 500 mg/dL / Nitrite: Negative   Leuk Esterase: Negative / RBC: 0-2 /HPF / WBC 0-2 /HPF   Sq Epi: x / Non Sq Epi: Few / Bacteria: x          INTERPRETATION OF TELEMETRY:     ECG:   Prior ECG: Yes [  ] No [  ]      RADIOLOGY & ADDITIONAL STUDIES:    X-ray:  reviewed by me.   CT scan:   MRI:   US:                                                [facility]                                              CARDIOLOGY AT [address], [place] [address]                                             Telephone: [phone]. Fax:[phone]                                                       CARDIOLOGY CONSULTATION NOTE                                                                                             Consult requested by:  Dr. High  History obtained by: Patient and medical record  Community Cardiologist: Inocencia   obtained: Yes [  ] No [ X ]  Reason for Consultation: ataxia, abnormal troponin     Chief complaint:    Patient is a 67y old  Male who presents with a chief complaint of ataxia (28 Mar 2022 17:46)    HPI:  66 y/o M with PMH CAD s/p CABG  (LIMA-LAD reverse SVG to posterior lateral reverse SVG to OM), HFpEF, HTN HLD, renal ca s/p L nephrectomy 10/2020, CKD stage 4, recently admitted to [facility] with near syncope; was found to have acute CVA on MRI. Discharged 1 week ago. Per cardiology office note from today, patient's PT called the office and reported that patient was off balance, and had BP's of 190/90 and 180/90. Patient was advised by NP in office to go to ED based on this report. Patient reports feeling "off," but is unable to describe further symptoms other than generalized fatigue. Denies CP, dyspnea, palpitations, dizziness, lightheadedness, syncope, or near syncope.         CARDIAC TESTING   ECHO:  Summary:   1. Technically difficult study.   2. Normal global left ventricular systolic function.   3. Left ventricular ejection fraction, by visual estimation, is 55 to   60%.   4. Mildly enlarged left atrium.   5. Normal right atrial size.   6. Mild mitral annular calcification.   7. Mild mitral valve regurgitation.   8. Thickening and calcification of the anterior and posterior mitral   valve leaflets.   9. Moderate tricuspid regurgitation.  10. Sclerotic aortic valve with normal opening.  11. Estimated pulmonary artery systolic pressure is 40.3 mmHg assuming a   right atrial pressure of 5 mmHg, which is consistent with mild pulmonary   hypertension.  12. Trivial pericardial effusion.    STRESS:    CATH:     ELECTROPHYSIOLOGY:       PAST MEDICAL HISTORY  DM (diabetes mellitus)    HTN (hypertension)    High cholesterol    Myocardial infarction    Acute on chronic congestive heart failure, unspecified congestive heart failure type        PAST SURGICAL HISTORY  No significant past surgical history    S/P coronary artery bypass with three autogenous grafts        SOCIAL HISTORY:  Denies smoking/alcohol/drugs  CIGARETTES:     ALCOHOL:  DRUGS:      FAMILY HISTORY:  Family history of heart disease (Sibling)        Family History of Cardiovascular Disease:  Yes [  ] No [  ]  Coronary Artery Disease in first degree relative: Yes [  ] No [  ]  Sudden Cardiac Death in First degree relative: Yes [  ] No [  ]      HOME MEDICATIONS:  From prior dc summary:  amLODIPine 5 mg oral tablet: 1 tab(s) orally once a day  aspirin 81 mg oral tablet, chewable: 1 tab(s) orally once a day  atorvastatin 20 mg oral tablet: 1 tab(s) orally once a day (at bedtime)  carvedilol 25 mg oral tablet: 1 tab(s) orally every 12 hours  CILOSTAZOL  100 MG TABS:   Eliquis 2.5 mg oral tablet: 1 tab(s) orally 2 times a day   hydrALAZINE 25 mg oral tablet: 1 tab(s) orally every 8 hours  isosorbide dinitrate 20 mg oral tablet: 1 tab(s) orally 3 times a day  Lasix 40 mg oral tablet:   rolling walker: 1 rolling Walker   sodium bicarbonate 650 mg oral tablet: 2 tab(s) orally 3 times a day  TRESIBA FLEXTOUCH  100 UNIT/ML SOPN:    CURRENT MEDICATIONS:     apixaban  aspirin enteric coated  atorvastatin  sodium bicarbonate        ALLERGIES: Allergies    No Known Allergies    Intolerances          REVIEW OF SYMPTOMS:   CONSTITUTIONAL: No fever, no chills, no weight loss, no weight gain, no fatigue   ENMT:  No vertigo; No sinus or throat pain  NECK: No pain or stiffness  CARDIOVASCULAR: No chest pain, no dyspnea, no syncope/presyncope, no palpitations, no dizziness, no Orthopnea, no Paroxsymal nocturnal dyspnea  RESPIRATORY: no Shortness of breath, no cough, no wheezing  : No dysuria, no hematuria   GI: No dark color stool, no nausea, no diarrhea, no constipation, no abdominal pain   NEURO: No headache, no slurred speech   MUSCULOSKELETAL: No joint pain or swelling; No muscle, back, or extremity pain  PSYCH: No agitation, no anxiety.    ALL OTHER REVIEW OF SYSTEMS ARE NEGATIVE.      Vital Signs Last 24 Hrs  T(C): 36.5 (28 Mar 2022 16:27), Max: 36.5 (28 Mar 2022 13:22)  T(F): 97.7 (28 Mar 2022 16:27), Max: 97.7 (28 Mar 2022 13:22)  HR: 69 (28 Mar 2022 16:49) (65 - 76)  BP: 183/89 (28 Mar 2022 16:49) (151/82 - 183/89)  BP(mean): --  RR: 16 (28 Mar 2022 16:49) (16 - 18)  SpO2: 96% (28 Mar 2022 16:49) (95% - 99%)  INTAKE AND OUTPUT:     PHYSICAL EXAM:  Constitutional: Comfortable . No acute distress.   HEENT: Atraumatic and normocephalic , neck is supple . no JVD. No carotid bruit.  CNS: A&Ox3. No focal deficits.   Respiratory: CTAB, unlabored   Cardiovascular: RRR normal s1 s2. No murmur. No rubs or gallop.  Gastrointestinal: Soft, non-tender. +Bowel sounds.   MSK: full ROM extremities x 4  Extremities: No edema. No cyanosis   Psychiatric: Calm . no agitation.   Skin: Warm and dry, no ulcers on extremities       LABS:                        10.4   7.25  )-----------( 268      ( 28 Mar 2022 13:54 )             32.7     03-    140  |  108<H>  |  49.7<H>  ----------------------------<  111<H>  4.8   |  19.0<L>  |  4.17<H>    Ca    8.8      28 Mar 2022 13:54    TPro  6.7  /  Alb  3.5  /  TBili  0.4  /  DBili  x   /  AST  15  /  ALT  18  /  AlkPhos  122<H>  -28    CARDIAC MARKERS ( 28 Mar 2022 13:54 )  x     / 0.07 ng/mL / x     / x     / x        ;p-BNP=  PT/INR - ( 28 Mar 2022 13:54 )   PT: 16.0 sec;   INR: 1.37 ratio         PTT - ( 28 Mar 2022 13:54 )  PTT:39.0 sec  Urinalysis Basic - ( 28 Mar 2022 17:22 )    Color: Yellow / Appearance: Clear / S.010 / pH: x  Gluc: x / Ketone: Negative  / Bili: Negative / Urobili: Negative mg/dL   Blood: x / Protein: 500 mg/dL / Nitrite: Negative   Leuk Esterase: Negative / RBC: 0-2 /HPF / WBC 0-2 /HPF   Sq Epi: x / Non Sq Epi: Few / Bacteria: x          INTERPRETATION OF TELEMETRY:     ECG: SR, incomplete RBBB, old inferior MI (unchanged)  Prior ECG: Yes [ X ] No [  ]      RADIOLOGY & ADDITIONAL STUDIES:    X-ray:  reviewed by me.     CT scan:   IMPRESSION:  HEAD CT: Mild volume loss, moderate microvascular disease, no acute   hemorrhage or midline shift.  Chronic lacunar infarcts, unchanged.    MRI:   US:

## 2022-03-28 NOTE — H&P ADULT - LV FUNCTION ASSESSMENT
yes Humira Pregnancy And Lactation Text: This medication is Pregnancy Category B and is considered safe during pregnancy. It is unknown if this medication is excreted in breast milk.

## 2022-03-28 NOTE — H&P ADULT - HISTORY OF PRESENT ILLNESS
68 y/o male came in for reported ataxia per triage note but pt. reporting he has been ok, some dizziness ? As per pt. home visiting nurse found his bp to be high and send the pt. to the ER.  pt. was discharged from [facility] 1 week ago and was admitted for pre syncope and during his work up was found to have cva on mri brain. pt. was followed by neurology, cardiology ( was started on eliquis for his afib ) and by nephrology team for his ckd. pt. reports no focal weakness, no cp, no sob. no abd. pain, no n/v/d. mo HA reported. mo speech/ swallow difficulty. no hemoptysis, hematemesis no blood per rectum.  66 y/o male came in for reported ataxia per triage note but pt. reporting he has been ok, some dizziness ? As per pt. home visiting nurse found his bp to be high and send the pt. to the ER.  pt. was discharged from [facility] 1 week ago and was admitted for pre syncope and during his work up was found to have cva on mri brain. pt. was followed by neurology, cardiology ( was started on eliquis for cva and [facility] monitoring as an out-pt for afib  ) and by nephrology team for his ckd. pt. reports no focal weakness, no cp, no sob. no abd. pain, no n/v/d. mo HA reported. mo speech/ swallow difficulty. no hemoptysis, hematemesis no blood per rectum.

## 2022-03-28 NOTE — ED PROVIDER NOTE - OBJECTIVE STATEMENT
68yo male with pmh of CVA no residual deficit, ACS, CHF, HTN, HLD presents with dizziness. Pt states he woke up this morning at 9am and felt dizzy, room spinning like when moving his head or getting up from sitting. Pt had visiting nursing service come and was told he was hypertensive and brought to ED. Pt states last time he felt well was last night prior to bed around 10pm. Pt denies fevers/chills, ha, loc, focal neuro deficits, cp/sob/palp, cough, abd pain/n/v/d, urinary symptoms, recent travel.

## 2022-03-28 NOTE — CONSULT NOTE ADULT - PROBLEM SELECTOR RECOMMENDATION 6
Found to have L parietal and frontal infarcts during previous hospitalization  Was also found to have L carotid 50-69% stenosis  Was planned for outpatient vascular evaluation   Cardiac monitoring while inpatient   Outpatient [facility] for evaluation of AF   Continue ASA, statin  Was also started on renally dosed eliquis during previous hospitalization; continue for now

## 2022-03-28 NOTE — ED ADULT TRIAGE NOTE - CHIEF COMPLAINT QUOTE
patient c/o woke up with feeling off balance when walking, was discharged recently for stroke, has been having uncontrolled hypertension. visiting nurse came over and was found to be hypertensive. wife and patient stated that the off balance has been going on since he woke up today, last known well was 2200 last night. CODE STROKE Called.

## 2022-03-28 NOTE — H&P ADULT - PROBLEM SELECTOR PLAN 2
Accelerated htn, norvasc increased to 10 mg daily from 5 mg daily, continue coreg, hydralazine as home dose for now and adjust as per response.

## 2022-03-29 DIAGNOSIS — Z02.9 ENCOUNTER FOR ADMINISTRATIVE EXAMINATIONS, UNSPECIFIED: ICD-10-CM

## 2022-03-29 LAB
ANION GAP SERPL CALC-SCNC: 13 MMOL/L — SIGNIFICANT CHANGE UP (ref 5–17)
BASOPHILS # BLD AUTO: 0.05 K/UL — SIGNIFICANT CHANGE UP (ref 0–0.2)
BASOPHILS NFR BLD AUTO: 0.8 % — SIGNIFICANT CHANGE UP (ref 0–2)
BUN SERPL-MCNC: 48.1 MG/DL — HIGH (ref 8–20)
CALCIUM SERPL-MCNC: 8.3 MG/DL — LOW (ref 8.6–10.2)
CHLORIDE SERPL-SCNC: 113 MMOL/L — HIGH (ref 98–107)
CHOLEST SERPL-MCNC: 109 MG/DL — SIGNIFICANT CHANGE UP
CO2 SERPL-SCNC: 19 MMOL/L — LOW (ref 22–29)
CREAT SERPL-MCNC: 4.03 MG/DL — HIGH (ref 0.5–1.3)
CULTURE RESULTS: SIGNIFICANT CHANGE UP
EGFR: 15 ML/MIN/1.73M2 — LOW
EOSINOPHIL # BLD AUTO: 0.18 K/UL — SIGNIFICANT CHANGE UP (ref 0–0.5)
EOSINOPHIL NFR BLD AUTO: 2.8 % — SIGNIFICANT CHANGE UP (ref 0–6)
GLUCOSE BLDC GLUCOMTR-MCNC: 101 MG/DL — HIGH (ref 70–99)
GLUCOSE BLDC GLUCOMTR-MCNC: 125 MG/DL — HIGH (ref 70–99)
GLUCOSE BLDC GLUCOMTR-MCNC: 136 MG/DL — HIGH (ref 70–99)
GLUCOSE BLDC GLUCOMTR-MCNC: 72 MG/DL — SIGNIFICANT CHANGE UP (ref 70–99)
GLUCOSE SERPL-MCNC: 73 MG/DL — SIGNIFICANT CHANGE UP (ref 70–99)
HCT VFR BLD CALC: 28.4 % — LOW (ref 39–50)
HDLC SERPL-MCNC: 25 MG/DL — LOW
HGB BLD-MCNC: 9 G/DL — LOW (ref 13–17)
IMM GRANULOCYTES NFR BLD AUTO: 0.2 % — SIGNIFICANT CHANGE UP (ref 0–1.5)
LIPID PNL WITH DIRECT LDL SERPL: 68 MG/DL — SIGNIFICANT CHANGE UP
LYMPHOCYTES # BLD AUTO: 1.22 K/UL — SIGNIFICANT CHANGE UP (ref 1–3.3)
LYMPHOCYTES # BLD AUTO: 19.2 % — SIGNIFICANT CHANGE UP (ref 13–44)
MCHC RBC-ENTMCNC: 27.4 PG — SIGNIFICANT CHANGE UP (ref 27–34)
MCHC RBC-ENTMCNC: 31.7 GM/DL — LOW (ref 32–36)
MCV RBC AUTO: 86.3 FL — SIGNIFICANT CHANGE UP (ref 80–100)
MONOCYTES # BLD AUTO: 0.49 K/UL — SIGNIFICANT CHANGE UP (ref 0–0.9)
MONOCYTES NFR BLD AUTO: 7.7 % — SIGNIFICANT CHANGE UP (ref 2–14)
NEUTROPHILS # BLD AUTO: 4.42 K/UL — SIGNIFICANT CHANGE UP (ref 1.8–7.4)
NEUTROPHILS NFR BLD AUTO: 69.3 % — SIGNIFICANT CHANGE UP (ref 43–77)
NON HDL CHOLESTEROL: 84 MG/DL — SIGNIFICANT CHANGE UP
PLATELET # BLD AUTO: 245 K/UL — SIGNIFICANT CHANGE UP (ref 150–400)
POTASSIUM SERPL-MCNC: 4.4 MMOL/L — SIGNIFICANT CHANGE UP (ref 3.5–5.3)
POTASSIUM SERPL-SCNC: 4.4 MMOL/L — SIGNIFICANT CHANGE UP (ref 3.5–5.3)
RBC # BLD: 3.29 M/UL — LOW (ref 4.2–5.8)
RBC # FLD: 14.6 % — HIGH (ref 10.3–14.5)
SODIUM SERPL-SCNC: 145 MMOL/L — SIGNIFICANT CHANGE UP (ref 135–145)
SPECIMEN SOURCE: SIGNIFICANT CHANGE UP
TRIGL SERPL-MCNC: 79 MG/DL — SIGNIFICANT CHANGE UP
TROPONIN T SERPL-MCNC: 0.07 NG/ML — HIGH (ref 0–0.06)
WBC # BLD: 6.37 K/UL — SIGNIFICANT CHANGE UP (ref 3.8–10.5)
WBC # FLD AUTO: 6.37 K/UL — SIGNIFICANT CHANGE UP (ref 3.8–10.5)

## 2022-03-29 PROCEDURE — 99223 1ST HOSP IP/OBS HIGH 75: CPT

## 2022-03-29 PROCEDURE — 99233 SBSQ HOSP IP/OBS HIGH 50: CPT

## 2022-03-29 PROCEDURE — 99232 SBSQ HOSP IP/OBS MODERATE 35: CPT

## 2022-03-29 RX ORDER — ATORVASTATIN CALCIUM 80 MG/1
80 TABLET, FILM COATED ORAL AT BEDTIME
Refills: 0 | Status: DISCONTINUED | OUTPATIENT
Start: 2022-03-29 | End: 2022-04-01

## 2022-03-29 RX ADMIN — Medication 25 MILLIGRAM(S): at 12:39

## 2022-03-29 RX ADMIN — Medication 1300 MILLIGRAM(S): at 06:08

## 2022-03-29 RX ADMIN — ATORVASTATIN CALCIUM 80 MILLIGRAM(S): 80 TABLET, FILM COATED ORAL at 22:54

## 2022-03-29 RX ADMIN — ISOSORBIDE DINITRATE 20 MILLIGRAM(S): 5 TABLET ORAL at 06:08

## 2022-03-29 RX ADMIN — AMLODIPINE BESYLATE 10 MILLIGRAM(S): 2.5 TABLET ORAL at 06:09

## 2022-03-29 RX ADMIN — CARVEDILOL PHOSPHATE 25 MILLIGRAM(S): 80 CAPSULE, EXTENDED RELEASE ORAL at 06:09

## 2022-03-29 RX ADMIN — Medication 1300 MILLIGRAM(S): at 12:38

## 2022-03-29 RX ADMIN — ISOSORBIDE DINITRATE 20 MILLIGRAM(S): 5 TABLET ORAL at 12:37

## 2022-03-29 RX ADMIN — ISOSORBIDE DINITRATE 20 MILLIGRAM(S): 5 TABLET ORAL at 17:44

## 2022-03-29 RX ADMIN — Medication 25 MILLIGRAM(S): at 22:33

## 2022-03-29 RX ADMIN — Medication 25 MILLIGRAM(S): at 06:09

## 2022-03-29 RX ADMIN — APIXABAN 2.5 MILLIGRAM(S): 2.5 TABLET, FILM COATED ORAL at 17:45

## 2022-03-29 RX ADMIN — INSULIN GLARGINE 10 UNIT(S): 100 INJECTION, SOLUTION SUBCUTANEOUS at 22:33

## 2022-03-29 RX ADMIN — Medication 1300 MILLIGRAM(S): at 22:31

## 2022-03-29 RX ADMIN — CARVEDILOL PHOSPHATE 25 MILLIGRAM(S): 80 CAPSULE, EXTENDED RELEASE ORAL at 18:31

## 2022-03-29 RX ADMIN — Medication 81 MILLIGRAM(S): at 12:38

## 2022-03-29 RX ADMIN — APIXABAN 2.5 MILLIGRAM(S): 2.5 TABLET, FILM COATED ORAL at 07:00

## 2022-03-29 NOTE — PROGRESS NOTE ADULT - SUBJECTIVE AND OBJECTIVE BOX
Neurology consult    ANSHUL CUNNINGHAM 67y Male     Patient is a 67y old  Male who presents with a chief complaint of ataxia (28 Mar 2022 17:46)      HPI:  66 y/o male came in for reported ataxia per triage note but pt. reporting he has been ok, some dizziness ? As per pt. home visiting nurse found his bp to be high and send the pt. to the ER.  pt. was discharged from [facility] 1 week ago and was admitted for pre syncope and during his work up was found to have cva on mri brain. pt. was followed by neurology, cardiology ( was started on eliquis for cva and [facility] monitoring as an out-pt for afib  ) and by nephrology team for his ckd. pt. reports no focal weakness, no cp, no sob. no abd. pain, no n/v/d. mo HA reported. mo speech/ swallow difficulty. no hemoptysis, hematemesis no blood per rectum.  (28 Mar 2022 16:52)    3-28-22  Wife at bedside reports patient was having dizziness and difficulty ambulating.    3-29-22   No new complaints. Ambulating better today.    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          FAMILY HISTORY:  Family history of heart disease (Sibling)        SOCIAL HISTORY:  No history of tobacco or alcohol use     Allergies    No Known Allergies    Intolerances        Height (cm): 165.1 ( @ 13:22)  Weight (kg): 82.5 ( @ 13:22)  BMI (kg/m2): 30.3 ( @ 13:22)        Vital Signs Last 24 Hrs  T(C): 36.8 (29 Mar 2022 22:54), Max: 36.9 (29 Mar 2022 07:26)  T(F): 98.2 (29 Mar 2022 22:54), Max: 98.4 (29 Mar 2022 07:26)  HR: 70 (29 Mar 2022 22:54) (67 - 98)  BP: 132/67 (29 Mar 2022 22:54) (132/67 - 146/67)  BP(mean): --  RR: 18 (29 Mar 2022 22:54) (18 - 18)  SpO2: 93% (29 Mar 2022 22:54) (92% - 96%)    MEDICATIONS    amLODIPine   Tablet 10 milliGRAM(s) Oral daily  apixaban 2.5 milliGRAM(s) Oral two times a day  aspirin enteric coated 81 milliGRAM(s) Oral daily  atorvastatin 80 milliGRAM(s) Oral at bedtime  carvedilol 25 milliGRAM(s) Oral every 12 hours  dextrose 5%. 1000 milliLiter(s) IV Continuous <Continuous>  dextrose 5%. 1000 milliLiter(s) IV Continuous <Continuous>  dextrose 50% Injectable 25 Gram(s) IV Push once  dextrose 50% Injectable 12.5 Gram(s) IV Push once  dextrose 50% Injectable 25 Gram(s) IV Push once  dextrose Oral Gel 15 Gram(s) Oral once PRN  glucagon  Injectable 1 milliGRAM(s) IntraMuscular once  hydrALAZINE 25 milliGRAM(s) Oral every 8 hours  insulin glargine Injectable (LANTUS) 10 Unit(s) SubCutaneous at bedtime  insulin lispro (ADMELOG) corrective regimen sliding scale   SubCutaneous three times a day before meals  insulin lispro (ADMELOG) corrective regimen sliding scale   SubCutaneous at bedtime  isosorbide   dinitrate Tablet (ISORDIL) 20 milliGRAM(s) Oral three times a day  sodium bicarbonate 1300 milliGRAM(s) Oral three times a day         LABS:  CBC Full  -  ( 29 Mar 2022 08:49 )  WBC Count : 6.37 K/uL  RBC Count : 3.29 M/uL  Hemoglobin : 9.0 g/dL  Hematocrit : 28.4 %  Platelet Count - Automated : 245 K/uL  Mean Cell Volume : 86.3 fl  Mean Cell Hemoglobin : 27.4 pg  Mean Cell Hemoglobin Concentration : 31.7 gm/dL  Auto Neutrophil # : 4.42 K/uL  Auto Lymphocyte # : 1.22 K/uL  Auto Monocyte # : 0.49 K/uL  Auto Eosinophil # : 0.18 K/uL  Auto Basophil # : 0.05 K/uL  Auto Neutrophil % : 69.3 %  Auto Lymphocyte % : 19.2 %  Auto Monocyte % : 7.7 %  Auto Eosinophil % : 2.8 %  Auto Basophil % : 0.8 %    Urinalysis Basic - ( 28 Mar 2022 17:22 )    Color: Yellow / Appearance: Clear / S.010 / pH: x  Gluc: x / Ketone: Negative  / Bili: Negative / Urobili: Negative mg/dL   Blood: x / Protein: 500 mg/dL / Nitrite: Negative   Leuk Esterase: Negative / RBC: 0-2 /HPF / WBC 0-2 /HPF   Sq Epi: x / Non Sq Epi: Few / Bacteria: x          145  |  113<H>  |  48.1<H>  ----------------------------<  73  4.4   |  19.0<L>  |  4.03<H>    Ca    8.3<L>      29 Mar 2022 08:49    TPro  6.7  /  Alb  3.5  /  TBili  0.4  /  DBili  x   /  AST  15  /  ALT  18  /  AlkPhos  122<H>      LIVER FUNCTIONS - ( 28 Mar 2022 13:54 )  Alb: 3.5 g/dL / Pro: 6.7 g/dL / ALK PHOS: 122 U/L / ALT: 18 U/L / AST: 15 U/L / GGT: x           Hemoglobin A1C:   Lipid Panel  @ 08:49  Total Cholesterol, Serum 109  LDL --  Triglycerides 79      PT/INR - ( 28 Mar 2022 13:54 )   PT: 16.0 sec;   INR: 1.37 ratio         PTT - ( 28 Mar 2022 13:54 )  PTT:39.0 sec      On Neurological Examination:    Mental Status - Patient is  awake, alert and oriented X3.  Speech is fluent. Patient can name, repeat and follow commands correctly  There is no dysarthria.    Cranial Nerves - PERRL, EOMI,  Visual fields are full to finger counting, no gross facial asymmetry, tongue/uvula midline    Motor Exam -   Right upper ---5/5 No drift  Left upper ---5/5 No drift  Right lower ---5/5 No drift  Left lower  ---5/5 No drift     nml bulk/tone    Sensory    Intact to light touch and pinprick bilaterally    Coord: FTN intact bilaterally     Gait -  - slightly wide based but can ambulate unassisted.       RADIOLOGY ( All neurological imaging studies were independently reviewed and interpreted by me)  [facility]   CTA  CTP  CT Brain Stroke Protocol (22 @ 13:49) >    IMPRESSION:  HEAD CT: Mild volume loss, moderate microvascular disease, no acute   hemorrhage or midline shift.  Chronic lacunar infarcts, unchanged.    Discussed with Dr. Keating in the ED at 1:53 PM. CTA and perfusion not   ordered due to elevated creatinine. MRI may provide helpful additional   evaluation, if clinically indicated.    MIGDALIA ODUGLAS MD; Attending Radiologist      MRI:    MR Head No Cont (03.15.22 @ 04:14) >  IMPRESSION:  FEW SMALL ACUTE INFARCTS LEFT PARIETAL GREATER THAN FRONTAL LOBES.      JULISA ALCALA MD; Attending Radiologist      MR Angio Head No Cont (22 @ 23:08) >  PRESSION:    1. Suboptimal but grossly diagnostic study, degraded by motion.  2. moderate narrowing of both carotid bifurcations and proximal internal   carotid arteries, which is consistent with ultrasound findings of   3/11/2022. No dissection or occlusion. Patent vertebrals.  3. Atherosclerotic changes noted intracranially, most prevalent in the   posterior cerebral arteries but also visible in the middle cerebral   arteries are proximally. No occlusion identified.    AMARJIT SAINI MD; Attending Radiologist    TTE  TTE Echo Complete w/o Contrast w/ Doppler (22 @ 19:06) >    Summary:   1. Technically difficult study.   2. Normal global left ventricular systolic function.   3. Left ventricular ejection fraction, by visual estimation, is 55 to   60%.   4. Mildly enlarged left atrium.   5. Normal right atrial size.   6.Mild mitral annular calcification.   7. Mild mitral valve regurgitation.   8. Thickening and calcification of the anterior and posterior mitral   valve leaflets.   9. Moderate tricuspid regurgitation.  10. Sclerotic aortic valve with normal opening.  11. Estimated pulmonary artery systolic pressure is 40.3 mmHg assuming a   right atrial pressure of 5 mmHg, which is consistent with mild pulmonary   hypertension.  12. Trivial pericardial effusion.    MD Junie Electronically signed on 1/15/2022 at 10:13:45 AM      MR Head No Cont (22 @ 20:02) >      IMPRESSION:  1.  Subacute infarcts in the left parietal and frontal lobes,   corresponding to acute infarcts seen on 3/15/2022.  2.  No new areas of acute infarct, intracranial hemorrhage, or mass   effect.    JODY BURTON MD; Resident Radiologist  This document has been electronically signed.  EVERARDO SANTIZO MD; Attending Radiologist

## 2022-03-29 NOTE — PATIENT PROFILE ADULT - ARE SIGNIFICANT INDICATORS COMPLETE.
Patient was informed of results. She will continue the tylenol as prescribed and call if any further pain.   Yes

## 2022-03-29 NOTE — PATIENT PROFILE ADULT - FALL HARM RISK - HARM RISK INTERVENTIONS

## 2022-03-29 NOTE — PHYSICAL THERAPY INITIAL EVALUATION ADULT - GENERAL OBSERVATIONS, REHAB EVAL
Pt received in CDU, pt ok for PT by INES Carpenter. co-treated with Hero OLMSTEAD. pt's predominant language is Vietnamese, treating PT spoke Vietnamese prn(when pt demonstrated increased difficulty with communicating complex phrases in english). pt observed semi-daugherty in bed with IV lock, pleasant, cooperative, A&O and c/o 0/10 pain t/o eval.

## 2022-03-29 NOTE — OCCUPATIONAL THERAPY INITIAL EVALUATION ADULT - PERTINENT HX OF CURRENT PROBLEM, REHAB EVAL
67yMale with functional deficits after having elevated BP with recent history of acute CVA, imaging negative for new infarcts

## 2022-03-29 NOTE — PHYSICAL THERAPY INITIAL EVALUATION ADULT - DISCHARGE DISPOSITION, PT EVAL
home with assist, RW and home PT pending progress, pending confirmation of family's ability/willingness to provide assistance for pt upon d/c home and pending stair assessment, as pt is an increased falls risk and has multiple stairs to negotiate at home, deeming pt unsafe to return home at this time. home with supervision prn for safety and RW

## 2022-03-29 NOTE — CONSULT NOTE ADULT - ASSESSMENT
68 yo male with PMH of CAD s/p CABG, PAD, DM2,  TIA, HLD, HTN, renal Ca s/p L nephrectomy 10/2020, CKD 4, ischemic CMP, Advanced CKD - CAD / CABG , ICM , TIA , prior CVA   DM Nephropathy / HTN   Renal sono 3/14 Echogenic kidneys no hydronephrosis  No nephrotoxic meds or IV contrast   Lasix on hold   Updated MRI noted   Awaits future AVF   Follows with DR Andersen     HTN - Better  with med resumption     MA - Oral bicarb     Anemia - Iron stores in am . If BP remains stable , will add Epogen       daughter Angelica phone ( [phone] ) --> giving periodic updates    
IMPRESSION:  - R/O posterior circulation stroke versus TIA  Risk Factors. HTN DM ? Afib    ASSESSMENT/ PLAN:     - Admit to inpatient hospitalist service.  - Neuro checks and vital signs Q 2 hours.  - BP Goal 120-160 mm Hg  - Continue Home dose Eliquis and ASA 81 mg PO  QD.  - Lipitor for LDL goal of < 70 .  - Telemetry monitoring.  - CT/CTA/CTP- not performed due to CKD  - MRI Brain stroke protocol.  - Check fasting Lipid panel and HbA1c  - TTE .  - Cardiology consultation.  - PT OT SLP evaluation.  - SCD/Eliquis for DVT prophylaxis.        
68 y/o M with PMH CAD s/p CABG 2014 (LIMA-LAD reverse SVG to posterior lateral reverse SVG to OM), HFpEF, HTN HLD, renal ca s/p L nephrectomy 10/2020, CKD stage 4, recently admitted to [facility] with near syncope; was found to have acute CVA on MRI. Discharged 1 week ago. Per cardiology office note from today, patient's PT called the office and reported that patient was off balance, and had BP's of 190/90 and 180/90. Patient was advised by NP in office to go to ED based on this report. Patient reports feeling "off," but is unable to describe further symptoms other than generalized fatigue. Denies CP, dyspnea, palpitations, dizziness, lightheadedness, syncope, or near syncope.

## 2022-03-29 NOTE — CONSULT NOTE ADULT - SUBJECTIVE AND OBJECTIVE BOX
Patient is a 67y old  Male who presents with a chief complaint of ataxia (29 Mar 2022 08:17)      HPI:  68 y/o male came in for reported ataxia per triage note but pt. reporting he has been ok, some dizziness ? As per pt. home visiting nurse found his bp to be high and send the pt. to the ER.  pt. was discharged from [facility] 1 week ago and was admitted for pre syncope and during his work up was found to have cva on mri brain. pt. was followed by neurology, cardiology ( was started on eliquis for cva and MCOT monitoring as an out-pt for afib  ) and by nephrology team for his ckd. pt. reports no focal weakness, no cp, no sob. no abd. pain, no n/v/d. mo HA reported. mo speech/ swallow difficulty. no hemoptysis, hematemesis no blood per rectum.  (28 Mar 2022 16:52)      Known to us , follows with Dr Andersen   Recent admission , almost initiated on HD   No hydro on recent renal sono   Lasix held prior   Awaits eventual AVF   Presented with elevated BP   Recently  released 3/21 1with MELISSA on CKD , almost started HD that admission   BP better in ER with resumption of meds     PAST MEDICAL & SURGICAL HISTORY:  DM (diabetes mellitus)    HTN (hypertension)    High cholesterol    Myocardial infarction  (Coronary angiogram )    Acute on chronic congestive heart failure, unspecified congestive heart failure type    Cerebrovascular accident (CVA)    S/P coronary artery bypass with three autogenous grafts        FAMILY HISTORY:  Family history of heart disease (Sibling)        Social History:    MEDICATIONS  (STANDING):  amLODIPine   Tablet 10 milliGRAM(s) Oral daily  apixaban 2.5 milliGRAM(s) Oral two times a day  aspirin enteric coated 81 milliGRAM(s) Oral daily  atorvastatin 80 milliGRAM(s) Oral at bedtime  carvedilol 25 milliGRAM(s) Oral every 12 hours  dextrose 5%. 1000 milliLiter(s) (100 mL/Hr) IV Continuous <Continuous>  dextrose 5%. 1000 milliLiter(s) (50 mL/Hr) IV Continuous <Continuous>  dextrose 50% Injectable 25 Gram(s) IV Push once  dextrose 50% Injectable 12.5 Gram(s) IV Push once  dextrose 50% Injectable 25 Gram(s) IV Push once  glucagon  Injectable 1 milliGRAM(s) IntraMuscular once  hydrALAZINE 25 milliGRAM(s) Oral every 8 hours  insulin glargine Injectable (LANTUS) 10 Unit(s) SubCutaneous at bedtime  insulin lispro (ADMELOG) corrective regimen sliding scale   SubCutaneous three times a day before meals  insulin lispro (ADMELOG) corrective regimen sliding scale   SubCutaneous at bedtime  isosorbide   dinitrate Tablet (ISORDIL) 20 milliGRAM(s) Oral three times a day  sodium bicarbonate 1300 milliGRAM(s) Oral three times a day    MEDICATIONS  (PRN):  dextrose Oral Gel 15 Gram(s) Oral once PRN Blood Glucose LESS THAN 70 milliGRAM(s)/deciliter      Allergies    No Known Allergies    Intolerances      Vital Signs Last 24 Hrs  T(C): 36.9 (29 Mar 2022 07:26), Max: 36.9 (29 Mar 2022 07:)  T(F): 98.4 (29 Mar 2022 07:), Max: 98.4 (29 Mar 2022 07:)  HR: 68 (29 Mar 2022 07:) (64 - 98)  BP: 146/67 (29 Mar 2022 07:26) (133/63 - 183/89)  BP(mean): --  RR: 18 (29 Mar 2022 07:) (15 - 18)  SpO2: 94% (29 Mar 2022 07:26) (92% - 99%)  Daily Height in cm: 165.1 (28 Mar 2022 13:22)    Daily   I&O's Detail    28 Mar 2022 07:01  -  29 Mar 2022 07:00  --------------------------------------------------------  IN:  Total IN: 0 mL    OUT:    Voided (mL): 400 mL  Total OUT: 400 mL    Total NET: -400 mL        I&O's Summary    28 Mar 2022 07:01  -  29 Mar 2022 07:00  --------------------------------------------------------  IN: 0 mL / OUT: 400 mL / NET: -400 mL        PHYSICAL EXAM:        HEAD:  no edema , anicteric   NECK: Supple, No JVD,   CHEST/LUNG: EAE , few basilar crackles   HEART: Regular rate and rhythm; No gallop or rub   ABDOMEN: Soft, Nontender,   EXTREMITIES: warm , no edema     LABS:                        9.0    6.37  )-----------( 245      ( 29 Mar 2022 08:49 )             28.4     03    145  |  113<H>  |  48.1<H>  ----------------------------<  73  4.4   |  19.0<L>  |  4.03<H>    Ca    8.3<L>      29 Mar 2022 08:49    TPro  6.7  /  Alb  3.5  /  TBili  0.4  /  DBili  x   /  AST  15  /  ALT  18  /  AlkPhos  122<H>      PT/INR - ( 28 Mar 2022 13:54 )   PT: 16.0 sec;   INR: 1.37 ratio         PTT - ( 28 Mar 2022 13:54 )  PTT:39.0 sec  Urinalysis Basic - ( 28 Mar 2022 17:22 )    Color: Yellow / Appearance: Clear / S.010 / pH: x  Gluc: x / Ketone: Negative  / Bili: Negative / Urobili: Negative mg/dL   Blood: x / Protein: 500 mg/dL / Nitrite: Negative   Leuk Esterase: Negative / RBC: 0-2 /HPF / WBC 0-2 /HPF   Sq Epi: x / Non Sq Epi: Few / Bacteria: x        < from: MR Head No Cont (22 @ 20:02) >    ACC: [id number] EXAM:  MR BRAIN                          PROCEDURE DATE:  2022          INTERPRETATION:  CLINICAL INFORMATION: Eval for acute Stroke. XMR.   Admitting Dxs: R27.0 ATAXIA, UNSPECIFIED. CVA one week ago, now presents   with ataxia and dizziness.    TECHNIQUE: Multiplanar, multisequence brain MRI was performed without   intravenous contrast    COMPARISON: CT head 3/28/2022, MRA brain and neck 3/16/2022. MRI brain   3/15/2022    FINDINGS:    Several foci of DWI hyperintensity within the left parietal greater than   frontal lobes are redemonstrated, corresponding to acute infarcts seen on   MRI from 3/15/2022. Some of these areas demonstrate corresponding ADC   isointensity, suggesting evolution to subacute infarcts. There areno new   areas of acute infarct. Symmetrically distributed foci of increased   susceptibility involving the basal ganglia, likely represents   mineralization. Chronic lacunar infarcts in the bilateral basal ganglia,   right thalamus and right kinga. There are no foci of susceptibility   artifact to suggestive of hemorrhage. Scattered foci of T2/FLAIR   hyperintensity in the bilateral hemispheric white matter are nonspecific   but likely related to chronic white matter microvascular ischemic   disease. The ventricles are normal in size for patient's age.    Flow voids of the major intracranial vessels at the skull base follow   expected course and contour.    Mild mucosal thickening of the bilateral maxillary sinuses. Small soft   tissue in theright mastoid. Status post bilateral intraocular lens   implants.      IMPRESSION:  1.  Subacute infarcts in the left parietal and frontal lobes,   corresponding to acute infarcts seen on 3/15/2022.  2.  No new areas of acute infarct, intracranial hemorrhage, or mass   effect.    --- End of Report ---          JODY BURTON MD; Resident Radiologist  This document has been electronically signed.  EVERARDO SANTIZO MD; Attending Radiologist  This document has been electronically signed. Mar 29 2022  9:07AM    < end of copied text >      < from: US Renal (22 @ 14:16) >    ACC: [id number] EXAM:  US KIDNEY(S)                          PROCEDURE DATE:  2022          INTERPRETATION:  CLINICAL INFORMATION: Acute on chronic kidney disease    COMPARISON: 2022    TECHNIQUE: Sonography of the kidneys and bladder.    FINDINGS: The kidneys are echogenic suggestive of medical renal disease.    Right kidney: 10.4 cm. No renal mass, hydronephrosis or calculi.    Left kidney: 9.8 cm. No renal mass, hydronephrosis or calculi.    Urinary bladder: Within normal limits.    IMPRESSION:  Echogenic kidneys suggestive of medical renal disease.  No hydronephrosis or urolithiasis    --- End of Report ---            FAYE BAIRD MD; Attending Radiologist  This document has been electronically signed. Mar 14 2022  3:25PM    < end of copied text >    RADIOLOGY & ADDITIONAL TESTS:  < from: Xray Chest 1 View- PORTABLE-Urgent (22 @ 13:53) >    ACC: [id number] EXAM:  XR CHEST PORTABLE URGENT 1V                          PROCEDURE DATE:  2022          INTERPRETATION:  AP chest on 2022 at 1:42 PM. This is a stroke   code.    Heart magnified by technique. Sternotomy again noted.    Lungs remain clear.    Chest is similar to March 10, 2022.    IMPRESSION: No acute finding or change.    --- End of Report ---            TO GOLDEN MD; Attending Radiologist  This document has been electronically signed. Mar 28 2022  2:05PM    < end of copied text >

## 2022-03-29 NOTE — CONSULT NOTE ADULT - SUBJECTIVE AND OBJECTIVE BOX
67yM was admitted on  with elevated BP, otherwise patient felt just fine. Documentation of history reports a different history than what the patient currently stating. Patient was in the week prior with confirmed stroke.     Imaging performed:  HEAD CT 3/28 - Mild volume loss, moderate microvascular disease, no acute hemorrhage or midline shift. Chronic lacunar infarcts, unchanged.    MRI 3/16 - 1. Suboptimal but grossly diagnostic study, degraded by motion. 2. moderate narrowing of both carotid bifurcations and proximal internal carotid arteries, which is consistent with ultrasound findings of 3/11/2022. No dissection or occlusion. Patent vertebrals. 3. Atherosclerotic changes noted intracranially, most prevalent in the posterior cerebral arteries but also visible in the middle cerebral arteries are proximally. No occlusion identified.    Patient reports he feels fine.   He has been walking around without difficulty.     REVIEW OF SYSTEMS  Constitutional - No fever, No weight loss, No fatigue  HEENT - No eye pain, No visual disturbances, No difficulty hearing, No tinnitus, No vertigo, No neck pain  Respiratory - No cough, No wheezing, No shortness of breath  Cardiovascular - No chest pain, No palpitations  Gastrointestinal - No abdominal pain, No nausea, No vomiting, No diarrhea, No constipation  Genitourinary - No dysuria, No frequency, No hematuria, No incontinence  Neurological - No headaches, No memory loss, No loss of strength, No numbness, No tremors  Skin - No itching, No rashes, No lesions   Endocrine - No temperature intolerance  Musculoskeletal - No joint pain, No joint swelling, No muscle pain  Psychiatric - No depression, No anxiety    VITALS  T(C): 36.9 (22 @ 07:26), Max: 36.9 (22 @ 07:26)  HR: 68 (22 @ 07:26) (64 - 98)  BP: 146/67 (22 @ 07:26) (133/63 - 183/89)  RR: 18 (22 @ 07:26) (15 - 18)  SpO2: 94% (22 @ 07:26) (92% - 99%)  Wt(kg): --    PAST MEDICAL & SURGICAL HISTORY  DM (diabetes mellitus)    HTN (hypertension)    High cholesterol    Myocardial infarction    Acute on chronic congestive heart failure, unspecified congestive heart failure type    Cerebrovascular accident (CVA)    No significant past surgical history    S/P coronary artery bypass with three autogenous grafts        FUNCTIONAL HISTORY  Lives with family, 3 [place]  Independent    CURRENT FUNCTIONAL STATUS  Independent with mobility in stretcher    SOCIAL HISTORY - as per documentation/history  Smoking - None  EtOH - None  Drugs - None    FAMILY HISTORY   No pertinent family history in first degree relatives    Family history of heart disease (Sibling)        RECENT LABS - Reviewed  CBC Full  -  ( 28 Mar 2022 13:54 )  WBC Count : 7.25 K/uL  RBC Count : 3.81 M/uL  Hemoglobin : 10.4 g/dL  Hematocrit : 32.7 %  Platelet Count - Automated : 268 K/uL  Mean Cell Volume : 85.8 fl  Mean Cell Hemoglobin : 27.3 pg  Mean Cell Hemoglobin Concentration : 31.8 gm/dL  Auto Neutrophil # : 5.11 K/uL  Auto Lymphocyte # : 1.35 K/uL  Auto Monocyte # : 0.54 K/uL  Auto Eosinophil # : 0.17 K/uL  Auto Basophil # : 0.05 K/uL  Auto Neutrophil % : 70.6 %  Auto Lymphocyte % : 18.6 %  Auto Monocyte % : 7.4 %  Auto Eosinophil % : 2.3 %  Auto Basophil % : 0.7 %        140  |  108<H>  |  49.7<H>  ----------------------------<  111<H>  4.8   |  19.0<L>  |  4.17<H>    Ca    8.8      28 Mar 2022 13:54    TPro  6.7  /  Alb  3.5  /  TBili  0.4  /  DBili  x   /  AST  15  /  ALT  18  /  AlkPhos  122<H>      Urinalysis Basic - ( 28 Mar 2022 17:22 )    Color: Yellow / Appearance: Clear / S.010 / pH: x  Gluc: x / Ketone: Negative  / Bili: Negative / Urobili: Negative mg/dL   Blood: x / Protein: 500 mg/dL / Nitrite: Negative   Leuk Esterase: Negative / RBC: 0-2 /HPF / WBC 0-2 /HPF   Sq Epi: x / Non Sq Epi: Few / Bacteria: x        ALLERGIES  No Known Allergies      MEDICATIONS   amLODIPine   Tablet 10 milliGRAM(s) Oral daily  apixaban 2.5 milliGRAM(s) Oral two times a day  aspirin enteric coated 81 milliGRAM(s) Oral daily  atorvastatin 80 milliGRAM(s) Oral at bedtime  carvedilol 25 milliGRAM(s) Oral every 12 hours  dextrose 5%. 1000 milliLiter(s) IV Continuous <Continuous>  dextrose 5%. 1000 milliLiter(s) IV Continuous <Continuous>  dextrose 50% Injectable 25 Gram(s) IV Push once  dextrose 50% Injectable 12.5 Gram(s) IV Push once  dextrose 50% Injectable 25 Gram(s) IV Push once  dextrose Oral Gel 15 Gram(s) Oral once PRN  glucagon  Injectable 1 milliGRAM(s) IntraMuscular once  hydrALAZINE 25 milliGRAM(s) Oral every 8 hours  insulin glargine Injectable (LANTUS) 10 Unit(s) SubCutaneous at bedtime  insulin lispro (ADMELOG) corrective regimen sliding scale   SubCutaneous three times a day before meals  insulin lispro (ADMELOG) corrective regimen sliding scale   SubCutaneous at bedtime  isosorbide   dinitrate Tablet (ISORDIL) 20 milliGRAM(s) Oral three times a day  sodium bicarbonate 1300 milliGRAM(s) Oral three times a day      ----------------------------------------------------------------------------------------  PHYSICAL EXAM  Constitutional - NAD, Comfortable  HEENT - NCAT, EOMI  Neck - Supple, No limited ROM  Chest - Breathing comfortably, No wheezing  Cardiovascular - S1S2   Abdomen - Soft   Extremities - No C/C/E, No calf tenderness   Neurologic Exam -                    Cognitive - AAO to self, place, date, year, situation     Communication - Fluent, +dysarthria     Cranial Nerves - Right lip droop      Motor - No focal deficits                    LEFT    UE - ShAB 5/5, EF 5/5, EE 5/5, WE 5/5,  5/5                    RIGHT UE - ShAB 5/5, EF 5/5, EE 5/5, WE 5/5,  5/5                    LEFT    LE - HF 5/5, KE 5/5, DF 5/5, PF 5/5                    RIGHT LE - HF 5/5, KE 5/5, DF 5/5, PF 5/5        Sensory - Intact to LT  Psychiatric - Mood stable, Affect WNL  ----------------------------------------------------------------------------------------  ASSESSMENT/PLAN  67yMale with functional deficits after having elevated BP with recent history of acute CVA  Acute CVA - Eliquis, ASA, Lipitor   HTN - Norvasc, Coreg, Hydralazine, Isosorbide   DM2 - Lantus   Pain - Tylenol  DVT PPX - SCDs  Rehab - Patient medically being optimized. Expect therapy evals to be unchanged given unchanged functional or motor neuro exam. Expect patient to achieve functional goals for DC HOME.    Recommend ongoing mobilization by staff to maintain cardiopulmonary function and prevention of secondary complications related to debility. Discussed with rehab team.

## 2022-03-29 NOTE — PROGRESS NOTE ADULT - PROBLEM SELECTOR PLAN 7
- DVT ppx- Eliquis  - Diet- DASH diet  - Dispo- pending clinical improvement. F/u PT/OT. At baseline, patient ambulates with a walker.   - PMR recs- home    - Updated patient's wife, Ashli ([phone]) on 3/29

## 2022-03-29 NOTE — PHYSICAL THERAPY INITIAL EVALUATION ADULT - ASSISTIVE DEVICE FOR TRANSFER: SIT/STAND, REHAB EVAL
Call patient (and send a copy of labs):  CT scan showed nothing bad - just the bleeding around the spleen that we had known about.  Not necessarily bigger.  Not really sure what to do with this (rain since Dori had started the workup.)  Might be best to have him meet with a surgeon for advice - not that he needs surgery, but they are the experts of abdominal stuff like this.  I'd like to set him up with Dr Solis at [place].  I'll order the consult ASAP - help him set this up.  TAYA Garsia M.D.     TAYA Garsia M.D.
rolling walker

## 2022-03-29 NOTE — PROGRESS NOTE ADULT - PROBLEM SELECTOR PLAN 1
- R/o posterior circulation stroke vs TIA  - Appreciate neuro checks- neuro checks Q2H  - MRI-H- subacute infarcts in the left parietal and frontal lobes, corresponding to acute infarcts seen on 3/15/2022.  No new areas of acute infarct, intracranial hemorrhage, or mass   effect.  - US carotid (3/11)- LT ICA stenosis 50-69%  - c/w ASA and Eliquis given recent CVA. Discussed with cardiology, will c/w eliquis and have patient follow up outpatient for MCOt monitoring to evaluate for pA.fib  - Vascular c/s placed

## 2022-03-29 NOTE — PHYSICAL THERAPY INITIAL EVALUATION ADULT - PERTINENT HX OF CURRENT PROBLEM, REHAB EVAL
pt was brought into hospital 2/2 unsteady gait. pt was recently d/c'ed from [facility] for CVA. scans did not reveal any new infarcts since prior exam at last admission.

## 2022-03-29 NOTE — PROGRESS NOTE ADULT - SUBJECTIVE AND OBJECTIVE BOX
[facility] Division of Hospital Medicine    Chief Complaint: Ataxia     SUBJECTIVE / OVERNIGHT EVENTS: [place] PI# [id number]. HD stable. Denies n/v/f/c/h/d.     Patient denies chest pain, SOB, abd pain, N/V, fever, chills, dysuria or any other complaints. All remainder ROS negative.     MEDICATIONS  (STANDING):  amLODIPine   Tablet 10 milliGRAM(s) Oral daily  apixaban 2.5 milliGRAM(s) Oral two times a day  aspirin enteric coated 81 milliGRAM(s) Oral daily  atorvastatin 80 milliGRAM(s) Oral at bedtime  carvedilol 25 milliGRAM(s) Oral every 12 hours  dextrose 5%. 1000 milliLiter(s) (100 mL/Hr) IV Continuous <Continuous>  dextrose 5%. 1000 milliLiter(s) (50 mL/Hr) IV Continuous <Continuous>  dextrose 50% Injectable 25 Gram(s) IV Push once  dextrose 50% Injectable 12.5 Gram(s) IV Push once  dextrose 50% Injectable 25 Gram(s) IV Push once  glucagon  Injectable 1 milliGRAM(s) IntraMuscular once  hydrALAZINE 25 milliGRAM(s) Oral every 8 hours  insulin glargine Injectable (LANTUS) 10 Unit(s) SubCutaneous at bedtime  insulin lispro (ADMELOG) corrective regimen sliding scale   SubCutaneous three times a day before meals  insulin lispro (ADMELOG) corrective regimen sliding scale   SubCutaneous at bedtime  isosorbide   dinitrate Tablet (ISORDIL) 20 milliGRAM(s) Oral three times a day  sodium bicarbonate 1300 milliGRAM(s) Oral three times a day    MEDICATIONS  (PRN):  dextrose Oral Gel 15 Gram(s) Oral once PRN Blood Glucose LESS THAN 70 milliGRAM(s)/deciliter        I&O's Summary    28 Mar 2022 07:01  -  29 Mar 2022 07:00  --------------------------------------------------------  IN: 0 mL / OUT: 400 mL / NET: -400 mL        PHYSICAL EXAM:  Vital Signs Last 24 Hrs  T(C): 36.4 (29 Mar 2022 12:10), Max: 36.9 (29 Mar 2022 07:26)  T(F): 97.6 (29 Mar 2022 12:10), Max: 98.4 (29 Mar 2022 07:26)  HR: 70 (29 Mar 2022 12:10) (64 - 98)  BP: 135/68 (29 Mar 2022 12:10) (133/63 - 183/89)  BP(mean): --  RR: 18 (29 Mar 2022 12:10) (15 - 18)  SpO2: 95% (29 Mar 2022 12:10) (92% - 99%)    General: no acute distress, resting comformtably  Chest: CTA bilaterally, no w /r/r.   Heart: S1,S2. RRR. no heart murmur. no edema.   Abdomen: soft. non-tender. non-distended. + BS.   Ext: no calf tenderness. distal pulses 2 +.   Neuro: AAO x3. no focal weakness. no speech disorder, cns ii to xii intact. heel to shin appears intact B/l. sensory intact b/L, motor 5/5 b/ L.     LABS:                        9.0    6.37  )-----------( 245      ( 29 Mar 2022 08:49 )             28.4     03-29    145  |  113<H>  |  48.1<H>  ----------------------------<  73  4.4   |  19.0<L>  |  4.03<H>    Ca    8.3<L>      29 Mar 2022 08:49    TPro  6.7  /  Alb  3.5  /  TBili  0.4  /  DBili  x   /  AST  15  /  ALT  18  /  AlkPhos  122<H>  03-28    PT/INR - ( 28 Mar 2022 13:54 )   PT: 16.0 sec;   INR: 1.37 ratio         PTT - ( 28 Mar 2022 13:54 )  PTT:39.0 sec  CARDIAC MARKERS ( 29 Mar 2022 08:49 )  x     / 0.07 ng/mL / x     / x     / x      CARDIAC MARKERS ( 28 Mar 2022 22:10 )  x     / 0.07 ng/mL / x     / x     / x      CARDIAC MARKERS ( 28 Mar 2022 13:54 )  x     / 0.07 ng/mL / x     / x     / x          Urinalysis Basic - ( 28 Mar 2022 17:22 )    Color: Yellow / Appearance: Clear / S.010 / pH: x  Gluc: x / Ketone: Negative  / Bili: Negative / Urobili: Negative mg/dL   Blood: x / Protein: 500 mg/dL / Nitrite: Negative   Leuk Esterase: Negative / RBC: 0-2 /HPF / WBC 0-2 /HPF   Sq Epi: x / Non Sq Epi: Few / Bacteria: x        CAPILLARY BLOOD GLUCOSE      POCT Blood Glucose.: 101 mg/dL (29 Mar 2022 11:36)  POCT Blood Glucose.: 72 mg/dL (29 Mar 2022 08:07)  POCT Blood Glucose.: 139 mg/dL (28 Mar 2022 22:48)  POCT Blood Glucose.: 144 mg/dL (28 Mar 2022 18:44)  POCT Blood Glucose.: 108 mg/dL (28 Mar 2022 13:26)        RADIOLOGY & ADDITIONAL TESTS:  Results Reviewed:   Imaging Personally Reviewed:  < from: MR Head No Cont (22 @ 20:02) >  IMPRESSION:  1.  Subacute infarcts in the left parietal and frontal lobes,   corresponding to acute infarcts seen on 3/15/2022.  2.  No new areas of acute infarct, intracranial hemorrhage, or mass   effect.    --- End of Report ---          JODY BURTON MD; Resident Radiologist  This document has been electronically signed.  EVERARDO SANTIZO MD; Attending Radiologist  This document has been electronically signed. Mar 29 2022  9:07AM    < end of copied text >    Electrocardiogram Personally Reviewed:

## 2022-03-29 NOTE — CONSULT NOTE ADULT - SUBJECTIVE AND OBJECTIVE BOX
Vascular Attending:  Mary ISLAS    Vascular Surgery HPI:  Admission HPI reviewed below  full consult note to follow      HPI:  68 y/o male came in for reported ataxia per triage note but pt. reporting he has been ok, some dizziness ? As per pt. home visiting nurse found his bp to be high and send the pt. to the ER.  pt. was discharged from [facility] 1 week ago and was admitted for pre syncope and during his work up was found to have cva on mri brain. pt. was followed by neurology, cardiology ( was started on eliquis for cva and MCOT monitoring as an out-pt for afib  ) and by nephrology team for his ckd. pt. reports no focal weakness, no cp, no sob. no abd. pain, no n/v/d. mo HA reported. mo speech/ swallow difficulty. no hemoptysis, hematemesis no blood per rectum.  (28 Mar 2022 16:52)      PAST MEDICAL & SURGICAL HISTORY:  DM (diabetes mellitus)    HTN (hypertension)    High cholesterol    Myocardial infarction  (Coronary angiogram )    Acute on chronic congestive heart failure, unspecified congestive heart failure type    Cerebrovascular accident (CVA)    S/P coronary artery bypass with three autogenous grafts        REVIEW OF SYSTEMS      General:	    Skin/Breast:  	  Ophthalmologic:  	  ENMT:	    Respiratory and Thorax:  	  Cardiovascular:	    Gastrointestinal:	    Genitourinary:	    Musculoskeletal:	    Neurological:	    Psychiatric:	    Hematology/Lymphatics:	    Endocrine:	    Allergic/Immunologic:	    MEDICATIONS  (STANDING):  amLODIPine   Tablet 10 milliGRAM(s) Oral daily  apixaban 2.5 milliGRAM(s) Oral two times a day  aspirin enteric coated 81 milliGRAM(s) Oral daily  atorvastatin 80 milliGRAM(s) Oral at bedtime  carvedilol 25 milliGRAM(s) Oral every 12 hours  dextrose 5%. 1000 milliLiter(s) (100 mL/Hr) IV Continuous <Continuous>  dextrose 5%. 1000 milliLiter(s) (50 mL/Hr) IV Continuous <Continuous>  dextrose 50% Injectable 25 Gram(s) IV Push once  dextrose 50% Injectable 12.5 Gram(s) IV Push once  dextrose 50% Injectable 25 Gram(s) IV Push once  glucagon  Injectable 1 milliGRAM(s) IntraMuscular once  hydrALAZINE 25 milliGRAM(s) Oral every 8 hours  insulin glargine Injectable (LANTUS) 10 Unit(s) SubCutaneous at bedtime  insulin lispro (ADMELOG) corrective regimen sliding scale   SubCutaneous three times a day before meals  insulin lispro (ADMELOG) corrective regimen sliding scale   SubCutaneous at bedtime  isosorbide   dinitrate Tablet (ISORDIL) 20 milliGRAM(s) Oral three times a day  sodium bicarbonate 1300 milliGRAM(s) Oral three times a day    MEDICATIONS  (PRN):  dextrose Oral Gel 15 Gram(s) Oral once PRN Blood Glucose LESS THAN 70 milliGRAM(s)/deciliter      Allergies    No Known Allergies    Intolerances        SOCIAL HISTORY:      Vital Signs Last 24 Hrs  T(C): 36.4 (29 Mar 2022 12:10), Max: 36.9 (29 Mar 2022 07:26)  T(F): 97.6 (29 Mar 2022 12:10), Max: 98.4 (29 Mar 2022 07:26)  HR: 70 (29 Mar 2022 12:10) (64 - 98)  BP: 135/68 (29 Mar 2022 12:10) (133/63 - 183/89)  BP(mean): --  RR: 18 (29 Mar 2022 12:10) (15 - 18)  SpO2: 95% (29 Mar 2022 12:10) (92% - 99%)    PHYSICAL EXAM:      Constitutional:    Eyes:    ENMT:    Neck:    Breasts:    Back:    Respiratory:    Cardiovascular:    Gastrointestinal:    Genitourinary:    Rectal:    Extremities:    Vascular:    Neurological:    Skin:    Lymph Nodes:    Musculoskeletal:    Psychiatric:      Pulses:   Right:                                                                          Left:  FEM [ ]2+ [ ]1+ [ ]doppler                                             FEM [ ]2+ [ ]1+ [ ]doppler    POP [ ]2+ [ ]1+ [ ]doppler                                             POP [ ]2+ [ ]1+ [ ]doppler    DP [ ]2+ [ ]1+ [ ]doppler                                                DP [ ]2+ [ ]1+ [ ]doppler  PT[ ]2+ [ ]1+ [ ]doppler                                                  PT [ ]2+ [ ]1+ [ ]doppler      LABS:                        9.0    6.37  )-----------( 245      ( 29 Mar 2022 08:49 )             28.4     03-    145  |  113<H>  |  48.1<H>  ----------------------------<  73  4.4   |  19.0<L>  |  4.03<H>    Ca    8.3<L>      29 Mar 2022 08:49    TPro  6.7  /  Alb  3.5  /  TBili  0.4  /  DBili  x   /  AST  15  /  ALT  18  /  AlkPhos  122<H>  03-28    PT/INR - ( 28 Mar 2022 13:54 )   PT: 16.0 sec;   INR: 1.37 ratio         PTT - ( 28 Mar 2022 13:54 )  PTT:39.0 sec  Urinalysis Basic - ( 28 Mar 2022 17:22 )    Color: Yellow / Appearance: Clear / S.010 / pH: x  Gluc: x / Ketone: Negative  / Bili: Negative / Urobili: Negative mg/dL   Blood: x / Protein: 500 mg/dL / Nitrite: Negative   Leuk Esterase: Negative / RBC: 0-2 /HPF / WBC 0-2 /HPF   Sq Epi: x / Non Sq Epi: Few / Bacteria: x        RADIOLOGY & ADDITIONAL STUDIES    Impression and Plan: Vascular Attending:  Mary ISLAS    Vascular Surgery HPI:  Admission HPI reviewed below  patient with known moderate ICA stenosis  History of Ataxic gait, returns with a reported new episode of ataxia and High Blood pressure.   CT head performed revealing no acute infarcts       HPI:  68 y/o male came in for reported ataxia per triage note but pt. reporting he has been ok, some dizziness ? As per pt. home visiting nurse found his bp to be high and send the pt. to the ER.  pt. was discharged from [facility] 1 week ago and was admitted for pre syncope and during his work up was found to have cva on mri brain. pt. was followed by neurology, cardiology ( was started on eliquis for cva and MCOT monitoring as an out-pt for afib  ) and by nephrology team for his ckd. pt. reports no focal weakness, no cp, no sob. no abd. pain, no n/v/d. mo HA reported. mo speech/ swallow difficulty. no hemoptysis, hematemesis no blood per rectum.  (28 Mar 2022 16:52)      PAST MEDICAL & SURGICAL HISTORY:  DM (diabetes mellitus)    HTN (hypertension)    High cholesterol    Myocardial infarction  (Coronary angiogram )    Acute on chronic congestive heart failure, unspecified congestive heart failure type    Cerebrovascular accident (CVA)    S/P coronary artery bypass with three autogenous grafts        REVIEW OF SYSTEMS:  see HPI       General:	    Skin/Breast:  	  Ophthalmologic:  	  ENMT:	    Respiratory and Thorax:  	  Cardiovascular:	    Gastrointestinal:	    Genitourinary:	    Musculoskeletal:	    Neurological:	    Psychiatric:	    Hematology/Lymphatics:	    Endocrine:	    Allergic/Immunologic:	    MEDICATIONS  (STANDING):  amLODIPine   Tablet 10 milliGRAM(s) Oral daily  apixaban 2.5 milliGRAM(s) Oral two times a day  aspirin enteric coated 81 milliGRAM(s) Oral daily  atorvastatin 80 milliGRAM(s) Oral at bedtime  carvedilol 25 milliGRAM(s) Oral every 12 hours  dextrose 5%. 1000 milliLiter(s) (100 mL/Hr) IV Continuous <Continuous>  dextrose 5%. 1000 milliLiter(s) (50 mL/Hr) IV Continuous <Continuous>  dextrose 50% Injectable 25 Gram(s) IV Push once  dextrose 50% Injectable 12.5 Gram(s) IV Push once  dextrose 50% Injectable 25 Gram(s) IV Push once  glucagon  Injectable 1 milliGRAM(s) IntraMuscular once  hydrALAZINE 25 milliGRAM(s) Oral every 8 hours  insulin glargine Injectable (LANTUS) 10 Unit(s) SubCutaneous at bedtime  insulin lispro (ADMELOG) corrective regimen sliding scale   SubCutaneous three times a day before meals  insulin lispro (ADMELOG) corrective regimen sliding scale   SubCutaneous at bedtime  isosorbide   dinitrate Tablet (ISORDIL) 20 milliGRAM(s) Oral three times a day  sodium bicarbonate 1300 milliGRAM(s) Oral three times a day    MEDICATIONS  (PRN):  dextrose Oral Gel 15 Gram(s) Oral once PRN Blood Glucose LESS THAN 70 milliGRAM(s)/deciliter      Allergies    No Known Allergies    Intolerances        SOCIAL HISTORY: non contributory       Vital Signs Last 24 Hrs  T(C): 36.4 (29 Mar 2022 12:10), Max: 36.9 (29 Mar 2022 07:26)  T(F): 97.6 (29 Mar 2022 12:10), Max: 98.4 (29 Mar 2022 07:26)  HR: 70 (29 Mar 2022 12:10) (64 - 98)  BP: 135/68 (29 Mar 2022 12:10) (133/63 - 183/89)  BP(mean): --  RR: 18 (29 Mar 2022 12:10) (15 - 18)  SpO2: 95% (29 Mar 2022 12:10) (92% - 99%)    PHYSICAL EXAM:      Constitutional: alert and oriented, no acute distress     Eyes: no scleral icterus     ENMT: atraumatic     Neck: supple         Respiratory: non labored breathing     Cardiovascular: s1, s2     Gastrointestinal: grossly obese, non tender     Genitourinary: no foreman     Rectal: not examined     Extremities: warm, no noted ulcers     Vascular: palpable radials and pedals     Neurological: no noted gross motor or sensory deficits     Skin: warm       Psychiatric: good affect       Pulses:   Right:                                                                          Left:  FEM [ ]2+ [ ]1+ [ ]doppler                                             FEM [ ]2+ [ ]1+ [ ]doppler    POP [ ]2+ [ ]1+ [ ]doppler                                             POP [ ]2+ [ ]1+ [ ]doppler    DP [x ]2+ [ ]1+ [ ]doppler                                                DP [x ]2+ [ ]1+ [ ]doppler  PT[ x]2+ [ ]1+ [ ]doppler                                                  PT [ x]2+ [ ]1+ [ ]doppler      LABS:                        9.0    6.37  )-----------( 245      ( 29 Mar 2022 08:49 )             28.4     03    145  |  113<H>  |  48.1<H>  ----------------------------<  73  4.4   |  19.0<L>  |  4.03<H>    Ca    8.3<L>      29 Mar 2022 08:49    TPro  6.7  /  Alb  3.5  /  TBili  0.4  /  DBili  x   /  AST  15  /  ALT  18  /  AlkPhos  122<H>      PT/INR - ( 28 Mar 2022 13:54 )   PT: 16.0 sec;   INR: 1.37 ratio         PTT - ( 28 Mar 2022 13:54 )  PTT:39.0 sec  Urinalysis Basic - ( 28 Mar 2022 17:22 )    Color: Yellow / Appearance: Clear / S.010 / pH: x  Gluc: x / Ketone: Negative  / Bili: Negative / Urobili: Negative mg/dL   Blood: x / Protein: 500 mg/dL / Nitrite: Negative   Leuk Esterase: Negative / RBC: 0-2 /HPF / WBC 0-2 /HPF   Sq Epi: x / Non Sq Epi: Few / Bacteria: x        RADIOLOGY & ADDITIONAL STUDIES    < from: CT Brain Stroke Protocol (22 @ 13:49) >  ACC: [id number] EXAM:  CT BRAIN STROKE PROTOCOL                          PROCEDURE DATE:  2022          INTERPRETATION:  CT HEAD STROKE PROTOCOL  HEAD CT    INDICATIONS: Stroke Code. pmh of CVA no residual deficit, ACS, CHF, HTN,   HLD presentswith dizziness. Pt states he woke up this morning at 9am and   felt dizzy, room spinning like when moving his head or getting up from   sitting. Pt had visiting nursing service come and was told he was   hypertensive and brought to ED. Pt states lasttime he felt well was last   night prior to bed around 10pm.  XCT  No additional history was provided.    TECHNIQUE:  HEAD CT:  Serial axial images were obtained from the skull base to the vertex   without the use of intravenous contrast. RAPID artificial intelligence   was used for preliminary evaluation of intracranial hemorrhage.    COMPARISON EXAMINATION: Head CT 3/10/2022    FINDINGS:  HEAD CT:  VENTRICLES AND SULCI: Ventricles and sulci are unremarkable for patient   age.  INTRA-AXIAL: No intracranial mass, acute hemorrhage, or midline shift is   present.There is non-specific decreased attenuation in the white matter   likely microvascular disease. Old bilateral lacunar infarcts in the basal   ganglia and in the right thalamus.  EXTRA-AXIAL: No extra-axial fluid collection is present.  INTRACRANIAL HEMORRHAGE: None.    VISUALIZED SINUSES: No air-fluid levels are identified.  VISUALIZED MASTOIDS:  Clear.  CALVARIUM:  Intact.  MISCELLANEOUS:  Bilateral cataract surgery.    SOFT TISSUES: Unremarkable.  BONES: Unremarkable.      IMPRESSION:  HEAD CT: Mild volume loss, moderate microvascular disease, no acute   hemorrhage or midline shift.  Chronic lacunar infarcts, unchanged.    Discussed with Dr. Keating in the ED at 1:53 PM. CTA and perfusion not   ordered due to elevated creatinine. MRI may provide helpful additional   evaluation, if clinically indicated.    --- End of Report ---            MIGDALIA DOUGLAS MD; Attending Radiologist  This document has been electronically signed. Mar 28 2022  1:55PM        < from: US Duplex Carotid Arteries Complete, Bilateral (22 @ 11:52) >  ACC: [id number] EXAM:  US DPLX CAROTIDS COMPL BI                          PROCEDURE DATE:  2022          INTERPRETATION:  CLINICAL INFORMATION: Syncope    COMPARISON: None available.    TECHNIQUE: Grayscale, color and spectral Doppler examination of both   carotid arteries was performed.    FINDINGS:    Elevated velocities are encountered in the left internal carotid artery.    Peak systolic velocities are as follows:    RIGHT:  PROX CCA = 81 cm/s  DIST CCA = 108 cm/s  PROX ICA = 109 cm/s  DIST ICA = 79 cm/s  ECA = 162 cm/s    LEFT:  PROX CCA = 94 cm/s  DIST CCA = 87 cm/s  PROX ICA = 135 cm/s  MID ICA = 148 cm/s  DIST ICA = 35 cm/s  ECA = 127 cm/s    Antegrade flow is noted within both vertebral arteries.    IMPRESSION: 50-69% left internal carotid artery stenosis.    Measurement of carotid stenosis is based on velocity parameters that   correlate the residual internal carotid diameter with that of the more   distal vessel in accordance with a method such as the North American   Symptomatic Carotid Endarterectomy Trial (NASCET).    --- End of Report ---            HANSEL MARTIN MD; Attending Radiologist  This document has been electronically signed. Mar 11 2022 12:00PM    < end of copied text >    Impression and Plan:    History of ataxia  Moderate left ICA stenosis  negative CVA workup    - No indication for acute vascular surgery intervention  - cont antiplatelets and antistatin  - will forward above to the covering attending  Vascular Attending:  Mary ISLAS    Vascular Surgery HPI:  Admission HPI reviewed below  patient with known moderate ICA stenosis  History of Ataxic gait, returns with a reported new episode of ataxia and High Blood pressure.   CT head performed revealing no acute infarcts       HPI:  68 y/o male came in for reported ataxia per triage note but pt. reporting he has been ok, some dizziness ? As per pt. home visiting nurse found his bp to be high and send the pt. to the ER.  pt. was discharged from [facility] 1 week ago and was admitted for pre syncope and during his work up was found to have cva on mri brain. pt. was followed by neurology, cardiology ( was started on eliquis for cva and MCOT monitoring as an out-pt for afib  ) and by nephrology team for his ckd. pt. reports no focal weakness, no cp, no sob. no abd. pain, no n/v/d. mo HA reported. mo speech/ swallow difficulty. no hemoptysis, hematemesis no blood per rectum.  (28 Mar 2022 16:52)      PAST MEDICAL & SURGICAL HISTORY:  DM (diabetes mellitus)    HTN (hypertension)    High cholesterol    Myocardial infarction  (Coronary angiogram )    Acute on chronic congestive heart failure, unspecified congestive heart failure type    Cerebrovascular accident (CVA)    S/P coronary artery bypass with three autogenous grafts        REVIEW OF SYSTEMS:  see HPI       General:	    Skin/Breast:  	  Ophthalmologic:  	  ENMT:	    Respiratory and Thorax:  	  Cardiovascular:	    Gastrointestinal:	    Genitourinary:	    Musculoskeletal:	    Neurological:	    Psychiatric:	    Hematology/Lymphatics:	    Endocrine:	    Allergic/Immunologic:	    MEDICATIONS  (STANDING):  amLODIPine   Tablet 10 milliGRAM(s) Oral daily  apixaban 2.5 milliGRAM(s) Oral two times a day  aspirin enteric coated 81 milliGRAM(s) Oral daily  atorvastatin 80 milliGRAM(s) Oral at bedtime  carvedilol 25 milliGRAM(s) Oral every 12 hours  dextrose 5%. 1000 milliLiter(s) (100 mL/Hr) IV Continuous <Continuous>  dextrose 5%. 1000 milliLiter(s) (50 mL/Hr) IV Continuous <Continuous>  dextrose 50% Injectable 25 Gram(s) IV Push once  dextrose 50% Injectable 12.5 Gram(s) IV Push once  dextrose 50% Injectable 25 Gram(s) IV Push once  glucagon  Injectable 1 milliGRAM(s) IntraMuscular once  hydrALAZINE 25 milliGRAM(s) Oral every 8 hours  insulin glargine Injectable (LANTUS) 10 Unit(s) SubCutaneous at bedtime  insulin lispro (ADMELOG) corrective regimen sliding scale   SubCutaneous three times a day before meals  insulin lispro (ADMELOG) corrective regimen sliding scale   SubCutaneous at bedtime  isosorbide   dinitrate Tablet (ISORDIL) 20 milliGRAM(s) Oral three times a day  sodium bicarbonate 1300 milliGRAM(s) Oral three times a day    MEDICATIONS  (PRN):  dextrose Oral Gel 15 Gram(s) Oral once PRN Blood Glucose LESS THAN 70 milliGRAM(s)/deciliter      Allergies    No Known Allergies    Intolerances        SOCIAL HISTORY: non contributory       Vital Signs Last 24 Hrs  T(C): 36.4 (29 Mar 2022 12:10), Max: 36.9 (29 Mar 2022 07:26)  T(F): 97.6 (29 Mar 2022 12:10), Max: 98.4 (29 Mar 2022 07:26)  HR: 70 (29 Mar 2022 12:10) (64 - 98)  BP: 135/68 (29 Mar 2022 12:10) (133/63 - 183/89)  BP(mean): --  RR: 18 (29 Mar 2022 12:10) (15 - 18)  SpO2: 95% (29 Mar 2022 12:10) (92% - 99%)    PHYSICAL EXAM:      Constitutional: alert and oriented, no acute distress     Eyes: no scleral icterus     ENMT: atraumatic     Neck: supple         Respiratory: non labored breathing     Cardiovascular: s1, s2     Gastrointestinal: grossly obese, non tender     Genitourinary: no foreman     Rectal: not examined     Extremities: warm, no noted ulcers     Vascular: palpable radials and pedals     Neurological: no noted gross motor or sensory deficits     Skin: warm       Psychiatric: good affect       Pulses:   Right:                                                                          Left:  FEM [ ]2+ [ ]1+ [ ]doppler                                             FEM [ ]2+ [ ]1+ [ ]doppler    POP [ ]2+ [ ]1+ [ ]doppler                                             POP [ ]2+ [ ]1+ [ ]doppler    DP [x ]2+ [ ]1+ [ ]doppler                                                DP [x ]2+ [ ]1+ [ ]doppler  PT[ x]2+ [ ]1+ [ ]doppler                                                  PT [ x]2+ [ ]1+ [ ]doppler      LABS:                        9.0    6.37  )-----------( 245      ( 29 Mar 2022 08:49 )             28.4     03    145  |  113<H>  |  48.1<H>  ----------------------------<  73  4.4   |  19.0<L>  |  4.03<H>    Ca    8.3<L>      29 Mar 2022 08:49    TPro  6.7  /  Alb  3.5  /  TBili  0.4  /  DBili  x   /  AST  15  /  ALT  18  /  AlkPhos  122<H>      PT/INR - ( 28 Mar 2022 13:54 )   PT: 16.0 sec;   INR: 1.37 ratio         PTT - ( 28 Mar 2022 13:54 )  PTT:39.0 sec  Urinalysis Basic - ( 28 Mar 2022 17:22 )    Color: Yellow / Appearance: Clear / S.010 / pH: x  Gluc: x / Ketone: Negative  / Bili: Negative / Urobili: Negative mg/dL   Blood: x / Protein: 500 mg/dL / Nitrite: Negative   Leuk Esterase: Negative / RBC: 0-2 /HPF / WBC 0-2 /HPF   Sq Epi: x / Non Sq Epi: Few / Bacteria: x        RADIOLOGY & ADDITIONAL STUDIES    < from: CT Brain Stroke Protocol (22 @ 13:49) >  ACC: [id number] EXAM:  CT BRAIN STROKE PROTOCOL                          PROCEDURE DATE:  2022          INTERPRETATION:  CT HEAD STROKE PROTOCOL  HEAD CT    INDICATIONS: Stroke Code. pmh of CVA no residual deficit, ACS, CHF, HTN,   HLD presentswith dizziness. Pt states he woke up this morning at 9am and   felt dizzy, room spinning like when moving his head or getting up from   sitting. Pt had visiting nursing service come and was told he was   hypertensive and brought to ED. Pt states lasttime he felt well was last   night prior to bed around 10pm.  XCT  No additional history was provided.    TECHNIQUE:  HEAD CT:  Serial axial images were obtained from the skull base to the vertex   without the use of intravenous contrast. RAPID artificial intelligence   was used for preliminary evaluation of intracranial hemorrhage.    COMPARISON EXAMINATION: Head CT 3/10/2022    FINDINGS:  HEAD CT:  VENTRICLES AND SULCI: Ventricles and sulci are unremarkable for patient   age.  INTRA-AXIAL: No intracranial mass, acute hemorrhage, or midline shift is   present.There is non-specific decreased attenuation in the white matter   likely microvascular disease. Old bilateral lacunar infarcts in the basal   ganglia and in the right thalamus.  EXTRA-AXIAL: No extra-axial fluid collection is present.  INTRACRANIAL HEMORRHAGE: None.    VISUALIZED SINUSES: No air-fluid levels are identified.  VISUALIZED MASTOIDS:  Clear.  CALVARIUM:  Intact.  MISCELLANEOUS:  Bilateral cataract surgery.    SOFT TISSUES: Unremarkable.  BONES: Unremarkable.      IMPRESSION:  HEAD CT: Mild volume loss, moderate microvascular disease, no acute   hemorrhage or midline shift.  Chronic lacunar infarcts, unchanged.    Discussed with Dr. Keating in the ED at 1:53 PM. CTA and perfusion not   ordered due to elevated creatinine. MRI may provide helpful additional   evaluation, if clinically indicated.    --- End of Report ---            MIGDALIA DOUGLAS MD; Attending Radiologist  This document has been electronically signed. Mar 28 2022  1:55PM        < from: US Duplex Carotid Arteries Complete, Bilateral (22 @ 11:52) >  ACC: [id number] EXAM:  US DPLX CAROTIDS COMPL BI                          PROCEDURE DATE:  2022          INTERPRETATION:  CLINICAL INFORMATION: Syncope    COMPARISON: None available.    TECHNIQUE: Grayscale, color and spectral Doppler examination of both   carotid arteries was performed.    FINDINGS:    Elevated velocities are encountered in the left internal carotid artery.    Peak systolic velocities are as follows:    RIGHT:  PROX CCA = 81 cm/s  DIST CCA = 108 cm/s  PROX ICA = 109 cm/s  DIST ICA = 79 cm/s  ECA = 162 cm/s    LEFT:  PROX CCA = 94 cm/s  DIST CCA = 87 cm/s  PROX ICA = 135 cm/s  MID ICA = 148 cm/s  DIST ICA = 35 cm/s  ECA = 127 cm/s    Antegrade flow is noted within both vertebral arteries.    IMPRESSION: 50-69% left internal carotid artery stenosis.    Measurement of carotid stenosis is based on velocity parameters that   correlate the residual internal carotid diameter with that of the more   distal vessel in accordance with a method such as the North American   Symptomatic Carotid Endarterectomy Trial (NASCET).    --- End of Report ---            HANSEL MARTIN MD; Attending Radiologist  This document has been electronically signed. Mar 11 2022 12:00PM    < end of copied text >    Impression and Plan:    History of ataxia  Moderate left ICA stenosis  subacute infarcts noted on 3/15 MRI and also today's MRI      - cont antiplatelets and antistasin please increase asa to 325mg daily  - recommend obtaining brain and neck cta and NOE to rule out ischemic/embolic sources  - will forward above to the covering attending

## 2022-03-29 NOTE — PHYSICAL THERAPY INITIAL EVALUATION ADULT - ADDITIONAL COMMENTS
Pt lives with spouse (able to assist) in a private home with 4 CLIFFORD 2 handrails + 5 steps 2 handrails to bed&bath. Pt's PLOF was independent in all ADLs/ambulation with RW. Pt has RW DME at home.

## 2022-03-30 LAB
ALBUMIN SERPL ELPH-MCNC: 3 G/DL — LOW (ref 3.3–5.2)
ALP SERPL-CCNC: 92 U/L — SIGNIFICANT CHANGE UP (ref 40–120)
ALT FLD-CCNC: 9 U/L — SIGNIFICANT CHANGE UP
ANION GAP SERPL CALC-SCNC: 12 MMOL/L — SIGNIFICANT CHANGE UP (ref 5–17)
AST SERPL-CCNC: 8 U/L — SIGNIFICANT CHANGE UP
BILIRUB SERPL-MCNC: 0.3 MG/DL — LOW (ref 0.4–2)
BUN SERPL-MCNC: 52.8 MG/DL — HIGH (ref 8–20)
CALCIUM SERPL-MCNC: 8.1 MG/DL — LOW (ref 8.6–10.2)
CHLORIDE SERPL-SCNC: 112 MMOL/L — HIGH (ref 98–107)
CO2 SERPL-SCNC: 20 MMOL/L — LOW (ref 22–29)
CREAT SERPL-MCNC: 4.15 MG/DL — HIGH (ref 0.5–1.3)
EGFR: 15 ML/MIN/1.73M2 — LOW
FERRITIN SERPL-MCNC: 68 NG/ML — SIGNIFICANT CHANGE UP (ref 30–400)
GLUCOSE BLDC GLUCOMTR-MCNC: 100 MG/DL — HIGH (ref 70–99)
GLUCOSE BLDC GLUCOMTR-MCNC: 111 MG/DL — HIGH (ref 70–99)
GLUCOSE BLDC GLUCOMTR-MCNC: 113 MG/DL — HIGH (ref 70–99)
GLUCOSE BLDC GLUCOMTR-MCNC: 115 MG/DL — HIGH (ref 70–99)
GLUCOSE BLDC GLUCOMTR-MCNC: 67 MG/DL — LOW (ref 70–99)
GLUCOSE BLDC GLUCOMTR-MCNC: 68 MG/DL — LOW (ref 70–99)
GLUCOSE SERPL-MCNC: 78 MG/DL — SIGNIFICANT CHANGE UP (ref 70–99)
HCT VFR BLD CALC: 27.2 % — LOW (ref 39–50)
HGB BLD-MCNC: 8.5 G/DL — LOW (ref 13–17)
IRON SATN MFR SERPL: 12 % — LOW (ref 16–55)
IRON SATN MFR SERPL: 28 UG/DL — LOW (ref 59–158)
MAGNESIUM SERPL-MCNC: 1.5 MG/DL — LOW (ref 1.6–2.6)
MCHC RBC-ENTMCNC: 27.1 PG — SIGNIFICANT CHANGE UP (ref 27–34)
MCHC RBC-ENTMCNC: 31.3 GM/DL — LOW (ref 32–36)
MCV RBC AUTO: 86.6 FL — SIGNIFICANT CHANGE UP (ref 80–100)
PHOSPHATE SERPL-MCNC: 3.1 MG/DL — SIGNIFICANT CHANGE UP (ref 2.4–4.7)
PLATELET # BLD AUTO: 212 K/UL — SIGNIFICANT CHANGE UP (ref 150–400)
POTASSIUM SERPL-MCNC: 4.4 MMOL/L — SIGNIFICANT CHANGE UP (ref 3.5–5.3)
POTASSIUM SERPL-SCNC: 4.4 MMOL/L — SIGNIFICANT CHANGE UP (ref 3.5–5.3)
PROT SERPL-MCNC: 5.4 G/DL — LOW (ref 6.6–8.7)
RBC # BLD: 3.14 M/UL — LOW (ref 4.2–5.8)
RBC # FLD: 14.6 % — HIGH (ref 10.3–14.5)
SODIUM SERPL-SCNC: 144 MMOL/L — SIGNIFICANT CHANGE UP (ref 135–145)
TIBC SERPL-MCNC: 236 UG/DL — SIGNIFICANT CHANGE UP (ref 220–430)
TRANSFERRIN SERPL-MCNC: 165 MG/DL — LOW (ref 180–329)
WBC # BLD: 6.21 K/UL — SIGNIFICANT CHANGE UP (ref 3.8–10.5)
WBC # FLD AUTO: 6.21 K/UL — SIGNIFICANT CHANGE UP (ref 3.8–10.5)

## 2022-03-30 PROCEDURE — 99233 SBSQ HOSP IP/OBS HIGH 50: CPT

## 2022-03-30 PROCEDURE — 99232 SBSQ HOSP IP/OBS MODERATE 35: CPT

## 2022-03-30 RX ORDER — INSULIN GLARGINE 100 [IU]/ML
5 INJECTION, SOLUTION SUBCUTANEOUS AT BEDTIME
Refills: 0 | Status: DISCONTINUED | OUTPATIENT
Start: 2022-03-30 | End: 2022-03-31

## 2022-03-30 RX ORDER — MAGNESIUM SULFATE 500 MG/ML
1 VIAL (ML) INJECTION ONCE
Refills: 0 | Status: COMPLETED | OUTPATIENT
Start: 2022-03-30 | End: 2022-03-30

## 2022-03-30 RX ORDER — IRON SUCROSE 20 MG/ML
200 INJECTION, SOLUTION INTRAVENOUS EVERY 24 HOURS
Refills: 0 | Status: DISCONTINUED | OUTPATIENT
Start: 2022-03-30 | End: 2022-04-01

## 2022-03-30 RX ORDER — MAGNESIUM OXIDE 400 MG ORAL TABLET 241.3 MG
400 TABLET ORAL DAILY
Refills: 0 | Status: DISCONTINUED | OUTPATIENT
Start: 2022-03-30 | End: 2022-04-01

## 2022-03-30 RX ORDER — ERYTHROPOIETIN 10000 [IU]/ML
20000 INJECTION, SOLUTION INTRAVENOUS; SUBCUTANEOUS
Refills: 0 | Status: DISCONTINUED | OUTPATIENT
Start: 2022-03-30 | End: 2022-04-01

## 2022-03-30 RX ADMIN — CARVEDILOL PHOSPHATE 25 MILLIGRAM(S): 80 CAPSULE, EXTENDED RELEASE ORAL at 18:26

## 2022-03-30 RX ADMIN — AMLODIPINE BESYLATE 10 MILLIGRAM(S): 2.5 TABLET ORAL at 05:33

## 2022-03-30 RX ADMIN — ISOSORBIDE DINITRATE 20 MILLIGRAM(S): 5 TABLET ORAL at 05:33

## 2022-03-30 RX ADMIN — Medication 1300 MILLIGRAM(S): at 21:36

## 2022-03-30 RX ADMIN — APIXABAN 2.5 MILLIGRAM(S): 2.5 TABLET, FILM COATED ORAL at 05:33

## 2022-03-30 RX ADMIN — INSULIN GLARGINE 5 UNIT(S): 100 INJECTION, SOLUTION SUBCUTANEOUS at 21:36

## 2022-03-30 RX ADMIN — CARVEDILOL PHOSPHATE 25 MILLIGRAM(S): 80 CAPSULE, EXTENDED RELEASE ORAL at 05:33

## 2022-03-30 RX ADMIN — Medication 25 MILLIGRAM(S): at 21:38

## 2022-03-30 RX ADMIN — Medication 1300 MILLIGRAM(S): at 14:24

## 2022-03-30 RX ADMIN — ISOSORBIDE DINITRATE 20 MILLIGRAM(S): 5 TABLET ORAL at 12:33

## 2022-03-30 RX ADMIN — IRON SUCROSE 110 MILLIGRAM(S): 20 INJECTION, SOLUTION INTRAVENOUS at 18:26

## 2022-03-30 RX ADMIN — APIXABAN 2.5 MILLIGRAM(S): 2.5 TABLET, FILM COATED ORAL at 18:26

## 2022-03-30 RX ADMIN — Medication 25 MILLIGRAM(S): at 14:23

## 2022-03-30 RX ADMIN — Medication 1300 MILLIGRAM(S): at 05:34

## 2022-03-30 RX ADMIN — ISOSORBIDE DINITRATE 20 MILLIGRAM(S): 5 TABLET ORAL at 18:26

## 2022-03-30 RX ADMIN — Medication 100 GRAM(S): at 12:33

## 2022-03-30 RX ADMIN — Medication 81 MILLIGRAM(S): at 12:33

## 2022-03-30 RX ADMIN — ATORVASTATIN CALCIUM 80 MILLIGRAM(S): 80 TABLET, FILM COATED ORAL at 21:36

## 2022-03-30 RX ADMIN — Medication 25 MILLIGRAM(S): at 05:33

## 2022-03-30 NOTE — PROGRESS NOTE ADULT - PROBLEM SELECTOR PLAN 1
- R/o posterior circulation stroke vs TIA  - Appreciate neuro checks- neuro checks Q2H  - MRI-H- subacute infarcts in the left parietal and frontal lobes, corresponding to acute infarcts seen on 3/15/2022.  No new areas of acute infarct, intracranial hemorrhage, or mass   effect.  - US carotid (3/11)- LT ICA stenosis 50-69%  - c/w ASA and Eliquis given recent CVA. Discussed with cardiology, will c/w eliquis and have patient follow up outpatient for MCOt monitoring to evaluate for pA.fib  - Vascular c/s placed - R/o posterior circulation stroke vs TIA  - MRI-H- subacute infarcts in the left parietal and frontal lobes, corresponding to acute infarcts seen on 3/15/2022.  No new areas of acute infarct, intracranial hemorrhage, or mass   effect.  - US carotid (3/11)- LT ICA stenosis 50-69%  - c/w ASA and Eliquis given recent CVA. Discussed with cardiology, will c/w eliquis and have patient follow up outpatient for MCOt monitoring to evaluate for pA.fib  - Vascular c/s placed- Appreciate recs. Discussed with neurology given CKD and management will hold off on CTA H/N. No new stroke findings, will hold off on increasing ASA  - Will touch base with cards in regards to NOE

## 2022-03-30 NOTE — PROGRESS NOTE ADULT - SUBJECTIVE AND OBJECTIVE BOX
Vascular surgery follow up    Patient seen and examined this AM  No reported acute events overnight. NO reported new motor or sensory deficits.  Recommendations issued yesterday to do embolic workup  Patient with advanced CKD, unlikely to be able to receive IV contrast due to the need to avoid nephrotoxicy    The primary team will reach out to Dr. Pelletier to discuss other options and/or weigh the risk vs. benefits regarding the proposed embolic work up.

## 2022-03-30 NOTE — PROGRESS NOTE ADULT - SUBJECTIVE AND OBJECTIVE BOX
[facility] Division of Hospital Medicine    Chief Complaint: ataxia     SUBJECTIVE / OVERNIGHT EVENTS: No acute events overnight. HD stable. Denies n/v/f/c/h/d.     Patient denies chest pain, SOB, abd pain, N/V, fever, chills, dysuria or any other complaints. All remainder ROS negative.     MEDICATIONS  (STANDING):  amLODIPine   Tablet 10 milliGRAM(s) Oral daily  apixaban 2.5 milliGRAM(s) Oral two times a day  aspirin enteric coated 81 milliGRAM(s) Oral daily  atorvastatin 80 milliGRAM(s) Oral at bedtime  carvedilol 25 milliGRAM(s) Oral every 12 hours  dextrose 5%. 1000 milliLiter(s) (100 mL/Hr) IV Continuous <Continuous>  dextrose 5%. 1000 milliLiter(s) (50 mL/Hr) IV Continuous <Continuous>  dextrose 50% Injectable 25 Gram(s) IV Push once  dextrose 50% Injectable 12.5 Gram(s) IV Push once  dextrose 50% Injectable 25 Gram(s) IV Push once  glucagon  Injectable 1 milliGRAM(s) IntraMuscular once  hydrALAZINE 25 milliGRAM(s) Oral every 8 hours  insulin glargine Injectable (LANTUS) 5 Unit(s) SubCutaneous at bedtime  insulin lispro (ADMELOG) corrective regimen sliding scale   SubCutaneous three times a day before meals  insulin lispro (ADMELOG) corrective regimen sliding scale   SubCutaneous at bedtime  isosorbide   dinitrate Tablet (ISORDIL) 20 milliGRAM(s) Oral three times a day  sodium bicarbonate 1300 milliGRAM(s) Oral three times a day    MEDICATIONS  (PRN):  dextrose Oral Gel 15 Gram(s) Oral once PRN Blood Glucose LESS THAN 70 milliGRAM(s)/deciliter        I&O's Summary    29 Mar 2022 07:01  -  30 Mar 2022 07:00  --------------------------------------------------------  IN: 0 mL / OUT: 400 mL / NET: -400 mL    30 Mar 2022 07:01  -  30 Mar 2022 13:46  --------------------------------------------------------  IN: 420 mL / OUT: 125 mL / NET: 295 mL        PHYSICAL EXAM:  Vital Signs Last 24 Hrs  T(C): 36.7 (30 Mar 2022 08:10), Max: 36.8 (29 Mar 2022 22:54)  T(F): 98 (30 Mar 2022 08:10), Max: 98.2 (29 Mar 2022 22:54)  HR: 73 (30 Mar 2022 12:00) (64 - 73)  BP: 134/56 (30 Mar 2022 12:00) (111/46 - 155/77)  BP(mean): 64 (30 Mar 2022 06:33) (64 - 97)  RR: 18 (30 Mar 2022 12:00) (18 - 18)  SpO2: 97% (30 Mar 2022 12:00) (93% - 98%)        CONSTITUTIONAL: NAD, well-developed, well-groomed  ENMT: Moist oral mucosa, no pharyngeal injection or exudates; normal dentition  RESPIRATORY: Normal respiratory effort; lungs are clear to auscultation bilaterally  CARDIOVASCULAR: Regular rate and rhythm, normal S1 and S2, no murmur/rub/gallop; No lower extremity edema; Peripheral pulses are 2+ bilaterally  ABDOMEN: Nontender to palpation, normoactive bowel sounds, no rebound/guarding; No hepatosplenomegaly  MUSCLOSKELETAL:  Normal gait; no clubbing or cyanosis of digits; no joint swelling or tenderness to palpation  PSYCH: A+O to person, place, and time; affect appropriate  NEUROLOGY: CN 2-12 are intact and symmetric; no gross sensory deficits;   SKIN: No rashes; no palpable lesions    LABS:                        8.5    6.21  )-----------( 212      ( 30 Mar 2022 03:56 )             27.2     03-30    144  |  112<H>  |  52.8<H>  ----------------------------<  78  4.4   |  20.0<L>  |  4.15<H>    Ca    8.1<L>      30 Mar 2022 03:56  Phos  3.1     03-30  Mg     1.5     03-30    TPro  5.4<L>  /  Alb  3.0<L>  /  TBili  0.3<L>  /  DBili  x   /  AST  8   /  ALT  9   /  AlkPhos  92  03-30    PT/INR - ( 28 Mar 2022 13:54 )   PT: 16.0 sec;   INR: 1.37 ratio         PTT - ( 28 Mar 2022 13:54 )  PTT:39.0 sec  CARDIAC MARKERS ( 29 Mar 2022 08:49 )  x     / 0.07 ng/mL / x     / x     / x      CARDIAC MARKERS ( 28 Mar 2022 22:10 )  x     / 0.07 ng/mL / x     / x     / x      CARDIAC MARKERS ( 28 Mar 2022 13:54 )  x     / 0.07 ng/mL / x     / x     / x          Urinalysis Basic - ( 28 Mar 2022 17:22 )    Color: Yellow / Appearance: Clear / S.010 / pH: x  Gluc: x / Ketone: Negative  / Bili: Negative / Urobili: Negative mg/dL   Blood: x / Protein: 500 mg/dL / Nitrite: Negative   Leuk Esterase: Negative / RBC: 0-2 /HPF / WBC 0-2 /HPF   Sq Epi: x / Non Sq Epi: Few / Bacteria: x        Culture - Urine (collected 28 Mar 2022 22:13)  Source: Clean Catch Clean Catch (Midstream)  Final Report (29 Mar 2022 18:52):    <10,000 CFU/mL Normal Urogenital Tracy      CAPILLARY BLOOD GLUCOSE      POCT Blood Glucose.: 111 mg/dL (30 Mar 2022 13:10)  POCT Blood Glucose.: 113 mg/dL (30 Mar 2022 09:09)  POCT Blood Glucose.: 68 mg/dL (30 Mar 2022 08:23)  POCT Blood Glucose.: 67 mg/dL (30 Mar 2022 08:21)  POCT Blood Glucose.: 125 mg/dL (29 Mar 2022 22:30)  POCT Blood Glucose.: 136 mg/dL (29 Mar 2022 16:00)        RADIOLOGY & ADDITIONAL TESTS:  Results Reviewed:   Imaging Personally Reviewed:  Electrocardiogram Personally Reviewed:

## 2022-03-30 NOTE — PROGRESS NOTE ADULT - SUBJECTIVE AND OBJECTIVE BOX
Patient seen and examined    Feels fine, no distress laying flat in bed  voiding easily  no c/o CP SOB NV   No swelling feet    Vital Signs Last 24 Hrs  T(C): 36.7 (30 Mar 2022 08:10), Max: 36.8 (29 Mar 2022 22:54)  T(F): 98 (30 Mar 2022 08:10), Max: 98.2 (29 Mar 2022 22:54)  HR: 78 (30 Mar 2022 14:24) (64 - 78)  BP: 132/74 (30 Mar 2022 14:24) (111/46 - 155/77)  BP(mean): 64 (30 Mar 2022 06:33) (64 - 97)  RR: 18 (30 Mar 2022 14:24) (18 - 18)  SpO2: 97% (30 Mar 2022 14:24) (93% - 98%)    PHYSICAL EXAM    GENERAL: NAD,   EYES:  conjunctiva and sclera clear  NECK: Supple, No JVD/Bruit  NERVOUS SYSTEM:  A/O x3,   CHEST:  No rales, No rhonchi  HEART:  RRR, No murmur  ABDOMEN: Soft, NT/ND BS+  EXTREMITIES:  No Edema;  SKIN: No rashes    30 Mar 2022 03:56    144    |  112    |  52.8   ----------------------------<  78     4.4     |  20.0   |  4.15     Ca    8.1        30 Mar 2022 03:56  Phos  3.1       30 Mar 2022 03:56  Mg     1.5       30 Mar 2022 03:56    TPro  5.4    /  Alb  3.0    /  TBili  0.3    /  DBili  x      /  AST  8      /  ALT  9      /  AlkPhos  92     30 Mar 2022 03:56                          8.5    6.21  )-----------( 212      ( 30 Mar 2022 03:56 )    Tsat 12  Feritin 68              MEDICATIONS  (STANDING):  amLODIPine   Tablet 10 milliGRAM(s) Oral daily  apixaban 2.5 milliGRAM(s) Oral two times a day  aspirin enteric coated 81 milliGRAM(s) Oral daily  atorvastatin 80 milliGRAM(s) Oral at bedtime  carvedilol 25 milliGRAM(s) Oral every 12 hours  dextrose 5%. 1000 milliLiter(s) (100 mL/Hr) IV Continuous <Continuous>  dextrose 5%. 1000 milliLiter(s) (50 mL/Hr) IV Continuous <Continuous>  dextrose 50% Injectable 25 Gram(s) IV Push once  dextrose 50% Injectable 12.5 Gram(s) IV Push once  dextrose 50% Injectable 25 Gram(s) IV Push once  glucagon  Injectable 1 milliGRAM(s) IntraMuscular once  hydrALAZINE 25 milliGRAM(s) Oral every 8 hours  insulin glargine Injectable (LANTUS) 5 Unit(s) SubCutaneous at bedtime  insulin lispro (ADMELOG) corrective regimen sliding scale   SubCutaneous three times a day before meals  insulin lispro (ADMELOG) corrective regimen sliding scale   SubCutaneous at bedtime  isosorbide   dinitrate Tablet (ISORDIL) 20 milliGRAM(s) Oral three times a day  sodium bicarbonate 1300 milliGRAM(s) Oral three times a day

## 2022-03-30 NOTE — PROGRESS NOTE ADULT - SUBJECTIVE AND OBJECTIVE BOX
Neurology consult    ANSHUL CUNNINGHAM 67y Male     Patient is a 67y old  Male who presents with a chief complaint of ataxia (28 Mar 2022 17:46)      HPI:  68 y/o male came in for reported ataxia per triage note but pt. reporting he has been ok, some dizziness ? As per pt. home visiting nurse found his bp to be high and send the pt. to the ER.  pt. was discharged from [facility] 1 week ago and was admitted for pre syncope and during his work up was found to have cva on mri brain. pt. was followed by neurology, cardiology ( was started on eliquis for cva and MCOT monitoring as an out-pt for afib  ) and by nephrology team for his ckd. pt. reports no focal weakness, no cp, no sob. no abd. pain, no n/v/d. mo HA reported. mo speech/ swallow difficulty. no hemoptysis, hematemesis no blood per rectum.  (28 Mar 2022 16:52)    3-28-22  Wife at bedside reports patient was having dizziness and difficulty ambulating.    3-29-22   No new complaints. Ambulating better today.    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          FAMILY HISTORY:  Family history of heart disease (Sibling)        SOCIAL HISTORY:  No history of tobacco or alcohol use     Allergies    No Known Allergies    Intolerances        Height (cm): 165.1 (03-28 @ 13:22)  Weight (kg): 82.5 (03-28 @ 13:22)  BMI (kg/m2): 30.3 (03-28 @ 13:22)        Vital Signs Last 24 Hrs  T(C): 36.7 (30 Mar 2022 16:53), Max: 36.8 (29 Mar 2022 22:54)  T(F): 98.1 (30 Mar 2022 16:53), Max: 98.2 (29 Mar 2022 22:54)  HR: 68 (30 Mar 2022 16:53) (64 - 78)  BP: 151/70 (30 Mar 2022 16:53) (111/46 - 155/77)  BP(mean): 64 (30 Mar 2022 06:33) (64 - 97)  RR: 18 (30 Mar 2022 16:53) (18 - 18)  SpO2: 97% (30 Mar 2022 16:53) (93% - 98%)    MEDICATIONS    amLODIPine   Tablet 10 milliGRAM(s) Oral daily  apixaban 2.5 milliGRAM(s) Oral two times a day  aspirin enteric coated 81 milliGRAM(s) Oral daily  atorvastatin 80 milliGRAM(s) Oral at bedtime  carvedilol 25 milliGRAM(s) Oral every 12 hours  dextrose 5%. 1000 milliLiter(s) IV Continuous <Continuous>  dextrose 5%. 1000 milliLiter(s) IV Continuous <Continuous>  dextrose 50% Injectable 25 Gram(s) IV Push once  dextrose 50% Injectable 12.5 Gram(s) IV Push once  dextrose 50% Injectable 25 Gram(s) IV Push once  dextrose Oral Gel 15 Gram(s) Oral once PRN  epoetin dilia-epbx (RETACRIT) Injectable 77303 Unit(s) SubCutaneous every 7 days  glucagon  Injectable 1 milliGRAM(s) IntraMuscular once  hydrALAZINE 25 milliGRAM(s) Oral every 8 hours  insulin glargine Injectable (LANTUS) 5 Unit(s) SubCutaneous at bedtime  insulin lispro (ADMELOG) corrective regimen sliding scale   SubCutaneous three times a day before meals  insulin lispro (ADMELOG) corrective regimen sliding scale   SubCutaneous at bedtime  iron sucrose IVPB 200 milliGRAM(s) IV Intermittent every 24 hours  isosorbide   dinitrate Tablet (ISORDIL) 20 milliGRAM(s) Oral three times a day  magnesium oxide 400 milliGRAM(s) Oral daily  sodium bicarbonate 1300 milliGRAM(s) Oral three times a day         LABS:  CBC Full  -  ( 30 Mar 2022 03:56 )  WBC Count : 6.21 K/uL  RBC Count : 3.14 M/uL  Hemoglobin : 8.5 g/dL  Hematocrit : 27.2 %  Platelet Count - Automated : 212 K/uL  Mean Cell Volume : 86.6 fl  Mean Cell Hemoglobin : 27.1 pg  Mean Cell Hemoglobin Concentration : 31.3 gm/dL  Auto Neutrophil # : x  Auto Lymphocyte # : x  Auto Monocyte # : x  Auto Eosinophil # : x  Auto Basophil # : x  Auto Neutrophil % : x  Auto Lymphocyte % : x  Auto Monocyte % : x  Auto Eosinophil % : x  Auto Basophil % : x      03-30    144  |  112<H>  |  52.8<H>  ----------------------------<  78  4.4   |  20.0<L>  |  4.15<H>    Ca    8.1<L>      30 Mar 2022 03:56  Phos  3.1     03-30  Mg     1.5     03-30    TPro  5.4<L>  /  Alb  3.0<L>  /  TBili  0.3<L>  /  DBili  x   /  AST  8   /  ALT  9   /  AlkPhos  92  03-30    LIVER FUNCTIONS - ( 30 Mar 2022 03:56 )  Alb: 3.0 g/dL / Pro: 5.4 g/dL / ALK PHOS: 92 U/L / ALT: 9 U/L / AST: 8 U/L / GGT: x           STROKE CORE LABS:    Hemoglobin A1C:   Lipid Panel 03-29 @ 08:49  Total Cholesterol, Serum 109  LDL --  Triglycerides 79    PT/INR - ( 28 Mar 2022 13:54 )   PT: 16.0 sec;   INR: 1.37 ratio    PTT - ( 28 Mar 2022 13:54 )  PTT:39.0 sec      On Neurological Examination:    Mental Status - Patient is  awake, alert and oriented X3.  Speech is fluent. Patient can name, repeat and follow commands correctly  There is no dysarthria.    Cranial Nerves - PERRL, EOMI,  Visual fields are full to finger counting, no gross facial asymmetry, tongue/uvula midline    Motor Exam -   Right upper ---5/5 No drift  Left upper ---5/5 No drift  Right lower ---5/5 No drift  Left lower  ---5/5 No drift     nml bulk/tone    Sensory    Intact to light touch and pinprick bilaterally    Coord: FTN intact bilaterally     Gait -  - slightly wide based but can ambulate unassisted.       RADIOLOGY ( All neurological imaging studies were independently reviewed and interpreted by me)  [facility]   CTA  CTP  CT Brain Stroke Protocol (03.28.22 @ 13:49) >    IMPRESSION:  HEAD CT: Mild volume loss, moderate microvascular disease, no acute   hemorrhage or midline shift.  Chronic lacunar infarcts, unchanged.    Discussed with Dr. Keating in the ED at 1:53 PM. CTA and perfusion not   ordered due to elevated creatinine. MRI may provide helpful additional   evaluation, if clinically indicated.    MIGDALIA DOUGLAS MD; Attending Radiologist      MRI:    MR Head No Cont (03.15.22 @ 04:14) >  IMPRESSION:  FEW SMALL ACUTE INFARCTS LEFT PARIETAL GREATER THAN FRONTAL LOBES.      JULISA ALCALA MD; Attending Radiologist      MR Angio Head No Cont (03.16.22 @ 23:08) >  PRESSION:    1. Suboptimal but grossly diagnostic study, degraded by motion.  2. moderate narrowing of both carotid bifurcations and proximal internal   carotid arteries, which is consistent with ultrasound findings of   3/11/2022. No dissection or occlusion. Patent vertebrals.  3. Atherosclerotic changes noted intracranially, most prevalent in the   posterior cerebral arteries but also visible in the middle cerebral   arteries are proximally. No occlusion identified.    AMARJIT SAINI MD; Attending Radiologist    TTE  TTE Echo Complete w/o Contrast w/ Doppler (01.14.22 @ 19:06) >    Summary:   1. Technically difficult study.   2. Normal global left ventricular systolic function.   3. Left ventricular ejection fraction, by visual estimation, is 55 to   60%.   4. Mildly enlarged left atrium.   5. Normal right atrial size.   6.Mild mitral annular calcification.   7. Mild mitral valve regurgitation.   8. Thickening and calcification of the anterior and posterior mitral   valve leaflets.   9. Moderate tricuspid regurgitation.  10. Sclerotic aortic valve with normal opening.  11. Estimated pulmonary artery systolic pressure is 40.3 mmHg assuming a   right atrial pressure of 5 mmHg, which is consistent with mild pulmonary   hypertension.  12. Trivial pericardial effusion.    MD Junie Electronically signed on 1/15/2022 at 10:13:45 AM      MR Head No Cont (03.28.22 @ 20:02) >      IMPRESSION:  1.  Subacute infarcts in the left parietal and frontal lobes,   corresponding to acute infarcts seen on 3/15/2022.  2.  No new areas of acute infarct, intracranial hemorrhage, or mass   effect.    JODY BURTON MD; Resident Radiologist  This document has been electronically signed.  EVERARDO SANTIZO MD; Attending Radiologist

## 2022-03-30 NOTE — PROGRESS NOTE ADULT - SUBJECTIVE AND OBJECTIVE BOX
Patient feels well.  Reports a bit of feeling cold after having an episode of hypoglycemia.   Denies pain.     REVIEW OF SYSTEMS  Constitutional - No fever,  No fatigue  HEENT - No vertigo, No neck pain  Neurological - No headaches, No memory loss, No loss of strength, No numbness, No tremors  Musculoskeletal - No joint pain, No joint swelling, No muscle pain  Psychiatric - No depression, No anxiety    FUNCTIONAL PROGRESS  3/29 PT  Bed Mobility: Sit to Supine:     · Level of Gill	independent  · Assistive Device	bed rails    Bed Mobility: Supine to Sit:     · Level of Gill	independent  · Assistive Device	bed rails    Transfer: Sit to Stand:     · Level of Gill	independent  · Assistive Device	rolling walker    Transfer: Stand to Sit:     · Level of Gill	independent  · Assistive Device	rolling walker    Gait Skills:     · Level of Gill	independent  · Assistive Device	rolling walker  · Gait Distance	40 ft (there/back) including turns, limited ambulation distance  pt's request    Gait Analysis:     · Gait Pattern Used	swing-through gait  · Gait Deviations Noted	decreased lauri    Stair Negotiation:     · Level of Gill	n/a, pt declined    3/29 OT  Bathing Training:     · Level of Gill	independent    Upper Body Dressing Training:     · Level of Gill	independent    Lower Body Dressing Training:     · Level of Gill	independent    Toilet Hygiene Training:     · Level of Gill	independent    Grooming Training:     · Level of Gill	independent    Eating/Self-Feeding Training:     · Level of Gill	independent    VITALS  T(C): 36.7 (22 @ 08:10), Max: 36.8 (22 @ 22:54)  HR: 69 (22 @ 08:10) (64 - 70)  BP: 152/71 (22 @ 08:10) (111/46 - 155/77)  RR: 18 (22 @ 04:26) (18 - 18)  SpO2: 95% (22 @ 08:10) (93% - 98%)  Wt(kg): --    MEDICATIONS   amLODIPine   Tablet 10 milliGRAM(s) daily  apixaban 2.5 milliGRAM(s) two times a day  aspirin enteric coated 81 milliGRAM(s) daily  atorvastatin 80 milliGRAM(s) at bedtime  carvedilol 25 milliGRAM(s) every 12 hours  dextrose 5%. 1000 milliLiter(s) <Continuous>  dextrose 5%. 1000 milliLiter(s) <Continuous>  dextrose 50% Injectable 25 Gram(s) once  dextrose 50% Injectable 12.5 Gram(s) once  dextrose 50% Injectable 25 Gram(s) once  dextrose Oral Gel 15 Gram(s) once PRN  glucagon  Injectable 1 milliGRAM(s) once  hydrALAZINE 25 milliGRAM(s) every 8 hours  insulin glargine Injectable (LANTUS) 5 Unit(s) at bedtime  insulin lispro (ADMELOG) corrective regimen sliding scale   three times a day before meals  insulin lispro (ADMELOG) corrective regimen sliding scale   at bedtime  isosorbide   dinitrate Tablet (ISORDIL) 20 milliGRAM(s) three times a day  magnesium sulfate  IVPB 1 Gram(s) once  sodium bicarbonate 1300 milliGRAM(s) three times a day      RECENT LABS/IMAGING                          8.5    6.21  )-----------( 212      ( 30 Mar 2022 03:56 )             27.2     03-30    144  |  112<H>  |  52.8<H>  ----------------------------<  78  4.4   |  20.0<L>  |  4.15<H>    Ca    8.1<L>      30 Mar 2022 03:56  Phos  3.1     03-30  Mg     1.5     03-30    TPro  5.4<L>  /  Alb  3.0<L>  /  TBili  0.3<L>  /  DBili  x   /  AST  8   /  ALT  9   /  AlkPhos  92  03-30    PT/INR - ( 28 Mar 2022 13:54 )   PT: 16.0 sec;   INR: 1.37 ratio         PTT - ( 28 Mar 2022 13:54 )  PTT:39.0 sec  Urinalysis Basic - ( 28 Mar 2022 17:22 )    Color: Yellow / Appearance: Clear / S.010 / pH: x  Gluc: x / Ketone: Negative  / Bili: Negative / Urobili: Negative mg/dL   Blood: x / Protein: 500 mg/dL / Nitrite: Negative   Leuk Esterase: Negative / RBC: 0-2 /HPF / WBC 0-2 /HPF   Sq Epi: x / Non Sq Epi: Few / Bacteria: x              HEAD CT 3/28 - Mild volume loss, moderate microvascular disease, no acute hemorrhage or midline shift. Chronic lacunar infarcts, unchanged.    MRI 3/28 - 1.  Subacute infarcts in the left parietal and frontal lobes, corresponding to acute infarcts seen on 3/15/2022.  2.  No new areas of acute infarct, intracranial hemorrhage, or mass effect.  ----------------------------------------------------------------------------------------  PHYSICAL EXAM  Constitutional - NAD, Comfortable  Extremities - No C/C/E, No calf tenderness   Neurologic Exam -                    Cognitive - AAO to self, place, date, year, situation     Communication - Fluent, +dysarthria     Cranial Nerves - Right lip droop      Motor - No focal deficits     Sensory - Intact to LT  Psychiatric - Mood stable, Affect WNL  ----------------------------------------------------------------------------------------  ASSESSMENT/PLAN  67yMale with functional deficits after having elevated BP with recent history of acute CVA  Subacute CVA - Eliquis, ASA, Lipitor   HTN - Norvasc, Coreg, Hydralazine, Isosorbide   DM2 - Lantus   Pain - Tylenol  DVT PPX - SCDs  Rehab - Patient medically being optimized for return HOME. Patient is independent. Recommend ongoing mobilization by staff to maintain cardiopulmonary function and prevention of secondary complications related to debility. Discussed with rehab team.     Will sign off at this time. Thank you for allowing me to be part of your patient's care. Please reconsult PMR for additional rehab recommendations or dispo needs if functional status changes.

## 2022-03-30 NOTE — PROGRESS NOTE ADULT - PROBLEM SELECTOR PLAN 7
- DVT ppx- Eliquis  - Diet- DASH diet  - Dispo- pending clinical improvement. F/u PT/OT. At baseline, patient ambulates with a walker.   - PMR recs- home    - Updated patient's wife, Ashli ([phone]) on 3/29 - DVT ppx- Eliquis  - Diet- DASH diet  - Dispo- pending clinical improvement. F/u PT/OT- home with supervision. At baseline, patient ambulates with a walker.   - PMR recs- home    - Updated patient's wife, Ashli ([phone]) on 3/30

## 2022-03-31 LAB
ALBUMIN SERPL ELPH-MCNC: 3.1 G/DL — LOW (ref 3.3–5.2)
ALP SERPL-CCNC: 92 U/L — SIGNIFICANT CHANGE UP (ref 40–120)
ALT FLD-CCNC: 10 U/L — SIGNIFICANT CHANGE UP
ANION GAP SERPL CALC-SCNC: 13 MMOL/L — SIGNIFICANT CHANGE UP (ref 5–17)
AST SERPL-CCNC: 10 U/L — SIGNIFICANT CHANGE UP
BILIRUB SERPL-MCNC: 0.3 MG/DL — LOW (ref 0.4–2)
BUN SERPL-MCNC: 53.1 MG/DL — HIGH (ref 8–20)
CALCIUM SERPL-MCNC: 8.3 MG/DL — LOW (ref 8.6–10.2)
CHLORIDE SERPL-SCNC: 111 MMOL/L — HIGH (ref 98–107)
CO2 SERPL-SCNC: 20 MMOL/L — LOW (ref 22–29)
CREAT SERPL-MCNC: 4.24 MG/DL — HIGH (ref 0.5–1.3)
EGFR: 14 ML/MIN/1.73M2 — LOW
GLUCOSE BLDC GLUCOMTR-MCNC: 110 MG/DL — HIGH (ref 70–99)
GLUCOSE BLDC GLUCOMTR-MCNC: 152 MG/DL — HIGH (ref 70–99)
GLUCOSE BLDC GLUCOMTR-MCNC: 64 MG/DL — LOW (ref 70–99)
GLUCOSE BLDC GLUCOMTR-MCNC: 76 MG/DL — SIGNIFICANT CHANGE UP (ref 70–99)
GLUCOSE BLDC GLUCOMTR-MCNC: 93 MG/DL — SIGNIFICANT CHANGE UP (ref 70–99)
GLUCOSE SERPL-MCNC: 73 MG/DL — SIGNIFICANT CHANGE UP (ref 70–99)
HCT VFR BLD CALC: 27.7 % — LOW (ref 39–50)
HCT VFR BLD CALC: 29.8 % — LOW (ref 39–50)
HGB BLD-MCNC: 8.6 G/DL — LOW (ref 13–17)
HGB BLD-MCNC: 9 G/DL — LOW (ref 13–17)
MAGNESIUM SERPL-MCNC: 2 MG/DL — SIGNIFICANT CHANGE UP (ref 1.6–2.6)
MCHC RBC-ENTMCNC: 27 PG — SIGNIFICANT CHANGE UP (ref 27–34)
MCHC RBC-ENTMCNC: 31 GM/DL — LOW (ref 32–36)
MCV RBC AUTO: 87.1 FL — SIGNIFICANT CHANGE UP (ref 80–100)
OB PNL STL: POSITIVE
PHOSPHATE SERPL-MCNC: 3.4 MG/DL — SIGNIFICANT CHANGE UP (ref 2.4–4.7)
PLATELET # BLD AUTO: 228 K/UL — SIGNIFICANT CHANGE UP (ref 150–400)
POTASSIUM SERPL-MCNC: 4.8 MMOL/L — SIGNIFICANT CHANGE UP (ref 3.5–5.3)
POTASSIUM SERPL-SCNC: 4.8 MMOL/L — SIGNIFICANT CHANGE UP (ref 3.5–5.3)
PROT SERPL-MCNC: 5.3 G/DL — LOW (ref 6.6–8.7)
RBC # BLD: 3.18 M/UL — LOW (ref 4.2–5.8)
RBC # FLD: 14.6 % — HIGH (ref 10.3–14.5)
SARS-COV-2 RNA SPEC QL NAA+PROBE: SIGNIFICANT CHANGE UP
SODIUM SERPL-SCNC: 144 MMOL/L — SIGNIFICANT CHANGE UP (ref 135–145)
WBC # BLD: 5.72 K/UL — SIGNIFICANT CHANGE UP (ref 3.8–10.5)
WBC # FLD AUTO: 5.72 K/UL — SIGNIFICANT CHANGE UP (ref 3.8–10.5)

## 2022-03-31 PROCEDURE — 99233 SBSQ HOSP IP/OBS HIGH 50: CPT

## 2022-03-31 PROCEDURE — 93320 DOPPLER ECHO COMPLETE: CPT | Mod: 26

## 2022-03-31 PROCEDURE — 93325 DOPPLER ECHO COLOR FLOW MAPG: CPT | Mod: 26

## 2022-03-31 PROCEDURE — 99232 SBSQ HOSP IP/OBS MODERATE 35: CPT

## 2022-03-31 PROCEDURE — 76376 3D RENDER W/INTRP POSTPROCES: CPT | Mod: 26

## 2022-03-31 PROCEDURE — 93312 ECHO TRANSESOPHAGEAL: CPT | Mod: 26

## 2022-03-31 RX ORDER — PANTOPRAZOLE SODIUM 20 MG/1
20 TABLET, DELAYED RELEASE ORAL
Refills: 0 | Status: DISCONTINUED | OUTPATIENT
Start: 2022-03-31 | End: 2022-04-01

## 2022-03-31 RX ORDER — SODIUM CHLORIDE 9 MG/ML
1000 INJECTION, SOLUTION INTRAVENOUS
Refills: 0 | Status: DISCONTINUED | OUTPATIENT
Start: 2022-03-31 | End: 2022-03-31

## 2022-03-31 RX ADMIN — IRON SUCROSE 110 MILLIGRAM(S): 20 INJECTION, SOLUTION INTRAVENOUS at 17:11

## 2022-03-31 RX ADMIN — PANTOPRAZOLE SODIUM 20 MILLIGRAM(S): 20 TABLET, DELAYED RELEASE ORAL at 22:17

## 2022-03-31 RX ADMIN — Medication 25 MILLIGRAM(S): at 05:45

## 2022-03-31 RX ADMIN — Medication 25 MILLIGRAM(S): at 21:14

## 2022-03-31 RX ADMIN — ISOSORBIDE DINITRATE 20 MILLIGRAM(S): 5 TABLET ORAL at 17:09

## 2022-03-31 RX ADMIN — ERYTHROPOIETIN 20000 UNIT(S): 10000 INJECTION, SOLUTION INTRAVENOUS; SUBCUTANEOUS at 17:11

## 2022-03-31 RX ADMIN — AMLODIPINE BESYLATE 10 MILLIGRAM(S): 2.5 TABLET ORAL at 05:46

## 2022-03-31 RX ADMIN — Medication 1300 MILLIGRAM(S): at 17:10

## 2022-03-31 RX ADMIN — MAGNESIUM OXIDE 400 MG ORAL TABLET 400 MILLIGRAM(S): 241.3 TABLET ORAL at 17:09

## 2022-03-31 RX ADMIN — CARVEDILOL PHOSPHATE 25 MILLIGRAM(S): 80 CAPSULE, EXTENDED RELEASE ORAL at 05:46

## 2022-03-31 RX ADMIN — Medication 1300 MILLIGRAM(S): at 05:45

## 2022-03-31 RX ADMIN — Medication 1300 MILLIGRAM(S): at 21:11

## 2022-03-31 RX ADMIN — APIXABAN 2.5 MILLIGRAM(S): 2.5 TABLET, FILM COATED ORAL at 05:45

## 2022-03-31 RX ADMIN — CARVEDILOL PHOSPHATE 25 MILLIGRAM(S): 80 CAPSULE, EXTENDED RELEASE ORAL at 17:09

## 2022-03-31 RX ADMIN — APIXABAN 2.5 MILLIGRAM(S): 2.5 TABLET, FILM COATED ORAL at 17:53

## 2022-03-31 RX ADMIN — ISOSORBIDE DINITRATE 20 MILLIGRAM(S): 5 TABLET ORAL at 05:46

## 2022-03-31 RX ADMIN — Medication 81 MILLIGRAM(S): at 17:09

## 2022-03-31 RX ADMIN — ATORVASTATIN CALCIUM 80 MILLIGRAM(S): 80 TABLET, FILM COATED ORAL at 21:12

## 2022-03-31 NOTE — CHART NOTE - NSCHARTNOTEFT_GEN_A_CORE
PA NOTE-MEDICINE    Called by RN due to Fecal Occult resulting Positive     68 yo Pt PMHX: HTN CKD DM New CVA admitted due to "unsteadiness" Dx with +CVA approx 2 wks ago at [facility] on Eliquis/ASA (Renally Dosed).  3/28 H/H: 10.4/32.7  3/31 H/H: 8.6/27.7    Stat H/H Ordered   If results lower than previous then will hold next dose of Eliquis and Signout to AM Team to follow     continue to Monitor Pt  Recall PA if any changes in Pt Status  Will Follow labs PA NOTE-MEDICINE    Called by RN due to Fecal Occult resulting Positive     68 yo Pt PMHX: HTN CKD DM New CVA admitted due to "unsteadiness" Dx with +CVA approx 2 wks ago at [facility] on Eliquis/ASA (Renally Dosed).  3/28 H/H: 10.4/32.7  3/31 H/H: 8.6/27.7    Stat H/H Ordered   If results lower than previous then will hold next dose of Eliquis and Signout to AM Team to follow     Continue to Monitor Pt  Recall PA if any changes in Pt Status  Will Follow labs    Addendum:   4/1/22 23:24  9/29.8

## 2022-03-31 NOTE — PROGRESS NOTE ADULT - PROBLEM SELECTOR PLAN 3
close f/u on labs, avoid nephrotoxic meds, dialysis plan ? as per nephrology. Patient deferred HD.   - appreciate nephro recs
close f/u on labs, avoid nephrotoxic meds, dialysis plan ? as per nephrology.  - appreciate nephro recs
close f/u on labs, avoid nephrotoxic meds. Patient deferred AVF  - appreciate nephro recs- no indication for emergent HD. Discontinued lasix.  - will monitor renal function one more day

## 2022-03-31 NOTE — PROGRESS NOTE ADULT - SUBJECTIVE AND OBJECTIVE BOX
HPI:  68 y/o M with PMH CAD s/p CABG 2014 (LIMA-LAD reverse SVG to posterior lateral reverse SVG to OM), HFpEF, HTN HLD, renal ca s/p L nephrectomy 10/2020, CKD stage 4, recently admitted to [facility] with near syncope; was found to have acute CVA on MRI. Discharged 1 week ago. Per cardiology office note from today, patient's PT called the office and reported that patient was off balance, and had BP's of 190/90 and 180/90. Patient was advised by NP in office to go to ED based on this report. Patient reports feeling "off," but is unable to describe further symptoms other than generalized fatigue. Denies CP, dyspnea, palpitations, dizziness, lightheadedness, syncope, or near syncope.       Pt. presents to Cath Holding area today for a NOE with   Preop Assessment:  NPO after midnight confirmed  Pt seen and examined  Consent obtained by       ASA and MALL as per Anesthesia        CARDIAC TESTING   ECHO:  Summary:   1. Technically difficult study.   2. Normal global left ventricular systolic function.   3. Left ventricular ejection fraction, by visual estimation, is 55 to   60%.   4. Mildly enlarged left atrium.   5. Normal right atrial size.   6. Mild mitral annular calcification.   7. Mild mitral valve regurgitation.   8. Thickening and calcification of the anterior and posterior mitral   valve leaflets.   9. Moderate tricuspid regurgitation.  10. Sclerotic aortic valve with normal opening.  11. Estimated pulmonary artery systolic pressure is 40.3 mmHg assuming a   right atrial pressure of 5 mmHg, which is consistent with mild pulmonary   hypertension.  12. Trivial pericardial effusion.   HPI:  68 y/o M with PMH CAD s/p CABG 2014 (LIMA-LAD reverse SVG to posterior lateral reverse SVG to OM), HFpEF, HTN HLD, renal ca s/p L nephrectomy 10/2020, CKD stage 4, recently admitted to [facility] with near syncope; was found to have acute CVA on MRI. Discharged 1 week ago. Per cardiology office note from today, patient's PT called the office and reported that patient was off balance, and had BP's of 190/90 and 180/90. Patient was advised by NP in office to go to ED based on this report. Patient reports feeling "off," but is unable to describe further symptoms other than generalized fatigue. Denies CP, dyspnea, palpitations, dizziness, lightheadedness, syncope, or near syncope.       Pt. presents to Cath Holding area today for a NOE with  to r/o any cardioembolic source of CVA  Preop Assessment:  NPO after midnight confirmed  Pt seen and examined/ NSR HR 72,   Consent obtained by   COVID Negative-3/31/22    ASA and MALL as per Anesthesia        CARDIAC TESTING   ECHO:  Summary:   1. Technically difficult study.   2. Normal global left ventricular systolic function.   3. Left ventricular ejection fraction, by visual estimation, is 55 to   60%.   4. Mildly enlarged left atrium.   5. Normal right atrial size.   6. Mild mitral annular calcification.   7. Mild mitral valve regurgitation.   8. Thickening and calcification of the anterior and posterior mitral   valve leaflets.   9. Moderate tricuspid regurgitation.  10. Sclerotic aortic valve with normal opening.  11. Estimated pulmonary artery systolic pressure is 40.3 mmHg assuming a   right atrial pressure of 5 mmHg, which is consistent with mild pulmonary   hypertension.  12. Trivial pericardial effusion.

## 2022-03-31 NOTE — PROGRESS NOTE ADULT - REASON FOR ADMISSION
ataxia
Detail Level: Detailed
ataxia
ataxia

## 2022-03-31 NOTE — PROGRESS NOTE ADULT - PROVIDER SPECIALTY LIST ADULT
Vascular Surgery
Cardiology
Cardiology
Nephrology
Nephrology
Neurology
Rehab Medicine
Hospitalist

## 2022-03-31 NOTE — PROGRESS NOTE ADULT - SUBJECTIVE AND OBJECTIVE BOX
Neurology    ANSHUL CUNNINGHAM 67y Male     Patient is a 67y old  Male who presents with a chief complaint of ataxia (28 Mar 2022 17:46)      HPI:  68 y/o male came in for reported ataxia per triage note but pt. reporting he has been ok, some dizziness ? As per pt. home visiting nurse found his bp to be high and send the pt. to the ER.  pt. was discharged from [facility] 1 week ago and was admitted for pre syncope and during his work up was found to have cva on mri brain. pt. was followed by neurology, cardiology ( was started on eliquis for cva and [facility] monitoring as an out-pt for afib  ) and by nephrology team for his ckd. pt. reports no focal weakness, no cp, no sob. no abd. pain, no n/v/d. mo HA reported. mo speech/ swallow difficulty. no hemoptysis, hematemesis no blood per rectum.  (28 Mar 2022 16:52)    3-28-22  Wife at bedside reports patient was having dizziness and difficulty ambulating.    3-29-22   No new complaints. Ambulating better today.    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          FAMILY HISTORY:  Family history of heart disease (Sibling)        SOCIAL HISTORY:  No history of tobacco or alcohol use     Allergies    No Known Allergies    Intolerances        Height (cm): 165.1 (03-28 @ 13:22)  Weight (kg): 82.5 (03-28 @ 13:22)  BMI (kg/m2): 30.3 (03-28 @ 13:22)          Vital Signs Last 24 Hrs  T(C): 36.4 (31 Mar 2022 20:01), Max: 36.8 (31 Mar 2022 04:35)  T(F): 97.6 (31 Mar 2022 20:01), Max: 98.3 (31 Mar 2022 04:35)  HR: 77 (31 Mar 2022 20:00) (69 - 77)  BP: 144/72 (31 Mar 2022 20:00) (122/65 - 181/78)  BP(mean): 87 (31 Mar 2022 20:00) (67 - 101)  RR: 20 (31 Mar 2022 20:00) (17 - 20)  SpO2: 98% (31 Mar 2022 20:00) (93% - 98%)    MEDICATIONS    amLODIPine   Tablet 10 milliGRAM(s) Oral daily  apixaban 2.5 milliGRAM(s) Oral two times a day  aspirin enteric coated 81 milliGRAM(s) Oral daily  atorvastatin 80 milliGRAM(s) Oral at bedtime  carvedilol 25 milliGRAM(s) Oral every 12 hours  dextrose 5%. 1000 milliLiter(s) IV Continuous <Continuous>  dextrose 5%. 1000 milliLiter(s) IV Continuous <Continuous>  dextrose 50% Injectable 25 Gram(s) IV Push once  dextrose 50% Injectable 12.5 Gram(s) IV Push once  dextrose 50% Injectable 25 Gram(s) IV Push once  dextrose Oral Gel 15 Gram(s) Oral once PRN  epoetin dilia-epbx (RETACRIT) Injectable 49118 Unit(s) SubCutaneous every 7 days  glucagon  Injectable 1 milliGRAM(s) IntraMuscular once  hydrALAZINE 25 milliGRAM(s) Oral every 8 hours  insulin lispro (ADMELOG) corrective regimen sliding scale   SubCutaneous three times a day before meals  insulin lispro (ADMELOG) corrective regimen sliding scale   SubCutaneous at bedtime  iron sucrose IVPB 200 milliGRAM(s) IV Intermittent every 24 hours  isosorbide   dinitrate Tablet (ISORDIL) 20 milliGRAM(s) Oral three times a day  magnesium oxide 400 milliGRAM(s) Oral daily  sodium bicarbonate 1300 milliGRAM(s) Oral three times a day         LABS:  CBC Full  -  ( 31 Mar 2022 07:35 )  WBC Count : 5.72 K/uL  RBC Count : 3.18 M/uL  Hemoglobin : 8.6 g/dL  Hematocrit : 27.7 %  Platelet Count - Automated : 228 K/uL  Mean Cell Volume : 87.1 fl  Mean Cell Hemoglobin : 27.0 pg  Mean Cell Hemoglobin Concentration : 31.0 gm/dL  Auto Neutrophil # : x  Auto Lymphocyte # : x  Auto Monocyte # : x  Auto Eosinophil # : x  Auto Basophil # : x  Auto Neutrophil % : x  Auto Lymphocyte % : x  Auto Monocyte % : x  Auto Eosinophil % : x  Auto Basophil % : x      03-31    144  |  111<H>  |  53.1<H>  ----------------------------<  73  4.8   |  20.0<L>  |  4.24<H>    Ca    8.3<L>      31 Mar 2022 07:35  Phos  3.4     03-31  Mg     2.0     03-31    TPro  5.3<L>  /  Alb  3.1<L>  /  TBili  0.3<L>  /  DBili  x   /  AST  10  /  ALT  10  /  AlkPhos  92  03-31    LIVER FUNCTIONS - ( 31 Mar 2022 07:35 )  Alb: 3.1 g/dL / Pro: 5.3 g/dL / ALK PHOS: 92 U/L / ALT: 10 U/L / AST: 10 U/L / GGT: x             STROKE CORE LABS:    Hemoglobin A1C:   Lipid Panel 03-29 @ 08:49  Total Cholesterol, Serum 109  LDL --  Triglycerides 79    PT/INR - ( 28 Mar 2022 13:54 )   PT: 16.0 sec;   INR: 1.37 ratio    PTT - ( 28 Mar 2022 13:54 )  PTT:39.0 sec      On Neurological Examination:    Mental Status - Patient is  awake, alert and oriented X3.  Speech is fluent. Patient can name, repeat and follow commands correctly  There is no dysarthria.    Cranial Nerves - PERRL, EOMI,  Visual fields are full to finger counting, no gross facial asymmetry, tongue/uvula midline    Motor Exam -   Right upper ---5/5 No drift  Left upper ---5/5 No drift  Right lower ---5/5 No drift  Left lower  ---5/5 No drift     nml bulk/tone    Sensory    Intact to light touch and pinprick bilaterally    Coord: FTN intact bilaterally     Gait -  - slightly wide based but can ambulate unassisted.       RADIOLOGY ( All neurological imaging studies were independently reviewed and interpreted by me)  [facility]   CTA  CTP  CT Brain Stroke Protocol (03.28.22 @ 13:49) >    IMPRESSION:  HEAD CT: Mild volume loss, moderate microvascular disease, no acute   hemorrhage or midline shift.  Chronic lacunar infarcts, unchanged.    Discussed with Dr. Keating in the ED at 1:53 PM. CTA and perfusion not   ordered due to elevated creatinine. MRI may provide helpful additional   evaluation, if clinically indicated.    MIGDALIA DOUGLAS MD; Attending Radiologist      MRI:    MR Head No Cont (03.15.22 @ 04:14) >  IMPRESSION:  FEW SMALL ACUTE INFARCTS LEFT PARIETAL GREATER THAN FRONTAL LOBES.      JULISA ALCALA MD; Attending Radiologist      MR Angio Head No Cont (03.16.22 @ 23:08) >  PRESSION:    1. Suboptimal but grossly diagnostic study, degraded by motion.  2. moderate narrowing of both carotid bifurcations and proximal internal   carotid arteries, which is consistent with ultrasound findings of   3/11/2022. No dissection or occlusion. Patent vertebrals.  3. Atherosclerotic changes noted intracranially, most prevalent in the   posterior cerebral arteries but also visible in the middle cerebral   arteries are proximally. No occlusion identified.    AMARJIT SAINI MD; Attending Radiologist    TTE  TTE Echo Complete w/o Contrast w/ Doppler (01.14.22 @ 19:06) >    Summary:   1. Technically difficult study.   2. Normal global left ventricular systolic function.   3. Left ventricular ejection fraction, by visual estimation, is 55 to   60%.   4. Mildly enlarged left atrium.   5. Normal right atrial size.   6.Mild mitral annular calcification.   7. Mild mitral valve regurgitation.   8. Thickening and calcification of the anterior and posterior mitral   valve leaflets.   9. Moderate tricuspid regurgitation.  10. Sclerotic aortic valve with normal opening.  11. Estimated pulmonary artery systolic pressure is 40.3 mmHg assuming a   right atrial pressure of 5 mmHg, which is consistent with mild pulmonary   hypertension.  12. Trivial pericardial effusion.    MD Junie Electronically signed on 1/15/2022 at 10:13:45 AM      MR Head No Cont (03.28.22 @ 20:02) >      IMPRESSION:  1.  Subacute infarcts in the left parietal and frontal lobes,   corresponding to acute infarcts seen on 3/15/2022.  2.  No new areas of acute infarct, intracranial hemorrhage, or mass   effect.    JODY BURTON MD; Resident Radiologist  This document has been electronically signed.  EVERARDO SANTIZO MD; Attending Radiologist

## 2022-03-31 NOTE — PROGRESS NOTE ADULT - PROBLEM SELECTOR PLAN 7
- DVT ppx- Eliquis  - Diet- DASH diet  - Dispo- F/u PT/OT- home with supervision. At baseline, patient ambulates with a walker.   - PMR recs- home    - Updated patient's daughter, Angelica ([phone]) on 3/31.

## 2022-03-31 NOTE — PROGRESS NOTE ADULT - PROBLEM SELECTOR PLAN 4
h/o CAD, continue asa, coreg and statin  - appreciate cardio recs- recent negative for NST on 2/2022

## 2022-03-31 NOTE — PROGRESS NOTE ADULT - SUBJECTIVE AND OBJECTIVE BOX
[facility] Division of Hospital Medicine    Chief Complaint: ataxia      SUBJECTIVE / OVERNIGHT EVENTS: No acute events overnight. HD stable.     Patient denies chest pain, SOB, abd pain, N/V, fever, chills, dysuria or any other complaints. All remainder ROS negative.     MEDICATIONS  (STANDING):  amLODIPine   Tablet 10 milliGRAM(s) Oral daily  apixaban 2.5 milliGRAM(s) Oral two times a day  aspirin enteric coated 81 milliGRAM(s) Oral daily  atorvastatin 80 milliGRAM(s) Oral at bedtime  carvedilol 25 milliGRAM(s) Oral every 12 hours  dextrose 5%. 1000 milliLiter(s) (100 mL/Hr) IV Continuous <Continuous>  dextrose 5%. 1000 milliLiter(s) (50 mL/Hr) IV Continuous <Continuous>  dextrose 50% Injectable 25 Gram(s) IV Push once  dextrose 50% Injectable 12.5 Gram(s) IV Push once  dextrose 50% Injectable 25 Gram(s) IV Push once  epoetin dilia-epbx (RETACRIT) Injectable 48255 Unit(s) SubCutaneous every 7 days  glucagon  Injectable 1 milliGRAM(s) IntraMuscular once  hydrALAZINE 25 milliGRAM(s) Oral every 8 hours  insulin lispro (ADMELOG) corrective regimen sliding scale   SubCutaneous three times a day before meals  insulin lispro (ADMELOG) corrective regimen sliding scale   SubCutaneous at bedtime  iron sucrose IVPB 200 milliGRAM(s) IV Intermittent every 24 hours  isosorbide   dinitrate Tablet (ISORDIL) 20 milliGRAM(s) Oral three times a day  magnesium oxide 400 milliGRAM(s) Oral daily  sodium bicarbonate 1300 milliGRAM(s) Oral three times a day    MEDICATIONS  (PRN):  dextrose Oral Gel 15 Gram(s) Oral once PRN Blood Glucose LESS THAN 70 milliGRAM(s)/deciliter        I&O's Summary    30 Mar 2022 07:01  -  31 Mar 2022 07:00  --------------------------------------------------------  IN: 420 mL / OUT: 125 mL / NET: 295 mL    31 Mar 2022 07:01  -  31 Mar 2022 12:54  --------------------------------------------------------  IN: 0 mL / OUT: 300 mL / NET: -300 mL        PHYSICAL EXAM:  Vital Signs Last 24 Hrs  T(C): 36.7 (31 Mar 2022 12:15), Max: 36.8 (31 Mar 2022 04:35)  T(F): 98.1 (31 Mar 2022 12:15), Max: 98.3 (31 Mar 2022 04:35)  HR: 72 (31 Mar 2022 12:15) (68 - 78)  BP: 181/78 (31 Mar 2022 12:15) (120/60 - 181/78)  BP(mean): 80 (31 Mar 2022 05:45) (76 - 80)  RR: 17 (31 Mar 2022 12:15) (17 - 18)  SpO2: 98% (31 Mar 2022 12:15) (96% - 98%)    CONSTITUTIONAL: NAD, well-developed  RESPIRATORY: Normal respiratory effort; lungs are clear to auscultation bilaterally  CARDIOVASCULAR: Regular rate and rhythm, normal S1 and S2  ABDOMEN: Nontender to palpation, normoactive bowel sounds  PSYCH: A+O to person, place, and time; affect appropriate  NEUROLOGY: CN 2-12 are intact and symmetric; no gross sensory deficits;   SKIN: No rashes; no palpable lesions    LABS:                        8.6    5.72  )-----------( 228      ( 31 Mar 2022 07:35 )             27.7     03-31    144  |  111<H>  |  53.1<H>  ----------------------------<  73  4.8   |  20.0<L>  |  4.24<H>    Ca    8.3<L>      31 Mar 2022 07:35  Phos  3.4     03-31  Mg     2.0     03-31    TPro  5.3<L>  /  Alb  3.1<L>  /  TBili  0.3<L>  /  DBili  x   /  AST  10  /  ALT  10  /  AlkPhos  92  03-31              Culture - Urine (collected 28 Mar 2022 22:13)  Source: Clean Catch Clean Catch (Midstream)  Final Report (29 Mar 2022 18:52):    <10,000 CFU/mL Normal Urogenital Tracy      CAPILLARY BLOOD GLUCOSE      POCT Blood Glucose.: 76 mg/dL (31 Mar 2022 08:47)  POCT Blood Glucose.: 115 mg/dL (30 Mar 2022 21:34)  POCT Blood Glucose.: 100 mg/dL (30 Mar 2022 17:37)  POCT Blood Glucose.: 111 mg/dL (30 Mar 2022 13:10)        RADIOLOGY & ADDITIONAL TESTS:  Results Reviewed:   Imaging Personally Reviewed:  Electrocardiogram Personally Reviewed:

## 2022-03-31 NOTE — PROGRESS NOTE ADULT - PROBLEM SELECTOR PLAN 1
- R/o posterior circulation stroke vs TIA  - MRI-H- subacute infarcts in the left parietal and frontal lobes, corresponding to acute infarcts seen on 3/15/2022.  No new areas of acute infarct, intracranial hemorrhage, or mass   effect.  - US carotid (3/11)- LT ICA stenosis 50-69%  - c/w ASA and Eliquis given recent CVA. Discussed with cardiology, will c/w eliquis and have patient follow up outpatient for [facility]t monitoring to evaluate for pA.fib  - Vascular c/s placed- Appreciate recs. Discussed with neurology given CKD and management will hold off on CTA H/N. No new stroke findings, will hold off on increasing ASA  - F/u NOE on 3/31

## 2022-03-31 NOTE — CHART NOTE - NSCHARTNOTEFT_GEN_A_CORE
S/p NOE today; no intracardiac source of emboli identified  Patient found to have severe TR; follow up outpatient with Dr. Pro   Outpatient [facility] to evaluate for AF     No further inpatient cardiac workup needed at this time   Please contact me with any further questions

## 2022-03-31 NOTE — PROGRESS NOTE ADULT - SUBJECTIVE AND OBJECTIVE BOX
Patient seen and examined    Feels fine  no c/o CP SOB NV   No swelling feet    Vital Signs Last 24 Hrs  T(C): 36.7 (31 Mar 2022 12:15), Max: 36.8 (31 Mar 2022 04:35)  T(F): 98.1 (31 Mar 2022 12:15), Max: 98.3 (31 Mar 2022 04:35)  HR: 71 (31 Mar 2022 15:00) (68 - 77)  BP: 139/56 (31 Mar 2022 15:00) (120/60 - 181/78)  BP(mean): 67 (31 Mar 2022 15:00) (67 - 80)  RR: 18 (31 Mar 2022 15:00) (17 - 20)  SpO2: 97% (31 Mar 2022 15:00) (93% - 98%)    PHYSICAL EXAM    GENERAL: NAD,   EYES:  conjunctiva and sclera clear  NECK: Supple, No JVD/Bruit  NERVOUS SYSTEM:  A/O x3,   CHEST:  No rales, No rhonchi  HEART:  RRR, No murmur  ABDOMEN: Soft, NT/ND BS+  EXTREMITIES:  No Edema;  SKIN: No rashes    31 Mar 2022 07:35    144    |  111    |  53.1   ----------------------------<  73     4.8     |  20.0   |  4.24     Ca    8.3        31 Mar 2022 07:35  Phos  3.4       31 Mar 2022 07:35  Mg     2.0       31 Mar 2022 07:35    TPro  5.3    /  Alb  3.1    /  TBili  0.3    /  DBili  x      /  AST  10     /  ALT  10     /  AlkPhos  92     31 Mar 2022 07:35                          8.6    5.72  )-----------( 228      ( 31 Mar 2022 07:35 )             27.7         s/p NOE - no obv source of vegetations, Severe TR noted

## 2022-03-31 NOTE — PROGRESS NOTE ADULT - SUBJECTIVE AND OBJECTIVE BOX
Pt doing well s/p uncomplicated NOE- No PFO, No ckots, EF-65%, severe TR, Mod MR Denies complaint.     General: No fatigue, no fevers/chills  Respiratory: No dyspnea, no cough, no wheeze  CV: No chest pain, no palpitations  Abd: No nausea  Neuro: No headache, no dizziness    Exam:   VSS, NAD, A&O x 3  Skin: no erythema  Card: S1/S2, RRR, no m/g/r  Resp: lungs CTA b/l  Abd: S/NT/ND  Ext: no edema, distal pulses intact    Assessment:   67y Male h/o CVA, now status post NOE negative for YELENA thrombus, No PFO, No ckots, EF-65%, severe TR, Mod MR with Dr Chapman    Plan:   Observation on telemetry per post op protocol.    Resume PO intake once + gag reflex  Ambulate w/ assist once fully awake & back to baseline mental status w/ VSS.  Resume current  medications.   Return back to inpatient unit.

## 2022-03-31 NOTE — PROGRESS NOTE ADULT - ASSESSMENT
66 yo male with PMH of CAD s/p CABG, PAD, DM2,  TIA, HLD, HTN, renal Ca s/p L nephrectomy 10/2020, CKD 4, ischemic CMP, Advanced CKD - CAD / CABG , ICM , TIA , prior CVA   DM Nephropathy / HTN   Renal sono 3/14 Echogenic kidneys no hydronephrosis  No nephrotoxic meds or IV contrast   Lasix on hold   Updated MRI noted   Awaits future AVF   Follows with DR Andersen     HTN - Better  with med resumption     MA - Oral bicarb     Anemia -Add venofer,, will add Epogen     Supplement Mag      daughter Angelica phone ( [phone] ) --> giving periodic updates    
68 yo male with PMH of CAD s/p CABG, PAD, DM2,  TIA, HLD, HTN, renal Ca s/p L nephrectomy 10/2020, CKD 4, ischemic CMP, Advanced CKD - CAD / CABG , ICM , TIA , prior CVA   DM Nephropathy / HTN   Renal sono 3/14 Echogenic kidneys no hydronephrosis  No nephrotoxic meds or IV contrast   Lasix on hold ; Creat stable around 4.2  Updated MRI noted   Awaits future AVF but daughter not intested at present; d/w pt again    HTN - Better  with med resumption     MA - Oral bicarb     Anemia -Add venofer,, and Epogen     Supplement Mag      daughter Angelica phone ( [phone] ) --> giving periodic updates    
IMPRESSION:  - R/O posterior circulation stroke versus TIA  Risk Factors. HTN DM ? Afib    ASSESSMENT/ PLAN:     - Neuro checks and vital signs Q 4 hours.  - BP Goal -normotensive.  - Continue Home dose Eliquis and ASA 81 mg PO  QD.  - Lipitor for LDL goal of < 70 .  - CT/CTA/CTP- not performed due to CKD  - MRI Brain stroke protocol.- shows no new infarcts.  Prior subacute left parietal and frontal infarcts noted.  - PT OT  evaluation.  - SCD/Eliquis for DVT prophylaxis.        
IMPRESSION:  posterior circulation  TIA  Risk Factors. HTN DM ? Afib    ASSESSMENT/ PLAN:     - Neuro checks and vital signs Q 4 hours.  - BP Goal -normotensive.  - Continue Home dose Eliquis and ASA 81 mg PO  QD.  - Lipitor for LDL goal of < 70 .  - CT/CTA/CTP- not performed due to CKD  - MRI Brain stroke protocol.- shows no new infarcts.  Prior subacute left parietal and frontal infarcts noted.  - PT OT  evaluation.  - SCD/Eliquis for DVT prophylaxis.        
IMPRESSION:  - R/O posterior circulation stroke versus TIA  Risk Factors. HTN DM ? Afib    ASSESSMENT/ PLAN:     - Neuro checks and vital signs Q 4 hours.  - BP Goal -normotensive.  - Continue Home dose Eliquis and ASA 81 mg PO  QD.  - Lipitor for LDL goal of < 70 .  - CT/CTA/CTP- not performed due to CKD  - MRI Brain stroke protocol.- shows no new infarcts.  Prior subacute left parietal and frontal infarcts noted.  - PT OT  evaluation.  - SCD/Eliquis for DVT prophylaxis.        
66 y/o male came in for reported ataxia per triage note but pt. reporting he has been ok, some dizziness ? As per pt. home visiting nurse found his bp to be high and send the pt. to the ER.  pt. was discharged from [facility] 1 week ago and was admitted for pre syncope and during his work up was found to have cva on mri brain. pt. was followed by neurology, cardiology ( was started on eliquis for cva and [facility] monitoring as an out-pt for afib  ) and by nephrology team for his ckd. pt. reports no focal weakness, no cp, no sob. no abd. pain, no n/v/d. mo HA reported. mo speech/ swallow difficulty. no hemoptysis, hematemesis no blood per rectum. 
68 y/o male came in for reported ataxia per triage note but pt. reporting he has been ok, some dizziness ? As per pt. home visiting nurse found his bp to be high and send the pt. to the ER.  pt. was discharged from [facility] 1 week ago and was admitted for pre syncope and during his work up was found to have cva on mri brain. pt. was followed by neurology, cardiology ( was started on eliquis for cva and [facility] monitoring as an out-pt for afib  ) and by nephrology team for his ckd. pt. reports no focal weakness, no cp, no sob. no abd. pain, no n/v/d. mo HA reported. mo speech/ swallow difficulty. no hemoptysis, hematemesis no blood per rectum. 
68 y/o male came in for reported ataxia per triage note but pt. reporting he has been ok, some dizziness ? As per pt. home visiting nurse found his bp to be high and send the pt. to the ER.  pt. was discharged from [facility] 1 week ago and was admitted for pre syncope and during his work up was found to have cva on mri brain. pt. was followed by neurology, cardiology ( was started on eliquis for cva and [facility] monitoring as an out-pt for afib  ) and by nephrology team for his ckd. pt. reports no focal weakness, no cp, no sob. no abd. pain, no n/v/d. mo HA reported. mo speech/ swallow difficulty. no hemoptysis, hematemesis no blood per rectum.

## 2022-04-01 ENCOUNTER — TRANSCRIPTION ENCOUNTER (OUTPATIENT)
Age: 67
End: 2022-04-01

## 2022-04-01 VITALS
SYSTOLIC BLOOD PRESSURE: 148 MMHG | RESPIRATION RATE: 21 BRPM | HEART RATE: 70 BPM | DIASTOLIC BLOOD PRESSURE: 51 MMHG | OXYGEN SATURATION: 97 % | TEMPERATURE: 97 F

## 2022-04-01 LAB
ALBUMIN SERPL ELPH-MCNC: 3.2 G/DL — LOW (ref 3.3–5.2)
ALP SERPL-CCNC: 101 U/L — SIGNIFICANT CHANGE UP (ref 40–120)
ALT FLD-CCNC: 11 U/L — SIGNIFICANT CHANGE UP
ANION GAP SERPL CALC-SCNC: 11 MMOL/L — SIGNIFICANT CHANGE UP (ref 5–17)
AST SERPL-CCNC: 10 U/L — SIGNIFICANT CHANGE UP
BILIRUB SERPL-MCNC: 0.3 MG/DL — LOW (ref 0.4–2)
BUN SERPL-MCNC: 50.9 MG/DL — HIGH (ref 8–20)
CALCIUM SERPL-MCNC: 8.3 MG/DL — LOW (ref 8.6–10.2)
CHLORIDE SERPL-SCNC: 112 MMOL/L — HIGH (ref 98–107)
CO2 SERPL-SCNC: 20 MMOL/L — LOW (ref 22–29)
CREAT SERPL-MCNC: 4.67 MG/DL — HIGH (ref 0.5–1.3)
EGFR: 13 ML/MIN/1.73M2 — LOW
GLUCOSE BLDC GLUCOMTR-MCNC: 108 MG/DL — HIGH (ref 70–99)
GLUCOSE SERPL-MCNC: 108 MG/DL — HIGH (ref 70–99)
HCT VFR BLD CALC: 28.8 % — LOW (ref 39–50)
HGB BLD-MCNC: 9.1 G/DL — LOW (ref 13–17)
MCHC RBC-ENTMCNC: 27.7 PG — SIGNIFICANT CHANGE UP (ref 27–34)
MCHC RBC-ENTMCNC: 31.6 GM/DL — LOW (ref 32–36)
MCV RBC AUTO: 87.5 FL — SIGNIFICANT CHANGE UP (ref 80–100)
PLATELET # BLD AUTO: 212 K/UL — SIGNIFICANT CHANGE UP (ref 150–400)
POTASSIUM SERPL-MCNC: 5 MMOL/L — SIGNIFICANT CHANGE UP (ref 3.5–5.3)
POTASSIUM SERPL-SCNC: 5 MMOL/L — SIGNIFICANT CHANGE UP (ref 3.5–5.3)
PROT SERPL-MCNC: 5.6 G/DL — LOW (ref 6.6–8.7)
RBC # BLD: 3.29 M/UL — LOW (ref 4.2–5.8)
RBC # FLD: 14.6 % — HIGH (ref 10.3–14.5)
SODIUM SERPL-SCNC: 143 MMOL/L — SIGNIFICANT CHANGE UP (ref 135–145)
WBC # BLD: 6.21 K/UL — SIGNIFICANT CHANGE UP (ref 3.8–10.5)
WBC # FLD AUTO: 6.21 K/UL — SIGNIFICANT CHANGE UP (ref 3.8–10.5)

## 2022-04-01 PROCEDURE — 84100 ASSAY OF PHOSPHORUS: CPT

## 2022-04-01 PROCEDURE — 83550 IRON BINDING TEST: CPT

## 2022-04-01 PROCEDURE — 70450 CT HEAD/BRAIN W/O DYE: CPT | Mod: MA

## 2022-04-01 PROCEDURE — 83036 HEMOGLOBIN GLYCOSYLATED A1C: CPT

## 2022-04-01 PROCEDURE — 81001 URINALYSIS AUTO W/SCOPE: CPT

## 2022-04-01 PROCEDURE — 93320 DOPPLER ECHO COMPLETE: CPT

## 2022-04-01 PROCEDURE — 87086 URINE CULTURE/COLONY COUNT: CPT

## 2022-04-01 PROCEDURE — 82962 GLUCOSE BLOOD TEST: CPT

## 2022-04-01 PROCEDURE — 80048 BASIC METABOLIC PNL TOTAL CA: CPT

## 2022-04-01 PROCEDURE — 85610 PROTHROMBIN TIME: CPT

## 2022-04-01 PROCEDURE — 83540 ASSAY OF IRON: CPT

## 2022-04-01 PROCEDURE — 70551 MRI BRAIN STEM W/O DYE: CPT | Mod: MA

## 2022-04-01 PROCEDURE — 87637 SARSCOV2&INF A&B&RSV AMP PRB: CPT

## 2022-04-01 PROCEDURE — 83735 ASSAY OF MAGNESIUM: CPT

## 2022-04-01 PROCEDURE — 85730 THROMBOPLASTIN TIME PARTIAL: CPT

## 2022-04-01 PROCEDURE — 99239 HOSP IP/OBS DSCHRG MGMT >30: CPT

## 2022-04-01 PROCEDURE — 80053 COMPREHEN METABOLIC PANEL: CPT

## 2022-04-01 PROCEDURE — 85014 HEMATOCRIT: CPT

## 2022-04-01 PROCEDURE — U0003: CPT

## 2022-04-01 PROCEDURE — 93325 DOPPLER ECHO COLOR FLOW MAPG: CPT

## 2022-04-01 PROCEDURE — 85018 HEMOGLOBIN: CPT

## 2022-04-01 PROCEDURE — 93005 ELECTROCARDIOGRAM TRACING: CPT

## 2022-04-01 PROCEDURE — 84466 ASSAY OF TRANSFERRIN: CPT

## 2022-04-01 PROCEDURE — 99285 EMERGENCY DEPT VISIT HI MDM: CPT

## 2022-04-01 PROCEDURE — 82270 OCCULT BLOOD FECES: CPT

## 2022-04-01 PROCEDURE — U0005: CPT

## 2022-04-01 PROCEDURE — 93312 ECHO TRANSESOPHAGEAL: CPT

## 2022-04-01 PROCEDURE — 85025 COMPLETE CBC W/AUTO DIFF WBC: CPT

## 2022-04-01 PROCEDURE — 36415 COLL VENOUS BLD VENIPUNCTURE: CPT

## 2022-04-01 PROCEDURE — 82728 ASSAY OF FERRITIN: CPT

## 2022-04-01 PROCEDURE — 71045 X-RAY EXAM CHEST 1 VIEW: CPT

## 2022-04-01 PROCEDURE — 84484 ASSAY OF TROPONIN QUANT: CPT

## 2022-04-01 PROCEDURE — 85027 COMPLETE CBC AUTOMATED: CPT

## 2022-04-01 PROCEDURE — 80061 LIPID PANEL: CPT

## 2022-04-01 RX ORDER — AMLODIPINE BESYLATE 2.5 MG/1
1 TABLET ORAL
Qty: 0 | Refills: 0 | DISCHARGE
Start: 2022-04-01

## 2022-04-01 RX ORDER — ATORVASTATIN CALCIUM 80 MG/1
1 TABLET, FILM COATED ORAL
Qty: 30 | Refills: 0
Start: 2022-04-01 | End: 2022-04-30

## 2022-04-01 RX ORDER — SODIUM BICARBONATE 1 MEQ/ML
2 SYRINGE (ML) INTRAVENOUS
Qty: 0 | Refills: 0 | DISCHARGE
Start: 2022-04-01

## 2022-04-01 RX ORDER — CARVEDILOL PHOSPHATE 80 MG/1
1 CAPSULE, EXTENDED RELEASE ORAL
Qty: 0 | Refills: 0 | DISCHARGE
Start: 2022-04-01

## 2022-04-01 RX ORDER — ISOSORBIDE DINITRATE 5 MG/1
1 TABLET ORAL
Qty: 0 | Refills: 0 | DISCHARGE
Start: 2022-04-01

## 2022-04-01 RX ORDER — MAGNESIUM OXIDE 400 MG ORAL TABLET 241.3 MG
1 TABLET ORAL
Qty: 0 | Refills: 0 | DISCHARGE
Start: 2022-04-01

## 2022-04-01 RX ORDER — HYDRALAZINE HCL 50 MG
1 TABLET ORAL
Qty: 90 | Refills: 0
Start: 2022-04-01 | End: 2022-04-30

## 2022-04-01 RX ORDER — INSULIN DEGLUDEC 100 U/ML
0 INJECTION, SOLUTION SUBCUTANEOUS
Qty: 0 | Refills: 0 | DISCHARGE

## 2022-04-01 RX ORDER — PANTOPRAZOLE SODIUM 20 MG/1
1 TABLET, DELAYED RELEASE ORAL
Qty: 30 | Refills: 0
Start: 2022-04-01 | End: 2022-04-30

## 2022-04-01 RX ADMIN — MAGNESIUM OXIDE 400 MG ORAL TABLET 400 MILLIGRAM(S): 241.3 TABLET ORAL at 11:57

## 2022-04-01 RX ADMIN — CARVEDILOL PHOSPHATE 25 MILLIGRAM(S): 80 CAPSULE, EXTENDED RELEASE ORAL at 05:07

## 2022-04-01 RX ADMIN — ISOSORBIDE DINITRATE 20 MILLIGRAM(S): 5 TABLET ORAL at 05:07

## 2022-04-01 RX ADMIN — Medication 81 MILLIGRAM(S): at 11:57

## 2022-04-01 RX ADMIN — AMLODIPINE BESYLATE 10 MILLIGRAM(S): 2.5 TABLET ORAL at 05:06

## 2022-04-01 RX ADMIN — Medication 1300 MILLIGRAM(S): at 05:06

## 2022-04-01 RX ADMIN — ISOSORBIDE DINITRATE 20 MILLIGRAM(S): 5 TABLET ORAL at 11:57

## 2022-04-01 RX ADMIN — APIXABAN 2.5 MILLIGRAM(S): 2.5 TABLET, FILM COATED ORAL at 05:07

## 2022-04-01 RX ADMIN — PANTOPRAZOLE SODIUM 20 MILLIGRAM(S): 20 TABLET, DELAYED RELEASE ORAL at 11:58

## 2022-04-01 RX ADMIN — Medication 25 MILLIGRAM(S): at 05:06

## 2022-04-01 NOTE — DISCHARGE NOTE PROVIDER - CARE PROVIDER_API CALL
Siomara Pro)  Cardiology; Internal Medicine  [address], [address]  [place], [place] [id number]  Phone: [phone]  Fax: [phone]  Follow Up Time:     Martita Wilson)  Vascular Surgery  [address] [place] [address]  Phone: [phone]  Fax: [phone]  Follow Up Time:

## 2022-04-01 NOTE — DISCHARGE NOTE NURSING/CASE MANAGEMENT/SOCIAL WORK - PATIENT PORTAL LINK FT
You can access the FollowMyHealth Patient Portal offered by [facility] by registering at the following website: [email or web address]. By joining [employer]’s FollowMyHealth portal, you will also be able to view your health information using other applications (apps) compatible with our system.

## 2022-04-01 NOTE — DISCHARGE NOTE PROVIDER - NSDCMRMEDTOKEN_GEN_ALL_CORE_FT
amLODIPine 10 mg oral tablet: 1 tab(s) orally once a day  aspirin 81 mg oral tablet, chewable: 1 tab(s) orally once a day  atorvastatin 80 mg oral tablet: 1 tab(s) orally once a day (at bedtime)  carvedilol 25 mg oral tablet: 1 tab(s) orally every 12 hours  Eliquis 2.5 mg oral tablet: 1 tab(s) orally 2 times a day   hydrALAZINE 25 mg oral tablet: 1 tab(s) orally every 8 hours  isosorbide dinitrate 20 mg oral tablet: 1 tab(s) orally 3 times a day  magnesium oxide 400 mg oral tablet: 1 tab(s) orally once a day  Protonix 40 mg oral delayed release tablet: 1 tab(s) orally once a day   rolling walker: 1 rolling Walker   sodium bicarbonate 650 mg oral tablet: 2 tab(s) orally 3 times a day   amLODIPine 10 mg oral tablet: 1 tab(s) orally once a day  aspirin 81 mg oral tablet, chewable: 1 tab(s) orally once a day  atorvastatin 80 mg oral tablet: 1 tab(s) orally once a day (at bedtime)  carvedilol 25 mg oral tablet: 1 tab(s) orally every 12 hours  Eliquis 2.5 mg oral tablet: 1 tab(s) orally 2 times a day   hydrALAZINE 50 mg oral tablet: 1 tab(s) orally every 8 hours   isosorbide dinitrate 20 mg oral tablet: 1 tab(s) orally 3 times a day  magnesium oxide 400 mg oral tablet: 1 tab(s) orally once a day  Protonix 40 mg oral delayed release tablet: 1 tab(s) orally once a day   rolling walker: 1 rolling Walker   sodium bicarbonate 650 mg oral tablet: 2 tab(s) orally 3 times a day

## 2022-04-01 NOTE — DISCHARGE NOTE PROVIDER - CARE PROVIDERS DIRECT ADDRESSES
,[email or web address]

## 2022-04-01 NOTE — DISCHARGE NOTE PROVIDER - ATTENDING DISCHARGE PHYSICAL EXAMINATION:
Vital Signs Last 24 Hrs  T(F): 97.1 (01 Apr 2022 08:03), Max: 98.1 (31 Mar 2022 12:15)  HR: 73 (01 Apr 2022 08:03) (66 - 80)  BP: 105/64 (01 Apr 2022 08:03) (105/64 - 181/78)  RR: 18 (01 Apr 2022 04:00) (16 - 20)  SpO2: 96% (01 Apr 2022 08:03) (93% - 98%)    Physical Exam:  Constitutional: NAD, awake and alert, well-developed  Neck: Soft and supple, No LAD, No JVD  Respiratory: cta b/l no wheezing no rhonchi  Cardiovascular: +s1/s2 no edema b/l le  Gastrointestinal: soft nt nd bs+  Vascular: 2+ peripheral pulses  Neurological: A/O x 3, no focal deficits

## 2022-04-01 NOTE — DISCHARGE NOTE NURSING/CASE MANAGEMENT/SOCIAL WORK - NSDCPEFALRISK_GEN_ALL_CORE
For information on Fall & Injury Prevention, visit: [email or web address] OR  [email or web address] OR  https://www.cdc.gov/steadi/patient.html

## 2022-04-01 NOTE — DISCHARGE NOTE PROVIDER - NSDCFUSCHEDAPPT_GEN_ALL_CORE_FT
ANSHUL CUNNINGHAM ; 04/05/2022 ; [facility] [address]  ANSHUL CUNNINGHAM ; 04/05/2022 ; [facility] [address]   ANSHUL CUNNINGHAM ; 05/24/2022 ; [facility] [address]

## 2022-04-01 NOTE — DISCHARGE NOTE PROVIDER - NSDCCPCAREPLAN_GEN_ALL_CORE_FT
PRINCIPAL DISCHARGE DIAGNOSIS  Diagnosis: Ataxia  Assessment and Plan of Treatment: You presented with ataxia. Your MRI-H showed no new areas of infacrt. You underwent a NOE that did not reveal cardioembolic source.      SECONDARY DISCHARGE DIAGNOSES  Diagnosis: Accelerated hypertension  Assessment and Plan of Treatment: Please continue taking your home medications, as prescribed. We have held your lasix due to worsening renal function.     PRINCIPAL DISCHARGE DIAGNOSIS  Diagnosis: Ataxia  Assessment and Plan of Treatment: You presented with ataxia. Your MRI-H showed no new areas of infacrt. You underwent a NOE that did not reveal cardioembolic source.      SECONDARY DISCHARGE DIAGNOSES  Diagnosis: Accelerated hypertension  Assessment and Plan of Treatment: Please continue taking your home medications, as prescribed. We have held your lasix due to worsening renal function. Please follow up with Dr. Pro for outpatient [facility]OT evaluation for A. fib.

## 2022-04-05 ENCOUNTER — APPOINTMENT (OUTPATIENT)
Dept: FAMILY MEDICINE | Facility: CLINIC | Age: 67
End: 2022-04-05
Payer: MEDICARE

## 2022-04-05 ENCOUNTER — APPOINTMENT (OUTPATIENT)
Dept: CARDIOLOGY | Facility: CLINIC | Age: 67
End: 2022-04-05
Payer: MEDICARE

## 2022-04-05 VITALS
OXYGEN SATURATION: 98 % | HEIGHT: 65 IN | TEMPERATURE: 98.2 F | RESPIRATION RATE: 16 BRPM | HEART RATE: 68 BPM | BODY MASS INDEX: 29.82 KG/M2 | SYSTOLIC BLOOD PRESSURE: 150 MMHG | DIASTOLIC BLOOD PRESSURE: 70 MMHG | WEIGHT: 179 LBS

## 2022-04-05 VITALS
TEMPERATURE: 98.1 F | OXYGEN SATURATION: 95 % | BODY MASS INDEX: 29.82 KG/M2 | HEART RATE: 60 BPM | WEIGHT: 179 LBS | SYSTOLIC BLOOD PRESSURE: 156 MMHG | DIASTOLIC BLOOD PRESSURE: 74 MMHG | HEIGHT: 65 IN

## 2022-04-05 VITALS — SYSTOLIC BLOOD PRESSURE: 150 MMHG | DIASTOLIC BLOOD PRESSURE: 70 MMHG

## 2022-04-05 DIAGNOSIS — Z76.89 PERSONS ENCOUNTERING HEALTH SERVICES IN OTHER SPECIFIED CIRCUMSTANCES: ICD-10-CM

## 2022-04-05 DIAGNOSIS — R31.21 ASYMPTOMATIC MICROSCOPIC HEMATURIA: ICD-10-CM

## 2022-04-05 PROBLEM — I63.9 CEREBRAL INFARCTION, UNSPECIFIED: Chronic | Status: ACTIVE | Noted: 2022-03-28

## 2022-04-05 PROCEDURE — 36415 COLL VENOUS BLD VENIPUNCTURE: CPT

## 2022-04-05 PROCEDURE — 99204 OFFICE O/P NEW MOD 45 MIN: CPT | Mod: 25

## 2022-04-05 PROCEDURE — 93000 ELECTROCARDIOGRAM COMPLETE: CPT

## 2022-04-05 PROCEDURE — 99214 OFFICE O/P EST MOD 30 MIN: CPT

## 2022-04-05 PROCEDURE — G0444 DEPRESSION SCREEN ANNUAL: CPT | Mod: 59

## 2022-04-05 RX ORDER — PRAMOXINE HYDROCHLORIDE AND ZINC ACETATE 10; 1 MG/ML; MG/ML
1-0.1 LOTION TOPICAL
Refills: 0 | Status: DISCONTINUED | COMMUNITY
End: 2022-04-05

## 2022-04-05 RX ORDER — CILOSTAZOL 100 MG/1
100 TABLET ORAL TWICE DAILY
Qty: 60 | Refills: 4 | Status: DISCONTINUED | COMMUNITY
Start: 2022-03-08 | End: 2022-04-05

## 2022-04-05 RX ORDER — HYDRALAZINE HYDROCHLORIDE AND ISOSORBIDE DINITRATE 37.5; 2 MG/1; MG/1
20-37.5 TABLET, FILM COATED ORAL 3 TIMES DAILY
Qty: 270 | Refills: 1 | Status: DISCONTINUED | COMMUNITY
Start: 2019-06-11 | End: 2022-04-05

## 2022-04-05 NOTE — COUNSELING
[Fall prevention counseling provided] : Fall prevention counseling provided [Adequate lighting] : Adequate lighting [No throw rugs] : No throw rugs [Use proper foot wear] : Use proper foot wear [Use recommended devices] : Use recommended devices [Behavioral health counseling provided] : Behavioral health counseling provided [Sleep ___ hours/day] : Sleep [unfilled] hours/day [Engage in a relaxing activity] : Engage in a relaxing activity [Plan in advance] : Plan in advance [AUDIT-C Screening administered and reviewed] : AUDIT-C Screening administered and reviewed [Benefits of weight loss discussed] : Benefits of weight loss discussed [Encouraged to increase physical activity] : Encouraged to increase physical activity [Decrease Portions] : decrease portions [____ min/wk Activity] : [unfilled] min/wk activity [None] : None [Good understanding] : Patient has a good understanding of lifestyle changes and steps needed to achieve self management goal

## 2022-04-07 LAB — GLUCOSE BLDC GLUCOMTR-MCNC: 75 MG/DL — SIGNIFICANT CHANGE UP (ref 70–99)

## 2022-04-11 LAB
ALBUMIN SERPL ELPH-MCNC: 4.1 G/DL
ANION GAP SERPL CALC-SCNC: 13 MMOL/L
BASOPHILS # BLD AUTO: 0.06 K/UL
BASOPHILS NFR BLD AUTO: 0.8 %
BUN SERPL-MCNC: 39 MG/DL
CALCIUM SERPL-MCNC: 9.1 MG/DL
CHLORIDE SERPL-SCNC: 109 MMOL/L
CO2 SERPL-SCNC: 20 MMOL/L
CREAT SERPL-MCNC: 4.49 MG/DL
EGFR: 13 ML/MIN/1.73M2
EOSINOPHIL # BLD AUTO: 0.16 K/UL
EOSINOPHIL NFR BLD AUTO: 2.1 %
GLUCOSE SERPL-MCNC: 142 MG/DL
HCT VFR BLD CALC: 34.9 %
HGB BLD-MCNC: 10.3 G/DL
IMM GRANULOCYTES NFR BLD AUTO: 0.5 %
LYMPHOCYTES # BLD AUTO: 1.55 K/UL
LYMPHOCYTES NFR BLD AUTO: 20.8 %
MAGNESIUM SERPL-MCNC: 1.7 MG/DL
MAN DIFF?: NORMAL
MCHC RBC-ENTMCNC: 26.3 PG
MCHC RBC-ENTMCNC: 29.5 GM/DL
MCV RBC AUTO: 89.3 FL
MONOCYTES # BLD AUTO: 0.52 K/UL
MONOCYTES NFR BLD AUTO: 7 %
NEUTROPHILS # BLD AUTO: 5.13 K/UL
NEUTROPHILS NFR BLD AUTO: 68.8 %
PHOSPHATE SERPL-MCNC: 3 MG/DL
PLATELET # BLD AUTO: 302 K/UL
POTASSIUM SERPL-SCNC: 5.6 MMOL/L
PSA SERPL-MCNC: 0.76 NG/ML
RBC # BLD: 3.91 M/UL
RBC # FLD: 15.9 %
SODIUM SERPL-SCNC: 142 MMOL/L
WBC # FLD AUTO: 7.46 K/UL

## 2022-04-12 ENCOUNTER — NON-APPOINTMENT (OUTPATIENT)
Age: 67
End: 2022-04-12

## 2022-04-12 VITALS
OXYGEN SATURATION: 95 % | SYSTOLIC BLOOD PRESSURE: 138 MMHG | DIASTOLIC BLOOD PRESSURE: 72 MMHG | RESPIRATION RATE: 16 BRPM | HEART RATE: 72 BPM

## 2022-04-12 VITALS — SYSTOLIC BLOOD PRESSURE: 144 MMHG | DIASTOLIC BLOOD PRESSURE: 72 MMHG

## 2022-04-12 RX ORDER — AMLODIPINE BESYLATE 10 MG/1
10 TABLET ORAL DAILY
Qty: 90 | Refills: 3 | Status: DISCONTINUED | COMMUNITY
End: 2022-04-12

## 2022-04-13 ENCOUNTER — NON-APPOINTMENT (OUTPATIENT)
Age: 67
End: 2022-04-13

## 2022-04-15 PROBLEM — R31.21 ASYMPTOMATIC MICROSCOPIC HEMATURIA: Status: ACTIVE | Noted: 2022-02-28

## 2022-04-15 NOTE — HEALTH RISK ASSESSMENT
[Intercurrent hospitalizations] : was admitted to the hospital  [Former] : Former [20 or more] : 20 or more [1 or 2 (0 pts)] : 1 or 2 (0 points) [Never (0 pts)] : Never (0 points) [No] : In the past 12 months have you used drugs other than those required for medical reasons? No [One fall no injury in past year] : Patient reported one fall in the past year without injury [2] : 1) Little interest or pleasure doing things for more than half of the days (2) [3] : 2) Feeling down, depressed, or hopeless for nearly every day (3) [PHQ-2 Negative - No further assessment needed] : PHQ-2 Negative - No further assessment needed [Nearly Every Day (3)] : 4.) Feeling tired or having little energy? Nearly every day [1/2 of Days or More (2)] : 5.) Poor appetite or overeating? Half the days or more [Not at All (0)] : 8.) Moving or speaking so slowly that other people could have noticed, or the opposite, moving or speaking faster than usual? Not at all [Moderate] : severity of depression is moderate [Somewhat Difficult] : How difficult have these problems made it for you to do your work, take care of things at home, or get along with people? Somewhat difficult [PHQ-9 Positive] : PHQ-9 Positive [I have developed a follow-up plan documented below in the note.] : I have developed a follow-up plan documented below in the note. [de-identified] : ALIDA [YearQuit] : 2014 [de-identified] : Cardiology [Audit-CScore] : 0 [de-identified] : none [de-identified] : low salt [BSZ2Usuux] : 5 [[id number]] : 12

## 2022-04-15 NOTE — ASSESSMENT
[FreeTextEntry1] : This is a 68 y/o male with PMHx significant for CAD s/p 3V CABG (2009- [facility] - LAD reverse SVG to posteroo lateral reverse SCG to OM), Anxiety, Carotid artery disease, CKD stage 4, CHFpEF, HTN, PAD, HLD, T2DM, CVA with left sided weakness, presenting to establish as a new patient in the practice\par \par CVS: h/o CAD s/p 3V CABG (2009), Carotid artery disease, CHFpEF, HTN, PAD, HLD, f/w accelerated HTN \par -Cardiology following\par -NST showed no evidence of Ischemia\par -MCOT followed by cardiology\par -Follow up with Cardiology Dr Michuad as scheduled.\par -Blood pressure suboptimal\par -Continue Amlodipine 5 mg, will consider increasing to 1-0 mg but will defer to Cardiology, ASA 81 mg, Atorvastatin 80 mg, Carvedilol 25 mg bid, Eliquis 2.5 mg bid, Hydralazine 50 mg tid, Isosorbide 20 mg tid.\par \par NEPHROLOGY: CKD stage 4\par -Check MAg, Renal panel\par -Continue MAgnesium supplementation, Sodium bicarb 1300 mg tid\par -Nephrology Dr Andersen following, no longer accepting insurance, referral for Dr rBo had been given\par \par : Microscopic hematuria\par -Followed by Dr España\par -Awaiting Cysto\par \par HCM:\par -Blood work drawn in office today\par -PFIZER Covid vaccine\par -Flu vaccine reportedly around 2/5/22\par -No record of Colonoscopy\par -RTO for CPE at earliest convenience\par \par \par NEURO: h/o CVA with LEFT sided residual weakness\par -Continue ASA, Eliquis renally dosed.\par

## 2022-04-15 NOTE — PHYSICAL EXAM
[No Acute Distress] : no acute distress [Well Nourished] : well nourished [Well Developed] : well developed [Well-Appearing] : well-appearing [Normal Sclera/Conjunctiva] : normal sclera/conjunctiva [PERRL] : pupils equal round and reactive to light [EOMI] : extraocular movements intact [Normal Outer Ear/Nose] : the outer ears and nose were normal in appearance [Normal Oropharynx] : the oropharynx was normal [No JVD] : no jugular venous distention [No Lymphadenopathy] : no lymphadenopathy [Supple] : supple [Thyroid Normal, No Nodules] : the thyroid was normal and there were no nodules present [No Respiratory Distress] : no respiratory distress  [No Accessory Muscle Use] : no accessory muscle use [Clear to Auscultation] : lungs were clear to auscultation bilaterally [Normal Rate] : normal rate  [Regular Rhythm] : with a regular rhythm [Normal S1, S2] : normal S1 and S2 [No Murmur] : no murmur heard [No Carotid Bruits] : no carotid bruits [No Abdominal Bruit] : a ~M bruit was not heard ~T in the abdomen [No Varicosities] : no varicosities [Pedal Pulses Present] : the pedal pulses are present [No Edema] : there was no peripheral edema [No Palpable Aorta] : no palpable aorta [No Extremity Clubbing/Cyanosis] : no extremity clubbing/cyanosis [Soft] : abdomen soft [Non Tender] : non-tender [Non-distended] : non-distended [No Masses] : no abdominal mass palpated [No HSM] : no HSM [Normal Bowel Sounds] : normal bowel sounds [Normal Posterior Cervical Nodes] : no posterior cervical lymphadenopathy [Normal Anterior Cervical Nodes] : no anterior cervical lymphadenopathy [No CVA Tenderness] : no CVA  tenderness [No Spinal Tenderness] : no spinal tenderness [No Joint Swelling] : no joint swelling [Grossly Normal Strength/Tone] : grossly normal strength/tone [No Rash] : no rash [Coordination Grossly Intact] : coordination grossly intact [No Focal Deficits] : no focal deficits [Normal Gait] : normal gait [Deep Tendon Reflexes (DTR)] : deep tendon reflexes were 2+ and symmetric [Normal Affect] : the affect was normal [Normal Insight/Judgement] : insight and judgment were intact [de-identified] : 24 Hr Holter monitor in place

## 2022-04-15 NOTE — HISTORY OF PRESENT ILLNESS
[Spouse] : spouse [FreeTextEntry8] : This is a 66 y/o male with PMHx significant for CAD s/p 3V CABG (2009), Anxiety, Carotid artery disease, CKD stage 4, CHFpEF, HTN, PAD, HLD, T2DM, CVA with left sided weakness, presenting to establish as a new patient in the practice. Pt was hospitalized at [facility] on 3/11/22 secondary to unresponsive episode, f/w Stroke, discharged to home on 3/21/22, while with PT was found with elevated BP and readmitted to [facility] on 3/28/22 and discharged on 4/1/22 with Dx of Ataxia and accelerated HTN. Pt went to see his Cardiologist this morning and was given an ambulatory BP machine.

## 2022-04-19 ENCOUNTER — TRANSCRIPTION ENCOUNTER (OUTPATIENT)
Age: 67
End: 2022-04-19

## 2022-04-19 ENCOUNTER — LABORATORY RESULT (OUTPATIENT)
Age: 67
End: 2022-04-19

## 2022-04-19 ENCOUNTER — APPOINTMENT (OUTPATIENT)
Dept: FAMILY MEDICINE | Facility: CLINIC | Age: 67
End: 2022-04-19
Payer: MEDICARE

## 2022-04-19 VITALS
WEIGHT: 175 LBS | DIASTOLIC BLOOD PRESSURE: 74 MMHG | BODY MASS INDEX: 29.16 KG/M2 | HEART RATE: 71 BPM | SYSTOLIC BLOOD PRESSURE: 142 MMHG | TEMPERATURE: 98.8 F | HEIGHT: 65 IN | RESPIRATION RATE: 16 BRPM | OXYGEN SATURATION: 98 %

## 2022-04-19 DIAGNOSIS — Z87.19 PERSONAL HISTORY OF OTHER DISEASES OF THE DIGESTIVE SYSTEM: ICD-10-CM

## 2022-04-19 DIAGNOSIS — Z01.810 ENCOUNTER FOR PREPROCEDURAL CARDIOVASCULAR EXAMINATION: ICD-10-CM

## 2022-04-19 DIAGNOSIS — R79.89 OTHER SPECIFIED ABNORMAL FINDINGS OF BLOOD CHEMISTRY: ICD-10-CM

## 2022-04-19 DIAGNOSIS — Z87.898 PERSONAL HISTORY OF OTHER SPECIFIED CONDITIONS: ICD-10-CM

## 2022-04-19 DIAGNOSIS — Z12.11 ENCOUNTER FOR SCREENING FOR MALIGNANT NEOPLASM OF COLON: ICD-10-CM

## 2022-04-19 DIAGNOSIS — G47.09 OTHER INSOMNIA: ICD-10-CM

## 2022-04-19 DIAGNOSIS — Z86.39 PERSONAL HISTORY OF OTHER ENDOCRINE, NUTRITIONAL AND METABOLIC DISEASE: ICD-10-CM

## 2022-04-19 DIAGNOSIS — Z87.09 PERSONAL HISTORY OF OTHER DISEASES OF THE RESPIRATORY SYSTEM: ICD-10-CM

## 2022-04-19 DIAGNOSIS — R22.0 LOCALIZED SWELLING, MASS AND LUMP, HEAD: ICD-10-CM

## 2022-04-19 LAB
HBA1C MFR BLD HPLC: 6.7
HEMOCCULT STL QL IA: NEGATIVE

## 2022-04-19 PROCEDURE — 99214 OFFICE O/P EST MOD 30 MIN: CPT | Mod: 25

## 2022-04-19 PROCEDURE — 83036 HEMOGLOBIN GLYCOSYLATED A1C: CPT | Mod: QW

## 2022-04-19 PROCEDURE — 36415 COLL VENOUS BLD VENIPUNCTURE: CPT

## 2022-04-21 ENCOUNTER — NON-APPOINTMENT (OUTPATIENT)
Age: 67
End: 2022-04-21

## 2022-04-26 ENCOUNTER — APPOINTMENT (OUTPATIENT)
Dept: VASCULAR SURGERY | Facility: CLINIC | Age: 67
End: 2022-04-26
Payer: MEDICARE

## 2022-04-26 VITALS
TEMPERATURE: 97.5 F | BODY MASS INDEX: 30.97 KG/M2 | HEART RATE: 73 BPM | RESPIRATION RATE: 16 BRPM | OXYGEN SATURATION: 95 % | DIASTOLIC BLOOD PRESSURE: 72 MMHG | SYSTOLIC BLOOD PRESSURE: 151 MMHG | WEIGHT: 177 LBS | HEIGHT: 63.5 IN

## 2022-04-26 DIAGNOSIS — Z86.39 PERSONAL HISTORY OF OTHER ENDOCRINE, NUTRITIONAL AND METABOLIC DISEASE: ICD-10-CM

## 2022-04-26 DIAGNOSIS — I50.9 HEART FAILURE, UNSPECIFIED: ICD-10-CM

## 2022-04-26 DIAGNOSIS — Z87.448 PERSONAL HISTORY OF OTHER DISEASES OF URINARY SYSTEM: ICD-10-CM

## 2022-04-26 DIAGNOSIS — Z86.79 PERSONAL HISTORY OF OTHER DISEASES OF THE CIRCULATORY SYSTEM: ICD-10-CM

## 2022-04-26 DIAGNOSIS — I25.810 ATHEROSCLEROSIS OF CORONARY ARTERY BYPASS GRAFT(S) W/OUT ANGINA PECTORIS: ICD-10-CM

## 2022-04-26 DIAGNOSIS — Z87.891 PERSONAL HISTORY OF NICOTINE DEPENDENCE: ICD-10-CM

## 2022-04-26 PROBLEM — Z87.898 HISTORY OF FATIGUE: Status: RESOLVED | Noted: 2022-01-11 | Resolved: 2022-04-26

## 2022-04-26 PROBLEM — Z01.810 PREPROCEDURAL CARDIOVASCULAR EXAMINATION: Status: RESOLVED | Noted: 2020-02-03 | Resolved: 2022-04-26

## 2022-04-26 PROBLEM — Z87.898 HISTORY OF ATAXIA: Status: RESOLVED | Noted: 2019-03-26 | Resolved: 2022-04-26

## 2022-04-26 PROBLEM — R79.89 ELEVATED SERUM CREATININE: Noted: 2022-02-28

## 2022-04-26 PROCEDURE — 93923 UPR/LXTR ART STDY 3+ LVLS: CPT

## 2022-04-26 PROCEDURE — 99202 OFFICE O/P NEW SF 15 MIN: CPT

## 2022-04-26 NOTE — HISTORY OF PRESENT ILLNESS
[FreeTextEntry1] : Blood pressure check [de-identified] : This is a 66 y/o male with PMHx significant for CAD s/p 3V CABG (2009), Anxiety, Carotid artery disease, CKD stage 4, CHFpEF, HTN, PAD, HLD, T2DM, CVA with left sided weakness, presenting for follow up on blood pressure. Pt seen by cardiology, had BP medication adjusted increasing Amlodipine to 10 mg daily. Pt c/o waking up with elevated BP in the 180s

## 2022-04-26 NOTE — HISTORY OF PRESENT ILLNESS
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

## 2022-04-26 NOTE — ASSESSMENT
[FreeTextEntry1] : This is a 66 y/o male with PMHx significant for CAD s/p 3V CABG (2009- [facility] - LAD reverse SVG to posteroo lateral reverse SCG to OM), Anxiety, Carotid artery disease, CKD stage 4, CHFpEF, HTN, PAD, HLD, T2DM, CVA with left sided weakness, presenting for blood pressure check.\par \par CVS: h/o CAD s/p 3V CABG (2009), Carotid artery disease, CHFpEF, HTN, PAD, HLD, f/w accelerated HTN \par -Cardiology following\par -NST showed no evidence of Ischemia\par -MCOT followed by cardiology\par -Follow up with Cardiology Dr Michaud as scheduled.\par -Blood pressure suboptimal\par -Amlodipine increased to 10 mg by Cardiology\par -Continue ASA 81 mg, Atorvastatin 80 mg, Carvedilol 25 mg bid, Eliquis 2.5 mg bid, Hydralazine 50 mg tid, Isosorbide 20 mg tid.\par \par NEPHROLOGY: CKD stage 4\par -Check Renal panel, Mag.\par -Continue MAgnesium supplementation, Sodium bicarb 1300 mg tid\par -Nephrology Dr Andersen following, no longer accepting insurance, referral for Dr Bro had been given\par \par : Microscopic hematuria\par -Followed by Dr España\par -Awaiting Cysto\par \par NEURO: h/o CVA with LEFT sided residual weakness\par -Continue ASA, Eliquis renally dosed.\par \par HEME: Anemia of CKD\par -Check CBC, Iron with TIBC\par \par GENERAL: Fatigue\par -Check TSH, CBC\par \par ENDO: T2DM\par -Diet controlled\par -A1c 6.7 \par -Will check again q3m\par \par HCM:\par -Blood work drawn in house\par -PFIZER Covid vaccine\par -Flu vaccine reportedly around 2/5/22\par -No record of Colonoscopy,, GI referral given\par -RTO for CPE at earliest convenience\par \par \par

## 2022-04-26 NOTE — HEALTH RISK ASSESSMENT
[Intercurrent hospitalizations] : was admitted to the hospital  [Former] : Former [20 or more] : 20 or more [1 or 2 (0 pts)] : 1 or 2 (0 points) [Never (0 pts)] : Never (0 points) [No] : In the past 12 months have you used drugs other than those required for medical reasons? No [One fall no injury in past year] : Patient reported one fall in the past year without injury [2] : 1) Little interest or pleasure doing things for more than half of the days (2) [3] : 2) Feeling down, depressed, or hopeless for nearly every day (3) [PHQ-2 Negative - No further assessment needed] : PHQ-2 Negative - No further assessment needed [Nearly Every Day (3)] : 4.) Feeling tired or having little energy? Nearly every day [1/2 of Days or More (2)] : 5.) Poor appetite or overeating? Half the days or more [Not at All (0)] : 8.) Moving or speaking so slowly that other people could have noticed, or the opposite, moving or speaking faster than usual? Not at all [Moderate] : severity of depression is moderate [Somewhat Difficult] : How difficult have these problems made it for you to do your work, take care of things at home, or get along with people? Somewhat difficult [PHQ-9 Positive] : PHQ-9 Positive [I have developed a follow-up plan documented below in the note.] : I have developed a follow-up plan documented below in the note. [de-identified] : ALIDA [de-identified] : Cardiology [YearQuit] : 2014 [Audit-CScore] : 0 [de-identified] : none [de-identified] : low salt [[id number]] : 5 [[id number]] : 12

## 2022-04-26 NOTE — ASSESSMENT
[FreeTextEntry1] : US carotids  show  50  % or less bilateral ICA stenosis  Had Ataxiia.  and  infarcts not MCA distribution  NANETTE PVRs are normal  PVRS and toe pressures are normal No Evidence of  significant PAD   I explained this to the patient and his wife through a  No need for  intervention on carotid or  legs  [Arterial/Venous Disease] : arterial/venous disease [Carotid Endarterectomy] : carotid endarterectomy

## 2022-04-29 LAB
25(OH)D3 SERPL-MCNC: 23.4 NG/ML
ALBUMIN SERPL ELPH-MCNC: 4 G/DL
ANION GAP SERPL CALC-SCNC: 13 MMOL/L
BASOPHILS # BLD AUTO: 0.04 K/UL
BASOPHILS NFR BLD AUTO: 0.6 %
BUN SERPL-MCNC: 64 MG/DL
CALCIUM SERPL-MCNC: 8.8 MG/DL
CHLORIDE SERPL-SCNC: 109 MMOL/L
CO2 SERPL-SCNC: 18 MMOL/L
CREAT SERPL-MCNC: 5.01 MG/DL
EGFR: 12 ML/MIN/1.73M2
EOSINOPHIL # BLD AUTO: 0.09 K/UL
EOSINOPHIL NFR BLD AUTO: 1.3 %
GLUCOSE SERPL-MCNC: 166 MG/DL
HCT VFR BLD CALC: 36.5 %
HGB BLD-MCNC: 11.1 G/DL
IMM GRANULOCYTES NFR BLD AUTO: 0.1 %
IRON SATN MFR SERPL: 20 %
IRON SERPL-MCNC: 54 UG/DL
LYMPHOCYTES # BLD AUTO: 1.18 K/UL
LYMPHOCYTES NFR BLD AUTO: 17.2 %
MAGNESIUM SERPL-MCNC: 1.3 MG/DL
MAN DIFF?: NORMAL
MCHC RBC-ENTMCNC: 26.9 PG
MCHC RBC-ENTMCNC: 30.4 GM/DL
MCV RBC AUTO: 88.6 FL
MONOCYTES # BLD AUTO: 0.48 K/UL
MONOCYTES NFR BLD AUTO: 7 %
NEUTROPHILS # BLD AUTO: 5.07 K/UL
NEUTROPHILS NFR BLD AUTO: 73.8 %
PHOSPHATE SERPL-MCNC: 3.5 MG/DL
PLATELET # BLD AUTO: 256 K/UL
POTASSIUM SERPL-SCNC: 6.3 MMOL/L
RBC # BLD: 4.12 M/UL
RBC # FLD: 15.2 %
SODIUM SERPL-SCNC: 140 MMOL/L
TIBC SERPL-MCNC: 263 UG/DL
TSH SERPL-ACNC: 0.23 UIU/ML
UIBC SERPL-MCNC: 209 UG/DL
WBC # FLD AUTO: 6.87 K/UL

## 2022-05-24 ENCOUNTER — APPOINTMENT (OUTPATIENT)
Dept: CARDIOLOGY | Facility: CLINIC | Age: 67
End: 2022-05-24

## 2022-06-07 ENCOUNTER — APPOINTMENT (OUTPATIENT)
Dept: CARDIOLOGY | Facility: CLINIC | Age: 67
End: 2022-06-07
Payer: MEDICARE

## 2022-06-07 VITALS
HEIGHT: 63.5 IN | BODY MASS INDEX: 29.75 KG/M2 | DIASTOLIC BLOOD PRESSURE: 58 MMHG | WEIGHT: 170 LBS | TEMPERATURE: 97.4 F | SYSTOLIC BLOOD PRESSURE: 131 MMHG

## 2022-06-07 VITALS — OXYGEN SATURATION: 94 %

## 2022-06-07 PROCEDURE — 99214 OFFICE O/P EST MOD 30 MIN: CPT

## 2022-06-07 RX ORDER — ADHESIVE TAPE 3"X 2.3 YD
5 TAPE, NON-MEDICATED TOPICAL
Qty: 30 | Refills: 0 | Status: DISCONTINUED | COMMUNITY
Start: 2022-04-19 | End: 2022-06-07

## 2022-06-07 RX ORDER — APIXABAN 2.5 MG/1
2.5 TABLET, FILM COATED ORAL
Qty: 180 | Refills: 1 | Status: DISCONTINUED | COMMUNITY
End: 2022-06-07

## 2022-06-07 RX ORDER — MULTIVIT-MIN/IRON/FOLIC ACID/K 18-600-40
400 CAPSULE ORAL
Refills: 0 | Status: DISCONTINUED | COMMUNITY
End: 2022-06-07

## 2022-06-23 ENCOUNTER — APPOINTMENT (OUTPATIENT)
Dept: CARDIOLOGY | Facility: CLINIC | Age: 67
End: 2022-06-23

## 2022-07-05 ENCOUNTER — APPOINTMENT (OUTPATIENT)
Dept: FAMILY MEDICINE | Facility: CLINIC | Age: 67
End: 2022-07-05

## 2022-07-05 VITALS
WEIGHT: 169 LBS | HEIGHT: 63.5 IN | HEART RATE: 72 BPM | OXYGEN SATURATION: 98 % | DIASTOLIC BLOOD PRESSURE: 80 MMHG | BODY MASS INDEX: 29.57 KG/M2 | SYSTOLIC BLOOD PRESSURE: 148 MMHG | RESPIRATION RATE: 16 BRPM | TEMPERATURE: 98 F

## 2022-07-05 DIAGNOSIS — R10.2 PELVIC AND PERINEAL PAIN: ICD-10-CM

## 2022-07-05 DIAGNOSIS — K40.90 UNILATERAL INGUINAL HERNIA, W/OUT OBSTRUCTION OR GANGRENE, NOT SPECIFIED AS RECURRENT: ICD-10-CM

## 2022-07-05 PROCEDURE — 83036 HEMOGLOBIN GLYCOSYLATED A1C: CPT | Mod: QW

## 2022-07-05 PROCEDURE — 36415 COLL VENOUS BLD VENIPUNCTURE: CPT

## 2022-07-05 PROCEDURE — 99214 OFFICE O/P EST MOD 30 MIN: CPT | Mod: 25

## 2022-07-05 NOTE — PHYSICAL EXAM
[Normal] : normal rate, regular rhythm, normal S1 and S2 and no murmur heard [Soft] : abdomen soft [Normal Bowel Sounds] : normal bowel sounds [de-identified] : LEFT Inguinal hernia, reducible. FROM LEFT hip passively

## 2022-07-05 NOTE — REVIEW OF SYSTEMS
[Dizziness] : dizziness [Negative] : Heme/Lymph [Joint Pain] : joint pain [FreeTextEntry9] : LEFT hip / groin pain
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
## 2022-07-05 NOTE — HEALTH RISK ASSESSMENT
[Intercurrent hospitalizations] : was admitted to the hospital  [Former] : Former [20 or more] : 20 or more [1 or 2 (0 pts)] : 1 or 2 (0 points) [Never (0 pts)] : Never (0 points) [No] : In the past 12 months have you used drugs other than those required for medical reasons? No [One fall no injury in past year] : Patient reported one fall in the past year without injury [de-identified] : ALIDA [de-identified] : Nephrology [YearQuit] : 2014 [Audit-CScore] : 0 [de-identified] : none [de-identified] : low salt

## 2022-07-05 NOTE — ASSESSMENT
[FreeTextEntry1] : This is a 66 y/o male with PMHx significant for CAD s/p 3V CABG (2009- [facility] - LAD reverse SVG to postero- lateral reverse SCG to OM), Anxiety, Carotid artery disease, CKD stage 4, CHFpEF, HTN, PAD, HLD, T2DM, CVA with left sided weakness, presenting c/o weight loss, LEFT hip pain, dizziness..\par \par CVS: h/o CAD s/p 3V CABG (2009), Carotid artery disease, CHFpEF, HTN, PAD, HLD, f/w accelerated HTN \par -Cardiology following\par -NST showed no evidence of Ischemia\par -MCOT followed by cardiology, No Afib\par -Follow up with Cardiology Dr Michaud as scheduled.\par -Continue ASA 81 mg, Amlodipine 10 mg  daily, Atorvastatin 80 mg, Carvedilol 25 mg bid, Hydralazine 50 mg tid, Isosorbide 20 mg tid, Clonidine 0.1 mg bid.\par -Discontinued Eliquis secondary to frequent falls, started on Clopidogrel by Cardiology\par \par NEPHROLOGY: CKD stage 4\par -Check Renal panel.\par -Continue Magnesium supplementation, Sodium bicarb 1300 mg tid\par -Nephrology Dr Andersen following.\par \par : Microscopic hematuria\par -Followed by Dr España\par \par NEURO: h/o CVA with LEFT sided residual weakness, positional vertigo\par -Neurology referral with Dr Sarabia\par -Continue ASA, Eliquis discontinued.\par \par HEME: Anemia of CKD\par -Check CBC, Iron with TIBC\par \par MSK: LEFT Hip pain\par -LEFT Hip Xray\par -OTC Tylenol for pain\par \par ENDO: T2DM\par -Diet controlled\par -A1c 6.7 --> 8.1 POCT today\par -Had been on Trulicity uncertain wether he has been taking it daily or weekly, I have asked him to stop it as this may be causing him to loose appetite and loose weight.\par -Will call with name of insulin / injection that he takes daily for adjustment.\par \par GI: Inguinal hernia\par -Surgery referral with Dr Michael\par \par GENERAL: Weight loss\par -Per records has lost 1 lb since last month, 8 Lbs since April 26.\par -Asked to discontinue Trulicity\par \par HCM:\par -Blood work drawn in house\par -PFIZER Covid vaccine 3/11/21, 4/2/21, 1/11/22\par -Flu vaccine reportedly around 2/5/22\par -No record of Colonoscopy, GI referral given, not scheduled yet\par -RTO for CPE at earliest convenience\par \par \par

## 2022-07-06 LAB
ALBUMIN SERPL ELPH-MCNC: 3.6 G/DL
ANION GAP SERPL CALC-SCNC: 15 MMOL/L
BASOPHILS # BLD AUTO: 0.05 K/UL
BASOPHILS NFR BLD AUTO: 0.7 %
BUN SERPL-MCNC: 53 MG/DL
CALCIUM SERPL-MCNC: 8.5 MG/DL
CHLORIDE SERPL-SCNC: 107 MMOL/L
CO2 SERPL-SCNC: 20 MMOL/L
CREAT SERPL-MCNC: 4.97 MG/DL
EGFR: 12 ML/MIN/1.73M2
EOSINOPHIL # BLD AUTO: 0.18 K/UL
EOSINOPHIL NFR BLD AUTO: 2.4 %
GLUCOSE SERPL-MCNC: 295 MG/DL
HBA1C MFR BLD HPLC: 8.1
HCT VFR BLD CALC: 37.1 %
HGB BLD-MCNC: 11.4 G/DL
IMM GRANULOCYTES NFR BLD AUTO: 0.1 %
LYMPHOCYTES # BLD AUTO: 1.33 K/UL
LYMPHOCYTES NFR BLD AUTO: 17.5 %
MAN DIFF?: NORMAL
MCHC RBC-ENTMCNC: 25.9 PG
MCHC RBC-ENTMCNC: 30.7 GM/DL
MCV RBC AUTO: 84.3 FL
MONOCYTES # BLD AUTO: 0.4 K/UL
MONOCYTES NFR BLD AUTO: 5.3 %
NEUTROPHILS # BLD AUTO: 5.62 K/UL
NEUTROPHILS NFR BLD AUTO: 74 %
PHOSPHATE SERPL-MCNC: 3.6 MG/DL
PLATELET # BLD AUTO: 238 K/UL
POTASSIUM SERPL-SCNC: 4.3 MMOL/L
RBC # BLD: 4.4 M/UL
RBC # FLD: 14.3 %
SODIUM SERPL-SCNC: 142 MMOL/L
WBC # FLD AUTO: 7.59 K/UL

## 2022-07-13 ENCOUNTER — APPOINTMENT (OUTPATIENT)
Dept: CARDIOLOGY | Facility: CLINIC | Age: 67
End: 2022-07-13

## 2022-07-13 PROCEDURE — 93975 VASCULAR STUDY: CPT

## 2022-07-13 PROCEDURE — 76775 US EXAM ABDO BACK WALL LIM: CPT | Mod: 59

## 2022-07-14 ENCOUNTER — NON-APPOINTMENT (OUTPATIENT)
Age: 67
End: 2022-07-14

## 2022-08-01 ENCOUNTER — APPOINTMENT (OUTPATIENT)
Dept: FAMILY MEDICINE | Facility: CLINIC | Age: 67
End: 2022-08-01

## 2022-08-30 ENCOUNTER — APPOINTMENT (OUTPATIENT)
Dept: CARDIOLOGY | Facility: CLINIC | Age: 67
End: 2022-08-30

## 2022-08-30 ENCOUNTER — NON-APPOINTMENT (OUTPATIENT)
Age: 67
End: 2022-08-30

## 2022-08-30 VITALS
HEIGHT: 63 IN | TEMPERATURE: 97.9 F | OXYGEN SATURATION: 97 % | SYSTOLIC BLOOD PRESSURE: 134 MMHG | DIASTOLIC BLOOD PRESSURE: 64 MMHG | WEIGHT: 172 LBS | BODY MASS INDEX: 30.48 KG/M2 | HEART RATE: 64 BPM

## 2022-08-30 DIAGNOSIS — I10 ESSENTIAL (PRIMARY) HYPERTENSION: ICD-10-CM

## 2022-08-30 PROCEDURE — 93000 ELECTROCARDIOGRAM COMPLETE: CPT

## 2022-08-30 PROCEDURE — 99215 OFFICE O/P EST HI 40 MIN: CPT | Mod: 25

## 2022-08-30 NOTE — HISTORY OF PRESENT ILLNESS
[FreeTextEntry1] : Multivessel CAD, HTN, CVA, HF pEF\par \par \par HPI for today: :  feels tired and fatigeu. no syncope. no chest pain . no dyspnea on exertion complaitnw ith meds. \par has some pain int he legs. but not so bad. \par \par Old note:   no chestpain. no dyspnea.  he was in the ER. for dyspnea.  and he ahd recent covid.  we had give him some diuresis. he was send him on small dose.  \par he came back with worsening renal funciton and was given iv fluids later.\par \par \par old niote:   he still have caludfication symptoms.    we never pursued intervention due to CKD.  \par if symptoms are persistent and worse, then jared plan for CO2 angiography\par no chest paion. no dyspnea. nfagitue and tired.\par \par \par old note:  no chest pain. no dyspnea.  feels good. complaitn with meds.  pain in the feet and legs. claudication\par \par \par old note: patient ran out of norvasc. he has not been taking it for 1 week.  \par he could not get the prescription\par no headhecs. no dizziness. No LE edema. He had stopped losartan due to CKD.  we put him on Bidil.\par he states when he takes his norvasc. his BP is good around 130. \par he has not made the appt with nephrologist yet. \par \par old note: : jeanette was in the hospital few days ago. he was fluid overload and LE edaem. responded to Iv lasix. CKD . chornic.  \par discharged the next day. stress test ordered and he got blood test done at Primary doctor yesterday. \par his echo was unremarkable in hospital \par \par \par old noteL: patient has been having increaese weight gain. he has been drinking too much water also as he feels its good for his kidneys. He drinks atleast 10 bottles a day.\par Compliant with  meds.  +_ dyspnea one xertion 2 + LE edema. \par \par \par \par old note: does not feel good. Lost balance. Unsteady gait.  diozziness.\par his primary doctor sent him for the carotid US and TCD ,. \par fatigeu and tired. \par \par \par old noteL: feels  good . no problem. compliant with meds. \par no smoking. need script for coreg. patient did not get the stress test done. Will order stress test agiain. checked hospital records. no stress test in sunrise. \par \par \par old note: no chest pain. no dypsnea. no headaches. feels good . complikant with meds. \par last time got NANETTE done that was abnormal. but US arterial showed run off disease on the right side,. tibiaperoneal. \par \par \par old note: No chest pain.  No dyspnea.  No LE edema.  Compliant with meds. Does not exercise.\par got NANETTE and US of LE.  found to have abnormal; NANETTE on the left and calcification on the right. US of LE arterial shows right PTA occlusion. patient does complain of feet pain on the right side. No pain on the left.

## 2022-08-30 NOTE — DISCUSSION/SUMMARY
[Patient] : the patient [Risks] : risks [Benefits] : benefits [Alternatives] : alternatives [With Me] : with me [EKG obtained to assist in diagnosis and management of assessed problem(s)] : EKG obtained to assist in diagnosis and management of assessed problem(s) [___ Month(s)] : in [unfilled] month(s) [FreeTextEntry1] : This is a 67 M with history of smoking, HTN,DM (on insulin), HLD, found to have 3 vessel disease and TIA post cath: s/p CABG recent PNA and now with facial swelling,.\par 1)  HFpEF: Stable weight.  history of problesm due to CKD lasxi . \par 2) Peripheral vascular disease : bilateral sfa disease. right owrse then left. no intervention due to CKD. if symptoms persist or worsen then will plan fro CO2 angiography or stenting.\par 3) LE edema resolved.  CKD and Diastollic heart failure.   : ct diuresis.    \par 4) PVD:   bilateral atherosclerosis right sfa severe stenosis.  Walking and ct antiplatelets. life style modification. diabetes control.  aspirin and lipitor.  did not intervened due to CKD. if recurrent symptosm,. then intervneion with co2 angioraphy.   \par 50  Multivessel CAD: s/p CABG2D  Continue Coreg 25 Q12, .ASA 81 mg,  lipitor 20 daily.  \par 6) HTN:  continue coreg 25 Q12   bidil 20 Q12   amldoipne 10 mg daily.  \par 7) DM: f/u PCP \par 8) CKD: creatinine stable.  f/u nephrology. dr. price.  DM control.  will re evaluate for losartan if BP not controlled.  will get cardiomems for managing heart failure with preserve ejection fraction \par 9) carotid artery disease: carotid atheroscelrosuis.   aspirin statins.  \par  \par

## 2022-08-30 NOTE — CARDIOLOGY SUMMARY
[No Ischemia] : no Ischemia [LVEF ___%] : LVEF [unfilled]% [Enlarged] : enlarged LA size [None] : no mitral regurgitation [___] : [unfilled] [de-identified] : 8 30 2022 Sinus  Rhythm  -First degree A-V block \par Linda = 252\par -Incomplete right bundle branch block. \par \par ABNORMAL \par \par  \par \par 1 11 2022  Sinus  Rhythm  -First degree A-V block \par -Incomplete right bundle branch block. \par  \par \par ABNORMAL \par \par \par 10 19 2021 \par \par 4/28/2021  : Sinus  Rhythm \par First degree AV block. \par -Incomplete right bundle branch block. \par  -Old inferior infarct. \par \par ABNORMAL \par  [de-identified] : apr 2022   30 days no significant events.  [de-identified] : nov 2021: LE angiogrpahy: right sfa 80%. and left SFA moderate stenosis.  [de-identified] : july 2022: Renal Dupelx:  no renal artery stenosis.  [de-identified] : NANETTE: Left 0.7 right : 1.4 \par US of LE arterial: sub total occlusion right PTA. mdoerate atherosclerosis bilaterally. \par Carotid DUplexL: sept 2020  moderate atherslceorsi. no stenosis.

## 2022-10-04 ENCOUNTER — RESULT CHARGE (OUTPATIENT)
Age: 67
End: 2022-10-04

## 2022-10-04 ENCOUNTER — APPOINTMENT (OUTPATIENT)
Dept: FAMILY MEDICINE | Facility: CLINIC | Age: 67
End: 2022-10-04

## 2022-10-04 VITALS
HEART RATE: 72 BPM | OXYGEN SATURATION: 98 % | SYSTOLIC BLOOD PRESSURE: 130 MMHG | BODY MASS INDEX: 30.82 KG/M2 | DIASTOLIC BLOOD PRESSURE: 80 MMHG | WEIGHT: 174 LBS | RESPIRATION RATE: 14 BRPM | TEMPERATURE: 98.8 F

## 2022-10-04 DIAGNOSIS — H81.10 BENIGN PAROXYSMAL VERTIGO, UNSPECIFIED EAR: ICD-10-CM

## 2022-10-04 DIAGNOSIS — R29.898 OTHER SYMPTOMS AND SIGNS INVOLVING THE MUSCULOSKELETAL SYSTEM: ICD-10-CM

## 2022-10-04 DIAGNOSIS — K21.9 GASTRO-ESOPHAGEAL REFLUX DISEASE W/OUT ESOPHAGITIS: ICD-10-CM

## 2022-10-04 LAB — HBA1C MFR BLD HPLC: 7.5

## 2022-10-04 PROCEDURE — 83036 HEMOGLOBIN GLYCOSYLATED A1C: CPT | Mod: QW

## 2022-10-04 PROCEDURE — 36415 COLL VENOUS BLD VENIPUNCTURE: CPT

## 2022-10-04 PROCEDURE — 90677 PCV20 VACCINE IM: CPT

## 2022-10-04 PROCEDURE — G0009: CPT

## 2022-10-04 PROCEDURE — 99214 OFFICE O/P EST MOD 30 MIN: CPT | Mod: 25

## 2022-10-10 ENCOUNTER — APPOINTMENT (OUTPATIENT)
Dept: NEUROLOGY | Facility: CLINIC | Age: 67
End: 2022-10-10

## 2022-10-10 VITALS
SYSTOLIC BLOOD PRESSURE: 126 MMHG | DIASTOLIC BLOOD PRESSURE: 76 MMHG | WEIGHT: 174 LBS | BODY MASS INDEX: 30.83 KG/M2 | HEIGHT: 63 IN

## 2022-10-10 PROCEDURE — 99215 OFFICE O/P EST HI 40 MIN: CPT

## 2022-10-10 NOTE — HISTORY OF PRESENT ILLNESS
[FreeTextEntry1] : He is here with his wife who serves as \par Patient was admitted to [facility] March of this year with main complaint of exacerbation of pre-existing balance problems.  Those records have been reviewed.  Brain MRI showed a few small acute infarcts left frontal parietal region.  He does have history of hypertension, diabetes, coronary artery disease and hyperlipidemia.  He had home physical therapy after discharge.  Balance improved but still not back to normal.  He uses either a cane or a walker.

## 2022-10-10 NOTE — PHYSICAL EXAM
[FreeTextEntry1] : No tremors, no cogwheeling or bradykinesia. [General Appearance - Alert] : alert [General Appearance - In No Acute Distress] : in no acute distress [General Appearance - Well-Appearing] : healthy appearing [Oriented To Time, Place, And Person] : oriented to person, place, and time [Impaired Insight] : insight and judgment were intact [Affect] : the affect was normal [Memory Recent] : recent memory was not impaired [Person] : oriented to person [Place] : oriented to place [Time] : oriented to time [Concentration Intact] : normal concentrating ability [Naming Objects] : no difficulty naming common objects [Fluency] : fluency intact [Past History] : adequate knowledge of personal past history [Cranial Nerves Optic (II)] : visual acuity intact bilaterally,  visual fields full to confrontation, pupils equal round and reactive to light [Cranial Nerves Oculomotor (III)] : extraocular motion intact [Cranial Nerves Trigeminal (V)] : facial sensation intact symmetrically [Cranial Nerves Facial (VII)] : face symmetrical [Cranial Nerves Vestibulocochlear (VIII)] : hearing was intact bilaterally [Cranial Nerves Glossopharyngeal (IX)] : tongue and palate midline [Cranial Nerves Accessory (XI - Cranial And Spinal)] : head turning and shoulder shrug symmetric [Cranial Nerves Hypoglossal (XII)] : there was no tongue deviation with protrusion [Motor Tone] : muscle tone was normal in all four extremities [Motor Strength] : muscle strength was normal in all four extremities [No Muscle Atrophy] : normal bulk in all four extremities [Paresis Pronator Drift Right-Sided] : no pronator drift on the right [Paresis Pronator Drift Left-Sided] : no pronator drift on the left [Sensation Tactile Decrease] : light touch was intact [Sensation Pain / Temperature Decrease] : pain and temperature was intact [Proprioception] : proprioception was intact [Romberg's Sign] : Romberg's sign was negtive [Abnormal Walk] : normal gait [Balance] : balance was intact [Past-pointing] : there was no past-pointing [Tremor] : no tremor present [Coordination - Dysmetria Impaired Finger-to-Nose Bilateral] : not present [Coordination - Dysmetria Impaired Heel-to-Shin Bilateral] : not present [2+] : Brachioradialis left 2+ [1+] : Ankle jerk left 1+ [Plantar Reflex Right Only] : normal on the right [Plantar Reflex Left Only] : normal on the left [FreeTextEntry8] : Gait mildly slowed but ambulates well independently, fairly good arm swing, turns in about 2 steps [PERRL With Normal Accommodation] : pupils were equal in size, round, reactive to light, with normal accommodation [Extraocular Movements] : extraocular movements were intact [Full Visual Field] : full visual field

## 2022-10-10 NOTE — ASSESSMENT
[FreeTextEntry1] : Status post stroke as described above\par Main complaint is balance issues but no evident balance problems on examination today\par I am suggesting some additional physical therapy.\par Follow-up as needed

## 2022-10-14 PROBLEM — H81.10 BENIGN POSITIONAL VERTIGO: Status: ACTIVE | Noted: 2022-07-05

## 2022-10-14 PROBLEM — R29.898 LEG WEAKNESS, BILATERAL: Status: ACTIVE | Noted: 2022-03-08

## 2022-10-14 NOTE — ASSESSMENT
[FreeTextEntry1] : This is a 66 y/o male with PMHx significant for CAD s/p 3V CABG (2009- [facility] - LAD reverse SVG to postero- lateral reverse SCG to OM), Anxiety, Carotid artery disease, CKD stage 4, CHFpEF, HTN, PAD, HLD, T2DM, CVA with left sided weakness, presenting for diabetes check and medication refills..\par \par CVS: h/o CAD s/p 3V CABG (2009), Carotid artery disease, CHFpEF, HTN, PAD, HLD, f/w accelerated HTN \par -Cardiology following Dr Michaud\par -NST 2/22 showed no evidence of Ischemia\par -MCOT followed by cardiology, No Afib\par -Continue ASA 81 mg, Amlodipine 10 mg  daily, Atorvastatin 80 mg, Carvedilol 25 mg bid, Hydralazine 50 mg tid, Isosorbide 20 mg tid, Clonidine 0.1 mg bid.\par -Discontinued Eliquis secondary to frequent falls, started on Clopidogrel by Cardiology\par \par NEPHROLOGY: CKD stage 4\par -Check Renal panel.\par -Continue Magnesium supplementation, Sodium bicarb 1300 mg tid e-refilled\par -Nephrology Dr Andersen following.\par \par : Microscopic hematuria\par -Followed by Dr España\par \par NEURO: h/o CVA with LEFT sided residual weakness, positional vertigo\par -Neurology referral with Dr Sarabia\par -Continue ASA, Eliquis discontinued.\par \par HEME: Anemia of CKD\par -Check CBC\par \par ENDO: T2DM\par -Diet controlled\par -A1c 6.7 --> 8.1 --> 7.5 POCT today\par -Continue Tresiba 40 u at bedtime.\par \par GI: Inguinal hernia\par -Surgery referral with Dr Michael\par \par GENERAL: Weight loss\par -Per records has lost 1 lb since last month, 8 Lbs since April 26.\par -Asked to discontinue Trulicity\par \par HCM:\par -Blood work drawn in house\par -PFIZER Covid vaccine 3/11/21, 4/2/21, 1/11/22\par -Flu vaccine 9/30/22\par -Prevnar 20 in house today\par -No record of Colonoscopy, GI referral given, not scheduled yet\par -RTO for CPE at earliest convenience\par \par \par

## 2022-10-14 NOTE — HEALTH RISK ASSESSMENT
[Intercurrent hospitalizations] : was admitted to the hospital  [Former] : Former [20 or more] : 20 or more [1 or 2 (0 pts)] : 1 or 2 (0 points) [Never (0 pts)] : Never (0 points) [No] : In the past 12 months have you used drugs other than those required for medical reasons? No [One fall no injury in past year] : Patient reported one fall in the past year without injury [Patient/Caregiver unclear of wishes] : , patient/caregiver unclear of wishes [de-identified] : ALIDA [de-identified] : Nephrology [YearQuit] : 2014 [Audit-CScore] : 0 [de-identified] : none [de-identified] : low salt [AdvancecareDate] : 10/22

## 2022-10-14 NOTE — HISTORY OF PRESENT ILLNESS
[FreeTextEntry8] : This is a 68 y/o male with PMHx significant for CAD s/p 3V CABG (2009), Anxiety, Carotid artery disease, CKD stage 4, CHFpEF, HTN, PAD, HLD, T2DM, CVA with left sided weakness, [FreeTextEntry1] : diabetes check, follow up. [de-identified] : This is a 66 y/o male with PMHx significant for CAD s/p 3V CABG (2009), Anxiety, Carotid artery disease, CKD stage 4, CHFpEF, HTN, PAD, HLD, T2DM, CVA with left sided weakness, presenting for diabetes check. Pt offers no new complains.

## 2022-10-14 NOTE — CARDIOLOGY SUMMARY
[No Ischemia] : no Ischemia [LVEF ___%] : LVEF [unfilled]% [Enlarged] : enlarged LA size [None] : no mitral regurgitation [___] : [unfilled] [de-identified] : 8 30 2022 Sinus  Rhythm  -First degree A-V block \par Linda = 252\par -Incomplete right bundle branch block. \par \par ABNORMAL \par \par  \par \par 1 11 2022  Sinus  Rhythm  -First degree A-V block \par -Incomplete right bundle branch block. \par  \par \par ABNORMAL \par \par \par 10 19 2021 \par \par 4/28/2021  : Sinus  Rhythm \par First degree AV block. \par -Incomplete right bundle branch block. \par  -Old inferior infarct. \par \par ABNORMAL \par  [de-identified] : apr 2022   30 days no significant events.  [de-identified] : Nov 2021: LE angiography: right SFA 80%. and left SFA moderate stenosis.  [de-identified] : July 2022: Renal Dupelx:  no renal artery stenosis.

## 2022-10-24 LAB
ALBUMIN SERPL ELPH-MCNC: 3.9 G/DL
ANION GAP SERPL CALC-SCNC: 14 MMOL/L
BASOPHILS # BLD AUTO: 0.07 K/UL
BASOPHILS NFR BLD AUTO: 0.9 %
BUN SERPL-MCNC: 66 MG/DL
CALCIUM SERPL-MCNC: 9 MG/DL
CHLORIDE SERPL-SCNC: 106 MMOL/L
CO2 SERPL-SCNC: 21 MMOL/L
CREAT SERPL-MCNC: 5.88 MG/DL
EGFR: 10 ML/MIN/1.73M2
EOSINOPHIL # BLD AUTO: 0.19 K/UL
EOSINOPHIL NFR BLD AUTO: 2.4 %
GLUCOSE SERPL-MCNC: 244 MG/DL
HCT VFR BLD CALC: 38.3 %
HGB BLD-MCNC: 12.3 G/DL
IMM GRANULOCYTES NFR BLD AUTO: 0.3 %
LYMPHOCYTES # BLD AUTO: 1.46 K/UL
LYMPHOCYTES NFR BLD AUTO: 18.3 %
MAN DIFF?: NORMAL
MCHC RBC-ENTMCNC: 27.5 PG
MCHC RBC-ENTMCNC: 32.1 GM/DL
MCV RBC AUTO: 85.7 FL
MONOCYTES # BLD AUTO: 0.46 K/UL
MONOCYTES NFR BLD AUTO: 5.8 %
NEUTROPHILS # BLD AUTO: 5.79 K/UL
NEUTROPHILS NFR BLD AUTO: 72.3 %
PHOSPHATE SERPL-MCNC: 4.7 MG/DL
PLATELET # BLD AUTO: 261 K/UL
POTASSIUM SERPL-SCNC: 4.8 MMOL/L
RBC # BLD: 4.47 M/UL
RBC # FLD: 13.2 %
SODIUM SERPL-SCNC: 141 MMOL/L
WBC # FLD AUTO: 7.99 K/UL

## 2022-11-01 ENCOUNTER — OFFICE (OUTPATIENT)
Dept: URBAN - METROPOLITAN AREA CLINIC 94 | Facility: CLINIC | Age: 67
Setting detail: OPHTHALMOLOGY
End: 2022-11-01
Payer: MEDICARE

## 2022-11-01 DIAGNOSIS — H35.372: ICD-10-CM

## 2022-11-01 DIAGNOSIS — E11.3312: ICD-10-CM

## 2022-11-01 DIAGNOSIS — E11.3313: ICD-10-CM

## 2022-11-01 DIAGNOSIS — H35.033: ICD-10-CM

## 2022-11-01 PROCEDURE — 92134 CPTRZ OPH DX IMG PST SGM RTA: CPT | Performed by: OPHTHALMOLOGY

## 2022-11-01 PROCEDURE — 67028 INJECTION EYE DRUG: CPT | Performed by: OPHTHALMOLOGY

## 2022-11-01 ASSESSMENT — AXIALLENGTH_DERIVED
OD_AL: 23.4412
OS_AL: 23.8821

## 2022-11-01 ASSESSMENT — CONFRONTATIONAL VISUAL FIELD TEST (CVF)
OD_FINDINGS: FULL
OS_FINDINGS: FULL

## 2022-11-01 ASSESSMENT — KERATOMETRY
OD_K1POWER_DIOPTERS: 43.25
OS_AXISANGLE_DEGREES: 163
OS_K1POWER_DIOPTERS: 43.00
OS_K2POWER_DIOPTERS: 43.50
METHOD_AUTO_MANUAL: AUTO
OD_K2POWER_DIOPTERS: 43.25
OD_AXISANGLE_DEGREES: 090

## 2022-11-01 ASSESSMENT — VISUAL ACUITY
OS_BCVA: 20/30
OD_BCVA: 20/70

## 2022-11-01 ASSESSMENT — TONOMETRY
OS_IOP_MMHG: 14
OD_IOP_MMHG: 13

## 2022-11-01 ASSESSMENT — PACHYMETRY
OD_CT_CORRECTION: -3
OD_CT_UM: 585
OS_CT_UM: 552
OS_CT_CORRECTION: -1

## 2022-11-01 ASSESSMENT — REFRACTION_AUTOREFRACTION
OS_SPHERE: -0.25
OS_AXIS: 085
OS_CYLINDER: -0.50
OD_CYLINDER: -0.75
OD_AXIS: 099
OD_SPHERE: +1.00

## 2022-11-01 ASSESSMENT — SPHEQUIV_DERIVED
OD_SPHEQUIV: 0.625
OS_SPHEQUIV: -0.5

## 2022-11-08 ENCOUNTER — OFFICE (OUTPATIENT)
Dept: URBAN - METROPOLITAN AREA CLINIC 94 | Facility: CLINIC | Age: 67
Setting detail: OPHTHALMOLOGY
End: 2022-11-08
Payer: MEDICARE

## 2022-11-08 DIAGNOSIS — E11.3313: ICD-10-CM

## 2022-11-08 DIAGNOSIS — E11.3311: ICD-10-CM

## 2022-11-08 DIAGNOSIS — H35.033: ICD-10-CM

## 2022-11-08 DIAGNOSIS — H35.372: ICD-10-CM

## 2022-11-08 DIAGNOSIS — E11.3312: ICD-10-CM

## 2022-11-08 PROCEDURE — 92250 FUNDUS PHOTOGRAPHY W/I&R: CPT | Performed by: OPHTHALMOLOGY

## 2022-11-08 PROCEDURE — 67028 INJECTION EYE DRUG: CPT | Performed by: OPHTHALMOLOGY

## 2022-11-08 ASSESSMENT — AXIALLENGTH_DERIVED
OD_AL: 23.4412
OS_AL: 23.8821

## 2022-11-08 ASSESSMENT — REFRACTION_AUTOREFRACTION
OS_AXIS: 085
OS_SPHERE: -0.25
OD_CYLINDER: -0.75
OS_CYLINDER: -0.50
OD_SPHERE: +1.00
OD_AXIS: 099

## 2022-11-08 ASSESSMENT — KERATOMETRY
OS_AXISANGLE_DEGREES: 163
OD_K2POWER_DIOPTERS: 43.25
OS_K1POWER_DIOPTERS: 43.00
OD_K1POWER_DIOPTERS: 43.25
METHOD_AUTO_MANUAL: AUTO
OD_AXISANGLE_DEGREES: 090
OS_K2POWER_DIOPTERS: 43.50

## 2022-11-08 ASSESSMENT — CONFRONTATIONAL VISUAL FIELD TEST (CVF)
OS_FINDINGS: FULL
OD_FINDINGS: FULL

## 2022-11-08 ASSESSMENT — TONOMETRY
OD_IOP_MMHG: 15
OS_IOP_MMHG: 15

## 2022-11-08 ASSESSMENT — SPHEQUIV_DERIVED
OS_SPHEQUIV: -0.5
OD_SPHEQUIV: 0.625

## 2022-11-08 ASSESSMENT — PACHYMETRY
OS_CT_CORRECTION: -1
OD_CT_CORRECTION: -3
OS_CT_UM: 552
OD_CT_UM: 585

## 2022-11-08 ASSESSMENT — VISUAL ACUITY
OD_BCVA: 20/80
OS_BCVA: 20/25

## 2022-11-22 ENCOUNTER — ASC (OUTPATIENT)
Dept: URBAN - METROPOLITAN AREA SURGERY 8 | Facility: SURGERY | Age: 67
Setting detail: OPHTHALMOLOGY
End: 2022-11-22
Payer: MEDICARE

## 2022-11-22 DIAGNOSIS — E11.3312: ICD-10-CM

## 2022-11-22 PROCEDURE — 67210 TREATMENT OF RETINAL LESION: CPT | Performed by: OPHTHALMOLOGY

## 2022-11-22 ASSESSMENT — REFRACTION_AUTOREFRACTION
OS_SPHERE: -0.25
OD_CYLINDER: -0.75
OD_SPHERE: +1.00
OS_CYLINDER: -0.50
OD_AXIS: 099
OS_AXIS: 085

## 2022-11-22 ASSESSMENT — VISUAL ACUITY
OS_BCVA: 20/30-1
OD_BCVA: 20/70-1

## 2022-11-22 ASSESSMENT — TONOMETRY
OD_IOP_MMHG: 15
OS_IOP_MMHG: 20

## 2022-11-22 ASSESSMENT — PACHYMETRY
OS_CT_CORRECTION: -1
OD_CT_CORRECTION: -3
OS_CT_UM: 552
OD_CT_UM: 585

## 2022-11-22 ASSESSMENT — CONFRONTATIONAL VISUAL FIELD TEST (CVF)
OS_FINDINGS: FULL
OD_FINDINGS: FULL

## 2022-11-22 ASSESSMENT — KERATOMETRY
METHOD_AUTO_MANUAL: AUTO
OS_AXISANGLE_DEGREES: 163
OS_K2POWER_DIOPTERS: 43.50
OS_K1POWER_DIOPTERS: 43.00
OD_K2POWER_DIOPTERS: 43.25
OD_K1POWER_DIOPTERS: 43.25
OD_AXISANGLE_DEGREES: 090

## 2022-11-22 ASSESSMENT — AXIALLENGTH_DERIVED
OS_AL: 23.8821
OD_AL: 23.4412

## 2022-11-22 ASSESSMENT — SPHEQUIV_DERIVED
OS_SPHEQUIV: -0.5
OD_SPHEQUIV: 0.625

## 2022-11-23 ENCOUNTER — APPOINTMENT (OUTPATIENT)
Dept: VASCULAR SURGERY | Facility: CLINIC | Age: 67
End: 2022-11-23
Payer: MEDICARE

## 2022-11-23 VITALS
BODY MASS INDEX: 30.68 KG/M2 | DIASTOLIC BLOOD PRESSURE: 71 MMHG | HEIGHT: 62.75 IN | TEMPERATURE: 98 F | RESPIRATION RATE: 14 BRPM | HEART RATE: 70 BPM | WEIGHT: 171 LBS | OXYGEN SATURATION: 97 % | SYSTOLIC BLOOD PRESSURE: 148 MMHG

## 2022-11-23 PROCEDURE — 99203 OFFICE O/P NEW LOW 30 MIN: CPT

## 2022-11-30 ENCOUNTER — NON-APPOINTMENT (OUTPATIENT)
Age: 67
End: 2022-11-30

## 2022-11-30 ENCOUNTER — APPOINTMENT (OUTPATIENT)
Dept: CARDIOLOGY | Facility: CLINIC | Age: 67
End: 2022-11-30

## 2022-11-30 VITALS
OXYGEN SATURATION: 98 % | DIASTOLIC BLOOD PRESSURE: 70 MMHG | WEIGHT: 170 LBS | TEMPERATURE: 98 F | HEART RATE: 75 BPM | BODY MASS INDEX: 29.02 KG/M2 | HEIGHT: 64 IN | SYSTOLIC BLOOD PRESSURE: 145 MMHG

## 2022-11-30 PROCEDURE — 99215 OFFICE O/P EST HI 40 MIN: CPT | Mod: 25

## 2022-11-30 PROCEDURE — 93000 ELECTROCARDIOGRAM COMPLETE: CPT

## 2022-12-01 ENCOUNTER — APPOINTMENT (OUTPATIENT)
Dept: CARDIOLOGY | Facility: CLINIC | Age: 67
End: 2022-12-01

## 2022-12-02 ENCOUNTER — APPOINTMENT (OUTPATIENT)
Dept: CARDIOLOGY | Facility: CLINIC | Age: 67
End: 2022-12-02

## 2022-12-02 PROCEDURE — 93306 TTE W/DOPPLER COMPLETE: CPT

## 2022-12-06 ENCOUNTER — APPOINTMENT (OUTPATIENT)
Dept: FAMILY MEDICINE | Facility: CLINIC | Age: 67
End: 2022-12-06

## 2022-12-07 ENCOUNTER — APPOINTMENT (OUTPATIENT)
Dept: TRANSPLANT | Facility: CLINIC | Age: 67
End: 2022-12-07

## 2022-12-07 ENCOUNTER — NON-APPOINTMENT (OUTPATIENT)
Age: 67
End: 2022-12-07

## 2022-12-07 ENCOUNTER — APPOINTMENT (OUTPATIENT)
Dept: NEPHROLOGY | Facility: CLINIC | Age: 67
End: 2022-12-07

## 2022-12-07 VITALS
BODY MASS INDEX: 28.84 KG/M2 | DIASTOLIC BLOOD PRESSURE: 78 MMHG | TEMPERATURE: 98 F | WEIGHT: 168 LBS | RESPIRATION RATE: 14 BRPM | OXYGEN SATURATION: 97 % | HEART RATE: 67 BPM | SYSTOLIC BLOOD PRESSURE: 152 MMHG

## 2022-12-07 VITALS
SYSTOLIC BLOOD PRESSURE: 152 MMHG | TEMPERATURE: 98 F | HEART RATE: 67 BPM | RESPIRATION RATE: 14 BRPM | DIASTOLIC BLOOD PRESSURE: 78 MMHG | HEIGHT: 64 IN | BODY MASS INDEX: 28.68 KG/M2 | WEIGHT: 168 LBS | OXYGEN SATURATION: 97 %

## 2022-12-07 DIAGNOSIS — Z01.818 ENCOUNTER FOR OTHER PREPROCEDURAL EXAMINATION: ICD-10-CM

## 2022-12-07 PROCEDURE — 99072 ADDL SUPL MATRL&STAF TM PHE: CPT

## 2022-12-07 PROCEDURE — 99203 OFFICE O/P NEW LOW 30 MIN: CPT

## 2022-12-07 PROCEDURE — 99205 OFFICE O/P NEW HI 60 MIN: CPT

## 2022-12-07 NOTE — PHYSICAL EXAM
[General Appearance - Alert] : alert [General Appearance - In No Acute Distress] : in no acute distress [Sclera] : the sclera and conjunctiva were normal [PERRL With Normal Accommodation] : pupils were equal in size, round, and reactive to light [Extraocular Movements] : extraocular movements were intact [Outer Ear] : the ears and nose were normal in appearance [Oropharynx] : the oropharynx was normal [Jugular Venous Distention Increased] : there was no jugular-venous distention [Auscultation Breath Sounds / Voice Sounds] : lungs were clear to auscultation bilaterally [Heart Sounds Gallop] : no gallops [Heart Sounds Pericardial Friction Rub] : no pericardial rub [Edema] : there was no peripheral edema [Bowel Sounds] : normal bowel sounds [Abdomen Soft] : soft [Abdomen Tenderness] : non-tender [Abdomen Mass (___ Cm)] : no abdominal mass palpated [Cervical Lymph Nodes Enlarged Posterior Bilaterally] : posterior cervical [Cervical Lymph Nodes Enlarged Anterior Bilaterally] : anterior cervical [Supraclavicular Lymph Nodes Enlarged Bilaterally] : supraclavicular [Involuntary Movements] : no involuntary movements were seen [] : no rash [No Focal Deficits] : no focal deficits [Oriented To Time, Place, And Person] : oriented to person, place, and time [Impaired Insight] : insight and judgment were intact [Affect] : the affect was normal [FreeTextEntry1] : pedal pulses are diminished bilaterally

## 2022-12-07 NOTE — HISTORY OF PRESENT ILLNESS
[TextBox_42] : \par 67 years old male, born in [place]\par Patient has known CKD (2007), ESRD (0000) on follow up with . is here for pre kidney transplant evaluation. \par He is a preemptive candidate. In discussion to place fistula in next couple of months\par he is accompanied by his wife today.\par \par He has known DM (age 43) On insulin (age 53); HTN (age 43). H/o Hyperlipidemia. No h/o Gout\par No known h/o kidney stone/ Prostatism.\par No hematuria/Transfusions\par Nocturia:3-4 times\par \par PMHx\par H/o CVA/TIA in March, had a blank stare while eating; recovered fully.  off balance, sometimes uses walker. not driving, otherwise no deficits. Was on Eliquis for 6-7 months now off.\par H/o CAD. CABG () Dr Meyers.\par No known h/o tuberculosis or hepatitis.\par No h/o NSAID use.\par No major weight loss/weight loss surgeries\par Has no h/o Pneumonia / UTI.\par No known h/o active PVD/DVT/neoplasia/active infections/bleeding/open wounds/falls/seizures/psych issues\par Had COVID  , recovered at home.\par COVID vaccination: Yes\par Reports no major allergies. \par Most recent hospitalization/for: 2022 for CVA\par Past surgeries:\par CABG ()\par sleep apnea - doesn't use CPAP\par Hernia repair (Many years ago)\par Cataract surgery\par No history of kidney/ bladder/ prostate surgery.\par Non smoker.\par Former smoker, quit in . many years over a pack/d \par no longer etoh\par no illicits\par Family: \par Lives with: Wife and grandson (32), has 4 children (2 live locally)\par \par Parents are  Mother- DM , CAD Siblings- 2 sisters, diabetic\par Children: 4 children, 2 daughters, 2 sons Healthy. \par No family history of kidney disease\par Independent for ADL\par Able to walk two blocks, can climb stairs with difficulty.\par Assist devices: Uses walker for longer distances\par Driving:no\par ROS: Has h/o shortness of breath on exertion. \par Vision: impaired\par Hearing:ok\par Functional/employment status: walks 1 block, can walk up 5 steps, no regular exercise\par Retired, [employer] he did their inventory\par Dialysis history:preemptive, noted GFR 10 ml/min in October\par Kidney Biopsy: no\par Potential Live donors:being explored\par \par Prior Studies:\par Cardiology: [facility] - saw 2022\par TTE - LVEF 52%, mild pulm HT\par Cancer Screen:Had colonoscopy \par Imaging:\par Consultants:\par Primary MD:Dr. Stephens\par Nephrologist:Dr. Andersen\par \par Prior Transplants:none\par Prior Transplant evaluation:none prior\par Allergies:NKDA\par Medications:\par Tresciba\par Aspirin\par Atorvastatin 80\par Carvedilol 25\par Sod bicarb 650\par Pantoprazole\par Calcitriol\par Amlodipine 10/d\par Clonidine 0.1 X2/d\par Furosemide 40/d\par Hydralazine 50 X3/d\par Isosorbide 20 mg X3/d\par

## 2022-12-07 NOTE — REASON FOR VISIT
[Initial] : an initial visit for [End-Stage Renal Disease] : end-stage renal disease [Kidney Transplant Evaluation] : kidney transplant evaluation [Other: _____] : [unfilled]

## 2022-12-07 NOTE — HISTORY OF PRESENT ILLNESS
[FreeTextEntry1] : 67  years old male, born in  [place]\par Patient has known CKD (2007), ESRD (0000)  on follow up with . is here for pre kidney transplant evaluation. \par He is a preemptive candidate.\par he is accompanied by his wife today.\par He has known DM (age 43) On insulin (age 53); HTN (age 43). H/o Hyperlipidemia. No h/o Gout\par No known h/o kidney stone/ Prostatism.\par No hematuria/Transfusions\par Nocturia:3-4 times\par H/o CVA in March, had a blank stare while eating; recovered fully. No deficits. Was on Eliquis for 6-7 months now off.\par H/o CAD. CABG () Dr Meyers.\par No known h/o tuberculosis or hepatitis.\par No h/o NSAID use.\par No major weight loss/weight loss surgeries\par Has no h/o Pneumonia / UTI.\par No known h/o active PVD/DVT/neoplasia/active infections/bleeding/open wounds/falls/seizures/psych issues\par Had COVID  , recovered at home.\par COVID vaccination: Yes\par Reports no major allergies. \par Most recent hospitalization/for: 2022 for CVA\par Past surgeries:\par CABG ()\par Hernia repair (Many years ago)\par Cataract surgery\par No history of kidney/ bladder/ prostate surgery.\par Non smoker.\par Former smoker, quit in . many years over a pack/d\par Family: \par Lives with: Wife and grandson\par \par Parents are  Mother- DM , CAD Siblings- 2 sisters, diabetic\par Children: 4 children, 2 daughters, 2 sons Healthy. \par No family history of kidney disease\par Independent for ADL\par Able to walk two blocks, can climb stairs with difficulty.\par Assist devices: Uses walker for longer distances\par Driving:no\par ROS: Has h/o shortness of breath on exertion. 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 Stephens\par Nephrologist:Dr. Andersen\par \par Prior Transplants:none\par Prior Transplant evaluation:none prior\par Allergies:NKDA\par Medications:\par Tresciba\par Aspirin\par Atorvastatin 80\par Carvedilol 25\par Sod bicarb 650\par Pantoprazole\par Calcitriol\par Amlodipine 10/d\par Clonidine 0.1 X2/d\par Furosemide 40/d\par Hydralazine 50 X3/d\par Isosorbide 20 mg X3/d\par

## 2022-12-07 NOTE — ASSESSMENT
[Fair candidate] : a fair candidate. We should proceed with our protocol for evaluation for kidney transplantation. [FreeTextEntry1] : 66yo man with ckd4 for kidney transplant evaluation\par 1) cardiac eval and clearance\par 2) ct chest abdo pelvis non-con\par 3) carotid doppler\par 4) colonoscopy/psa\par \par

## 2022-12-07 NOTE — ASSESSMENT
[FreeTextEntry1] : .Mr. CUNNINGHAM 67 year He is evaluated for kidney transplantation.\par Pre transplant/CKD: Patient will benefit from renal allotransplantation he is an acceptable/ moderate risk candidate, diabetes, CAD, Peripheral arterial disease, former smoker\par Patient will continue to follow up with primary nephrologist.\par Medical risks: Cardiovascular, cancer screening.\par Diabetes Mellitus: Discussed implications. Continue follow up with primary physicians\par Hypertension: Discussed implications. Continue follow up with primary physicians.\par Cardiac risk:  will get further evaluation; echo, stress test; Reviewed cardiovascular risk reduction strategies\par Cancer screening: PSA .  Colon smitha screening. No known h/o neoplastic disease\par ID: Serology for acute and chronic viral infections. Screening for latent TB. \par Imaging: Renal/abdominal /chest /Iliac/ carotids imaging  screening\par Consults: Nutrition, social work, cardiology, Transplant surgery.\par Reviewed factors affecting survival and morbidity while on wait list and reviewed savannah-operative and long-term risk factors affecting outcome in kidney transplantation.\par Details of transplant surgery, immunosuppression and its complications and benefits of live donor transplantation as well as variability in wait times across regions and multiple listing were discussed. KDPI >85% and PHS high risk criteria donors were discussed. Discussed factors affecting morbidity and mortality while on hemodialysis.\par Current outcome for [facility] kidney transplant program and SRTR data were discussed. He has informative educational video and power point presentation regarding details of kidney transplantation at this center.\par Patient has potential live donor ( being explored) at present. \par Will proceed with completing/ updating work up and listing for transplant/ live donor transplant once work up is reviewed and found to be ok.\par

## 2022-12-08 PROBLEM — Z01.818 PRE-TRANSPLANT EVALUATION FOR KIDNEY TRANSPLANT: Status: ACTIVE | Noted: 2022-12-07

## 2022-12-08 LAB
ABO + RH PNL BLD: NORMAL
ABO + RH PNL BLD: NORMAL
ALBUMIN SERPL ELPH-MCNC: 4 G/DL
ALP BLD-CCNC: 136 U/L
ALT SERPL-CCNC: 22 U/L
ANION GAP SERPL CALC-SCNC: 14 MMOL/L
APPEARANCE: CLEAR
AST SERPL-CCNC: 13 U/L
BACTERIA: NEGATIVE
BASOPHILS # BLD AUTO: 0.06 K/UL
BASOPHILS NFR BLD AUTO: 0.7 %
BILIRUB SERPL-MCNC: 0.4 MG/DL
BILIRUBIN URINE: NEGATIVE
BLOOD URINE: ABNORMAL
BUN SERPL-MCNC: 65 MG/DL
CALCIUM SERPL-MCNC: 9 MG/DL
CHLORIDE SERPL-SCNC: 107 MMOL/L
CHOLEST SERPL-MCNC: 135 MG/DL
CMV IGG SERPL QL: 5.5 U/ML
CMV IGG SERPL-IMP: POSITIVE
CO2 SERPL-SCNC: 21 MMOL/L
COLOR: NORMAL
COVID-19 NUCLEOCAPSID  GAM ANTIBODY INTERPRETATION: NEGATIVE
COVID-19 SPIKE DOMAIN ANTIBODY INTERPRETATION: POSITIVE
CREAT SERPL-MCNC: 6.31 MG/DL
CREAT SPEC-SCNC: 106 MG/DL
CREAT/PROT UR: 3.1 RATIO
EBV EA AB SER IA-ACNC: 11.8 U/ML
EBV EA AB TITR SER IF: POSITIVE
EBV EA IGG SER QL IA: 574 U/ML
EBV EA IGG SER-ACNC: POSITIVE
EBV EA IGM SER IA-ACNC: NEGATIVE
EBV PATRN SPEC IB-IMP: NORMAL
EBV VCA IGG SER IA-ACNC: >750 U/ML
EBV VCA IGM SER QL IA: <10 U/ML
EGFR: 9 ML/MIN/1.73M2
EOSINOPHIL # BLD AUTO: 0.21 K/UL
EOSINOPHIL NFR BLD AUTO: 2.3 %
EPSTEIN-BARR VIRUS CAPSID ANTIGEN IGG: POSITIVE
ESTIMATED AVERAGE GLUCOSE: 183 MG/DL
GLUCOSE QUALITATIVE U: NORMAL
GLUCOSE SERPL-MCNC: 100 MG/DL
HAV IGM SER QL: NONREACTIVE
HBA1C MFR BLD HPLC: 8 %
HBV CORE IGG+IGM SER QL: NONREACTIVE
HBV SURFACE AB SER QL: NONREACTIVE
HBV SURFACE AB SERPL IA-ACNC: <3 MIU/ML
HBV SURFACE AG SER QL: NONREACTIVE
HCT VFR BLD CALC: 38.3 %
HCV AB SER QL: NONREACTIVE
HCV S/CO RATIO: 0.08 S/CO
HDLC SERPL-MCNC: 27 MG/DL
HEPATITIS A IGG ANTIBODY: NONREACTIVE
HGB BLD-MCNC: 12.4 G/DL
HIV1+2 AB SPEC QL IA.RAPID: NONREACTIVE
HSV 1+2 IGG SER IA-IMP: NEGATIVE
HSV 1+2 IGG SER IA-IMP: NEGATIVE
HSV1 IGG SER QL: 0.16 INDEX
HSV2 IGG SER QL: 0.23 INDEX
HYALINE CASTS: 2 /LPF
IMM GRANULOCYTES NFR BLD AUTO: 0.2 %
KETONES URINE: NEGATIVE
LDLC SERPL CALC-MCNC: 90 MG/DL
LEUKOCYTE ESTERASE URINE: NEGATIVE
LYMPHOCYTES # BLD AUTO: 1.52 K/UL
LYMPHOCYTES NFR BLD AUTO: 16.6 %
MAGNESIUM SERPL-MCNC: 1.6 MG/DL
MAN DIFF?: NORMAL
MCHC RBC-ENTMCNC: 26.7 PG
MCHC RBC-ENTMCNC: 32.4 GM/DL
MCV RBC AUTO: 82.5 FL
MICROSCOPIC-UA: NORMAL
MONOCYTES # BLD AUTO: 0.49 K/UL
MONOCYTES NFR BLD AUTO: 5.4 %
NEUTROPHILS # BLD AUTO: 6.84 K/UL
NEUTROPHILS NFR BLD AUTO: 74.8 %
NITRITE URINE: NEGATIVE
NONHDLC SERPL-MCNC: 108 MG/DL
PH URINE: 6
PHOSPHATE SERPL-MCNC: 5 MG/DL
PLATELET # BLD AUTO: 283 K/UL
POTASSIUM SERPL-SCNC: 4.5 MMOL/L
PROT SERPL-MCNC: 6.9 G/DL
PROT UR-MCNC: 332 MG/DL
PROTEIN URINE: ABNORMAL
PSA SERPL-MCNC: 1.07 NG/ML
RBC # BLD: 4.64 M/UL
RBC # FLD: 14.1 %
RED BLOOD CELLS URINE: 3 /HPF
ROGOSIN: NORMAL
RUBV IGG FLD-ACNC: 20 INDEX
RUBV IGG SER-IMP: POSITIVE
SARS-COV-2 AB SERPL IA-ACNC: >250 U/ML
SARS-COV-2 AB SERPL QL IA: 0.44 INDEX
SODIUM SERPL-SCNC: 142 MMOL/L
SPECIFIC GRAVITY URINE: 1.01
SQUAMOUS EPITHELIAL CELLS: 1 /HPF
T GONDII AB SER-IMP: NEGATIVE
T GONDII IGG SER QL: <3 IU/ML
T PALLIDUM AB SER QL IA: NEGATIVE
TRIGL SERPL-MCNC: 91 MG/DL
UROBILINOGEN URINE: NORMAL
VZV AB TITR SER: POSITIVE
VZV IGG SER IF-ACNC: >4000 INDEX
WBC # FLD AUTO: 9.14 K/UL
WHITE BLOOD CELLS URINE: 1 /HPF

## 2022-12-12 LAB
M TB IFN-G BLD-IMP: NEGATIVE
QUANTIFERON TB PLUS MITOGEN MINUS NIL: 2.43 IU/ML
QUANTIFERON TB PLUS NIL: 0.03 IU/ML
QUANTIFERON TB PLUS TB1 MINUS NIL: 0 IU/ML
QUANTIFERON TB PLUS TB2 MINUS NIL: 0 IU/ML

## 2022-12-13 ENCOUNTER — ASC (OUTPATIENT)
Dept: URBAN - METROPOLITAN AREA SURGERY 8 | Facility: SURGERY | Age: 67
Setting detail: OPHTHALMOLOGY
End: 2022-12-13
Payer: MEDICARE

## 2022-12-13 ENCOUNTER — APPOINTMENT (OUTPATIENT)
Dept: CARDIOLOGY | Facility: CLINIC | Age: 67
End: 2022-12-13

## 2022-12-13 DIAGNOSIS — E11.3311: ICD-10-CM

## 2022-12-13 PROCEDURE — 67210 TREATMENT OF RETINAL LESION: CPT | Performed by: OPHTHALMOLOGY

## 2022-12-13 ASSESSMENT — CONFRONTATIONAL VISUAL FIELD TEST (CVF)
OD_FINDINGS: FULL
OS_FINDINGS: FULL

## 2022-12-13 ASSESSMENT — PACHYMETRY
OD_CT_UM: 585
OD_CT_CORRECTION: -3
OS_CT_UM: 552
OS_CT_CORRECTION: -1

## 2022-12-13 ASSESSMENT — TONOMETRY
OS_IOP_MMHG: 18
OD_IOP_MMHG: 14

## 2022-12-14 ENCOUNTER — APPOINTMENT (OUTPATIENT)
Dept: FAMILY MEDICINE | Facility: CLINIC | Age: 67
End: 2022-12-14

## 2022-12-14 ASSESSMENT — KERATOMETRY
OD_K2POWER_DIOPTERS: 43.25
OD_K1POWER_DIOPTERS: 43.25
OD_AXISANGLE_DEGREES: 090
OS_K1POWER_DIOPTERS: 43.00
METHOD_AUTO_MANUAL: AUTO
OS_K2POWER_DIOPTERS: 43.50
OS_AXISANGLE_DEGREES: 163

## 2022-12-14 ASSESSMENT — REFRACTION_AUTOREFRACTION
OS_CYLINDER: -0.50
OD_SPHERE: +1.00
OD_AXIS: 099
OS_AXIS: 085
OS_SPHERE: -0.25
OD_CYLINDER: -0.75

## 2022-12-14 ASSESSMENT — VISUAL ACUITY
OS_BCVA: 20/25
OD_BCVA: 20/70

## 2022-12-14 ASSESSMENT — SPHEQUIV_DERIVED
OS_SPHEQUIV: -0.5
OD_SPHEQUIV: 0.625

## 2022-12-14 ASSESSMENT — AXIALLENGTH_DERIVED
OS_AL: 23.8821
OD_AL: 23.4412

## 2022-12-20 ENCOUNTER — APPOINTMENT (OUTPATIENT)
Dept: CT IMAGING | Facility: CLINIC | Age: 67
End: 2022-12-20

## 2022-12-20 ENCOUNTER — APPOINTMENT (OUTPATIENT)
Dept: ULTRASOUND IMAGING | Facility: CLINIC | Age: 67
End: 2022-12-20

## 2022-12-28 NOTE — H&P PST ADULT - VENOUS THROMBOEMBOLISM BMI
(1) obesity (BMI greater than 25)
Bed in lowest position, wheels locked, appropriate side rails in place/Call bell, personal items and telephone in reach/Instruct patient to call for assistance before getting out of bed or chair/Non-slip footwear when patient is out of bed/[place]nard to call system/Physically safe environment - no spills, clutter or unnecessary equipment/Purposeful Proactive Rounding/Room/bathroom lighting operational, light cord in reach

## 2023-01-03 ENCOUNTER — RESULT CHARGE (OUTPATIENT)
Age: 68
End: 2023-01-03

## 2023-01-03 ENCOUNTER — APPOINTMENT (OUTPATIENT)
Dept: FAMILY MEDICINE | Facility: CLINIC | Age: 68
End: 2023-01-03
Payer: MEDICARE

## 2023-01-03 VITALS
HEART RATE: 83 BPM | BODY MASS INDEX: 28.84 KG/M2 | WEIGHT: 168 LBS | OXYGEN SATURATION: 97 % | DIASTOLIC BLOOD PRESSURE: 90 MMHG | RESPIRATION RATE: 14 BRPM | SYSTOLIC BLOOD PRESSURE: 160 MMHG | TEMPERATURE: 97.3 F

## 2023-01-03 DIAGNOSIS — F41.9 ANXIETY DISORDER, UNSPECIFIED: ICD-10-CM

## 2023-01-03 DIAGNOSIS — N18.30 CHRONIC KIDNEY DISEASE, STAGE 3 UNSPECIFIED: ICD-10-CM

## 2023-01-03 PROCEDURE — 99214 OFFICE O/P EST MOD 30 MIN: CPT | Mod: 25

## 2023-01-03 PROCEDURE — 83036 HEMOGLOBIN GLYCOSYLATED A1C: CPT | Mod: QW

## 2023-01-03 RX ORDER — APIXABAN 2.5 MG/1
2.5 TABLET, FILM COATED ORAL
Qty: 180 | Refills: 1 | Status: DISCONTINUED | COMMUNITY
Start: 2022-07-07 | End: 2023-01-03

## 2023-01-03 NOTE — HISTORY OF PRESENT ILLNESS
[FreeTextEntry1] : diabetes check, follow up. [de-identified] : This is a 66 y/o male with PMHx significant for CAD s/p 3V CABG (2009), Anxiety, Carotid artery disease, CKD stage 4, CHFpEF, HTN, PAD, HLD, T2DM, CVA with left sided weakness, presenting for diabetes check. Pt offers no new complains.

## 2023-01-03 NOTE — HEALTH RISK ASSESSMENT
[Intercurrent hospitalizations] : was admitted to the hospital  [Former] : Former [20 or more] : 20 or more [1 or 2 (0 pts)] : 1 or 2 (0 points) [Never (0 pts)] : Never (0 points) [No] : In the past 12 months have you used drugs other than those required for medical reasons? No [One fall no injury in past year] : Patient reported one fall in the past year without injury [Patient/Caregiver unclear of wishes] : , patient/caregiver unclear of wishes [de-identified] : ALIDA [de-identified] : Nephrology [YearQuit] : 2014 [Audit-CScore] : 0 [de-identified] : none [de-identified] : low salt [AdvancecareDate] : 10/22

## 2023-01-03 NOTE — ASSESSMENT
[FreeTextEntry1] : This is a 68 y/o male with PMHx significant for CAD s/p 3V CABG (2009- [facility] - LAD reverse SVG to postero- lateral reverse SCG to OM), Anxiety, Carotid artery disease, CKD stage 4, CHFpEF, HTN, PAD, HLD, T2DM, CVA with left sided weakness, presenting for diabetes check and medication refills..\par \par CVS: h/o CAD s/p 3V CABG (2009), Carotid artery disease, CHFpEF, HTN, PAD, HLD, f/w accelerated HTN \par -Cardiology following Dr Michaud\par -NST 2/22 showed no evidence of Ischemia\par -MCOT followed by cardiology, No Afib\par -Continue ASA 81 mg, Amlodipine 10 mg  daily, Atorvastatin 80 mg, Carvedilol 25 mg bid, Hydralazine 50 mg tid, Isosorbide 20 mg tid, Clonidine 0.1 mg bid.\par -Discontinued Eliquis secondary to frequent falls, started on Clopidogrel by Cardiology\par \par NEPHROLOGY: CKD stage 4\par -Check Renal panel.\par -Continue Magnesium supplementation, Sodium bicarb 1300 mg tid e-refilled\par -Nephrology Dr Andersen following.\par -Pt being considered for Renal transplant\par \par : Microscopic hematuria\par -Followed by Dr España\par \par NEURO: h/o CVA with LEFT sided residual weakness, positional vertigo\par -Neurology following Dr Sarabia\par -Continue ASA, Eliquis discontinued.\par \par HEME: Anemia of CKD\par -Stable / improving (12.4)\par \par ENDO: T2DM\par -Diet controlled\par -A1c 6.7 --> 8.1 --> 7.5 --> 8.0 --> 7.5 POCT today\par -Continue Tresiba, pt states that he changes his Insulin regimen depending on how high or low his BS are.\par \par GI: Inguinal hernia\par -Surgery referral given but provider does not accept his insurance.\par \par HCM:\par -Blood work drawn in house\par -PFIZER Covid vaccine 3/11/21, 4/2/21, 1/11/22\par -Flu vaccine 9/30/22\par -Prevnar 20 Oct 2022\par -No record of Colonoscopy, GI referral given, not scheduled yet\par -RTO for CPE at earliest convenience\par \par \par

## 2023-01-05 ENCOUNTER — OFFICE (OUTPATIENT)
Dept: URBAN - METROPOLITAN AREA CLINIC 94 | Facility: CLINIC | Age: 68
Setting detail: OPHTHALMOLOGY
End: 2023-01-05
Payer: MEDICARE

## 2023-01-05 DIAGNOSIS — E11.3313: ICD-10-CM

## 2023-01-05 DIAGNOSIS — H35.033: ICD-10-CM

## 2023-01-05 DIAGNOSIS — E11.3312: ICD-10-CM

## 2023-01-05 DIAGNOSIS — H35.372: ICD-10-CM

## 2023-01-05 DIAGNOSIS — Z96.1: ICD-10-CM

## 2023-01-05 DIAGNOSIS — E11.3311: ICD-10-CM

## 2023-01-05 PROCEDURE — 92134 CPTRZ OPH DX IMG PST SGM RTA: CPT | Performed by: OPHTHALMOLOGY

## 2023-01-05 PROCEDURE — 99212 OFFICE O/P EST SF 10 MIN: CPT | Performed by: OPHTHALMOLOGY

## 2023-01-05 ASSESSMENT — SPHEQUIV_DERIVED
OD_SPHEQUIV: 0.125
OS_SPHEQUIV: -0.75

## 2023-01-05 ASSESSMENT — KERATOMETRY
OS_AXISANGLE_DEGREES: 095
OS_K2POWER_DIOPTERS: 43.75
OD_K2POWER_DIOPTERS: 44.00
OD_K1POWER_DIOPTERS: 43.25
OD_AXISANGLE_DEGREES: 020
OS_K1POWER_DIOPTERS: 43.25
METHOD_AUTO_MANUAL: AUTO

## 2023-01-05 ASSESSMENT — PACHYMETRY
OS_CT_CORRECTION: -1
OS_CT_UM: 552
OD_CT_CORRECTION: -3
OD_CT_UM: 585

## 2023-01-05 ASSESSMENT — CONFRONTATIONAL VISUAL FIELD TEST (CVF)
OD_FINDINGS: FULL
OS_FINDINGS: FULL

## 2023-01-05 ASSESSMENT — REFRACTION_CURRENTRX
OD_OVR_VA: 20/
OD_VPRISM_DIRECTION: PROGS
OS_OVR_VA: 20/
OS_AXIS: 080
OD_ADD: +3.25
OD_SPHERE: +1.00
OS_CYLINDER: -0.75
OS_ADD: +3.25
OS_VPRISM_DIRECTION: PROGS
OD_AXIS: 100
OS_SPHERE: +1.25
OD_CYLINDER: -1.50

## 2023-01-05 ASSESSMENT — TONOMETRY
OS_IOP_MMHG: 16
OD_IOP_MMHG: 18

## 2023-01-05 ASSESSMENT — REFRACTION_AUTOREFRACTION
OS_SPHERE: -0.50
OD_CYLINDER: -0.75
OS_AXIS: 080
OD_AXIS: 100
OS_CYLINDER: -0.50
OD_SPHERE: +0.50

## 2023-01-05 ASSESSMENT — VISUAL ACUITY
OD_BCVA: 20/80
OS_BCVA: 20/25

## 2023-01-05 ASSESSMENT — AXIALLENGTH_DERIVED
OD_AL: 23.4977
OS_AL: 23.8878

## 2023-01-09 ENCOUNTER — NON-APPOINTMENT (OUTPATIENT)
Age: 68
End: 2023-01-09

## 2023-01-16 ENCOUNTER — APPOINTMENT (OUTPATIENT)
Dept: FAMILY MEDICINE | Facility: CLINIC | Age: 68
End: 2023-01-16
Payer: MEDICARE

## 2023-01-16 VITALS
SYSTOLIC BLOOD PRESSURE: 150 MMHG | RESPIRATION RATE: 14 BRPM | BODY MASS INDEX: 28.34 KG/M2 | WEIGHT: 166 LBS | HEART RATE: 75 BPM | HEIGHT: 64 IN | TEMPERATURE: 97 F | OXYGEN SATURATION: 98 % | DIASTOLIC BLOOD PRESSURE: 70 MMHG

## 2023-01-16 DIAGNOSIS — R32 UNSPECIFIED URINARY INCONTINENCE: ICD-10-CM

## 2023-01-16 PROCEDURE — 99214 OFFICE O/P EST MOD 30 MIN: CPT

## 2023-01-17 ENCOUNTER — OUTPATIENT (OUTPATIENT)
Dept: OUTPATIENT SERVICES | Facility: HOSPITAL | Age: 68
LOS: 1 days | End: 2023-01-17
Payer: MEDICARE

## 2023-01-17 VITALS
OXYGEN SATURATION: 98 % | WEIGHT: 169.98 LBS | RESPIRATION RATE: 17 BRPM | HEIGHT: 64 IN | TEMPERATURE: 97 F | HEART RATE: 66 BPM | SYSTOLIC BLOOD PRESSURE: 120 MMHG | DIASTOLIC BLOOD PRESSURE: 64 MMHG

## 2023-01-17 DIAGNOSIS — I10 ESSENTIAL (PRIMARY) HYPERTENSION: ICD-10-CM

## 2023-01-17 DIAGNOSIS — N18.4 CHRONIC KIDNEY DISEASE, STAGE 4 (SEVERE): ICD-10-CM

## 2023-01-17 DIAGNOSIS — I25.10 ATHEROSCLEROTIC HEART DISEASE OF NATIVE CORONARY ARTERY WITHOUT ANGINA PECTORIS: ICD-10-CM

## 2023-01-17 DIAGNOSIS — N18.6 END STAGE RENAL DISEASE: ICD-10-CM

## 2023-01-17 DIAGNOSIS — E11.9 TYPE 2 DIABETES MELLITUS WITHOUT COMPLICATIONS: ICD-10-CM

## 2023-01-17 DIAGNOSIS — Z95.1 PRESENCE OF AORTOCORONARY BYPASS GRAFT: Chronic | ICD-10-CM

## 2023-01-17 DIAGNOSIS — Z01.818 ENCOUNTER FOR OTHER PREPROCEDURAL EXAMINATION: ICD-10-CM

## 2023-01-17 DIAGNOSIS — Z29.9 ENCOUNTER FOR PROPHYLACTIC MEASURES, UNSPECIFIED: ICD-10-CM

## 2023-01-17 DIAGNOSIS — I63.9 CEREBRAL INFARCTION, UNSPECIFIED: ICD-10-CM

## 2023-01-17 DIAGNOSIS — I12.0 HYPERTENSIVE CHRONIC KIDNEY DISEASE WITH STAGE 5 CHRONIC KIDNEY DISEASE OR END STAGE RENAL DISEASE: ICD-10-CM

## 2023-01-17 LAB
A1C WITH ESTIMATED AVERAGE GLUCOSE RESULT: 7.5 % — HIGH (ref 4–5.6)
ALBUMIN SERPL ELPH-MCNC: 3.8 G/DL — SIGNIFICANT CHANGE UP (ref 3.3–5.2)
ALP SERPL-CCNC: 111 U/L — SIGNIFICANT CHANGE UP (ref 40–120)
ALT FLD-CCNC: 9 U/L — SIGNIFICANT CHANGE UP
ANION GAP SERPL CALC-SCNC: 14 MMOL/L — SIGNIFICANT CHANGE UP (ref 5–17)
APTT BLD: 29.5 SEC — SIGNIFICANT CHANGE UP (ref 27.5–35.5)
AST SERPL-CCNC: 14 U/L — SIGNIFICANT CHANGE UP
BASOPHILS # BLD AUTO: 0.05 K/UL — SIGNIFICANT CHANGE UP (ref 0–0.2)
BASOPHILS NFR BLD AUTO: 0.7 % — SIGNIFICANT CHANGE UP (ref 0–2)
BILIRUB SERPL-MCNC: 0.3 MG/DL — LOW (ref 0.4–2)
BLD GP AB SCN SERPL QL: SIGNIFICANT CHANGE UP
BUN SERPL-MCNC: 76.3 MG/DL — HIGH (ref 8–20)
CALCIUM SERPL-MCNC: 8 MG/DL — LOW (ref 8.4–10.5)
CHLORIDE SERPL-SCNC: 108 MMOL/L — SIGNIFICANT CHANGE UP (ref 96–108)
CO2 SERPL-SCNC: 20 MMOL/L — LOW (ref 22–29)
CREAT SERPL-MCNC: 6.69 MG/DL — HIGH (ref 0.5–1.3)
EGFR: 8 ML/MIN/1.73M2 — LOW
EOSINOPHIL # BLD AUTO: 0.2 K/UL — SIGNIFICANT CHANGE UP (ref 0–0.5)
EOSINOPHIL NFR BLD AUTO: 2.9 % — SIGNIFICANT CHANGE UP (ref 0–6)
ESTIMATED AVERAGE GLUCOSE: 169 MG/DL — HIGH (ref 68–114)
GLUCOSE SERPL-MCNC: 171 MG/DL — HIGH (ref 70–99)
HCT VFR BLD CALC: 34.7 % — LOW (ref 39–50)
HGB BLD-MCNC: 11.4 G/DL — LOW (ref 13–17)
IMM GRANULOCYTES NFR BLD AUTO: 0.6 % — SIGNIFICANT CHANGE UP (ref 0–0.9)
INR BLD: 1.07 RATIO — SIGNIFICANT CHANGE UP (ref 0.88–1.16)
LYMPHOCYTES # BLD AUTO: 1.65 K/UL — SIGNIFICANT CHANGE UP (ref 1–3.3)
LYMPHOCYTES # BLD AUTO: 23.6 % — SIGNIFICANT CHANGE UP (ref 13–44)
MCHC RBC-ENTMCNC: 26.5 PG — LOW (ref 27–34)
MCHC RBC-ENTMCNC: 32.9 GM/DL — SIGNIFICANT CHANGE UP (ref 32–36)
MCV RBC AUTO: 80.7 FL — SIGNIFICANT CHANGE UP (ref 80–100)
MONOCYTES # BLD AUTO: 0.51 K/UL — SIGNIFICANT CHANGE UP (ref 0–0.9)
MONOCYTES NFR BLD AUTO: 7.3 % — SIGNIFICANT CHANGE UP (ref 2–14)
NEUTROPHILS # BLD AUTO: 4.53 K/UL — SIGNIFICANT CHANGE UP (ref 1.8–7.4)
NEUTROPHILS NFR BLD AUTO: 64.9 % — SIGNIFICANT CHANGE UP (ref 43–77)
PLATELET # BLD AUTO: 282 K/UL — SIGNIFICANT CHANGE UP (ref 150–400)
POTASSIUM SERPL-MCNC: 5.5 MMOL/L — HIGH (ref 3.5–5.3)
POTASSIUM SERPL-SCNC: 5.5 MMOL/L — HIGH (ref 3.5–5.3)
PROT SERPL-MCNC: 6.5 G/DL — LOW (ref 6.6–8.7)
PROTHROM AB SERPL-ACNC: 12.4 SEC — SIGNIFICANT CHANGE UP (ref 10.5–13.4)
RBC # BLD: 4.3 M/UL — SIGNIFICANT CHANGE UP (ref 4.2–5.8)
RBC # FLD: 14.5 % — SIGNIFICANT CHANGE UP (ref 10.3–14.5)
SODIUM SERPL-SCNC: 142 MMOL/L — SIGNIFICANT CHANGE UP (ref 135–145)
WBC # BLD: 6.98 K/UL — SIGNIFICANT CHANGE UP (ref 3.8–10.5)
WBC # FLD AUTO: 6.98 K/UL — SIGNIFICANT CHANGE UP (ref 3.8–10.5)

## 2023-01-17 PROCEDURE — G0463: CPT

## 2023-01-17 NOTE — H&P PST ADULT - PROBLEM SELECTOR PLAN 2
Gait unsteady, educated patient on utilizing assistive device and x1 person assist when ambulating or transferring, seek medical attention or go to ED if sustain injury.  Aspirin as prescribed.

## 2023-01-17 NOTE — H&P PST ADULT - HISTORY OF PRESENT ILLNESS
67 year old male with a pmhx of CHF, HLD, CAD s/p CABG, CKD, DM2, CVA        Prior Cardiac Interventions:       PCI's:        CAB2014: LIMA to LAD, reverse SVG to posterior lateral. Reverse SCG to OM      Echo: < from: TTE Echo Complete w/o Contrast w/ Doppler (22 @ 19:06) >  Summary:   1. Technically difficult study.   2. Normal global left ventricular systolic function.   3. Left ventricular ejection fraction, by visual estimation, is 55 to   60%.   4. Mildly enlarged left atrium.   5. Normal right atrial size.   6.Mild mitral annular calcification.   7. Mild mitral valve regurgitation.   8. Thickening and calcification of the anterior and posterior mitral   valve leaflets.   9. Moderate tricuspid regurgitation.  10. Sclerotic aortic valve with normal opening.  11. Estimated pulmonary artery systolic pressure is 40.3 mmHg assuming a   right atrial pressure of 5 mmHg, which is consistent with mild pulmonary   hypertension.  12. Trivial pericardial effusion.   67 year old male with a pmhx of CHF, HLD, CAD s/p CABG in , DM2, URVASHI no CPAP, CVA 2022 pt reports issues with balance has fallen a few times, last fall was last week denies any injury, CKD now ESRD plan is for left AV fistula.  Patient denies fever, chills, LE abrasions or edema, anuria.  Patient is scheduled for         Wrap Text: On     Name: ANSHUL CUNNINGHAM  MRN: [id number]  Appointment Date: Dec 7 2022 12:00PM  : 1955  Documented By: RACHAEL VALADEZ  Documented Status: Final    Reason For Visit    ANSHUL CUNNINGHAM is a 67 year old male who presents for an initial visit for end-stage renal disease, kidney transplant evaluation.   Patient accompanied by Shawn (daughter), francisco (wife).     History of Present Illness      67 years old male, born in [place]  Patient has known CKD (2007), ESRD (0000) on follow up with . is here for pre kidney transplant evaluation.   He is a preemptive candidate. In discussion to place fistula in next couple of months  he is accompanied by his wife today.    He has known DM (age 43) On insulin (age 53); HTN (age 43). H/o Hyperlipidemia. No h/o Gout  No known h/o kidney stone/ Prostatism.  No hematuria/Transfusions  Nocturia:3-4 times    PMHx  H/o CVA/TIA in March, had a blank stare while eating; recovered fully. off balance, sometimes uses walker. not driving, otherwise no deficits. Was on Eliquis for 6-7 months now off.  H/o CAD. CABG () Dr Meyers.  No known h/o tuberculosis or hepatitis.  No h/o NSAID use.  No major weight loss/weight loss surgeries  Has no h/o Pneumonia / UTI.  No known h/o active PVD/DVT/neoplasia/active infections/bleeding/open wounds/falls/seizures/psych issues  Had COVID  , recovered at home.  COVID vaccination: Yes  Reports no major allergies.   Most recent hospitalization/for: 2022 for CVA  Past surgeries:  CABG ()  sleep apnea - doesn't use CPAP  Hernia repair (Many years ago)  Cataract surgery  No history of kidney/ bladder/ prostate surgery    Prior Cardiac Interventions:       PCI's:        CAB2014: LIMA to LAD, reverse SVG to posterior lateral. Reverse SCG to OM      Echo: < from: TTE Echo Complete w/o Contrast w/ Doppler (22 @ 19:06) >  Summary:   1. Technically difficult study.   2. Normal global left ventricular systolic function.   3. Left ventricular ejection fraction, by visual estimation, is 55 to   60%.   4. Mildly enlarged left atrium.   5. Normal right atrial size.   6.Mild mitral annular calcification.   7. Mild mitral valve regurgitation.   8. Thickening and calcification of the anterior and posterior mitral   valve leaflets.   9. Moderate tricuspid regurgitation.  10. Sclerotic aortic valve with normal opening.  11. Estimated pulmonary artery systolic pressure is 40.3 mmHg assuming a   right atrial pressure of 5 mmHg, which is consistent with mild pulmonary   hypertension.  12. Trivial pericardial effusion.   67 year old male Georgian speaking however can understand and communicate, prefers wife to translate, with a pmhx of CHF, HLD, CAD s/p CABG in 2014, DM2, URVASHI no CPAP, CVA March 2022 pt reports issues with balance has fallen a few times, last fall was last week denies any injury, CKD now ESRD plan is for left AV fistula.  Patient denies fever, chills, LE abrasions or edema, anuria. He reports left lower extremity swelling and dyspnea on exertion, but denies chest pain, palpitations, dizziness, near syncope or syncope. Patient is scheduled for left upper extremity arteriovenous fistula creation on 1/19/23 with Dr Vanda Alcaraz.    Medical clearance on chart - need addendum for Tresiba   Cardiac clearance on chart

## 2023-01-17 NOTE — H&P PST ADULT - ASSESSMENT
CAPRINI SCORE    AGE RELATED RISK FACTORS                                                             [ ] Age 41-60 years                                            (1 Point)  [ ] Age: 61-74 years                                           (2 Points)                 [ ] Age= 75 years                                                (3 Points)             DISEASE RELATED RISK FACTORS                                                       [ ] Edema in the lower extremities                 (1 Point)                     [ ] Varicose veins                                               (1 Point)                                 [ ] BMI > 25 Kg/m2                                            (1 Point)                                  [ ] Serious infection (ie PNA)                            (1 Point)                     [ ] Lung disease ( COPD, Emphysema)            (1 Point)                                                                          [ ] Acute myocardial infarction                         (1 Point)                  [ ] Congestive heart failure (in the previous month)  (1 Point)         [ ] Inflammatory bowel disease                            (1 Point)                  [ ] Central venous access, PICC or Port               (2 points)       (within the last month)                                                                [ ] Stroke (in the previous month)                        (5 Points)    [ ] Previous or present malignancy                       (2 points)                                                                                                                                                         HEMATOLOGY RELATED FACTORS                                                         [ ] Prior episodes of VTE                                     (3 Points)                     [ ] Positive family history for VTE                      (3 Points)                  [ ] Prothrombin 62921 A                                     (3 Points)                     [ ] Factor V Leiden                                                (3 Points)                        [ ] Lupus anticoagulants                                      (3 Points)                                                           [ ] Anticardiolipin antibodies                              (3 Points)                                                       [ ] High homocysteine in the blood                   (3 Points)                                             [ ] Other congenital or acquired thrombophilia      (3 Points)                                                [ ] Heparin induced thrombocytopenia                  (3 Points)                                        MOBILITY RELATED FACTORS  [ ] Bed rest                                                         (1 Point)  [ ] Plaster cast                                                    (2 points)  [ ] Bed bound for more than 72 hours           (2 Points)    GENDER SPECIFIC FACTORS  [ ] Pregnancy or had a baby within the last month   (1 Point)  [ ] Post-partum < 6 weeks                                   (1 Point)  [ ] Hormonal therapy  or oral contraception   (1 Point)  [ ] History of pregnancy complications              (1 point)  [ ] Unexplained or recurrent              (1 Point)    OTHER RISK FACTORS                                           (1 Point)  [ ] BMI >40, smoking, diabetes requiring insulin, chemotherapy  blood transfusions and length of surgery over 2 hours    SURGERY RELATED RISK FACTORS  [ ]  Section within the last month     (1 Point)  [ ] Minor surgery                                                  (1 Point)  [ ] Arthroscopic surgery                                       (2 Points)  [ ] Planned major surgery lasting more            (2 Points)      than 45 minutes     [ ] Elective hip or knee joint replacement       (5 points)       surgery                                                TRAUMA RELATED RISK FACTORS  [ ] Fracture of the hip, pelvis, or leg                       (5 Points)  [ ] Spinal cord injury resulting in paralysis             (5 points)       (in the previous month)    [ ] Paralysis  (less than 1 month)                             (5 Points)  [ ] Multiple Trauma within 1 month                        (5 Points)    Total Score [        ]    Caprini Score 0-2: Low Risk, NO VTE prophylaxis required for most patients, encourage ambulation  Caprini Score 3-6: Moderate Risk , pharmacologic VTE prophylaxis is indicated for most patients (in the absence of contraindications)  Caprini Score Greater than or =7: High risk, pharmocologic VTE prophylaxis indicated for most patients (in the absence of contraindications)                OPIOID RISK TOOL    PILAR EACH BOX THAT APPLIES AND ADD TOTALS AT THE END    FAMILY HISTORY OF SUBSTANCE ABUSE                 FEMALE         MALE                                                Alcohol                             [  ]1 pt          [  ]3pts                                               Illegal Durgs                     [  ]2 pts        [  ]3pts                                               Rx Drugs                           [  ]4 pts        [  ]4 pts    PERSONAL HISTORY OF SUBSTANCE ABUSE                                                                                          Alcohol                             [  ]3 pts       [  ]3 pts                                               Illegal Durgs                     [  ]4 pts        [  ]4 pts                                               Rx Drugs                           [  ]5 pts        [  ]5 pts    AGE BETWEEN 16-45 YEARS                                      [  ]1 pt         [  ]1 pt    HISTORY OF PREADOLESCENT   SEXUAL ABUSE                                                             [  ]3 pts        [  ]0pts    PSYCHOLOGICAL DISEASE                     ADD, OCD, Bipolar, Schizophrenia        [  ]2 pts         [  ]2 pts                      Depression                                               [  ]1 pt           [  ]1 pt           SCORING TOTAL   (add numbers and type here)              (***)                                     A score of 3 or lower indicated LOW risk for future opiod abuse  A score of 4 to 7 indicated moderate risk for future opiod abuse  A score of 8 or higher indicates a high risk for opiod abuse    Patient educated on surgical scrub, COVID testing, preadmission instructions, medical clearance and day of procedure medications, verbalizes understanding.   Pt instructed to stop vitamins/supplements/herbal medications/ASA/NSAIDS for one week prior to surgery and discuss with PMD.                CAPRINI SCORE    AGE RELATED RISK FACTORS                                                             [ ] Age 41-60 years                                            (1 Point)  [x ] Age: 61-74 years                                           (2 Points)                 [ ] Age= 75 years                                                (3 Points)             DISEASE RELATED RISK FACTORS                                                       [x ] Edema in the lower extremities                 (1 Point)                     [ ] Varicose veins                                               (1 Point)                                 [ x] BMI > 25 Kg/m2                                            (1 Point)                                  [ ] Serious infection (ie PNA)                            (1 Point)                     [ ] Lung disease ( COPD, Emphysema)            (1 Point)                                                                          [ ] Acute myocardial infarction                         (1 Point)                  [ ] Congestive heart failure (in the previous month)  (1 Point)         [ ] Inflammatory bowel disease                            (1 Point)                  [ ] Central venous access, PICC or Port               (2 points)       (within the last month)                                                                [ ] Stroke (in the previous month)                        (5 Points)    [x ] Previous or present malignancy                       (2 points)                                                                                                                                                         HEMATOLOGY RELATED FACTORS                                                         [ ] Prior episodes of VTE                                     (3 Points)                     [ ] Positive family history for VTE                      (3 Points)                  [ ] Prothrombin 02087 A                                     (3 Points)                     [ ] Factor V Leiden                                                (3 Points)                        [ ] Lupus anticoagulants                                      (3 Points)                                                           [ ] Anticardiolipin antibodies                              (3 Points)                                                       [ ] High homocysteine in the blood                   (3 Points)                                             [ ] Other congenital or acquired thrombophilia      (3 Points)                                                [ ] Heparin induced thrombocytopenia                  (3 Points)                                        MOBILITY RELATED FACTORS  [ ] Bed rest                                                         (1 Point)  [ ] Plaster cast                                                    (2 points)  [ ] Bed bound for more than 72 hours           (2 Points)    GENDER SPECIFIC FACTORS  [ ] Pregnancy or had a baby within the last month   (1 Point)  [ ] Post-partum < 6 weeks                                   (1 Point)  [ ] Hormonal therapy  or oral contraception   (1 Point)  [ ] History of pregnancy complications              (1 point)  [ ] Unexplained or recurrent              (1 Point)    OTHER RISK FACTORS                                           (1 Point)  [x ] BMI >40, smoking, diabetes requiring insulin, chemotherapy  blood transfusions and length of surgery over 2 hours    SURGERY RELATED RISK FACTORS  [ ]  Section within the last month     (1 Point)  [ ] Minor surgery                                                  (1 Point)  [ ] Arthroscopic surgery                                       (2 Points)  [ x] Planned major surgery lasting more            (2 Points)      than 45 minutes     [ ] Elective hip or knee joint replacement       (5 points)       surgery                                                TRAUMA RELATED RISK FACTORS  [ ] Fracture of the hip, pelvis, or leg                       (5 Points)  [ ] Spinal cord injury resulting in paralysis             (5 points)       (in the previous month)    [ ] Paralysis  (less than 1 month)                             (5 Points)  [ ] Multiple Trauma within 1 month                        (5 Points)    Total Score [     9   ]    Caprini Score 0-2: Low Risk, NO VTE prophylaxis required for most patients, encourage ambulation  Caprini Score 3-6: Moderate Risk , pharmacologic VTE prophylaxis is indicated for most patients (in the absence of contraindications)  Caprini Score Greater than or =7: High risk, pharmocologic VTE prophylaxis indicated for most patients (in the absence of contraindications)      67 year old male English speaking with a pmhx of CHF, HLD, CAD s/p CABG in , DM2, URVASHI no CPAP, CVA 2022 pt reports issues with balance has fallen a few times, last fall was last week denies any injury, CKD now ESRD plan is for left AV fistula.  Patient denies fever, chills, LE abrasions or edema, anuria. He reports left lower extremity swelling and dyspnea on exertion, but denies chest pain, palpitations, dizziness, near syncope or syncope. Patient is scheduled for left upper extremity arteriovenous fistula creation on 23 with Dr Vanda Alcaraz. Patient educated on surgical scrub, COVID testing, preadmission instructions, medical, cardiac clearance and day of procedure medications, verbalizes understanding. Pt instructed to stop vitamins/supplements/herbal medications/NSAIDS for one week prior to surgery and discuss with PMD. Aspirin as per Dr Vanda Alcaraz. Call Angelo ROONEY to discuss Tresiba dose the night before the procedure 23, both patient and wife verbalized understanding.           OPIOID RISK TOOL    PILAR EACH BOX THAT APPLIES AND ADD TOTALS AT THE END    FAMILY HISTORY OF SUBSTANCE ABUSE                 FEMALE         MALE                                                Alcohol                             [  ]1 pt          [  ]3pts                                               Illegal Durgs                     [  ]2 pts        [  ]3pts                                               Rx Drugs                           [  ]4 pts        [  ]4 pts    PERSONAL HISTORY OF SUBSTANCE ABUSE                                                                                          Alcohol                             [  ]3 pts       [  ]3 pts                                               Illegal Durgs                     [  ]4 pts        [  ]4 pts                                               Rx Drugs                           [  ]5 pts        [  ]5 pts    AGE BETWEEN 16-45 YEARS                                      [  ]1 pt         [  ]1 pt    HISTORY OF PREADOLESCENT   SEXUAL ABUSE                                                             [  ]3 pts        [  ]0pts    PSYCHOLOGICAL DISEASE                     ADD, OCD, Bipolar, Schizophrenia        [  ]2 pts         [  ]2 pts                      Depression                                               [  ]1 pt           [  ]1 pt           SCORING TOTAL   (add numbers and type here)              (***)                                     A score of 3 or lower indicated LOW risk for future opiod abuse  A score of 4 to 7 indicated moderate risk for future opiod abuse  A score of 8 or higher indicates a high risk for opiod abuse    Patient educated on surgical scrub, COVID testing, preadmission instructions, medical clearance and day of procedure medications, verbalizes understanding.   Pt instructed to stop vitamins/supplements/herbal medications/ASA/NSAIDS for one week prior to surgery and discuss with PMD.

## 2023-01-17 NOTE — H&P PST ADULT - NEGATIVE NEUROLOGICAL SYMPTOMS
no generalized seizures/no syncope/no tremors/no vertigo/no headache/no loss of consciousness/no hemiparesis

## 2023-01-17 NOTE — H&P PST ADULT - NSICDXPASTMEDICALHX_GEN_ALL_CORE_FT
PAST MEDICAL HISTORY:  Acute on chronic congestive heart failure, unspecified congestive heart failure type     Cerebrovascular accident (CVA)     DM (diabetes mellitus)     High cholesterol     HTN (hypertension)     Myocardial infarction (Coronary angiogram 2014)     PAST MEDICAL HISTORY:  Acute on chronic congestive heart failure, unspecified congestive heart failure type     Cerebrovascular accident (CVA)     DM (diabetes mellitus)     ESRD (end stage renal disease)     High cholesterol     HTN (hypertension)     Myocardial infarction (Coronary angiogram 2014)

## 2023-01-17 NOTE — H&P PST ADULT - PROBLEM SELECTOR PLAN 1
Patient is scheduled for left upper extremity arteriovenous fistula creation on 1/19/23 with Dr Vanda Alcaraz.

## 2023-01-17 NOTE — H&P PST ADULT - MUSCULOSKELETAL
negative ROM intact/no calf tenderness/strength 5/5 bilateral lower extremities/decreased strength/abnormal gait

## 2023-01-17 NOTE — H&P PST ADULT - CARDIOVASCULAR
details… +left carotid bruit, left lower extremity +2 edema pitting/regular rate and rhythm/no gallops/murmur/carotid bruit/peripheral edema

## 2023-01-18 ENCOUNTER — TRANSCRIPTION ENCOUNTER (OUTPATIENT)
Age: 68
End: 2023-01-18

## 2023-01-18 ENCOUNTER — OUTPATIENT (OUTPATIENT)
Dept: OUTPATIENT SERVICES | Facility: HOSPITAL | Age: 68
LOS: 1 days | End: 2023-01-18
Payer: MEDICARE

## 2023-01-18 DIAGNOSIS — Z20.828 CONTACT WITH AND (SUSPECTED) EXPOSURE TO OTHER VIRAL COMMUNICABLE DISEASES: ICD-10-CM

## 2023-01-18 DIAGNOSIS — Z95.1 PRESENCE OF AORTOCORONARY BYPASS GRAFT: Chronic | ICD-10-CM

## 2023-01-18 LAB — SARS-COV-2 RNA SPEC QL NAA+PROBE: SIGNIFICANT CHANGE UP

## 2023-01-18 PROCEDURE — U0005: CPT

## 2023-01-18 PROCEDURE — U0003: CPT

## 2023-01-18 NOTE — RESULTS/DATA
[] : results reviewed [de-identified] : Hgb 11.4, Hct 34.7 Anemia of chronic Kidney disease [de-identified] : INR 1.07 [de-identified] : K+ 5.5, bun 76.3, Cr 6.69, CKD Stage 5 [de-identified] : First degree AV block, Sinus Rhythm, no acute changes from previous

## 2023-01-18 NOTE — HISTORY OF PRESENT ILLNESS
[No Pertinent Cardiac History] : no history of aortic stenosis, atrial fibrillation, coronary artery disease, recent myocardial infarction, or implantable device/pacemaker [No Pertinent Pulmonary History] : no history of asthma, COPD, sleep apnea, or smoking [No Adverse Anesthesia Reaction] : no adverse anesthesia reaction in self or family member [Chronic Kidney Disease] : chronic kidney disease [Diabetes] : diabetes [(Patient denies any chest pain, claudication, dyspnea on exertion, orthopnea, palpitations or syncope)] : Patient denies any chest pain, claudication, dyspnea on exertion, orthopnea, palpitations or syncope [NSAIDs: _____] : NSAIDs: [unfilled] [Spouse] : spouse [Chronic Anticoagulation] : no chronic anticoagulation [FreeTextEntry1] : LEFT UPPER EXTREMITY ARTERIOVENOUS FISTULA CREATION [FreeTextEntry2] : 1/19/2023 [FreeTextEntry3] : Dr Binu Craig [FreeTextEntry4] : 66 y/o male with PMHx of T2DM, ESRD, HTN, CHFpEF presenting for medical clearance for AVF creation, Tessio catheter placement for HD initiation.

## 2023-01-18 NOTE — PLAN
[FreeTextEntry1] : \par \par \par \par \par Medical clearance discussed with and reviewed by supervising attending Dr Melania Booker M.D.\par \par

## 2023-01-18 NOTE — PHYSICAL EXAM
[No Acute Distress] : no acute distress [Well Nourished] : well nourished [Well Developed] : well developed [Well-Appearing] : well-appearing [Normal Sclera/Conjunctiva] : normal sclera/conjunctiva [PERRL] : pupils equal round and reactive to light [EOMI] : extraocular movements intact [Normal Outer Ear/Nose] : the outer ears and nose were normal in appearance [Normal Oropharynx] : the oropharynx was normal [No JVD] : no jugular venous distention [No Lymphadenopathy] : no lymphadenopathy [Supple] : supple [Thyroid Normal, No Nodules] : the thyroid was normal and there were no nodules present [No Respiratory Distress] : no respiratory distress  [No Accessory Muscle Use] : no accessory muscle use [Clear to Auscultation] : lungs were clear to auscultation bilaterally [Normal Rate] : normal rate  [Regular Rhythm] : with a regular rhythm [Normal S1, S2] : normal S1 and S2 [No Murmur] : no murmur heard [No Carotid Bruits] : no carotid bruits [No Abdominal Bruit] : a ~M bruit was not heard ~T in the abdomen [No Varicosities] : no varicosities [Pedal Pulses Present] : the pedal pulses are present [No Edema] : there was no peripheral edema [No Palpable Aorta] : no palpable aorta [No Extremity Clubbing/Cyanosis] : no extremity clubbing/cyanosis [Soft] : abdomen soft [Non Tender] : non-tender [Non-distended] : non-distended [No Masses] : no abdominal mass palpated [No HSM] : no HSM [Normal Bowel Sounds] : normal bowel sounds [Normal Posterior Cervical Nodes] : no posterior cervical lymphadenopathy [Normal Anterior Cervical Nodes] : no anterior cervical lymphadenopathy [No CVA Tenderness] : no CVA  tenderness [No Spinal Tenderness] : no spinal tenderness [No Joint Swelling] : no joint swelling [Grossly Normal Strength/Tone] : grossly normal strength/tone [No Rash] : no rash [Coordination Grossly Intact] : coordination grossly intact [No Focal Deficits] : no focal deficits [Normal Gait] : normal gait [Deep Tendon Reflexes (DTR)] : deep tendon reflexes were 2+ and symmetric [Normal Affect] : the affect was normal [Normal Insight/Judgement] : insight and judgment were intact [de-identified] : obese

## 2023-01-18 NOTE — ASSESSMENT
[High Risk Surgery - Intraperitoneal, Intrathoracic or Supringuinal Vascular Procedures] : High Risk Surgery - Intraperitoneal, Intrathoracic or Supringuinal Vascular Procedures - Yes (1) [Ischemic Heart Disease] : Ischemic Heart Disease - No (0) [Congestive Heart Failure] : Congestive Heart Failure - No (0) [Prior Cerebrovascular Accident or TIA] : Prior Cerebrovascular Accident or TIA - No (0) [Creatinine >= 2mg/dL (1 Point)] : Creatinine >= 2mg/dL - Yes (1) [Insulin-dependent Diabetic (1 Point)] : Insulin-dependent Diabetic - No (0) [2] : 2 , RCRI Class: III, Risk of Post-Op Cardiac Complications: 10.1%, 95% CI for Risk Estimate: 8.1% - 12.6% [No Further Testing Recommended] : no further testing recommended [FreeTextEntry4] : Laboratory results reviewed, abnormalities secondary to CKD, EKG reviewed, no acute changes, none of this findings are a contraindication for proposed procedure. Pt is medically cleared. [FreeTextEntry6] : hold ASA starting today [FreeTextEntry7] : Take anti-hypertensive medication with small sip of water on the day of the procedure. Avoid NSAIDs starting 1 week prior to procedure. May continue Tresiba using 50% of usual dose (20 u) the night before the procedure (1/18/23).

## 2023-01-19 ENCOUNTER — APPOINTMENT (OUTPATIENT)
Dept: VASCULAR SURGERY | Facility: HOSPITAL | Age: 68
End: 2023-01-19

## 2023-01-19 ENCOUNTER — TRANSCRIPTION ENCOUNTER (OUTPATIENT)
Age: 68
End: 2023-01-19

## 2023-01-19 ENCOUNTER — OUTPATIENT (OUTPATIENT)
Dept: INPATIENT UNIT | Facility: HOSPITAL | Age: 68
LOS: 1 days | End: 2023-01-19
Payer: MEDICARE

## 2023-01-19 VITALS
DIASTOLIC BLOOD PRESSURE: 77 MMHG | TEMPERATURE: 98 F | SYSTOLIC BLOOD PRESSURE: 146 MMHG | OXYGEN SATURATION: 94 % | RESPIRATION RATE: 20 BRPM | HEART RATE: 64 BPM

## 2023-01-19 VITALS
WEIGHT: 169.98 LBS | DIASTOLIC BLOOD PRESSURE: 73 MMHG | OXYGEN SATURATION: 98 % | TEMPERATURE: 98 F | HEART RATE: 64 BPM | RESPIRATION RATE: 16 BRPM | SYSTOLIC BLOOD PRESSURE: 135 MMHG | HEIGHT: 64 IN

## 2023-01-19 DIAGNOSIS — N18.4 CHRONIC KIDNEY DISEASE, STAGE 4 (SEVERE): ICD-10-CM

## 2023-01-19 DIAGNOSIS — Z95.1 PRESENCE OF AORTOCORONARY BYPASS GRAFT: Chronic | ICD-10-CM

## 2023-01-19 LAB
ANION GAP SERPL CALC-SCNC: 12 MMOL/L — SIGNIFICANT CHANGE UP (ref 5–17)
BUN SERPL-MCNC: 76.8 MG/DL — HIGH (ref 8–20)
CALCIUM SERPL-MCNC: 8.8 MG/DL — SIGNIFICANT CHANGE UP (ref 8.4–10.5)
CHLORIDE SERPL-SCNC: 106 MMOL/L — SIGNIFICANT CHANGE UP (ref 96–108)
CO2 SERPL-SCNC: 21 MMOL/L — LOW (ref 22–29)
CREAT SERPL-MCNC: 6.76 MG/DL — HIGH (ref 0.5–1.3)
EGFR: 8 ML/MIN/1.73M2 — LOW
GLUCOSE SERPL-MCNC: 103 MG/DL — HIGH (ref 70–99)
POTASSIUM SERPL-MCNC: 4.8 MMOL/L — SIGNIFICANT CHANGE UP (ref 3.5–5.3)
POTASSIUM SERPL-SCNC: 4.8 MMOL/L — SIGNIFICANT CHANGE UP (ref 3.5–5.3)
SODIUM SERPL-SCNC: 139 MMOL/L — SIGNIFICANT CHANGE UP (ref 135–145)

## 2023-01-19 PROCEDURE — 36415 COLL VENOUS BLD VENIPUNCTURE: CPT

## 2023-01-19 PROCEDURE — 36821 AV FUSION DIRECT ANY SITE: CPT | Mod: LT

## 2023-01-19 PROCEDURE — 80048 BASIC METABOLIC PNL TOTAL CA: CPT

## 2023-01-19 PROCEDURE — 82962 GLUCOSE BLOOD TEST: CPT

## 2023-01-19 PROCEDURE — 36818 AV FUSE UPPR ARM CEPHALIC: CPT | Mod: LT

## 2023-01-19 RX ORDER — FENTANYL CITRATE 50 UG/ML
25 INJECTION INTRAVENOUS
Refills: 0 | Status: DISCONTINUED | OUTPATIENT
Start: 2023-01-19 | End: 2023-01-19

## 2023-01-19 RX ORDER — CEFAZOLIN SODIUM 1 G
2000 VIAL (EA) INJECTION ONCE
Refills: 0 | Status: DISCONTINUED | OUTPATIENT
Start: 2023-01-19 | End: 2023-01-19

## 2023-01-19 RX ORDER — ACETAMINOPHEN 500 MG
975 TABLET ORAL ONCE
Refills: 0 | Status: COMPLETED | OUTPATIENT
Start: 2023-01-19 | End: 2023-01-19

## 2023-01-19 RX ADMIN — Medication 2000 MILLIGRAM(S): at 08:00

## 2023-01-19 RX ADMIN — Medication 975 MILLIGRAM(S): at 07:07

## 2023-01-19 NOTE — ASU DISCHARGE PLAN (ADULT/PEDIATRIC) - CARE PROVIDER_API CALL
Binu Bernal)  Surgery; Vascular Surgery  [address] [facility], 1st [place] [address]  Phone: [phone]  Fax: [phone]  Follow Up Time:

## 2023-01-19 NOTE — ASU DISCHARGE PLAN (ADULT/PEDIATRIC) - NS MD DC FALL RISK RISK
For information on Fall & Injury Prevention, visit: [email or web address] OR  [email or web address] OR  https://www.cdc.gov/steadi/patient.html

## 2023-01-19 NOTE — BRIEF OPERATIVE NOTE - OPERATION/FINDINGS
creation of LUE radiocephalic AV fistula, palpable thrill proximal to fistula at the end of the case, palpable radial pulse at the end of the case

## 2023-01-19 NOTE — ASU DISCHARGE PLAN (ADULT/PEDIATRIC) - CALL YOUR DOCTOR IF YOU HAVE ANY OF THE FOLLOWING:
Bleeding that does not stop/Fever greater than (need to indicate Fahrenheit or Celsius) Bleeding that does not stop/Swelling that gets worse/Pain not relieved by Medications/Fever greater than (need to indicate Fahrenheit or Celsius)/Wound/Surgical Site with redness, or foul smelling discharge or pus/Numbness, tingling, color or temperature change to extremity

## 2023-01-20 PROBLEM — N18.6 END STAGE RENAL DISEASE: Chronic | Status: ACTIVE | Noted: 2023-01-17

## 2023-02-08 ENCOUNTER — APPOINTMENT (OUTPATIENT)
Dept: VASCULAR SURGERY | Facility: CLINIC | Age: 68
End: 2023-02-08
Payer: MEDICARE

## 2023-02-08 VITALS
OXYGEN SATURATION: 95 % | HEIGHT: 64 IN | WEIGHT: 173 LBS | HEART RATE: 76 BPM | SYSTOLIC BLOOD PRESSURE: 166 MMHG | TEMPERATURE: 97 F | BODY MASS INDEX: 29.53 KG/M2 | DIASTOLIC BLOOD PRESSURE: 73 MMHG | RESPIRATION RATE: 14 BRPM

## 2023-02-08 PROCEDURE — 99024 POSTOP FOLLOW-UP VISIT: CPT

## 2023-02-08 NOTE — HISTORY OF PRESENT ILLNESS
[FreeTextEntry1] : 66 yo male PMHx DM 2, ESRD, HTN, CHF, presents s/p left radiocephalic AV fistula creation. Pt doing well since surgery. Denies any severe hand pain or cramping. He has not started dialysis yet. \par \par PSHx: \par 1/19/23 left radiocephalic AV fistula

## 2023-02-08 NOTE — PHYSICAL EXAM
[2+] : left 2+ [de-identified] : NAD. well appearing  [FreeTextEntry1] : strong thrill in left radiocephalic AV fistula

## 2023-02-08 NOTE — ASSESSMENT
[FreeTextEntry1] : 66 yo male s/p  left radiocephalic AV fistula. Fistula has good thrill on exam today \par \par Pt counseled on surgical procedure performed \par Pt counseled on fistula maintenance \par RTC in 4-6 weeks for AV fistula duplex to assess for maturity \par \par A total of 20 minutes was spent with patient and coordinating care\par

## 2023-02-08 NOTE — REASON FOR VISIT
Problem: Mobility Impaired (Adult and Pediatric)  Goal: *Acute Goals and Plan of Care (Insert Text)  STG's 1-3 MET 7/24/20 :  (1.)Mr. Kacy Calero will move from supine to sit and sit to supine  in bed with MINIMAL ASSIST. Met 7/19  (2.)Mr. Kacy Calero will transfer from bed to chair and chair to bed with MINIMAL ASSIST using the least restrictive device. Met 7/24  (3.)Mr. Kacy Calero will ambulate with MINIMAL ASSIST for 25 feet with the least restrictive device. Met 7/24    RE-EVALUATION / GOALS RE-ASSESSED 7/24/20 :   1) Bd mobility with HOB 20 deg & rail with SBA. 2) Transfers with walker & CGA. 3) pt ambulating  ft with rolling walker & CGA.  (4)Mr. Kacy Calero will increase right knee ROM to 5°-80° and perform HEP with cues and assistance. .          ________________________________________________________________________________________________      PHYSICAL THERAPY: Daily Note and PM 7/24/2020  INPATIENT: PT Visit Days : 1  Payor: Lawyer Metcalf / Plan: [place] MEDICARE HMO/PPO / Product Type: Managed Care Medicare /       NAME/AGE/GENDER: Sarah Castrejon is a 78 y.o. male   PRIMARY DIAGNOSIS: Sepsis ([facility] [address] [A41.9]  Atrial fibrillation with RVR ([facility] [address] [I48.91] Sepsis ([address] Sepsis ([address]       ICD-10: Treatment Diagnosis:    · Generalized Muscle Weakness (M62.81)  · Other abnormalities of gait and mobility (R26.89)   Precaution/Allergies:  Patient has no known allergies. ASSESSMENT:     Mr. Kacy Calero has shown remarkable progress & continues to be motivated & hard working. This pt is the type of personality that the more intensity in his rehab, the better & quicker his progress will be. This pt & family has agreed to have 24/7 care available at home ,on DC from the 9th floor, if accepted to this program.   This pt will have greater rehab potential in a 9th floor setting instead of SNF & likely a better overall outcome. In pm session pt continued to show steady gains with functional mobility.  Pt needs constant cues to stay on task. Pt is a good in-pt rehab candidate. This section established at most recent assessment   PROBLEM LIST (Impairments causing functional limitations):  1. Decreased Strength  2. Decreased ADL/Functional Activities  3. Decreased Transfer Abilities  4. Decreased Ambulation Ability/Technique  5. Decreased Balance  6. Increased Pain  7. Decreased Activity Tolerance  8. Increased Fatigue  9. Decreased Flexibility/Joint Mobility  10. Edema/Girth  11. Decreased [place] with Home Exercise Program   INTERVENTIONS PLANNED: (Benefits and precautions of physical therapy have been discussed with the patient.)  1. Balance Exercise  2. Bed Mobility  3. Family Education  4. Gait Training  5. Home Exercise Program (HEP)  6. Range of Motion (ROM)  7. Therapeutic Activites  8. Therapeutic Exercise/Strengthening  9. Transfer Training     TREATMENT PLAN: Frequency/Duration: daily for duration of hospital stay  Rehabilitation Potential For Stated Goals: Good     REHAB RECOMMENDATIONS (at time of discharge pending progress):    Placement:  Rehab in pt 9th floor  Equipment:    to be determined              HISTORY:   History of Present Injury/Illness (Reason for Referral):  Mr. Mamadou Brown is a nice 79 y/o WM sent to the ER from home on 7/15 for confusion. He recently underwent right TKA on 7/13. Yesterday a friend thought he was confused and dizzy and sent him to the ER. He was in rapid AFib and met sepsis criteria. CXR negative. Cultures collected. He was started on antibiotics and a Cardizem drip and admitted. Ortho consulted. 7/17: O2 from 2L to 4L. Afebrile overnight. WBCs up a little. He is asking if he can go home today. Overnight nurse changed knee bandage, I reviewed a picture that was taken of the surgical site and there was one circular area of erythema just distal to the knee, lateral side of incision. Had to get Cardizem bolus overnight, back in NSR this morning. Bps good.   Past Medical History/Comorbidities: Mr. Shelley Greer  has a past medical history of Acute lumbar radiculopathy, Arthritis, Ascending aorta dilatation ([address] Atrial fibrillation with RVR ([address] Enlarged prostate, Martinez catheter in place, GERD (gastroesophageal reflux disease), High cholesterol, Hypertension, ICH (intracerebral hemorrhage) ([address] Lumbar stenosis with neurogenic claudication, Other forms of scoliosis, lumbar region, Poor historian, Psychiatric disorder, Seizures ([address] Tongue lesion, and Urinary frequency. Mr. Shelley Greer  has a past surgical history that includes hx other surgical; hx turp (10/20/11); hx heent (8/2012); hx orthopaedic (Right); and hx other surgical.  Social History/Living Environment:   Home Environment: Private residence  # Steps to Enter: 1(4 ft ramp for dog)  One/Two Story Residence: One story  Living Alone: Yes  Support Systems: Family member(s)  Patient Expects to be Discharged to[de-identified] Skilled nursing facility  Current DME Used/Available at Home: karthikeyan Hancock  Prior Level of Function/Work/Activity:  Using RW after tka     Number of Personal Factors/Comorbidities that affect the Plan of Care: 3+: HIGH COMPLEXITY   EXAMINATION:   Most Recent Physical Functioning:   Gross Assessment:          RLE PROM  R Knee Flexion: 100  R Knee Extension: -15            Balance:  Sitting: Intact; Without support  Standing: Impaired; With support(walker) Bed Mobility:  Supine to Sit: (NT)  Sit to Supine: Stand-by assistance  Scooting: Contact guard assistance       Transfers:  Sit to Stand: Minimum assistance  Stand to Sit: Minimum assistance  Bed to Chair: Minimum assistance(with walkr)  Duration: 26 Minutes(extra time to work through all activity noted)  Gait:  Right Side Weight Bearing: As tolerated  Speed/Elena: Delayed  Step Length: Left shortened;Right shortened  Stance: Right decreased  Gait Abnormalities: Antalgic;Decreased step clearance  Distance (ft): 80 Feet (ft)  Assistive Device: Walker, rolling  Ambulation - Level of Assistance: Minimal assistance  Interventions: Safety awareness training;Verbal cues      Body Structures Involved:  1. Bones  2. Joints  3. Muscles  4. Ligaments Body Functions Affected:  1. Movement Related Activities and Participation Affected:  1. Mobility   Number of elements that affect the Plan of Care: 3: MODERATE COMPLEXITY   CLINICAL PRESENTATION:   Presentation: Stable and uncomplicated: LOW COMPLEXITY   CLINICAL DECISION MAKIN[address] [place] [address] AM-PAC 6 Clicks   Basic Mobility Inpatient Short Form  How much difficulty does the patient currently have. .. Unable A Lot A Little None   1. Turning over in bed (including adjusting bedclothes, sheets and blankets)? [] 1   [x] 2   [] 3   [] 4   2. Sitting down on and standing up from a chair with arms ( e.g., wheelchair, bedside commode, etc.)   [] 1   [x] 2   [] 3   [] 4   3. Moving from lying on back to sitting on the side of the bed? [] 1   [x] 2   [] 3   [] 4   How much help from another person does the patient currently need. .. Total A Lot A Little None   4. Moving to and from a bed to a chair (including a wheelchair)? [] 1   [x] 2   [] 3   [] 4   5. Need to walk in hospital room? [x] 1   [] 2   [] 3   [] 4   6. Climbing 3-5 steps with a railing? [x] 1   [] 2   [] 3   [] 4   © , Trustees of [address] [place] [address], under license to [employer]. All rights reserved      Score:  Initial: 10 Most Recent: X (Date: -- )    Interpretation of Tool:  Represents activities that are increasingly more difficult (i.e. Bed mobility, Transfers, Gait). Medical Necessity:     · Patient is expected to demonstrate progress in   · strength, range of motion, and balance  ·  to   · decrease assistance required with exercises and functional mobility  · . Reason for Services/Other Comments:  · Patient   · continues to require present interventions due to patient's inability to perform exercises and functional mobility independently  · .    Use of outcome tool(s) and clinical judgement create a POC that gives a: Questionable prediction of patient's progress: MODERATE COMPLEXITY            TREATMENT:   (In addition to Assessment/Re-Assessment sessions the following treatments were rendered)   Pre-treatment Symptoms/Complaints:  Pt eager to work with PT  Pain: Initial:  Numeric scale  Pain Intensity 1: 3  Pain Location 1: Knee  Pain Orientation 1: Right  Pain Intervention(s) 1: Ambulation/Increased Activity, Cold pack  Post Session: 3/10     Therapeutic Activity: (  26 Minutes(extra time to work through all activity noted) : Therapeutic activities including bed mobility, transfers, progressive gait with rolling walker to improve mobility, strength and balance. Required minimal Safety awareness training;Verbal cues to promote static balance in standing. Date:  7/21 Date:  7/23 Date:   7/24   ACTIVITY/EXERCISE AM PM AM PM AM PM   GROUP THERAPY  []  []  []  []  []  []   Ankle Pumps 20   20 20    Quad Sets 20   20 20    Gluteal Sets 20   20 20    Hip ABd/ADduction 20aa   20 20    Straight Leg Raises 20aa   20 20aa    Knee Slides 20aa   20 20    Short Arc Quads 20    20    Long Arc Quads         Chair Slides     20               B = bilateral; AA = active assistive; A = active; P = passive     Braces/Orthotics/Lines/Etc:   · IV  · arnett catheter  · O2 Device: Nasal cannula  Treatment/Session Assessment:    · Response to Treatment:  Steady progress, good DC plan to home with caregivers  · Interdisciplinary Collaboration:   o Registered Nurse  o   · After treatment position/precautions:   o Supine in bed  o Bed/Chair-wheels locked  o Bed in low position  o Call light within reach  o RN notified   · Compliance with Program/Exercises: Will assess as treatment progresses  · Recommendations/Intent for next treatment session: \"Next visit will focus on advancements to more challenging activities and reduction in assistance provided\".   Total Treatment Duration:  PT Patient Time In/Time Out  Time In: 9935  Time Out: 631 KATHRINE Winter, PT [Follow-Up: _____] : a [unfilled] follow-up visit

## 2023-02-09 RX ORDER — BLOOD SUGAR DIAGNOSTIC
STRIP MISCELLANEOUS
Qty: 1 | Refills: 0 | Status: ACTIVE | COMMUNITY
Start: 2021-10-23 | End: 1900-01-01

## 2023-02-13 ENCOUNTER — NON-APPOINTMENT (OUTPATIENT)
Age: 68
End: 2023-02-13

## 2023-02-14 ENCOUNTER — APPOINTMENT (OUTPATIENT)
Dept: NEPHROLOGY | Facility: CLINIC | Age: 68
End: 2023-02-14
Payer: MEDICARE

## 2023-02-14 ENCOUNTER — NON-APPOINTMENT (OUTPATIENT)
Age: 68
End: 2023-02-14

## 2023-02-14 VITALS
HEIGHT: 64 IN | DIASTOLIC BLOOD PRESSURE: 82 MMHG | WEIGHT: 171 LBS | BODY MASS INDEX: 29.19 KG/M2 | SYSTOLIC BLOOD PRESSURE: 148 MMHG | OXYGEN SATURATION: 98 % | HEART RATE: 78 BPM

## 2023-02-14 PROCEDURE — 99204 OFFICE O/P NEW MOD 45 MIN: CPT | Mod: 25

## 2023-02-14 PROCEDURE — 36415 COLL VENOUS BLD VENIPUNCTURE: CPT

## 2023-02-15 ENCOUNTER — INPATIENT (INPATIENT)
Facility: HOSPITAL | Age: 68
LOS: 5 days | Discharge: HOME | DRG: 291 | End: 2023-02-21
Attending: INTERNAL MEDICINE | Admitting: FAMILY MEDICINE
Payer: MEDICARE

## 2023-02-15 VITALS
TEMPERATURE: 98 F | OXYGEN SATURATION: 99 % | SYSTOLIC BLOOD PRESSURE: 184 MMHG | DIASTOLIC BLOOD PRESSURE: 85 MMHG | RESPIRATION RATE: 18 BRPM | HEART RATE: 80 BPM | WEIGHT: 169.98 LBS | HEIGHT: 64 IN

## 2023-02-15 DIAGNOSIS — N18.6 END STAGE RENAL DISEASE: ICD-10-CM

## 2023-02-15 DIAGNOSIS — Z95.1 PRESENCE OF AORTOCORONARY BYPASS GRAFT: Chronic | ICD-10-CM

## 2023-02-15 LAB
ALBUMIN SERPL ELPH-MCNC: 3.8 G/DL
ALBUMIN SERPL ELPH-MCNC: 3.8 G/DL — SIGNIFICANT CHANGE UP (ref 3.3–5.2)
ALP BLD-CCNC: 129 U/L
ALP SERPL-CCNC: 117 U/L — SIGNIFICANT CHANGE UP (ref 40–120)
ALT FLD-CCNC: 8 U/L — SIGNIFICANT CHANGE UP
ALT SERPL-CCNC: 8 U/L
ANION GAP SERPL CALC-SCNC: 13 MMOL/L — SIGNIFICANT CHANGE UP (ref 5–17)
ANION GAP SERPL CALC-SCNC: 14 MMOL/L — SIGNIFICANT CHANGE UP (ref 5–17)
ANION GAP SERPL CALC-SCNC: 17 MMOL/L
APTT BLD: 30.8 SEC — SIGNIFICANT CHANGE UP (ref 27.5–35.5)
AST SERPL-CCNC: 10 U/L
AST SERPL-CCNC: 11 U/L — SIGNIFICANT CHANGE UP
BASOPHILS # BLD AUTO: 0.05 K/UL — SIGNIFICANT CHANGE UP (ref 0–0.2)
BASOPHILS NFR BLD AUTO: 0.6 % — SIGNIFICANT CHANGE UP (ref 0–2)
BILIRUB SERPL-MCNC: 0.2 MG/DL
BILIRUB SERPL-MCNC: 0.3 MG/DL — LOW (ref 0.4–2)
BLD GP AB SCN SERPL QL: SIGNIFICANT CHANGE UP
BUN SERPL-MCNC: 73.4 MG/DL — HIGH (ref 8–20)
BUN SERPL-MCNC: 74 MG/DL
BUN SERPL-MCNC: 74.4 MG/DL — HIGH (ref 8–20)
CALCIUM SERPL-MCNC: 8.3 MG/DL — LOW (ref 8.4–10.5)
CALCIUM SERPL-MCNC: 8.5 MG/DL
CALCIUM SERPL-MCNC: 8.5 MG/DL — SIGNIFICANT CHANGE UP (ref 8.4–10.5)
CHLORIDE SERPL-SCNC: 107 MMOL/L
CHLORIDE SERPL-SCNC: 107 MMOL/L — SIGNIFICANT CHANGE UP (ref 96–108)
CHLORIDE SERPL-SCNC: 108 MMOL/L — SIGNIFICANT CHANGE UP (ref 96–108)
CO2 SERPL-SCNC: 17 MMOL/L
CO2 SERPL-SCNC: 18 MMOL/L — LOW (ref 22–29)
CO2 SERPL-SCNC: 19 MMOL/L — LOW (ref 22–29)
CREAT SERPL-MCNC: 6.97 MG/DL — HIGH (ref 0.5–1.3)
CREAT SERPL-MCNC: 7.07 MG/DL
CREAT SERPL-MCNC: 7.34 MG/DL — HIGH (ref 0.5–1.3)
EGFR: 7 ML/MIN/1.73M2 — LOW
EGFR: 8 ML/MIN/1.73M2
EGFR: 8 ML/MIN/1.73M2 — LOW
EOSINOPHIL # BLD AUTO: 0.15 K/UL — SIGNIFICANT CHANGE UP (ref 0–0.5)
EOSINOPHIL NFR BLD AUTO: 1.9 % — SIGNIFICANT CHANGE UP (ref 0–6)
GLUCOSE BLDC GLUCOMTR-MCNC: 116 MG/DL — HIGH (ref 70–99)
GLUCOSE BLDC GLUCOMTR-MCNC: 148 MG/DL — HIGH (ref 70–99)
GLUCOSE SERPL-MCNC: 109 MG/DL — HIGH (ref 70–99)
GLUCOSE SERPL-MCNC: 131 MG/DL
GLUCOSE SERPL-MCNC: 142 MG/DL — HIGH (ref 70–99)
HBV SURFACE AB SER-ACNC: <3 MIU/ML — LOW
HBV SURFACE AG SER-ACNC: SIGNIFICANT CHANGE UP
HCT VFR BLD CALC: 33.9 % — LOW (ref 39–50)
HCV AB S/CO SERPL IA: 0.1 S/CO — SIGNIFICANT CHANGE UP (ref 0–0.99)
HCV AB SERPL-IMP: SIGNIFICANT CHANGE UP
HGB BLD-MCNC: 10.8 G/DL — LOW (ref 13–17)
IMM GRANULOCYTES NFR BLD AUTO: 0.4 % — SIGNIFICANT CHANGE UP (ref 0–0.9)
INR BLD: 1.03 RATIO — SIGNIFICANT CHANGE UP (ref 0.88–1.16)
LYMPHOCYTES # BLD AUTO: 1.6 K/UL — SIGNIFICANT CHANGE UP (ref 1–3.3)
LYMPHOCYTES # BLD AUTO: 19.8 % — SIGNIFICANT CHANGE UP (ref 13–44)
MAGNESIUM SERPL-MCNC: 1.5 MG/DL — LOW (ref 1.6–2.6)
MAGNESIUM SERPL-MCNC: 1.7 MG/DL — SIGNIFICANT CHANGE UP (ref 1.6–2.6)
MCHC RBC-ENTMCNC: 26.9 PG — LOW (ref 27–34)
MCHC RBC-ENTMCNC: 31.9 GM/DL — LOW (ref 32–36)
MCV RBC AUTO: 84.3 FL — SIGNIFICANT CHANGE UP (ref 80–100)
MONOCYTES # BLD AUTO: 0.47 K/UL — SIGNIFICANT CHANGE UP (ref 0–0.9)
MONOCYTES NFR BLD AUTO: 5.8 % — SIGNIFICANT CHANGE UP (ref 2–14)
NEUTROPHILS # BLD AUTO: 5.78 K/UL — SIGNIFICANT CHANGE UP (ref 1.8–7.4)
NEUTROPHILS NFR BLD AUTO: 71.5 % — SIGNIFICANT CHANGE UP (ref 43–77)
PLATELET # BLD AUTO: 241 K/UL — SIGNIFICANT CHANGE UP (ref 150–400)
POTASSIUM SERPL-MCNC: 4.7 MMOL/L — SIGNIFICANT CHANGE UP (ref 3.5–5.3)
POTASSIUM SERPL-MCNC: 5.7 MMOL/L — HIGH (ref 3.5–5.3)
POTASSIUM SERPL-SCNC: 4.7 MMOL/L — SIGNIFICANT CHANGE UP (ref 3.5–5.3)
POTASSIUM SERPL-SCNC: 5.7 MMOL/L
POTASSIUM SERPL-SCNC: 5.7 MMOL/L — HIGH (ref 3.5–5.3)
PROT SERPL-MCNC: 6.6 G/DL
PROT SERPL-MCNC: 6.7 G/DL — SIGNIFICANT CHANGE UP (ref 6.6–8.7)
PROTHROM AB SERPL-ACNC: 12 SEC — SIGNIFICANT CHANGE UP (ref 10.5–13.4)
RAPID RVP RESULT: SIGNIFICANT CHANGE UP
RBC # BLD: 4.02 M/UL — LOW (ref 4.2–5.8)
RBC # FLD: 14.7 % — HIGH (ref 10.3–14.5)
SARS-COV-2 RNA SPEC QL NAA+PROBE: SIGNIFICANT CHANGE UP
SODIUM SERPL-SCNC: 139 MMOL/L — SIGNIFICANT CHANGE UP (ref 135–145)
SODIUM SERPL-SCNC: 140 MMOL/L — SIGNIFICANT CHANGE UP (ref 135–145)
SODIUM SERPL-SCNC: 141 MMOL/L
WBC # BLD: 8.08 K/UL — SIGNIFICANT CHANGE UP (ref 3.8–10.5)
WBC # FLD AUTO: 8.08 K/UL — SIGNIFICANT CHANGE UP (ref 3.8–10.5)

## 2023-02-15 PROCEDURE — 99223 1ST HOSP IP/OBS HIGH 75: CPT

## 2023-02-15 PROCEDURE — 93010 ELECTROCARDIOGRAM REPORT: CPT

## 2023-02-15 PROCEDURE — 99285 EMERGENCY DEPT VISIT HI MDM: CPT

## 2023-02-15 PROCEDURE — 99497 ADVNCD CARE PLAN 30 MIN: CPT | Mod: 25

## 2023-02-15 PROCEDURE — 71045 X-RAY EXAM CHEST 1 VIEW: CPT | Mod: 26

## 2023-02-15 RX ORDER — GLUCAGON INJECTION, SOLUTION 0.5 MG/.1ML
1 INJECTION, SOLUTION SUBCUTANEOUS ONCE
Refills: 0 | Status: DISCONTINUED | OUTPATIENT
Start: 2023-02-15 | End: 2023-02-21

## 2023-02-15 RX ORDER — INSULIN LISPRO 100/ML
3 VIAL (ML) SUBCUTANEOUS
Refills: 0 | Status: DISCONTINUED | OUTPATIENT
Start: 2023-02-15 | End: 2023-02-21

## 2023-02-15 RX ORDER — ASPIRIN/CALCIUM CARB/MAGNESIUM 324 MG
81 TABLET ORAL DAILY
Refills: 0 | Status: DISCONTINUED | OUTPATIENT
Start: 2023-02-15 | End: 2023-02-21

## 2023-02-15 RX ORDER — DEXTROSE 50 % IN WATER 50 %
15 SYRINGE (ML) INTRAVENOUS ONCE
Refills: 0 | Status: DISCONTINUED | OUTPATIENT
Start: 2023-02-15 | End: 2023-02-21

## 2023-02-15 RX ORDER — INSULIN LISPRO 100/ML
VIAL (ML) SUBCUTANEOUS
Refills: 0 | Status: DISCONTINUED | OUTPATIENT
Start: 2023-02-15 | End: 2023-02-21

## 2023-02-15 RX ORDER — AMLODIPINE BESYLATE 2.5 MG/1
10 TABLET ORAL DAILY
Refills: 0 | Status: DISCONTINUED | OUTPATIENT
Start: 2023-02-16 | End: 2023-02-21

## 2023-02-15 RX ORDER — INSULIN GLARGINE 100 [IU]/ML
5 INJECTION, SOLUTION SUBCUTANEOUS AT BEDTIME
Refills: 0 | Status: DISCONTINUED | OUTPATIENT
Start: 2023-02-15 | End: 2023-02-21

## 2023-02-15 RX ORDER — ISOSORBIDE DINITRATE 5 MG/1
20 TABLET ORAL THREE TIMES A DAY
Refills: 0 | Status: DISCONTINUED | OUTPATIENT
Start: 2023-02-15 | End: 2023-02-21

## 2023-02-15 RX ORDER — ACETAMINOPHEN 500 MG
650 TABLET ORAL EVERY 6 HOURS
Refills: 0 | Status: DISCONTINUED | OUTPATIENT
Start: 2023-02-15 | End: 2023-02-21

## 2023-02-15 RX ORDER — SODIUM CHLORIDE 9 MG/ML
1000 INJECTION, SOLUTION INTRAVENOUS
Refills: 0 | Status: DISCONTINUED | OUTPATIENT
Start: 2023-02-15 | End: 2023-02-21

## 2023-02-15 RX ORDER — ONDANSETRON 8 MG/1
4 TABLET, FILM COATED ORAL EVERY 8 HOURS
Refills: 0 | Status: DISCONTINUED | OUTPATIENT
Start: 2023-02-15 | End: 2023-02-21

## 2023-02-15 RX ORDER — HYDRALAZINE HCL 50 MG
10 TABLET ORAL ONCE
Refills: 0 | Status: COMPLETED | OUTPATIENT
Start: 2023-02-15 | End: 2023-02-15

## 2023-02-15 RX ORDER — SODIUM BICARBONATE 1 MEQ/ML
1300 SYRINGE (ML) INTRAVENOUS THREE TIMES A DAY
Refills: 0 | Status: DISCONTINUED | OUTPATIENT
Start: 2023-02-15 | End: 2023-02-19

## 2023-02-15 RX ORDER — HYDRALAZINE HCL 50 MG
50 TABLET ORAL EVERY 8 HOURS
Refills: 0 | Status: DISCONTINUED | OUTPATIENT
Start: 2023-02-15 | End: 2023-02-21

## 2023-02-15 RX ORDER — CALCITRIOL 0.5 UG/1
0.25 CAPSULE ORAL DAILY
Refills: 0 | Status: DISCONTINUED | OUTPATIENT
Start: 2023-02-15 | End: 2023-02-21

## 2023-02-15 RX ORDER — ASPIRIN/CALCIUM CARB/MAGNESIUM 324 MG
81 TABLET ORAL ONCE
Refills: 0 | Status: COMPLETED | OUTPATIENT
Start: 2023-02-15 | End: 2023-02-15

## 2023-02-15 RX ORDER — CARVEDILOL PHOSPHATE 80 MG/1
25 CAPSULE, EXTENDED RELEASE ORAL EVERY 12 HOURS
Refills: 0 | Status: DISCONTINUED | OUTPATIENT
Start: 2023-02-15 | End: 2023-02-21

## 2023-02-15 RX ORDER — ATORVASTATIN CALCIUM 80 MG/1
80 TABLET, FILM COATED ORAL AT BEDTIME
Refills: 0 | Status: DISCONTINUED | OUTPATIENT
Start: 2023-02-15 | End: 2023-02-21

## 2023-02-15 RX ORDER — PANTOPRAZOLE SODIUM 20 MG/1
40 TABLET, DELAYED RELEASE ORAL
Refills: 0 | Status: DISCONTINUED | OUTPATIENT
Start: 2023-02-15 | End: 2023-02-21

## 2023-02-15 RX ORDER — HEPARIN SODIUM 5000 [USP'U]/ML
5000 INJECTION INTRAVENOUS; SUBCUTANEOUS ONCE
Refills: 0 | Status: COMPLETED | OUTPATIENT
Start: 2023-02-15 | End: 2023-02-15

## 2023-02-15 RX ORDER — SODIUM ZIRCONIUM CYCLOSILICATE 10 G/10G
5 POWDER, FOR SUSPENSION ORAL ONCE
Refills: 0 | Status: COMPLETED | OUTPATIENT
Start: 2023-02-15 | End: 2023-02-15

## 2023-02-15 RX ORDER — DEXTROSE 50 % IN WATER 50 %
25 SYRINGE (ML) INTRAVENOUS ONCE
Refills: 0 | Status: DISCONTINUED | OUTPATIENT
Start: 2023-02-15 | End: 2023-02-21

## 2023-02-15 RX ORDER — MAGNESIUM OXIDE 400 MG ORAL TABLET 241.3 MG
400 TABLET ORAL DAILY
Refills: 0 | Status: DISCONTINUED | OUTPATIENT
Start: 2023-02-15 | End: 2023-02-21

## 2023-02-15 RX ORDER — ASPIRIN/CALCIUM CARB/MAGNESIUM 324 MG
81 TABLET ORAL DAILY
Refills: 0 | Status: DISCONTINUED | OUTPATIENT
Start: 2023-02-15 | End: 2023-02-15

## 2023-02-15 RX ORDER — LANOLIN ALCOHOL/MO/W.PET/CERES
3 CREAM (GRAM) TOPICAL AT BEDTIME
Refills: 0 | Status: DISCONTINUED | OUTPATIENT
Start: 2023-02-15 | End: 2023-02-21

## 2023-02-15 RX ORDER — MAGNESIUM SULFATE 500 MG/ML
1 VIAL (ML) INJECTION ONCE
Refills: 0 | Status: COMPLETED | OUTPATIENT
Start: 2023-02-15 | End: 2023-02-15

## 2023-02-15 RX ADMIN — HEPARIN SODIUM 5000 UNIT(S): 5000 INJECTION INTRAVENOUS; SUBCUTANEOUS at 20:20

## 2023-02-15 RX ADMIN — Medication 10 MILLIGRAM(S): at 16:14

## 2023-02-15 RX ADMIN — CARVEDILOL PHOSPHATE 25 MILLIGRAM(S): 80 CAPSULE, EXTENDED RELEASE ORAL at 21:28

## 2023-02-15 RX ADMIN — Medication 100 GRAM(S): at 13:55

## 2023-02-15 RX ADMIN — SODIUM ZIRCONIUM CYCLOSILICATE 5 GRAM(S): 10 POWDER, FOR SUSPENSION ORAL at 13:56

## 2023-02-15 RX ADMIN — ATORVASTATIN CALCIUM 80 MILLIGRAM(S): 80 TABLET, FILM COATED ORAL at 21:28

## 2023-02-15 RX ADMIN — ISOSORBIDE DINITRATE 20 MILLIGRAM(S): 5 TABLET ORAL at 16:12

## 2023-02-15 RX ADMIN — INSULIN GLARGINE 5 UNIT(S): 100 INJECTION, SOLUTION SUBCUTANEOUS at 21:29

## 2023-02-15 RX ADMIN — Medication 1300 MILLIGRAM(S): at 21:29

## 2023-02-15 RX ADMIN — Medication 81 MILLIGRAM(S): at 16:16

## 2023-02-15 RX ADMIN — Medication 0.1 MILLIGRAM(S): at 16:11

## 2023-02-15 RX ADMIN — Medication 1300 MILLIGRAM(S): at 16:12

## 2023-02-15 RX ADMIN — Medication 50 MILLIGRAM(S): at 16:17

## 2023-02-15 RX ADMIN — Medication 50 MILLIGRAM(S): at 21:28

## 2023-02-15 NOTE — H&P ADULT - HISTORY OF PRESENT ILLNESS
68y/o M with past medical history significant for hypertension, diabetes, hyperlipidemia, CAD s/p CABG 2019,, CVA, chronic kidney disease progressed to an advanced stage V s/p AVF 01/19/23 who was seen yesterday at outpatient nephrology office for f/u of his CKD. Labs ordered yesterday were significant for K 5.7, Scr 7.07 and egFR 8ml/min.Pt sent to ED by nephology for Dialysis.Currently K 5.7, Cr 6.9,co2 19. Patient is c/o  fatigue. Denies N/V/D/Dizziness,cp,sob,edema, urinary problems.He has good appetite.     PAST MEDICAL & SURGICAL HISTORY:  DM (diabetes mellitus)  HTN (hypertension)  High cholesterol  Myocardial infarction; (Coronary angiogram 2014)CABG  Acute on chronic congestive heart failure, unspecified congestive heart failure type  Cerebrovascular accident (CVA)  ESRD (end stage renal disease)  S/P coronary artery bypass with three autogenous grafts  AVF lt forearm 01/19/23    FAMILY HISTORY:  Family history of heart disease (Sibling)  DM    SOCIAL HISTORY: Remove smoker  Denies  alcohol, drug abuse.

## 2023-02-15 NOTE — ED PROVIDER NOTE - PHYSICAL EXAMINATION
Head: atraumatic, normacephalic  Face: atraumatic, no crepitus no orbiral/maxillary/mandibular ttp  throat: uvula midline no exudates  eyes: perrla eomi  heart: rrr s1s2  lungs: ctab  abd: soft, nt nd +bs no rebound/guarding no cva ttp  skin: warm  LE: no swelling, no calf ttp  back: no midline cervical/thoracic/lumbar ttp  ue: LEFT AV FISTULA

## 2023-02-15 NOTE — ED PROVIDER NOTE - OBJECTIVE STATEMENT
68yo M hx of dm htn hld esrd (av fistula placed umu 15, 2023 not matured yet) sent in by his nephrologist for HD port and dialysis. Denies f/c/n/v/cp/sob/palpitations/ cough/rash/headache/dizziness/abd.pain/d/c/dysuria/hematuria

## 2023-02-15 NOTE — CHART NOTE - NSCHARTNOTEFT_GEN_A_CORE
Patient was seen yesterday at outpatient CKD office.   H/o CKD V  Labs obtained significant for eGFR 8, potassium 5.7 mmol/L  He has AVF placed in left wrist on Jan 9 currently not mature for cannulation  He will need inpatient admission, tunnelled HD catheter placement and initiation of HD  Please request IR consult for placement of tunneled HD catheter  Patient not on anticoagulant and asked to be NPO since this AM  recommend non tunneled HD catheter (shiley) if tunneled HD cathter (permcath) can not be placed today.  Full Note to follow.

## 2023-02-15 NOTE — H&P ADULT - NSHPPHYSICALEXAM_GEN_ALL_CORE
T(C): 36.5 (02-15-23 @ 09:43), Max: 36.5 (02-15-23 @ 09:43)  HR: 80 (02-15-23 @ 09:43) (80 - 80)  BP: 184/85 (02-15-23 @ 09:43) (184/85 - 184/85)  RR: 18 (02-15-23 @ 09:43) (18 - 18)  SpO2: 99% (02-15-23 @ 09:43) (99% - 99%)    GEN - NAD  HEENT - NCAT, EOMI, KENNEY,   RESP - CTA BL, no wheeze/rhonchi/crackles. not on supplemental O2.  CARDIO - NS1S2, RRR. No murmurs/rubs/gallops.  ABD - Soft/Non tender/Non distended. Normal BS x4 quadrants.   Ext - No SUKHDEV.   MSK - full ROM of BL upper and lower extremities without pain or restriction. BL 5/5 strength on upper and lower extremities.   Neuro - cn 2-12 grossly intact. no tremor. gait not observed.     Psych- AAOx3. . appropriate behaviour. attentive. normal affect.

## 2023-02-15 NOTE — ED PROVIDER NOTE - CLINICAL SUMMARY MEDICAL DECISION MAKING FREE TEXT BOX
66yo M hx of dm htn hld esrd (av fistula placed umu 15, 2023 not matured yet) sent in by his nephrologist for HD port and dialysis.   mild hyper K ; pending ekg, asymptomatic here for initiation of dialysis and HD port placement as av fistula is not matured yet  IR consult placed- admitted to tele

## 2023-02-15 NOTE — PATIENT PROFILE ADULT - FALL HARM RISK - HARM RISK INTERVENTIONS

## 2023-02-15 NOTE — CONSULT NOTE ADULT - ASSESSMENT
68y/o M with past medical history significant for hypertension, diabetes, hyperlipidemia, CAD, CVA and chronic kidney disease progressed to an advanced stage V who was seen yesterday at outpatient nephrology office for f/u of his CKD. Labs ordered yesterday were significant for K 5.7, Scr 7.07 and egFR 8ml/min. Sent to ER for initiation of HD.    1.  ESRD, progressive Chronic kidney disease was likely multifactorial secondary to, diabetes, hypertension, renoatherosclerotic disease.  The patient is azotemic w/ initial uremic equivalent and hyperkalemia.    -Given hyperkalemia even after strict dietary restrictions and high risk w/ h/o CAD/CVA it was decided that patient will benefit with initiation of HD via a tunneled HD catheter   -He has AVF placed in left wrist on Jan 9 currently not mature for cannulation  -He will need inpatient admission, tunnelled HD catheter placement and initiation of HD  -Please request IR consult for placement of tunneled HD catheter  -Patient not on anticoagulant and asked to be NPO since this AM  -Recommend non tunneled HD catheter (shiley) if tunneled HD cathter (permcath) can not be placed today.  -We will need social work consult for outpatient placement for HD at [facility] (nearest to patient home/pt preference). I have placed orders for hep panel, QuantiFeron required for it.     2.  Hypertension uncontrolled.   He has not taken morning medications today. Please resume home dose of antihypertensive. Will follow.  3.  Metabolic acidosis. Resume home sodium bicarbonate PO. Will d/c once placed on HD.  4.  Anemia.  Had component of iron deficiency in the past.  He also has anemia of chronic kidney disease. Will check iron panel to r/o deficiency. No need of JACK for now.  5.  Hyperkalemia.. HD as above, if no HD access possible today then give lokelma 10 mg once.   6.  Mineral bone disease of chronic kidney disease. Will continue on calcitriol.  7.  Edema.   We will continue patient on home dose of furosemide 40 milligram one tablet orally and will d/c once started on HD.     68y/o M with past medical history significant for hypertension, diabetes, hyperlipidemia, CAD, CVA and chronic kidney disease progressed to an advanced stage V who was seen yesterday at outpatient nephrology office for f/u of his CKD. Labs ordered yesterday were significant for K 5.7, Scr 7.07 and egFR 8ml/min. Sent to ER for initiation of HD.    1.  ESRD, progressive Chronic kidney disease was likely multifactorial secondary to, diabetes, hypertension, renoatherosclerotic disease.  The patient is azotemic w/ initial uremic equivalent and hyperkalemia.    -Given hyperkalemia even after strict dietary restrictions and high risk w/ h/o CAD/CVA it was decided that patient will benefit with initiation of HD via a tunneled HD catheter   -He has AVF placed in left wrist on Jan 19 currently not mature for cannulation  -He will need inpatient admission, tunnelled HD catheter placement and initiation of HD  -Please request IR consult for placement of tunneled HD catheter  -Patient not on anticoagulant and asked to be NPO since this AM  -Recommend non tunneled HD catheter (shiley) if tunneled HD cathter (permcath) can not be placed today.  -We will need social work consult for outpatient placement for HD at [facility] (nearest to patient home/pt preference). I have placed orders for hep panel, QuantiFeron required for it.     2.  Hypertension uncontrolled.   He has not taken morning medications today. Please resume home dose of antihypertensive. Will follow.  3.  Metabolic acidosis. Resume home sodium bicarbonate PO. Will d/c once placed on HD.  4.  Anemia.  Had component of iron deficiency in the past.  He also has anemia of chronic kidney disease. Will check iron panel to r/o deficiency. No need of JACK for now.  5.  Hyperkalemia.. HD as above, if no HD access possible today then give lokelma 10 mg once.   6.  Mineral bone disease of chronic kidney disease. Will continue on calcitriol.  7.  Edema.   We will continue patient on home dose of furosemide 40 milligram one tablet orally and will d/c once started on HD.

## 2023-02-15 NOTE — ED ADULT NURSE NOTE - NSIMPLEMENTINTERV_GEN_ALL_ED
Implemented All Universal Safety Interventions:  [place] to call system. Call bell, personal items and telephone within reach. Instruct patient to call for assistance. Room bathroom lighting operational. Non-slip footwear when patient is off stretcher. Physically safe environment: no spills, clutter or unnecessary equipment. Stretcher in lowest position, wheels locked, appropriate side rails in place.

## 2023-02-15 NOTE — H&P ADULT - ASSESSMENT
68y/o M with past medical history significant for hypertension, diabetes, hyperlipidemia, CAD s/p CABG 2019,, CVA, chronic kidney disease progressed to an advanced stage V s/p AVF 01/19/23 who was seen yesterday at outpatient nephrology office for f/u of his CKD. Labs ordered yesterday were significant for K 5.7, Scr 7.07 and egFR 8ml/min.Pt sent to ED by nephology for Dialysis.Currently K 5.7, Cr 6.9,co2 19. Patient is c/o  fatigue. Denies N/V/D/Dizziness,cp,sob,edema, urinary problems.He has good appetite.     Acute on chronic renal failure st 5  Metabolic acidosis  Hyperkalemia 66y/o M with past medical history significant for hypertension, diabetes, hyperlipidemia, CAD s/p CABG 2019,, CVA, chronic kidney disease progressed to an advanced stage V s/p AVF 01/19/23 who was seen yesterday at outpatient nephrology office for f/u of his CKD. Labs ordered yesterday were significant for K 5.7, Scr 7.07 and egFR 8ml/min.Pt sent to ED by nephology for Dialysis.Currently K 5.7, Cr 6.9,co2 19. Patient is c/o  fatigue. Denies N/V/D/Dizziness,cp,sob,edema, urinary problems.He has good appetite.     Acute on chronic renal failure st 5  Metabolic acidosis  Hyperkalemia  Hypomagnesemia  -telemetry  -spoke with nephrology,he spoke to IR, plan for  HD cath placement tomorrow  and plan for HD   -replaced Mag, s/p lokelma  -Hold lasix, continue po bicarb  -EKG  -bmp, mag    Malignant htn  -Pt missed some of his bp meds. only took coreg,amlodipine  -stat hydralazine/clonidine/isosorbid  -recheck bp  -adjust meds per bp    Cad/cabg   -give dose of Asa,hold tomorrow for HD CATH,CAN RESUME AFTER HD placement  -coreg,statin,isosorbid    Hyperlipidemia  -Statin    DM  -on tresiba 40 mg bedtime at home  -start lantus w/meal coverage,iss. adjust dose per bg  -a1c    dvt ppx sq heparin (hold am dose)  Plan of care dw pt and wife at bedside.

## 2023-02-15 NOTE — H&P ADULT - CONVERSATION DETAILS
68y/o M with past medical history significant for hypertension, diabetes, hyperlipidemia, CAD s/p CABG 2019,, CVA, chronic kidney disease progressed to an advanced stage V s/p AVF 01/19/23 who was seen yesterday at outpatient nephrology office for f/u of his CKD. Labs ordered yesterday were significant for K 5.7, Scr 7.07 and egFR 8ml/min.Pt sent to ED by nephology for Dialysis.Currently K 5.7, Cr 6.9,co2 19.Pt wants to be intubated/resuscitation. wife at bedside agreed with pts decisions.

## 2023-02-15 NOTE — ED ADULT NURSE NOTE - OBJECTIVE STATEMENT
Patient presents to ED as directed by Surgeon to have Dialysis Port placed. Patient has no complaints at this time.

## 2023-02-15 NOTE — CONSULT NOTE ADULT - SUBJECTIVE AND OBJECTIVE BOX
HISTORY OF PRESENT ILLNESS:  Patient is a 68y/o M with past medical history significant for hypertension, diabetes, hyperlipidemia, CAD, CVA and chronic kidney disease progressed to an advanced stage V who was seen yesterday at outpatient nephrology office for f/u of his CKD. Labs ordered yesterday were significant for K 5.7, Scr 7.07 and egFR 8ml/min. Patient w/ fatigue, daytime somnolence and dysgeusia. Given hyperkalemia even after strict dietary restrictions and high risk w/ h/o CAD/CVA it was decided that patient will benefit with initiation of HD via a tunneled HD catheter for which he was asked to come to [facility] ER. He has a left radiocephalic AVF placed on Jan 19 but it is not mature enough yet for cannulation. He complained of some itching but denied increased urination frequency, dysuria, gross hematuria, fever, chills, skin rash, nausea, vomiting, diarrhea, bleeding problems and joint pain or swelling. Review of other systems was negative.    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    SOCIAL HISTORY:  Denies tobacco, alcohol, drug abuse.     Home Medications:  amLODIPine 10 mg oral tablet: 1 tab(s) orally once a day (19 Jan 2023 06:39)  calcitriol 0.25 mcg oral capsule: 1 cap(s) orally once a day (19 Jan 2023 06:39)  carvedilol 25 mg oral tablet: 1 tab(s) orally every 12 hours (19 Jan 2023 06:39)  cloNIDine 0.1 mg oral tablet: 1 tab(s) orally 2 times a day (19 Jan 2023 06:39)  furosemide 40 mg oral tablet: 1 tab(s) orally once a day (19 Jan 2023 06:39)  isosorbide dinitrate 20 mg oral tablet: 1 tab(s) orally 3 times a day (19 Jan 2023 06:39)  magnesium oxide 400 mg oral tablet: 1 tab(s) orally once a day (19 Jan 2023 06:39)  sodium bicarbonate 650 mg oral tablet: 2 tab(s) orally 3 times a day (19 Jan 2023 06:39)  Tresiba FlexTouch 100 units/mL subcutaneous solution: 40 unit(s) subcutaneous once a day (at bedtime) (19 Jan 2023 06:39)    ALLERGIES:  No Known Allergies    REVIEW OF SYSTEMS: All systems were reviewed in detail. Pertinent positive and negative have been detailed in HPI, otherwise negative.     Vital Signs Last 24 Hrs  T(C): 36.5 (15 Feb 2023 09:43), Max: 36.5 (15 Feb 2023 09:43)  T(F): 97.7 (15 Feb 2023 09:43), Max: 97.7 (15 Feb 2023 09:43)  HR: 80 (15 Feb 2023 09:43) (80 - 80)  BP: 184/85 (15 Feb 2023 09:43) (184/85 - 184/85)  RR: 18 (15 Feb 2023 09:43) (18 - 18)  SpO2: 99% (15 Feb 2023 09:43) (99% - 99%)    Parameters below as of 15 Feb 2023 09:43  Patient On (Oxygen Delivery Method): room air    PHYSICAL EXAM:  Gen: no acute distress  MS: alert, conversing normally  Eyes: EOMI, no icterus  HENT: NCAT, MMM  CV: rhythm reg reg, rate normal, no m/g/r, no LE edema  Chest: CTAB, no w/r/r,  Abd: soft, NT, ND  Neuro: moving all 4 limbs spontaneously, no tremor  MSK: normal bulk and tone, no joint swelling  Skin: dry, warm, no rash or jaundice    LABS:                      10.8   8.08  )-----------( 241      ( 15 Feb 2023 11:30 )             33.9     02-15    139  |  107  |  73.4<H>  ----------------------------<  142<H>  5.7<H>   |  19.0<L>  |  6.97<H>    Ca    8.5      15 Feb 2023 11:30  Mg     1.5     02-15    TPro  6.7  /  Alb  3.8  /  TBili  0.3<L>  /  DBili  x   /  AST  11  /  ALT  8   /  AlkPhos  117  02-15  PT/INR - ( 15 Feb 2023 11:30 )   PT: 12.0 sec;   INR: 1.03 ratio     PTT - ( 15 Feb 2023 11:30 )  PTT:30.8 sec  Magnesium, Serum: 1.5 mg/dL (02-15 @ 11:30)    RADIOLOGY & ADDITIONAL TESTS: Reviewed   HISTORY OF PRESENT ILLNESS:  Patient is a 68y/o M with past medical history significant for hypertension, diabetes, hyperlipidemia, CAD, CVA and chronic kidney disease progressed to an advanced stage V who was seen yesterday at outpatient nephrology office for f/u of his CKD. Labs ordered yesterday were significant for K 5.7, Scr 7.07 and egFR 8ml/min. Patient w/ fatigue, daytime somnolence and dysgeusia. Given hyperkalemia even after strict dietary restrictions and high risk w/ h/o CAD/CVA it was decided that patient will benefit with initiation of HD via a tunneled HD catheter for which he was asked to come to [facility] ER. He has a left radiocephalic AVF placed on Jan 19 but it is not mature enough yet for cannulation. He complained of some itching but denied increased urination frequency, dysuria, gross hematuria, fever, chills, skin rash, nausea, vomiting, diarrhea, bleeding problems and joint pain or swelling. Review of other systems was negative.    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    SOCIAL HISTORY:  Denies tobacco, alcohol, drug abuse.     Home Medications:  amLODIPine 10 mg oral tablet: 1 tab(s) orally once a day (19 Jan 2023 06:39)  calcitriol 0.25 mcg oral capsule: 1 cap(s) orally once a day (19 Jan 2023 06:39)  carvedilol 25 mg oral tablet: 1 tab(s) orally every 12 hours (19 Jan 2023 06:39)  cloNIDine 0.1 mg oral tablet: 1 tab(s) orally 2 times a day (19 Jan 2023 06:39)  furosemide 40 mg oral tablet: 1 tab(s) orally once a day (19 Jan 2023 06:39)  isosorbide dinitrate 20 mg oral tablet: 1 tab(s) orally 3 times a day (19 Jan 2023 06:39)  magnesium oxide 400 mg oral tablet: 1 tab(s) orally once a day (19 Jan 2023 06:39)  sodium bicarbonate 650 mg oral tablet: 2 tab(s) orally 3 times a day (19 Jan 2023 06:39)  Tresiba FlexTouch 100 units/mL subcutaneous solution: 40 unit(s) subcutaneous once a day (at bedtime) (19 Jan 2023 06:39)    ALLERGIES:  No Known Allergies    REVIEW OF SYSTEMS: All systems were reviewed in detail. Pertinent positive and negative have been detailed in HPI, otherwise negative.     Vital Signs Last 24 Hrs  T(C): 36.5 (15 Feb 2023 09:43), Max: 36.5 (15 Feb 2023 09:43)  T(F): 97.7 (15 Feb 2023 09:43), Max: 97.7 (15 Feb 2023 09:43)  HR: 80 (15 Feb 2023 09:43) (80 - 80)  BP: 184/85 (15 Feb 2023 09:43) (184/85 - 184/85)  RR: 18 (15 Feb 2023 09:43) (18 - 18)  SpO2: 99% (15 Feb 2023 09:43) (99% - 99%)    Parameters below as of 15 Feb 2023 09:43  Patient On (Oxygen Delivery Method): room air    PHYSICAL EXAM:  Gen: no acute distress  MS: alert, conversing normally  Eyes: EOMI, no icterus, pallor+  HENT: NCAT, MMM  CV: rhythm reg reg, rate normal, no m/g/r, trace LE edema  Chest: CTAB, no w/r/r,  Abd: soft, NT, ND  Neuro: moving all 4 limbs spontaneously, no tremor  MSK: normal bulk and tone, no joint swelling  Skin: dry, warm, no rash or jaundice  Left radiocephalic AVF good bruit    LABS:                      10.8   8.08  )-----------( 241      ( 15 Feb 2023 11:30 )             33.9     02-15    139  |  107  |  73.4<H>  ----------------------------<  142<H>  5.7<H>   |  19.0<L>  |  6.97<H>    Ca    8.5      15 Feb 2023 11:30  Mg     1.5     02-15    TPro  6.7  /  Alb  3.8  /  TBili  0.3<L>  /  DBili  x   /  AST  11  /  ALT  8   /  AlkPhos  117  02-15  PT/INR - ( 15 Feb 2023 11:30 )   PT: 12.0 sec;   INR: 1.03 ratio     PTT - ( 15 Feb 2023 11:30 )  PTT:30.8 sec  Magnesium, Serum: 1.5 mg/dL (02-15 @ 11:30)    RADIOLOGY & ADDITIONAL TESTS: Reviewed   HISTORY OF PRESENT ILLNESS:  Patient is a 66y/o M with past medical history significant for hypertension, diabetes, hyperlipidemia, CAD, CVA and chronic kidney disease progressed to an advanced stage V who was seen yesterday at outpatient nephrology office for f/u of his CKD. Labs ordered yesterday were significant for K 5.7, Scr 7.07 and egFR 8ml/min. Patient w/ fatigue, daytime somnolence and dysgeusia. Given hyperkalemia even after strict dietary restrictions and high risk w/ h/o CAD/CVA it was decided that patient will benefit with initiation of HD via a tunneled HD catheter for which he was asked to come to [facility] ER. He has a left radiocephalic AVF placed on Jan 19 but it is not mature enough yet for cannulation. He complained of some itching but denied increased urination frequency, dysuria, gross hematuria, fever, chills, skin rash, nausea, vomiting, diarrhea, bleeding problems and joint pain or swelling. Review of other systems was negative.    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    SOCIAL HISTORY:  Denies tobacco, alcohol, drug abuse.     HOME MEDICATIONS:  amLODIPine 10 mg oral tablet: 1 tab(s) orally once a day (19 Jan 2023 06:39)  calcitriol 0.25 mcg oral capsule: 1 cap(s) orally once a day (19 Jan 2023 06:39)  carvedilol 25 mg oral tablet: 1 tab(s) orally every 12 hours (19 Jan 2023 06:39)  cloNIDine 0.1 mg oral tablet: 1 tab(s) orally 2 times a day (19 Jan 2023 06:39)  furosemide 40 mg oral tablet: 1 tab(s) orally once a day (19 Jan 2023 06:39)  isosorbide dinitrate 20 mg oral tablet: 1 tab(s) orally 3 times a day (19 Jan 2023 06:39)  magnesium oxide 400 mg oral tablet: 1 tab(s) orally once a day (19 Jan 2023 06:39)  sodium bicarbonate 650 mg oral tablet: 2 tab(s) orally 3 times a day (19 Jan 2023 06:39)  Tresiba FlexTouch 100 units/mL subcutaneous solution: 40 unit(s) subcutaneous once a day (at bedtime) (19 Jan 2023 06:39)    ALLERGIES:  No Known Allergies    REVIEW OF SYSTEMS: All systems were reviewed in detail. Pertinent positive and negative have been detailed in HPI, otherwise negative.     Vital Signs Last 24 Hrs  T(C): 36.5 (15 Feb 2023 09:43), Max: 36.5 (15 Feb 2023 09:43)  T(F): 97.7 (15 Feb 2023 09:43), Max: 97.7 (15 Feb 2023 09:43)  HR: 80 (15 Feb 2023 09:43) (80 - 80)  BP: 184/85 (15 Feb 2023 09:43) (184/85 - 184/85)  RR: 18 (15 Feb 2023 09:43) (18 - 18)  SpO2: 99% (15 Feb 2023 09:43) (99% - 99%)    Parameters below as of 15 Feb 2023 09:43  Patient On (Oxygen Delivery Method): room air    PHYSICAL EXAM:  Gen: no acute distress  MS: alert, conversing normally  Eyes: EOMI, no icterus, pallor+  HENT: NCAT, MMM  CV: rhythm reg reg, rate normal, no m/g/r, trace LE edema  Chest: CTAB, no w/r/r,  Abd: soft, NT, ND  Neuro: moving all 4 limbs spontaneously, no tremor  MSK: normal bulk and tone, no joint swelling  Skin: dry, warm, no rash or jaundice  Left radiocephalic AVF good bruit    LABS:                      10.8   8.08  )-----------( 241      ( 15 Feb 2023 11:30 )             33.9     02-15    139  |  107  |  73.4<H>  ----------------------------<  142<H>  5.7<H>   |  19.0<L>  |  6.97<H>    Ca    8.5      15 Feb 2023 11:30  Mg     1.5     02-15    TPro  6.7  /  Alb  3.8  /  TBili  0.3<L>  /  DBili  x   /  AST  11  /  ALT  8   /  AlkPhos  117  02-15  PT/INR - ( 15 Feb 2023 11:30 )   PT: 12.0 sec;   INR: 1.03 ratio     PTT - ( 15 Feb 2023 11:30 )  PTT:30.8 sec  Magnesium, Serum: 1.5 mg/dL (02-15 @ 11:30)    RADIOLOGY & ADDITIONAL TESTS: Reviewed

## 2023-02-16 LAB
A1C WITH ESTIMATED AVERAGE GLUCOSE RESULT: 7 % — HIGH (ref 4–5.6)
ALBUMIN SERPL ELPH-MCNC: 3.2 G/DL — LOW (ref 3.3–5.2)
ALP SERPL-CCNC: 98 U/L — SIGNIFICANT CHANGE UP (ref 40–120)
ALT FLD-CCNC: 6 U/L — SIGNIFICANT CHANGE UP
ANION GAP SERPL CALC-SCNC: 13 MMOL/L — SIGNIFICANT CHANGE UP (ref 5–17)
AST SERPL-CCNC: 9 U/L — SIGNIFICANT CHANGE UP
BILIRUB SERPL-MCNC: 0.3 MG/DL — LOW (ref 0.4–2)
BUN SERPL-MCNC: 75.1 MG/DL — HIGH (ref 8–20)
CALCIUM SERPL-MCNC: 8.4 MG/DL — SIGNIFICANT CHANGE UP (ref 8.4–10.5)
CHLORIDE SERPL-SCNC: 109 MMOL/L — HIGH (ref 96–108)
CHOLEST SERPL-MCNC: 115 MG/DL — SIGNIFICANT CHANGE UP
CO2 SERPL-SCNC: 19 MMOL/L — LOW (ref 22–29)
CREAT SERPL-MCNC: 7.27 MG/DL — HIGH (ref 0.5–1.3)
EGFR: 8 ML/MIN/1.73M2 — LOW
ESTIMATED AVERAGE GLUCOSE: 154 MG/DL — HIGH (ref 68–114)
FERRITIN SERPL-MCNC: 28 NG/ML — LOW (ref 30–400)
GLUCOSE BLDC GLUCOMTR-MCNC: 108 MG/DL — HIGH (ref 70–99)
GLUCOSE BLDC GLUCOMTR-MCNC: 108 MG/DL — HIGH (ref 70–99)
GLUCOSE BLDC GLUCOMTR-MCNC: 170 MG/DL — HIGH (ref 70–99)
GLUCOSE BLDC GLUCOMTR-MCNC: 206 MG/DL — HIGH (ref 70–99)
GLUCOSE SERPL-MCNC: 110 MG/DL — HIGH (ref 70–99)
HCT VFR BLD CALC: 23.6 % — LOW (ref 39–50)
HCT VFR BLD CALC: 29.9 % — LOW (ref 39–50)
HCT VFR BLD CALC: 30 % — LOW (ref 39–50)
HDLC SERPL-MCNC: 21 MG/DL — LOW
HGB BLD-MCNC: 7.4 G/DL — LOW (ref 13–17)
HGB BLD-MCNC: 9.5 G/DL — LOW (ref 13–17)
HGB BLD-MCNC: 9.5 G/DL — LOW (ref 13–17)
IRON SATN MFR SERPL: 14 % — LOW (ref 16–55)
IRON SATN MFR SERPL: 35 UG/DL — LOW (ref 59–158)
LIPID PNL WITH DIRECT LDL SERPL: 70 MG/DL — SIGNIFICANT CHANGE UP
MAGNESIUM SERPL-MCNC: 1.9 MG/DL — SIGNIFICANT CHANGE UP (ref 1.6–2.6)
MCHC RBC-ENTMCNC: 27 PG — SIGNIFICANT CHANGE UP (ref 27–34)
MCHC RBC-ENTMCNC: 27.2 PG — SIGNIFICANT CHANGE UP (ref 27–34)
MCHC RBC-ENTMCNC: 27.3 PG — SIGNIFICANT CHANGE UP (ref 27–34)
MCHC RBC-ENTMCNC: 31.4 GM/DL — LOW (ref 32–36)
MCHC RBC-ENTMCNC: 31.7 GM/DL — LOW (ref 32–36)
MCHC RBC-ENTMCNC: 31.8 GM/DL — LOW (ref 32–36)
MCV RBC AUTO: 84.9 FL — SIGNIFICANT CHANGE UP (ref 80–100)
MCV RBC AUTO: 86 FL — SIGNIFICANT CHANGE UP (ref 80–100)
MCV RBC AUTO: 87.1 FL — SIGNIFICANT CHANGE UP (ref 80–100)
NON HDL CHOLESTEROL: 94 MG/DL — SIGNIFICANT CHANGE UP
PLATELET # BLD AUTO: 189 K/UL — SIGNIFICANT CHANGE UP (ref 150–400)
PLATELET # BLD AUTO: 209 K/UL — SIGNIFICANT CHANGE UP (ref 150–400)
PLATELET # BLD AUTO: 238 K/UL — SIGNIFICANT CHANGE UP (ref 150–400)
POTASSIUM SERPL-MCNC: 4.8 MMOL/L — SIGNIFICANT CHANGE UP (ref 3.5–5.3)
POTASSIUM SERPL-SCNC: 4.8 MMOL/L — SIGNIFICANT CHANGE UP (ref 3.5–5.3)
PROT SERPL-MCNC: 5.4 G/DL — LOW (ref 6.6–8.7)
RBC # BLD: 2.71 M/UL — LOW (ref 4.2–5.8)
RBC # BLD: 3.49 M/UL — LOW (ref 4.2–5.8)
RBC # BLD: 3.52 M/UL — LOW (ref 4.2–5.8)
RBC # FLD: 14.8 % — HIGH (ref 10.3–14.5)
RBC # FLD: 14.9 % — HIGH (ref 10.3–14.5)
RBC # FLD: 15 % — HIGH (ref 10.3–14.5)
SODIUM SERPL-SCNC: 141 MMOL/L — SIGNIFICANT CHANGE UP (ref 135–145)
TIBC SERPL-MCNC: 252 UG/DL — SIGNIFICANT CHANGE UP (ref 220–430)
TRANSFERRIN SERPL-MCNC: 176 MG/DL — LOW (ref 180–329)
TRIGL SERPL-MCNC: 119 MG/DL — SIGNIFICANT CHANGE UP
WBC # BLD: 7.24 K/UL — SIGNIFICANT CHANGE UP (ref 3.8–10.5)
WBC # BLD: 8.15 K/UL — SIGNIFICANT CHANGE UP (ref 3.8–10.5)
WBC # BLD: 9.38 K/UL — SIGNIFICANT CHANGE UP (ref 3.8–10.5)
WBC # FLD AUTO: 7.24 K/UL — SIGNIFICANT CHANGE UP (ref 3.8–10.5)
WBC # FLD AUTO: 8.15 K/UL — SIGNIFICANT CHANGE UP (ref 3.8–10.5)
WBC # FLD AUTO: 9.38 K/UL — SIGNIFICANT CHANGE UP (ref 3.8–10.5)

## 2023-02-16 PROCEDURE — 99233 SBSQ HOSP IP/OBS HIGH 50: CPT

## 2023-02-16 PROCEDURE — 76937 US GUIDE VASCULAR ACCESS: CPT | Mod: 26

## 2023-02-16 PROCEDURE — 99358 PROLONG SERVICE W/O CONTACT: CPT

## 2023-02-16 PROCEDURE — 36558 INSERT TUNNELED CV CATH: CPT

## 2023-02-16 PROCEDURE — 77001 FLUOROGUIDE FOR VEIN DEVICE: CPT | Mod: 26

## 2023-02-16 PROCEDURE — 99359 PROLONG SERV W/O CONTACT ADD: CPT

## 2023-02-16 RX ORDER — PHYTONADIONE (VIT K1) 5 MG
5 TABLET ORAL ONCE
Refills: 0 | Status: COMPLETED | OUTPATIENT
Start: 2023-02-16 | End: 2023-02-16

## 2023-02-16 RX ORDER — DESMOPRESSIN ACETATE 0.1 MG/1
23 TABLET ORAL ONCE
Refills: 0 | Status: COMPLETED | OUTPATIENT
Start: 2023-02-16 | End: 2023-02-16

## 2023-02-16 RX ORDER — SODIUM CHLORIDE 9 MG/ML
10 INJECTION INTRAMUSCULAR; INTRAVENOUS; SUBCUTANEOUS
Refills: 0 | Status: DISCONTINUED | OUTPATIENT
Start: 2023-02-16 | End: 2023-02-21

## 2023-02-16 RX ORDER — CHLORHEXIDINE GLUCONATE 213 G/1000ML
1 SOLUTION TOPICAL
Refills: 0 | Status: DISCONTINUED | OUTPATIENT
Start: 2023-02-16 | End: 2023-02-21

## 2023-02-16 RX ORDER — DESMOPRESSIN ACETATE 0.1 MG/1
25 TABLET ORAL ONCE
Refills: 0 | Status: DISCONTINUED | OUTPATIENT
Start: 2023-02-16 | End: 2023-02-16

## 2023-02-16 RX ORDER — FUROSEMIDE 40 MG
80 TABLET ORAL ONCE
Refills: 0 | Status: COMPLETED | OUTPATIENT
Start: 2023-02-16 | End: 2023-02-17

## 2023-02-16 RX ADMIN — Medication 50 MILLIGRAM(S): at 05:26

## 2023-02-16 RX ADMIN — INSULIN GLARGINE 5 UNIT(S): 100 INJECTION, SOLUTION SUBCUTANEOUS at 23:03

## 2023-02-16 RX ADMIN — Medication 1300 MILLIGRAM(S): at 11:53

## 2023-02-16 RX ADMIN — MAGNESIUM OXIDE 400 MG ORAL TABLET 400 MILLIGRAM(S): 241.3 TABLET ORAL at 11:53

## 2023-02-16 RX ADMIN — ISOSORBIDE DINITRATE 20 MILLIGRAM(S): 5 TABLET ORAL at 05:26

## 2023-02-16 RX ADMIN — Medication 50 MILLIGRAM(S): at 22:30

## 2023-02-16 RX ADMIN — Medication 1300 MILLIGRAM(S): at 05:26

## 2023-02-16 RX ADMIN — CALCITRIOL 0.25 MICROGRAM(S): 0.5 CAPSULE ORAL at 11:54

## 2023-02-16 RX ADMIN — Medication 0.1 MILLIGRAM(S): at 17:03

## 2023-02-16 RX ADMIN — Medication 50 MILLIGRAM(S): at 11:53

## 2023-02-16 RX ADMIN — ISOSORBIDE DINITRATE 20 MILLIGRAM(S): 5 TABLET ORAL at 11:53

## 2023-02-16 RX ADMIN — CARVEDILOL PHOSPHATE 25 MILLIGRAM(S): 80 CAPSULE, EXTENDED RELEASE ORAL at 11:53

## 2023-02-16 RX ADMIN — Medication 1: at 17:03

## 2023-02-16 RX ADMIN — Medication 101 MILLIGRAM(S): at 17:22

## 2023-02-16 RX ADMIN — Medication 1300 MILLIGRAM(S): at 22:30

## 2023-02-16 RX ADMIN — ISOSORBIDE DINITRATE 20 MILLIGRAM(S): 5 TABLET ORAL at 17:01

## 2023-02-16 RX ADMIN — Medication 0.1 MILLIGRAM(S): at 05:26

## 2023-02-16 RX ADMIN — CARVEDILOL PHOSPHATE 25 MILLIGRAM(S): 80 CAPSULE, EXTENDED RELEASE ORAL at 22:30

## 2023-02-16 RX ADMIN — Medication 3 UNIT(S): at 17:03

## 2023-02-16 RX ADMIN — DESMOPRESSIN ACETATE 223 MICROGRAM(S): 0.1 TABLET ORAL at 11:16

## 2023-02-16 RX ADMIN — AMLODIPINE BESYLATE 10 MILLIGRAM(S): 2.5 TABLET ORAL at 05:27

## 2023-02-16 RX ADMIN — ATORVASTATIN CALCIUM 80 MILLIGRAM(S): 80 TABLET, FILM COATED ORAL at 22:30

## 2023-02-16 RX ADMIN — CHLORHEXIDINE GLUCONATE 1 APPLICATION(S): 213 SOLUTION TOPICAL at 11:53

## 2023-02-16 NOTE — PROGRESS NOTE ADULT - ASSESSMENT
67 year old male with PMH of DM, HTN, HLD, CAD, CVA and progressive CKD Stage 5 s/p preemptive AV fistula on 01/19/23 had routine follow up with his outpatient nephrologist and labs showed electrolyte derangement associated with uremic symptoms. He was sent in for initiation of hemodialysis, s/p tunneled dialysis catheter placement on 02/16/23.    -Access: R chest tunneled dialysis catheter + (not yet mature) AV fistula  -Notable for prolong bleeding post catheter placement today - IR currently at bedside  -Will order DDAVP 0.4mcg/kg subQ x 1 stat given concern for possible uremic bleeding  -Remains hemodynamically stable at this time   -Planned for first hemodialysis treatment today pending resolution to bleeding; next hemodialysis will be tomorrow   -Metabolic acidosis in setting of CKD - will improve with HD   -Anemia in setting of CKD + blood loss anemia - will check H/H later today given prolong bleeding episode this a.m.   -Mineral Bone Disease in setting of CKD - continue calcitriol daily     Sana Laguerre DO  Nephrology  [facility]  Office Number [phone]   67 year old male with PMH of DM, HTN, HLD, CAD, CVA and progressive CKD Stage 5 s/p preemptive AV fistula on 01/19/23 had routine follow up with his outpatient nephrologist and labs showed electrolyte derangement associated with uremic symptoms. He was sent in for initiation of hemodialysis, s/p tunneled dialysis catheter placement on 02/16/23.    -Access: R chest tunneled dialysis catheter + (not yet mature) AV fistula  -Notable for prolong bleeding post catheter placement today - IR currently at bedside  -Will order DDAVP 0.3mcg/kg x 1 stat given concern for possible uremic bleeding  -Remains hemodynamically stable at this time   -Plan for first hemodialysis treatment today pending resolution to bleeding  -Metabolic acidosis in setting of CKD - will improve with HD   -Anemia in setting of CKD + blood loss anemia - will check H/H later today given prolong bleeding episode this a.m.   -Mineral Bone Disease in setting of CKD - continue calcitriol daily     Plan discussed with primary team and IR.     Sana Laguerre DO  Nephrology  [facility]  Office Number [phone]   67 year old male with PMH of DM, HTN, HLD, CAD, CVA and progressive CKD Stage 5 s/p preemptive AV fistula on 01/19/23 had routine follow up with his outpatient nephrologist and labs showed electrolyte derangement associated with uremic symptoms. He was sent in for initiation of hemodialysis, s/p tunneled dialysis catheter placement on 02/16/23.    -Access: R chest tunneled dialysis catheter + (not yet mature) AV fistula  -Notable for prolong bleeding post catheter placement today - IR currently at bedside  -Will order DDAVP 0.3mcg/kg x 1 stat given concern for possible uremic bleeding  -Remains hemodynamically stable at this time   -Plan for first hemodialysis treatment today pending resolution to bleeding  -Metabolic acidosis in setting of CKD - will improve with HD   -Anemia in setting of CKD + blood loss anemia - will check H/H later today given prolong bleeding episode this a.m.   -Mineral Bone Disease in setting of CKD - continue calcitriol daily     Pharmacist and floor RN were made aware of DDAVP stat order.   Plan discussed with primary team and IR.     Sana Laguerre DO  Nephrology  [facility]  Office Number [phone]   67 year old male with PMH of DM, HTN, HLD, CAD, CVA and progressive CKD Stage 5 s/p preemptive AV fistula on 01/19/23 had routine follow up with his outpatient nephrologist and labs showed electrolyte derangement associated with uremic symptoms. He was sent in for initiation of hemodialysis, s/p tunneled dialysis catheter placement on 02/16/23.    -Access: R chest tunneled dialysis catheter + (not yet mature) AV fistula  -Notable for prolong bleeding post catheter placement today - IR currently at bedside  -Will order DDAVP 0.3mcg/kg x 1 stat given concern for possible uremic bleeding  -Remains hemodynamically stable at this time   -Plan for first hemodialysis treatment today pending resolution to bleeding  -Metabolic acidosis in setting of CKD - will improve with HD   -Anemia in setting of CKD + blood loss anemia - will check H/H later today given prolong bleeding episode this a.m.   -Mineral Bone Disease in setting of CKD - continue calcitriol daily     Pharmacist and floor RN were made aware of DDAVP stat order.   Plan discussed with primary team and IR.     ADDENDUM (02/16/2023 at 12:26pm): Went back to re-assess the patient given bleeding episode this a.m. Of note, already received DDAVP. Still with fresh oozing at this time. Discussed with RN to apply pressure to site with sandbag (if available). Will check CBC stat.     Sana Laguerre DO  Nephrology  [facility]  Office Number [phone]   67 year old male with PMH of DM, HTN, HLD, CAD, CVA and progressive CKD Stage 5 s/p preemptive AV fistula on 01/19/23 had routine follow up with his outpatient nephrologist and labs showed electrolyte derangement associated with uremic symptoms. He was sent in for initiation of hemodialysis, s/p tunneled dialysis catheter placement on 02/16/23.    -Access: R chest tunneled dialysis catheter + (not yet mature) AV fistula  -Notable for prolong bleeding post catheter placement today - IR currently at bedside  -Will order DDAVP 0.3mcg/kg x 1 stat given concern for possible uremic bleeding  -Remains hemodynamically stable at this time   -Plan for first hemodialysis treatment today pending resolution to bleeding  -Metabolic acidosis in setting of CKD - will improve with HD   -Anemia in setting of CKD + blood loss anemia - will check H/H later today given prolong bleeding episode this a.m.   -Mineral Bone Disease in setting of CKD - continue calcitriol daily     Pharmacist and floor RN were made aware of DDAVP stat order.   Plan discussed with primary team and IR.     ADDENDUM (02/16/2023 at 12:26pm): Went back to re-assess the patient given bleeding episode this a.m. Of note, already received DDAVP. Still with fresh oozing at this time. Discussed with RN to apply pressure to site with sandbag (if available). Will check CBC stat.     ADDENDUM (02/16/2023 at 2pm): Went to re-assess the patient. Dressing now with sanguinous drainage. Informed IR - per IR there is no additional intervention on their end. Stat CBC reviewed - hemoglobin is unchanged from this a.m. - remains stable at 9.5. Platelet is WNL. Will continue sandbag to site of bleeding and check CBC q8h.    Sana Laguerre DO  Nephrology  [facility]  Office Number [phone]   67 year old male with PMH of DM, HTN, HLD, CAD, CVA and progressive CKD Stage 5 s/p preemptive AV fistula on 01/19/23 had routine follow up with his outpatient nephrologist and labs showed electrolyte derangement associated with uremic symptoms. He was sent in for initiation of hemodialysis, s/p tunneled dialysis catheter placement on 02/16/23.    -Access: R chest tunneled dialysis catheter + (not yet mature) AV fistula  -Notable for prolong bleeding post catheter placement today - IR currently at bedside  -Will order DDAVP 0.3mcg/kg x 1 stat given concern for possible uremic bleeding  -Remains hemodynamically stable at this time   -Plan for first hemodialysis treatment today pending resolution to bleeding  -Metabolic acidosis in setting of CKD - will improve with HD   -Anemia in setting of CKD + blood loss anemia - will check H/H later today given prolong bleeding episode this a.m.   -Mineral Bone Disease in setting of CKD - continue calcitriol daily     Pharmacist and floor RN were made aware of DDAVP stat order.   Plan discussed with primary team and IR.     ADDENDUM (02/16/2023 at 12:26pm): Went back to re-assess the patient given bleeding episode this a.m. Of note, already received DDAVP. Still with fresh oozing at this time. Discussed with RN to apply pressure to site with sandbag (if available). Will check CBC stat.     ADDENDUM (02/16/2023 at 2pm): Went to re-assess the patient. Dressing now with sanguinous drainage. Informed IR - per IR there is no additional intervention on their end. Stat CBC reviewed - hemoglobin is unchanged from this a.m. - remains stable at 9.5. Platelet is WNL. Will continue sandbag to site of bleeding and check CBC q8h. Will give a unit of FFP. Unable to safely dialyze given active bleeding from catheter. Will reschedule to tomorrow.     Sana Laguerre DO  Nephrology  [facility]  Office Number [phone]   67 year old male with PMH of DM, HTN, HLD, CAD, CVA and progressive CKD Stage 5 s/p preemptive AV fistula on 01/19/23 had routine follow up with his outpatient nephrologist and labs showed electrolyte derangement associated with uremic symptoms. He was sent in for initiation of hemodialysis, s/p tunneled dialysis catheter placement on 02/16/23.    -Access: R chest tunneled dialysis catheter + (not yet mature) AV fistula  -Notable for prolong bleeding post catheter placement today - IR currently at bedside  -Will order DDAVP 0.3mcg/kg x 1 stat given concern for possible uremic bleeding  -Remains hemodynamically stable at this time   -Plan for first hemodialysis treatment today pending resolution to bleeding  -Metabolic acidosis in setting of CKD - will improve with HD   -Anemia in setting of CKD + blood loss anemia - will check H/H later today given prolong bleeding episode this a.m.   -Mineral Bone Disease in setting of CKD - continue calcitriol daily     Pharmacist and floor RN were made aware of DDAVP stat order.   Plan discussed with primary team and IR.     ADDENDUM (02/16/2023 at 12:26pm): Went back to re-assess the patient given bleeding episode this a.m. Of note, already received DDAVP. Still with fresh oozing at this time. Discussed with RN to apply pressure to site with sandbag (if available). Will check CBC stat.     ADDENDUM (02/16/2023 at 2pm): Went to re-assess the patient. Dressing now with sanguinous drainage. Informed IR - per IR there is no additional intervention on their end. Stat CBC reviewed - hemoglobin is unchanged from this a.m. - remains stable at 9.5. Platelet is WNL. Will continue sandbag to site of bleeding and check CBC q8h. Will give a unit of FFP. Unable to safely dialyze given active bleeding from catheter. Will reschedule to tomorrow.     ADDENDUM (02/16/2023 at 4:30pm): Went back to re-assess the patient. Per RN, already received 1u FFP, some blood clots noted and dressing recently changed, however still with bleeding. When I went to examine the patient, dressing with sanguinous drainage at 1 o'clock region. Remains hemodynamically stable. Will give IV Vitamin K x 1. Next CBC will be tonight at 8pm.     Sana Laguerre DO  Nephrology  [facility]  Office Number [phone]   67 year old male with PMH of DM, HTN, HLD, CAD, CVA and progressive CKD Stage 5 s/p preemptive AV fistula on 01/19/23 had routine follow up with his outpatient nephrologist and labs showed electrolyte derangement associated with uremic symptoms. He was sent in for initiation of hemodialysis, s/p tunneled dialysis catheter placement on 02/16/23.    -Access: R chest tunneled dialysis catheter + (not yet mature) AV fistula  -Notable for prolong bleeding post catheter placement today - IR currently at bedside  -Will order DDAVP 0.3mcg/kg x 1 stat given concern for possible uremic bleeding  -Remains hemodynamically stable at this time   -Plan for first hemodialysis treatment today pending resolution to bleeding  -Metabolic acidosis in setting of CKD - will improve with HD   -Anemia in setting of CKD + blood loss anemia - will check H/H later today given prolong bleeding episode this a.m.   -Mineral Bone Disease in setting of CKD - continue calcitriol daily     Pharmacist and floor RN were made aware of DDAVP stat order.   Plan discussed with primary team and IR.     ADDENDUM (02/16/2023 at 12:26pm): Went back to re-assess the patient given bleeding episode this a.m. Of note, already received DDAVP. Still with fresh oozing at this time. Discussed with RN to apply pressure to site with sandbag (if available). Will check CBC stat.     ADDENDUM (02/16/2023 at 2pm): Went to re-assess the patient. Dressing now with sanguinous drainage. Informed IR - per IR there is no additional intervention on their end. Stat CBC reviewed - hemoglobin is unchanged from this a.m. - remains stable at 9.5. Platelet is WNL. Will continue sandbag to site of bleeding and check CBC q8h. Will give a unit of FFP. Unable to safely dialyze given active bleeding from catheter. Will reschedule to tomorrow.     ADDENDUM (02/16/2023 at 4:30pm): Went back to re-assess the patient. Per RN, already received 1u FFP, some blood clots noted and dressing recently changed, however still with bleeding. When I went to examine the patient, dressing with sanguinous drainage at 1 o'clock region. Remains hemodynamically stable. Will give IV Vitamin K x 1. Next CBC will be tonight at 8pm.     ADDENDUM (02/16/2023 at 9pm): Latest CBC reviewed - hemoglobin 9.5 -> 7.4 and platelets 238 -> 189; discussed with RN - will transfuse 2u pRBC + 1u platelet. Of note, consent for blood transfusion was signed by the patient earlier today. Next CBC will be at 4am.     Sana Laguerre DO  Nephrology  [facility]  Office Number [phone]

## 2023-02-16 NOTE — PROGRESS NOTE ADULT - SUBJECTIVE AND OBJECTIVE BOX
MICU downgrade note/Transfer Note MICU down grade note/Transfer Note INTERVAL HPI/OVERNIGHT EVENTS:    CC: CKD stage 5, hypertension, hyperlipidemia, CVA, PAD, ischemic cardiomyopathy      Chart and course reviewed.  Seen at bedside, denies complaints  had HD catheter placed this am, noted to have bleeding post procedure, now resolved.     Vital Signs Last 24 Hrs  T(C): 36.7 (16 Feb 2023 15:25), Max: 36.8 (15 Feb 2023 18:30)  T(F): 98 (16 Feb 2023 15:25), Max: 98.3 (15 Feb 2023 18:30)  HR: 74 (16 Feb 2023 15:25) (70 - 76)  BP: 139/72 (16 Feb 2023 15:25) (118/73 - 159/70)  BP(mean): 92 (15 Feb 2023 19:23) (92 - 92)  RR: 18 (16 Feb 2023 15:25) (18 - 18)  SpO2: 100% (16 Feb 2023 15:25) (94% - 100%)    Parameters below as of 16 Feb 2023 15:25  Patient On (Oxygen Delivery Method): room air        PHYSICAL EXAM:    GENERAL: alert, not in distress  CHEST/LUNG: b/l air entry  HEART: reg  ABDOMEN: soft, bs+  EXTREMITIES:  no edema, tenderness    MEDICATIONS  (STANDING):  amLODIPine   Tablet 10 milliGRAM(s) Oral daily  aspirin  chewable 81 milliGRAM(s) Oral daily  atorvastatin 80 milliGRAM(s) Oral at bedtime  calcitriol   Capsule 0.25 MICROGram(s) Oral daily  carvedilol 25 milliGRAM(s) Oral every 12 hours  chlorhexidine 2% Cloths 1 Application(s) Topical <User Schedule>  cloNIDine 0.1 milliGRAM(s) Oral two times a day  dextrose 5%. 1000 milliLiter(s) (50 mL/Hr) IV Continuous <Continuous>  dextrose 50% Injectable 25 Gram(s) IV Push once  glucagon  Injectable 1 milliGRAM(s) IntraMuscular once  hydrALAZINE 50 milliGRAM(s) Oral every 8 hours  insulin glargine Injectable (LANTUS) 5 Unit(s) SubCutaneous at bedtime  insulin lispro (ADMELOG) corrective regimen sliding scale   SubCutaneous three times a day before meals  insulin lispro Injectable (ADMELOG) 3 Unit(s) SubCutaneous three times a day before meals  isosorbide   dinitrate Tablet (ISORDIL) 20 milliGRAM(s) Oral three times a day  magnesium oxide 400 milliGRAM(s) Oral daily  pantoprazole    Tablet 40 milliGRAM(s) Oral before breakfast  sodium bicarbonate 1300 milliGRAM(s) Oral three times a day    MEDICATIONS  (PRN):  acetaminophen     Tablet .. 650 milliGRAM(s) Oral every 6 hours PRN Temp greater or equal to 38C (100.4F), Mild Pain (1 - 3)  aluminum hydroxide/magnesium hydroxide/simethicone Suspension 30 milliLiter(s) Oral every 4 hours PRN Dyspepsia  dextrose Oral Gel 15 Gram(s) Oral once PRN Blood Glucose LESS THAN 70 milliGRAM(s)/deciliter  melatonin 3 milliGRAM(s) Oral at bedtime PRN Insomnia  ondansetron Injectable 4 milliGRAM(s) IV Push every 8 hours PRN Nausea and/or Vomiting  sodium chloride 0.9% lock flush 10 milliLiter(s) IV Push every 1 hour PRN Pre/post blood products, medications, blood draw, and to maintain line patency      Allergies    No Known Allergies    Intolerances          LABS:                          9.5    9.38  )-----------( 238      ( 16 Feb 2023 12:55 )             30.0     02-16    141  |  109<H>  |  75.1<H>  ----------------------------<  110<H>  4.8   |  19.0<L>  |  7.27<H>    Ca    8.4      16 Feb 2023 06:00  Mg     1.9     02-16    TPro  5.4<L>  /  Alb  3.2<L>  /  TBili  0.3<L>  /  DBili  x   /  AST  9   /  ALT  6   /  AlkPhos  98  02-16    PT/INR - ( 15 Feb 2023 11:30 )   PT: 12.0 sec;   INR: 1.03 ratio         PTT - ( 15 Feb 2023 11:30 )  PTT:30.8 sec      RADIOLOGY & ADDITIONAL TESTS:

## 2023-02-16 NOTE — PROGRESS NOTE ADULT - ASSESSMENT
67 yr old male with hypertension, hyperlipidemia, diabetes mellitus, CKD stage 5, CVA, left AVF placed in 1/2023 was sent in for evaluation by his nephrologist for tunneled HD catheter placement and initiation of HD as he was noted to have uremic symptoms hyperkalemia, edema. He was evaluated by nephrology in ED, IR was consulted for tunneled catheter placement. His home anti hypertensives were initiated.  67 yr old male with hypertension, hyperlipidemia, diabetes mellitus, CKD stage 5, CVA, ischemic cardiomyopathy, left AVF placed in 1/2023 was sent in for evaluation by his nephrologist for tunneled HD catheter placement and initiation of HD as he was noted to have uremic symptoms hyperkalemia, edema. He was evaluated by nephrology in ED, IR was consulted for tunneled catheter placement. His home anti hypertensives were initiated. He underwent HD catheter placement by IR on 2/16, noted to have bleeding post procedure, which subsequently resolved. His repeat CBC was stable.    1. CKD stage 5, uremia, needing HD:  S/p catheter placement today  HD rescheduled to tomorrow.  nephrology following.  continue Sodium bicarbonate.    2. Hypertension:  Continue Hydralazine, Isosorbide, Amlodipine, Clonidine, Coreg  monitor BP    3. Hyperlipidemia:  Continue statin.    4. Diabetes mellitus:  Continue Lantus 5 units  pre meal insulin 3 units  monitor FS, sliding scale coverage.    5. CAD, CVA, PAD:  Continue aspirin and statin.    6. DVT ppx:  SCDs  hold heparin given bleeding.    Discussed with patient and nephrology.  HD in am  social work following to assist with HD seat in community.

## 2023-02-16 NOTE — CHART NOTE - NSCHARTNOTEFT_GEN_A_CORE
Pt seen at bedside after RN reporting profuse bleeding from tunneled dialysis catheter. Has been bleeding from site of catheter placement all day despite pressure, IR reassessed earlier today, no intervention. Today received DDAVP, 1U FFP and IV Vitamin K x 1.    At this time nursing staff applied pressure dressing to site, on my assessment it appears hemostasis achieved.   Pt is resting comfortably in NAD. Endorsing slight dizziness. Denies chest pain, SOB, n/v/d/c, abdominal pain, HA, blurry vision.  All vital signs stable.   RN in contact with Dr. Laguerre who ordered 2U PRBCs and 1U platelets for hemoglobin drop 9.5 -> 7.4 and platelets 238 -> 189. Consent in chart.   Repeat CBC @ 0400  RN will continue to monitor and notify provider of any acute change in pt status.

## 2023-02-16 NOTE — PROCEDURE NOTE - PROCEDURE FINDINGS AND DETAILS
19cm Tunnelled HD Catheter placed into Right IJV under US and fluoroscopic guidance. Flushed w heparin. Tip at cavoatrial junction, OK to use.    Please call Interventional Radiology with any questions, concerns, or issues.

## 2023-02-16 NOTE — PROVIDER CONTACT NOTE (OTHER) - SITUATION
Patient just got back from IR for tunneled catheter placement.  Catheter site bleeding through dressing and gown

## 2023-02-16 NOTE — PROGRESS NOTE ADULT - SUBJECTIVE AND OBJECTIVE BOX
Had placement of tunneled dialysis catheter this a.m. complicated by profuse bleeding at site of catheter placement despite pressure to site for ~30 minutes as per nursing staff. IR now at bedside.     Vital Signs Last 24 Hrs  T(C): 36.3 (16 Feb 2023 04:26), Max: 36.8 (15 Feb 2023 18:30)  T(F): 97.4 (16 Feb 2023 04:26), Max: 98.3 (15 Feb 2023 18:30)  HR: 75 (16 Feb 2023 04:26) (65 - 76)  BP: 151/74 (16 Feb 2023 04:26) (118/73 - 171/60)  BP(mean): 92 (15 Feb 2023 19:23) (92 - 92)  RR: 18 (16 Feb 2023 04:26) (18 - 18)  SpO2: 94% (16 Feb 2023 04:26) (94% - 96%)    Parameters below as of 16 Feb 2023 04:26  Patient On (Oxygen Delivery Method): room air    I&O's Summary: Not recorded    Physical Exam  General:   Cardiac:   Respiratory:  Abdomen:   Extremities:   Neuro: Alert, awake   Integument: +Bleeding at R chest     02-16    141  |  109<H>  |  75.1<H>  ----------------------------<  110<H>  4.8   |  19.0<L>  |  7.27<H>    Ca    8.4      16 Feb 2023 06:00  Mg     1.9     02-16    TPro  5.4<L>  /  Alb  3.2<L>  /  TBili  0.3<L>  /  DBili  x   /  AST  9   /  ALT  6   /  AlkPhos  98  02-16                  9.5    7.24  )-----------( 209      ( 16 Feb 2023 06:00 )             29.9     MEDICATIONS  (STANDING):  amLODIPine   Tablet 10 milliGRAM(s) Oral daily  aspirin  chewable 81 milliGRAM(s) Oral daily  atorvastatin 80 milliGRAM(s) Oral at bedtime  calcitriol   Capsule 0.25 MICROGram(s) Oral daily  carvedilol 25 milliGRAM(s) Oral every 12 hours  chlorhexidine 2% Cloths 1 Application(s) Topical <User Schedule>  cloNIDine 0.1 milliGRAM(s) Oral two times a day  desmopressin Injectable 25 MICROGram(s) SubCutaneous once  dextrose 5%. 1000 milliLiter(s) (50 mL/Hr) IV Continuous <Continuous>  dextrose 50% Injectable 25 Gram(s) IV Push once  glucagon  Injectable 1 milliGRAM(s) IntraMuscular once  hydrALAZINE 50 milliGRAM(s) Oral every 8 hours  insulin glargine Injectable (LANTUS) 5 Unit(s) SubCutaneous at bedtime  insulin lispro (ADMELOG) corrective regimen sliding scale   SubCutaneous three times a day before meals  insulin lispro Injectable (ADMELOG) 3 Unit(s) SubCutaneous three times a day before meals  isosorbide   dinitrate Tablet (ISORDIL) 20 milliGRAM(s) Oral three times a day  magnesium oxide 400 milliGRAM(s) Oral daily  pantoprazole    Tablet 40 milliGRAM(s) Oral before breakfast  sodium bicarbonate 1300 milliGRAM(s) Oral three times a day    MEDICATIONS  (PRN):  acetaminophen     Tablet .. 650 milliGRAM(s) Oral every 6 hours PRN Temp greater or equal to 38C (100.4F), Mild Pain (1 - 3)  aluminum hydroxide/magnesium hydroxide/simethicone Suspension 30 milliLiter(s) Oral every 4 hours PRN Dyspepsia  dextrose Oral Gel 15 Gram(s) Oral once PRN Blood Glucose LESS THAN 70 milliGRAM(s)/deciliter  melatonin 3 milliGRAM(s) Oral at bedtime PRN Insomnia  ondansetron Injectable 4 milliGRAM(s) IV Push every 8 hours PRN Nausea and/or Vomiting  sodium chloride 0.9% lock flush 10 milliLiter(s) IV Push every 1 hour PRN Pre/post blood products, medications, blood draw, and to maintain line patency         Had placement of tunneled dialysis catheter this a.m. complicated by profuse bleeding at site of catheter placement despite pressure to site for ~30 minutes as per nursing staff. IR now at bedside.     Vital Signs Last 24 Hrs  T(C): 36.3 (16 Feb 2023 04:26), Max: 36.8 (15 Feb 2023 18:30)  T(F): 97.4 (16 Feb 2023 04:26), Max: 98.3 (15 Feb 2023 18:30)  HR: 75 (16 Feb 2023 04:26) (65 - 76)  BP: 151/74 (16 Feb 2023 04:26) (118/73 - 171/60)  BP(mean): 92 (15 Feb 2023 19:23) (92 - 92)  RR: 18 (16 Feb 2023 04:26) (18 - 18)  SpO2: 94% (16 Feb 2023 04:26) (94% - 96%)    Parameters below as of 16 Feb 2023 04:26  Patient On (Oxygen Delivery Method): room air    I&O's Summary: Not recorded    Physical Exam  General: WDWN male in NAD  Cardiac: S1S2 RRR  Respiratory: CTAB  Abdomen: Obese, soft  Extremities: No appreciable edema   Neuro: Alert, awake   Integument: +Bleeding at R chest     02-16    141  |  109<H>  |  75.1<H>  ----------------------------<  110<H>  4.8   |  19.0<L>  |  7.27<H>    Ca    8.4      16 Feb 2023 06:00  Mg     1.9     02-16    TPro  5.4<L>  /  Alb  3.2<L>  /  TBili  0.3<L>  /  DBili  x   /  AST  9   /  ALT  6   /  AlkPhos  98  02-16                  9.5    7.24  )-----------( 209      ( 16 Feb 2023 06:00 )             29.9     MEDICATIONS  (STANDING):  amLODIPine   Tablet 10 milliGRAM(s) Oral daily  aspirin  chewable 81 milliGRAM(s) Oral daily  atorvastatin 80 milliGRAM(s) Oral at bedtime  calcitriol   Capsule 0.25 MICROGram(s) Oral daily  carvedilol 25 milliGRAM(s) Oral every 12 hours  chlorhexidine 2% Cloths 1 Application(s) Topical <User Schedule>  cloNIDine 0.1 milliGRAM(s) Oral two times a day  desmopressin Injectable 25 MICROGram(s) SubCutaneous once  dextrose 5%. 1000 milliLiter(s) (50 mL/Hr) IV Continuous <Continuous>  dextrose 50% Injectable 25 Gram(s) IV Push once  glucagon  Injectable 1 milliGRAM(s) IntraMuscular once  hydrALAZINE 50 milliGRAM(s) Oral every 8 hours  insulin glargine Injectable (LANTUS) 5 Unit(s) SubCutaneous at bedtime  insulin lispro (ADMELOG) corrective regimen sliding scale   SubCutaneous three times a day before meals  insulin lispro Injectable (ADMELOG) 3 Unit(s) SubCutaneous three times a day before meals  isosorbide   dinitrate Tablet (ISORDIL) 20 milliGRAM(s) Oral three times a day  magnesium oxide 400 milliGRAM(s) Oral daily  pantoprazole    Tablet 40 milliGRAM(s) Oral before breakfast  sodium bicarbonate 1300 milliGRAM(s) Oral three times a day    MEDICATIONS  (PRN):  acetaminophen     Tablet .. 650 milliGRAM(s) Oral every 6 hours PRN Temp greater or equal to 38C (100.4F), Mild Pain (1 - 3)  aluminum hydroxide/magnesium hydroxide/simethicone Suspension 30 milliLiter(s) Oral every 4 hours PRN Dyspepsia  dextrose Oral Gel 15 Gram(s) Oral once PRN Blood Glucose LESS THAN 70 milliGRAM(s)/deciliter  melatonin 3 milliGRAM(s) Oral at bedtime PRN Insomnia  ondansetron Injectable 4 milliGRAM(s) IV Push every 8 hours PRN Nausea and/or Vomiting  sodium chloride 0.9% lock flush 10 milliLiter(s) IV Push every 1 hour PRN Pre/post blood products, medications, blood draw, and to maintain line patency

## 2023-02-17 LAB
ANION GAP SERPL CALC-SCNC: 13 MMOL/L — SIGNIFICANT CHANGE UP (ref 5–17)
BASOPHILS # BLD AUTO: 0.04 K/UL — SIGNIFICANT CHANGE UP (ref 0–0.2)
BASOPHILS NFR BLD AUTO: 0.5 % — SIGNIFICANT CHANGE UP (ref 0–2)
BUN SERPL-MCNC: 75.9 MG/DL — HIGH (ref 8–20)
CALCIUM SERPL-MCNC: 8 MG/DL — LOW (ref 8.4–10.5)
CHLORIDE SERPL-SCNC: 105 MMOL/L — SIGNIFICANT CHANGE UP (ref 96–108)
CO2 SERPL-SCNC: 20 MMOL/L — LOW (ref 22–29)
CREAT SERPL-MCNC: 7.35 MG/DL — HIGH (ref 0.5–1.3)
EGFR: 7 ML/MIN/1.73M2 — LOW
EOSINOPHIL # BLD AUTO: 0.12 K/UL — SIGNIFICANT CHANGE UP (ref 0–0.5)
EOSINOPHIL NFR BLD AUTO: 1.6 % — SIGNIFICANT CHANGE UP (ref 0–6)
GLUCOSE BLDC GLUCOMTR-MCNC: 145 MG/DL — HIGH (ref 70–99)
GLUCOSE BLDC GLUCOMTR-MCNC: 146 MG/DL — HIGH (ref 70–99)
GLUCOSE BLDC GLUCOMTR-MCNC: 179 MG/DL — HIGH (ref 70–99)
GLUCOSE BLDC GLUCOMTR-MCNC: 76 MG/DL — SIGNIFICANT CHANGE UP (ref 70–99)
GLUCOSE SERPL-MCNC: 142 MG/DL — HIGH (ref 70–99)
HCT VFR BLD CALC: 28.1 % — LOW (ref 39–50)
HGB BLD-MCNC: 9.2 G/DL — LOW (ref 13–17)
IMM GRANULOCYTES NFR BLD AUTO: 0.7 % — SIGNIFICANT CHANGE UP (ref 0–0.9)
LYMPHOCYTES # BLD AUTO: 1.32 K/UL — SIGNIFICANT CHANGE UP (ref 1–3.3)
LYMPHOCYTES # BLD AUTO: 17.4 % — SIGNIFICANT CHANGE UP (ref 13–44)
MAGNESIUM SERPL-MCNC: 1.8 MG/DL — SIGNIFICANT CHANGE UP (ref 1.6–2.6)
MCHC RBC-ENTMCNC: 27.8 PG — SIGNIFICANT CHANGE UP (ref 27–34)
MCHC RBC-ENTMCNC: 32.7 GM/DL — SIGNIFICANT CHANGE UP (ref 32–36)
MCV RBC AUTO: 84.9 FL — SIGNIFICANT CHANGE UP (ref 80–100)
MONOCYTES # BLD AUTO: 0.55 K/UL — SIGNIFICANT CHANGE UP (ref 0–0.9)
MONOCYTES NFR BLD AUTO: 7.3 % — SIGNIFICANT CHANGE UP (ref 2–14)
NEUTROPHILS # BLD AUTO: 5.5 K/UL — SIGNIFICANT CHANGE UP (ref 1.8–7.4)
NEUTROPHILS NFR BLD AUTO: 72.5 % — SIGNIFICANT CHANGE UP (ref 43–77)
PHOSPHATE SERPL-MCNC: 4.9 MG/DL — HIGH (ref 2.4–4.7)
PLATELET # BLD AUTO: 211 K/UL — SIGNIFICANT CHANGE UP (ref 150–400)
POTASSIUM SERPL-MCNC: 4.6 MMOL/L — SIGNIFICANT CHANGE UP (ref 3.5–5.3)
POTASSIUM SERPL-SCNC: 4.6 MMOL/L — SIGNIFICANT CHANGE UP (ref 3.5–5.3)
RBC # BLD: 3.31 M/UL — LOW (ref 4.2–5.8)
RBC # FLD: 14.6 % — HIGH (ref 10.3–14.5)
SODIUM SERPL-SCNC: 138 MMOL/L — SIGNIFICANT CHANGE UP (ref 135–145)
WBC # BLD: 7.58 K/UL — SIGNIFICANT CHANGE UP (ref 3.8–10.5)
WBC # FLD AUTO: 7.58 K/UL — SIGNIFICANT CHANGE UP (ref 3.8–10.5)

## 2023-02-17 PROCEDURE — 99233 SBSQ HOSP IP/OBS HIGH 50: CPT

## 2023-02-17 PROCEDURE — 90937 HEMODIALYSIS REPEATED EVAL: CPT

## 2023-02-17 RX ADMIN — Medication 0.1 MILLIGRAM(S): at 18:05

## 2023-02-17 RX ADMIN — INSULIN GLARGINE 5 UNIT(S): 100 INJECTION, SOLUTION SUBCUTANEOUS at 21:26

## 2023-02-17 RX ADMIN — ATORVASTATIN CALCIUM 80 MILLIGRAM(S): 80 TABLET, FILM COATED ORAL at 21:18

## 2023-02-17 RX ADMIN — CALCITRIOL 0.25 MICROGRAM(S): 0.5 CAPSULE ORAL at 12:11

## 2023-02-17 RX ADMIN — Medication 1300 MILLIGRAM(S): at 14:04

## 2023-02-17 RX ADMIN — Medication 1: at 12:10

## 2023-02-17 RX ADMIN — CHLORHEXIDINE GLUCONATE 1 APPLICATION(S): 213 SOLUTION TOPICAL at 05:10

## 2023-02-17 RX ADMIN — CARVEDILOL PHOSPHATE 25 MILLIGRAM(S): 80 CAPSULE, EXTENDED RELEASE ORAL at 21:19

## 2023-02-17 RX ADMIN — Medication 3 UNIT(S): at 12:10

## 2023-02-17 RX ADMIN — Medication 80 MILLIGRAM(S): at 01:20

## 2023-02-17 RX ADMIN — Medication 1300 MILLIGRAM(S): at 21:19

## 2023-02-17 RX ADMIN — Medication 50 MILLIGRAM(S): at 05:11

## 2023-02-17 RX ADMIN — AMLODIPINE BESYLATE 10 MILLIGRAM(S): 2.5 TABLET ORAL at 05:14

## 2023-02-17 RX ADMIN — Medication 1300 MILLIGRAM(S): at 05:11

## 2023-02-17 RX ADMIN — ISOSORBIDE DINITRATE 20 MILLIGRAM(S): 5 TABLET ORAL at 18:05

## 2023-02-17 RX ADMIN — MAGNESIUM OXIDE 400 MG ORAL TABLET 400 MILLIGRAM(S): 241.3 TABLET ORAL at 12:11

## 2023-02-17 RX ADMIN — Medication 0.1 MILLIGRAM(S): at 05:11

## 2023-02-17 RX ADMIN — Medication 50 MILLIGRAM(S): at 21:19

## 2023-02-17 RX ADMIN — PANTOPRAZOLE SODIUM 40 MILLIGRAM(S): 20 TABLET, DELAYED RELEASE ORAL at 05:11

## 2023-02-17 RX ADMIN — ISOSORBIDE DINITRATE 20 MILLIGRAM(S): 5 TABLET ORAL at 05:11

## 2023-02-17 RX ADMIN — Medication 81 MILLIGRAM(S): at 12:11

## 2023-02-17 NOTE — PROGRESS NOTE ADULT - SUBJECTIVE AND OBJECTIVE BOX
INTERVAL HPI/OVERNIGHT EVENTS:    CC: CKD stage 5, hypertension, hyperlipidemia, CVA, PAD, ischemic cardiomyopathy, acute anemia sec blood loss    Events noted  was bleeding from catheter site  resolved now  received 2 units of PRBC last night  denies complaints.    Vital Signs Last 24 Hrs  T(C): 36.4 (17 Feb 2023 11:01), Max: 37.1 (17 Feb 2023 02:15)  T(F): 97.5 (17 Feb 2023 11:01), Max: 98.8 (17 Feb 2023 02:15)  HR: 63 (17 Feb 2023 11:01) (63 - 74)  BP: 96/63 (17 Feb 2023 11:01) (96/63 - 153/69)  BP(mean): --  RR: 18 (17 Feb 2023 11:01) (18 - 18)  SpO2: 96% (17 Feb 2023 11:01) (96% - 100%)    Parameters below as of 17 Feb 2023 11:01  Patient On (Oxygen Delivery Method): room air        PHYSICAL EXAM:    GENERAL: alert, not in distress  CHEST/LUNG: b/l air entry, right chest wall catheter placement, no bleeding noted, dressing in place  HEART: reg  ABDOMEN: soft, bs+  EXTREMITIES:  no edema, tenderness    MEDICATIONS  (STANDING):  amLODIPine   Tablet 10 milliGRAM(s) Oral daily  aspirin  chewable 81 milliGRAM(s) Oral daily  atorvastatin 80 milliGRAM(s) Oral at bedtime  calcitriol   Capsule 0.25 MICROGram(s) Oral daily  carvedilol 25 milliGRAM(s) Oral every 12 hours  chlorhexidine 2% Cloths 1 Application(s) Topical <User Schedule>  cloNIDine 0.1 milliGRAM(s) Oral two times a day  dextrose 5%. 1000 milliLiter(s) (50 mL/Hr) IV Continuous <Continuous>  dextrose 50% Injectable 25 Gram(s) IV Push once  glucagon  Injectable 1 milliGRAM(s) IntraMuscular once  hydrALAZINE 50 milliGRAM(s) Oral every 8 hours  insulin glargine Injectable (LANTUS) 5 Unit(s) SubCutaneous at bedtime  insulin lispro (ADMELOG) corrective regimen sliding scale   SubCutaneous three times a day before meals  insulin lispro Injectable (ADMELOG) 3 Unit(s) SubCutaneous three times a day before meals  isosorbide   dinitrate Tablet (ISORDIL) 20 milliGRAM(s) Oral three times a day  magnesium oxide 400 milliGRAM(s) Oral daily  pantoprazole    Tablet 40 milliGRAM(s) Oral before breakfast  sodium bicarbonate 1300 milliGRAM(s) Oral three times a day    MEDICATIONS  (PRN):  acetaminophen     Tablet .. 650 milliGRAM(s) Oral every 6 hours PRN Temp greater or equal to 38C (100.4F), Mild Pain (1 - 3)  aluminum hydroxide/magnesium hydroxide/simethicone Suspension 30 milliLiter(s) Oral every 4 hours PRN Dyspepsia  dextrose Oral Gel 15 Gram(s) Oral once PRN Blood Glucose LESS THAN 70 milliGRAM(s)/deciliter  melatonin 3 milliGRAM(s) Oral at bedtime PRN Insomnia  ondansetron Injectable 4 milliGRAM(s) IV Push every 8 hours PRN Nausea and/or Vomiting  sodium chloride 0.9% lock flush 10 milliLiter(s) IV Push every 1 hour PRN Pre/post blood products, medications, blood draw, and to maintain line patency      Allergies    No Known Allergies    Intolerances          LABS:                          7.4    8.15  )-----------( 189      ( 16 Feb 2023 19:39 )             23.6     02-17    138  |  105  |  75.9<H>  ----------------------------<  142<H>  4.6   |  20.0<L>  |  7.35<H>    Ca    8.0<L>      17 Feb 2023 08:30  Phos  4.9     02-17  Mg     1.8     02-17    TPro  5.4<L>  /  Alb  3.2<L>  /  TBili  0.3<L>  /  DBili  x   /  AST  9   /  ALT  6   /  AlkPhos  98  02-16    PT/INR - ( 15 Feb 2023 11:30 )   PT: 12.0 sec;   INR: 1.03 ratio         PTT - ( 15 Feb 2023 11:30 )  PTT:30.8 sec      RADIOLOGY & ADDITIONAL TESTS:

## 2023-02-17 NOTE — PROGRESS NOTE ADULT - ASSESSMENT
67 yr old male with hypertension, hyperlipidemia, diabetes mellitus, CKD stage 5, CVA, ischemic cardiomyopathy, left AVF placed in 1/2023 was sent in for evaluation by his nephrologist for tunneled HD catheter placement and initiation of HD as he was noted to have uremic symptoms hyperkalemia, edema. He was evaluated by nephrology in ED, IR was consulted for tunneled catheter placement. His home anti hypertensives were initiated. He underwent HD catheter placement by IR on 2/16, noted to have bleeding post procedure, which subsequently resolved. His repeat CBC was stable.    1. CKD stage 5, uremia, needing HD:  S/p catheter placement 2/16  HD was not initiated 2/16 given bleeding from catheter site  nephrology following.  continue Sodium bicarbonate.  plan for HD today    2. Hypertension:  Continue Hydralazine, Isosorbide, Amlodipine, Clonidine, Coreg  monitor BP    3. Hyperlipidemia:  Continue statin.    4. Diabetes mellitus:  Continue Lantus 5 units  pre meal insulin 3 units  monitor FS, sliding scale coverage.    5. CAD, CVA, PAD:  Continue aspirin and statin.    6. DVT ppx:  SCDs    7. Acute anemia sec blood loss from catheter site:  S/p 2 units PRBC  hemostasis achieved.  repeat CBC pending.      Discussed with patient and nephrology.  updated wife and daughter  social work following to assist with HD seat in community.  plan to initiate HD today.

## 2023-02-17 NOTE — PROGRESS NOTE ADULT - ASSESSMENT
68y/o M with past medical history significant for hypertension, diabetes, hyperlipidemia, CAD, CVA and chronic kidney disease progressed to an advanced stage V who was seen yesterday at outpatient nephrology office for f/u of his CKD. Labs ordered yesterday were significant for K 5.7, Scr 7.07 and egFR 8ml/min. Sent to ER for initiation of HD.    1.  ESRD, progressive Chronic kidney disease was likely multifactorial secondary to, diabetes, hypertension, renoatherosclerotic disease.  The patient will be starting on HD today via right IJ THDC, orders placed.  -He has AVF placed in left wrist on Jan 19 currently not mature for cannulation  -Social workfor outpatient placement for HD.    2.  Hypertension controlled. BP slightly soft. Will follow closely.  3.  Metabolic acidosis. ON home sodium bicarbonate PO. Will d/c post successful initiation on HD.  4.  Anemia.  iron deficiency+ anemia of chronic kidney disease. Got transfusion yesterday. Hgb stable now. Will follow for need of JACK  w/ HD.  5.  Hyperkalemia.. resolved  6.  Mineral bone disease of chronic kidney disease. Will continue on calcitriol.  7.  Edema.   We will continue patient on home dose of furosemide 40 milligram one tablet orally and will d/c once started on HD.  8. Access bleeding stabilized for now, will follow on HD.      D/w family, Dr. Estrella.

## 2023-02-17 NOTE — PROGRESS NOTE ADULT - SUBJECTIVE AND OBJECTIVE BOX
Reason for visit: ESRD     Subjective: Patient seen in his room this AM, wife at bedside. No more active bleeding. Dressing CDI. Denies CP, focal weakness. No fever/chills.     ROS: All systems were reviewed in detail pertinent positive and negative mentioned above, rest are negative.    Physical Exam:  Gen: no acute distress  MS: alert, conversing normally  Eyes: EOMI, no icterus  HENT: NCAT, MMM  CV: rhythm reg reg, rate normal, no m/g/r  Chest: CTAB, no w/r/r,  Abd: soft, NT, ND  Extremities: trace edema  Access: RIJ tunneled HD catheter, pressure dressing CDI  Left radiocephalic AVF good bruit    =======================================================  Vital Signs Last 24 Hrs  T(C): 36.4 (17 Feb 2023 11:01), Max: 37.1 (17 Feb 2023 02:15)  T(F): 97.5 (17 Feb 2023 11:01), Max: 98.8 (17 Feb 2023 02:15)  HR: 63 (17 Feb 2023 11:01) (63 - 74)  BP: 96/63 (17 Feb 2023 11:01) (96/63 - 153/69)  BP(mean): --  RR: 18 (17 Feb 2023 11:01) (18 - 18)  SpO2: 96% (17 Feb 2023 11:01) (96% - 100%)    Parameters below as of 17 Feb 2023 11:01  Patient On (Oxygen Delivery Method): room air      I&O's Summary    16 Feb 2023 07:01  -  17 Feb 2023 07:00  --------------------------------------------------------  IN: 0 mL / OUT: 1150 mL / NET: -1150 mL      =======================================================  Current Antibiotics:    Other medications:  amLODIPine   Tablet 10 milliGRAM(s) Oral daily  aspirin  chewable 81 milliGRAM(s) Oral daily  atorvastatin 80 milliGRAM(s) Oral at bedtime  calcitriol   Capsule 0.25 MICROGram(s) Oral daily  carvedilol 25 milliGRAM(s) Oral every 12 hours  chlorhexidine 2% Cloths 1 Application(s) Topical <User Schedule>  cloNIDine 0.1 milliGRAM(s) Oral two times a day  dextrose 5%. 1000 milliLiter(s) IV Continuous <Continuous>  dextrose 50% Injectable 25 Gram(s) IV Push once  glucagon  Injectable 1 milliGRAM(s) IntraMuscular once  hydrALAZINE 50 milliGRAM(s) Oral every 8 hours  insulin glargine Injectable (LANTUS) 5 Unit(s) SubCutaneous at bedtime  insulin lispro (ADMELOG) corrective regimen sliding scale   SubCutaneous three times a day before meals  insulin lispro Injectable (ADMELOG) 3 Unit(s) SubCutaneous three times a day before meals  isosorbide   dinitrate Tablet (ISORDIL) 20 milliGRAM(s) Oral three times a day  magnesium oxide 400 milliGRAM(s) Oral daily  pantoprazole    Tablet 40 milliGRAM(s) Oral before breakfast  sodium bicarbonate 1300 milliGRAM(s) Oral three times a day    =======================================================  02-17    138  |  105  |  75.9<H>  ----------------------------<  142<H>  4.6   |  20.0<L>  |  7.35<H>    Ca    8.0<L>      17 Feb 2023 08:30  Phos  4.9     02-17  Mg     1.8     02-17    TPro  5.4<L>  /  Alb  3.2<L>  /  TBili  0.3<L>  /  DBili  x   /  AST  9   /  ALT  6   /  AlkPhos  98  02-16    Creatinine, Serum: 7.35 mg/dL (02-17-23 @ 08:30)  Creatinine, Serum: 7.27 mg/dL (02-16-23 @ 06:00)  Creatinine, Serum: 7.34 mg/dL (02-15-23 @ 22:00)  Creatinine, Serum: 6.97 mg/dL (02-15-23 @ 11:30)      =======================================================

## 2023-02-18 LAB
ANION GAP SERPL CALC-SCNC: 10 MMOL/L — SIGNIFICANT CHANGE UP (ref 5–17)
BUN SERPL-MCNC: 50.1 MG/DL — HIGH (ref 8–20)
CALCIUM SERPL-MCNC: 7.8 MG/DL — LOW (ref 8.4–10.5)
CHLORIDE SERPL-SCNC: 100 MMOL/L — SIGNIFICANT CHANGE UP (ref 96–108)
CO2 SERPL-SCNC: 26 MMOL/L — SIGNIFICANT CHANGE UP (ref 22–29)
CREAT SERPL-MCNC: 5.88 MG/DL — HIGH (ref 0.5–1.3)
EGFR: 10 ML/MIN/1.73M2 — LOW
GAMMA INTERFERON BACKGROUND BLD IA-ACNC: 0.02 IU/ML — SIGNIFICANT CHANGE UP
GLUCOSE BLDC GLUCOMTR-MCNC: 147 MG/DL — HIGH (ref 70–99)
GLUCOSE BLDC GLUCOMTR-MCNC: 88 MG/DL — SIGNIFICANT CHANGE UP (ref 70–99)
GLUCOSE BLDC GLUCOMTR-MCNC: 95 MG/DL — SIGNIFICANT CHANGE UP (ref 70–99)
GLUCOSE BLDC GLUCOMTR-MCNC: 96 MG/DL — SIGNIFICANT CHANGE UP (ref 70–99)
GLUCOSE SERPL-MCNC: 101 MG/DL — HIGH (ref 70–99)
HCT VFR BLD CALC: 25.8 % — LOW (ref 39–50)
HGB BLD-MCNC: 8.6 G/DL — LOW (ref 13–17)
M TB IFN-G BLD-IMP: NEGATIVE — SIGNIFICANT CHANGE UP
M TB IFN-G CD4+ BCKGRND COR BLD-ACNC: 0 IU/ML — SIGNIFICANT CHANGE UP
M TB IFN-G CD4+CD8+ BCKGRND COR BLD-ACNC: -0.01 IU/ML — SIGNIFICANT CHANGE UP
MCHC RBC-ENTMCNC: 27.7 PG — SIGNIFICANT CHANGE UP (ref 27–34)
MCHC RBC-ENTMCNC: 33.3 GM/DL — SIGNIFICANT CHANGE UP (ref 32–36)
MCV RBC AUTO: 83.2 FL — SIGNIFICANT CHANGE UP (ref 80–100)
PLATELET # BLD AUTO: 188 K/UL — SIGNIFICANT CHANGE UP (ref 150–400)
POTASSIUM SERPL-MCNC: 4.3 MMOL/L — SIGNIFICANT CHANGE UP (ref 3.5–5.3)
POTASSIUM SERPL-SCNC: 4.3 MMOL/L — SIGNIFICANT CHANGE UP (ref 3.5–5.3)
QUANT TB PLUS MITOGEN MINUS NIL: 6.71 IU/ML — SIGNIFICANT CHANGE UP
RBC # BLD: 3.1 M/UL — LOW (ref 4.2–5.8)
RBC # FLD: 14.5 % — SIGNIFICANT CHANGE UP (ref 10.3–14.5)
SODIUM SERPL-SCNC: 136 MMOL/L — SIGNIFICANT CHANGE UP (ref 135–145)
WBC # BLD: 9.28 K/UL — SIGNIFICANT CHANGE UP (ref 3.8–10.5)
WBC # FLD AUTO: 9.28 K/UL — SIGNIFICANT CHANGE UP (ref 3.8–10.5)

## 2023-02-18 PROCEDURE — 90935 HEMODIALYSIS ONE EVALUATION: CPT

## 2023-02-18 PROCEDURE — 99233 SBSQ HOSP IP/OBS HIGH 50: CPT

## 2023-02-18 RX ADMIN — Medication 50 MILLIGRAM(S): at 05:03

## 2023-02-18 RX ADMIN — PANTOPRAZOLE SODIUM 40 MILLIGRAM(S): 20 TABLET, DELAYED RELEASE ORAL at 05:04

## 2023-02-18 RX ADMIN — CHLORHEXIDINE GLUCONATE 1 APPLICATION(S): 213 SOLUTION TOPICAL at 05:04

## 2023-02-18 RX ADMIN — CARVEDILOL PHOSPHATE 25 MILLIGRAM(S): 80 CAPSULE, EXTENDED RELEASE ORAL at 10:48

## 2023-02-18 RX ADMIN — Medication 50 MILLIGRAM(S): at 21:35

## 2023-02-18 RX ADMIN — MAGNESIUM OXIDE 400 MG ORAL TABLET 400 MILLIGRAM(S): 241.3 TABLET ORAL at 11:47

## 2023-02-18 RX ADMIN — Medication 81 MILLIGRAM(S): at 11:45

## 2023-02-18 RX ADMIN — Medication 0.1 MILLIGRAM(S): at 05:03

## 2023-02-18 RX ADMIN — INSULIN GLARGINE 5 UNIT(S): 100 INJECTION, SOLUTION SUBCUTANEOUS at 21:36

## 2023-02-18 RX ADMIN — CALCITRIOL 0.25 MICROGRAM(S): 0.5 CAPSULE ORAL at 11:45

## 2023-02-18 RX ADMIN — ISOSORBIDE DINITRATE 20 MILLIGRAM(S): 5 TABLET ORAL at 05:04

## 2023-02-18 RX ADMIN — CARVEDILOL PHOSPHATE 25 MILLIGRAM(S): 80 CAPSULE, EXTENDED RELEASE ORAL at 21:36

## 2023-02-18 RX ADMIN — ATORVASTATIN CALCIUM 80 MILLIGRAM(S): 80 TABLET, FILM COATED ORAL at 21:36

## 2023-02-18 RX ADMIN — Medication 1300 MILLIGRAM(S): at 21:36

## 2023-02-18 RX ADMIN — AMLODIPINE BESYLATE 10 MILLIGRAM(S): 2.5 TABLET ORAL at 05:04

## 2023-02-18 RX ADMIN — Medication 0.1 MILLIGRAM(S): at 17:54

## 2023-02-18 RX ADMIN — Medication 1300 MILLIGRAM(S): at 14:37

## 2023-02-18 RX ADMIN — Medication 50 MILLIGRAM(S): at 14:37

## 2023-02-18 RX ADMIN — ISOSORBIDE DINITRATE 20 MILLIGRAM(S): 5 TABLET ORAL at 17:54

## 2023-02-18 RX ADMIN — ISOSORBIDE DINITRATE 20 MILLIGRAM(S): 5 TABLET ORAL at 10:48

## 2023-02-18 RX ADMIN — Medication 1300 MILLIGRAM(S): at 05:03

## 2023-02-18 NOTE — PROGRESS NOTE ADULT - ASSESSMENT
66y/o M with past medical history significant for hypertension, diabetes, hyperlipidemia, CAD, CVA and chronic kidney disease progressed to an advanced stage V who was seen yesterday at outpatient nephrology office for f/u of his CKD. Labs ordered yesterday were significant for K 5.7, Scr 7.07 and egFR 8ml/min. Sent to ER for initiation of HD.    1.  ESRD, progressive Chronic kidney disease was likely multifactorial secondary to, diabetes, hypertension, renoatherosclerotic disease.   2nd HD session today  Seen on HD.  Qd, Qb rate reviewed.  Vitals stable.  Access working well.  Patient tolerating HD well.  -He has AVF placed in left wrist on Jan 19 currently not mature for cannulation  -Social workfor outpatient placement for HD.    2.  Hypertension controlled. BP WNL.  3.  Metabolic acidosis. ON home sodium bicarbonate PO. Will d/c now as pt on HD.  4.  Anemia.  iron deficiency+ anemia of chronic kidney disease. Got transfusion 2/16. Hgb stable now. Will follow for need of JACK  w/ HD.  5.  Hyperkalemia.. resolved  6.  Mineral bone disease of chronic kidney disease. Will continue on calcitriol.  7.  Edema.   We will continue patient on home dose of furosemide 40 milligram one tablet orally.  8. Access bleeding stabilized for now, will follow on HD.      D/w family (daughter Angelica on phone), Dr. Carter.

## 2023-02-18 NOTE — PROGRESS NOTE ADULT - SUBJECTIVE AND OBJECTIVE BOX
Reason for visit: ESRD     Subjective: Patient seen on HD this AM. No more active bleeding. Dressing CDI. Denies CP, focal weakness. No fever/chills.     ROS: All systems were reviewed in detail pertinent positive and negative mentioned above, rest are negative.    Physical Exam:  Gen: no acute distress  MS: alert, conversing normally  Eyes: EOMI, no icterus  HENT: NCAT, MMM  CV: rhythm reg reg, rate normal, no m/g/r  Chest: CTAB, no w/r/r,  Abd: soft, NT, ND  Extremities: trace edema  Access: RIJ tunneled HD catheter, pressure dressing CDI  Left radiocephalic AVF good bruit    =======================================================  Vital Signs Last 24 Hrs  T(C): 36.7 (18 Feb 2023 10:07), Max: 36.8 (17 Feb 2023 15:20)  T(F): 98.1 (18 Feb 2023 10:07), Max: 98.2 (17 Feb 2023 15:20)  HR: 64 (18 Feb 2023 10:07) (62 - 74)  BP: 114/72 (18 Feb 2023 10:07) (105/58 - 172/75)  BP(mean): --  RR: 18 (18 Feb 2023 10:07) (17 - 18)  SpO2: 96% (18 Feb 2023 10:07) (92% - 96%)    Parameters below as of 18 Feb 2023 10:07  Patient On (Oxygen Delivery Method): room air      I&O's Summary    17 Feb 2023 07:01  -  18 Feb 2023 07:00  --------------------------------------------------------  IN: 0 mL / OUT: 0 mL / NET: 0 mL    18 Feb 2023 07:01  -  18 Feb 2023 12:12  --------------------------------------------------------  IN: 0 mL / OUT: 0 mL / NET: 0 mL      =======================================================  Current Antibiotics:    Other medications:  amLODIPine   Tablet 10 milliGRAM(s) Oral daily  aspirin  chewable 81 milliGRAM(s) Oral daily  atorvastatin 80 milliGRAM(s) Oral at bedtime  calcitriol   Capsule 0.25 MICROGram(s) Oral daily  carvedilol 25 milliGRAM(s) Oral every 12 hours  chlorhexidine 2% Cloths 1 Application(s) Topical <User Schedule>  cloNIDine 0.1 milliGRAM(s) Oral two times a day  dextrose 5%. 1000 milliLiter(s) IV Continuous <Continuous>  dextrose 50% Injectable 25 Gram(s) IV Push once  glucagon  Injectable 1 milliGRAM(s) IntraMuscular once  hydrALAZINE 50 milliGRAM(s) Oral every 8 hours  insulin glargine Injectable (LANTUS) 5 Unit(s) SubCutaneous at bedtime  insulin lispro (ADMELOG) corrective regimen sliding scale   SubCutaneous three times a day before meals  insulin lispro Injectable (ADMELOG) 3 Unit(s) SubCutaneous three times a day before meals  isosorbide   dinitrate Tablet (ISORDIL) 20 milliGRAM(s) Oral three times a day  magnesium oxide 400 milliGRAM(s) Oral daily  pantoprazole    Tablet 40 milliGRAM(s) Oral before breakfast  sodium bicarbonate 1300 milliGRAM(s) Oral three times a day    =======================================================  02-18    136  |  100  |  50.1<H>  ----------------------------<  101<H>  4.3   |  26.0  |  5.88<H>    Ca    7.8<L>      18 Feb 2023 07:25  Phos  4.9     02-17  Mg     1.8     02-17      Creatinine, Serum: 5.88 mg/dL (02-18-23 @ 07:25)  Creatinine, Serum: 7.35 mg/dL (02-17-23 @ 08:30)  Creatinine, Serum: 7.27 mg/dL (02-16-23 @ 06:00)  Creatinine, Serum: 7.34 mg/dL (02-15-23 @ 22:00)  Creatinine, Serum: 6.97 mg/dL (02-15-23 @ 11:30)      =======================================================

## 2023-02-18 NOTE — PROGRESS NOTE ADULT - SUBJECTIVE AND OBJECTIVE BOX
[facility] Division of Hospital Medicine     Chief Complaint:  CKD stage 5     SUBJECTIVE / OVERNIGHT EVENTS:   Pt reports he feels OK  Wife at bedside, has a lot of Qs about Dialysis which we discussed     Patient denies chest pain, SOB, abd pain, N/V, fever, chills, dysuria or any other complaints. All remainder ROS negative.     MEDICATIONS  (STANDING):   amLODIPine   Tablet 10 milliGRAM(s) Oral daily  aspirin  chewable 81 milliGRAM(s) Oral daily  atorvastatin 80 milliGRAM(s) Oral at bedtime  calcitriol   Capsule 0.25 MICROGram(s) Oral daily  carvedilol 25 milliGRAM(s) Oral every 12 hours  chlorhexidine 2% Cloths 1 Application(s) Topical <User Schedule>  cloNIDine 0.1 milliGRAM(s) Oral two times a day  dextrose 5%. 1000 milliLiter(s) (50 mL/Hr) IV Continuous <Continuous>  dextrose 50% Injectable 25 Gram(s) IV Push once  glucagon  Injectable 1 milliGRAM(s) IntraMuscular once  hydrALAZINE 50 milliGRAM(s) Oral every 8 hours  insulin glargine Injectable (LANTUS) 5 Unit(s) SubCutaneous at bedtime  insulin lispro (ADMELOG) corrective regimen sliding scale   SubCutaneous three times a day before meals  insulin lispro Injectable (ADMELOG) 3 Unit(s) SubCutaneous three times a day before meals  isosorbide   dinitrate Tablet (ISORDIL) 20 milliGRAM(s) Oral three times a day  magnesium oxide 400 milliGRAM(s) Oral daily  pantoprazole    Tablet 40 milliGRAM(s) Oral before breakfast  sodium bicarbonate 1300 milliGRAM(s) Oral three times a day    MEDICATIONS  (PRN):  acetaminophen     Tablet .. 650 milliGRAM(s) Oral every 6 hours PRN Temp greater or equal to 38C (100.4F), Mild Pain (1 - 3)  aluminum hydroxide/magnesium hydroxide/simethicone Suspension 30 milliLiter(s) Oral every 4 hours PRN Dyspepsia  dextrose Oral Gel 15 Gram(s) Oral once PRN Blood Glucose LESS THAN 70 milliGRAM(s)/deciliter  melatonin 3 milliGRAM(s) Oral at bedtime PRN Insomnia  ondansetron Injectable 4 milliGRAM(s) IV Push every 8 hours PRN Nausea and/or Vomiting  sodium chloride 0.9% lock flush 10 milliLiter(s) IV Push every 1 hour PRN Pre/post blood products, medications, blood draw, and to maintain line patency        I&O's Summary    17 Feb 2023 07:01  -  18 Feb 2023 07:00  --------------------------------------------------------  IN: 0 mL / OUT: 0 mL / NET: 0 mL    18 Feb 2023 07:01  -  18 Feb 2023 14:28  --------------------------------------------------------  IN: 0 mL / OUT: 0 mL / NET: 0 mL        PHYSICAL EXAM:  Vital Signs Last 24 Hrs  T(C): 36.7 (18 Feb 2023 13:55), Max: 36.8 (17 Feb 2023 15:20)  T(F): 98 (18 Feb 2023 13:55), Max: 98.2 (17 Feb 2023 15:20)  HR: 65 (18 Feb 2023 13:55) (62 - 74)  BP: 138/64 (18 Feb 2023 13:55) (114/72 - 172/75)  BP(mean): --  RR: 18 (18 Feb 2023 13:55) (17 - 18)  SpO2: 97% (18 Feb 2023 13:55) (92% - 97%)    Parameters below as of 18 Feb 2023 13:55  Patient On (Oxygen Delivery Method): room air          CONSTITUTIONAL: NAD, sitting up in bed  ENMT: Moist oral mucosa, EOMI   RIJ HD cath   RESPIRATORY: Normal respiratory effort; lungs are clear to auscultation bilaterally  CARDIOVASCULAR: Regular rate and rhythm, normal S1 and S2   ABDOMEN: Nontender to palpation, normoactive bowel sounds  MUSCULOSKELETAL:  No clubbing or cyanosis of digits   PSYCH: A+O to person, place, and time; affect appropriate  NEUROLOGY: No gross sensory or motor deficits   SKIN: warm and dry       LABS:                        8.6    9.28  )-----------( 188      ( 18 Feb 2023 07:25 )             25.8     02-18    136  |  100  |  50.1<H>  ----------------------------<  101<H>  4.3   |  26.0  |  5.88<H>    Ca    7.8<L>      18 Feb 2023 07:25  Phos  4.9     02-17  Mg     1.8     02-17        CAPILLARY BLOOD GLUCOSE      POCT Blood Glucose.: 88 mg/dL (18 Feb 2023 11:28)  POCT Blood Glucose.: 96 mg/dL (18 Feb 2023 07:59)  POCT Blood Glucose.: 145 mg/dL (17 Feb 2023 21:21)  POCT Blood Glucose.: 76 mg/dL (17 Feb 2023 17:28)

## 2023-02-18 NOTE — PROGRESS NOTE ADULT - ASSESSMENT
67 yr old male with hypertension, hyperlipidemia, diabetes mellitus, CKD stage 5, CVA, ischemic cardiomyopathy, left AVF placed in 1/2023 was sent in for evaluation by his nephrologist for tunneled HD catheter placement and initiation of HD as he was noted to have uremic symptoms hyperkalemia, edema. He was evaluated by nephrology in ED, IR was consulted for tunneled catheter placement. His home anti hypertensives were initiated. He underwent HD catheter placement by IR on 2/16, noted to have bleeding post procedure, which subsequently resolved. His repeat CBC was stable.    CKD stage 5, uremia, started on HD  S/p catheter placement 2/16  HD initiated 2/17 given bleeding from catheter site  nephrology following  continue Sodium bicarbonate  plan for another HD today    Hypertension  Continue Hydralazine, Isosorbide, Amlodipine, Clonidine, Coreg  monitor BP    Hyperlipidemia  Continue statin    Diabetes mellitus  Continue Lantus and pre meal insulin   monitor FS, sliding scale coverage    CAD, CVA, PAD  Continue aspirin and statin    Acute anemia sec blood loss from catheter site  S/p 2 units PRBC  hemostasis achieved  Monitor CBC     DVT ppx: SCDs    Discussed with patient and nephrology.   updated wife   social work following to assist with HD seat in community.

## 2023-02-19 LAB
ANION GAP SERPL CALC-SCNC: 11 MMOL/L — SIGNIFICANT CHANGE UP (ref 5–17)
BASOPHILS # BLD AUTO: 0.04 K/UL — SIGNIFICANT CHANGE UP (ref 0–0.2)
BASOPHILS NFR BLD AUTO: 0.5 % — SIGNIFICANT CHANGE UP (ref 0–2)
BUN SERPL-MCNC: 39.3 MG/DL — HIGH (ref 8–20)
CALCIUM SERPL-MCNC: 8 MG/DL — LOW (ref 8.4–10.5)
CHLORIDE SERPL-SCNC: 100 MMOL/L — SIGNIFICANT CHANGE UP (ref 96–108)
CO2 SERPL-SCNC: 25 MMOL/L — SIGNIFICANT CHANGE UP (ref 22–29)
CREAT SERPL-MCNC: 5.7 MG/DL — HIGH (ref 0.5–1.3)
EGFR: 10 ML/MIN/1.73M2 — LOW
EOSINOPHIL # BLD AUTO: 0.16 K/UL — SIGNIFICANT CHANGE UP (ref 0–0.5)
EOSINOPHIL NFR BLD AUTO: 1.9 % — SIGNIFICANT CHANGE UP (ref 0–6)
GLUCOSE BLDC GLUCOMTR-MCNC: 122 MG/DL — HIGH (ref 70–99)
GLUCOSE BLDC GLUCOMTR-MCNC: 128 MG/DL — HIGH (ref 70–99)
GLUCOSE BLDC GLUCOMTR-MCNC: 153 MG/DL — HIGH (ref 70–99)
GLUCOSE BLDC GLUCOMTR-MCNC: 93 MG/DL — SIGNIFICANT CHANGE UP (ref 70–99)
GLUCOSE SERPL-MCNC: 86 MG/DL — SIGNIFICANT CHANGE UP (ref 70–99)
HCT VFR BLD CALC: 26.4 % — LOW (ref 39–50)
HGB BLD-MCNC: 8.6 G/DL — LOW (ref 13–17)
IMM GRANULOCYTES NFR BLD AUTO: 0.4 % — SIGNIFICANT CHANGE UP (ref 0–0.9)
LYMPHOCYTES # BLD AUTO: 1.66 K/UL — SIGNIFICANT CHANGE UP (ref 1–3.3)
LYMPHOCYTES # BLD AUTO: 19.6 % — SIGNIFICANT CHANGE UP (ref 13–44)
MCHC RBC-ENTMCNC: 27.6 PG — SIGNIFICANT CHANGE UP (ref 27–34)
MCHC RBC-ENTMCNC: 32.6 GM/DL — SIGNIFICANT CHANGE UP (ref 32–36)
MCV RBC AUTO: 84.6 FL — SIGNIFICANT CHANGE UP (ref 80–100)
MONOCYTES # BLD AUTO: 0.74 K/UL — SIGNIFICANT CHANGE UP (ref 0–0.9)
MONOCYTES NFR BLD AUTO: 8.7 % — SIGNIFICANT CHANGE UP (ref 2–14)
NEUTROPHILS # BLD AUTO: 5.86 K/UL — SIGNIFICANT CHANGE UP (ref 1.8–7.4)
NEUTROPHILS NFR BLD AUTO: 68.9 % — SIGNIFICANT CHANGE UP (ref 43–77)
PLATELET # BLD AUTO: 193 K/UL — SIGNIFICANT CHANGE UP (ref 150–400)
POTASSIUM SERPL-MCNC: 4.4 MMOL/L — SIGNIFICANT CHANGE UP (ref 3.5–5.3)
POTASSIUM SERPL-SCNC: 4.4 MMOL/L — SIGNIFICANT CHANGE UP (ref 3.5–5.3)
RBC # BLD: 3.12 M/UL — LOW (ref 4.2–5.8)
RBC # FLD: 13.9 % — SIGNIFICANT CHANGE UP (ref 10.3–14.5)
SODIUM SERPL-SCNC: 136 MMOL/L — SIGNIFICANT CHANGE UP (ref 135–145)
WBC # BLD: 8.49 K/UL — SIGNIFICANT CHANGE UP (ref 3.8–10.5)
WBC # FLD AUTO: 8.49 K/UL — SIGNIFICANT CHANGE UP (ref 3.8–10.5)

## 2023-02-19 PROCEDURE — 99232 SBSQ HOSP IP/OBS MODERATE 35: CPT

## 2023-02-19 RX ORDER — ERYTHROPOIETIN 10000 [IU]/ML
10000 INJECTION, SOLUTION INTRAVENOUS; SUBCUTANEOUS
Refills: 0 | Status: DISCONTINUED | OUTPATIENT
Start: 2023-02-20 | End: 2023-02-21

## 2023-02-19 RX ADMIN — Medication 0.1 MILLIGRAM(S): at 05:42

## 2023-02-19 RX ADMIN — Medication 50 MILLIGRAM(S): at 13:50

## 2023-02-19 RX ADMIN — CHLORHEXIDINE GLUCONATE 1 APPLICATION(S): 213 SOLUTION TOPICAL at 05:46

## 2023-02-19 RX ADMIN — Medication 50 MILLIGRAM(S): at 21:35

## 2023-02-19 RX ADMIN — Medication 3 UNIT(S): at 12:00

## 2023-02-19 RX ADMIN — AMLODIPINE BESYLATE 10 MILLIGRAM(S): 2.5 TABLET ORAL at 05:42

## 2023-02-19 RX ADMIN — Medication 81 MILLIGRAM(S): at 11:14

## 2023-02-19 RX ADMIN — Medication 0.1 MILLIGRAM(S): at 16:39

## 2023-02-19 RX ADMIN — CARVEDILOL PHOSPHATE 25 MILLIGRAM(S): 80 CAPSULE, EXTENDED RELEASE ORAL at 11:14

## 2023-02-19 RX ADMIN — ISOSORBIDE DINITRATE 20 MILLIGRAM(S): 5 TABLET ORAL at 05:42

## 2023-02-19 RX ADMIN — Medication 1300 MILLIGRAM(S): at 05:42

## 2023-02-19 RX ADMIN — ISOSORBIDE DINITRATE 20 MILLIGRAM(S): 5 TABLET ORAL at 11:14

## 2023-02-19 RX ADMIN — MAGNESIUM OXIDE 400 MG ORAL TABLET 400 MILLIGRAM(S): 241.3 TABLET ORAL at 11:14

## 2023-02-19 RX ADMIN — CARVEDILOL PHOSPHATE 25 MILLIGRAM(S): 80 CAPSULE, EXTENDED RELEASE ORAL at 21:35

## 2023-02-19 RX ADMIN — Medication 3 UNIT(S): at 17:06

## 2023-02-19 RX ADMIN — PANTOPRAZOLE SODIUM 40 MILLIGRAM(S): 20 TABLET, DELAYED RELEASE ORAL at 05:42

## 2023-02-19 RX ADMIN — ATORVASTATIN CALCIUM 80 MILLIGRAM(S): 80 TABLET, FILM COATED ORAL at 21:35

## 2023-02-19 RX ADMIN — Medication 50 MILLIGRAM(S): at 05:42

## 2023-02-19 RX ADMIN — ISOSORBIDE DINITRATE 20 MILLIGRAM(S): 5 TABLET ORAL at 16:35

## 2023-02-19 RX ADMIN — CALCITRIOL 0.25 MICROGRAM(S): 0.5 CAPSULE ORAL at 11:14

## 2023-02-19 RX ADMIN — Medication 1: at 12:01

## 2023-02-19 RX ADMIN — INSULIN GLARGINE 5 UNIT(S): 100 INJECTION, SOLUTION SUBCUTANEOUS at 21:34

## 2023-02-19 NOTE — PROGRESS NOTE ADULT - ASSESSMENT
66y/o M with past medical history significant for hypertension, diabetes, hyperlipidemia, CAD, CVA and chronic kidney disease progressed to an advanced stage V who was seen yesterday at outpatient nephrology office for f/u of his CKD. Labs ordered yesterday were significant for K 5.7, Scr 7.07 and egFR 8ml/min. Sent to ER for initiation of HD.    1.  ESRD, progressive Chronic kidney disease was likely multifactorial secondary to, diabetes, hypertension, renoatherosclerotic disease.   Patient had 2 sessions of HD, last one yesterday  Access working well.  Patient tolerating HD well.  -No more HD planned for today, next tomorrow  -He has AVF placed in left wrist on Jan 19 currently not mature for cannulation  -Social work for outpatient placement for HD.    2.  Hypertension controlled. BP WNL.  3.  Metabolic acidosis. ON home sodium bicarbonate PO. Will d/c now as pt on HD.  4.  Anemia.  iron deficiency+ anemia of chronic kidney disease. Got transfusion 2/16. Will place on JACK w/ HD.  5.  Hyperkalemia.. resolved  6.  Mineral bone disease of chronic kidney disease. Will continue on calcitriol.  7.  Edema.   We will continue patient on home dose of furosemide 40 milligram one tablet orally.  8. Access bleeding stabilized, will follow on HD.      Stable for discharge from nephrology standpoint when outpatient HD chair is confirmed.

## 2023-02-19 NOTE — PROGRESS NOTE ADULT - SUBJECTIVE AND OBJECTIVE BOX
Reason for visit: ESRD     Subjective: Patient w/ no complains. Dressing CDI. Denies CP, focal weakness. No fever/chills.     ROS: All systems were reviewed in detail pertinent positive and negative mentioned above, rest are negative.    Physical Exam:  Gen: no acute distress  MS: alert, conversing normally  Eyes: EOMI, no icterus  HENT: NCAT, MMM  CV: rhythm reg reg, rate normal, no m/g/r  Chest: CTAB, no w/r/r,  Abd: soft, NT, ND  Extremities: trace edema  Access: RIJ tunneled HD catheter, pressure dressing CDI  Left radiocephalic AVF good bruit    =======================================================  Vital Signs Last 24 Hrs  T(C): 36.6 (19 Feb 2023 04:26), Max: 36.7 (18 Feb 2023 10:07)  T(F): 97.9 (19 Feb 2023 04:26), Max: 98.1 (18 Feb 2023 10:07)  HR: 69 (19 Feb 2023 04:26) (64 - 69)  BP: 136/62 (19 Feb 2023 04:26) (114/72 - 149/79)  RR: 18 (19 Feb 2023 04:26) (18 - 18)  SpO2: 93% (19 Feb 2023 04:26) (93% - 97%)    Parameters below as of 19 Feb 2023 04:26  Patient On (Oxygen Delivery Method): room air      I&O's Summary    18 Feb 2023 07:01  -  19 Feb 2023 07:00  --------------------------------------------------------  IN: 0 mL / OUT: 880 mL / NET: -880 mL      =======================================================  Current Antibiotics:    Other medications:  amLODIPine   Tablet 10 milliGRAM(s) Oral daily  aspirin  chewable 81 milliGRAM(s) Oral daily  atorvastatin 80 milliGRAM(s) Oral at bedtime  calcitriol   Capsule 0.25 MICROGram(s) Oral daily  carvedilol 25 milliGRAM(s) Oral every 12 hours  chlorhexidine 2% Cloths 1 Application(s) Topical <User Schedule>  cloNIDine 0.1 milliGRAM(s) Oral two times a day  dextrose 5%. 1000 milliLiter(s) IV Continuous <Continuous>  dextrose 50% Injectable 25 Gram(s) IV Push once  glucagon  Injectable 1 milliGRAM(s) IntraMuscular once  hydrALAZINE 50 milliGRAM(s) Oral every 8 hours  insulin glargine Injectable (LANTUS) 5 Unit(s) SubCutaneous at bedtime  insulin lispro (ADMELOG) corrective regimen sliding scale   SubCutaneous three times a day before meals  insulin lispro Injectable (ADMELOG) 3 Unit(s) SubCutaneous three times a day before meals  isosorbide   dinitrate Tablet (ISORDIL) 20 milliGRAM(s) Oral three times a day  magnesium oxide 400 milliGRAM(s) Oral daily  pantoprazole    Tablet 40 milliGRAM(s) Oral before breakfast  sodium bicarbonate 1300 milliGRAM(s) Oral three times a day    =======================================================  02-18    136  |  100  |  50.1<H>  ----------------------------<  101<H>  4.3   |  26.0  |  5.88<H>    Ca    7.8<L>      18 Feb 2023 07:25  Phos  4.9     02-17  Mg     1.8     02-17      Creatinine, Serum: 5.88 mg/dL (02-18-23 @ 07:25)  Creatinine, Serum: 7.35 mg/dL (02-17-23 @ 08:30)  Creatinine, Serum: 7.27 mg/dL (02-16-23 @ 06:00)  Creatinine, Serum: 7.34 mg/dL (02-15-23 @ 22:00)  Creatinine, Serum: 6.97 mg/dL (02-15-23 @ 11:30)      =======================================================

## 2023-02-19 NOTE — PROGRESS NOTE ADULT - ASSESSMENT
67 yr old male with hypertension, hyperlipidemia, diabetes mellitus, CKD stage 5, CVA, ischemic cardiomyopathy, left AVF placed in 1/2023 was sent in for evaluation by his nephrologist for tunneled HD catheter placement and initiation of HD as he was noted to have uremic symptoms hyperkalemia, edema. He was evaluated by nephrology in ED, IR was consulted for tunneled catheter placement. His home anti hypertensives were initiated. He underwent HD catheter placement by IR on 2/16, noted to have bleeding post procedure, which subsequently resolved. His repeat CBC was stable.    CKD stage 5, uremia, started on HD  S/p catheter placement 2/16  HD initiated 2/17 given bleeding from catheter site  nephrology following   continue Sodium bicarbonate  plan for next HD tomorrow     Hypertension  Continue Hydralazine, Isosorbide, Amlodipine, Clonidine, Coreg  monitor BP    Hyperlipidemia  Continue statin    Diabetes mellitus  Continue Lantus and pre meal insulin   monitor FS, sliding scale coverage    CAD, CVA, PAD  Continue aspirin and statin    Acute anemia sec blood loss from catheter site  S/p 2 units PRBC  hemostasis achieved  Monitor CBC     Depression  has been crying per family  I recommended starting an anti depressant but pt and family wish to see a Psychiatrist here     DVT ppx: SCDs    Discussed with patient and wife   social work following to assist with HD seat in community.

## 2023-02-19 NOTE — PROGRESS NOTE ADULT - SUBJECTIVE AND OBJECTIVE BOX
[facility] Division of Hospital Medicine     Chief Complaint:  CKD stage 5     SUBJECTIVE / OVERNIGHT EVENTS:   Pt reports he feels very depressed, pt and family want to see a Psychiatrist here before discharge     Patient denies chest pain, SOB, abd pain, N/V, fever, chills, dysuria or any other complaints. All remainder ROS negative.     MEDICATIONS  (STANDING):   amLODIPine   Tablet 10 milliGRAM(s) Oral daily  aspirin  chewable 81 milliGRAM(s) Oral daily  atorvastatin 80 milliGRAM(s) Oral at bedtime  calcitriol   Capsule 0.25 MICROGram(s) Oral daily  carvedilol 25 milliGRAM(s) Oral every 12 hours  chlorhexidine 2% Cloths 1 Application(s) Topical <User Schedule>  cloNIDine 0.1 milliGRAM(s) Oral two times a day  dextrose 5%. 1000 milliLiter(s) (50 mL/Hr) IV Continuous <Continuous>  dextrose 50% Injectable 25 Gram(s) IV Push once  glucagon  Injectable 1 milliGRAM(s) IntraMuscular once  hydrALAZINE 50 milliGRAM(s) Oral every 8 hours  insulin glargine Injectable (LANTUS) 5 Unit(s) SubCutaneous at bedtime  insulin lispro (ADMELOG) corrective regimen sliding scale   SubCutaneous three times a day before meals  insulin lispro Injectable (ADMELOG) 3 Unit(s) SubCutaneous three times a day before meals  isosorbide   dinitrate Tablet (ISORDIL) 20 milliGRAM(s) Oral three times a day  magnesium oxide 400 milliGRAM(s) Oral daily  pantoprazole    Tablet 40 milliGRAM(s) Oral before breakfast    MEDICATIONS  (PRN):  acetaminophen     Tablet .. 650 milliGRAM(s) Oral every 6 hours PRN Temp greater or equal to 38C (100.4F), Mild Pain (1 - 3)  aluminum hydroxide/magnesium hydroxide/simethicone Suspension 30 milliLiter(s) Oral every 4 hours PRN Dyspepsia  dextrose Oral Gel 15 Gram(s) Oral once PRN Blood Glucose LESS THAN 70 milliGRAM(s)/deciliter  melatonin 3 milliGRAM(s) Oral at bedtime PRN Insomnia  ondansetron Injectable 4 milliGRAM(s) IV Push every 8 hours PRN Nausea and/or Vomiting  sodium chloride 0.9% lock flush 10 milliLiter(s) IV Push every 1 hour PRN Pre/post blood products, medications, blood draw, and to maintain line patency        I&O's Summary    18 Feb 2023 07:01  -  19 Feb 2023 07:00  --------------------------------------------------------  IN: 0 mL / OUT: 880 mL / NET: -880 mL        PHYSICAL EXAM:   Vital Signs Last 24 Hrs  T(C): 36.7 (19 Feb 2023 11:45), Max: 36.7 (18 Feb 2023 13:55)  T(F): 98.1 (19 Feb 2023 11:45), Max: 98.1 (19 Feb 2023 11:45)  HR: 68 (19 Feb 2023 11:45) (65 - 69)  BP: 135/65 (19 Feb 2023 11:45) (135/65 - 138/64)  BP(mean): --  RR: 18 (19 Feb 2023 11:45) (18 - 18)  SpO2: 95% (19 Feb 2023 11:45) (93% - 97%)    Parameters below as of 19 Feb 2023 11:45  Patient On (Oxygen Delivery Method): room air        CONSTITUTIONAL: NAD, sitting up in bed  ENMT: Moist oral mucosa, EOMI   RIJ HD cath   RESPIRATORY: Normal respiratory effort; lungs are clear to auscultation bilaterally  CARDIOVASCULAR: Regular rate and rhythm, normal S1 and S2   ABDOMEN: Nontender to palpation, normoactive bowel sounds  MUSCULOSKELETAL:  No clubbing or cyanosis of digits   PSYCH: A+O to person, place, and time; affect appropriate  NEUROLOGY: No gross sensory or motor deficits   SKIN: warm and dry       LABS:                        8.6    8.49  )-----------( 193      ( 19 Feb 2023 06:54 )             26.4     02-19    136  |  100  |  39.3<H>  ----------------------------<  86  4.4   |  25.0  |  5.70<H>    Ca    8.0<L>      19 Feb 2023 06:54          CAPILLARY BLOOD GLUCOSE      POCT Blood Glucose.: 153 mg/dL (19 Feb 2023 11:32)  POCT Blood Glucose.: 93 mg/dL (19 Feb 2023 07:37)  POCT Blood Glucose.: 95 mg/dL (18 Feb 2023 21:33)  POCT Blood Glucose.: 147 mg/dL (18 Feb 2023 17:02)

## 2023-02-20 LAB
ANION GAP SERPL CALC-SCNC: 12 MMOL/L — SIGNIFICANT CHANGE UP (ref 5–17)
BASOPHILS # BLD AUTO: 0.05 K/UL — SIGNIFICANT CHANGE UP (ref 0–0.2)
BASOPHILS NFR BLD AUTO: 0.6 % — SIGNIFICANT CHANGE UP (ref 0–2)
BUN SERPL-MCNC: 48.9 MG/DL — HIGH (ref 8–20)
CALCIUM SERPL-MCNC: 8 MG/DL — LOW (ref 8.4–10.5)
CHLORIDE SERPL-SCNC: 102 MMOL/L — SIGNIFICANT CHANGE UP (ref 96–108)
CO2 SERPL-SCNC: 25 MMOL/L — SIGNIFICANT CHANGE UP (ref 22–29)
CREAT SERPL-MCNC: 7.03 MG/DL — HIGH (ref 0.5–1.3)
EGFR: 8 ML/MIN/1.73M2 — LOW
EOSINOPHIL # BLD AUTO: 0.22 K/UL — SIGNIFICANT CHANGE UP (ref 0–0.5)
EOSINOPHIL NFR BLD AUTO: 2.7 % — SIGNIFICANT CHANGE UP (ref 0–6)
GLUCOSE BLDC GLUCOMTR-MCNC: 100 MG/DL — HIGH (ref 70–99)
GLUCOSE BLDC GLUCOMTR-MCNC: 167 MG/DL — HIGH (ref 70–99)
GLUCOSE BLDC GLUCOMTR-MCNC: 190 MG/DL — HIGH (ref 70–99)
GLUCOSE BLDC GLUCOMTR-MCNC: 78 MG/DL — SIGNIFICANT CHANGE UP (ref 70–99)
GLUCOSE SERPL-MCNC: 90 MG/DL — SIGNIFICANT CHANGE UP (ref 70–99)
HCT VFR BLD CALC: 25.9 % — LOW (ref 39–50)
HGB BLD-MCNC: 8.4 G/DL — LOW (ref 13–17)
IMM GRANULOCYTES NFR BLD AUTO: 0.6 % — SIGNIFICANT CHANGE UP (ref 0–0.9)
LYMPHOCYTES # BLD AUTO: 1.69 K/UL — SIGNIFICANT CHANGE UP (ref 1–3.3)
LYMPHOCYTES # BLD AUTO: 21.1 % — SIGNIFICANT CHANGE UP (ref 13–44)
MCHC RBC-ENTMCNC: 27.8 PG — SIGNIFICANT CHANGE UP (ref 27–34)
MCHC RBC-ENTMCNC: 32.4 GM/DL — SIGNIFICANT CHANGE UP (ref 32–36)
MCV RBC AUTO: 85.8 FL — SIGNIFICANT CHANGE UP (ref 80–100)
MONOCYTES # BLD AUTO: 0.65 K/UL — SIGNIFICANT CHANGE UP (ref 0–0.9)
MONOCYTES NFR BLD AUTO: 8.1 % — SIGNIFICANT CHANGE UP (ref 2–14)
NEUTROPHILS # BLD AUTO: 5.35 K/UL — SIGNIFICANT CHANGE UP (ref 1.8–7.4)
NEUTROPHILS NFR BLD AUTO: 66.9 % — SIGNIFICANT CHANGE UP (ref 43–77)
PLATELET # BLD AUTO: 189 K/UL — SIGNIFICANT CHANGE UP (ref 150–400)
POTASSIUM SERPL-MCNC: 4.7 MMOL/L — SIGNIFICANT CHANGE UP (ref 3.5–5.3)
POTASSIUM SERPL-SCNC: 4.7 MMOL/L — SIGNIFICANT CHANGE UP (ref 3.5–5.3)
RBC # BLD: 3.02 M/UL — LOW (ref 4.2–5.8)
RBC # FLD: 13.8 % — SIGNIFICANT CHANGE UP (ref 10.3–14.5)
SODIUM SERPL-SCNC: 139 MMOL/L — SIGNIFICANT CHANGE UP (ref 135–145)
WBC # BLD: 8.01 K/UL — SIGNIFICANT CHANGE UP (ref 3.8–10.5)
WBC # FLD AUTO: 8.01 K/UL — SIGNIFICANT CHANGE UP (ref 3.8–10.5)

## 2023-02-20 PROCEDURE — 99232 SBSQ HOSP IP/OBS MODERATE 35: CPT

## 2023-02-20 PROCEDURE — 90935 HEMODIALYSIS ONE EVALUATION: CPT

## 2023-02-20 RX ADMIN — Medication 0.1 MILLIGRAM(S): at 05:25

## 2023-02-20 RX ADMIN — MAGNESIUM OXIDE 400 MG ORAL TABLET 400 MILLIGRAM(S): 241.3 TABLET ORAL at 14:00

## 2023-02-20 RX ADMIN — AMLODIPINE BESYLATE 10 MILLIGRAM(S): 2.5 TABLET ORAL at 05:25

## 2023-02-20 RX ADMIN — Medication 1: at 17:45

## 2023-02-20 RX ADMIN — Medication 50 MILLIGRAM(S): at 05:25

## 2023-02-20 RX ADMIN — Medication 50 MILLIGRAM(S): at 22:00

## 2023-02-20 RX ADMIN — ISOSORBIDE DINITRATE 20 MILLIGRAM(S): 5 TABLET ORAL at 17:45

## 2023-02-20 RX ADMIN — ATORVASTATIN CALCIUM 80 MILLIGRAM(S): 80 TABLET, FILM COATED ORAL at 22:00

## 2023-02-20 RX ADMIN — CALCITRIOL 0.25 MICROGRAM(S): 0.5 CAPSULE ORAL at 13:59

## 2023-02-20 RX ADMIN — CHLORHEXIDINE GLUCONATE 1 APPLICATION(S): 213 SOLUTION TOPICAL at 05:26

## 2023-02-20 RX ADMIN — PANTOPRAZOLE SODIUM 40 MILLIGRAM(S): 20 TABLET, DELAYED RELEASE ORAL at 05:25

## 2023-02-20 RX ADMIN — INSULIN GLARGINE 5 UNIT(S): 100 INJECTION, SOLUTION SUBCUTANEOUS at 22:00

## 2023-02-20 RX ADMIN — ERYTHROPOIETIN 10000 UNIT(S): 10000 INJECTION, SOLUTION INTRAVENOUS; SUBCUTANEOUS at 10:52

## 2023-02-20 RX ADMIN — Medication 3 UNIT(S): at 17:45

## 2023-02-20 RX ADMIN — Medication 0.1 MILLIGRAM(S): at 17:45

## 2023-02-20 RX ADMIN — ISOSORBIDE DINITRATE 20 MILLIGRAM(S): 5 TABLET ORAL at 05:25

## 2023-02-20 RX ADMIN — Medication 50 MILLIGRAM(S): at 13:59

## 2023-02-20 RX ADMIN — Medication 81 MILLIGRAM(S): at 13:59

## 2023-02-20 RX ADMIN — CARVEDILOL PHOSPHATE 25 MILLIGRAM(S): 80 CAPSULE, EXTENDED RELEASE ORAL at 22:00

## 2023-02-20 NOTE — PROGRESS NOTE ADULT - SUBJECTIVE AND OBJECTIVE BOX
Reason for visit: ESRD     Subjective: Patient seen on HD this AM. No more active bleeding. Dressing CDI. Denies CP, focal weakness. No fever/chills.     ROS: All systems were reviewed in detail pertinent positive and negative mentioned above, rest are negative.    Physical Exam:  Gen: no acute distress  MS: alert, conversing normally  Eyes: EOMI, no icterus  HENT: NCAT, MMM  CV: rhythm reg reg, rate normal, no m/g/r  Chest: CTAB, no w/r/r,  Abd: soft, NT, ND  Extremities: trace edema  Access: RIJ tunneled HD catheter, pressure dressing CDI  Left radiocephalic AVF good bruit    =======================================================  Vital Signs Last 24 Hrs  T(C): 36.6 (20 Feb 2023 07:37), Max: 37.4 (19 Feb 2023 16:39)  T(F): 97.9 (20 Feb 2023 07:37), Max: 99.3 (19 Feb 2023 16:39)  HR: 61 (20 Feb 2023 07:37) (58 - 77)  BP: 110/61 (20 Feb 2023 07:37) (110/61 - 152/76)  BP(mean): --  RR: 18 (20 Feb 2023 07:37) (16 - 18)  SpO2: 93% (20 Feb 2023 07:37) (93% - 95%)    Parameters below as of 20 Feb 2023 07:37  Patient On (Oxygen Delivery Method): room air      I&O's Summary    19 Feb 2023 07:01  -  20 Feb 2023 07:00  --------------------------------------------------------  IN: 0 mL / OUT: 500 mL / NET: -500 mL      =======================================================  Current Antibiotics:    Other medications:  amLODIPine   Tablet 10 milliGRAM(s) Oral daily  aspirin  chewable 81 milliGRAM(s) Oral daily  atorvastatin 80 milliGRAM(s) Oral at bedtime  calcitriol   Capsule 0.25 MICROGram(s) Oral daily  carvedilol 25 milliGRAM(s) Oral every 12 hours  chlorhexidine 2% Cloths 1 Application(s) Topical <User Schedule>  cloNIDine 0.1 milliGRAM(s) Oral two times a day  dextrose 5%. 1000 milliLiter(s) IV Continuous <Continuous>  dextrose 50% Injectable 25 Gram(s) IV Push once  epoetin dilia-epbx (RETACRIT) Injectable 83732 Unit(s) IV Push <User Schedule>  glucagon  Injectable 1 milliGRAM(s) IntraMuscular once  hydrALAZINE 50 milliGRAM(s) Oral every 8 hours  insulin glargine Injectable (LANTUS) 5 Unit(s) SubCutaneous at bedtime  insulin lispro (ADMELOG) corrective regimen sliding scale   SubCutaneous three times a day before meals  insulin lispro Injectable (ADMELOG) 3 Unit(s) SubCutaneous three times a day before meals  isosorbide   dinitrate Tablet (ISORDIL) 20 milliGRAM(s) Oral three times a day  magnesium oxide 400 milliGRAM(s) Oral daily  pantoprazole    Tablet 40 milliGRAM(s) Oral before breakfast    =======================================================  02-20    139  |  102  |  48.9<H>  ----------------------------<  90  4.7   |  25.0  |  7.03<H>    Ca    8.0<L>      20 Feb 2023 06:43      Creatinine, Serum: 7.03 mg/dL (02-20-23 @ 06:43)  Creatinine, Serum: 5.70 mg/dL (02-19-23 @ 06:54)  Creatinine, Serum: 5.88 mg/dL (02-18-23 @ 07:25)  Creatinine, Serum: 7.35 mg/dL (02-17-23 @ 08:30)  Creatinine, Serum: 7.27 mg/dL (02-16-23 @ 06:00)  Creatinine, Serum: 7.34 mg/dL (02-15-23 @ 22:00)  Creatinine, Serum: 6.97 mg/dL (02-15-23 @ 11:30)      =======================================================

## 2023-02-20 NOTE — PROGRESS NOTE ADULT - ASSESSMENT
66y/o M with past medical history significant for hypertension, diabetes, hyperlipidemia, CAD, CVA and chronic kidney disease progressed to an advanced stage V who was seen yesterday at outpatient nephrology office for f/u of his CKD. Labs ordered yesterday were significant for K 5.7, Scr 7.07 and egFR 8ml/min. Sent to ER for initiation of HD.    1.  ESRD, progressive Chronic kidney disease was likely multifactorial secondary to, diabetes, hypertension, renoatherosclerotic disease.   3rd HD session today  Seen on HD.  Qd, Qb, UF rate reviewed.  Vitals stable.  Access working well.  Patient tolerating HD well.  -He has AVF placed in left wrist on Jan 19 currently not mature for cannulation  -Social work for outpatient placement for HD.    2.  Hypertension controlled. BP WNL.  3.  Metabolic acidosis. improved on HD  4.  Anemia.  iron deficiency+ anemia of chronic kidney disease. Got transfusion 2/16. Hgb stable now. Will continue on JACK  w/ HD.  5.  Hyperkalemia.. resolved  6.  Mineral bone disease of chronic kidney disease. Will continue on calcitriol.  7.  Edema.   UF on HD  8. Access bleeding stabilized.      Stable for discharge from nephrology standpoint when outpatient HD chair is confirmed.

## 2023-02-20 NOTE — PROGRESS NOTE ADULT - ASSESSMENT
67 yr old male with hypertension, hyperlipidemia, diabetes mellitus, CKD stage 5, CVA, ischemic cardiomyopathy, left AVF placed in 1/2023 was sent in for evaluation by his nephrologist for tunneled HD catheter placement and initiation of HD as he was noted to have uremic symptoms hyperkalemia, edema. He was evaluated by nephrology in ED, IR was consulted for tunneled catheter placement. His home anti hypertensives were initiated. He underwent HD catheter placement by IR on 2/16, noted to have bleeding post procedure, which subsequently resolved. His repeat CBC was stable.    CKD stage 5, uremia, started on HD  S/p catheter placement 2/16  HD initiated 2/17 given bleeding from catheter site  nephrology following   continue Sodium bicarbonate  plan for next HD tomorrow     Hypertension  Continue Hydralazine, Isosorbide, Amlodipine, Clonidine, Coreg  monitor BP    Hyperlipidemia  Continue statin    Diabetes mellitus  Continue Lantus and pre meal insulin   monitor FS, sliding scale coverage    CAD, CVA, PAD  Continue aspirin and statin    Acute anemia sec blood loss from catheter site  S/p 2 units PRBC  hemostasis achieved  Monitor CBC     Depression  has been crying per family  I recommended starting an anti depressant but pt and family wish to see a Psychiatrist here   Psych consult placed     DVT ppx: SCDs    Discussed with patient and wife   social work following to assist with HD seat in community.

## 2023-02-20 NOTE — PROGRESS NOTE ADULT - SUBJECTIVE AND OBJECTIVE BOX
[facility] Division of Hospital Medicine    Chief Complaint:  CKD stage 5     SUBJECTIVE / OVERNIGHT EVENTS:   Pt reports he feels tired, depressed   was crying all weekend     Patient denies chest pain, SOB, abd pain, N/V, fever, chills, dysuria or any other complaints. All remainder ROS negative.     MEDICATIONS  (STANDING):  amLODIPine   Tablet 10 milliGRAM(s) Oral daily  aspirin  chewable 81 milliGRAM(s) Oral daily  atorvastatin 80 milliGRAM(s) Oral at bedtime  calcitriol   Capsule 0.25 MICROGram(s) Oral daily  carvedilol 25 milliGRAM(s) Oral every 12 hours  chlorhexidine 2% Cloths 1 Application(s) Topical <User Schedule>  cloNIDine 0.1 milliGRAM(s) Oral two times a day  dextrose 5%. 1000 milliLiter(s) (50 mL/Hr) IV Continuous <Continuous>  dextrose 50% Injectable 25 Gram(s) IV Push once  epoetin dilia-epbx (RETACRIT) Injectable 08215 Unit(s) IV Push <User Schedule>  glucagon  Injectable 1 milliGRAM(s) IntraMuscular once  hydrALAZINE 50 milliGRAM(s) Oral every 8 hours  insulin glargine Injectable (LANTUS) 5 Unit(s) SubCutaneous at bedtime  insulin lispro (ADMELOG) corrective regimen sliding scale   SubCutaneous three times a day before meals  insulin lispro Injectable (ADMELOG) 3 Unit(s) SubCutaneous three times a day before meals  isosorbide   dinitrate Tablet (ISORDIL) 20 milliGRAM(s) Oral three times a day  magnesium oxide 400 milliGRAM(s) Oral daily  pantoprazole    Tablet 40 milliGRAM(s) Oral before breakfast    MEDICATIONS  (PRN):  acetaminophen     Tablet .. 650 milliGRAM(s) Oral every 6 hours PRN Temp greater or equal to 38C (100.4F), Mild Pain (1 - 3)  aluminum hydroxide/magnesium hydroxide/simethicone Suspension 30 milliLiter(s) Oral every 4 hours PRN Dyspepsia  dextrose Oral Gel 15 Gram(s) Oral once PRN Blood Glucose LESS THAN 70 milliGRAM(s)/deciliter  melatonin 3 milliGRAM(s) Oral at bedtime PRN Insomnia  ondansetron Injectable 4 milliGRAM(s) IV Push every 8 hours PRN Nausea and/or Vomiting  sodium chloride 0.9% lock flush 10 milliLiter(s) IV Push every 1 hour PRN Pre/post blood products, medications, blood draw, and to maintain line patency        I&O's Summary    19 Feb 2023 07:01  -  20 Feb 2023 07:00  --------------------------------------------------------  IN: 0 mL / OUT: 500 mL / NET: -500 mL    20 Feb 2023 07:01  -  20 Feb 2023 12:11  --------------------------------------------------------  IN: 0 mL / OUT: 1000 mL / NET: -1000 mL        PHYSICAL EXAM:  Vital Signs Last 24 Hrs  T(C): 36.6 (20 Feb 2023 11:25), Max: 37.4 (19 Feb 2023 16:39)  T(F): 97.9 (20 Feb 2023 11:25), Max: 99.3 (19 Feb 2023 16:39)  HR: 69 (20 Feb 2023 11:25) (58 - 77)  BP: 142/69 (20 Feb 2023 11:25) (105/90 - 152/76)  BP(mean): --  RR: 16 (20 Feb 2023 11:25) (16 - 18)  SpO2: 94% (20 Feb 2023 11:25) (93% - 100%)    Parameters below as of 20 Feb 2023 11:25  Patient On (Oxygen Delivery Method): room air        CONSTITUTIONAL: NAD, sitting up in bed  ENMT: Moist oral mucosa, EOMI   RIJ HD cath   RESPIRATORY: Normal respiratory effort; lungs are clear to auscultation bilaterally  CARDIOVASCULAR: Regular rate and rhythm, normal S1 and S2   ABDOMEN: Nontender to palpation, normoactive bowel sounds  MUSCULOSKELETAL:  No clubbing or cyanosis of digits   PSYCH: A+O to person, place, and time; flat affect   NEUROLOGY: No gross sensory or motor deficits   SKIN: warm and dry       LABS:                        8.4    8.01  )-----------( 189      ( 20 Feb 2023 06:43 )             25.9     02-20    139  |  102  |  48.9<H>  ----------------------------<  90  4.7   |  25.0  |  7.03<H>    Ca    8.0<L>      20 Feb 2023 06:43        CAPILLARY BLOOD GLUCOSE      POCT Blood Glucose.: 78 mg/dL (20 Feb 2023 10:38)  POCT Blood Glucose.: 100 mg/dL (20 Feb 2023 07:13)  POCT Blood Glucose.: 122 mg/dL (19 Feb 2023 21:33)  POCT Blood Glucose.: 128 mg/dL (19 Feb 2023 16:34)

## 2023-02-21 ENCOUNTER — TRANSCRIPTION ENCOUNTER (OUTPATIENT)
Age: 68
End: 2023-02-21

## 2023-02-21 ENCOUNTER — NON-APPOINTMENT (OUTPATIENT)
Age: 68
End: 2023-02-21

## 2023-02-21 VITALS
HEART RATE: 60 BPM | OXYGEN SATURATION: 96 % | DIASTOLIC BLOOD PRESSURE: 62 MMHG | RESPIRATION RATE: 17 BRPM | SYSTOLIC BLOOD PRESSURE: 124 MMHG | TEMPERATURE: 98 F

## 2023-02-21 LAB
ANION GAP SERPL CALC-SCNC: 10 MMOL/L — SIGNIFICANT CHANGE UP (ref 5–17)
BASOPHILS # BLD AUTO: 0.07 K/UL — SIGNIFICANT CHANGE UP (ref 0–0.2)
BASOPHILS NFR BLD AUTO: 0.8 % — SIGNIFICANT CHANGE UP (ref 0–2)
BUN SERPL-MCNC: 27 MG/DL — HIGH (ref 8–20)
CALCIUM SERPL-MCNC: 8 MG/DL — LOW (ref 8.4–10.5)
CHLORIDE SERPL-SCNC: 101 MMOL/L — SIGNIFICANT CHANGE UP (ref 96–108)
CO2 SERPL-SCNC: 26 MMOL/L — SIGNIFICANT CHANGE UP (ref 22–29)
CREAT SERPL-MCNC: 4.84 MG/DL — HIGH (ref 0.5–1.3)
EGFR: 12 ML/MIN/1.73M2 — LOW
EOSINOPHIL # BLD AUTO: 0.18 K/UL — SIGNIFICANT CHANGE UP (ref 0–0.5)
EOSINOPHIL NFR BLD AUTO: 2.1 % — SIGNIFICANT CHANGE UP (ref 0–6)
GLUCOSE BLDC GLUCOMTR-MCNC: 110 MG/DL — HIGH (ref 70–99)
GLUCOSE BLDC GLUCOMTR-MCNC: 133 MG/DL — HIGH (ref 70–99)
GLUCOSE SERPL-MCNC: 135 MG/DL — HIGH (ref 70–99)
HCT VFR BLD CALC: 28.6 % — LOW (ref 39–50)
HGB BLD-MCNC: 9.2 G/DL — LOW (ref 13–17)
IMM GRANULOCYTES NFR BLD AUTO: 0.5 % — SIGNIFICANT CHANGE UP (ref 0–0.9)
LYMPHOCYTES # BLD AUTO: 1.53 K/UL — SIGNIFICANT CHANGE UP (ref 1–3.3)
LYMPHOCYTES # BLD AUTO: 17.7 % — SIGNIFICANT CHANGE UP (ref 13–44)
MCHC RBC-ENTMCNC: 27.5 PG — SIGNIFICANT CHANGE UP (ref 27–34)
MCHC RBC-ENTMCNC: 32.2 GM/DL — SIGNIFICANT CHANGE UP (ref 32–36)
MCV RBC AUTO: 85.4 FL — SIGNIFICANT CHANGE UP (ref 80–100)
MONOCYTES # BLD AUTO: 0.69 K/UL — SIGNIFICANT CHANGE UP (ref 0–0.9)
MONOCYTES NFR BLD AUTO: 8 % — SIGNIFICANT CHANGE UP (ref 2–14)
NEUTROPHILS # BLD AUTO: 6.13 K/UL — SIGNIFICANT CHANGE UP (ref 1.8–7.4)
NEUTROPHILS NFR BLD AUTO: 70.9 % — SIGNIFICANT CHANGE UP (ref 43–77)
PLATELET # BLD AUTO: 205 K/UL — SIGNIFICANT CHANGE UP (ref 150–400)
POTASSIUM SERPL-MCNC: 4.5 MMOL/L — SIGNIFICANT CHANGE UP (ref 3.5–5.3)
POTASSIUM SERPL-SCNC: 4.5 MMOL/L — SIGNIFICANT CHANGE UP (ref 3.5–5.3)
RBC # BLD: 3.35 M/UL — LOW (ref 4.2–5.8)
RBC # FLD: 13.6 % — SIGNIFICANT CHANGE UP (ref 10.3–14.5)
SODIUM SERPL-SCNC: 136 MMOL/L — SIGNIFICANT CHANGE UP (ref 135–145)
WBC # BLD: 8.64 K/UL — SIGNIFICANT CHANGE UP (ref 3.8–10.5)
WBC # FLD AUTO: 8.64 K/UL — SIGNIFICANT CHANGE UP (ref 3.8–10.5)

## 2023-02-21 PROCEDURE — 93005 ELECTROCARDIOGRAM TRACING: CPT

## 2023-02-21 PROCEDURE — 77001 FLUOROGUIDE FOR VEIN DEVICE: CPT

## 2023-02-21 PROCEDURE — P9016: CPT

## 2023-02-21 PROCEDURE — C1750: CPT

## 2023-02-21 PROCEDURE — 99261: CPT

## 2023-02-21 PROCEDURE — 99233 SBSQ HOSP IP/OBS HIGH 50: CPT

## 2023-02-21 PROCEDURE — 0225U NFCT DS DNA&RNA 21 SARSCOV2: CPT

## 2023-02-21 PROCEDURE — 99239 HOSP IP/OBS DSCHRG MGMT >30: CPT

## 2023-02-21 PROCEDURE — 87340 HEPATITIS B SURFACE AG IA: CPT

## 2023-02-21 PROCEDURE — 83540 ASSAY OF IRON: CPT

## 2023-02-21 PROCEDURE — 36430 TRANSFUSION BLD/BLD COMPNT: CPT

## 2023-02-21 PROCEDURE — 84466 ASSAY OF TRANSFERRIN: CPT

## 2023-02-21 PROCEDURE — 86706 HEP B SURFACE ANTIBODY: CPT

## 2023-02-21 PROCEDURE — 80048 BASIC METABOLIC PNL TOTAL CA: CPT

## 2023-02-21 PROCEDURE — 86923 COMPATIBILITY TEST ELECTRIC: CPT

## 2023-02-21 PROCEDURE — 84100 ASSAY OF PHOSPHORUS: CPT

## 2023-02-21 PROCEDURE — 85027 COMPLETE CBC AUTOMATED: CPT

## 2023-02-21 PROCEDURE — 83550 IRON BINDING TEST: CPT

## 2023-02-21 PROCEDURE — 82728 ASSAY OF FERRITIN: CPT

## 2023-02-21 PROCEDURE — 83735 ASSAY OF MAGNESIUM: CPT

## 2023-02-21 PROCEDURE — 82962 GLUCOSE BLOOD TEST: CPT

## 2023-02-21 PROCEDURE — 86850 RBC ANTIBODY SCREEN: CPT

## 2023-02-21 PROCEDURE — P9100: CPT

## 2023-02-21 PROCEDURE — 85025 COMPLETE CBC W/AUTO DIFF WBC: CPT

## 2023-02-21 PROCEDURE — 76942 ECHO GUIDE FOR BIOPSY: CPT

## 2023-02-21 PROCEDURE — 36415 COLL VENOUS BLD VENIPUNCTURE: CPT

## 2023-02-21 PROCEDURE — 85610 PROTHROMBIN TIME: CPT

## 2023-02-21 PROCEDURE — 86900 BLOOD TYPING SEROLOGIC ABO: CPT

## 2023-02-21 PROCEDURE — 99223 1ST HOSP IP/OBS HIGH 75: CPT

## 2023-02-21 PROCEDURE — 83036 HEMOGLOBIN GLYCOSYLATED A1C: CPT

## 2023-02-21 PROCEDURE — 86803 HEPATITIS C AB TEST: CPT

## 2023-02-21 PROCEDURE — 86480 TB TEST CELL IMMUN MEASURE: CPT

## 2023-02-21 PROCEDURE — 80053 COMPREHEN METABOLIC PANEL: CPT

## 2023-02-21 PROCEDURE — 86901 BLOOD TYPING SEROLOGIC RH(D): CPT

## 2023-02-21 PROCEDURE — P9059: CPT

## 2023-02-21 PROCEDURE — 71045 X-RAY EXAM CHEST 1 VIEW: CPT

## 2023-02-21 PROCEDURE — 85730 THROMBOPLASTIN TIME PARTIAL: CPT

## 2023-02-21 PROCEDURE — 99285 EMERGENCY DEPT VISIT HI MDM: CPT

## 2023-02-21 PROCEDURE — P9037: CPT

## 2023-02-21 PROCEDURE — 80061 LIPID PANEL: CPT

## 2023-02-21 RX ORDER — INSULIN DEGLUDEC 100 U/ML
40 INJECTION, SOLUTION SUBCUTANEOUS
Qty: 0 | Refills: 0 | DISCHARGE

## 2023-02-21 RX ORDER — FUROSEMIDE 40 MG
1 TABLET ORAL
Qty: 0 | Refills: 0 | DISCHARGE

## 2023-02-21 RX ORDER — SODIUM BICARBONATE 1 MEQ/ML
2 SYRINGE (ML) INTRAVENOUS
Qty: 0 | Refills: 0 | DISCHARGE

## 2023-02-21 RX ADMIN — PANTOPRAZOLE SODIUM 40 MILLIGRAM(S): 20 TABLET, DELAYED RELEASE ORAL at 05:23

## 2023-02-21 RX ADMIN — ISOSORBIDE DINITRATE 20 MILLIGRAM(S): 5 TABLET ORAL at 05:23

## 2023-02-21 RX ADMIN — CHLORHEXIDINE GLUCONATE 1 APPLICATION(S): 213 SOLUTION TOPICAL at 05:24

## 2023-02-21 RX ADMIN — CALCITRIOL 0.25 MICROGRAM(S): 0.5 CAPSULE ORAL at 11:24

## 2023-02-21 RX ADMIN — Medication 81 MILLIGRAM(S): at 11:24

## 2023-02-21 RX ADMIN — Medication 3 UNIT(S): at 08:23

## 2023-02-21 RX ADMIN — CARVEDILOL PHOSPHATE 25 MILLIGRAM(S): 80 CAPSULE, EXTENDED RELEASE ORAL at 11:24

## 2023-02-21 RX ADMIN — Medication 50 MILLIGRAM(S): at 05:23

## 2023-02-21 RX ADMIN — Medication 0.1 MILLIGRAM(S): at 05:23

## 2023-02-21 RX ADMIN — Medication 50 MILLIGRAM(S): at 14:50

## 2023-02-21 RX ADMIN — AMLODIPINE BESYLATE 10 MILLIGRAM(S): 2.5 TABLET ORAL at 05:23

## 2023-02-21 RX ADMIN — ISOSORBIDE DINITRATE 20 MILLIGRAM(S): 5 TABLET ORAL at 11:24

## 2023-02-21 RX ADMIN — Medication 3 UNIT(S): at 12:04

## 2023-02-21 NOTE — DISCHARGE NOTE PROVIDER - HOSPITAL COURSE
67 yr old male with hypertension, hyperlipidemia, diabetes mellitus, CKD stage 5, CVA, ischemic cardiomyopathy, left AVF placed in 1/2023 was sent in for evaluation by his nephrologist for tunneled HD catheter placement and initiation of HD as he was noted to have uremic symptoms hyperkalemia, edema. He was evaluated by nephrology in ED, IR was consulted for tunneled catheter placement. His home anti hypertensives were initiated. He underwent HD catheter placement by IR on 2/16, noted to have bleeding post procedure, which subsequently resolved. His repeat CBC was stable.   CKD stage 5, uremia, started on HD - S/p catheter placement 2/16. HD initiated 2/17 and now has HD seat in the community. Nephrology follow up after DC.   Hypertension  Hyperlipidemia  Diabetes mellitus  CAD, CVA, PAD  Acute anemia sec blood loss from catheter site - S/p 2 units PRBC  Depression - seen by Psych, rec outpt follow up

## 2023-02-21 NOTE — BH CONSULTATION LIAISON ASSESSMENT NOTE - CURRENT MEDICATION
MEDICATIONS  (STANDING):  amLODIPine   Tablet 10 milliGRAM(s) Oral daily  aspirin  chewable 81 milliGRAM(s) Oral daily  atorvastatin 80 milliGRAM(s) Oral at bedtime  calcitriol   Capsule 0.25 MICROGram(s) Oral daily  carvedilol 25 milliGRAM(s) Oral every 12 hours  chlorhexidine 2% Cloths 1 Application(s) Topical <User Schedule>  cloNIDine 0.1 milliGRAM(s) Oral two times a day  dextrose 5%. 1000 milliLiter(s) (50 mL/Hr) IV Continuous <Continuous>  dextrose 50% Injectable 25 Gram(s) IV Push once  epoetin dilia-epbx (RETACRIT) Injectable 71572 Unit(s) IV Push <User Schedule>  glucagon  Injectable 1 milliGRAM(s) IntraMuscular once  hydrALAZINE 50 milliGRAM(s) Oral every 8 hours  insulin glargine Injectable (LANTUS) 5 Unit(s) SubCutaneous at bedtime  insulin lispro (ADMELOG) corrective regimen sliding scale   SubCutaneous three times a day before meals  insulin lispro Injectable (ADMELOG) 3 Unit(s) SubCutaneous three times a day before meals  isosorbide   dinitrate Tablet (ISORDIL) 20 milliGRAM(s) Oral three times a day  pantoprazole    Tablet 40 milliGRAM(s) Oral before breakfast    MEDICATIONS  (PRN):  acetaminophen     Tablet .. 650 milliGRAM(s) Oral every 6 hours PRN Temp greater or equal to 38C (100.4F), Mild Pain (1 - 3)  dextrose Oral Gel 15 Gram(s) Oral once PRN Blood Glucose LESS THAN 70 milliGRAM(s)/deciliter  melatonin 3 milliGRAM(s) Oral at bedtime PRN Insomnia  ondansetron Injectable 4 milliGRAM(s) IV Push every 8 hours PRN Nausea and/or Vomiting  sodium chloride 0.9% lock flush 10 milliLiter(s) IV Push every 1 hour PRN Pre/post blood products, medications, blood draw, and to maintain line patency

## 2023-02-21 NOTE — DISCHARGE NOTE PROVIDER - ATTENDING DISCHARGE PHYSICAL EXAMINATION:
CONSTITUTIONAL: NAD, sitting up in bed  ENMT: Moist oral mucosa, EOMI   R chest HD cath   RESPIRATORY: Normal respiratory effort; lungs are clear to auscultation bilaterally  CARDIOVASCULAR: Regular rate and rhythm, normal S1 and S2   ABDOMEN: Nontender to palpation, normoactive bowel sounds  MUSCULOSKELETAL:  No clubbing or cyanosis of digits   PSYCH: A+O to person, place, and time; flat affect   NEUROLOGY: No gross sensory or motor deficits   SKIN: warm and dry

## 2023-02-21 NOTE — BH CONSULTATION LIAISON ASSESSMENT NOTE - NSBHCHARTREVIEWINVESTIGATE_PSY_A_CORE FT
< from: 12 Lead ECG (02.15.23 @ 16:05) >      Ventricular Rate 64 BPM    Atrial Rate 64 BPM    P-R Interval 246 ms    QRS Duration 112 ms    Q-T Interval 432 ms    QTC Calculation(Bazett) 445 ms    P Axis 46 degrees    R Axis 25 degrees    T Axis -7 degrees    Diagnosis Line Sinus rhythm with 1st degree A-V block  Right bundle branch block  Moderate voltage criteria for LVH, may be normal variant  Inferior infarct , age undetermined  Abnormal ECG    Confirmed by WILL LEONARDO ([id number]) on 2/15/2023 5:45:39 PM    < end of copied text >

## 2023-02-21 NOTE — BH CONSULTATION LIAISON ASSESSMENT NOTE - NSBHCHARTREVIEWVS_PSY_A_CORE FT
Vital Signs Last 24 Hrs  T(C): 36.3 (21 Feb 2023 11:26), Max: 36.6 (21 Feb 2023 06:04)  T(F): 97.4 (21 Feb 2023 11:26), Max: 97.9 (21 Feb 2023 06:04)  HR: 63 (21 Feb 2023 11:26) (62 - 68)  BP: 124/63 (21 Feb 2023 11:26) (124/63 - 176/64)  BP(mean): --  RR: 18 (21 Feb 2023 11:26) (18 - 18)  SpO2: 94% (21 Feb 2023 11:26) (94% - 98%)    Parameters below as of 21 Feb 2023 11:26  Patient On (Oxygen Delivery Method): room air

## 2023-02-21 NOTE — BH CONSULTATION LIAISON ASSESSMENT NOTE - NSBHCONSULTDISCUSS_PSY_A_CORE
[facility] here to take the pt, report given. Pt d/c in stable condition. VSS. Written and verbal instructions given. Reviewed need for f/u and Rx. Answered pt's questions. Pt voiced understanding. Pt ambulatory.    yes

## 2023-02-21 NOTE — PROGRESS NOTE ADULT - PROVIDER SPECIALTY LIST ADULT
Nephrology
Hospitalist
Nephrology
Hospitalist
Nephrology
Hospitalist
Nephrology

## 2023-02-21 NOTE — DISCHARGE NOTE PROVIDER - CARE PROVIDER_API CALL
Sana Laguerre (DO)  Internal Medicine  [address], [address]  [place] [address]  Phone: [phone]  Fax: [phone]  Follow Up Time:

## 2023-02-21 NOTE — DISCHARGE NOTE PROVIDER - NSDCFUADDAPPT_GEN_ALL_CORE_FT
Patient will follow up with following treatment in the community:    Outpatient Dialysis:  [facility]  [address] [place] [address]  Mondays/Wednesdays/Fridays - 3rd Shift - between 1:15pm to 2pm start time  First appointment for intake 2/22 at 1:30pm    [facility]  [address] [place] [address] [phone]  As per request, would like to set up appointment on own. Please call number to schedule intake appointment for mental health follow up.

## 2023-02-21 NOTE — DISCHARGE NOTE PROVIDER - NSDCMRMEDTOKEN_GEN_ALL_CORE_FT
amLODIPine 10 mg oral tablet: 1 tab(s) orally once a day  aspirin 81 mg oral tablet, chewable: 1 tab(s) orally once a day  atorvastatin 80 mg oral tablet: 1 tab(s) orally once a day (at bedtime)  calcitriol 0.25 mcg oral capsule: 1 cap(s) orally once a day  carvedilol 25 mg oral tablet: 1 tab(s) orally every 12 hours  cloNIDine 0.1 mg oral tablet: 1 tab(s) orally 2 times a day  hydrALAZINE 50 mg oral tablet: 1 tab(s) orally every 8 hours   isosorbide dinitrate 20 mg oral tablet: 1 tab(s) orally 3 times a day  magnesium oxide 400 mg oral tablet: 1 tab(s) orally once a day  Protonix 40 mg oral delayed release tablet: 1 tab(s) orally once a day   rolling walker: 1 rolling Walker   Tresiba FlexTouch 100 units/mL subcutaneous solution: 10 unit(s) subcutaneous once a day (at bedtime)

## 2023-02-21 NOTE — BH CONSULTATION LIAISON ASSESSMENT NOTE - HPI (INCLUDE ILLNESS QUALITY, SEVERITY, DURATION, TIMING, CONTEXT, MODIFYING FACTORS, ASSOCIATED SIGNS AND SYMPTOMS)
Patient is a 67 year old male who is unemployed living with grand son, with a history of depression with previous out patient treatment, no past  hospitalizations or suicide attempts and no history of substance abuse with hypertension, hyperlipidemia, diabetes mellitus, CKD stage 5, CVA, ischemic cardiomyopathy, left AVF placed in 1/2023 was sent in for evaluation by his nephrologist for tunneled HD catheter placement and initiation of HD as he was noted to have uremic symptoms hyperkalemia, edema.    Patient seen and evaluated and found to be calm and cooperative. Patient talking of his history of depression and used to be in outpatient treatment over 5 years ago and used to be on meds (cant remember name) but states his depression has worsened again talking of his declining health and stating he cant do things he used to do. Patient reports fair sleep and appetite, denies any s/h ideation, symptoms of ly or AVH

## 2023-02-21 NOTE — BH CONSULTATION LIAISON ASSESSMENT NOTE - SUMMARY
Patient is a 67 year old male who is unemployed living with grand son, with a history of depression with previous out patient treatment, no past  hospitalizations or suicide attempts and no history of substance abuse with hypertension, hyperlipidemia, diabetes mellitus, CKD stage 5, CVA, ischemic cardiomyopathy, left AVF placed in 1/2023 was sent in for evaluation by his nephrologist for tunneled HD catheter placement and initiation of HD as he was noted to have uremic symptoms hyperkalemia, edema.    Patient seen for evaluation of depression. Patient denying any s/h ideation and with no signs of ly or psychosis but with significant depression that would benefit from outpatient follow up   Recommend SW prior to DC to refer to [facility] for outpatient f/u

## 2023-02-21 NOTE — BH CONSULTATION LIAISON ASSESSMENT NOTE - NSBHCHARTREVIEWLAB_PSY_A_CORE FT
Basic Metabolic Panel in AM (02.21.23 @ 05:31)    Sodium, Serum: 136 mmol/L    Potassium, Serum: 4.5 mmol/L    Chloride, Serum: 101: Chloride reference range changed from ..10/26/2022 mmol/L    Carbon Dioxide, Serum: 26.0 mmol/L    Anion Gap, Serum: 10 mmol/L    Blood Urea Nitrogen, Serum: 27.0 mg/dL    Creatinine, Serum: 4.84 mg/dL    Glucose, Serum: 135 mg/dL    Calcium, Total Serum: 8.0: Prior Reference Range of 8.6 – 10.2 was amended as of 7/26/2022 to 8.4 –  10.5. mg/dL    eGFR: 12: The estimated glomerular filtration rate (eGFR) is calculated using the  2021 CKD-EPI creatinine equation, which does not have a coefficient for  race and is validated in individuals 18 years of age and older (N Engl J  Med 2021; 385:0406-8118). Creatinine-based eGFR may be inaccurate in  various situations including but not limited to extremes of muscle mass,  altered dietary protein intake, or medications that affect renal tubular  creatinine secretion. mL/min/1.73m2

## 2023-02-21 NOTE — DISCHARGE NOTE NURSING/CASE MANAGEMENT/SOCIAL WORK - NSDCPEFALRISK_GEN_ALL_CORE
For information on Fall & Injury Prevention, visit: [email or web address] OR  [email or web address] OR  https://www.cdc.gov/steadi/patient.html

## 2023-02-21 NOTE — PROGRESS NOTE ADULT - REASON FOR ADMISSION
Acute on chronic ckd
progressive CKD Stage 5 associated with electrolyte derangement and uremic symptoms
Acute on chronic ckd

## 2023-02-21 NOTE — DISCHARGE NOTE PROVIDER - NSDCFUSCHEDAPPT_GEN_ALL_CORE_FT
Binu Craig  [facility]  VASCULAR [address]  Scheduled Appointment: 03/22/2023

## 2023-02-21 NOTE — PROGRESS NOTE ADULT - SUBJECTIVE AND OBJECTIVE BOX
[facility] DIVISION OF KIDNEY DISEASES AND HYPERTENSION -- FOLLOW UP NOTE  --------------------------------------------------------------------------------  Chief Complaint: Feels well, No C/O,    Not orthopneic,     24 hour events/subjective:    PAST HISTORY  --------------------------------------------------------------------------------  No significant changes to PMH, PSH, FHx, SHx, unless otherwise noted    ALLERGIES & MEDICATIONS  --------------------------------------------------------------------------------  Allergies    No Known Allergies    Intolerances    Standing Inpatient Medications  amLODIPine   Tablet 10 milliGRAM(s) Oral daily  aspirin  chewable 81 milliGRAM(s) Oral daily  atorvastatin 80 milliGRAM(s) Oral at bedtime  calcitriol   Capsule 0.25 MICROGram(s) Oral daily  carvedilol 25 milliGRAM(s) Oral every 12 hours  chlorhexidine 2% Cloths 1 Application(s) Topical <User Schedule>  cloNIDine 0.1 milliGRAM(s) Oral two times a day  dextrose 5%. 1000 milliLiter(s) IV Continuous <Continuous>  dextrose 50% Injectable 25 Gram(s) IV Push once  epoetin dilia-epbx (RETACRIT) Injectable 51555 Unit(s) IV Push <User Schedule>  glucagon  Injectable 1 milliGRAM(s) IntraMuscular once  hydrALAZINE 50 milliGRAM(s) Oral every 8 hours  insulin glargine Injectable (LANTUS) 5 Unit(s) SubCutaneous at bedtime  insulin lispro (ADMELOG) corrective regimen sliding scale   SubCutaneous three times a day before meals  insulin lispro Injectable (ADMELOG) 3 Unit(s) SubCutaneous three times a day before meals  isosorbide   dinitrate Tablet (ISORDIL) 20 milliGRAM(s) Oral three times a day  pantoprazole    Tablet 40 milliGRAM(s) Oral before breakfast    PRN Inpatient Medications  acetaminophen     Tablet .. 650 milliGRAM(s) Oral every 6 hours PRN  aluminum hydroxide/magnesium hydroxide/simethicone Suspension 30 milliLiter(s) Oral every 4 hours PRN  dextrose Oral Gel 15 Gram(s) Oral once PRN  melatonin 3 milliGRAM(s) Oral at bedtime PRN  ondansetron Injectable 4 milliGRAM(s) IV Push every 8 hours PRN  sodium chloride 0.9% lock flush 10 milliLiter(s) IV Push every 1 hour PRN    REVIEW OF SYSTEMS  --------------------------------------------------------------------------------  Gen: No weight changes, fatigue, fevers/chills, weakness  Skin: No rashes  Head/Eyes/Ears/Mouth: No headache; Normal hearing; Normal vision w/o blurriness; No sinus pain/discomfort, sore throat  Respiratory: No dyspnea, cough, wheezing, hemoptysis  CV: No chest pain, PND, orthopnea  GI: No abdominal pain, diarrhea, constipation, nausea, vomiting, melena, hematochezia  : No increased frequency, dysuria, hematuria, nocturia  MSK: No joint pain/swelling; no back pain; no edema  Neuro: No dizziness/lightheadedness, weakness, seizures, numbness, tingling  Heme: No easy bruising or bleeding  Endo: No heat/cold intolerance  Psych: No significant nervousness, anxiety, stress, depression    All other systems were reviewed and are negative, except as noted.    VITALS/PHYSICAL EXAM  --------------------------------------------------------------------------------  T(C): 36.6 (23 @ 06:04), Max: 36.6 (23 @ 11:25)  HR: 62 (23 @ 06:04) (62 - 69)  BP: 140/67 (23 @ 06:04) (105/90 - 176/64)  RR: 18 (23 @ 06:04) (16 - 18)  SpO2: 94% (23 @ 06:04) (94% - 100%)    23 @ 07:01  -  23 @ 07:00  --------------------------------------------------------  IN: 0 mL / OUT: 1000 mL / NET: -1000 mL    Physical Exam:  	Gen: NAD, well-appearing  	HEENT: PERRL, supple neck, clear oropharynx  	Pulm: CTA B/L  	CV: RRR, S1S2; no rub  	Back: No spinal or CVA tenderness; no sacral edema  	Abd: +BS, soft, nontender/nondistended  	: No suprapubic tenderness  	UE: Warm, FROM, no clubbing, intact strength; no edema; no asterixis  	LE: Warm, FROM, no clubbing, intact strength; no edema  	Neuro: No focal deficits, intact gait  	Psych: Normal affect and mood  	Skin: Warm, without rashes  	Vascular access:    LABS/STUDIES  --------------------------------------------------------------------------------              9.2    8.64  >-----------<  205      [23 @ 05:31]              28.6     136  |  101  |  27.0  ----------------------------<  135      [23 @ 05:31]  4.5   |  26.0  |  4.84        Ca     8.0     [23:31]    Creatinine Trend:  SCr 4.84 [:31]  SCr 7.03 [ 06:43]  SCr 5.70 [ 06:54]  SCr 5.88 [ 07:25]  SCr 7.35 [ 08:30]    Iron 35, TIBC 252, %sat 14      [23 @ 06:00]  Ferritin 28      [23 @ 06:00]  Lipid: chol 115, , HDL 21, LDL --      [23 @ 06:00]    HBsAb <3.0      [02-15-23 @ 11:30]  HBsAg Nonreact      [02-15-23 @ 11:30]  HCV 0.10, Nonreact      [02-15-23 @ 11:30]    IR US Guided Needle Placement (23 @ 08:55)    ACC: [id number] EXAM:  SP FLUOR GUID CV CATH PROC SI   ORDERED BY: NAHED HUBBARD     ACC: [id number] EXAM:  SP US GUID PLC NDL SI   ORDERED BY: NAHED HUBBARD     PROCEDURE DATE:  2023      INTERPRETATION:  History: End-stage renal disease.    : Dr. Romo.    Assistant: Nav ROONEY    Sedation: Monitored anesthesia care.    Contrast: None    Anesthesia: 1% subcutaneous Lidocaine    Complication: None..    Procedure: The procedure, risks, benefits, and alternatives were   discussed with the patient in lay man's terms and informed consent was   placed in the chart.    The patient was placed in the supine position on the angio suite bed and   a time out was performed.    The patient's right anterior chest wall and neck were prepped and draped   in normal sterile fashion. Access to the right internal jugular vein was   attained through  anesthetized skin utilizing a 4 Sami micropuncture   set under ultrasound guidance.    The tunnel site was chosen in the chest and  anesthetized. Dermatotomy   was performed. The tunnel tract was anesthetized. The Perma-Cath was   tunneled to the tenotomy site. Through the micropuncture sheath, an   Amplatz wire was advanced into the inferior vena cava under fluoroscopic   guidance. After sequential dilatation over the  Amplatz wire under    fluoroscopic guidance, the Perma-Cath was advanced to the peel-away   sheath into the right atrium.. Peel-away sheath was removed.    The venous access site was closed with Dermabond. The catheter was   secured in place with silk suture and sterile dressing was applied. The   catheter were flushed with heparinized saline.    The patient  tolerated the procedure without complication and left the   suite in stable condition.    FINDINGS: There is a right-sided Perma-Cath in the region of the SVC   right atrial junction. No pneumothorax is seen. Ultrasound shows patency   of the right internal jugular vein.    Impression:  Successful placement of a 19cm cuff to tip Perma-Cath in the right   internal jugular vein.    MARIANO ROMO MD; Attending Interventional Radiologist  This document has been electronically signed. 2023  3:28PM    · Procedure Name	Interventional Radiology  · Procedure Name	Tunneled HD catheter  · TIME OUT	Patient's first and last name, , procedure, and correct site confirmed prior to the start of procedure.  · Procedure Date/Time	2023 09:18  · Informed Consent	Benefits, risks, and possible complications of procedure explained to patient/caregiver who verbalized understanding and gave written consent.  · Procedure Performed By	Myself  · Procedure Assisted By	Attending, Edgardo ISLAS  · Procedure Findings and Details	19cm Tunnelled HD Catheter placed into Right IJV under US and fluoroscopic guidance. Flushed w heparin. Tip at cavoatrial junction, OK to use.    Please call Interventional Radiology with any questions, concerns, or issues.    67 yr old male with hypertension, hyperlipidemia, diabetes mellitus, CKD stage 5, CVA, ischemic cardiomyopathy, left AVF placed in 2023 was sent in for evaluation by his nephrologist for tunneled HD catheter placement and initiation of HD as he was noted to have uremic symptoms hyperkalemia, edema. He was evaluated by nephrology in ED, IR was consulted for tunneled catheter placement. His home anti hypertensives were initiated. He underwent HD catheter placement by IR on , noted to have bleeding post procedure, which subsequently resolved. His repeat CBC was stable.    CKD stage 5, uremia, started on HD  S/p catheter placement   HD initiated  given bleeding from catheter site  D.C  Sodium bicarbonate    Hypertension  On Hydralazine, Isosorbide, Amlodipine, Clonidine, Coreg  Maintain Euvolemia , Post HD,     Hyperlipidemia    Diabetes mellitus    CAD, CVA, PAD    Acute anemia sec blood loss from catheter site  S/p 2 units PRBC Transfusion , On JACK,

## 2023-02-21 NOTE — PROGRESS NOTE ADULT - ASSESSMENT
68y/o M with past medical history significant for hypertension, diabetes, hyperlipidemia, CAD, CVA and chronic kidney disease progressed to an advanced stage V who was seen yesterday at outpatient nephrology office for f/u of his CKD. Labs ordered yesterday were significant for K 5.7, Scr 7.07 and egFR 8ml/min. Sent to ER for initiation of HD.    1.  ESRD, progressive Chronic kidney disease was likely multifactorial secondary to, diabetes, hypertension, renoatherosclerotic disease.   3rd HD session today  Seen on HD.  Qd, Qb, UF rate reviewed.  Vitals stable.  Access working well.  Patient tolerating HD well.  -He has AVF placed in left wrist on Jan 19 currently not mature for cannulation  -Social work for outpatient placement for HD.    2.  Hypertension controlled. BP WNL.  3.  Metabolic acidosis. improved  on HD  4.  Anemia.  iron deficiency+ anemia of chronic kidney disease. Got transfusion 2/16. Hgb stable now. Will continue on JACK  w/ HD.  5.  Hyperkalemia.. resolved  6.  Mineral bone disease of chronic kidney disease. Will continue on calcitriol.  7.  Edema.   UF on HD  8.  Access bleeding stabilized.   HD in AM,     Stable for discharge from nephrology standpoint when outpatient HD chair is confirmed.

## 2023-02-21 NOTE — DISCHARGE NOTE PROVIDER - NSDCCPCAREPLAN_GEN_ALL_CORE_FT
PRINCIPAL DISCHARGE DIAGNOSIS  Diagnosis: ESRD needing dialysis  Assessment and Plan of Treatment: S/p catheter placement 2/16.   HD initiated 2/17 and now has HD seat in the community.   Please follow up with Nephrology after DC.      SECONDARY DISCHARGE DIAGNOSES  Diagnosis: Anemia of chronic disease  Assessment and Plan of Treatment: You received 2 units of blood in the hospital  Please have blood work per Nephrology and PCP as outpt    Diagnosis: Other depression  Assessment and Plan of Treatment: Please follow up with Therapist as outpt per Psychiatry recs

## 2023-02-21 NOTE — DISCHARGE NOTE NURSING/CASE MANAGEMENT/SOCIAL WORK - PATIENT PORTAL LINK FT
You can access the FollowMyHealth Patient Portal offered by [facility] by registering at the following website: [email or web address]. By joining [employer]’s FollowMyHealth portal, you will also be able to view your health information using other applications (apps) compatible with our system.

## 2023-02-21 NOTE — BH CONSULTATION LIAISON ASSESSMENT NOTE - NSICDXPASTMEDICALHX_GEN_ALL_CORE_FT
PAST MEDICAL HISTORY:  Acute on chronic congestive heart failure, unspecified congestive heart failure type     Cerebrovascular accident (CVA)     DM (diabetes mellitus)     ESRD (end stage renal disease)     High cholesterol     HTN (hypertension)     Myocardial infarction (Coronary angiogram 2014)

## 2023-02-21 NOTE — DISCHARGE NOTE NURSING/CASE MANAGEMENT/SOCIAL WORK - NSDCFUADDAPPT_GEN_ALL_CORE_FT
Patient will follow up with following treatment in the community:    Outpatient Dialysis:  [facility]  [address] [place] [address]  Mondays/Wednesdays/Fridays - 3rd Shift - between 1:15pm to 2pm start time  First appointment for intake 2/22 at 1:30pm    [facility]  [address] [place] [id number] [phone]  As per request, would like to set up appointment on own. Please call number to schedule intake appointment for mental health follow up.

## 2023-02-22 ENCOUNTER — NON-APPOINTMENT (OUTPATIENT)
Age: 68
End: 2023-02-22

## 2023-02-26 NOTE — ASSESSMENT
[FreeTextEntry1] : 68 year old male with HTN, HLD, DM, CAD here for discussion of labs and evaluation of renal function. \par -He has advanced CKD and wants to change care to [facility] system\par -He has been under transplant evaluation at [place] [facility]\par -Has AVF maturing, placed on Jan 19\par -C/o excessive day time somnolence, fatigue\par -Last labs obtained w/ SCr of 6.3, GFR 9 \par \par \par We discussed in detail as patient has some symptoms of initial uremic equivalents\par All options were discussed, patient and family wishes to initiate on HD electively\par The AVF is not mature yet and therefore patient will need THDC placement\par I have ordered labs and will follow. Plan is to call tomorrow for possible initiation of HD. Will follow labs and call  Janna patients daughter (as preferred by the patient w/ results to discuss further plans).

## 2023-02-26 NOTE — HISTORY OF PRESENT ILLNESS
[FreeTextEntry1] : HPI: Patient is a 68 year old male with HTN, HLD, DM, CAD here for discussion of labs and evaluation of renal function. \par -He has advanced CKD and wants to change care to [facility]\par -He has been under transplant evaluation at [facility]\par -Has AVF maturing, placed on Jan 19\par -C/o excessive day time somnolence, fatigue\par -Last labs obtained w/ SCr of 6.3, GFR 9 \par \par Denies CP, palpitation, HA, itching, focal weakness, SOB.

## 2023-03-16 ENCOUNTER — RESULT CHARGE (OUTPATIENT)
Age: 68
End: 2023-03-16

## 2023-03-16 ENCOUNTER — EMERGENCY (EMERGENCY)
Facility: HOSPITAL | Age: 68
LOS: 1 days | Discharge: HOME | End: 2023-03-16
Attending: EMERGENCY MEDICINE
Payer: MEDICARE

## 2023-03-16 ENCOUNTER — APPOINTMENT (OUTPATIENT)
Dept: FAMILY MEDICINE | Facility: CLINIC | Age: 68
End: 2023-03-16
Payer: MEDICARE

## 2023-03-16 VITALS
HEART RATE: 80 BPM | SYSTOLIC BLOOD PRESSURE: 110 MMHG | WEIGHT: 164 LBS | BODY MASS INDEX: 28 KG/M2 | DIASTOLIC BLOOD PRESSURE: 78 MMHG | HEIGHT: 64 IN | TEMPERATURE: 98.1 F | RESPIRATION RATE: 12 BRPM | OXYGEN SATURATION: 96 %

## 2023-03-16 VITALS
TEMPERATURE: 97 F | DIASTOLIC BLOOD PRESSURE: 82 MMHG | HEIGHT: 64 IN | HEART RATE: 108 BPM | OXYGEN SATURATION: 94 % | RESPIRATION RATE: 18 BRPM | SYSTOLIC BLOOD PRESSURE: 160 MMHG

## 2023-03-16 DIAGNOSIS — Z95.1 PRESENCE OF AORTOCORONARY BYPASS GRAFT: Chronic | ICD-10-CM

## 2023-03-16 DIAGNOSIS — N18.4 CHRONIC KIDNEY DISEASE, STAGE 4 (SEVERE): ICD-10-CM

## 2023-03-16 PROCEDURE — 93010 ELECTROCARDIOGRAM REPORT: CPT

## 2023-03-16 PROCEDURE — 99214 OFFICE O/P EST MOD 30 MIN: CPT | Mod: 25

## 2023-03-16 PROCEDURE — 36415 COLL VENOUS BLD VENIPUNCTURE: CPT

## 2023-03-16 PROCEDURE — 99284 EMERGENCY DEPT VISIT MOD MDM: CPT

## 2023-03-16 PROCEDURE — 82962 GLUCOSE BLOOD TEST: CPT

## 2023-03-16 RX ORDER — CALCITRIOL 0.5 UG/1
0.5 CAPSULE, LIQUID FILLED ORAL
Qty: 90 | Refills: 0 | Status: DISCONTINUED | COMMUNITY
Start: 2022-10-12 | End: 2023-03-16

## 2023-03-16 RX ORDER — SODIUM BICARBONATE 650 MG/1
650 TABLET ORAL 3 TIMES DAILY
Qty: 540 | Refills: 0 | Status: COMPLETED | COMMUNITY
Start: 2022-04-05 | End: 2023-03-16

## 2023-03-16 RX ORDER — FUROSEMIDE 40 MG/1
40 TABLET ORAL DAILY
Refills: 0 | Status: COMPLETED | COMMUNITY
End: 2023-03-16

## 2023-03-16 NOTE — ED ADULT TRIAGE NOTE - CHIEF COMPLAINT QUOTE
BIBEMS form home for near syncopal episode. As per wife who witnessed the episode, the pt became diaphoretic and almost synopsized at the dinner table. Pt denies chest pain and SOB, but endorses some epigastric pain. HD patient, sessions M/W/F. EKG done in triage.

## 2023-03-17 LAB
ALBUMIN SERPL ELPH-MCNC: 3.6 G/DL — SIGNIFICANT CHANGE UP (ref 3.3–5.2)
ALP SERPL-CCNC: 150 U/L — HIGH (ref 40–120)
ALT FLD-CCNC: 22 U/L — SIGNIFICANT CHANGE UP
ANION GAP SERPL CALC-SCNC: 13 MMOL/L — SIGNIFICANT CHANGE UP (ref 5–17)
APPEARANCE UR: CLEAR — SIGNIFICANT CHANGE UP
AST SERPL-CCNC: 19 U/L — SIGNIFICANT CHANGE UP
BACTERIA # UR AUTO: NEGATIVE — SIGNIFICANT CHANGE UP
BASOPHILS # BLD AUTO: 0.05 K/UL — SIGNIFICANT CHANGE UP (ref 0–0.2)
BASOPHILS NFR BLD AUTO: 0.4 % — SIGNIFICANT CHANGE UP (ref 0–2)
BILIRUB SERPL-MCNC: 0.3 MG/DL — LOW (ref 0.4–2)
BILIRUB UR-MCNC: NEGATIVE — SIGNIFICANT CHANGE UP
BUN SERPL-MCNC: 36.5 MG/DL — HIGH (ref 8–20)
CALCIUM SERPL-MCNC: 9.4 MG/DL — SIGNIFICANT CHANGE UP (ref 8.4–10.5)
CHLORIDE SERPL-SCNC: 97 MMOL/L — SIGNIFICANT CHANGE UP (ref 96–108)
CO2 SERPL-SCNC: 29 MMOL/L — SIGNIFICANT CHANGE UP (ref 22–29)
COLOR SPEC: YELLOW — SIGNIFICANT CHANGE UP
CREAT SERPL-MCNC: 6.06 MG/DL — HIGH (ref 0.5–1.3)
DIFF PNL FLD: NEGATIVE — SIGNIFICANT CHANGE UP
EGFR: 9 ML/MIN/1.73M2 — LOW
EOSINOPHIL # BLD AUTO: 0.1 K/UL — SIGNIFICANT CHANGE UP (ref 0–0.5)
EOSINOPHIL NFR BLD AUTO: 0.8 % — SIGNIFICANT CHANGE UP (ref 0–6)
EPI CELLS # UR: SIGNIFICANT CHANGE UP
GLUCOSE SERPL-MCNC: 112 MG/DL — HIGH (ref 70–99)
GLUCOSE UR QL: 50 MG/DL
HCT VFR BLD CALC: 38.3 % — LOW (ref 39–50)
HGB BLD-MCNC: 12.7 G/DL — LOW (ref 13–17)
IMM GRANULOCYTES NFR BLD AUTO: 0.6 % — SIGNIFICANT CHANGE UP (ref 0–0.9)
KETONES UR-MCNC: NEGATIVE — SIGNIFICANT CHANGE UP
LEUKOCYTE ESTERASE UR-ACNC: NEGATIVE — SIGNIFICANT CHANGE UP
LYMPHOCYTES # BLD AUTO: 1.7 K/UL — SIGNIFICANT CHANGE UP (ref 1–3.3)
LYMPHOCYTES # BLD AUTO: 13.6 % — SIGNIFICANT CHANGE UP (ref 13–44)
MCHC RBC-ENTMCNC: 27.5 PG — SIGNIFICANT CHANGE UP (ref 27–34)
MCHC RBC-ENTMCNC: 33.2 GM/DL — SIGNIFICANT CHANGE UP (ref 32–36)
MCV RBC AUTO: 82.9 FL — SIGNIFICANT CHANGE UP (ref 80–100)
MONOCYTES # BLD AUTO: 0.53 K/UL — SIGNIFICANT CHANGE UP (ref 0–0.9)
MONOCYTES NFR BLD AUTO: 4.3 % — SIGNIFICANT CHANGE UP (ref 2–14)
NEUTROPHILS # BLD AUTO: 10.01 K/UL — HIGH (ref 1.8–7.4)
NEUTROPHILS NFR BLD AUTO: 80.3 % — HIGH (ref 43–77)
NITRITE UR-MCNC: NEGATIVE — SIGNIFICANT CHANGE UP
PH UR: 7 — SIGNIFICANT CHANGE UP (ref 5–8)
PLATELET # BLD AUTO: 305 K/UL — SIGNIFICANT CHANGE UP (ref 150–400)
POTASSIUM SERPL-MCNC: 3.5 MMOL/L — SIGNIFICANT CHANGE UP (ref 3.5–5.3)
POTASSIUM SERPL-SCNC: 3.5 MMOL/L — SIGNIFICANT CHANGE UP (ref 3.5–5.3)
PROT SERPL-MCNC: 7 G/DL — SIGNIFICANT CHANGE UP (ref 6.6–8.7)
PROT UR-MCNC: 500 MG/DL
RAPID RVP RESULT: SIGNIFICANT CHANGE UP
RBC # BLD: 4.62 M/UL — SIGNIFICANT CHANGE UP (ref 4.2–5.8)
RBC # FLD: 13.8 % — SIGNIFICANT CHANGE UP (ref 10.3–14.5)
RBC CASTS # UR COMP ASSIST: SIGNIFICANT CHANGE UP /HPF (ref 0–4)
SARS-COV-2 RNA SPEC QL NAA+PROBE: SIGNIFICANT CHANGE UP
SODIUM SERPL-SCNC: 139 MMOL/L — SIGNIFICANT CHANGE UP (ref 135–145)
SP GR SPEC: 1.01 — SIGNIFICANT CHANGE UP (ref 1.01–1.02)
UROBILINOGEN FLD QL: NEGATIVE MG/DL — SIGNIFICANT CHANGE UP
WBC # BLD: 12.46 K/UL — HIGH (ref 3.8–10.5)
WBC # FLD AUTO: 12.46 K/UL — HIGH (ref 3.8–10.5)
WBC UR QL: SIGNIFICANT CHANGE UP /HPF (ref 0–5)

## 2023-03-17 PROCEDURE — 81001 URINALYSIS AUTO W/SCOPE: CPT

## 2023-03-17 PROCEDURE — 85025 COMPLETE CBC W/AUTO DIFF WBC: CPT

## 2023-03-17 PROCEDURE — 87086 URINE CULTURE/COLONY COUNT: CPT

## 2023-03-17 PROCEDURE — 80053 COMPREHEN METABOLIC PANEL: CPT

## 2023-03-17 PROCEDURE — 93010 ELECTROCARDIOGRAM REPORT: CPT

## 2023-03-17 PROCEDURE — 93005 ELECTROCARDIOGRAM TRACING: CPT

## 2023-03-17 PROCEDURE — T1013: CPT

## 2023-03-17 PROCEDURE — 99283 EMERGENCY DEPT VISIT LOW MDM: CPT

## 2023-03-17 PROCEDURE — 36415 COLL VENOUS BLD VENIPUNCTURE: CPT

## 2023-03-17 PROCEDURE — 0225U NFCT DS DNA&RNA 21 SARSCOV2: CPT

## 2023-03-17 NOTE — ED PROVIDER NOTE - NSFOLLOWUPINSTRUCTIONS_ED_ALL_ED_FT
Follow up with your primary care provider.    Come back for fever, chills, nausea, vomiting, abdominal pain, chest pain.

## 2023-03-17 NOTE — ED PROVIDER NOTE - ATTENDING CONTRIBUTION TO CARE
David: I performed a face to face bedside interview with patient regarding history of present illness, review of symptoms and past medical history. I completed an independent physical exam and ordered tests/medications as needed.  I have discussed patient's plan of care with the resident. The resident assisted in  executing the discussed plan. I was available for any questions or issues that may have arose during the execution of the plan of care.

## 2023-03-17 NOTE — ED ADULT NURSE NOTE - OBJECTIVE STATEMENT
assumed care of pt from triage, pt AAOX3, resp. even and unlabored on RA, pt on CM/, pts wife at bedside states he was in the chair at home and got really pale and diaphoretic, pt reports he felt tingling in his hands, pts wife called ambulance, pt denies chest pain/SOB, pt denies LOC/syncope, pt reports tingling has dissipated at this time, pt has fistula in left forearm

## 2023-03-17 NOTE — ED PROVIDER NOTE - CLINICAL SUMMARY MEDICAL DECISION MAKING FREE TEXT BOX
patient with an episode of chills and feeling warm associated with elevated blood pressure lasting approximately 20 minutes.  Currently asymptomatic.  Plan for basic labs, EKG, RVP, UA/urine culture and reassess. patient with an episode of chills and feeling warm associated with elevated blood pressure lasting approximately 20 minutes.  Currently asymptomatic.  Plan for basic labs, EKG, RVP, UA/urine culture and reassess.    Pt continues to fell well. Asymptomatic. Walked at baseline without assistance. UA negative. Will DC.

## 2023-03-17 NOTE — ED PROVIDER NOTE - PHYSICAL EXAMINATION
Gen: Well appearing in NAD  Head: NC/AT  Neck: trachea midline  Resp:  No distress, CTAB  CV: RRR  GI: soft, NTND  Ext: no deformities, no calf ttp, no LE edema  Neuro:  A&O appears non focal  Skin:  Warm and dry as visualized  Psych:  Normal affect and mood

## 2023-03-17 NOTE — ED PROVIDER NOTE - PATIENT PORTAL LINK FT
You can access the FollowMyHealth Patient Portal offered by [facility] by registering at the following website: [email or web address]. By joining [employer]’s FollowMyHealth portal, you will also be able to view your health information using other applications (apps) compatible with our system.

## 2023-03-17 NOTE — REASON FOR VISIT
[Follow-Up - Clinic] : a clinic follow-up of [FreeTextEntry1] : Multivessel CAD, ICMP, HTN, CVA [FreeTextEntry2] : Multivessel CAD, ICMP, HTN, CVA no

## 2023-03-17 NOTE — ED PROVIDER NOTE - OBJECTIVE STATEMENT
Translation provided by ED : Lis   68-year-old male history of CVA, diabetes, ESRD on HD presents status post an episode of chills.  Patient states he was sitting and watching TV he suddenly felt cold and then hot, family member at bedside states that he appeared pale at the time.  Episode lasted about 20 minutes and was associated with elevated blood pressure.  Family member gave him his blood pressure medication and patient felt better by time paramedics arrived.  Patient currently asymptomatic.  Denies fever, nausea, vomiting, diarrhea, nasal congestion, cough, dysuria, hematuria, sick contacts, chest pain, shortness of breath.

## 2023-03-17 NOTE — ED ADULT NURSE REASSESSMENT NOTE - NS ED NURSE REASSESS COMMENT FT1
Pt resting comfortably in stretcher, resp even and unlabored, pt aware of plan of care, offers no complaints at this time, will continue to monitor.

## 2023-03-17 NOTE — ED ADULT NURSE NOTE - ALCOHOL PRE SCREEN (AUDIT - C)
General Question     Subject: Pt daughter Terri Agrawal ) is calling with some questions. Physical therapy can't really do anything to help her. She stated she drop off the CD of MRI on Monday.    Patient and /or Facility Request:   Contact Number: [phone]
Spoke with patient daughter and let her know that [facility] looked at MRI and he said she can try TEMITOPE or come into office and see Dr. Carlos Iyer. She states that TEMITOPE did not help. Scheduled her to come in and see Dr. Carlos Iyer.
Statement Selected

## 2023-03-18 LAB
CULTURE RESULTS: SIGNIFICANT CHANGE UP
SPECIMEN SOURCE: SIGNIFICANT CHANGE UP

## 2023-03-20 PROBLEM — N18.4 CHRONIC KIDNEY DISEASE (CKD), STAGE IV (SEVERE): Noted: 2022-12-07

## 2023-03-20 NOTE — HISTORY OF PRESENT ILLNESS
[Post-hospitalization from ___ Hospital] : Post-hospitalization from [unfilled] Hospital [Patient Contacted By: ____] : and contacted by [unfilled] [Admitted on: ___] : The patient was admitted on [unfilled] [Discharged on ___] : discharged on [unfilled] [Discharge Summary] : discharge summary [Pertinent Labs] : pertinent labs [Radiology Findings] : radiology findings [Discharge Med List] : discharge medication list [Med Reconciliation] : medication reconciliation has been completed [FreeTextEntry2] : From Hospitalization Course:\par " Hospital Course	\par 67 yr old male with hypertension, hyperlipidemia, diabetes mellitus, CKD stage 5, CVA, ischemic cardiomyopathy, left AVF placed in 1/2023 was sent in for evaluation by his nephrologist for tunneled HD catheter placement and\par initiation of HD as he was noted to have uremic symptoms hyperkalemia, edema.  He was evaluated by nephrology in ED, IR was consulted for tunneled catheter placement. His home anti hypertensives were initiated. He underwent HD catheter placement by IR on 2/16, noted to have bleeding post procedure, which subsequently resolved. His repeat CBC was stable. CKD stage 5, uremia, started on HD - S/p catheter placement 2/16. HD initiated 2/17 and now has HD seat in the community. Nephrology follow up after DC."

## 2023-03-20 NOTE — ASSESSMENT
Soft tissue swelling is consistent with a Baker's cyst but it is possible it might be a lipoma.  He definitely has degenerative arthritic changes with deformity in both knees.  I will send him to orthopedist for x-rays and evaluation and possible injection.  It is possible he may need to have surgical intervention for the Baker's cyst if it does not improve with conservative measures.  At some point he will likely need knee replacement surgery but we will attempt many conservative methods before then   [FreeTextEntry1] : This is a 66 y/o male with PMHx significant for CAD s/p 3V CABG (2009- [facility] - LAD reverse SVG to postero- lateral reverse SCG to OM), Anxiety, Carotid artery disease, CKD stage 4, CHFpEF, HTN, PAD, HLD, T2DM, CVA with left sided weakness, presenting s/p hospitalization for initiation of HD.\par \par NEPHROLOGY: CKD stage 5 on HD MWF\par -s/p AVF creation\par -Check Renal panel.\par -Continue Magnesium supplementation, Sodium bicarb 1300 mg tid e-refilled\par -Nephrology Dr Andersen following.\par -Pt being considered for Renal transplant\par \par CVS: h/o CAD s/p 3V CABG (2009), Carotid artery disease, CHFpEF, HTN, PAD, HLD, f/w accelerated HTN \par -Cardiology following Dr Michaud\par -NST 2/22 showed no evidence of Ischemia\par -MCOT followed by cardiology, No Afib\par -Continue ASA 81 mg, Amlodipine 10 mg  daily, Atorvastatin 80 mg, Carvedilol 25 mg bid, Hydralazine 50 mg tid, Isosorbide 20 mg tid, Clonidine 0.1 mg bid.\par -Discontinued Eliquis secondary to frequent falls, started on Clopidogrel by Cardiology\par \par : Microscopic hematuria\par -Followed by Dr España\par \par NEURO: h/o CVA with LEFT sided residual weakness, positional vertigo\par -Neurology following Dr Sarabia\par -Continue ASA, Eliquis discontinued.\par \par HEME: Anemia of CKD\par -s/p 2U transfused while inpatient\par -Check CBC\par \par ENDO: T2DM\par -Diet controlled\par -A1c 6.7 --> 8.1 --> 7.5 --> 8.0 --> 7.5\par -Will send for A1c with average glucose\par -Continue Tresiba, pt states that he changes his Insulin regimen depending on how high or low his BS are.\par \par GI: Inguinal hernia\par -Surgery referral given but provider does not accept his insurance.\par \par HCM:\par -Blood work drawn in house\par -PFIZER Covid vaccine 3/11/21, 4/2/21, 1/11/22\par -Flu vaccine 9/30/22\par -Prevnar 20 Oct 2022\par -No record of Colonoscopy, GI referral given, not scheduled yet\par -RTO for CPE at earliest convenience\par \par \par

## 2023-03-20 NOTE — PHYSICAL EXAM
[2+] : left 2+ [de-identified] : NAD. well appearing  [FreeTextEntry1] : strong thrill in left radiocephalic AV fistula

## 2023-03-20 NOTE — PHYSICAL EXAM
[Normal] : soft, non-tender, non-distended, no masses palpated, no HSM and normal bowel sounds [de-identified] : LEFT UE AVF with good thrill

## 2023-03-20 NOTE — HEALTH RISK ASSESSMENT
[Intercurrent hospitalizations] : was admitted to the hospital  [1 or 2 (0 pts)] : 1 or 2 (0 points) [Never (0 pts)] : Never (0 points) [No] : In the past 12 months have you used drugs other than those required for medical reasons? No [One fall no injury in past year] : Patient reported one fall in the past year without injury [Patient/Caregiver unclear of wishes] : , patient/caregiver unclear of wishes [None] : None [Fully functional (bathing, dressing, toileting, transferring, walking, feeding)] : Fully functional (bathing, dressing, toileting, transferring, walking, feeding) [Fully functional (using the telephone, shopping, preparing meals, housekeeping, doing laundry, using] : Fully functional and needs no help or supervision to perform IADLs (using the telephone, shopping, preparing meals, housekeeping, doing laundry, using transportation, managing medications and managing finances) [Smoke Detector] : smoke detector [Carbon Monoxide Detector] : carbon monoxide detector [Seat Belt] :  uses seat belt [de-identified] : [facility] [de-identified] : Nephrology [Audit-CScore] : 0 [de-identified] : none [de-identified] : low salt [Change in mental status noted] : No change in mental status noted [Language] : denies difficulty with language [Behavior] : denies difficulty with behavior [Learning/Retaining New Information] : denies difficulty learning/retaining new information [Handling Complex Tasks] : denies difficulty handling complex tasks [Reasoning] : denies difficulty with reasoning [Spatial Ability and Orientation] : denies difficulty with spatial ability and orientation [Sexually Active] : not sexually active [High Risk Behavior] : no high risk behavior [Reports changes in hearing] : Reports no changes in hearing [Reports changes in vision] : Reports no changes in vision [Reports normal functional visual acuity (ie: able to read med bottle)] : Reports poor functional visual acuity.  [Reports changes in dental health] : Reports no changes in dental health [Guns at Home] : no guns at home [Sunscreen] : does not use sunscreen [MammogramComments] : n/a [PapSmearComments] : n/a [BoneDensityComments] : n/a [ColonoscopyComments] : n/a [HIVDate] : 12/22 [HIVComments] : non reactive [HepatitisCDate] : 12/22 [HepatitisCComments] : non reactive [AdvancecareDate] : 03/23

## 2023-03-20 NOTE — HISTORY OF PRESENT ILLNESS
[FreeTextEntry1] : 66 yo male PMHx DM 2, ESRD, HTN, CHF, presents for dialysis access planning  \par \par PSHx: \par 1/19/23 left radiocephalic AV fistula

## 2023-03-21 LAB
ALBUMIN SERPL ELPH-MCNC: 3.8 G/DL
ANION GAP SERPL CALC-SCNC: 14 MMOL/L
BASOPHILS # BLD AUTO: 0.07 K/UL
BASOPHILS NFR BLD AUTO: 0.7 %
BUN SERPL-MCNC: 29 MG/DL
CALCIUM SERPL-MCNC: 9.4 MG/DL
CHLORIDE SERPL-SCNC: 94 MMOL/L
CO2 SERPL-SCNC: 30 MMOL/L
CREAT SERPL-MCNC: 5.34 MG/DL
EGFR: 11 ML/MIN/1.73M2
EOSINOPHIL # BLD AUTO: 0.14 K/UL
EOSINOPHIL NFR BLD AUTO: 1.5 %
ESTIMATED AVERAGE GLUCOSE: 163 MG/DL
GLUCOSE SERPL-MCNC: 262 MG/DL
HBA1C MFR BLD HPLC: 7.3 %
HCT VFR BLD CALC: 37.4 %
HGB BLD-MCNC: 11.6 G/DL
IMM GRANULOCYTES NFR BLD AUTO: 0.3 %
LYMPHOCYTES # BLD AUTO: 1.65 K/UL
LYMPHOCYTES NFR BLD AUTO: 17.6 %
MAN DIFF?: NORMAL
MCHC RBC-ENTMCNC: 27.4 PG
MCHC RBC-ENTMCNC: 31 GM/DL
MCV RBC AUTO: 88.4 FL
MONOCYTES # BLD AUTO: 0.61 K/UL
MONOCYTES NFR BLD AUTO: 6.5 %
NEUTROPHILS # BLD AUTO: 6.89 K/UL
NEUTROPHILS NFR BLD AUTO: 73.4 %
PHOSPHATE SERPL-MCNC: 4.3 MG/DL
PLATELET # BLD AUTO: 313 K/UL
POTASSIUM SERPL-SCNC: 4.1 MMOL/L
RBC # BLD: 4.23 M/UL
RBC # FLD: 14.3 %
SODIUM SERPL-SCNC: 137 MMOL/L
WBC # FLD AUTO: 9.39 K/UL

## 2023-03-22 ENCOUNTER — APPOINTMENT (OUTPATIENT)
Dept: VASCULAR SURGERY | Facility: CLINIC | Age: 68
End: 2023-03-22
Payer: MEDICARE

## 2023-03-22 VITALS
TEMPERATURE: 98.2 F | SYSTOLIC BLOOD PRESSURE: 159 MMHG | DIASTOLIC BLOOD PRESSURE: 78 MMHG | HEART RATE: 66 BPM | BODY MASS INDEX: 28 KG/M2 | OXYGEN SATURATION: 97 % | WEIGHT: 164 LBS | RESPIRATION RATE: 16 BRPM | HEIGHT: 64 IN

## 2023-03-22 PROCEDURE — 99024 POSTOP FOLLOW-UP VISIT: CPT

## 2023-03-22 PROCEDURE — 93971 EXTREMITY STUDY: CPT | Mod: 59

## 2023-03-22 PROCEDURE — 93990 DOPPLER FLOW TESTING: CPT

## 2023-03-22 NOTE — PROCEDURE
[FreeTextEntry1] : 3/22/23 AV fistula duplex: patent radiocephalic AVF, volume flow 493 mL/min. max diameter 5.1 cm. \par \par 3/22/23 LLE venous duplex: No DVT

## 2023-03-22 NOTE — ASSESSMENT
[FreeTextEntry1] : 68 yo male s/p  left radiocephalic AV fistula. Fistula has 493 mL/min volume flow on duplex today. Strong thrill on physical exam \par \par Pt counseled on results of duplex and above diagnosis.\par Pt counseled on fistula maintenance \par Pt is cleared to use AV fistula for dialysis \par RTC in 2-3 weeks for perm cath removal \par \par A total of 20 minutes was spent with patient and coordinating care\par

## 2023-03-22 NOTE — HISTORY OF PRESENT ILLNESS
[FreeTextEntry1] : 2/8/23: 68 yo male PMHx DM 2, ESRD, HTN, CHF, presents s/p left radiocephalic AV fistula creation. Pt doing well since surgery. Denies any severe hand pain or cramping. He has not started dialysis yet. \par \par 3/22/23: Pt doing well since last visit. He denies any hand pain or cramping. He has been doing dialysis via perm cath. \par \par PSHx: \par 1/19/23 left radiocephalic AV fistula  97

## 2023-03-24 NOTE — DISCHARGE NOTE PROVIDER - DISCHARGING ATTENDING PHYSICIAN:
Medicare Wellness Visit  Plan for Preventive Care    A good way for you to stay healthy is to use preventive care.  Medicare covers many services that can help you stay healthy.* The goal of these services is to find any health problems as quickly as possible. Finding problems early can help make them easier to treat.  Your personal plan below lists the services you may need and when they are due.      Health Maintenance Summary       Shingles Vaccine (3 of 3)  Overdue since 11/24/2021    DTaP/Tdap/Td Vaccine (2 - Td or Tdap)  Overdue since 12/3/2022    Medicare Advantage- Medicare Wellness Visit (Yearly - January to December)  Due since 1/1/2023    DM/CKD Microalbumin (Yearly)  Next due on 7/28/2023    DM/CKD GFR (Yearly)  Next due on 7/28/2023    Colorectal Cancer Risk - Colonoscopy (Every 5 Years)  Next due on 9/17/2023    Breast Cancer Screening (Every 2 Years)  Next due on 11/4/2024    Osteoporosis Screening   Ordered on 3/22/2023    Hepatitis C Screening   Completed    Pneumococcal Vaccine 65+   Completed    Influenza Vaccine   Completed    COVID-19 Vaccine   Completed    Meningococcal Vaccine   Aged Out    Hepatitis B Vaccine (For Physician/[facility] Discussion)   Aged Out    HPV Vaccine   Aged Out             Preventive Care for Women and Men    Heart Screenings (Cardiovascular):  Blood tests are used to check your cholesterol, lipid and triglyceride levels. High levels can increase your risk for heart disease and stroke. High levels can be treated with medications, diet and exercise. Lowering your levels can help keep your heart and blood vessels healthy.  Your provider will order these tests if they are needed.    An ultrasound is done to see if you have an abdominal aortic aneurysm (AAA).  This is an enlargement of one of the main blood vessels that delivers blood to the body.   In the [place], 9,000 deaths are caused by AAA.  You may not even know you have this problem and as many as 1 in 3 people will  have a serious problem if it is not treated.  Early diagnosis allows for more effective treatment and cure.  If you have a family history of AAA or are a male age 65-75 who has smoked, you are at higher risk of an AAA.  Your provider can order this test, if needed.    Colorectal Screening:  There are many tests that are used to check for cancer of your colon and rectum. You and your provider should discuss what test is best for you and when to have it done.  Options include:  Screening Colonoscopy: exam of the entire colon, seen through a flexible lighted tube.  Flexible Sigmoidoscopy: exam of the last third (sigmoid portion) of the colon and rectum, seen through a flexible lighted tube.  Cologuard DNA stool test: a sample of your stool is used to screen for cancer and unseen blood in your stool.  Fecal Occult Blood Test: a sample of your stool is studied to find any unseen blood    Flu Shot:  An immunization that helps to prevent influenza (the flu). You should get this every year. The best time to get the shot is in the fall.    Pneumococcal Shot:  Vaccines help prevent pneumococcal disease, which is any type of illness caused by Streptococcus pneumoniae bacteria. There are two kinds of pneumococcal vaccines available in the [place]:   Pneumococcal conjugate vaccines (PCV20 or Jaurass07®)  Pneumococcal polysaccharide vaccine (PPSV23 or Vbelctwlg44®)  For those who have never received any pneumococcal conjugate vaccine, CDC recommends PVC20 for adults 65 years or older and adults 19 through 64 years old with certain medical conditions or risk factors.   For those who have previously received PCV13, this should be followed by a dose of PPSV23.     Hepatitis B Shot:  An immunization that helps to protect people from getting Hepatitis B. Hepatitis B is a virus that spreads through contact with infected blood or body fluids. Many people with the virus do not have symptoms.  The virus can lead to serious problems,  such as liver disease. Some people are at higher risk than others. Your doctor will tell you if you need this shot.     Diabetes Screening:  A test to measure sugar (glucose) in your blood is called a fasting blood sugar. Fasting means you cannot have food or drink for at least 8 hours before the test. This test can detect diabetes long before you may notice symptoms.    Glaucoma Screening:  Glaucoma screening is performed by your eye doctor. The test measures the fluid pressure inside your eyes to determine if you have glaucoma.     Hepatitis C Screening:  A blood test to see if you have the hepatitis C virus.  Hepatitis C attacks the liver and is a major cause of chronic liver disease.  Medicare will cover a single screening for all adults born between 1945 & 1965, or high risk patients (people who have injected illegal drugs or people who have had blood transfusions).  High risk patients who continue to inject illegal drugs can be screened for Hepatitis C every year.    Smoking and Tobacco-Use Cessation Counseling:  Tobacco is the single greatest cause of disease and early death in our country today. Medication and counseling together can increase a person’s chance of quitting for good.   Medicare covers two quitting attempts per year, with four counseling sessions per attempt (eight sessions in a 12 month period)    Preventive Screening tests for Women    Screening Mammograms and Breast Exams:  An x-ray of your breasts to check for breast cancer before you or your doctor may be able to feel it.  If breast cancer is found early it can usually be treated with success.    Pelvic Exams and Pap Tests:  An exam to check for cervical and vaginal cancer. A Pap test is a lab test in which cells are taken from your cervix and sent to the lab to look for signs of cervical cancer. If cancer of the cervix is found early, chances for a cure are good. Testing can generally end at age 65, or if a woman has a hysterectomy for a  benign condition. Your provider may recommend more frequent testing if certain abnormal results are found.    Bone Mass Measurements:  A painless x-ray of your bone density to see if you are at risk for a broken bone. Bone density refers to the thickness of bones or how tightly the bone tissue is packed.    Preventive Screening tests for Men    Prostate Screening:  Should you have a prostate cancer test (PSA)?  It is up to you to decide if you want a prostate cancer test. Talk to your clinician to find out if the test is right for you.  Things for you to consider and talk about should include:  Benefits and harms of the test  Your family history  How your race/ethnicity may influence the test  If the test may impact other medical conditions you have  Your values on screenings and treatments    *Medicare pays for many preventive services to keep you healthy. For some of these services, you might have to pay a deductible, coinsurance, and / or copayment.  The amounts vary depending on the type of services you need and the kind of Medicare health plan you have.    For further details on screenings offered by Medicare please visit: https://www.medicare.gov/coverage/preventive-screening-services      Dr Carter

## 2023-04-05 ENCOUNTER — APPOINTMENT (OUTPATIENT)
Dept: VASCULAR SURGERY | Facility: CLINIC | Age: 68
End: 2023-04-05

## 2023-04-26 ENCOUNTER — APPOINTMENT (OUTPATIENT)
Dept: VASCULAR SURGERY | Facility: CLINIC | Age: 68
End: 2023-04-26
Payer: MEDICARE

## 2023-04-26 VITALS
BODY MASS INDEX: 27.66 KG/M2 | HEART RATE: 71 BPM | SYSTOLIC BLOOD PRESSURE: 114 MMHG | WEIGHT: 162 LBS | HEIGHT: 64 IN | RESPIRATION RATE: 14 BRPM | OXYGEN SATURATION: 96 % | DIASTOLIC BLOOD PRESSURE: 65 MMHG | TEMPERATURE: 97.9 F

## 2023-04-26 PROCEDURE — ZZZZZ: CPT

## 2023-04-26 PROCEDURE — 93986 DUP-SCAN HEMO COMPL UNI STD: CPT | Mod: 59

## 2023-04-26 PROCEDURE — 93990 DOPPLER FLOW TESTING: CPT

## 2023-04-26 PROCEDURE — 99213 OFFICE O/P EST LOW 20 MIN: CPT

## 2023-04-28 NOTE — PHYSICAL EXAM
[2+] : left 2+ [de-identified] : NAD. well appearing  [FreeTextEntry1] : palpable thrill in left radiocephalic AV fistula

## 2023-04-28 NOTE — PROCEDURE
[FreeTextEntry1] : 3/22/23 AV fistula duplex: patent radiocephalic AVF, volume flow 493 mL/min. max diameter 5.1 cm. \par \par 3/22/23 LLE venous duplex: No DVT \par \par 4/28/23 LUE AV fistula duplex: patent radiocephalic AVF, volume flow 419 mL/min. max diameter 6.0 cm. Calcification of radial artery and anastomosis. \par \par 4/28/23 LUE vein mapping: cephalic and basilic vein appropriate size for fistula, see full report scanned in.

## 2023-04-28 NOTE — HISTORY OF PRESENT ILLNESS
[FreeTextEntry1] : 2/8/23: 66 yo male PMHx DM 2, ESRD, HTN, CHF, presents s/p left radiocephalic AV fistula creation. Pt doing well since surgery. Denies any severe hand pain or cramping. He has not started dialysis yet. \par \par 3/22/23: Pt doing well since last visit. He denies any hand pain or cramping. He has been doing dialysis via perm cath. \par \par 4/26/23: Pt has been having difficulty at dialysis with his fistula. For the past two weeks his dialysis needle gets clotted due to low flow volume. He has been having dialysis via perm cath and fistula. He denies any hand pain or cramping. \par \par PSHx: \par 2/2023 left fistulogram with intervention \par 2/2023 perm cath\par 1/19/23 left radiocephalic AV fistula

## 2023-04-28 NOTE — ASSESSMENT
[FreeTextEntry1] : 69 yo male s/p  left radiocephalic AV fistula. Fistula has low volume flow with calcification of radial artery and anastomosis. Clotting of needle during dialysis \par \par Pt counseled on results of duplex and above diagnosis.\par Pt counseled on fistula maintenance and hand exercise with stress ball \par Plan for left fistulogram with possible intervention. Possible revision of left AV fistula vs creation of new fistula. Discussed the risks and benefits of the procedure with the patient. All questions answered.\par \par \par A total of 20 minutes was spent with patient and coordinating care\par

## 2023-05-04 ENCOUNTER — OFFICE (OUTPATIENT)
Dept: URBAN - METROPOLITAN AREA CLINIC 94 | Facility: CLINIC | Age: 68
Setting detail: OPHTHALMOLOGY
End: 2023-05-04
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
## 2023-05-04 PROCEDURE — NO SHOW FE NO SHOW FEE: Performed by: OPHTHALMOLOGY

## 2023-05-20 RX ORDER — CHLORHEXIDINE GLUCONATE 213 G/1000ML
1 SOLUTION TOPICAL ONCE
Refills: 0 | Status: DISCONTINUED | OUTPATIENT
Start: 2023-05-22 | End: 2023-06-05
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
## 2023-05-20 NOTE — H&P PST ADULT - HISTORY OF PRESENT ILLNESS
Narrative: 67 yr old male with PMHX of  hypertension, hyperlipidemia, diabetes mellitus, CKD stage 5, ESRD, CHF, CVA, ischemic cardiomyopathy, URVASHI, BPH, eft left radiocephalic AVF placed in 1/19/ 2023,   was seen in OP Vascular office as F/U visit post AVF, New HD via AVF ( patient has been doing HD via perm cath).  Duplex Left AVF results on 04/26/23 consistent with modecate to severe AVF stenosis.  Patient now presents to [facility] for Left upper extremity fistulogram with possible intervention.    Associated Risk Factors:     Age: 67    DM: Yes    Smoking history: N/A***************    Hyperlipidemia: yes    Family history of PAD: N/A ***********    Known Athlerosclerotic disease(coronary, carotid, subclavian, renal, mesenteric, AAA):    Antiplatelets/Anticoagulants:        Plavix: Plavix 75mg PO daily*******************       Cilostazol: No       pentoxifylinne: No       NOAC: No    Vascular testing:    Duplex Left AVF results on 04/26/23 consistent with moderate to severe AVF stenosis. calcified formation resulting in elevated velocities noted at the anastamosis patent outflow subclavian noted.      Social History:        Marital:         Tobacco:        ETOH:        Caffeine:     ROS:  CONSTITUTIONAL: No weakness, fevers or chills  EYES/ENT: No visual changes;  No vertigo or throat pain   NECK: No pain or stiffness  RESPIRATORY: No cough, wheezing, hemoptysis; No shortness of breath  CARDIOVASCULAR: No chest pain or palpitations  GASTROINTESTINAL: No abdominal or epigastric pain. No nausea, vomiting, or hematemesis; No diarrhea or constipation. No melena or hematochezia.  GENITOURINARY: No dysuria, frequency or hematuria  NEUROLOGICAL: No numbness or weakness  SKIN: No itching, burning, rashes, or lesions   All other review of systems is negative unless indicated above    PHYSICAL EXAM:    Vital Signs Last 24 Hrs  T(C): --  T(F): --  HR: --  BP: --  BP(mean): --  RR: --  SpO2: --    GENERAL: Pt lying comfortably, NAD.  ENMT: PERRL, +EOMI.  NECK: soft, Supple, No JVD,   CHEST/LUNG: Clear to auscultatation bilaterally; No wheezing.  HEART: S1S2+, Regular rate and rhythm; No murmurs.  ABDOMEN: Soft, Nontender, Nondistended; Bowel sounds present.  MUSCULOSKELETAL: Normal range of motion.  SKIN: No rashes or lesions.  NEURO: AAOX3, no focal deficits, no motor r sensory loss.  PSYCH: normal mood.  VASCULAR:   Radial +2 R/+2 L  Femoral +2 R/+2 L  PT +2 R/+2 L  DP +2 R/+2 L    EKG:        Narrative: 67 yr old male with PMHX of  hypertension, hyperlipidemia, diabetes mellitus, CKD stage 5, ESRD, CHF, CVA, ischemic cardiomyopathy, URVASHI, BPH, eft left radiocephalic AVF placed in 1/19/ 2023,   was seen in OP Vascular office as F/U visit post AVF, New HD via AVF ( patient has been doing HD via perm cath).  Duplex Left AVF results on 04/26/23 consistent with modecate to severe AVF stenosis.  Patient now presents to [facility] CCL for Left upper extremity fistulogram with possible intervention.  Last HD was friday via right IJ permacath    Associated Risk Factors:     Age: 67    DM: Yes    Smoking history: N/A***************    Hyperlipidemia: yes    Family history of PAD: N/A ***********    Known Athlerosclerotic disease(coronary, carotid, subclavian, renal, mesenteric, AAA):    Antiplatelets/Anticoagulants:        Plavix: Plavix 75mg PO daily*******************       Cilostazol: No       pentoxifylinne: No       NOAC: No    Vascular testing:    Duplex Left AVF results on 04/26/23 consistent with moderate to severe AVF stenosis. calcified formation resulting in elevated velocities noted at the anastomosis patent outflow subclavian noted.          ROS:  CONSTITUTIONAL: No weakness, fevers or chills  EYES/ENT: No visual changes;  No vertigo or throat pain   NECK: No pain or stiffness  RESPIRATORY: No cough, wheezing, hemoptysis; No shortness of breath  CARDIOVASCULAR: No chest pain or palpitations  GASTROINTESTINAL: No abdominal or epigastric pain. No nausea, vomiting, or hematemesis; No diarrhea or constipation. No melena or hematochezia.  GENITOURINARY: No dysuria, frequency or hematuria  NEUROLOGICAL: No numbness or weakness  SKIN: No itching, burning, rashes, or lesions   All other review of systems is negative unless indicated above    PHYSICAL EXAM:  Neuro: A&O X3  Lungs: CTA  CV: RRR  Ext: L AVF.  Right IJ permacath          EKG: SR with 1st degree HB 63 bpm. RBBB       Narrative: 67 yr old male with PMHX of  hypertension, hyperlipidemia, diabetes mellitus, CKD stage 5, ESRD, CHF, CVA, ischemic cardiomyopathy, URVASHI, BPH, eft left radiocephalic AVF placed in 1/19/ 2023,   was seen in OP Vascular office as F/U visit post AVF, New HD via AVF ( patient has been doing HD via perm cath).  Duplex Left AVF results on 04/26/23 consistent with modecate to severe AVF stenosis.  Patient now presents to [facility] for Left upper extremity fistulogram with possible intervention.  Last HD was friday via right IJ permacath        Vascular testing:    Duplex Left AVF results on 04/26/23 consistent with moderate to severe AVF stenosis. calcified formation resulting in elevated velocities noted at the anastomosis patent outflow subclavian noted.          ROS:  CONSTITUTIONAL: No weakness, fevers or chills  EYES/ENT: No visual changes;  No vertigo or throat pain   NECK: No pain or stiffness  RESPIRATORY: No cough, wheezing, hemoptysis; No shortness of breath  CARDIOVASCULAR: No chest pain or palpitations  GASTROINTESTINAL: No abdominal or epigastric pain. No nausea, vomiting, or hematemesis; No diarrhea or constipation. No melena or hematochezia.  GENITOURINARY: No dysuria, frequency or hematuria  NEUROLOGICAL: No numbness or weakness  SKIN: No itching, burning, rashes, or lesions   All other review of systems is negative unless indicated above    PHYSICAL EXAM:  Neuro: A&O X3  Lungs: CTA  CV: RRR  Ext: L AVF.  Right IJ permacath          EKG: SR with 1st degree HB 63 bpm. RBBB

## 2023-05-20 NOTE — H&P PST ADULT - ASSESSMENT
67 yr old male with PMHX of  hypertension, hyperlipidemia, diabetes mellitus, CKD stage 5, ESRD, CHF, CVA, ischemic cardiomyopathy, URVASHI, BPH, eft left radiocephalic AVF placed in 1/19/ 2023,   was seen in OP Vascular office as F/U visit post AVF, New HD via AVF,  Duplex of Left AVF revealed moderate to severe AVF stenosis.  Patient now presents to [facility] for Left upper extremity fistulogram with possible intervention.    Risk Assessments:  ASA:  Mallampati:  GFR:   Cr:  BRA:      Plan:    -plan for left upper extremity Fistulogram with possible intervention  -patient seen and examined  -confirmed appropriate NPO duration  -ECG and Labs reviewed  -Aspirin 81mg po pre-cath  -Normal Saline 0.9%  250ml/hr IV: pre procedure YVES ppx   -procedure discussed with patient; risks and benefits explained, questions answered  -consent obtained by attending Kieran Solo       HPI:    67 yr old male with PMHX of  hypertension, hyperlipidemia, diabetes mellitus, CKD stage 5, ESRD, CHF, CVA, ischemic cardiomyopathy, URVASHI, BPH, eft left radiocephalic AVF placed in 1/19/ 2023,   was seen in OP Vascular office as F/U visit post AVF, New HD via AVF,  Duplex of Left AVF revealed moderate to severe AVF stenosis.  Patient now presents to [facility] for Left upper extremity fistulogram with possible intervention.    Risk Assessments:  ASA: 3  Mallampati: 2        Plan:    -plan for left upper extremity Fistulogram with possible intervention  -patient seen and examined  -confirmed appropriate NPO duration  -ECG and Labs reviewed  -procedure discussed with patient; risks and benefits explained, questions answered  -consent obtained by attending Kieran Solo       HPI:

## 2023-05-22 ENCOUNTER — TRANSCRIPTION ENCOUNTER (OUTPATIENT)
Age: 68
End: 2023-05-22

## 2023-05-22 ENCOUNTER — APPOINTMENT (OUTPATIENT)
Dept: VASCULAR SURGERY | Facility: HOSPITAL | Age: 68
End: 2023-05-22

## 2023-05-22 ENCOUNTER — OUTPATIENT (OUTPATIENT)
Dept: OUTPATIENT SERVICES | Facility: HOSPITAL | Age: 68
LOS: 1 days | Discharge: HOME | End: 2023-05-22
Payer: MEDICARE

## 2023-05-22 VITALS
HEART RATE: 64 BPM | RESPIRATION RATE: 20 BRPM | SYSTOLIC BLOOD PRESSURE: 197 MMHG | OXYGEN SATURATION: 100 % | TEMPERATURE: 98 F | DIASTOLIC BLOOD PRESSURE: 80 MMHG

## 2023-05-22 VITALS
RESPIRATION RATE: 18 BRPM | OXYGEN SATURATION: 99 % | DIASTOLIC BLOOD PRESSURE: 79 MMHG | SYSTOLIC BLOOD PRESSURE: 166 MMHG | HEART RATE: 66 BPM

## 2023-05-22 DIAGNOSIS — Z95.1 PRESENCE OF AORTOCORONARY BYPASS GRAFT: Chronic | ICD-10-CM

## 2023-05-22 DIAGNOSIS — I77.0 ARTERIOVENOUS FISTULA, ACQUIRED: ICD-10-CM

## 2023-05-22 DIAGNOSIS — T82.858A STENOSIS OF OTHER VASCULAR PROSTHETIC DEVICES, IMPLANTS AND GRAFTS, INITIAL ENCOUNTER: ICD-10-CM

## 2023-05-22 LAB
ANION GAP SERPL CALC-SCNC: 13 MMOL/L — SIGNIFICANT CHANGE UP (ref 5–17)
BASOPHILS # BLD AUTO: 0.07 K/UL — SIGNIFICANT CHANGE UP (ref 0–0.2)
BASOPHILS NFR BLD AUTO: 0.5 % — SIGNIFICANT CHANGE UP (ref 0–2)
BUN SERPL-MCNC: 51 MG/DL — HIGH (ref 8–20)
CALCIUM SERPL-MCNC: 9.6 MG/DL — SIGNIFICANT CHANGE UP (ref 8.4–10.5)
CHLORIDE SERPL-SCNC: 100 MMOL/L — SIGNIFICANT CHANGE UP (ref 96–108)
CO2 SERPL-SCNC: 29 MMOL/L — SIGNIFICANT CHANGE UP (ref 22–29)
CREAT SERPL-MCNC: 7.41 MG/DL — HIGH (ref 0.5–1.3)
EGFR: 7 ML/MIN/1.73M2 — LOW
EOSINOPHIL # BLD AUTO: 0.19 K/UL — SIGNIFICANT CHANGE UP (ref 0–0.5)
EOSINOPHIL NFR BLD AUTO: 1.5 % — SIGNIFICANT CHANGE UP (ref 0–6)
GLUCOSE SERPL-MCNC: 117 MG/DL — HIGH (ref 70–99)
HCT VFR BLD CALC: 38.9 % — LOW (ref 39–50)
HGB BLD-MCNC: 12.9 G/DL — LOW (ref 13–17)
IMM GRANULOCYTES NFR BLD AUTO: 0.5 % — SIGNIFICANT CHANGE UP (ref 0–0.9)
LYMPHOCYTES # BLD AUTO: 1.66 K/UL — SIGNIFICANT CHANGE UP (ref 1–3.3)
LYMPHOCYTES # BLD AUTO: 12.7 % — LOW (ref 13–44)
MCHC RBC-ENTMCNC: 28.6 PG — SIGNIFICANT CHANGE UP (ref 27–34)
MCHC RBC-ENTMCNC: 33.2 GM/DL — SIGNIFICANT CHANGE UP (ref 32–36)
MCV RBC AUTO: 86.3 FL — SIGNIFICANT CHANGE UP (ref 80–100)
MONOCYTES # BLD AUTO: 0.54 K/UL — SIGNIFICANT CHANGE UP (ref 0–0.9)
MONOCYTES NFR BLD AUTO: 4.1 % — SIGNIFICANT CHANGE UP (ref 2–14)
NEUTROPHILS # BLD AUTO: 10.51 K/UL — HIGH (ref 1.8–7.4)
NEUTROPHILS NFR BLD AUTO: 80.7 % — HIGH (ref 43–77)
PLATELET # BLD AUTO: 272 K/UL — SIGNIFICANT CHANGE UP (ref 150–400)
POTASSIUM SERPL-MCNC: 3.5 MMOL/L — SIGNIFICANT CHANGE UP (ref 3.5–5.3)
POTASSIUM SERPL-SCNC: 3.5 MMOL/L — SIGNIFICANT CHANGE UP (ref 3.5–5.3)
RBC # BLD: 4.51 M/UL — SIGNIFICANT CHANGE UP (ref 4.2–5.8)
RBC # FLD: 14.3 % — SIGNIFICANT CHANGE UP (ref 10.3–14.5)
SODIUM SERPL-SCNC: 142 MMOL/L — SIGNIFICANT CHANGE UP (ref 135–145)
WBC # BLD: 13.03 K/UL — HIGH (ref 3.8–10.5)
WBC # FLD AUTO: 13.03 K/UL — HIGH (ref 3.8–10.5)

## 2023-05-22 PROCEDURE — C1769: CPT

## 2023-05-22 PROCEDURE — 93005 ELECTROCARDIOGRAM TRACING: CPT

## 2023-05-22 PROCEDURE — 36902 INTRO CATH DIALYSIS CIRCUIT: CPT

## 2023-05-22 PROCEDURE — C1894: CPT

## 2023-05-22 PROCEDURE — C1725: CPT

## 2023-05-22 PROCEDURE — 80048 BASIC METABOLIC PNL TOTAL CA: CPT

## 2023-05-22 PROCEDURE — 85025 COMPLETE CBC W/AUTO DIFF WBC: CPT

## 2023-05-22 PROCEDURE — 99284 EMERGENCY DEPT VISIT MOD MDM: CPT

## 2023-05-22 PROCEDURE — 36902 INTRO CATH DIALYSIS CIRCUIT: CPT | Mod: LT

## 2023-05-22 PROCEDURE — 93010 ELECTROCARDIOGRAM REPORT: CPT

## 2023-05-22 PROCEDURE — 36415 COLL VENOUS BLD VENIPUNCTURE: CPT

## 2023-05-22 NOTE — DISCHARGE NOTE PROVIDER - CARE PROVIDERS DIRECT ADDRESSES
[email or web address]

## 2023-05-22 NOTE — DISCHARGE NOTE NURSING/CASE MANAGEMENT/SOCIAL WORK - NSDCPEFALRISK_GEN_ALL_CORE
For information on Fall & Injury Prevention, visit: [email or web address] OR  [email or web address] OR  https://www.cdc.gov/steadi/patient.html

## 2023-05-22 NOTE — DISCHARGE NOTE PROVIDER - CARE PROVIDER_API CALL
Binu Bernal)  Surgery; Vascular Surgery  [address], [address] [place] [address]  Phone: [phone]  Fax: [phone]  Follow Up Time:

## 2023-05-22 NOTE — DISCHARGE NOTE PROVIDER - HOSPITAL COURSE
68 year old male with ESRD on HD with non functioning Left AVF.   S/P Left AV fistula fistulogram with balloon angioplasty.  May use fistula.  Pt has HD today at 1400.

## 2023-05-22 NOTE — BRIEF OPERATIVE NOTE - NSICDXBRIEFPROCEDURE_GEN_ALL_CORE_FT
PROCEDURES:  Fistulogram 22-May-2023 12:36:58 Left Cristal fistulogram with balloon angioplasty Luz Elena Yanez

## 2023-05-22 NOTE — DISCHARGE NOTE PROVIDER - NSDCMRMEDTOKEN_GEN_ALL_CORE_FT
amLODIPine 10 mg oral tablet: 1 tab(s) orally once a day  aspirin 81 mg oral tablet, chewable: 1 tab(s) orally once a day  atorvastatin 80 mg oral tablet: 1 tab(s) orally once a day (at bedtime)  calcitriol 0.25 mcg oral capsule: 1 cap(s) orally once a day  carvedilol 25 mg oral tablet: 1 tab(s) orally every 12 hours  cloNIDine 0.1 mg oral tablet: 1 tab(s) orally 2 times a day  hydrALAZINE 50 mg oral tablet: 1 tab(s) orally every 8 hours   isosorbide dinitrate 20 mg oral tablet: 1 tab(s) orally 3 times a day  magnesium oxide 400 mg oral tablet: 1 tab(s) orally once a day  Protonix 40 mg oral delayed release tablet: 1 tab(s) orally once a day   Tresiba FlexTouch 100 units/mL subcutaneous solution: 10 unit(s) subcutaneous once a day (at bedtime)

## 2023-05-22 NOTE — DISCHARGE NOTE PROVIDER - NSDCCPTREATMENT_GEN_ALL_CORE_FT
PRINCIPAL PROCEDURE  Procedure: Fistulogram  Findings and Treatment: with balloon angioplasty to left AVF

## 2023-05-22 NOTE — DISCHARGE NOTE PROVIDER - NSDCCPCAREPLAN_GEN_ALL_CORE_FT
PRINCIPAL DISCHARGE DIAGNOSIS  Diagnosis: ESRD on dialysis  Assessment and Plan of Treatment: cont current medical management

## 2023-05-22 NOTE — DISCHARGE NOTE NURSING/CASE MANAGEMENT/SOCIAL WORK - PATIENT PORTAL LINK FT
You can access the FollowMyHealth Patient Portal offered by [facility] by registering at the following website: [email or web address]. By joining [facility]’s FollowMyHealth portal, you will also be able to view your health information using other applications (apps) compatible with our system.

## 2023-05-31 ENCOUNTER — APPOINTMENT (OUTPATIENT)
Dept: VASCULAR SURGERY | Facility: CLINIC | Age: 68
End: 2023-05-31
Payer: MEDICARE

## 2023-05-31 VITALS
OXYGEN SATURATION: 97 % | BODY MASS INDEX: 27.83 KG/M2 | WEIGHT: 163 LBS | SYSTOLIC BLOOD PRESSURE: 137 MMHG | HEIGHT: 64 IN | HEART RATE: 64 BPM | DIASTOLIC BLOOD PRESSURE: 69 MMHG | RESPIRATION RATE: 14 BRPM | TEMPERATURE: 97.9 F

## 2023-05-31 PROCEDURE — 99213 OFFICE O/P EST LOW 20 MIN: CPT

## 2023-05-31 NOTE — PHYSICAL EXAM
[2+] : left 2+ [de-identified] : NAD. well appearing  [FreeTextEntry1] : strong palpable thrill in left radiocephalic AV fistula

## 2023-05-31 NOTE — HISTORY OF PRESENT ILLNESS
[FreeTextEntry1] : 2/8/23: 68 yo male PMHx DM 2, ESRD, HTN, CHF, presents s/p left radiocephalic AV fistula creation. Pt doing well since surgery. Denies any severe hand pain or cramping. He has not started dialysis yet. \par \par 3/22/23: Pt doing well since last visit. He denies any hand pain or cramping. He has been doing dialysis via perm cath. \par \par 4/26/23: Pt has been having difficulty at dialysis with his fistula. For the past two weeks his dialysis needle gets clotted due to low flow volume. He has been having dialysis via perm cath and fistula. He denies any hand pain or cramping. \par \par 5/31/23: Pt doing well since fistulogram. He is still using perm cath for dialysis. He denies any hand pain or cramping. \par \par PSHx: \par 5/22/23 left fistulogram with balloon angioplasty \par 2/2023 left fistulogram with intervention \par 2/2023 perm cath\par 1/19/23 left radiocephalic AV fistula

## 2023-05-31 NOTE — ASSESSMENT
[FreeTextEntry1] : 69 yo male s/p  left radiocephalic AV fistulogram. Pt still using perm cath for dialysis \par \par Pt counseled on surgical procedure performed\par Pt counseled on fistula maintenance and hand exercise with stress ball \par Pt cleared to use AV fistula for dialysis today. RTC in 3 weeks for perm cath removal if able to use fistula \par \par \par A total of 20 minutes was spent with patient and coordinating care\par

## 2023-06-06 ENCOUNTER — EMERGENCY (EMERGENCY)
Facility: HOSPITAL | Age: 68
LOS: 1 days | Discharge: HOME | End: 2023-06-06
Attending: EMERGENCY MEDICINE
Payer: MEDICARE

## 2023-06-06 ENCOUNTER — APPOINTMENT (OUTPATIENT)
Dept: VASCULAR SURGERY | Facility: CLINIC | Age: 68
End: 2023-06-06
Payer: MEDICARE

## 2023-06-06 VITALS
OXYGEN SATURATION: 95 % | TEMPERATURE: 97 F | RESPIRATION RATE: 18 BRPM | DIASTOLIC BLOOD PRESSURE: 72 MMHG | SYSTOLIC BLOOD PRESSURE: 112 MMHG | HEART RATE: 63 BPM | HEIGHT: 67 IN | WEIGHT: 184.97 LBS

## 2023-06-06 VITALS — DIASTOLIC BLOOD PRESSURE: 53 MMHG | SYSTOLIC BLOOD PRESSURE: 76 MMHG

## 2023-06-06 VITALS
RESPIRATION RATE: 16 BRPM | HEIGHT: 64 IN | TEMPERATURE: 97.5 F | DIASTOLIC BLOOD PRESSURE: 62 MMHG | HEART RATE: 67 BPM | SYSTOLIC BLOOD PRESSURE: 110 MMHG | OXYGEN SATURATION: 96 %

## 2023-06-06 VITALS
RESPIRATION RATE: 19 BRPM | OXYGEN SATURATION: 98 % | DIASTOLIC BLOOD PRESSURE: 67 MMHG | SYSTOLIC BLOOD PRESSURE: 141 MMHG | HEART RATE: 64 BPM

## 2023-06-06 DIAGNOSIS — Z95.1 PRESENCE OF AORTOCORONARY BYPASS GRAFT: Chronic | ICD-10-CM

## 2023-06-06 LAB
ALBUMIN SERPL ELPH-MCNC: 3.8 G/DL — SIGNIFICANT CHANGE UP (ref 3.3–5.2)
ALP SERPL-CCNC: 114 U/L — SIGNIFICANT CHANGE UP (ref 40–120)
ALT FLD-CCNC: 18 U/L — SIGNIFICANT CHANGE UP
ANION GAP SERPL CALC-SCNC: 13 MMOL/L — SIGNIFICANT CHANGE UP (ref 5–17)
AST SERPL-CCNC: 12 U/L — SIGNIFICANT CHANGE UP
BASE EXCESS BLDV CALC-SCNC: 4.2 MMOL/L — HIGH (ref -2–3)
BASOPHILS # BLD AUTO: 0.07 K/UL — SIGNIFICANT CHANGE UP (ref 0–0.2)
BASOPHILS NFR BLD AUTO: 0.7 % — SIGNIFICANT CHANGE UP (ref 0–2)
BILIRUB SERPL-MCNC: 0.4 MG/DL — SIGNIFICANT CHANGE UP (ref 0.4–2)
BLD GP AB SCN SERPL QL: SIGNIFICANT CHANGE UP
BUN SERPL-MCNC: 36.6 MG/DL — HIGH (ref 8–20)
CA-I SERPL-SCNC: 1.14 MMOL/L — LOW (ref 1.15–1.33)
CALCIUM SERPL-MCNC: 9.4 MG/DL — SIGNIFICANT CHANGE UP (ref 8.4–10.5)
CHLORIDE BLDV-SCNC: 96 MMOL/L — SIGNIFICANT CHANGE UP (ref 96–108)
CHLORIDE SERPL-SCNC: 94 MMOL/L — LOW (ref 96–108)
CO2 SERPL-SCNC: 29 MMOL/L — SIGNIFICANT CHANGE UP (ref 22–29)
CREAT SERPL-MCNC: 5.9 MG/DL — HIGH (ref 0.5–1.3)
EGFR: 10 ML/MIN/1.73M2 — LOW
EOSINOPHIL # BLD AUTO: 0.19 K/UL — SIGNIFICANT CHANGE UP (ref 0–0.5)
EOSINOPHIL NFR BLD AUTO: 2 % — SIGNIFICANT CHANGE UP (ref 0–6)
GAS PNL BLDV: 133 MMOL/L — LOW (ref 136–145)
GAS PNL BLDV: SIGNIFICANT CHANGE UP
GLUCOSE BLDC GLUCOMTR-MCNC: 212
GLUCOSE BLDV-MCNC: 219 MG/DL — HIGH (ref 70–99)
GLUCOSE SERPL-MCNC: 236 MG/DL — HIGH (ref 70–99)
HCO3 BLDV-SCNC: 30 MMOL/L — HIGH (ref 22–29)
HCT VFR BLD CALC: 37.4 % — LOW (ref 39–50)
HCT VFR BLDA CALC: 36 % — SIGNIFICANT CHANGE UP
HGB BLD CALC-MCNC: 11.9 G/DL — LOW (ref 12.6–17.4)
HGB BLD-MCNC: 12.5 G/DL — LOW (ref 13–17)
IMM GRANULOCYTES NFR BLD AUTO: 0.4 % — SIGNIFICANT CHANGE UP (ref 0–0.9)
LACTATE BLDV-MCNC: 1.1 MMOL/L — SIGNIFICANT CHANGE UP (ref 0.5–2)
LIDOCAIN IGE QN: 44 U/L — SIGNIFICANT CHANGE UP (ref 22–51)
LYMPHOCYTES # BLD AUTO: 2.22 K/UL — SIGNIFICANT CHANGE UP (ref 1–3.3)
LYMPHOCYTES # BLD AUTO: 23.6 % — SIGNIFICANT CHANGE UP (ref 13–44)
MCHC RBC-ENTMCNC: 28.9 PG — SIGNIFICANT CHANGE UP (ref 27–34)
MCHC RBC-ENTMCNC: 33.4 GM/DL — SIGNIFICANT CHANGE UP (ref 32–36)
MCV RBC AUTO: 86.4 FL — SIGNIFICANT CHANGE UP (ref 80–100)
MONOCYTES # BLD AUTO: 0.62 K/UL — SIGNIFICANT CHANGE UP (ref 0–0.9)
MONOCYTES NFR BLD AUTO: 6.6 % — SIGNIFICANT CHANGE UP (ref 2–14)
NEUTROPHILS # BLD AUTO: 6.26 K/UL — SIGNIFICANT CHANGE UP (ref 1.8–7.4)
NEUTROPHILS NFR BLD AUTO: 66.7 % — SIGNIFICANT CHANGE UP (ref 43–77)
PCO2 BLDV: 51 MMHG — SIGNIFICANT CHANGE UP (ref 42–55)
PH BLDV: 7.37 — SIGNIFICANT CHANGE UP (ref 7.32–7.43)
PLATELET # BLD AUTO: 243 K/UL — SIGNIFICANT CHANGE UP (ref 150–400)
PO2 BLDV: 78 MMHG — HIGH (ref 25–45)
POTASSIUM BLDV-SCNC: 3.6 MMOL/L — SIGNIFICANT CHANGE UP (ref 3.5–5.1)
POTASSIUM SERPL-MCNC: 4.1 MMOL/L — SIGNIFICANT CHANGE UP (ref 3.5–5.3)
POTASSIUM SERPL-SCNC: 4.1 MMOL/L — SIGNIFICANT CHANGE UP (ref 3.5–5.3)
PROT SERPL-MCNC: 7.1 G/DL — SIGNIFICANT CHANGE UP (ref 6.6–8.7)
RAPID RVP RESULT: SIGNIFICANT CHANGE UP
RBC # BLD: 4.33 M/UL — SIGNIFICANT CHANGE UP (ref 4.2–5.8)
RBC # FLD: 14.1 % — SIGNIFICANT CHANGE UP (ref 10.3–14.5)
SAO2 % BLDV: 96.9 % — SIGNIFICANT CHANGE UP
SARS-COV-2 RNA SPEC QL NAA+PROBE: SIGNIFICANT CHANGE UP
SODIUM SERPL-SCNC: 136 MMOL/L — SIGNIFICANT CHANGE UP (ref 135–145)
TROPONIN T SERPL-MCNC: 0.1 NG/ML — HIGH (ref 0–0.06)
WBC # BLD: 9.4 K/UL — SIGNIFICANT CHANGE UP (ref 3.8–10.5)
WBC # FLD AUTO: 9.4 K/UL — SIGNIFICANT CHANGE UP (ref 3.8–10.5)

## 2023-06-06 PROCEDURE — 99283 EMERGENCY DEPT VISIT LOW MDM: CPT

## 2023-06-06 PROCEDURE — 84295 ASSAY OF SERUM SODIUM: CPT

## 2023-06-06 PROCEDURE — 93010 ELECTROCARDIOGRAM REPORT: CPT

## 2023-06-06 PROCEDURE — 71045 X-RAY EXAM CHEST 1 VIEW: CPT

## 2023-06-06 PROCEDURE — 93005 ELECTROCARDIOGRAM TRACING: CPT

## 2023-06-06 PROCEDURE — 80053 COMPREHEN METABOLIC PANEL: CPT

## 2023-06-06 PROCEDURE — 82803 BLOOD GASES ANY COMBINATION: CPT

## 2023-06-06 PROCEDURE — 82435 ASSAY OF BLOOD CHLORIDE: CPT

## 2023-06-06 PROCEDURE — 84132 ASSAY OF SERUM POTASSIUM: CPT

## 2023-06-06 PROCEDURE — 84484 ASSAY OF TROPONIN QUANT: CPT

## 2023-06-06 PROCEDURE — 85025 COMPLETE CBC W/AUTO DIFF WBC: CPT

## 2023-06-06 PROCEDURE — 85018 HEMOGLOBIN: CPT

## 2023-06-06 PROCEDURE — 82330 ASSAY OF CALCIUM: CPT

## 2023-06-06 PROCEDURE — 0225U NFCT DS DNA&RNA 21 SARSCOV2: CPT

## 2023-06-06 PROCEDURE — 86900 BLOOD TYPING SEROLOGIC ABO: CPT

## 2023-06-06 PROCEDURE — 99285 EMERGENCY DEPT VISIT HI MDM: CPT

## 2023-06-06 PROCEDURE — 99285 EMERGENCY DEPT VISIT HI MDM: CPT | Mod: 25

## 2023-06-06 PROCEDURE — 99024 POSTOP FOLLOW-UP VISIT: CPT

## 2023-06-06 PROCEDURE — 83690 ASSAY OF LIPASE: CPT

## 2023-06-06 PROCEDURE — 86901 BLOOD TYPING SEROLOGIC RH(D): CPT

## 2023-06-06 PROCEDURE — 71045 X-RAY EXAM CHEST 1 VIEW: CPT | Mod: 26

## 2023-06-06 PROCEDURE — 86850 RBC ANTIBODY SCREEN: CPT

## 2023-06-06 PROCEDURE — 36415 COLL VENOUS BLD VENIPUNCTURE: CPT

## 2023-06-06 PROCEDURE — 85014 HEMATOCRIT: CPT

## 2023-06-06 PROCEDURE — 83605 ASSAY OF LACTIC ACID: CPT

## 2023-06-06 PROCEDURE — 82947 ASSAY GLUCOSE BLOOD QUANT: CPT

## 2023-06-06 RX ORDER — SODIUM CHLORIDE 9 MG/ML
1000 INJECTION INTRAMUSCULAR; INTRAVENOUS; SUBCUTANEOUS ONCE
Refills: 0 | Status: COMPLETED | OUTPATIENT
Start: 2023-06-06 | End: 2023-06-06

## 2023-06-06 RX ADMIN — SODIUM CHLORIDE 1000 MILLILITER(S): 9 INJECTION INTRAMUSCULAR; INTRAVENOUS; SUBCUTANEOUS at 12:51

## 2023-06-06 NOTE — ED PROVIDER NOTE - ATTENDING CONTRIBUTION TO CARE
The patient seen with resident    Transient Hypotension    I, Wan Burrows, performed the initial face to face bedside interview with this patient regarding history of present illness, review of symptoms and relevant past medical, social and family history.  I completed an independent physical examination.  I was the initial provider who evaluated this patient. I have signed out the follow up of any pending tests (i.e. labs, radiological studies) to the resident  I have communicated the patient’s plan of care and disposition with the resident

## 2023-06-06 NOTE — ED ADULT NURSE NOTE - PAIN: PRESENCE, MLM
Quality 130: Documentation Of Current Medications In The Medical Record: Current Medications Documented Quality 431: Preventive Care And Screening: Unhealthy Alcohol Use - Screening: Patient not identified as an unhealthy alcohol user when screened for unhealthy alcohol use using a systematic screening method Detail Level: Detailed Quality 226: Preventive Care And Screening: Tobacco Use: Screening And Cessation Intervention: Patient screened for tobacco use and is an ex/non-smoker denies pain/discomfort (Rating = 0)

## 2023-06-06 NOTE — ED ADULT NURSE NOTE - OBJECTIVE STATEMENT
Patient sent to ED by doctor at dialysis after episode of hypotension.  Patient does not recall how low his pressure dropped.  As per patient's wife (at bedside), patient has had previous similar episodes and claims the episodes only happen during dialysis treatment.  Patient denies c/p, sob, n/v/d, chills, fever, headache.  Patient states, "I feel fine now, but earlier I was feeling very tired and weak."  Patient admits to history of anemia, denies bloody/black stools.  No further complaints at this time.

## 2023-06-06 NOTE — ED ADULT TRIAGE NOTE - BEFAST EYES
"COLONOSCOPY  Progress Note    Rafa Dan  10/7/2019    Colonoscopy confirms severe pancolitis with ulcerations, spontaneous bleeding, cobblestoning, and pseudopolyps.  Ileocecal valve is \"fish-mouthed\".  Terminal ileum is normal.    >> Await pathology  >> Obtain histo urine antigen, T spot, and hepatitis B surface antigen in anticipation of future biologic therapy.  >> Begin corticosteroids and sulfasalazine.    Mark I. Brunner, MD     Date: 10/7/2019  Time: 1:13 PM        "
No

## 2023-06-06 NOTE — ED ADULT NURSE NOTE - DISCHARGE DATE/TIME
Chief Complaint   Patient presents with   • Follow Up Cellulitis     feels terrible. felt fine yesterday. took medicine last night, outside of neck was red and blotchy. still there. area of lacteration is still warm and hard. feels like she is going to vomit, hard to focus, headache (took 600mg ibuprofen this am)   • bites     multiple bites on both lower legs from last night        SUBJECTIVE:  Eboni Medina is a 36 year old female in today for evaluation of the above concern. She was seen via video visit earlier this week and diagnosed with cellulitis. She was put on keflex. Since that time she reports that she has developed redness and itching/blotchiness of the skin on the neck for an hour or so after taking the keflex. She does feel that the redness around the cellulitis site is improved some today. She also has had headache, nausea and difficulties focusing. She reports some red itchy dots on the lower extremities (she thinks due to bug bites). She is not having lip/tongue swelling. No fever.     PAST MEDICAL HISTORY:    Mental disorder                                                 Comment: anxiety    Pelvic pain in pregnancy                        2017      Encounter for supervision of normal pregnancy * 2014     Encounter for female sterilization procedure    3/18/2020     Antepartum multigravida of advanced maternal a* 2019     Previous  delivery affecting pregnancy  2015       Comment: Planning repeat  section.     Status post  delivery                   3/18/2020     Patient Active Problem List    Diagnosis Date Noted   • Morbid obesity with BMI of 40.0-44.9, adult (CMS/[facility]) 2021     Priority: Low   • MATHEUS (generalized anxiety disorder) 2019     Priority: Low       MEDICATIONS:  Outpatient Medications Marked as Taking for the 21 encounter (Office Visit) with Leanna Peralta PA-C   Medication Sig Dispense Refill   • sertraline (ZOLOFT) 100 MG  tablet TAKE 2 TABLETS BY MOUTH ONCE DAILY 180 tablet 2       ALLERGIES:  ALLERGIES:   Allergen Reactions   • Cephalexin RASH       OBJECTIVE:  Visit Vitals  /80 (BP Location: LUE - Left upper extremity, Patient Position: Sitting, Cuff Size: Large Adult)   Pulse 82   Temp 98.2 °F (36.8 °C) (Temporal)   Ht 5' 9\" (1.753 m)   Wt 123.8 kg   LMP  (LMP Unknown)   SpO2 95%   BMI 40.32 kg/m²     General: Well-developed female in no acute distress. Nontoxic.  Psychiatric: The patient is alert and oriented x3.  Her mood and affect are appropriate.  HEENT: Pupils are equal.  Extraocular movements are intact. Conjunctival and scleral surfaces are without injection.  Mucous membranes are moist.    Neck: Supple without tenderness.    Lymph: There is shotty anterior cervical but no supraclavicular adenopathy.    Lungs: There is no respiratory distress.  Clear to auscultation bilaterally without crackles or wheezes.    Heart: Regular rate and rhythm without murmur.  Skin: Warm and dry with erythematous papules on bilateral lower extremities. There is a scratch with surrounding erythema and clear fluid fluid papules on the RLE. There is erythema and hives on the neck.       ASSESSMENT AND PLAN:    Cellulitis of right lower extremity  Allergic reaction to drug, initial encounter  Insect bite of lower leg, unspecified laterality, initial encounter  Malaise  Rash of neck  -     CBC WITH DIFFERENTIAL; Future  -     SEDIMENTATION RATE WESTERGREN; Future  -     COMPREHENSIVE METABOLIC PANEL; Future  Suspect allergic reaction causing rash and itching. It is possible that her cellulitis is contributing to fatigue and malaise as well. Will switch to alternate antibiotic and treat itching with :  -     doxycycline hyclate (VIBRAMYCIN) 100 MG capsule; Take 1 capsule by mouth 2 times daily for 5 days.  -     hydrOXYzine (ATARAX) 25 MG tablet; Take 1 tablet by mouth 3 times daily as needed for Itching.  Will also get some labs (CBC, CMP,  sed rate) to rule out underlying systemic issue causing her symptoms. If fever, difficulties breathing or lip/tongue swelling, she is to be seen in the ER.       ADELINA Kenney [facility]  [address] [place] [address]  phone [phone]  fax [phone]    Supervising physician is Maylin Taylor MD               06-Jun-2023 15:30

## 2023-06-06 NOTE — ED PROVIDER NOTE - OBJECTIVE STATEMENT
68M hx ESRD on HD (M/W/F), DM presents to ED from clinic for reported low BP reading at his routine followup appointment. Pt states he has been feeling well otherwise, no dizziness, HA, N/V, CP, SOB, fever. 68M hx ESRD on HD (M/W/F), DM presents to ED from clinic for reported low BP reading at his routine followup appointment. Pt states he has felt mildly fatigued recently, but otherwise feeling well, no dizziness, HA, N/V, CP, SOB, fever.

## 2023-06-06 NOTE — ED PROVIDER NOTE - PHYSICAL EXAMINATION
General: Awake, alert, lying in bed in NAD  HEENT: Normocephalic, atraumatic. No scleral icterus or conjunctival injection. EOMI. Moist mucous membranes. Oropharynx clear.   Neck:. Soft and supple.  Cardiac: RRR, +S1/S2. No murmurs, rubs, gallops. Peripheral pulses 2+ and symmetric. No LE edema. R chest wall dialysis port in place  Resp: Lungs CTAB. Speaking in full sentences. No accessory muscle use  Abd: Soft, non-tender, non-distended. No guarding, rebound, or rigidity.  Back: Spine midline and non-tender. No CVA tenderness.    Extremities: L brachial AV fistula site with palpable thrill  Skin: No rashes, abrasions, or lacerations.  Neuro: AO x 4. Moves all extremities symmetrically. Motor strength and sensation grossly intact.  Psych: Appropriate mood and affect

## 2023-06-06 NOTE — CONSULT NOTE ADULT - ASSESSMENT
ESRD on HD  MWF schedule at [facility]  last HD was yesterday- uneventful  Pt with transient hypotension; BP now stable; pt is euvolemic on exam  Lytes wnl  No indication for HD today  resume HD at outpt HD center.

## 2023-06-06 NOTE — ED PROVIDER NOTE - NSFOLLOWUPINSTRUCTIONS_ED_ALL_ED_FT
You were seen in the emergency room for an episode of low blood pressure which resolved. Bloodwork and a chest x-ray were performed which were unchanged from your baseline. Follow up with your doctors as scheduled.     SEEK IMMEDIATE MEDICAL CARE IF YOU HAVE ANY OF THE FOLLOWING SYMPTOMS: vomiting, changes in your vision or speech, weakness in your arms or legs, trouble speaking or swallowing, chest pain, abdominal pain, shortness of breath, sweating, bleeding, headache, neck pain, or fever.

## 2023-06-06 NOTE — CONSULT NOTE ADULT - SUBJECTIVE AND OBJECTIVE BOX
[facility] DIVISION OF KIDNEY DISEASES AND HYPERTENSION -- INITIAL CONSULT NOTE  --------------------------------------------------------------------------------  HPI:  68 yr old male with PMHX of  hypertension, hyperlipidemia, diabetes mellitus, CKD stage 5, ESRD, CHF, CVA, ischemic cardiomyopathy, URVASHI, BPH, left AVF -  sent from OP Vascular office for hypotension.  Pt had a left radiocephalic AVF placed in 1/19/ 2023 and had 5/22/23 left fistulogram with balloon angioplasty- AVF was cannulated twice since angioplasty but since had AVF malfunction. pt was sent to Vascular surgery office and was noted to be hypotensive and sent to ED.  pt seen and examiend; feels well.  Denies any acute complaint.  I called the dialysis unit and was informed pt's BP runs high; BP yesterday prior to starting tx was 162/86- pt  had 2 kg UF  and BP was 152/79 post HD.  Pt denies any symptoms at this time.  Wife at bedside  PAST HISTORY  --------------------------------------------------------------------------------  PAST MEDICAL & SURGICAL HISTORY:  DM (diabetes mellitus)      HTN (hypertension)      High cholesterol      Myocardial infarction  (Coronary angiogram 2014)      Acute on chronic congestive heart failure, unspecified congestive heart failure type      Cerebrovascular accident (CVA)      ESRD (end stage renal disease)      S/P coronary artery bypass with three autogenous grafts        FAMILY HISTORY:  Family history of heart disease (Sibling)      PAST SOCIAL HISTORY:    ALLERGIES & MEDICATIONS  --------------------------------------------------------------------------------  Allergies    No Known Allergies    Intolerances      Standing Inpatient Medications    PRN Inpatient Medications      REVIEW OF SYSTEMS  --------------------------------------------------------------------------------  Gen: No weight changes, fatigue, fevers/chills, weakness  Skin: No rashes  Head/Eyes/Ears/Mouth: No headache; Normal hearing; Normal vision w/o blurriness; No sinus pain/discomfort, sore throat  Respiratory: No dyspnea, cough, wheezing, hemoptysis  CV: No chest pain, PND, orthopnea  GI: No abdominal pain, diarrhea, constipation, nausea, vomiting, melena, hematochezia  : No increased frequency, dysuria, hematuria, nocturia  MSK: No joint pain/swelling; no back pain; no edema  Neuro: No dizziness/lightheadedness, weakness, seizures, numbness, tingling  Heme: No easy bruising or bleeding  Endo: No heat/cold intolerance  Psych: No significant nervousness, anxiety, stress, depression    All other systems were reviewed and are negative, except as noted.    VITALS/PHYSICAL EXAM  --------------------------------------------------------------------------------  T(C): 36.3 (06-06-23 @ 11:22), Max: 36.3 (06-06-23 @ 11:22)  HR: 62 (06-06-23 @ 11:36) (62 - 63)  BP: 114/59 (06-06-23 @ 11:36) (112/72 - 114/59)  RR: 18 (06-06-23 @ 11:36) (18 - 18)  SpO2: 96% (06-06-23 @ 11:36) (95% - 96%)  Wt(kg): --  Height (cm): 170.2 (06-06-23 @ 11:22)  Weight (kg): 83.9 (06-06-23 @ 11:22)  BMI (kg/m2): 29 (06-06-23 @ 11:22)  BSA (m2): 1.96 (06-06-23 @ 11:22)      Physical Exam:  	Gen: NAD, well-appearing  	HEENT:  supple neck  	Pulm: CTA B/L  	CV: RRR, S1S2; no rub  	Abd: +BS, soft, nontender/nondistended  	: No suprapubic tenderness  	UE: Warm, no edema  	LE: Warm,  no edema  	Neuro: No focal deficit  	Psych: Normal affect and mood  	Skin: Warm  	Vascular access: JANE TDCHASIDY, JESSICA VILLATORO    LABS/STUDIES  --------------------------------------------------------------------------------              12.5   9.40  >-----------<  243      [06-06-23 @ 11:40]              37.4     136  |  94  |  36.6  ----------------------------<  236      [06-06-23 @ 11:40]  4.1   |  29.0  |  5.90        Ca     9.4     [06-06-23 @ 11:40]    TPro  7.1  /  Alb  3.8  /  TBili  0.4  /  DBili  x   /  AST  12  /  ALT  18  /  AlkPhos  114  [06-06-23 @ 11:40]        Troponin 0.10      [06-06-23 @ 11:40]    Creatinine Trend:  SCr 5.90 [06-06 @ 11:40]  SCr 7.41 [05-22 @ 10:26]    Urinalysis - [03-17-23 @ 02:30]      Color Yellow / Appearance Clear / SG 1.010 / pH 7.0      Gluc 50 / Ketone Negative  / Bili Negative / Urobili Negative       Blood Negative / Protein 500 / Leuk Est Negative / Nitrite Negative      RBC 0-2 / WBC 0-2 / Hyaline  / Gran  / Sq Epi  / Non Sq Epi Occasional / Bacteria Negative      Iron 35, TIBC 252, %sat 14      [02-16-23 @ 06:00]  Ferritin 28      [02-16-23 @ 06:00]  Lipid: chol 115, , HDL 21, LDL --      [02-16-23 @ 06:00]    HBsAb <3.0      [02-15-23 @ 11:30]  HBsAb Nonreact      [03-15-22 @ 23:33]  HBsAg Nonreact      [02-15-23 @ 11:30]  HCV 0.10, Nonreact      [02-15-23 @ 11:30]  HIV Nonreact      [01-13-22 @ 16:19]

## 2023-06-06 NOTE — ED ADULT TRIAGE NOTE - CHIEF COMPLAINT QUOTE
Pt A&Ox4 states "I felt shaky in my legs and tired."  BIBA c/o being hypotensive in MD office, was normotensive on arrival to ED. Patient get HD 3x week M/W/F, Left AV fissula.

## 2023-06-06 NOTE — ED PROVIDER NOTE - PATIENT PORTAL LINK FT
You can access the FollowMyHealth Patient Portal offered by [facility] by registering at the following website: [email or web address]. By joining [employer] [facility]’s FollowMyHealth portal, you will also be able to view your health information using other applications (apps) compatible with our system.

## 2023-06-06 NOTE — ED PROVIDER NOTE - CLINICAL SUMMARY MEDICAL DECISION MAKING FREE TEXT BOX
68M hx ESRD on HD presents for hypotension noted at outpt clinic today, now resolved. Exam unremarkable. EKG with isolated TWI aVL. Will draw labs, CXR, reassess

## 2023-06-08 ENCOUNTER — APPOINTMENT (OUTPATIENT)
Dept: FAMILY MEDICINE | Facility: CLINIC | Age: 68
End: 2023-06-08
Payer: MEDICARE

## 2023-06-08 ENCOUNTER — RESULT CHARGE (OUTPATIENT)
Age: 68
End: 2023-06-08

## 2023-06-08 VITALS
BODY MASS INDEX: 27.31 KG/M2 | DIASTOLIC BLOOD PRESSURE: 70 MMHG | HEIGHT: 64 IN | HEART RATE: 73 BPM | RESPIRATION RATE: 16 BRPM | TEMPERATURE: 97.3 F | WEIGHT: 160 LBS | SYSTOLIC BLOOD PRESSURE: 138 MMHG | OXYGEN SATURATION: 96 %

## 2023-06-08 DIAGNOSIS — Z00.00 ENCOUNTER FOR GENERAL ADULT MEDICAL EXAMINATION W/OUT ABNORMAL FINDINGS: ICD-10-CM

## 2023-06-08 PROCEDURE — 36415 COLL VENOUS BLD VENIPUNCTURE: CPT

## 2023-06-08 PROCEDURE — 83036 HEMOGLOBIN GLYCOSYLATED A1C: CPT | Mod: QW

## 2023-06-08 PROCEDURE — G0438: CPT

## 2023-06-08 NOTE — ASSESSMENT
[FreeTextEntry1] : This is a 66 y/o male with PMHx significant for CAD s/p 3V CABG (2009- [facility] - LAD reverse SVG to postero- lateral reverse SCG to OM), Anxiety, Carotid artery disease, CKD stage 4, CHFpEF, HTN, PAD, HLD, T2DM, CVA with left sided weakness, presenting s/p hospitalization for initiation of HD.\par \par NEPHROLOGY: CKD stage 5 on HD MWF\par -s/p AVF creation\par -Check Renal panel.\par -Continue Magnesium supplementation, Sodium bicarb 1300 mg tid e-refilled\par -Nephrology Dr Andersen following.\par -Pt being considered for Renal transplant\par \par CVS: h/o CAD s/p 3V CABG (2009), Carotid artery disease, CHFpEF, HTN, PAD, HLD, f/w accelerated HTN \par -Cardiology following Dr Michaud\par -NST 2/22 showed no evidence of Ischemia\par -MCOT followed by cardiology, No Afib\par -Continue ASA 81 mg, Amlodipine 10 mg  daily, Atorvastatin 80 mg, Carvedilol 25 mg bid, Hydralazine 50 mg tid, Isosorbide 20 mg tid, Clonidine 0.1 mg bid.\par -Discontinued Eliquis secondary to frequent falls, started on Clopidogrel by Cardiology\par \par : Microscopic hematuria\par -Followed by Dr España\par \par NEURO: h/o CVA with LEFT sided residual weakness, positional vertigo\par -Neurology following Dr Sarabia\par -Continue ASA, Eliquis discontinued.\par \par HEME: Anemia of CKD\par -s/p 2U transfused while inpatient\par -Check CBC\par \par ENDO: T2DM\par -Diet controlled\par -A1c 6.7 --> 8.1 --> 7.5 --> 8.0 --> 7.5\par -Will send for A1c with average glucose\par -Continue Tresiba, pt states that he changes his Insulin regimen depending on how high or low his BS are.\par \par GI: Inguinal hernia\par -Surgery referral given but provider does not accept his insurance.\par \par HCM:\par -Blood work drawn in house\par -PFIZER Covid vaccine 3/11/21, 4/2/21, 1/11/22\par -Flu vaccine 9/30/22\par -Prevnar 20 Oct 2022\par -No record of Colonoscopy, GI referral given, not scheduled yet\par -RTO for CPE at earliest convenience\par \par \par

## 2023-06-08 NOTE — HEALTH RISK ASSESSMENT
[1 or 2 (0 pts)] : 1 or 2 (0 points) [Never (0 pts)] : Never (0 points) [No] : In the past 12 months have you used drugs other than those required for medical reasons? No [None] : None [Fully functional (bathing, dressing, toileting, transferring, walking, feeding)] : Fully functional (bathing, dressing, toileting, transferring, walking, feeding) [Fully functional (using the telephone, shopping, preparing meals, housekeeping, doing laundry, using] : Fully functional and needs no help or supervision to perform IADLs (using the telephone, shopping, preparing meals, housekeeping, doing laundry, using transportation, managing medications and managing finances) [Smoke Detector] : smoke detector [Carbon Monoxide Detector] : carbon monoxide detector [Seat Belt] :  uses seat belt [Patient/Caregiver unclear of wishes] : , patient/caregiver unclear of wishes [Fair] : ~his/her~ current health as fair  [Good] : ~his/her~  mood as  good [Intercurrent ED visits] : went to ED [No falls in past year] : Patient reported no falls in the past year [0] : 2) Feeling down, depressed, or hopeless: Not at all (0) [PHQ-2 Negative - No further assessment needed] : PHQ-2 Negative - No further assessment needed [Never] : Never [With Family] : lives with family [Retired] : retired [] :  [Reviewed no changes] : Reviewed, no changes [de-identified] : ALIDA [de-identified] : Nephrology, Vascular [Audit-CScore] : 0 [de-identified] : none [de-identified] : low salt [SGG0Geilo] : 0 [Change in mental status noted] : No change in mental status noted [Language] : denies difficulty with language [Behavior] : denies difficulty with behavior [Learning/Retaining New Information] : denies difficulty learning/retaining new information [Handling Complex Tasks] : denies difficulty handling complex tasks [Reasoning] : denies difficulty with reasoning [Spatial Ability and Orientation] : denies difficulty with spatial ability and orientation [Sexually Active] : not sexually active [High Risk Behavior] : no high risk behavior [Reports changes in hearing] : Reports no changes in hearing [Reports changes in vision] : Reports no changes in vision [Reports normal functional visual acuity (ie: able to read med bottle)] : Reports poor functional visual acuity.  [Reports changes in dental health] : Reports no changes in dental health [Guns at Home] : no guns at home [Sunscreen] : does not use sunscreen [MammogramComments] : n/a [PapSmearComments] : n/a [HIVDate] : 12/22 [HIVComments] : non reactive [HepatitisCDate] : 12/22 [HepatitisCComments] : non reactive [AdvancecareDate] : 03/23

## 2023-06-08 NOTE — PHYSICAL EXAM
[Normal] : affect was normal and insight and judgment were intact [Comprehensive Foot Exam Normal] : Right and left foot were examined and both feet are normal. No ulcers in either foot. Toes are normal and with full ROM.  Normal tactile sensation with monofilament testing throughout both feet [de-identified] : LEFT UE AVF with good thrill. RIGHT upper chest Perm-a-cath in place

## 2023-06-08 NOTE — HISTORY OF PRESENT ILLNESS
[FreeTextEntry1] : Wellness visit [de-identified] : This is a 69 y/o male with PMHx significant for CAD s/p 3V CABG (2009- [facility] - LAD reverse SVG to postero- lateral reverse SCG to OM), Anxiety, Carotid artery disease, CKD stage 5 on HD MWF, CHFpEF, HTN, PAD, HLD, T2DM, CVA with left sided weakness, presenting for wellness exam.

## 2023-06-12 NOTE — ASSESSMENT
[FreeTextEntry1] : 67yo male with left radiocephalic AVF that has failed to mature; in office patient hypotensive sent to ER\par -likely will need duplex and possibly procedure\par -will plan to schedule asap

## 2023-06-12 NOTE — PHYSICAL EXAM
[Normal Breath Sounds] : Normal breath sounds [0] : right 0 [2+] : left 2+ [JVD] : no jugular venous distention  [de-identified] : ill appearing; palor and diaphoresis [de-identified] : tachycardia and hypotension  [FreeTextEntry1] : LUE: AVF with ecchymosis and small hematoma over previous

## 2023-06-12 NOTE — HISTORY OF PRESENT ILLNESS
[FreeTextEntry1] : 2/8/23: 66 yo male PMHx DM 2, ESRD, HTN, CHF, presents s/p left radiocephalic AV fistula creation. Pt doing well since surgery. Denies any severe hand pain or cramping. He has not started dialysis yet. \par \par 3/22/23: Pt doing well since last visit. He denies any hand pain or cramping. He has been doing dialysis via perm cath. \par \par 4/26/23: Pt has been having difficulty at dialysis with his fistula. For the past two weeks his dialysis needle gets clotted due to low flow volume. He has been having dialysis via perm cath and fistula. He denies any hand pain or cramping. \par \par 5/31/23: Pt doing well since fistulogram. He is still using perm cath for dialysis. He denies any hand pain or cramping. \par \par PSHx: \par 5/22/23 left fistulogram with balloon angioplasty \par 2/2023 left fistulogram with intervention \par 2/2023 perm cath\par 1/19/23 left radiocephalic AV fistula  [de-identified] : Mr. Alfonso is a former patient of Dr. Craig who was being seen for HD access; he recently had a fistulagram with balloon assisted maturation. Since last visit he was cannulated twice (first 1 needle in AVF and 1 in catheter; second both needle in AVF). When he was most recently accessed in AVF the site appeared to thrombose/malfunction. \par \par During the conversation Mr. Alfonso became increasingly dizzy and uncomfortable. He told me and his wife that he feels very unwell. A repeat set of vitals was done and he was hypotensive (70/50) and tachycardic (110s). I immediately called EMS for emergent transfer to ED.

## 2023-06-15 ENCOUNTER — APPOINTMENT (OUTPATIENT)
Dept: VASCULAR SURGERY | Facility: CLINIC | Age: 68
End: 2023-06-15

## 2023-06-15 ENCOUNTER — EMERGENCY (EMERGENCY)
Facility: HOSPITAL | Age: 68
LOS: 1 days | Discharge: HOME | End: 2023-06-15
Attending: STUDENT IN AN ORGANIZED HEALTH CARE EDUCATION/TRAINING PROGRAM
Payer: MEDICARE

## 2023-06-15 VITALS
OXYGEN SATURATION: 96 % | TEMPERATURE: 97 F | SYSTOLIC BLOOD PRESSURE: 101 MMHG | DIASTOLIC BLOOD PRESSURE: 64 MMHG | HEART RATE: 67 BPM | WEIGHT: 175.05 LBS | HEIGHT: 67 IN | RESPIRATION RATE: 18 BRPM

## 2023-06-15 DIAGNOSIS — Z95.1 PRESENCE OF AORTOCORONARY BYPASS GRAFT: Chronic | ICD-10-CM

## 2023-06-15 LAB
ALBUMIN SERPL ELPH-MCNC: 3.9 G/DL — SIGNIFICANT CHANGE UP (ref 3.3–5.2)
ALP SERPL-CCNC: 112 U/L — SIGNIFICANT CHANGE UP (ref 40–120)
ALT FLD-CCNC: 44 U/L — HIGH
ANION GAP SERPL CALC-SCNC: 12 MMOL/L — SIGNIFICANT CHANGE UP (ref 5–17)
AST SERPL-CCNC: 34 U/L — SIGNIFICANT CHANGE UP
BASOPHILS # BLD AUTO: 0.04 K/UL — SIGNIFICANT CHANGE UP (ref 0–0.2)
BASOPHILS NFR BLD AUTO: 0.5 % — SIGNIFICANT CHANGE UP (ref 0–2)
BILIRUB SERPL-MCNC: 0.5 MG/DL — SIGNIFICANT CHANGE UP (ref 0.4–2)
BUN SERPL-MCNC: 36.7 MG/DL — HIGH (ref 8–20)
CALCIUM SERPL-MCNC: 9.4 MG/DL — SIGNIFICANT CHANGE UP (ref 8.4–10.5)
CHLORIDE SERPL-SCNC: 93 MMOL/L — LOW (ref 96–108)
CO2 SERPL-SCNC: 29 MMOL/L — SIGNIFICANT CHANGE UP (ref 22–29)
CREAT SERPL-MCNC: 6.09 MG/DL — HIGH (ref 0.5–1.3)
EGFR: 9 ML/MIN/1.73M2 — LOW
EOSINOPHIL # BLD AUTO: 0.15 K/UL — SIGNIFICANT CHANGE UP (ref 0–0.5)
EOSINOPHIL NFR BLD AUTO: 1.7 % — SIGNIFICANT CHANGE UP (ref 0–6)
GLUCOSE SERPL-MCNC: 212 MG/DL — HIGH (ref 70–99)
HCT VFR BLD CALC: 40.3 % — SIGNIFICANT CHANGE UP (ref 39–50)
HGB BLD-MCNC: 13 G/DL — SIGNIFICANT CHANGE UP (ref 13–17)
IMM GRANULOCYTES NFR BLD AUTO: 0.7 % — SIGNIFICANT CHANGE UP (ref 0–0.9)
LYMPHOCYTES # BLD AUTO: 1.47 K/UL — SIGNIFICANT CHANGE UP (ref 1–3.3)
LYMPHOCYTES # BLD AUTO: 16.6 % — SIGNIFICANT CHANGE UP (ref 13–44)
MCHC RBC-ENTMCNC: 28.9 PG — SIGNIFICANT CHANGE UP (ref 27–34)
MCHC RBC-ENTMCNC: 32.3 GM/DL — SIGNIFICANT CHANGE UP (ref 32–36)
MCV RBC AUTO: 89.6 FL — SIGNIFICANT CHANGE UP (ref 80–100)
MONOCYTES # BLD AUTO: 0.48 K/UL — SIGNIFICANT CHANGE UP (ref 0–0.9)
MONOCYTES NFR BLD AUTO: 5.4 % — SIGNIFICANT CHANGE UP (ref 2–14)
NEUTROPHILS # BLD AUTO: 6.67 K/UL — SIGNIFICANT CHANGE UP (ref 1.8–7.4)
NEUTROPHILS NFR BLD AUTO: 75.1 % — SIGNIFICANT CHANGE UP (ref 43–77)
PLATELET # BLD AUTO: 258 K/UL — SIGNIFICANT CHANGE UP (ref 150–400)
POTASSIUM SERPL-MCNC: 4.1 MMOL/L — SIGNIFICANT CHANGE UP (ref 3.5–5.3)
POTASSIUM SERPL-SCNC: 4.1 MMOL/L — SIGNIFICANT CHANGE UP (ref 3.5–5.3)
PROT SERPL-MCNC: 7.1 G/DL — SIGNIFICANT CHANGE UP (ref 6.6–8.7)
RBC # BLD: 4.5 M/UL — SIGNIFICANT CHANGE UP (ref 4.2–5.8)
RBC # FLD: 14.4 % — SIGNIFICANT CHANGE UP (ref 10.3–14.5)
SODIUM SERPL-SCNC: 134 MMOL/L — LOW (ref 135–145)
TROPONIN T SERPL-MCNC: 0.09 NG/ML — HIGH (ref 0–0.06)
WBC # BLD: 8.87 K/UL — SIGNIFICANT CHANGE UP (ref 3.8–10.5)
WBC # FLD AUTO: 8.87 K/UL — SIGNIFICANT CHANGE UP (ref 3.8–10.5)

## 2023-06-15 PROCEDURE — 99284 EMERGENCY DEPT VISIT MOD MDM: CPT

## 2023-06-15 PROCEDURE — 80053 COMPREHEN METABOLIC PANEL: CPT

## 2023-06-15 PROCEDURE — 71045 X-RAY EXAM CHEST 1 VIEW: CPT

## 2023-06-15 PROCEDURE — 84484 ASSAY OF TROPONIN QUANT: CPT

## 2023-06-15 PROCEDURE — 85025 COMPLETE CBC W/AUTO DIFF WBC: CPT

## 2023-06-15 PROCEDURE — 99283 EMERGENCY DEPT VISIT LOW MDM: CPT | Mod: 25

## 2023-06-15 PROCEDURE — 36415 COLL VENOUS BLD VENIPUNCTURE: CPT

## 2023-06-15 PROCEDURE — 71045 X-RAY EXAM CHEST 1 VIEW: CPT | Mod: 26

## 2023-06-15 NOTE — ED PROVIDER NOTE - NSFOLLOWUPINSTRUCTIONS_ED_ALL_ED_FT
current/63.4 kg -Follow up with NEPHROLOGY for further evaluation and management of your BLOOD PRESSURE.   -Do not take your Amlodipine unless your blood pressure is over 120mg systolic.      -Follow up with you primary care doctor in the next 2 days. The results from today's visit have been provided for him/her to review. If you do not have a primary care doctor call [phone] to find a primary care doctor OR make an appointment with [facility] in [place] at [phone]    -Return to the ER with any new or worsening symptoms including worsening pain, fevers, confusion, bleeding.

## 2023-06-15 NOTE — ED PROVIDER NOTE - PROGRESS NOTE DETAILS
Shelley: Patient doing well on reassessment. Discussed findings and plan for patient to hold anti-hypertensives and follow up with nephrology for further evaluation. Pt expressed understanding. Patient amendable to discharge at this time. Return precautions provided.

## 2023-06-15 NOTE — ED PROVIDER NOTE - OBJECTIVE STATEMENT
ANSHUL CUNNINGHAM is a 69yo Male with PMH DM, HTN, HLD, MI, CHF, CVA, ESRD on HD (M/W/F) who presents c/o hypotension. Pt states he feels very light headed when he stands up. His blood pressure this morning was 80s/40s multiple times. Pt has been on HD for a few weeks and his wife states he has been getting hypotensive after HD. PT denies any symptoms of cp/sob, fevers, chills, nausea, vomiting, abdominal pain, urinary symptoms, confusion.

## 2023-06-15 NOTE — ED ADULT TRIAGE NOTE - CHIEF COMPLAINT QUOTE
Pt BIBA from home for hypotension.  Pt states "sometimes my blood pressure drops, not when im laying but when I get up."  Pt states today he stood up and felt weak; wife at home took BP and SBP was in 70's.  Pt normotensive on ems arrival and at arrival to ED.  Pt denies any complaints on arrival

## 2023-06-15 NOTE — ED PROVIDER NOTE - ATTENDING CONTRIBUTION TO CARE
ANSHUL CUNNINGHAM is a 67yo Male with PMH DM, HTN, HLD, MI, CHF, CVA, ESRD on HD (M/W/F) who presents c/o hypotension that is intermittent, has resolved; recently started dialysis consider orthostatic hypotension, poss dialysis disequilibrium though not always associated with dialysis, will work up here, if negative acute OK to follow up with his nephrologist for further eval / consider midodrine

## 2023-06-15 NOTE — ED PROVIDER NOTE - CLINICAL SUMMARY MEDICAL DECISION MAKING FREE TEXT BOX
ASSESSMENT:   ANSHUL CUNNINGHAM is a 69yo M who presented with hypotension in the setting of recent dialysis. Borderline hypotense on arrival and w/ ++orthostatics. physical exam otherwise on contributory.    PLAN: Screen for infection, electrolyte disturbances.

## 2023-06-15 NOTE — ED PROVIDER NOTE - CARE PROVIDER_API CALL
Jackson Faustin  Nephrology  [address] [place] [id number]  Phone: [phone]  Fax: [phone]  Follow Up Time: 1-3 Days

## 2023-06-15 NOTE — ED PROVIDER NOTE - PHYSICAL EXAMINATION
VITAL SIGNS: HYPOTENSE  CONSTITUTIONAL:  in no acute distress.  SKIN: Warm, dry, no rash.  HEAD: Normocephalic, atraumatic.  EYES: PERRL, conjunctiva and sclera clear.  ENT: pink & moist mucosa, no pharyngeal erythema  NECK: Supple, non tender. No cervical lymphadenopathy.  CARD: Regular rate and rhythm. No murmurs.   RESP: No wheezes, rales or rhonchi.   ABD:  soft, non-distended, non-tender.   MSK:  Good ROM in upper/lower extremities w/o pain.   NEURO: Alert, oriented. Grossly unremarkable. No focal deficits.   PSYCH: Cooperative, alert & oriented x3

## 2023-06-15 NOTE — ED PROVIDER NOTE - PATIENT PORTAL LINK FT
You can access the FollowMyHealth Patient Portal offered by [facility] by registering at the following website: [email or web address]. By joining [employer]’s FollowMyHealth portal, you will also be able to view your health information using other applications (apps) compatible with our system.

## 2023-06-15 NOTE — ED ADULT NURSE NOTE - NSFALLRISKINTERV_ED_ALL_ED

## 2023-06-15 NOTE — ED ADULT NURSE NOTE - OBJECTIVE STATEMENT
Patient a&ox4, respirations even and unlabored. Patient gets dialysis monday, Wednesday and fridays, patient has not missed any dialysis. Patient states his blood pressure has been low since this morning. VSS, call bell in reach, safety precautions maintained. Wife at the bed side. No distress noted at this time.

## 2023-06-22 ENCOUNTER — APPOINTMENT (OUTPATIENT)
Dept: VASCULAR SURGERY | Facility: CLINIC | Age: 68
End: 2023-06-22
Payer: MEDICARE

## 2023-06-22 VITALS
WEIGHT: 160 LBS | HEART RATE: 69 BPM | RESPIRATION RATE: 16 BRPM | TEMPERATURE: 97.2 F | BODY MASS INDEX: 27.31 KG/M2 | DIASTOLIC BLOOD PRESSURE: 81 MMHG | OXYGEN SATURATION: 96 % | SYSTOLIC BLOOD PRESSURE: 152 MMHG | HEIGHT: 64 IN

## 2023-06-22 PROCEDURE — 93990 DOPPLER FLOW TESTING: CPT

## 2023-06-22 PROCEDURE — 99212 OFFICE O/P EST SF 10 MIN: CPT

## 2023-06-27 ENCOUNTER — APPOINTMENT (OUTPATIENT)
Dept: CARDIOLOGY | Facility: CLINIC | Age: 68
End: 2023-06-27
Payer: MEDICARE

## 2023-06-27 VITALS
TEMPERATURE: 98.3 F | OXYGEN SATURATION: 95 % | HEART RATE: 81 BPM | BODY MASS INDEX: 27.81 KG/M2 | WEIGHT: 162 LBS | DIASTOLIC BLOOD PRESSURE: 82 MMHG | SYSTOLIC BLOOD PRESSURE: 180 MMHG

## 2023-06-27 VITALS — DIASTOLIC BLOOD PRESSURE: 80 MMHG | SYSTOLIC BLOOD PRESSURE: 160 MMHG

## 2023-06-27 PROCEDURE — 93000 ELECTROCARDIOGRAM COMPLETE: CPT

## 2023-06-27 PROCEDURE — 99214 OFFICE O/P EST MOD 30 MIN: CPT | Mod: 25

## 2023-06-27 RX ORDER — MUPIROCIN 20 MG/G
2 OINTMENT TOPICAL
Qty: 22 | Refills: 0 | Status: DISCONTINUED | COMMUNITY
Start: 2022-08-30 | End: 2023-06-27

## 2023-06-27 RX ORDER — AMLODIPINE BESYLATE 5 MG/1
5 TABLET ORAL
Qty: 90 | Refills: 3 | Status: ACTIVE | COMMUNITY

## 2023-06-27 RX ORDER — HYDRALAZINE HYDROCHLORIDE 50 MG/1
50 TABLET ORAL 3 TIMES DAILY
Qty: 90 | Refills: 3 | Status: DISCONTINUED | COMMUNITY
End: 2023-06-27

## 2023-06-27 RX ORDER — MAGNESIUM OXIDE 241.3 MG/1000MG
400 TABLET ORAL DAILY
Qty: 90 | Refills: 1 | Status: DISCONTINUED | COMMUNITY
Start: 2023-03-16 | End: 2023-06-27

## 2023-06-27 RX ORDER — LIDOCAINE AND PRILOCAINE 25; 25 MG/G; MG/G
2.5-2.5 CREAM TOPICAL
Qty: 1 | Refills: 3 | Status: DISCONTINUED | COMMUNITY
Start: 2023-03-22 | End: 2023-06-27

## 2023-06-27 RX ORDER — BETAMETHASONE DIPROPIONATE 0.5 MG/G
0.05 OINTMENT TOPICAL
Qty: 45 | Refills: 0 | Status: DISCONTINUED | COMMUNITY
Start: 2022-08-01 | End: 2023-06-27

## 2023-06-27 RX ORDER — PANTOPRAZOLE 40 MG/1
40 TABLET, DELAYED RELEASE ORAL
Qty: 90 | Refills: 0 | Status: DISCONTINUED | COMMUNITY
End: 2023-06-27

## 2023-06-27 RX ORDER — CALCITRIOL 0.25 UG/1
0.25 CAPSULE, LIQUID FILLED ORAL
Qty: 30 | Refills: 3 | Status: DISCONTINUED | COMMUNITY
End: 2023-06-27

## 2023-07-03 ENCOUNTER — NON-APPOINTMENT (OUTPATIENT)
Age: 68
End: 2023-07-03

## 2023-07-06 ENCOUNTER — LABORATORY RESULT (OUTPATIENT)
Age: 68
End: 2023-07-06

## 2023-07-10 RX ORDER — LIDOCAINE AND PRILOCAINE 25; 25 MG/G; MG/G
2.5-2.5 CREAM TOPICAL
Qty: 1 | Refills: 3 | Status: ACTIVE | COMMUNITY
Start: 2023-07-10 | End: 1900-01-01

## 2023-07-11 ENCOUNTER — APPOINTMENT (OUTPATIENT)
Dept: NEPHROLOGY | Facility: CLINIC | Age: 68
End: 2023-07-11

## 2023-07-11 ENCOUNTER — APPOINTMENT (OUTPATIENT)
Dept: TRAUMA SURGERY | Facility: CLINIC | Age: 68
End: 2023-07-11

## 2023-07-13 ENCOUNTER — APPOINTMENT (OUTPATIENT)
Dept: RADIOLOGY | Facility: CLINIC | Age: 68
End: 2023-07-13
Payer: MEDICARE

## 2023-07-13 ENCOUNTER — OUTPATIENT (OUTPATIENT)
Dept: OUTPATIENT SERVICES | Facility: HOSPITAL | Age: 68
LOS: 1 days | End: 2023-07-13
Payer: MEDICARE

## 2023-07-13 DIAGNOSIS — Z95.1 PRESENCE OF AORTOCORONARY BYPASS GRAFT: Chronic | ICD-10-CM

## 2023-07-13 DIAGNOSIS — Z00.00 ENCOUNTER FOR GENERAL ADULT MEDICAL EXAMINATION WITHOUT ABNORMAL FINDINGS: ICD-10-CM

## 2023-07-13 PROCEDURE — 77080 DXA BONE DENSITY AXIAL: CPT | Mod: 26

## 2023-07-13 PROCEDURE — 77080 DXA BONE DENSITY AXIAL: CPT

## 2023-07-17 RX ORDER — ISOSORBIDE DINITRATE 20 MG/1
20 TABLET ORAL
Qty: 270 | Refills: 3 | Status: ACTIVE | COMMUNITY
Start: 1900-01-01 | End: 1900-01-01

## 2023-07-18 LAB
25(OH)D3 SERPL-MCNC: 29.1 NG/ML
ALBUMIN SERPL ELPH-MCNC: 4.1 G/DL
ALP BLD-CCNC: 109 U/L
ALT SERPL-CCNC: 25 U/L
ANION GAP SERPL CALC-SCNC: 15 MMOL/L
APPEARANCE: CLEAR
AST SERPL-CCNC: 16 U/L
BACTERIA: NEGATIVE /HPF
BILIRUB SERPL-MCNC: 0.5 MG/DL
BILIRUBIN URINE: NEGATIVE
BLOOD URINE: NEGATIVE
BUN SERPL-MCNC: 34 MG/DL
CALCIUM SERPL-MCNC: 9.3 MG/DL
CALCIUM SERPL-MCNC: 9.3 MG/DL
CAST: 0 /LPF
CHLORIDE SERPL-SCNC: 97 MMOL/L
CHOLEST SERPL-MCNC: 143 MG/DL
CO2 SERPL-SCNC: 29 MMOL/L
COLOR: YELLOW
CREAT SERPL-MCNC: 6.06 MG/DL
EGFR: 9 ML/MIN/1.73M2
EPITHELIAL CELLS: 0 /HPF
GLUCOSE QUALITATIVE U: 250 MG/DL
GLUCOSE SERPL-MCNC: 182 MG/DL
HDLC SERPL-MCNC: 24 MG/DL
KETONES URINE: NEGATIVE MG/DL
LDLC SERPL CALC-MCNC: 76 MG/DL
LEUKOCYTE ESTERASE URINE: NEGATIVE
MICROSCOPIC-UA: NORMAL
NITRITE URINE: NEGATIVE
NONHDLC SERPL-MCNC: 120 MG/DL
PARATHYROID HORMONE INTACT: 406 PG/ML
PH URINE: 7.5
POTASSIUM SERPL-SCNC: 3.8 MMOL/L
PROT SERPL-MCNC: 6.9 G/DL
PROTEIN URINE: 300 MG/DL
PSA SERPL-MCNC: 0.92 NG/ML
RED BLOOD CELLS URINE: 0 /HPF
SODIUM SERPL-SCNC: 140 MMOL/L
SPECIFIC GRAVITY URINE: 1.01
TRIGL SERPL-MCNC: 216 MG/DL
TSH SERPL-ACNC: 0.18 UIU/ML
URATE SERPL-MCNC: 5 MG/DL
UROBILINOGEN URINE: 1 MG/DL
WHITE BLOOD CELLS URINE: 0 /HPF

## 2023-08-04 RX ORDER — CLOPIDOGREL BISULFATE 75 MG/1
75 TABLET, FILM COATED ORAL
Qty: 90 | Refills: 3 | Status: ACTIVE | COMMUNITY
Start: 2022-06-07 | End: 1900-01-01

## 2023-08-07 NOTE — DISCHARGE NOTE PROVIDER - REASON FOR ADMISSION
Intubation    Date/Time: 8/7/2023 7:10 AM    Performed by: Paco Holder MD  Authorized by: Jayden Manning MD    Intubation:     Induction:  Intravenous    Intubated:  Postinduction    Mask Ventilation:  Easy with oral airway    Attempts:  1    Attempted By:  Resident anesthesiologist    Method of Intubation:  Video laryngoscopy    Blade:  Beyer 3    Laryngeal View Grade: Grade I - full view of cords      Difficult Airway Encountered?: No      Complications:  None    Airway Device:  Oral endotracheal tube    Airway Device Size:  7.5    Style/Cuff Inflation:  Cuffed (inflated to minimal occlusive pressure)    Inflation Amount (mL):  8    Tube secured:  24    Secured at:  The teeth    Placement Verified By:  Capnometry    Complicating Factors:  None    Findings Post-Intubation:  BS equal bilateral  Notes:      Deep cords with increased adiposity surrounding the airway.        
Left Supraclavicular single shot and ICB    Patient location during procedure: pre-op    Reason for block: primary anesthetic    Diagnosis: Left Radial head fracture   Start time: 8/7/2023 6:46 AM  Timeout: 8/7/2023 6:45 AM   End time: 8/7/2023 6:52 AM    Staffing  Authorizing Provider: Jonah Rascon MD  Performing Provider: Waqar Lowry MD    Staffing  Performed by: Waqar Lowry MD  Authorized by: Jonah Rascon MD    Preanesthetic Checklist  Completed: patient identified, IV checked, site marked, risks and benefits discussed, surgical consent, monitors and equipment checked, pre-op evaluation and timeout performed  Peripheral Block  Patient position: supine  Prep: ChloraPrep  Patient monitoring: heart rate, cardiac monitor, continuous pulse ox, continuous capnometry and frequent blood pressure checks  Block type: supraclavicular  Laterality: left  Injection technique: single shot  Needle  Needle type: Stimuplex   Needle gauge: 22 G  Needle length: 2 in  Needle localization: anatomical landmarks and ultrasound guidance   -ultrasound image captured on disc.  Assessment  Injection assessment: negative aspiration, negative parasthesia and local visualized surrounding nerve  Paresthesia pain: none  Heart rate change: no  Slow fractionated injection: yes  Pain Tolerance: comfortable throughout block and no complaints  Medications:    Medications: bupivacaine (pf) (MARCAINE) injection 0.5% - Perineural   40 mL - 8/7/2023 6:52:00 AM    Additional Notes  Intercostal brachial field block performed with bupivacaine 0.5% 10ml for additional coverage.    VSS.  See anesthesia record.  Patient tolerated procedure well.  Left Supraclavicular block with 30 mL of 0.5% Bupivacaine w/ epinephrine [id number]               
Syncope

## 2023-08-24 ENCOUNTER — EMERGENCY (EMERGENCY)
Facility: HOSPITAL | Age: 68
LOS: 1 days | Discharge: HOME | End: 2023-08-24
Attending: STUDENT IN AN ORGANIZED HEALTH CARE EDUCATION/TRAINING PROGRAM
Payer: MEDICARE

## 2023-08-24 ENCOUNTER — NON-APPOINTMENT (OUTPATIENT)
Age: 68
End: 2023-08-24

## 2023-08-24 VITALS
OXYGEN SATURATION: 96 % | RESPIRATION RATE: 20 BRPM | TEMPERATURE: 98 F | SYSTOLIC BLOOD PRESSURE: 172 MMHG | HEART RATE: 63 BPM | DIASTOLIC BLOOD PRESSURE: 81 MMHG

## 2023-08-24 VITALS
DIASTOLIC BLOOD PRESSURE: 89 MMHG | RESPIRATION RATE: 20 BRPM | OXYGEN SATURATION: 98 % | TEMPERATURE: 99 F | SYSTOLIC BLOOD PRESSURE: 215 MMHG | HEART RATE: 75 BPM

## 2023-08-24 DIAGNOSIS — Z95.1 PRESENCE OF AORTOCORONARY BYPASS GRAFT: Chronic | ICD-10-CM

## 2023-08-24 LAB
ALBUMIN SERPL ELPH-MCNC: 3.6 G/DL — SIGNIFICANT CHANGE UP (ref 3.3–5.2)
ALP SERPL-CCNC: 95 U/L — SIGNIFICANT CHANGE UP (ref 40–120)
ALT FLD-CCNC: 24 U/L — SIGNIFICANT CHANGE UP
ANION GAP SERPL CALC-SCNC: 13 MMOL/L — SIGNIFICANT CHANGE UP (ref 5–17)
AST SERPL-CCNC: 20 U/L — SIGNIFICANT CHANGE UP
BASOPHILS # BLD AUTO: 0.04 K/UL — SIGNIFICANT CHANGE UP (ref 0–0.2)
BASOPHILS NFR BLD AUTO: 0.4 % — SIGNIFICANT CHANGE UP (ref 0–2)
BILIRUB SERPL-MCNC: 0.3 MG/DL — LOW (ref 0.4–2)
BUN SERPL-MCNC: 32.7 MG/DL — HIGH (ref 8–20)
CALCIUM SERPL-MCNC: 9.4 MG/DL — SIGNIFICANT CHANGE UP (ref 8.4–10.5)
CHLORIDE SERPL-SCNC: 94 MMOL/L — LOW (ref 96–108)
CO2 SERPL-SCNC: 30 MMOL/L — HIGH (ref 22–29)
CREAT SERPL-MCNC: 6.13 MG/DL — HIGH (ref 0.5–1.3)
EGFR: 9 ML/MIN/1.73M2 — LOW
EOSINOPHIL # BLD AUTO: 0.18 K/UL — SIGNIFICANT CHANGE UP (ref 0–0.5)
EOSINOPHIL NFR BLD AUTO: 1.9 % — SIGNIFICANT CHANGE UP (ref 0–6)
GLUCOSE SERPL-MCNC: 239 MG/DL — HIGH (ref 70–99)
HCT VFR BLD CALC: 34.7 % — LOW (ref 39–50)
HGB BLD-MCNC: 12.1 G/DL — LOW (ref 13–17)
IMM GRANULOCYTES NFR BLD AUTO: 0.3 % — SIGNIFICANT CHANGE UP (ref 0–0.9)
LYMPHOCYTES # BLD AUTO: 2.08 K/UL — SIGNIFICANT CHANGE UP (ref 1–3.3)
LYMPHOCYTES # BLD AUTO: 22.1 % — SIGNIFICANT CHANGE UP (ref 13–44)
MCHC RBC-ENTMCNC: 30 PG — SIGNIFICANT CHANGE UP (ref 27–34)
MCHC RBC-ENTMCNC: 34.9 GM/DL — SIGNIFICANT CHANGE UP (ref 32–36)
MCV RBC AUTO: 85.9 FL — SIGNIFICANT CHANGE UP (ref 80–100)
MONOCYTES # BLD AUTO: 0.6 K/UL — SIGNIFICANT CHANGE UP (ref 0–0.9)
MONOCYTES NFR BLD AUTO: 6.4 % — SIGNIFICANT CHANGE UP (ref 2–14)
NEUTROPHILS # BLD AUTO: 6.49 K/UL — SIGNIFICANT CHANGE UP (ref 1.8–7.4)
NEUTROPHILS NFR BLD AUTO: 68.9 % — SIGNIFICANT CHANGE UP (ref 43–77)
PLATELET # BLD AUTO: 254 K/UL — SIGNIFICANT CHANGE UP (ref 150–400)
POTASSIUM SERPL-MCNC: 3.7 MMOL/L — SIGNIFICANT CHANGE UP (ref 3.5–5.3)
POTASSIUM SERPL-SCNC: 3.7 MMOL/L — SIGNIFICANT CHANGE UP (ref 3.5–5.3)
PROT SERPL-MCNC: 6.2 G/DL — LOW (ref 6.6–8.7)
RBC # BLD: 4.04 M/UL — LOW (ref 4.2–5.8)
RBC # FLD: 13.2 % — SIGNIFICANT CHANGE UP (ref 10.3–14.5)
SODIUM SERPL-SCNC: 137 MMOL/L — SIGNIFICANT CHANGE UP (ref 135–145)
WBC # BLD: 9.42 K/UL — SIGNIFICANT CHANGE UP (ref 3.8–10.5)
WBC # FLD AUTO: 9.42 K/UL — SIGNIFICANT CHANGE UP (ref 3.8–10.5)

## 2023-08-24 PROCEDURE — 99284 EMERGENCY DEPT VISIT MOD MDM: CPT

## 2023-08-24 PROCEDURE — 71045 X-RAY EXAM CHEST 1 VIEW: CPT | Mod: 26

## 2023-08-24 RX ORDER — METOCLOPRAMIDE HCL 10 MG
10 TABLET ORAL ONCE
Refills: 0 | Status: COMPLETED | OUTPATIENT
Start: 2023-08-24 | End: 2023-08-24

## 2023-08-24 RX ORDER — CARVEDILOL PHOSPHATE 80 MG/1
25 CAPSULE, EXTENDED RELEASE ORAL ONCE
Refills: 0 | Status: COMPLETED | OUTPATIENT
Start: 2023-08-24 | End: 2023-08-24

## 2023-08-24 RX ORDER — METOCLOPRAMIDE HCL 10 MG
10 TABLET ORAL ONCE
Refills: 0 | Status: DISCONTINUED | OUTPATIENT
Start: 2023-08-24 | End: 2023-08-24

## 2023-08-24 RX ADMIN — CARVEDILOL PHOSPHATE 25 MILLIGRAM(S): 80 CAPSULE, EXTENDED RELEASE ORAL at 22:34

## 2023-08-24 RX ADMIN — Medication 10 MILLIGRAM(S): at 22:34

## 2023-08-24 NOTE — ED PROVIDER NOTE - CLINICAL SUMMARY MEDICAL DECISION MAKING FREE TEXT BOX
[General Appearance - Well Developed] : well developed [General Appearance - Well Nourished] : well nourished [Bowel Sounds] : normal bowel sounds [Abdomen Soft] : soft [Skin Color & Pigmentation] : normal skin color and pigmentation [Skin Turgor] : supple [Heart Rate And Rhythm] : Heart rate and rhythm were normal [] : no respiratory distress [Respiration, Rhythm And Depth] : normal respiratory rhythm and effort [Oriented To Time, Place, And Person] : oriented to person, place, and time [Not Anxious] : not anxious [Normal Station and Gait] : the gait and station were normal for the patient's age [No Focal Deficits] : no focal deficits [FreeTextEntry1] : incisions healing well 69yo male with PMH DM, HTN, HLD, MI, CHF, CVA, ESRD on HD (M/W/F) presenting with complaints of hiccups which began yesterday after completing dialysis. Red vomit today with dyspepsia.    CBC, CMP, CXR  reglan 69yo male with PMH DM, HTN, HLD, MI, CHF, CVA, ESRD on HD (M/W/F) presenting with complaints of hiccups which began yesterday after completing dialysis. He also reported an episode of red emesis today. On exam patient has hiccups but is non toxic appearing, neurovascularly intact, without acute pain. Hiccups and nausea resolved with reglan. CBC, CMP, CXR negative for acute changes. Blood pressure was 215/89 on arrival, he did not take his nighttime carvedilol.     recheck BP  discharge with meds? 67yo male with PMH DM, HTN, HLD, MI, CHF, CVA, ESRD on HD (M/W/F) presenting with complaints of persistent hiccups which began yesterday after completing dialysis. He also reported an episode of red emesis today. On exam patient has hiccups but is non toxic appearing, neurovascularly intact, without acute pain. Hiccups and nausea resolved with reglan. CBC, CMP, CXR negative for acute changes. Blood pressure was 215/89 on arrival, he did not take his nighttime carvedilol. gave 25mg carvedilol in ED. Will discharge home with return precautions and PCP follow up.

## 2023-08-24 NOTE — ED PROVIDER NOTE - OBJECTIVE STATEMENT
67yo male with PMH DM, HTN, HLD, MI, CHF, CVA, ESRD on HD (M/W/F) presenting with complaints of hiccups for the past 2 days. 67yo male with PMH DM, HTN, HLD, MI, CHF, CVA, ESRD on HD (M/W/F) presenting with complaints of hiccups which began yesterday after completing dialysis. The hiccups were persistent through the night and caused him to vomit once today. Patient states the vomit was red in color. He has been able to eat and drink today and take his medications. He has not taken his nighttime medications. He is still nauseous now and complains of some retrosternal burning. He denies fever, cough, congestion, SOB, palpitations, weakness, dizziness, abdominal pain, diarrhea, black or bloody stool, or back pain. 69yo male with PMH DM, HTN, HLD, MI, CHF, CVA, ESRD on HD (M/W/F) presenting with complaints of persistent hiccups which began yesterday after completing dialysis. The hiccups were persistent through the night and caused him to vomit once today. Patient states the vomit was red in color. He has been able to eat and drink today and take his medications. He has not taken his nighttime medications. He is still nauseous now and complains of some retrosternal burning. He denies fever, cough, congestion, SOB, palpitations, weakness, dizziness, abdominal pain, diarrhea, black or bloody stool, or back pain.

## 2023-08-24 NOTE — ED PROVIDER NOTE - NS ED ROS FT
General: Denies fever, chills  HEENT: Denies sore throat  Neck: Denies neck pain  Resp: Denies coughing, SOB  Cardiovascular: Denies CP, palpitations, LE edema  GI: Denies abdominal pain, diarrhea, constipation, blood in stool  : Denies dysuria, hematuria  MSK: Denies back pain  Neuro: Denies HA, dizziness, numbness, weakness  Skin: Denies rashes.

## 2023-08-24 NOTE — ED PROVIDER NOTE - PHYSICAL EXAMINATION
General: Awake, alert, lying in bed in NAD, active hiccups  HEENT: Normocephalic, atraumatic. No scleral icterus or conjunctival injection. EOMI. Moist mucous membranes. Oropharynx clear.   Neck:. Soft and supple.  Cardiac: RRR, Peripheral pulses 2+ and symmetric. No LE edema.  Resp: Lungs CTAB. No accessory muscle use  Abd: Soft, non-tender, non-distended. No guarding, rebound, or rigidity.  Back: Spine midline and non-tender.   Skin: No rashes, abrasions, or lacerations.  Neuro: AO x 4. Moves all extremities symmetrically. Motor strength and sensation grossly intact.  Psych: Appropriate mood and affect

## 2023-08-24 NOTE — ED PROVIDER NOTE - PATIENT PORTAL LINK FT
You can access the FollowMyHealth Patient Portal offered by [facility] by registering at the following website: [email or web address]. By joining [facility]’s FollowMyHealth portal, you will also be able to view your health information using other applications (apps) compatible with our system.

## 2023-08-24 NOTE — ED ADULT TRIAGE NOTE - BP NONINVASIVE DIASTOLIC (MM HG)
89

## 2023-08-24 NOTE — ED PROVIDER NOTE - NSTIMEPROVIDERCAREINITIATE_GEN_ER
Chief Complaint: Acute respiratory failure with hypoxia, right-sided hydropneumothorax, liver cirrhosis with ascites    SUBJECTIVE: The patient reports feeling \"fine\".  He denies any shortness of breath and chest pain.  Vision changes that he previously reported have resolved.  He is saturating well on room air.    OBJECTIVE:    Allergies reviewed.    Medications:  Scheduled Medications:  • potassium CHLORIDE  40 mEq Oral Once   • [START ON 10/2/2020] bumetanide  1 mg Oral BID   • metoPROLOL succinate  25 mg Oral Daily   • [START ON 10/2/2020] calcitRIOL  0.25 mcg Oral Once per day on Mon Wed Fri   • famotidine  20 mg Oral Daily   • cholecalciferol  5,000 Units OG Tube Daily   • heparin (porcine)  5,000 Units Subcutaneous 3 times per day   • LORazepam  1 mg Intravenous Once   • pneumococcal 23-valent vaccine  0.5 mL Intramuscular Once   • sodium chloride (PF)  2 mL Intracatheter 2 times per day   • Potassium Standard Replacement Protocol   Does not apply See Admin Instructions   • Magnesium Standard Replacement Protocol   Does not apply See Admin Instructions   • Phosphorus Standard Replacement Protocol   Does not apply See Admin Instructions     Continuous Infusions:  • dextrose 5 % infusion     .       Vitals with minimal/maximal:      Vital Last Value 24-Hour Range   Temperature 98.4 °F (36.9 °C) (10/01/20 0304) Temp  Min: 98.1 °F (36.7 °C)  Max: 100 °F (37.8 °C)   Pulse 60 (10/01/20 0348) Pulse  Min: 59  Max: 85   Respiratory 19 (09/30/20 1950) Resp  Min: 19  Max: 22   Noninvasive  Blood Pressure (!) 146/76 (10/01/20 0304) BP  Min: 140/75  Max: 167/77   Pulse Oximetry 96 % (10/01/20 0304) SpO2  Min: 93 %  Max: 97 %   Arterial   Blood Pressure   No data recorded     Intake/Output:.    Intake/Output Summary (Last 24 hours) at 10/1/2020 0956  Last data filed at 10/1/2020 0921  Gross per 24 hour   Intake 100 ml   Output 4800 ml   Net -4700 ml     I/O last 3 completed shifts:  In: 100 [IV Piggyback:100]  Out: 7790  [Urine:4450]    General: This is an alert, pleasant male who appears in no acute respiratory distress at rest, sitting up in bedside chair, on room air.  HEENT: Pupils equal, round and reactive to light.  LUNGS: Respirations are even and unlabored. Clear to auscultation bilaterally.  No wheezes, no rhonchi, no rales.  CARDIOVASCULAR: Irregular rate and rhythm. Normal S1, S2.  Systolic murmur present.  EXTREMITIES: No clubbing, cyanosis, or edema.    LABS:  Recent Labs   Lab 10/01/20  0456 09/30/20  0410 09/29/20  0457   WBC 6.0 6.0 5.3   HGB 10.7* 10.7* 9.9*   HCT 32.3* 32.6* 31.0*   * 124* 120*    Recent Labs   Lab 10/01/20  0456 09/30/20  0410 09/29/20  0457 09/28/20  0524   SODIUM 143 144 143 142   POTASSIUM 3.3* 3.9 4.2 4.3   CHLORIDE 105 108* 110* 109*   CO2 26 23 24 22   BUN 76* 75* 77* 74*   CREATININE 2.47* 2.44* 2.58* 2.50*   CALCIUM 8.5 8.4 7.8* 8.4   ALBUMIN 3.8 3.5* 2.9* 3.5*   BILIRUBIN 1.1*  --  0.6 1.0   ALKPT 107  --  133* 110   GPT 18  --  21 20   AST 20  --  26 27   GLUCOSE 93 94 114* 91      Recent Labs   Lab 10/01/20  0456 09/29/20  0457 09/28/20  0524   MG 2.5* 2.7* 2.7*             IMAGING:  Xr Chest Pa And Lateral    Result Date: 10/1/2020  XR CHEST PA AND LATERAL HISTORY:  right hydropneumothorax. COMPARISON:  Previous studies. Including CT scan. TECHNIQUE:  PA and lateral FINDINGS:  The heart is normal. Right-sided chest tube has been removed. No appreciable significant pneumothorax. Diffuse interstitial markings are seen. Slightly nodular opacity is seen in the right suprahilar regions. Small residual right pleural effusion.     IMPRESSION: No appreciable pneumothorax. Diffuse increased interstitial markings likely related to edema or passive venous congestion. Somewhat nodular opacity/infiltrates and small right pleural effusion.     ASSESSMENT/PLAN:    1. Acute hypoxic respiratory failure:   · Likely related to some level of fluid accumulation due to the presumed hepatic  24-Aug-2023 21:02 hydrothorax  · Echo with preserved ejection fraction with concern for possible aortic valve endocarditis and moderate aortic stenosis  · Extubated on 9/29 without difficulties and diuresing well  · Saturating well on room air     2. Acute Metabolic Acidosis:   · Combined metabolic and respiratory acidosis on initial presentation  · Resolved     3. Right hydropneumothorax:  · Right-sided chest tube placed for acute decompensation and breathing  · Chest tube removed 9/29.  · No significant symptomatic change.  Diuresing well  · No pneumothorax seen on today's x-ray.  · Continue monitoring symptoms.     4. Right pleural effusion  · Status post right-sided chest tube placement  · Chest tube removed 9/29  · Currently diuresing with Bumex  · Small effusion noted on today's x-ray.     5. Acute kidney injury on Chronic stage 3 kidney disease   · History of hypercomplement disease causing ATN requiring dialysis for 2.5 months Cr in March 2020 was 1.96.   · Nephrology following  · Creatinine gradually improving.     6. Cirrhosis with ascites  · Bedside ultrasound showed a suspicious soft tissue density abutting the liver.  · No mass appreciated on CT, probably a loop of small bowel     7. Prolonged QT  · Stable, adjusting medications appropriately.     8. Aortic valve vegetation   · Transthoracic and transesophageal echocardiograms with concern for aortic valve vegetation and in an awkward spot.  · Cultures negative thus far  · No antibiotics at this point.  The risk of antibiotics long-term outweigh the benefits.       9. Paroxysmal atrial fibrillation  · Unclear etiology however fluid overload and pleural effusion are likely the most contributory.  · Patient reports having had a history of this in the past  · Currently rate controlled  · Need to consider anticoagulation in a patient with a valvular vegetation well as paroxysmal AFib.      LUDIN Sweeney    Patient seen and examined with LUDIN Sweeney. I agree  with the above subjective, physical exam, assessment, and plan.  No acute issues overnight.  The patient is feeling well.  Patient remains on room air.  Some reaccumulation of fluid on chest x-ray.  On exam the patient is in no acute distress with clear breath sounds bilaterally.  Irregular rate with a systolic murmur.    At this point we will sign off.  The patient is doing well and should continue with diuresis.  Continue to follow respiratory symptoms and if they significantly worsen we can re-evaluate for thoracentesis or paracentesis.  Blood cultures remain negative.    Chadwick Trevino MD

## 2023-08-24 NOTE — ED PROVIDER NOTE - ATTENDING CONTRIBUTION TO CARE
68y M w/ hx DM, HTN, HLD, MI, CHF, CVA, ESRD on HD; presents for hiccups x 2 days. Denies fever, chest pain, cough. Had episode of "red" vomiting earlier this morning but since then has been tolerating PO. No abdominal pain, no black/bloody stools. Scheduled for dialysis tomorrow. On exam, pt in no distress. Heart RRR, lungs CTAB, abd soft and nontender. Treated with reglan with improvement. No acute findings on CXR/labs. Stable for discharge with return precautions.

## 2023-08-24 NOTE — ED PROVIDER NOTE - NSFOLLOWUPINSTRUCTIONS_ED_ALL_ED_FT
Hipo  Hiccups    El hipo es consecuencia de la irritación repentina de un músculo que se usa para respirar (diafragma). El diafragma está ubicado debajo de los pulmones y por encima del estómago. Cuando el diafragma se irrita, puede tensarse rápidamente sin martino control (tener un espasmo). El espasmo hace que succione aire rápidamente y eso provoca que las cuerdas vocales se acerquen entre sí con rapidez. Estas reacciones provocan el emily del hipo. Normalmente, el hipo dura solo un breve período de tiempo (menos de 48 horas). En casos poco frecuentes, puede durar días o meses y requerir que visite a un médico.    Entre las causas frecuentes del hipo se incluyen las siguientes:  [place] demasiado rápido o demasiada cantidad.  Beber alcohol o bebidas gaseosas (con gas).  Ingerir comidas o bebidas picantes o condimentadas.  Tragar más aire al chupar un roslyn, un sorbete o al mascar chicle.  Sentirse nervioso, estresado o excitado.  Tener ciertas afecciones que irritan los nervios del diafragma.  Tener trastornos metabólicos o del sistema nervioso.  Siga estas instrucciones en martino casa:  Para prevenir el hipo o reducir las molestias que causa:    Coma y mastique la comida lentamente.  Coma comidas pequeñas y no coma de más.  Si mohinder alcohol:  Limite la cantidad que mohinder a lo siguiente:  De 0 a 1 medida por día para las mujeres que no están embarazadas.  De 0 a 2 medidas por día para los hombres.  Sepa cuánta cantidad de alcohol hay en las bebidas. En los [place], dimple medida equivale a dimple botella de cerveza de 12 oz (355 ml), un vaso de vino de 5 oz (148 ml) o un vaso de dimple bebida alcohólica de adilene graduación de 1½ oz (44 ml).  Limite el consumo de bebidas gaseosas o con gas, mary los refrescos.  No ingiera comidas ni bebidas picantes o condimentadas.  Instrucciones generales    Vigile el hipo para detectar cualquier cambio.  Use los medicamentos de venta fadi y los recetados solamente mary se lo haya indicado el médico.  Comuníquese con un médico si:  El hipo le dura más de 48 horas.  El hipo no mejora con el tratamiento.  No puede dormir o comer a causa del hipo.  Tiene dimple pérdida de peso inesperada debido al hipo.  Siente adormecimiento, cosquilleo o debilidad.  Solicite ayuda de inmediato si:  Tiene problemas para respirar o tragar.  Siente un dolor intenso en el abdomen.  Estos síntomas pueden representar un problema grave que constituye dimple emergencia. No espere a el si los síntomas desaparecen. Solicite atención médica de inmediato. Comuníquese con el servicio de emergencias de martino localidad (911 en los [place]). No conduzca por bobby propios medios hasta el hospital.    Resumen  El hipo es consecuencia de la irritación repentina de un músculo que se usa para respirar (diafragma).  El hipo puede tener muchas causas, entre ellas, comer demasiado rápido.  Llame al médico si el hipo le dura más de 48 horas.  Esta información no tiene mary fin reemplazar el consejo del médico. Asegúrese de hacerle al médico cualquier pregunta que tenga.    You are advised to please follow up with your primary care doctor within the next 24 hours and return to the Emergency Department for worsening symptoms or any other concerns.  Your doctor may call [phone] to follow up on the specific results of the tests performed today in the emergency department.

## 2023-08-25 PROCEDURE — 85025 COMPLETE CBC W/AUTO DIFF WBC: CPT

## 2023-08-25 PROCEDURE — T1013: CPT

## 2023-08-25 PROCEDURE — 99284 EMERGENCY DEPT VISIT MOD MDM: CPT | Mod: 25

## 2023-08-25 PROCEDURE — 71045 X-RAY EXAM CHEST 1 VIEW: CPT

## 2023-08-25 PROCEDURE — 96374 THER/PROPH/DIAG INJ IV PUSH: CPT

## 2023-08-25 PROCEDURE — 80053 COMPREHEN METABOLIC PANEL: CPT

## 2023-08-25 PROCEDURE — 36415 COLL VENOUS BLD VENIPUNCTURE: CPT

## 2023-08-25 RX ORDER — METOCLOPRAMIDE HCL 10 MG
1 TABLET ORAL
Qty: 5 | Refills: 0
Start: 2023-08-25

## 2023-09-07 ENCOUNTER — APPOINTMENT (OUTPATIENT)
Dept: FAMILY MEDICINE | Facility: CLINIC | Age: 68
End: 2023-09-07
Payer: MEDICARE

## 2023-09-07 VITALS
OXYGEN SATURATION: 96 % | SYSTOLIC BLOOD PRESSURE: 110 MMHG | RESPIRATION RATE: 16 BRPM | HEIGHT: 64 IN | HEART RATE: 72 BPM | BODY MASS INDEX: 27.31 KG/M2 | DIASTOLIC BLOOD PRESSURE: 66 MMHG | WEIGHT: 160 LBS | TEMPERATURE: 98 F

## 2023-09-07 DIAGNOSIS — Z23 ENCOUNTER FOR IMMUNIZATION: ICD-10-CM

## 2023-09-07 DIAGNOSIS — Z09 ENCOUNTER FOR FOLLOW-UP EXAMINATION AFTER COMPLETED TREATMENT FOR CONDITIONS OTHER THAN MALIGNANT NEOPLASM: ICD-10-CM

## 2023-09-07 DIAGNOSIS — Z01.818 ENCOUNTER FOR OTHER PREPROCEDURAL EXAMINATION: ICD-10-CM

## 2023-09-07 PROCEDURE — 99214 OFFICE O/P EST MOD 30 MIN: CPT | Mod: 25

## 2023-09-07 PROCEDURE — 83036 HEMOGLOBIN GLYCOSYLATED A1C: CPT | Mod: QW

## 2023-09-07 PROCEDURE — 90662 IIV NO PRSV INCREASED AG IM: CPT

## 2023-09-07 PROCEDURE — G0008: CPT

## 2023-09-07 RX ORDER — FLASH GLUCOSE SCANNING READER
EACH MISCELLANEOUS
Qty: 1 | Refills: 0 | Status: ACTIVE | COMMUNITY
Start: 2022-10-04 | End: 1900-01-01

## 2023-09-07 RX ORDER — INSULIN ASPART 100 [IU]/ML
100 INJECTION, SOLUTION INTRAVENOUS; SUBCUTANEOUS
Qty: 1 | Refills: 3 | Status: COMPLETED | COMMUNITY
Start: 2023-03-21 | End: 2023-09-07

## 2023-09-07 NOTE — PHYSICAL EXAM
[Normal] : affect was normal and insight and judgment were intact [Comprehensive Foot Exam Normal] : Right and left foot were examined and both feet are normal. No ulcers in either foot. Toes are normal and with full ROM.  Normal tactile sensation with monofilament testing throughout both feet [de-identified] : LEFT UE AVF with good thrill. RIGHT upper chest Perm-a-cath in place

## 2023-09-07 NOTE — HEALTH RISK ASSESSMENT
[Intercurrent ED visits] : went to ED [1 or 2 (0 pts)] : 1 or 2 (0 points) [Never (0 pts)] : Never (0 points) [No] : In the past 12 months have you used drugs other than those required for medical reasons? No [No falls in past year] : Patient reported no falls in the past year [0] : 2) Feeling down, depressed, or hopeless: Not at all (0) [PHQ-2 Negative - No further assessment needed] : PHQ-2 Negative - No further assessment needed [Patient/Caregiver unclear of wishes] : , patient/caregiver unclear of wishes [Reviewed no changes] : Reviewed, no changes [Never] : Never [de-identified] : ALIDA [de-identified] : Nephrology, Vascular [Audit-CScore] : 0 [de-identified] : none [de-identified] : low salt [[id number]] : 0 [AdvancecareDate] : 03/23

## 2023-09-07 NOTE — HISTORY OF PRESENT ILLNESS
[FreeTextEntry1] : Diabetes check [de-identified] : This is a 67 y/o male with PMHx significant for CAD s/p 3V CABG (2009- [facility] - LAD reverse SVG to postero- lateral reverse SCG to OM), Anxiety, Carotid artery disease, CKD stage 5 on HD MWF, CHFpEF, HTN, PAD, HLD, T2DM, CVA with left sided weakness, presenting for diabetes check. Pt states that he has only been using Tresiba as his insurance did not approve Novolog. Pt states that his blood sugars have not been normal and that last night he forgot to inject the insulin.

## 2023-09-07 NOTE — ASSESSMENT
[FreeTextEntry1] : This is a 68 y/o male with PMHx significant for CAD s/p 3V CABG (2009- [facility] - LAD reverse SVG to postero- lateral reverse SCG to OM), Anxiety, Carotid artery disease, CKD stage 4, CHFpEF, HTN, PAD, HLD, T2DM, CVA with left sided weakness, presenting for diabetic check.  ENDO: T2DM -Diet controlled -A1c 6.7 --> 8.1 --> 7.5 --> 8.0 --> 7.5 --> 7.3 --> 8.1 --> 8.6 POCT today -Continue Tresiba 36 u at bedtime asked pt to set up an alarm so as to not miss his basal insulin dose -Start Humalog 3 u before meals AND Sliding scale -Freestyle fadi Rx e-prescribed for better glycemic control. -Endocrinology referral provided.  NEPHROLOGY: CKD stage 5 on HD MWF -s/p AVF creation -Continue Magnesium supplementation, Sodium bicarb 1300 mg tid e-refilled -Nephrology Dr Andersen following. -Pt being considered for Renal transplant  CVS: h/o CAD s/p 3V CABG (2009), Carotid artery disease, CHFpEF, HTN, PAD, HLD, f/w accelerated HTN  -Cardiology following Dr Michaud -NST 2/22 showed no evidence of Ischemia -Continue ASA 81 mg, Amlodipine 10 mg daily, Atorvastatin 80 mg, Carvedilol 25 mg bid, Hydralazine 50 mg tid, Isosorbide 20 mg tid, Clonidine 0.1 mg bid. -Discontinued Eliquis secondary to frequent falls, started on Clopidogrel by Cardiology  : Microscopic hematuria -Followed by Dr España  NEURO: h/o CVA with LEFT sided residual weakness, positional vertigo -Neurology following Dr Sarabia -Continue ASA, Eliquis discontinued.  HEME: Anemia of CKD -s/p 2U transfused while inpatient -CBC stable  GI: Inguinal hernia -Surgery referral given but provider does not accept his insurance.  HCM: -PFIZER Covid vaccine 3/11/21, 4/2/21, 1/11/22 -Flu vaccine administered in house today -Prevnar 20 Oct 2022 -No record of Colonoscopy, GI referral given, not scheduled yet  Dosing Month 3 (Required For Cumulative Dosing): 40mg BID

## 2023-09-08 NOTE — PATIENT PROFILE ADULT - NSPROSPHOSPCHAPLAINYN_GEN_A_NUR
Refill Request    Medication request: HYDROcodone-acetaminophen 5-325 MG Oral Tab  TAKE ONE TABLET BY MOUTH TWICE A DAY AS NEEDED FOR PAIN     LOV:8/10/2023 Suyapa Torres 
no

## 2023-09-12 ENCOUNTER — APPOINTMENT (OUTPATIENT)
Dept: VASCULAR SURGERY | Facility: CLINIC | Age: 68
End: 2023-09-12
Payer: MEDICARE

## 2023-09-12 VITALS
BODY MASS INDEX: 27.66 KG/M2 | OXYGEN SATURATION: 98 % | DIASTOLIC BLOOD PRESSURE: 74 MMHG | TEMPERATURE: 97.6 F | WEIGHT: 162 LBS | HEIGHT: 64 IN | HEART RATE: 73 BPM | SYSTOLIC BLOOD PRESSURE: 165 MMHG | RESPIRATION RATE: 16 BRPM

## 2023-09-12 PROCEDURE — 99213 OFFICE O/P EST LOW 20 MIN: CPT

## 2023-09-14 ENCOUNTER — RESULT CHARGE (OUTPATIENT)
Age: 68
End: 2023-09-14

## 2023-09-27 NOTE — ED PROVIDER NOTE - CADM POA URETHRAL CATHETER
Chief Complaint:  elevated PSA    HPI:   09/27/2023 - returns today for follow-up, PSA down to 6.7, voiding well, no gross hematuria or dysuria, still taking the Flomax and the finasteride without side effects    03/22/2023 - patient returns today for follow-up and review of his MRI, this shows a 122 g prostate with no PI-RADS three or higher lesions, has been off the Flomax for about three weeks and has noticed a difference and started taking the finasteride recently, no gross hematuria or dysuria    02/01/2023 - patient returns today for follow-up, no issues in the interim, epididymal orchitis has completely resolved, voiding, has continued taking the finasteride, due PSA today    12/09/2022 - returns for follow-up, notes that the swelling has improved and is almost back to normal, voiding well, back to his baseline, the gross hematuria or dysuria, pain resolved    10/28/2022 - 80 yo male that presents for left epididymal orchitis.  Patient began having dysuria, swelling in both groins, and fevers about six weeks ago.  He presented to [facility] about three weeks ago for evaluation.  Scrotal ultrasound revealed heterogeneous appearance of left testicle with increased vascularity concerning for epididymal orchitis.  Patient has subsequently gone through two rounds of antibiotics.  He notes that his pain has improved but the swelling continues to be present.  Patient notes a decent flow but does have some hesitancy and also triple voids to ensure that he is empty.  He also notes nocturia times 3-4.  Is currently Flomax for his prostate and has been for quite some time.  He denies any prior episodes of epididymitis.  Notes a prior prostate biopsy several years ago, I am assuming assuming for elevated PSA, but patient notes this was done at an outside provider and he does not recall who that provider was.  He denies family history of  cancers.  A PSA was obtained during his infection and was expectedly elevated at  17.6.  IPSS - 5/4/5/4/3/0/4 = 25  QOL - 3(mixed)    PMH:  Past Medical History:   Diagnosis Date    Asthma     Basal cell carcinoma of nose     Hearing loss     Osteopenia     Wrist fracture        PSH:  Past Surgical History:   Procedure Laterality Date    EXTERNAL FIXATION WRIST FRACTURE      HERNIA REPAIR Right 12/2019    Plug and patch    NOSE SURGERY      mohs. antrobus    PROSTATE SURGERY         Family History:  History reviewed. No pertinent family history.    Social History:  Social History     Tobacco Use    Smoking status: Never    Smokeless tobacco: Never   Substance Use Topics    Alcohol use: No        Review of Systems:  General: No fever, chills  Skin: No rashes  Chest:  Denies cough and sputum production  Heart: Denies chest pain  Resp: Denies dyspnea  Abdomen: Denies diarrhea, abdominal pain, hematemesis, or blood in stool.  Musculoskeletal: No joint stiffness or swelling. Denies back pain.  : see HPI  Neuro: no dizziness or weakness    Allergies:  Patient has no known allergies.    Medications:    Current Outpatient Medications:     albuterol (PROAIR HFA) 90 mcg/actuation inhaler, inhale 2 puffs by mouth INTO LUNGS EVERY 6 HOURS AS NEEDED FOR WHEEZING, Disp: 8.5 g, Rfl: 11    finasteride (PROSCAR) 5 mg tablet, Take 1 tablet (5 mg total) by mouth once daily., Disp: 30 tablet, Rfl: 11    tamsulosin (FLOMAX) 0.4 mg Cap, Take 1 capsule (0.4 mg total) by mouth once daily., Disp: 90 capsule, Rfl: 3    alendronate (FOSAMAX) 70 MG tablet, Take 1 tablet (70 mg total) by mouth every 7 days., Disp: 4 tablet, Rfl: 11    Physical Exam:  Vitals:    09/27/23 0947   BP: 106/69   Pulse: 80     Body mass index is 26.16 kg/m².  General: awake, alert, cooperative  Head: NC/AT  Ears: external ears normal  Eyes: sclera normal  Lungs: normal inspiration, NAD  Heart: well-perfused  Abdomen: Soft, NT, ND   10/22: Normal uncirc'd phallus, meatus normal in size and position, BL testicles palpable, no masses, nontender,  no abnormalities of epididymi  DEEPIKA 10/22: Normal rectal tone, no hemorrhoids. Prostate large, small nodule on the left, 60 gm SV not palpable. Perineum and anus normal.  Lymphatic: groin nodes negative  Skin: The skin is warm and dry  Ext: No c/c/e.  Neuro: grossly intact, AOx3    RADIOLOGY:  MRI PROSTATE W W/O CONTRAST 03/07/2023     CLINICAL HISTORY:  Prostate cancer suspected;  Elevated prostate specific antigen (PSA)     TECHNIQUE:  TECHNIQUE: Multiparametric MRI of the prostate and pelvis performed on a 1.5 debra scanner utilizing phase pelvic coil.     Sequences obtained:Sagittal, axial and coronal high resolution T2-WI with small field-of-view; Axial diffusion weighted images with multiple B-values and creation of ADC-maps; Dynamic contrast enhanced T1-weighted images through the prostate were also obtained before, during and after the administration of intravenous gadolinium.     CONTRAST: 7.5 cc of Gadolinium-based contrast media (contrast:Gadavist).     COMPARISON:  None.     FINDINGS:  Previous biopsy:  None     PSA: 9.2 ng/mL 02/01/2023     Prior therapy: none     Prostate: The prostate measures 6.5 x 5.5 x 7.2cm corresponding to a computed volume of 122.3cc.     Peripheral zone: There is scattered linear and wedge-shaped areas of ADC and T2 hypointense signal seen throughout the peripheral zone bilaterally with no focal suspicious abnormality demonstrated.     PI-RADS assessment category: 2     Transition zone:     TZ abnormalities: Benign prostatic hyperplasia without focal abnormality.     PI-RADS assessment category:2     Neurovascular bundle: Unremarkable.     Seminal vesicles: Unremarkable.     Adjacent Organ Involvement: There is no focal bladder wall thickening. There is no rectal involvement.     Lymphadenopathy: none     Other Findings: There is diffuse urinary bladder wall thickening and trabeculation, likely related to chronic outlet obstruction.  Numerous sigmoid colonic diverticular  noted.     Impression:     1. BPH with no focal suspicious abnormality demonstrated.  Overall Assessment:     PIRADS 2 (clinically significant cancer is unlikely to be present)    LABS:  I personally reviewed the following lab values:  Lab Results   Component Value Date    WBC 9.04 10/04/2022    HGB 13.5 (L) 10/04/2022    HCT 44.3 10/04/2022     (H) 10/04/2022     04/10/2023    K 4.1 04/10/2023     04/10/2023    CREATININE 1.0 04/10/2023    BUN 16 04/10/2023    CO2 26 04/10/2023    TSH 1.401 04/14/2022    PSA 17.6 (H) 10/04/2022    GLUF 79 11/27/2012    CHOL 206 (H) 04/10/2023    TRIG 104 04/10/2023    HDL 55 04/10/2023    ALT 15 04/10/2023    AST 24 04/10/2023     PVR = 14mL    Assessment/Plan:   Tanmay Branch JrKimberlee is a 82 y.o. male with:    Left epididymal orchitis - resolved    Elevated PSA/prostate nodule - MRI shows large 122gm prostate without PIRADS 3 or higher lesions, will hold off on biopsy at this time, PSA 6 month    BPH - likely contributory to both of the above, continue Flomax and finasteride    Case Fuentes MD  Urology       No

## 2023-10-03 ENCOUNTER — APPOINTMENT (OUTPATIENT)
Dept: VASCULAR SURGERY | Facility: CLINIC | Age: 68
End: 2023-10-03
Payer: MEDICARE

## 2023-10-03 VITALS
HEIGHT: 64 IN | DIASTOLIC BLOOD PRESSURE: 77 MMHG | HEART RATE: 68 BPM | SYSTOLIC BLOOD PRESSURE: 161 MMHG | TEMPERATURE: 97.7 F | OXYGEN SATURATION: 96 % | BODY MASS INDEX: 27.66 KG/M2 | WEIGHT: 162 LBS | RESPIRATION RATE: 16 BRPM

## 2023-10-03 PROCEDURE — 99213 OFFICE O/P EST LOW 20 MIN: CPT

## 2023-10-03 PROCEDURE — 93990 DOPPLER FLOW TESTING: CPT

## 2023-10-09 ENCOUNTER — TRANSCRIPTION ENCOUNTER (OUTPATIENT)
Age: 68
End: 2023-10-09

## 2023-10-09 ENCOUNTER — OUTPATIENT (OUTPATIENT)
Dept: OUTPATIENT SERVICES | Facility: HOSPITAL | Age: 68
LOS: 1 days | Discharge: HOME | End: 2023-10-09
Payer: MEDICARE

## 2023-10-09 ENCOUNTER — APPOINTMENT (OUTPATIENT)
Dept: VASCULAR SURGERY | Facility: HOSPITAL | Age: 68
End: 2023-10-09

## 2023-10-09 VITALS
SYSTOLIC BLOOD PRESSURE: 197 MMHG | HEART RATE: 65 BPM | RESPIRATION RATE: 16 BRPM | DIASTOLIC BLOOD PRESSURE: 91 MMHG | OXYGEN SATURATION: 99 %

## 2023-10-09 VITALS
SYSTOLIC BLOOD PRESSURE: 169 MMHG | DIASTOLIC BLOOD PRESSURE: 94 MMHG | OXYGEN SATURATION: 98 % | RESPIRATION RATE: 16 BRPM | HEART RATE: 79 BPM

## 2023-10-09 DIAGNOSIS — T82.858: ICD-10-CM

## 2023-10-09 DIAGNOSIS — Z95.1 PRESENCE OF AORTOCORONARY BYPASS GRAFT: Chronic | ICD-10-CM

## 2023-10-09 LAB
ANION GAP SERPL CALC-SCNC: 12 MMOL/L — SIGNIFICANT CHANGE UP (ref 5–17)
BASOPHILS # BLD AUTO: 0.06 K/UL — SIGNIFICANT CHANGE UP (ref 0–0.2)
BASOPHILS NFR BLD AUTO: 0.7 % — SIGNIFICANT CHANGE UP (ref 0–2)
BUN SERPL-MCNC: 57.1 MG/DL — HIGH (ref 8–20)
CALCIUM SERPL-MCNC: 9.5 MG/DL — SIGNIFICANT CHANGE UP (ref 8.4–10.5)
CHLORIDE SERPL-SCNC: 100 MMOL/L — SIGNIFICANT CHANGE UP (ref 96–108)
CO2 SERPL-SCNC: 29 MMOL/L — SIGNIFICANT CHANGE UP (ref 22–29)
CREAT SERPL-MCNC: 7.68 MG/DL — HIGH (ref 0.5–1.3)
EGFR: 7 ML/MIN/1.73M2 — LOW
EOSINOPHIL # BLD AUTO: 0.12 K/UL — SIGNIFICANT CHANGE UP (ref 0–0.5)
EOSINOPHIL NFR BLD AUTO: 1.4 % — SIGNIFICANT CHANGE UP (ref 0–6)
GLUCOSE SERPL-MCNC: 118 MG/DL — HIGH (ref 70–99)
HCT VFR BLD CALC: 39.7 % — SIGNIFICANT CHANGE UP (ref 39–50)
HGB BLD-MCNC: 13.2 G/DL — SIGNIFICANT CHANGE UP (ref 13–17)
IMM GRANULOCYTES NFR BLD AUTO: 0.4 % — SIGNIFICANT CHANGE UP (ref 0–0.9)
LYMPHOCYTES # BLD AUTO: 1.93 K/UL — SIGNIFICANT CHANGE UP (ref 1–3.3)
LYMPHOCYTES # BLD AUTO: 22.9 % — SIGNIFICANT CHANGE UP (ref 13–44)
MAGNESIUM SERPL-MCNC: 2.2 MG/DL — SIGNIFICANT CHANGE UP (ref 1.8–2.6)
MCHC RBC-ENTMCNC: 29.7 PG — SIGNIFICANT CHANGE UP (ref 27–34)
MCHC RBC-ENTMCNC: 33.2 GM/DL — SIGNIFICANT CHANGE UP (ref 32–36)
MCV RBC AUTO: 89.4 FL — SIGNIFICANT CHANGE UP (ref 80–100)
MONOCYTES # BLD AUTO: 0.48 K/UL — SIGNIFICANT CHANGE UP (ref 0–0.9)
MONOCYTES NFR BLD AUTO: 5.7 % — SIGNIFICANT CHANGE UP (ref 2–14)
NEUTROPHILS # BLD AUTO: 5.81 K/UL — SIGNIFICANT CHANGE UP (ref 1.8–7.4)
NEUTROPHILS NFR BLD AUTO: 68.9 % — SIGNIFICANT CHANGE UP (ref 43–77)
PLATELET # BLD AUTO: 232 K/UL — SIGNIFICANT CHANGE UP (ref 150–400)
POTASSIUM SERPL-MCNC: 4.2 MMOL/L — SIGNIFICANT CHANGE UP (ref 3.5–5.3)
POTASSIUM SERPL-SCNC: 4.2 MMOL/L — SIGNIFICANT CHANGE UP (ref 3.5–5.3)
RBC # BLD: 4.44 M/UL — SIGNIFICANT CHANGE UP (ref 4.2–5.8)
RBC # FLD: 13.4 % — SIGNIFICANT CHANGE UP (ref 10.3–14.5)
SODIUM SERPL-SCNC: 141 MMOL/L — SIGNIFICANT CHANGE UP (ref 135–145)
WBC # BLD: 8.43 K/UL — SIGNIFICANT CHANGE UP (ref 3.8–10.5)
WBC # FLD AUTO: 8.43 K/UL — SIGNIFICANT CHANGE UP (ref 3.8–10.5)

## 2023-10-09 PROCEDURE — C1725: CPT

## 2023-10-09 PROCEDURE — 93005 ELECTROCARDIOGRAM TRACING: CPT

## 2023-10-09 PROCEDURE — 36415 COLL VENOUS BLD VENIPUNCTURE: CPT

## 2023-10-09 PROCEDURE — 83735 ASSAY OF MAGNESIUM: CPT

## 2023-10-09 PROCEDURE — 36902 INTRO CATH DIALYSIS CIRCUIT: CPT

## 2023-10-09 PROCEDURE — C1894: CPT

## 2023-10-09 PROCEDURE — C1887: CPT

## 2023-10-09 PROCEDURE — 76937 US GUIDE VASCULAR ACCESS: CPT | Mod: 26,GC

## 2023-10-09 PROCEDURE — 77001 FLUOROGUIDE FOR VEIN DEVICE: CPT | Mod: 26,GC,59

## 2023-10-09 PROCEDURE — C1750: CPT

## 2023-10-09 PROCEDURE — 77001 FLUOROGUIDE FOR VEIN DEVICE: CPT | Mod: XU

## 2023-10-09 PROCEDURE — C1769: CPT

## 2023-10-09 PROCEDURE — 80048 BASIC METABOLIC PNL TOTAL CA: CPT

## 2023-10-09 PROCEDURE — 85025 COMPLETE CBC W/AUTO DIFF WBC: CPT

## 2023-10-09 PROCEDURE — 36581 REPLACE TUNNELED CV CATH: CPT

## 2023-10-09 PROCEDURE — 93010 ELECTROCARDIOGRAM REPORT: CPT

## 2023-10-09 PROCEDURE — 36581 REPLACE TUNNELED CV CATH: CPT | Mod: GC

## 2023-10-09 PROCEDURE — 36902 INTRO CATH DIALYSIS CIRCUIT: CPT | Mod: GC

## 2023-10-09 RX ORDER — CHLORHEXIDINE GLUCONATE 213 G/1000ML
1 SOLUTION TOPICAL ONCE
Refills: 0 | Status: DISCONTINUED | OUTPATIENT
Start: 2023-10-09 | End: 2023-10-23

## 2023-10-09 NOTE — DISCHARGE NOTE NURSING/CASE MANAGEMENT/SOCIAL WORK - NSDCPEFALRISK_GEN_ALL_CORE
For information on Fall & Injury Prevention, visit: [email or web address] OR  [email or web address] OR  https://www.cdc.gov/steadi/patient.html

## 2023-10-09 NOTE — DISCHARGE NOTE NURSING/CASE MANAGEMENT/SOCIAL WORK - PATIENT PORTAL LINK FT
You can access the FollowMyHealth Patient Portal offered by [facility] by registering at the following website: [email or web address]. By joining [facility] FollowMyHealth portal, you will also be able to view your health information using other applications (apps) compatible with our system.

## 2023-10-09 NOTE — DISCHARGE NOTE PROVIDER - NSDCCPTREATMENT_GEN_ALL_CORE_FT
PRINCIPAL PROCEDURE  Procedure: Fistulogram  Findings and Treatment: Restricted use with no heavy lifting of affected arm for 48 hours.  No submerging the arm in water for 48 hours.  You may start showering today.  Call your doctor for any bleeding, swelling, loss of sensation in the hand or fingers, or fingers turning blue.  If heavy bleeding or large lumps form, hold pressure at the spot and come to the Emergency Room.

## 2023-10-09 NOTE — DISCHARGE NOTE PROVIDER - NSDCFUSCHEDAPPT_GEN_ALL_CORE_FT
[facility]  [facility] [address]  Scheduled Appointment: 12/07/2023    Siomara Pro  [facility]  CARDIOLOGY [address]  Scheduled Appointment: 12/19/2023

## 2023-10-09 NOTE — DISCHARGE NOTE PROVIDER - HOSPITAL COURSE
Pt doing well s/p uncomplicated  fistulogram . Denies complaint.     Exam:   VSS, NAD, A&O x 3  Access: L fistula + ecchymosis.   Card: S1/S2, RRR, no m/g/r  Resp: lungs CTA b/l  Abd: S/NT/ND  Ext: no edema, distal pulses intact    Assessment:   68y Male h/o  now status post   Plan:   Observation on telemetry per post op protocol.    Resume PO intake.   Ambulate w/ assist once fully awake & back to baseline mental status w/ VSS.  Resume other home medications.   Anticipate d/c home once all criteria met, with outpt f/up in 1 month.

## 2023-10-09 NOTE — DISCHARGE NOTE NURSING/CASE MANAGEMENT/SOCIAL WORK - FLU SEASON?
Render Note In Bullet Format When Appropriate: No Detail Level: Detailed Duration Of Freeze Thaw-Cycle (Seconds): 0 Post-Care Instructions: I reviewed with the patient in detail post-care instructions. Patient is to wear sunprotection, and avoid picking at any of the treated lesions. Pt may apply Vaseline to crusted or scabbing areas. Consent: The patient's consent was obtained including but not limited to risks of crusting, scabbing, blistering, scarring, darker or lighter pigmentary change, recurrence, incomplete removal and infection. Yes...

## 2023-10-09 NOTE — BRIEF OPERATIVE NOTE - OPERATION/FINDINGS
Fistulogram there was narrowing in the cephalic vein and anastomosis,  angioplasty of cephalic vein with 3mm balloon, 6mm balloon angioplasty of the anastomosis

## 2023-10-09 NOTE — DISCHARGE NOTE PROVIDER - NSDCCPCAREPLAN_GEN_ALL_CORE_FT
PRINCIPAL DISCHARGE DIAGNOSIS  Diagnosis: Stage 5 chronic kidney disease not on chronic dialysis  Assessment and Plan of Treatment:

## 2023-10-09 NOTE — DISCHARGE NOTE PROVIDER - NSDCMRMEDTOKEN_GEN_ALL_CORE_FT
amLODIPine 10 mg oral tablet: 1 tab(s) orally once a day  aspirin 81 mg oral tablet, chewable: 1 tab(s) orally once a day  atorvastatin 80 mg oral tablet: 1 tab(s) orally once a day (at bedtime)  calcitriol 0.25 mcg oral capsule: 1 cap(s) orally once a day  carvedilol 25 mg oral tablet: 1 tab(s) orally every 12 hours  cloNIDine 0.1 mg oral tablet: 1 tab(s) orally 2 times a day  clopidogrel 75 mg oral tablet: 1 tab(s) orally once a day  isosorbide dinitrate 20 mg oral tablet: 1 tab(s) orally 3 times a day  magnesium oxide 400 mg oral tablet: 1 tab(s) orally once a day  Protonix 40 mg oral delayed release tablet: 1 tab(s) orally once a day   Reglan 10 mg oral tablet: 1 tab(s) orally prn as needed for hiccups  Tresiba FlexTouch 100 units/mL subcutaneous solution: 10 unit(s) subcutaneous once a day (at bedtime)

## 2023-10-09 NOTE — DISCHARGE NOTE PROVIDER - CARE PROVIDER_API CALL
Clay Zayas  Vascular Surgery  [address] [place] [address], [address]  [place] [address]  Phone: [phone]  Fax: [phone]  Follow Up Time:

## 2023-10-17 ENCOUNTER — APPOINTMENT (OUTPATIENT)
Dept: VASCULAR SURGERY | Facility: CLINIC | Age: 68
End: 2023-10-17
Payer: MEDICARE

## 2023-10-17 VITALS
WEIGHT: 164 LBS | HEART RATE: 69 BPM | SYSTOLIC BLOOD PRESSURE: 129 MMHG | DIASTOLIC BLOOD PRESSURE: 69 MMHG | OXYGEN SATURATION: 95 % | HEIGHT: 64 IN | BODY MASS INDEX: 28 KG/M2 | TEMPERATURE: 97.7 F | RESPIRATION RATE: 16 BRPM

## 2023-10-17 PROCEDURE — 93990 DOPPLER FLOW TESTING: CPT

## 2023-10-17 PROCEDURE — 99024 POSTOP FOLLOW-UP VISIT: CPT

## 2023-10-19 ENCOUNTER — APPOINTMENT (OUTPATIENT)
Dept: CARDIOLOGY | Facility: CLINIC | Age: 68
End: 2023-10-19

## 2023-10-19 DIAGNOSIS — T82.858A STENOSIS OF OTHER VASCULAR PROSTHETIC DEVICES, IMPLANTS AND GRAFTS, INITIAL ENCOUNTER: ICD-10-CM

## 2023-10-23 ENCOUNTER — OUTPATIENT (OUTPATIENT)
Dept: OUTPATIENT SERVICES | Facility: HOSPITAL | Age: 68
LOS: 1 days | End: 2023-10-23
Payer: MEDICARE

## 2023-10-23 VITALS
HEART RATE: 76 BPM | SYSTOLIC BLOOD PRESSURE: 110 MMHG | RESPIRATION RATE: 20 BRPM | DIASTOLIC BLOOD PRESSURE: 60 MMHG | OXYGEN SATURATION: 98 % | TEMPERATURE: 98 F | HEIGHT: 65 IN | WEIGHT: 166.67 LBS

## 2023-10-23 DIAGNOSIS — Z95.1 PRESENCE OF AORTOCORONARY BYPASS GRAFT: Chronic | ICD-10-CM

## 2023-10-23 DIAGNOSIS — N18.6 END STAGE RENAL DISEASE: ICD-10-CM

## 2023-10-23 DIAGNOSIS — Z01.818 ENCOUNTER FOR OTHER PREPROCEDURAL EXAMINATION: ICD-10-CM

## 2023-10-23 DIAGNOSIS — Z29.9 ENCOUNTER FOR PROPHYLACTIC MEASURES, UNSPECIFIED: ICD-10-CM

## 2023-10-23 LAB
A1C WITH ESTIMATED AVERAGE GLUCOSE RESULT: 7.9 % — HIGH (ref 4–5.6)
A1C WITH ESTIMATED AVERAGE GLUCOSE RESULT: 7.9 % — HIGH (ref 4–5.6)
ALBUMIN SERPL ELPH-MCNC: 3.8 G/DL — SIGNIFICANT CHANGE UP (ref 3.3–5.2)
ALBUMIN SERPL ELPH-MCNC: 3.8 G/DL — SIGNIFICANT CHANGE UP (ref 3.3–5.2)
ALP SERPL-CCNC: 98 U/L — SIGNIFICANT CHANGE UP (ref 40–120)
ALP SERPL-CCNC: 98 U/L — SIGNIFICANT CHANGE UP (ref 40–120)
ALT FLD-CCNC: 39 U/L — SIGNIFICANT CHANGE UP
ALT FLD-CCNC: 39 U/L — SIGNIFICANT CHANGE UP
ANION GAP SERPL CALC-SCNC: 11 MMOL/L — SIGNIFICANT CHANGE UP (ref 5–17)
ANION GAP SERPL CALC-SCNC: 11 MMOL/L — SIGNIFICANT CHANGE UP (ref 5–17)
APTT BLD: 30.3 SEC — SIGNIFICANT CHANGE UP (ref 24.5–35.6)
APTT BLD: 30.3 SEC — SIGNIFICANT CHANGE UP (ref 24.5–35.6)
AST SERPL-CCNC: 25 U/L — SIGNIFICANT CHANGE UP
AST SERPL-CCNC: 25 U/L — SIGNIFICANT CHANGE UP
BASOPHILS # BLD AUTO: 0.07 K/UL — SIGNIFICANT CHANGE UP (ref 0–0.2)
BASOPHILS # BLD AUTO: 0.07 K/UL — SIGNIFICANT CHANGE UP (ref 0–0.2)
BASOPHILS NFR BLD AUTO: 0.8 % — SIGNIFICANT CHANGE UP (ref 0–2)
BASOPHILS NFR BLD AUTO: 0.8 % — SIGNIFICANT CHANGE UP (ref 0–2)
BILIRUB SERPL-MCNC: 0.3 MG/DL — LOW (ref 0.4–2)
BILIRUB SERPL-MCNC: 0.3 MG/DL — LOW (ref 0.4–2)
BLD GP AB SCN SERPL QL: SIGNIFICANT CHANGE UP
BLD GP AB SCN SERPL QL: SIGNIFICANT CHANGE UP
BUN SERPL-MCNC: 48.4 MG/DL — HIGH (ref 8–20)
BUN SERPL-MCNC: 48.4 MG/DL — HIGH (ref 8–20)
CALCIUM SERPL-MCNC: 9.4 MG/DL — SIGNIFICANT CHANGE UP (ref 8.4–10.5)
CALCIUM SERPL-MCNC: 9.4 MG/DL — SIGNIFICANT CHANGE UP (ref 8.4–10.5)
CHLORIDE SERPL-SCNC: 99 MMOL/L — SIGNIFICANT CHANGE UP (ref 96–108)
CHLORIDE SERPL-SCNC: 99 MMOL/L — SIGNIFICANT CHANGE UP (ref 96–108)
CO2 SERPL-SCNC: 29 MMOL/L — SIGNIFICANT CHANGE UP (ref 22–29)
CO2 SERPL-SCNC: 29 MMOL/L — SIGNIFICANT CHANGE UP (ref 22–29)
CREAT SERPL-MCNC: 8.04 MG/DL — HIGH (ref 0.5–1.3)
CREAT SERPL-MCNC: 8.04 MG/DL — HIGH (ref 0.5–1.3)
EGFR: 7 ML/MIN/1.73M2 — LOW
EGFR: 7 ML/MIN/1.73M2 — LOW
EOSINOPHIL # BLD AUTO: 0.22 K/UL — SIGNIFICANT CHANGE UP (ref 0–0.5)
EOSINOPHIL # BLD AUTO: 0.22 K/UL — SIGNIFICANT CHANGE UP (ref 0–0.5)
EOSINOPHIL NFR BLD AUTO: 2.4 % — SIGNIFICANT CHANGE UP (ref 0–6)
EOSINOPHIL NFR BLD AUTO: 2.4 % — SIGNIFICANT CHANGE UP (ref 0–6)
ESTIMATED AVERAGE GLUCOSE: 180 MG/DL — HIGH (ref 68–114)
ESTIMATED AVERAGE GLUCOSE: 180 MG/DL — HIGH (ref 68–114)
GLUCOSE SERPL-MCNC: 224 MG/DL — HIGH (ref 70–99)
GLUCOSE SERPL-MCNC: 224 MG/DL — HIGH (ref 70–99)
HCT VFR BLD CALC: 38.5 % — LOW (ref 39–50)
HCT VFR BLD CALC: 38.5 % — LOW (ref 39–50)
HGB BLD-MCNC: 12.6 G/DL — LOW (ref 13–17)
HGB BLD-MCNC: 12.6 G/DL — LOW (ref 13–17)
IMM GRANULOCYTES NFR BLD AUTO: 0.3 % — SIGNIFICANT CHANGE UP (ref 0–0.9)
IMM GRANULOCYTES NFR BLD AUTO: 0.3 % — SIGNIFICANT CHANGE UP (ref 0–0.9)
INR BLD: 0.99 RATIO — SIGNIFICANT CHANGE UP (ref 0.85–1.18)
INR BLD: 0.99 RATIO — SIGNIFICANT CHANGE UP (ref 0.85–1.18)
LYMPHOCYTES # BLD AUTO: 1.55 K/UL — SIGNIFICANT CHANGE UP (ref 1–3.3)
LYMPHOCYTES # BLD AUTO: 1.55 K/UL — SIGNIFICANT CHANGE UP (ref 1–3.3)
LYMPHOCYTES # BLD AUTO: 16.9 % — SIGNIFICANT CHANGE UP (ref 13–44)
LYMPHOCYTES # BLD AUTO: 16.9 % — SIGNIFICANT CHANGE UP (ref 13–44)
MCHC RBC-ENTMCNC: 29.6 PG — SIGNIFICANT CHANGE UP (ref 27–34)
MCHC RBC-ENTMCNC: 29.6 PG — SIGNIFICANT CHANGE UP (ref 27–34)
MCHC RBC-ENTMCNC: 32.7 GM/DL — SIGNIFICANT CHANGE UP (ref 32–36)
MCHC RBC-ENTMCNC: 32.7 GM/DL — SIGNIFICANT CHANGE UP (ref 32–36)
MCV RBC AUTO: 90.6 FL — SIGNIFICANT CHANGE UP (ref 80–100)
MCV RBC AUTO: 90.6 FL — SIGNIFICANT CHANGE UP (ref 80–100)
MONOCYTES # BLD AUTO: 0.56 K/UL — SIGNIFICANT CHANGE UP (ref 0–0.9)
MONOCYTES # BLD AUTO: 0.56 K/UL — SIGNIFICANT CHANGE UP (ref 0–0.9)
MONOCYTES NFR BLD AUTO: 6.1 % — SIGNIFICANT CHANGE UP (ref 2–14)
MONOCYTES NFR BLD AUTO: 6.1 % — SIGNIFICANT CHANGE UP (ref 2–14)
NEUTROPHILS # BLD AUTO: 6.75 K/UL — SIGNIFICANT CHANGE UP (ref 1.8–7.4)
NEUTROPHILS # BLD AUTO: 6.75 K/UL — SIGNIFICANT CHANGE UP (ref 1.8–7.4)
NEUTROPHILS NFR BLD AUTO: 73.5 % — SIGNIFICANT CHANGE UP (ref 43–77)
NEUTROPHILS NFR BLD AUTO: 73.5 % — SIGNIFICANT CHANGE UP (ref 43–77)
PLATELET # BLD AUTO: 277 K/UL — SIGNIFICANT CHANGE UP (ref 150–400)
PLATELET # BLD AUTO: 277 K/UL — SIGNIFICANT CHANGE UP (ref 150–400)
POTASSIUM SERPL-MCNC: 4.6 MMOL/L — SIGNIFICANT CHANGE UP (ref 3.5–5.3)
POTASSIUM SERPL-MCNC: 4.6 MMOL/L — SIGNIFICANT CHANGE UP (ref 3.5–5.3)
POTASSIUM SERPL-SCNC: 4.6 MMOL/L — SIGNIFICANT CHANGE UP (ref 3.5–5.3)
POTASSIUM SERPL-SCNC: 4.6 MMOL/L — SIGNIFICANT CHANGE UP (ref 3.5–5.3)
PROT SERPL-MCNC: 6.9 G/DL — SIGNIFICANT CHANGE UP (ref 6.6–8.7)
PROT SERPL-MCNC: 6.9 G/DL — SIGNIFICANT CHANGE UP (ref 6.6–8.7)
PROTHROM AB SERPL-ACNC: 11 SEC — SIGNIFICANT CHANGE UP (ref 9.5–13)
PROTHROM AB SERPL-ACNC: 11 SEC — SIGNIFICANT CHANGE UP (ref 9.5–13)
RBC # BLD: 4.25 M/UL — SIGNIFICANT CHANGE UP (ref 4.2–5.8)
RBC # BLD: 4.25 M/UL — SIGNIFICANT CHANGE UP (ref 4.2–5.8)
RBC # FLD: 13.4 % — SIGNIFICANT CHANGE UP (ref 10.3–14.5)
RBC # FLD: 13.4 % — SIGNIFICANT CHANGE UP (ref 10.3–14.5)
SODIUM SERPL-SCNC: 139 MMOL/L — SIGNIFICANT CHANGE UP (ref 135–145)
SODIUM SERPL-SCNC: 139 MMOL/L — SIGNIFICANT CHANGE UP (ref 135–145)
WBC # BLD: 9.18 K/UL — SIGNIFICANT CHANGE UP (ref 3.8–10.5)
WBC # BLD: 9.18 K/UL — SIGNIFICANT CHANGE UP (ref 3.8–10.5)
WBC # FLD AUTO: 9.18 K/UL — SIGNIFICANT CHANGE UP (ref 3.8–10.5)
WBC # FLD AUTO: 9.18 K/UL — SIGNIFICANT CHANGE UP (ref 3.8–10.5)

## 2023-10-23 PROCEDURE — 93005 ELECTROCARDIOGRAM TRACING: CPT

## 2023-10-23 PROCEDURE — G0463: CPT

## 2023-10-23 PROCEDURE — 93010 ELECTROCARDIOGRAM REPORT: CPT

## 2023-10-23 RX ORDER — CEFAZOLIN SODIUM 1 G
2000 VIAL (EA) INJECTION ONCE
Refills: 0 | Status: DISCONTINUED | OUTPATIENT
Start: 2023-11-09 | End: 2023-11-11

## 2023-10-23 RX ORDER — SODIUM CHLORIDE 9 MG/ML
3 INJECTION INTRAMUSCULAR; INTRAVENOUS; SUBCUTANEOUS ONCE
Refills: 0 | Status: DISCONTINUED | OUTPATIENT
Start: 2023-11-09 | End: 2023-11-09

## 2023-10-23 RX ORDER — ACETAMINOPHEN 500 MG
975 TABLET ORAL ONCE
Refills: 0 | Status: COMPLETED | OUTPATIENT
Start: 2023-11-09 | End: 2023-11-09

## 2023-10-23 NOTE — H&P PST ADULT - PROBLEM SELECTOR PLAN 1
Left Brachiocephalic Arteriovenous Fistula    Pt instructed to stop vitamins/supplements/herbal medications/ASA/NSAIDS for one week prior to surgery and discuss with PMD.  Patient educated on surgical scrub, COVID testing, preadmission instructions, medical clearance and day of procedure medications, verbalizes understanding. Left Brachiocephalic Arteriovenous Fistula    Pt instructed to stop vitamins/supplements/herbal medications/ASA/NSAIDS for one week prior to surgery and discuss with PMD.  Patient educated on surgical scrub, preadmission instructions, medical clearance and day of procedure medications, verbalizes understanding.  Medical clearance pending with Dr. Stephens  Verbal and written instructions given to the patient in Zambian via  he verbally expressed understanding.

## 2023-10-23 NOTE — H&P PST ADULT - ATTENDING COMMENTS
pt with failiing to mature radiocephalic , will create brachial cephalic AVF given inflow artery singificantly larger. also patient reports HD catheter stopped working , will replace under flourospcy today

## 2023-10-23 NOTE — H&P PST ADULT - ASSESSMENT
CAPRINI SCORE    AGE RELATED RISK FACTORS                                                             [ ] Age 41-60 years                                            (1 Point)  [ ] Age: 61-74 years                                           (2 Points)                 [ ] Age= 75 years                                                (3 Points)             DISEASE RELATED RISK FACTORS                                                       [ ] Edema in the lower extremities                 (1 Point)                     [ ] Varicose veins                                               (1 Point)                                 [ ] BMI > 25 Kg/m2                                            (1 Point)                                  [ ] Serious infection (ie PNA)                            (1 Point)                     [ ] Lung disease ( COPD, Emphysema)            (1 Point)                                                                          [ ] Acute myocardial infarction                         (1 Point)                  [ ] Congestive heart failure (in the previous month)  (1 Point)         [ ] Inflammatory bowel disease                            (1 Point)                  [ ] Central venous access, PICC or Port               (2 points)       (within the last month)                                                                [ ] Stroke (in the previous month)                        (5 Points)    [ ] Previous or present malignancy                       (2 points)                                                                                                                                                         HEMATOLOGY RELATED FACTORS                                                         [ ] Prior episodes of VTE                                     (3 Points)                     [ ] Positive family history for VTE                      (3 Points)                  [ ] Prothrombin 64769 A                                     (3 Points)                     [ ] Factor V Leiden                                                (3 Points)                        [ ] Lupus anticoagulants                                      (3 Points)                                                           [ ] Anticardiolipin antibodies                              (3 Points)                                                       [ ] High homocysteine in the blood                   (3 Points)                                             [ ] Other congenital or acquired thrombophilia      (3 Points)                                                [ ] Heparin induced thrombocytopenia                  (3 Points)                                        MOBILITY RELATED FACTORS  [ ] Bed rest                                                         (1 Point)  [ ] Plaster cast                                                    (2 points)  [ ] Bed bound for more than 72 hours           (2 Points)    GENDER SPECIFIC FACTORS  [ ] Pregnancy or had a baby within the last month   (1 Point)  [ ] Post-partum < 6 weeks                                   (1 Point)  [ ] Hormonal therapy  or oral contraception   (1 Point)  [ ] History of pregnancy complications              (1 point)  [ ] Unexplained or recurrent              (1 Point)    OTHER RISK FACTORS                                           (1 Point)  [ ] BMI >40, smoking, diabetes requiring insulin, chemotherapy  blood transfusions and length of surgery over 2 hours    SURGERY RELATED RISK FACTORS  [ ]  Section within the last month     (1 Point)  [ ] Minor surgery                                                  (1 Point)  [ ] Arthroscopic surgery                                       (2 Points)  [ ] Planned major surgery lasting more            (2 Points)      than 45 minutes     [ ] Elective hip or knee joint replacement       (5 points)       surgery                                                TRAUMA RELATED RISK FACTORS  [ ] Fracture of the hip, pelvis, or leg                       (5 Points)  [ ] Spinal cord injury resulting in paralysis             (5 points)       (in the previous month)    [ ] Paralysis  (less than 1 month)                             (5 Points)  [ ] Multiple Trauma within 1 month                        (5 Points)    Total Score [        ]    Caprini Score 0-2: Low Risk, NO VTE prophylaxis required for most patients, encourage ambulation  Caprini Score 3-6: Moderate Risk , pharmacologic VTE prophylaxis is indicated for most patients (in the absence of contraindications)  Caprini Score Greater than or =7: High risk, pharmocologic VTE prophylaxis indicated for most patients (in the absence of contraindications)                              OPIOID RISK TOOL    PILAR EACH BOX THAT APPLIES AND ADD TOTALS AT THE END    FAMILY HISTORY OF SUBSTANCE ABUSE                 FEMALE         MALE                                                Alcohol                             [  ]1 pt          [  ]3pts                                               Illegal Durgs                     [  ]2 pts        [  ]3pts                                               Rx Drugs                           [  ]4 pts        [  ]4 pts    PERSONAL HISTORY OF SUBSTANCE ABUSE                                                                                          Alcohol                             [  ]3 pts       [  ]3 pts                                               Illegal Drugs                     [  ]4 pts        [  ]4 pts                                               Rx Drugs                           [  ]5 pts        [  ]5 pts    AGE BETWEEN 16-45 YEARS                                      [  ]1 pt         [  ]1 pt    HISTORY OF PREADOLESCENT   SEXUAL ABUSE                                                             [  ]3 pts        [  ]0pts    PSYCHOLOGICAL DISEASE                     ADD, OCD, Bipolar, Schizophrenia        [  ]2 pts         [  ]2 pts                      Depression                                               [  ]1 pt           [  ]1 pt           SCORING TOTAL   (add numbers and type here)              (***)                                     A score of 3 or lower indicated LOW risk for future opioid abuse  A score of 4 to 7 indicated moderate risk for future opioid abuse  A score of 8 or higher indicates a high risk for opioid abuse     68 year old French speaking male with PMHx significant for CAD s/p 3V CABG (2009- [facility] - LAD reverse SVG to postero- lateral reverse SCG to OM), Anxiety, Carotid artery disease, CKD stage 5 on HD MWF, CHFpEF, HTN, PAD, HLD, T2DM, CVA with left sided weakness. Pt states been using Tresiba as his insurance did not approve Novolog. Per patient's chart: Patient with left radiocephalic AV fistula balloon for the second time. Flow volumes are adequate chronic venous changes in the vein next to the anastomosis and the radial artery itself is quite small and calcified. Patient is scheduled for a Left Brachiocephalic Arteriovenous Fistula on 23 with Dr. Sikalas CAPRINI SCORE    AGE RELATED RISK FACTORS                                                             [ ] Age 41-60 years                                            (1 Point)  [x ] Age: 61-74 years                                           (2 Points)                 [ ] Age= 75 years                                                (3 Points)             DISEASE RELATED RISK FACTORS                                                       [ ] Edema in the lower extremities                 (1 Point)                     [ ] Varicose veins                                               (1 Point)                                 [ ] BMI > 25 Kg/m2                                            (1 Point)                                  [ ] Serious infection (ie PNA)                            (1 Point)                     [ ] Lung disease ( COPD, Emphysema)            (1 Point)                                                                          [ ] Acute myocardial infarction                         (1 Point)                  [ ] Congestive heart failure (in the previous month)  (1 Point)         [ ] Inflammatory bowel disease                            (1 Point)                  [x ] Central venous access, PICC or Port               (2 points)       (within the last month)                                                                [ ] Stroke (in the previous month)                        (5 Points)    [ ] Previous or present malignancy                       (2 points)                                                                                                                                                         HEMATOLOGY RELATED FACTORS                                                         [ ] Prior episodes of VTE                                     (3 Points)                     [ ] Positive family history for VTE                      (3 Points)                  [ ] Prothrombin 04867 A                                     (3 Points)                     [ ] Factor V Leiden                                                (3 Points)                        [ ] Lupus anticoagulants                                      (3 Points)                                                           [ ] Anticardiolipin antibodies                              (3 Points)                                                       [ ] High homocysteine in the blood                   (3 Points)                                             [ ] Other congenital or acquired thrombophilia      (3 Points)                                                [ ] Heparin induced thrombocytopenia                  (3 Points)                                        MOBILITY RELATED FACTORS  [ ] Bed rest                                                         (1 Point)  [ ] Plaster cast                                                    (2 points)  [ ] Bed bound for more than 72 hours           (2 Points)    GENDER SPECIFIC FACTORS  [ ] Pregnancy or had a baby within the last month   (1 Point)  [ ] Post-partum < 6 weeks                                   (1 Point)  [ ] Hormonal therapy  or oral contraception   (1 Point)  [ ] History of pregnancy complications              (1 point)  [ ] Unexplained or recurrent              (1 Point)    OTHER RISK FACTORS                                           (1 Point)  [x ] BMI >40, smoking, diabetes requiring insulin, chemotherapy  blood transfusions and length of surgery over 2 hours    SURGERY RELATED RISK FACTORS  [ ]  Section within the last month     (1 Point)  [ ] Minor surgery                                                  (1 Point)  [ ] Arthroscopic surgery                                       (2 Points)  [x ] Planned major surgery lasting more            (2 Points)      than 45 minutes     [ ] Elective hip or knee joint replacement       (5 points)       surgery                                                TRAUMA RELATED RISK FACTORS  [ ] Fracture of the hip, pelvis, or leg                       (5 Points)  [ ] Spinal cord injury resulting in paralysis             (5 points)       (in the previous month)    [ ] Paralysis  (less than 1 month)                             (5 Points)  [ ] Multiple Trauma within 1 month                        (5 Points)    Total Score [   6     ]    Caprini Score 0-2: Low Risk, NO VTE prophylaxis required for most patients, encourage ambulation  Caprini Score 3-6: Moderate Risk , pharmacologic VTE prophylaxis is indicated for most patients (in the absence of contraindications)  Caprini Score Greater than or =7: High risk, pharmocologic VTE prophylaxis indicated for most patients (in the absence of contraindications)    OPIOID RISK TOOL    PILAR EACH BOX THAT APPLIES AND ADD TOTALS AT THE END    FAMILY HISTORY OF SUBSTANCE ABUSE                 FEMALE         MALE                                                Alcohol                             [  ]1 pt          [  ]3pts                                               Illegal Durgs                     [  ]2 pts        [  ]3pts                                               Rx Drugs                           [  ]4 pts        [  ]4 pts    PERSONAL HISTORY OF SUBSTANCE ABUSE                                                                                          Alcohol                             [  ]3 pts       [  ]3 pts                                               Illegal Drugs                     [  ]4 pts        [  ]4 pts                                               Rx Drugs                           [  ]5 pts        [  ]5 pts    AGE BETWEEN 16-45 YEARS                                      [  ]1 pt         [  ]1 pt    HISTORY OF PREADOLESCENT   SEXUAL ABUSE                                                             [  ]3 pts        [  ]0pts    PSYCHOLOGICAL DISEASE                     ADD, OCD, Bipolar, Schizophrenia        [  ]2 pts         [  ]2 pts                      Depression                                               [  ]1 pt           [  ]1 pt           SCORING TOTAL   (add numbers and type here)              (*0**)                                     A score of 3 or lower indicated LOW risk for future opioid abuse  A score of 4 to 7 indicated moderate risk for future opioid abuse  A score of 8 or higher indicates a high risk for opioid abuse

## 2023-10-23 NOTE — H&P PST ADULT - SKIN COMMENTS
permacath right chest, non working av fistula left wrist and brachial, well healed midline scar perm-a-cath right upper chest, good thrill avf left wrist, non working brachial AVF,  well healed midline scar

## 2023-10-23 NOTE — H&P PST ADULT - HISTORY OF PRESENT ILLNESS
67 y/o male with PMHx significant for CAD s/p 3V CABG (2009- [facility] - LAD reverse SVG to postero- lateral reverse SCG to OM), Anxiety, Carotid artery disease, CKD stage 5 on HD MWF, CHFpEF, HTN, PAD, HLD, T2DM, CVA with left sided weakness, presenting for diabetes check. Pt states that he has only been using Tresiba as his insurance did not approve Novolog. Pt states that his blood sugars have not been normal and that last night he forgot to inject the insulin.    68 year old Uzbek speaking male with PMHx significant for CAD s/p 3V CABG (2009- [facility] - LAD reverse SVG to postero- lateral reverse SCG to OM), Anxiety, Carotid artery disease, CKD stage 5 on HD MWF, CHFpEF, HTN, PAD, HLD, T2DM, CVA with left sided weakness. Pt states been using Tresiba as his insurance did not approve Novolog. Per patient's chart: Patient with left radiocephalic AV fistula balloon for the second time. Flow volumes are adequate chronic venous changes in the vein next to the anastomosis and the radial artery itself is quite small and calcified. Patient is scheduled for a Left Brachiocephalic Arteriovenous Fistula on 11/9/23 with Dr. Zayas

## 2023-10-23 NOTE — H&P PST ADULT - EKG AND INTERPRETATION
Sinus Rhythm with 1st degree AV block with premature atrial complexes 68 bpm. EKG pending official review

## 2023-11-02 ENCOUNTER — APPOINTMENT (OUTPATIENT)
Dept: FAMILY MEDICINE | Facility: CLINIC | Age: 68
End: 2023-11-02
Payer: MEDICARE

## 2023-11-02 VITALS
OXYGEN SATURATION: 98 % | SYSTOLIC BLOOD PRESSURE: 144 MMHG | RESPIRATION RATE: 15 BRPM | HEART RATE: 82 BPM | DIASTOLIC BLOOD PRESSURE: 84 MMHG | WEIGHT: 164 LBS | BODY MASS INDEX: 28 KG/M2 | TEMPERATURE: 98.2 F | HEIGHT: 64 IN

## 2023-11-02 DIAGNOSIS — Z01.818 ENCOUNTER FOR OTHER PREPROCEDURAL EXAMINATION: ICD-10-CM

## 2023-11-02 PROCEDURE — 99214 OFFICE O/P EST MOD 30 MIN: CPT

## 2023-11-03 ENCOUNTER — APPOINTMENT (OUTPATIENT)
Dept: CARDIOLOGY | Facility: CLINIC | Age: 68
End: 2023-11-03
Payer: MEDICARE

## 2023-11-03 VITALS
SYSTOLIC BLOOD PRESSURE: 110 MMHG | WEIGHT: 165.57 LBS | DIASTOLIC BLOOD PRESSURE: 60 MMHG | HEART RATE: 66 BPM | BODY MASS INDEX: 28.42 KG/M2 | TEMPERATURE: 98.6 F

## 2023-11-03 DIAGNOSIS — Z01.810 ENCOUNTER FOR PREPROCEDURAL CARDIOVASCULAR EXAMINATION: ICD-10-CM

## 2023-11-03 PROCEDURE — 99214 OFFICE O/P EST MOD 30 MIN: CPT

## 2023-11-07 PROBLEM — Z01.810 PREPROCEDURAL CARDIOVASCULAR EXAMINATION: Status: ACTIVE | Noted: 2023-11-07

## 2023-11-08 ENCOUNTER — TRANSCRIPTION ENCOUNTER (OUTPATIENT)
Age: 68
End: 2023-11-08

## 2023-11-09 ENCOUNTER — INPATIENT (INPATIENT)
Facility: HOSPITAL | Age: 68
LOS: 1 days | Discharge: HOME | DRG: 252 | End: 2023-11-11
Attending: SURGERY | Admitting: SURGERY
Payer: MEDICARE

## 2023-11-09 ENCOUNTER — APPOINTMENT (OUTPATIENT)
Dept: VASCULAR SURGERY | Facility: HOSPITAL | Age: 68
End: 2023-11-09

## 2023-11-09 ENCOUNTER — TRANSCRIPTION ENCOUNTER (OUTPATIENT)
Age: 68
End: 2023-11-09

## 2023-11-09 VITALS
WEIGHT: 166.67 LBS | HEIGHT: 65 IN | OXYGEN SATURATION: 98 % | HEART RATE: 69 BPM | TEMPERATURE: 98 F | SYSTOLIC BLOOD PRESSURE: 152 MMHG | DIASTOLIC BLOOD PRESSURE: 77 MMHG | RESPIRATION RATE: 16 BRPM

## 2023-11-09 DIAGNOSIS — Z95.1 PRESENCE OF AORTOCORONARY BYPASS GRAFT: Chronic | ICD-10-CM

## 2023-11-09 DIAGNOSIS — N18.6 END STAGE RENAL DISEASE: ICD-10-CM

## 2023-11-09 LAB
ALBUMIN SERPL ELPH-MCNC: 3.7 G/DL — SIGNIFICANT CHANGE UP (ref 3.3–5.2)
ALBUMIN SERPL ELPH-MCNC: 3.7 G/DL — SIGNIFICANT CHANGE UP (ref 3.3–5.2)
ALP SERPL-CCNC: 106 U/L — SIGNIFICANT CHANGE UP (ref 40–120)
ALP SERPL-CCNC: 106 U/L — SIGNIFICANT CHANGE UP (ref 40–120)
ALT FLD-CCNC: 43 U/L — HIGH
ALT FLD-CCNC: 43 U/L — HIGH
ANION GAP SERPL CALC-SCNC: 12 MMOL/L — SIGNIFICANT CHANGE UP (ref 5–17)
ANION GAP SERPL CALC-SCNC: 12 MMOL/L — SIGNIFICANT CHANGE UP (ref 5–17)
AST SERPL-CCNC: 30 U/L — SIGNIFICANT CHANGE UP
AST SERPL-CCNC: 30 U/L — SIGNIFICANT CHANGE UP
BILIRUB SERPL-MCNC: 0.4 MG/DL — SIGNIFICANT CHANGE UP (ref 0.4–2)
BILIRUB SERPL-MCNC: 0.4 MG/DL — SIGNIFICANT CHANGE UP (ref 0.4–2)
BUN SERPL-MCNC: 36.6 MG/DL — HIGH (ref 8–20)
BUN SERPL-MCNC: 36.6 MG/DL — HIGH (ref 8–20)
CALCIUM SERPL-MCNC: 9.7 MG/DL — SIGNIFICANT CHANGE UP (ref 8.4–10.5)
CALCIUM SERPL-MCNC: 9.7 MG/DL — SIGNIFICANT CHANGE UP (ref 8.4–10.5)
CHLORIDE SERPL-SCNC: 98 MMOL/L — SIGNIFICANT CHANGE UP (ref 96–108)
CHLORIDE SERPL-SCNC: 98 MMOL/L — SIGNIFICANT CHANGE UP (ref 96–108)
CO2 SERPL-SCNC: 29 MMOL/L — SIGNIFICANT CHANGE UP (ref 22–29)
CO2 SERPL-SCNC: 29 MMOL/L — SIGNIFICANT CHANGE UP (ref 22–29)
CREAT SERPL-MCNC: 6.2 MG/DL — HIGH (ref 0.5–1.3)
CREAT SERPL-MCNC: 6.2 MG/DL — HIGH (ref 0.5–1.3)
EGFR: 9 ML/MIN/1.73M2 — LOW
EGFR: 9 ML/MIN/1.73M2 — LOW
GLUCOSE BLDC GLUCOMTR-MCNC: 109 MG/DL — HIGH (ref 70–99)
GLUCOSE BLDC GLUCOMTR-MCNC: 109 MG/DL — HIGH (ref 70–99)
GLUCOSE BLDC GLUCOMTR-MCNC: 159 MG/DL — HIGH (ref 70–99)
GLUCOSE BLDC GLUCOMTR-MCNC: 159 MG/DL — HIGH (ref 70–99)
GLUCOSE BLDC GLUCOMTR-MCNC: 329 MG/DL — HIGH (ref 70–99)
GLUCOSE BLDC GLUCOMTR-MCNC: 329 MG/DL — HIGH (ref 70–99)
GLUCOSE SERPL-MCNC: 177 MG/DL — HIGH (ref 70–99)
GLUCOSE SERPL-MCNC: 177 MG/DL — HIGH (ref 70–99)
POTASSIUM SERPL-MCNC: 4.3 MMOL/L — SIGNIFICANT CHANGE UP (ref 3.5–5.3)
POTASSIUM SERPL-MCNC: 4.3 MMOL/L — SIGNIFICANT CHANGE UP (ref 3.5–5.3)
POTASSIUM SERPL-SCNC: 4.3 MMOL/L — SIGNIFICANT CHANGE UP (ref 3.5–5.3)
POTASSIUM SERPL-SCNC: 4.3 MMOL/L — SIGNIFICANT CHANGE UP (ref 3.5–5.3)
PROT SERPL-MCNC: 6.8 G/DL — SIGNIFICANT CHANGE UP (ref 6.6–8.7)
PROT SERPL-MCNC: 6.8 G/DL — SIGNIFICANT CHANGE UP (ref 6.6–8.7)
SODIUM SERPL-SCNC: 139 MMOL/L — SIGNIFICANT CHANGE UP (ref 135–145)
SODIUM SERPL-SCNC: 139 MMOL/L — SIGNIFICANT CHANGE UP (ref 135–145)

## 2023-11-09 PROCEDURE — 93010 ELECTROCARDIOGRAM REPORT: CPT

## 2023-11-09 DEVICE — CLIP APPLIER ETHICON LIGACLIP 11.5" MEDIUM: Type: IMPLANTABLE DEVICE | Status: FUNCTIONAL

## 2023-11-09 DEVICE — AGENT HEMOSTATIC HEMOBLAST 1.65G 10CM: Type: IMPLANTABLE DEVICE | Status: FUNCTIONAL

## 2023-11-09 DEVICE — CATH GLIDEPATH 14.5FR 19CM: Type: IMPLANTABLE DEVICE | Status: FUNCTIONAL

## 2023-11-09 RX ORDER — DEXTROSE 50 % IN WATER 50 %
25 SYRINGE (ML) INTRAVENOUS ONCE
Refills: 0 | Status: DISCONTINUED | OUTPATIENT
Start: 2023-11-09 | End: 2023-11-11

## 2023-11-09 RX ORDER — FENTANYL CITRATE 50 UG/ML
25 INJECTION INTRAVENOUS
Refills: 0 | Status: DISCONTINUED | OUTPATIENT
Start: 2023-11-09 | End: 2023-11-09

## 2023-11-09 RX ORDER — DEXTROSE 50 % IN WATER 50 %
15 SYRINGE (ML) INTRAVENOUS ONCE
Refills: 0 | Status: DISCONTINUED | OUTPATIENT
Start: 2023-11-09 | End: 2023-11-11

## 2023-11-09 RX ORDER — SODIUM CHLORIDE 9 MG/ML
1000 INJECTION, SOLUTION INTRAVENOUS
Refills: 0 | Status: DISCONTINUED | OUTPATIENT
Start: 2023-11-09 | End: 2023-11-11

## 2023-11-09 RX ORDER — SENNA PLUS 8.6 MG/1
2 TABLET ORAL AT BEDTIME
Refills: 0 | Status: DISCONTINUED | OUTPATIENT
Start: 2023-11-09 | End: 2023-11-11

## 2023-11-09 RX ORDER — ONDANSETRON 8 MG/1
4 TABLET, FILM COATED ORAL EVERY 6 HOURS
Refills: 0 | Status: DISCONTINUED | OUTPATIENT
Start: 2023-11-09 | End: 2023-11-11

## 2023-11-09 RX ORDER — CLOPIDOGREL BISULFATE 75 MG/1
75 TABLET, FILM COATED ORAL DAILY
Refills: 0 | Status: DISCONTINUED | OUTPATIENT
Start: 2023-11-10 | End: 2023-11-11

## 2023-11-09 RX ORDER — ISOSORBIDE DINITRATE 5 MG/1
20 TABLET ORAL THREE TIMES A DAY
Refills: 0 | Status: DISCONTINUED | OUTPATIENT
Start: 2023-11-09 | End: 2023-11-11

## 2023-11-09 RX ORDER — ONDANSETRON 8 MG/1
4 TABLET, FILM COATED ORAL ONCE
Refills: 0 | Status: DISCONTINUED | OUTPATIENT
Start: 2023-11-09 | End: 2023-11-09

## 2023-11-09 RX ORDER — GLUCAGON INJECTION, SOLUTION 0.5 MG/.1ML
1 INJECTION, SOLUTION SUBCUTANEOUS ONCE
Refills: 0 | Status: DISCONTINUED | OUTPATIENT
Start: 2023-11-09 | End: 2023-11-11

## 2023-11-09 RX ORDER — MAGNESIUM OXIDE 400 MG ORAL TABLET 241.3 MG
400 TABLET ORAL DAILY
Refills: 0 | Status: DISCONTINUED | OUTPATIENT
Start: 2023-11-09 | End: 2023-11-11

## 2023-11-09 RX ORDER — ASPIRIN/CALCIUM CARB/MAGNESIUM 324 MG
81 TABLET ORAL DAILY
Refills: 0 | Status: DISCONTINUED | OUTPATIENT
Start: 2023-11-09 | End: 2023-11-11

## 2023-11-09 RX ORDER — AMLODIPINE BESYLATE 2.5 MG/1
10 TABLET ORAL DAILY
Refills: 0 | Status: DISCONTINUED | OUTPATIENT
Start: 2023-11-10 | End: 2023-11-11

## 2023-11-09 RX ORDER — ATORVASTATIN CALCIUM 80 MG/1
80 TABLET, FILM COATED ORAL AT BEDTIME
Refills: 0 | Status: DISCONTINUED | OUTPATIENT
Start: 2023-11-09 | End: 2023-11-11

## 2023-11-09 RX ORDER — HYDROMORPHONE HYDROCHLORIDE 2 MG/ML
0.5 INJECTION INTRAMUSCULAR; INTRAVENOUS; SUBCUTANEOUS
Refills: 0 | Status: DISCONTINUED | OUTPATIENT
Start: 2023-11-09 | End: 2023-11-09

## 2023-11-09 RX ORDER — HEPARIN SODIUM 5000 [USP'U]/ML
5000 INJECTION INTRAVENOUS; SUBCUTANEOUS EVERY 8 HOURS
Refills: 0 | Status: DISCONTINUED | OUTPATIENT
Start: 2023-11-09 | End: 2023-11-11

## 2023-11-09 RX ORDER — CARVEDILOL PHOSPHATE 80 MG/1
25 CAPSULE, EXTENDED RELEASE ORAL EVERY 12 HOURS
Refills: 0 | Status: DISCONTINUED | OUTPATIENT
Start: 2023-11-10 | End: 2023-11-11

## 2023-11-09 RX ORDER — CHLORHEXIDINE GLUCONATE 213 G/1000ML
1 SOLUTION TOPICAL DAILY
Refills: 0 | Status: DISCONTINUED | OUTPATIENT
Start: 2023-11-09 | End: 2023-11-11

## 2023-11-09 RX ORDER — ACETAMINOPHEN 500 MG
650 TABLET ORAL EVERY 6 HOURS
Refills: 0 | Status: DISCONTINUED | OUTPATIENT
Start: 2023-11-09 | End: 2023-11-11

## 2023-11-09 RX ORDER — DEXTROSE 50 % IN WATER 50 %
12.5 SYRINGE (ML) INTRAVENOUS ONCE
Refills: 0 | Status: DISCONTINUED | OUTPATIENT
Start: 2023-11-09 | End: 2023-11-11

## 2023-11-09 RX ORDER — INSULIN LISPRO 100/ML
VIAL (ML) SUBCUTANEOUS AT BEDTIME
Refills: 0 | Status: DISCONTINUED | OUTPATIENT
Start: 2023-11-09 | End: 2023-11-11

## 2023-11-09 RX ORDER — CALCITRIOL 0.5 UG/1
0.25 CAPSULE ORAL DAILY
Refills: 0 | Status: DISCONTINUED | OUTPATIENT
Start: 2023-11-09 | End: 2023-11-11

## 2023-11-09 RX ORDER — INSULIN LISPRO 100/ML
VIAL (ML) SUBCUTANEOUS
Refills: 0 | Status: DISCONTINUED | OUTPATIENT
Start: 2023-11-09 | End: 2023-11-11

## 2023-11-09 RX ORDER — PANTOPRAZOLE SODIUM 20 MG/1
40 TABLET, DELAYED RELEASE ORAL
Refills: 0 | Status: DISCONTINUED | OUTPATIENT
Start: 2023-11-09 | End: 2023-11-11

## 2023-11-09 RX ADMIN — ATORVASTATIN CALCIUM 80 MILLIGRAM(S): 80 TABLET, FILM COATED ORAL at 21:05

## 2023-11-09 RX ADMIN — Medication 4: at 21:37

## 2023-11-09 RX ADMIN — HEPARIN SODIUM 5000 UNIT(S): 5000 INJECTION INTRAVENOUS; SUBCUTANEOUS at 21:06

## 2023-11-09 RX ADMIN — Medication 975 MILLIGRAM(S): at 08:59

## 2023-11-09 RX ADMIN — Medication 0.1 MILLIGRAM(S): at 18:49

## 2023-11-09 NOTE — DISCHARGE NOTE PROVIDER - NSDCFUADDINST_GEN_ALL_CORE_FT
Addended by: RITCHIE CABA on: 9/18/2018 02:41 PM     Modules accepted: Orders     Assessment and Plan of Treatment:   Activity:  • Expect a recovery period of 2 weeks.  • Walk as often as you wish. Walk short distances at first and increase slowly. You may go up and down stairs.  • You may shower anytime after your procedure.  • Follow up in the office in 2 weeks with Dr. Zayas.    Diet:  • Resume the diet you were on before your surgery.    Medications:  • Continue taking medications as before your surgery.    Incision Site:  After discharge, if you have any signs of bleeding, increased incisional pain, swelling, redness, purulent drainage, pain/discoloration in the leg or numbness to the toes please return to the ED immediately.  • There may be a small amount of bleeding at your incision site which may stain the band-aid or clothing; this is of no concern if it is less than a teaspoon

## 2023-11-09 NOTE — BRIEF OPERATIVE NOTE - NSICDXBRIEFPROCEDURE_GEN_ALL_CORE_FT
PROCEDURES:  Creation, AV fistula, brachiocephalic 09-Nov-2023 14:24:40 left Ray Salazar  Ligation, fistula, radiocephalic 09-Nov-2023 14:25:00  Ray Salazar   PROCEDURES:  Creation, AV fistula, brachiocephalic 09-Nov-2023 14:24:40 left Ray Salazar  Ligation, fistula, radiocephalic 09-Nov-2023 14:25:00  Ray Salazar  Replacement of Permacath catheter with imaging guidance 09-Nov-2023 14:32:29  Ray Salazar

## 2023-11-09 NOTE — CHART NOTE - NSCHARTNOTEFT_GEN_A_CORE
Post-op check:    Pt is a 67 y/o male s/p AVF brachiocephalic creation of LUE, ligation of radiocephalic fistula, and replacement of permacath catheter.     Vitals:  Vital Signs Last 24 Hrs  T(C): 36.4 (09 Nov 2023 20:14), Max: 36.6 (09 Nov 2023 09:09)  T(F): 97.6 (09 Nov 2023 20:14), Max: 97.9 (09 Nov 2023 09:09)  HR: 78 (09 Nov 2023 20:14) (68 - 88)  BP: 165/78 (09 Nov 2023 20:14) (137/77 - 172/80)  BP(mean): 103 (09 Nov 2023 16:00) (93 - 105)  RR: 16 (09 Nov 2023 20:14) (12 - 20)  SpO2: 91% (09 Nov 2023 20:14) (91% - 100%)    Parameters below as of 09 Nov 2023 20:14  Patient On (Oxygen Delivery Method): room air    Patient resting in bed. Pt denies pain in LUE and R chest at this time. Denies nausea, vomiting, SOB, chest pain, weakness, numbness, or tingling of LUE. Patient has no problems voiding.     Physical exam:  General: alert, patient resting in bed comfortably, in NAD  Resp: equal chest rise bilaterally, nonlabored breathing  Cardio: RRR, No chest wall tenderness or palpable hematoma at R IJ permacath site. Dressing remains clean, dry, and intact.   Extremities: moving all extremities spontaneously, pulses 2+ bilateral radial pulse. Palpable thrill on LUE at AVF site. Dressing remains, clean, dry, and itnact.    Plan:   - continue renal diet as tolerated  - pain control PRN  - encourage OOB, ambulation  - continue home medications  - Trend labs and replete PRN   - DVT ppx: SCDs, HSQ

## 2023-11-09 NOTE — ASU DISCHARGE PLAN (ADULT/PEDIATRIC) - CARE PROVIDER_API CALL
Clay Zayas  Vascular Surgery  [address] [facility], [address]  [place] [address]  Phone: [phone]  Fax: [phone]  Follow Up Time: 2 weeks

## 2023-11-09 NOTE — BRIEF OPERATIVE NOTE - OPERATION/FINDINGS
left Brachiocephalic fistula creation and ligation of radiocephalic fistula left Brachiocephalic fistula creation and ligation of radiocephalic fistula  Permacath was exchanged

## 2023-11-09 NOTE — DISCHARGE NOTE PROVIDER - HOSPITAL COURSE
Patient presented electively for permacath exchange and creation of left brachiocephalic fistula and ligation of radiocephalic fistula. Post-operatively, patient complained of generalized weakness and was admitted overnight for observation. Patient presented electively for permacath exchange and creation of left brachiocephalic fistula and ligation of radiocephalic fistula. Post-operatively, patient complained of generalized weakness and was admitted overnight for observation.  Patient had no issues overnight, remained HDN stable, afebrile, no new neurological deficits noted. Had HD in the hospital and remains stable for discharge home.

## 2023-11-09 NOTE — PROGRESS NOTE ADULT - NS PANP COMMENT GEN_ALL_CORE FT
pt has known left sided weakness ambulating with walker at bedside needed help to take 5 steps to get from wheelchair to bed preop . currentlly get up without assist . slow to ambulate but no lateralizing signs , slight fullness at surgical site, will keep overnight given concerns for general weakness

## 2023-11-09 NOTE — DISCHARGE NOTE PROVIDER - CARE PROVIDER_API CALL
Clay Zayas  Vascular Surgery  [address], [address]  [place] [address]  Phone: [phone]  Fax: [phone]  Follow Up Time: 2 weeks

## 2023-11-09 NOTE — DISCHARGE NOTE PROVIDER - NSDCCPTREATMENT_GEN_ALL_CORE_FT
PRINCIPAL PROCEDURE  Procedure: Creation, AV fistula, brachiocephalic  Findings and Treatment: left      SECONDARY PROCEDURE  Procedure: Replacement of Permacath catheter with imaging guidance  Findings and Treatment:      PRINCIPAL PROCEDURE  Procedure: Creation, AV fistula, brachiocephalic  Findings and Treatment: left  Follow up: Please call and make an appointment with the Dr. Zayas 2 weeks after discharge. Also, please call and make an appointment with your primary care physician as per your usual schedule.   Activity: May return to normal activities as tolerated, however refrain from heavy lifting > 10-15 lbs.  Diet: May continue renal/diabetic diet.  Medications: Please take all medications listed on your discharge paperwork as prescribed. Resume your home medications  Wound Care: Please, keep wound site clean and dry. You may shower, but do not bathe. No need for further compression. Do not remove the steristrips.   HD: Continue HD through permacath, until cleared by surgeon  Patient is advised to RETURN TO THE EMERGENCY DEPARTMENT for any of the following - worsening pain, fever/chills, nausea/vomiting, altered mental status, chest pain, shortness of breath, or any other new / worsening symptom.      SECONDARY PROCEDURE  Procedure: Replacement of Permacath catheter with imaging guidance  Findings and Treatment:

## 2023-11-09 NOTE — DISCHARGE NOTE PROVIDER - NSDCFUSCHEDAPPT_GEN_ALL_CORE_FT
[facility]  [facility] [address]  Scheduled Appointment: 12/07/2023    Siomara Pro  [facility]  CARDIOLOGY [address]  Scheduled Appointment: 12/19/2023

## 2023-11-10 ENCOUNTER — TRANSCRIPTION ENCOUNTER (OUTPATIENT)
Age: 68
End: 2023-11-10

## 2023-11-10 LAB
ANION GAP SERPL CALC-SCNC: 14 MMOL/L — SIGNIFICANT CHANGE UP (ref 5–17)
ANION GAP SERPL CALC-SCNC: 14 MMOL/L — SIGNIFICANT CHANGE UP (ref 5–17)
BASOPHILS # BLD AUTO: 0.01 K/UL — SIGNIFICANT CHANGE UP (ref 0–0.2)
BASOPHILS # BLD AUTO: 0.01 K/UL — SIGNIFICANT CHANGE UP (ref 0–0.2)
BASOPHILS NFR BLD AUTO: 0.1 % — SIGNIFICANT CHANGE UP (ref 0–2)
BASOPHILS NFR BLD AUTO: 0.1 % — SIGNIFICANT CHANGE UP (ref 0–2)
BUN SERPL-MCNC: 50.4 MG/DL — HIGH (ref 8–20)
BUN SERPL-MCNC: 50.4 MG/DL — HIGH (ref 8–20)
CALCIUM SERPL-MCNC: 9.2 MG/DL — SIGNIFICANT CHANGE UP (ref 8.4–10.5)
CALCIUM SERPL-MCNC: 9.2 MG/DL — SIGNIFICANT CHANGE UP (ref 8.4–10.5)
CHLORIDE SERPL-SCNC: 99 MMOL/L — SIGNIFICANT CHANGE UP (ref 96–108)
CHLORIDE SERPL-SCNC: 99 MMOL/L — SIGNIFICANT CHANGE UP (ref 96–108)
CO2 SERPL-SCNC: 24 MMOL/L — SIGNIFICANT CHANGE UP (ref 22–29)
CO2 SERPL-SCNC: 24 MMOL/L — SIGNIFICANT CHANGE UP (ref 22–29)
CREAT SERPL-MCNC: 7.11 MG/DL — HIGH (ref 0.5–1.3)
CREAT SERPL-MCNC: 7.11 MG/DL — HIGH (ref 0.5–1.3)
EGFR: 8 ML/MIN/1.73M2 — LOW
EGFR: 8 ML/MIN/1.73M2 — LOW
EOSINOPHIL # BLD AUTO: 0 K/UL — SIGNIFICANT CHANGE UP (ref 0–0.5)
EOSINOPHIL # BLD AUTO: 0 K/UL — SIGNIFICANT CHANGE UP (ref 0–0.5)
EOSINOPHIL NFR BLD AUTO: 0 % — SIGNIFICANT CHANGE UP (ref 0–6)
EOSINOPHIL NFR BLD AUTO: 0 % — SIGNIFICANT CHANGE UP (ref 0–6)
GLUCOSE BLDC GLUCOMTR-MCNC: 138 MG/DL — HIGH (ref 70–99)
GLUCOSE BLDC GLUCOMTR-MCNC: 138 MG/DL — HIGH (ref 70–99)
GLUCOSE BLDC GLUCOMTR-MCNC: 148 MG/DL — HIGH (ref 70–99)
GLUCOSE BLDC GLUCOMTR-MCNC: 201 MG/DL — HIGH (ref 70–99)
GLUCOSE BLDC GLUCOMTR-MCNC: 201 MG/DL — HIGH (ref 70–99)
GLUCOSE BLDC GLUCOMTR-MCNC: 235 MG/DL — HIGH (ref 70–99)
GLUCOSE BLDC GLUCOMTR-MCNC: 235 MG/DL — HIGH (ref 70–99)
GLUCOSE SERPL-MCNC: 217 MG/DL — HIGH (ref 70–99)
GLUCOSE SERPL-MCNC: 217 MG/DL — HIGH (ref 70–99)
HCT VFR BLD CALC: 36.4 % — LOW (ref 39–50)
HCT VFR BLD CALC: 36.4 % — LOW (ref 39–50)
HGB BLD-MCNC: 12.2 G/DL — LOW (ref 13–17)
HGB BLD-MCNC: 12.2 G/DL — LOW (ref 13–17)
IMM GRANULOCYTES NFR BLD AUTO: 0.4 % — SIGNIFICANT CHANGE UP (ref 0–0.9)
IMM GRANULOCYTES NFR BLD AUTO: 0.4 % — SIGNIFICANT CHANGE UP (ref 0–0.9)
LYMPHOCYTES # BLD AUTO: 1.17 K/UL — SIGNIFICANT CHANGE UP (ref 1–3.3)
LYMPHOCYTES # BLD AUTO: 1.17 K/UL — SIGNIFICANT CHANGE UP (ref 1–3.3)
LYMPHOCYTES # BLD AUTO: 8.9 % — LOW (ref 13–44)
LYMPHOCYTES # BLD AUTO: 8.9 % — LOW (ref 13–44)
MAGNESIUM SERPL-MCNC: 1.7 MG/DL — LOW (ref 1.8–2.6)
MAGNESIUM SERPL-MCNC: 1.7 MG/DL — LOW (ref 1.8–2.6)
MCHC RBC-ENTMCNC: 30.6 PG — SIGNIFICANT CHANGE UP (ref 27–34)
MCHC RBC-ENTMCNC: 30.6 PG — SIGNIFICANT CHANGE UP (ref 27–34)
MCHC RBC-ENTMCNC: 33.5 GM/DL — SIGNIFICANT CHANGE UP (ref 32–36)
MCHC RBC-ENTMCNC: 33.5 GM/DL — SIGNIFICANT CHANGE UP (ref 32–36)
MCV RBC AUTO: 91.2 FL — SIGNIFICANT CHANGE UP (ref 80–100)
MCV RBC AUTO: 91.2 FL — SIGNIFICANT CHANGE UP (ref 80–100)
MONOCYTES # BLD AUTO: 0.57 K/UL — SIGNIFICANT CHANGE UP (ref 0–0.9)
MONOCYTES # BLD AUTO: 0.57 K/UL — SIGNIFICANT CHANGE UP (ref 0–0.9)
MONOCYTES NFR BLD AUTO: 4.3 % — SIGNIFICANT CHANGE UP (ref 2–14)
MONOCYTES NFR BLD AUTO: 4.3 % — SIGNIFICANT CHANGE UP (ref 2–14)
NEUTROPHILS # BLD AUTO: 11.4 K/UL — HIGH (ref 1.8–7.4)
NEUTROPHILS # BLD AUTO: 11.4 K/UL — HIGH (ref 1.8–7.4)
NEUTROPHILS NFR BLD AUTO: 86.3 % — HIGH (ref 43–77)
NEUTROPHILS NFR BLD AUTO: 86.3 % — HIGH (ref 43–77)
PHOSPHATE SERPL-MCNC: 4.6 MG/DL — SIGNIFICANT CHANGE UP (ref 2.4–4.7)
PHOSPHATE SERPL-MCNC: 4.6 MG/DL — SIGNIFICANT CHANGE UP (ref 2.4–4.7)
PLATELET # BLD AUTO: 228 K/UL — SIGNIFICANT CHANGE UP (ref 150–400)
PLATELET # BLD AUTO: 228 K/UL — SIGNIFICANT CHANGE UP (ref 150–400)
POTASSIUM SERPL-MCNC: 4.1 MMOL/L — SIGNIFICANT CHANGE UP (ref 3.5–5.3)
POTASSIUM SERPL-MCNC: 4.1 MMOL/L — SIGNIFICANT CHANGE UP (ref 3.5–5.3)
POTASSIUM SERPL-SCNC: 4.1 MMOL/L — SIGNIFICANT CHANGE UP (ref 3.5–5.3)
POTASSIUM SERPL-SCNC: 4.1 MMOL/L — SIGNIFICANT CHANGE UP (ref 3.5–5.3)
RBC # BLD: 3.99 M/UL — LOW (ref 4.2–5.8)
RBC # BLD: 3.99 M/UL — LOW (ref 4.2–5.8)
RBC # FLD: 13.7 % — SIGNIFICANT CHANGE UP (ref 10.3–14.5)
RBC # FLD: 13.7 % — SIGNIFICANT CHANGE UP (ref 10.3–14.5)
SODIUM SERPL-SCNC: 137 MMOL/L — SIGNIFICANT CHANGE UP (ref 135–145)
SODIUM SERPL-SCNC: 137 MMOL/L — SIGNIFICANT CHANGE UP (ref 135–145)
WBC # BLD: 13.2 K/UL — HIGH (ref 3.8–10.5)
WBC # BLD: 13.2 K/UL — HIGH (ref 3.8–10.5)
WBC # FLD AUTO: 13.2 K/UL — HIGH (ref 3.8–10.5)
WBC # FLD AUTO: 13.2 K/UL — HIGH (ref 3.8–10.5)

## 2023-11-10 RX ADMIN — AMLODIPINE BESYLATE 10 MILLIGRAM(S): 2.5 TABLET ORAL at 05:14

## 2023-11-10 RX ADMIN — ISOSORBIDE DINITRATE 20 MILLIGRAM(S): 5 TABLET ORAL at 05:14

## 2023-11-10 RX ADMIN — Medication 0.1 MILLIGRAM(S): at 17:03

## 2023-11-10 RX ADMIN — Medication 0.1 MILLIGRAM(S): at 05:13

## 2023-11-10 RX ADMIN — HEPARIN SODIUM 5000 UNIT(S): 5000 INJECTION INTRAVENOUS; SUBCUTANEOUS at 05:12

## 2023-11-10 RX ADMIN — ATORVASTATIN CALCIUM 80 MILLIGRAM(S): 80 TABLET, FILM COATED ORAL at 21:39

## 2023-11-10 RX ADMIN — CHLORHEXIDINE GLUCONATE 1 APPLICATION(S): 213 SOLUTION TOPICAL at 11:59

## 2023-11-10 RX ADMIN — CALCITRIOL 0.25 MICROGRAM(S): 0.5 CAPSULE ORAL at 11:52

## 2023-11-10 RX ADMIN — Medication 81 MILLIGRAM(S): at 11:52

## 2023-11-10 RX ADMIN — PANTOPRAZOLE SODIUM 40 MILLIGRAM(S): 20 TABLET, DELAYED RELEASE ORAL at 05:13

## 2023-11-10 RX ADMIN — ISOSORBIDE DINITRATE 20 MILLIGRAM(S): 5 TABLET ORAL at 17:03

## 2023-11-10 RX ADMIN — MAGNESIUM OXIDE 400 MG ORAL TABLET 400 MILLIGRAM(S): 241.3 TABLET ORAL at 11:52

## 2023-11-10 RX ADMIN — Medication 4: at 11:53

## 2023-11-10 RX ADMIN — Medication 4: at 08:14

## 2023-11-10 RX ADMIN — HEPARIN SODIUM 5000 UNIT(S): 5000 INJECTION INTRAVENOUS; SUBCUTANEOUS at 11:52

## 2023-11-10 RX ADMIN — CARVEDILOL PHOSPHATE 25 MILLIGRAM(S): 80 CAPSULE, EXTENDED RELEASE ORAL at 05:13

## 2023-11-10 RX ADMIN — HEPARIN SODIUM 5000 UNIT(S): 5000 INJECTION INTRAVENOUS; SUBCUTANEOUS at 21:40

## 2023-11-10 RX ADMIN — ISOSORBIDE DINITRATE 20 MILLIGRAM(S): 5 TABLET ORAL at 11:52

## 2023-11-10 RX ADMIN — CLOPIDOGREL BISULFATE 75 MILLIGRAM(S): 75 TABLET, FILM COATED ORAL at 11:52

## 2023-11-10 RX ADMIN — CARVEDILOL PHOSPHATE 25 MILLIGRAM(S): 80 CAPSULE, EXTENDED RELEASE ORAL at 17:03

## 2023-11-10 NOTE — CONSULT NOTE ADULT - ASSESSMENT
ESRD  HTN  Anemia  MIKEY  S/p PC exchange    -HD TTS. HD today  -UF 2 kg  -3K bath  -Continue home BP medications  -No need for JACK at this time  -Phos binders    D/w HD RN

## 2023-11-10 NOTE — DISCHARGE NOTE NURSING/CASE MANAGEMENT/SOCIAL WORK - NSDCPEFALRISK_GEN_ALL_CORE
For information on Fall & Injury Prevention, visit: [email or web address] OR  [email or web address] OR  https://www.cdc.gov/steadi/patient.html

## 2023-11-10 NOTE — CONSULT NOTE ADULT - SUBJECTIVE AND OBJECTIVE BOX
Patient is a 68y old  Male who presents with a chief complaint of post-op generalized weakness (2023 17:15)       HPI:       PAST MEDICAL & SURGICAL HISTORY:  DM (diabetes mellitus)      HTN (hypertension)      High cholesterol      Myocardial infarction  (Coronary angiogram )      Acute on chronic congestive heart failure, unspecified congestive heart failure type      Cerebrovascular accident (CVA)      ESRD (end stage renal disease)      S/P coronary artery bypass with three autogenous grafts           FAMILY HISTORY:  Family history of heart disease (Sibling)    NC    Social History:Non smoker    MEDICATIONS  (STANDING):  amLODIPine   Tablet 10 milliGRAM(s) Oral daily  aspirin  chewable 81 milliGRAM(s) Oral daily  atorvastatin 80 milliGRAM(s) Oral at bedtime  calcitriol   Capsule 0.25 MICROGram(s) Oral daily  carvedilol 25 milliGRAM(s) Oral every 12 hours  ceFAZolin  Injectable. 2000 milliGRAM(s) IV Push Once  chlorhexidine 2% Cloths 1 Application(s) Topical daily  cloNIDine 0.1 milliGRAM(s) Oral two times a day  clopidogrel Tablet 75 milliGRAM(s) Oral daily  dextrose 5%. 1000 milliLiter(s) (100 mL/Hr) IV Continuous <Continuous>  dextrose 5%. 1000 milliLiter(s) (50 mL/Hr) IV Continuous <Continuous>  dextrose 50% Injectable 12.5 Gram(s) IV Push once  dextrose 50% Injectable 25 Gram(s) IV Push once  dextrose 50% Injectable 25 Gram(s) IV Push once  glucagon  Injectable 1 milliGRAM(s) IntraMuscular once  heparin   Injectable 5000 Unit(s) SubCutaneous every 8 hours  insulin lispro (ADMELOG) corrective regimen sliding scale   SubCutaneous at bedtime  insulin lispro (ADMELOG) corrective regimen sliding scale   SubCutaneous three times a day before meals  isosorbide   dinitrate Tablet (ISORDIL) 20 milliGRAM(s) Oral three times a day  magnesium oxide 400 milliGRAM(s) Oral daily  pantoprazole    Tablet 40 milliGRAM(s) Oral before breakfast    MEDICATIONS  (PRN):  acetaminophen     Tablet .. 650 milliGRAM(s) Oral every 6 hours PRN Temp greater or equal to 38C (100.4F), Mild Pain (1 - 3)  acetaminophen     Tablet .. 650 milliGRAM(s) Oral every 6 hours PRN Mild Pain (1 - 3), Moderate Pain (4 - 6), Severe Pain (7 - 10)  aluminum hydroxide/magnesium hydroxide/simethicone Suspension 30 milliLiter(s) Oral every 8 hours PRN Dyspepsia  dextrose Oral Gel 15 Gram(s) Oral once PRN Blood Glucose LESS THAN 70 milliGRAM(s)/deciliter  dextrose Oral Gel 15 Gram(s) Oral once PRN Blood Glucose LESS THAN 70 milliGRAM(s)/deciliter  ondansetron Injectable 4 milliGRAM(s) IV Push every 6 hours PRN Nausea  senna 2 Tablet(s) Oral at bedtime PRN Constipation   Meds reviewed    Allergies    No Known Allergies    Intolerances         REVIEW OF SYSTEMS:    CONSTITUTIONAL:  No weight loss   EYES: No eye pain, visual disturbances, or discharge  ENMT:  No difficulty hearing, tinnitus, vertigo; No sinus or throat pain  NECK: No pain or stiffness  BREASTS: No pain, masses, or nipple discharge  RESPIRATORY: No SOB. No wheeze. No SSOA  CARDIOVASCULAR: No chest pain, palpitations, dizziness,   GASTROINTESTINAL: No abdominal or epigastric pain. No nausea, vomiting, or hematemesis; No diarrhea or constipation. No melena or hematochezia.Trunckal obesity  GENITOURINARY: No dysuria, frequency, hematuria, or incontinence  NEUROLOGICAL: No headaches, memory loss, loss of strength, numbness, or tremors  SKIN: Diffuse erythema, no blisters  LYMPH NODES: No enlarged glands  ENDOCRINE: No heat or cold intolerance; No hair loss  MUSCULOSKELETAL: No joint pain or swelling   PSYCHIATRIC: No depression, anxiety, mood swings, or difficulty sleeping  HEME/LYMPH: No easy bruising, or bleeding gums  ALLERY AND IMMUNOLOGIC: No hives or eczema      Vital Signs Last 24 Hrs  T(C): 36.7 (10 Nov 2023 13:04), Max: 36.7 (10 Nov 2023 13:04)  T(F): 98 (10 Nov 2023 13:04), Max: 98 (10 Nov 2023 13:04)  HR: 67 (10 Nov 2023 13:04) (64 - 88)  BP: 136/71 (10 Nov 2023 13:04) (134/67 - 172/80)  BP(mean): 103 (2023 16:00) (93 - 105)  RR: 18 (10 Nov 2023 13:04) (12 - 20)  SpO2: 95% (10 Nov 2023 13:04) (91% - 100%)    Parameters below as of 10 Nov 2023 13:04  Patient On (Oxygen Delivery Method): room air      Daily     Daily Weight in k.6 (10 Nov 2023 13:04)    PHYSICAL EXAM:    GENERAL: NAD  HEAD:  Atraumatic, Normocephalic  EYES: EOMI, PERRLA, conjunctiva and sclera clear  ENMT: No tonsillar erythema, exudates, or enlargement; Moist mucous membranes, Good dentition, No lesions  NECK: Supple, neck  veins full  NERVOUS SYSTEM:  Alert & Oriented X3, Good concentration; Motor Strength wnl upper and lower extremities  CHEST/LUNG: Clear to percussion bilaterally; No rales, rhonchi, wheezing, or rubs  HEART: Regular rate and rhythm; No murmurs, rubs, or gallops  ABDOMEN: Soft, Nontender, Nondistended; Bowel sounds present, body wall and flank edema  EXTREMITIES:  Edema  LYMPH: No lymphadenopathy noted  SKIN: No rashes or lesions, pale      LABS:                        12.2   13.20 )-----------( 228      ( 10 Nov 2023 06:20 )             36.4     11-10    137  |  99  |  50.4<H>  ----------------------------<  217<H>  4.1   |  24.0  |  7.11<H>    Ca    9.2      10 Nov 2023 06:20  Phos  4.6     11-10  Mg     1.7     -10    TPro  6.8  /  Alb  3.7  /  TBili  0.4  /  DBili  x   /  AST  30  /  ALT  43<H>  /  AlkPhos  106  11-09      Urinalysis Basic - ( 10 Nov 2023 06:20 )    Color: x / Appearance: x / SG: x / pH: x  Gluc: 217 mg/dL / Ketone: x  / Bili: x / Urobili: x   Blood: x / Protein: x / Nitrite: x   Leuk Esterase: x / RBC: x / WBC x   Sq Epi: x / Non Sq Epi: x / Bacteria: x      Magnesium: 1.7 mg/dL (11-10 @ 06:20)  Phosphorus: 4.6 mg/dL (11-10 @ 06:20)          RADIOLOGY & ADDITIONAL TESTS:

## 2023-11-10 NOTE — DISCHARGE NOTE NURSING/CASE MANAGEMENT/SOCIAL WORK - PATIENT PORTAL LINK FT
You can access the FollowMyHealth Patient Portal offered by [facility] by registering at the following website: [email or web address]. By joining [facility]’s FollowMyHealth portal, you will also be able to view your health information using other applications (apps) compatible with our system.

## 2023-11-11 VITALS
RESPIRATION RATE: 18 BRPM | DIASTOLIC BLOOD PRESSURE: 65 MMHG | OXYGEN SATURATION: 91 % | SYSTOLIC BLOOD PRESSURE: 114 MMHG | HEART RATE: 62 BPM | TEMPERATURE: 98 F

## 2023-11-11 LAB
GLUCOSE BLDC GLUCOMTR-MCNC: 132 MG/DL — HIGH (ref 70–99)
GLUCOSE BLDC GLUCOMTR-MCNC: 132 MG/DL — HIGH (ref 70–99)
GLUCOSE BLDC GLUCOMTR-MCNC: 177 MG/DL — HIGH (ref 70–99)
GLUCOSE BLDC GLUCOMTR-MCNC: 177 MG/DL — HIGH (ref 70–99)

## 2023-11-11 PROCEDURE — C1750: CPT

## 2023-11-11 PROCEDURE — C1889: CPT

## 2023-11-11 PROCEDURE — 99261: CPT

## 2023-11-11 PROCEDURE — 93005 ELECTROCARDIOGRAM TRACING: CPT

## 2023-11-11 PROCEDURE — 84100 ASSAY OF PHOSPHORUS: CPT

## 2023-11-11 PROCEDURE — 85025 COMPLETE CBC W/AUTO DIFF WBC: CPT

## 2023-11-11 PROCEDURE — C9399: CPT

## 2023-11-11 PROCEDURE — 82962 GLUCOSE BLOOD TEST: CPT

## 2023-11-11 PROCEDURE — 36415 COLL VENOUS BLD VENIPUNCTURE: CPT

## 2023-11-11 PROCEDURE — 80048 BASIC METABOLIC PNL TOTAL CA: CPT

## 2023-11-11 PROCEDURE — 80053 COMPREHEN METABOLIC PANEL: CPT

## 2023-11-11 PROCEDURE — 83735 ASSAY OF MAGNESIUM: CPT

## 2023-11-11 RX ADMIN — AMLODIPINE BESYLATE 10 MILLIGRAM(S): 2.5 TABLET ORAL at 06:55

## 2023-11-11 RX ADMIN — Medication 81 MILLIGRAM(S): at 14:59

## 2023-11-11 RX ADMIN — Medication 2: at 13:19

## 2023-11-11 RX ADMIN — PANTOPRAZOLE SODIUM 40 MILLIGRAM(S): 20 TABLET, DELAYED RELEASE ORAL at 06:55

## 2023-11-11 RX ADMIN — CALCITRIOL 0.25 MICROGRAM(S): 0.5 CAPSULE ORAL at 15:00

## 2023-11-11 RX ADMIN — HEPARIN SODIUM 5000 UNIT(S): 5000 INJECTION INTRAVENOUS; SUBCUTANEOUS at 06:57

## 2023-11-11 RX ADMIN — Medication 0.1 MILLIGRAM(S): at 06:55

## 2023-11-11 RX ADMIN — CLOPIDOGREL BISULFATE 75 MILLIGRAM(S): 75 TABLET, FILM COATED ORAL at 14:58

## 2023-11-11 RX ADMIN — CARVEDILOL PHOSPHATE 25 MILLIGRAM(S): 80 CAPSULE, EXTENDED RELEASE ORAL at 06:56

## 2023-11-11 RX ADMIN — ISOSORBIDE DINITRATE 20 MILLIGRAM(S): 5 TABLET ORAL at 06:55

## 2023-11-11 RX ADMIN — ISOSORBIDE DINITRATE 20 MILLIGRAM(S): 5 TABLET ORAL at 13:19

## 2023-11-11 RX ADMIN — MAGNESIUM OXIDE 400 MG ORAL TABLET 400 MILLIGRAM(S): 241.3 TABLET ORAL at 14:59

## 2023-11-11 NOTE — PROGRESS NOTE ADULT - ATTENDING COMMENTS
Ready for discharge after LUE brachiocephalic fistula creation and RIJ permcath placement
Patient POD 1 from left brachiocephalic fistula creation and RIJ permcath placement. Strong thrill in fistula. LUE is neurovascularly in tact, permcath site clean. Home today after HD

## 2023-11-11 NOTE — PROGRESS NOTE ADULT - SUBJECTIVE AND OBJECTIVE BOX
INTERVAL HPI/OVERNIGHT EVENTS:    Patient evaluated at bedside. No acute distress. No acute events overnight.    MEDICATIONS  (STANDING):  amLODIPine   Tablet 10 milliGRAM(s) Oral daily  aspirin  chewable 81 milliGRAM(s) Oral daily  atorvastatin 80 milliGRAM(s) Oral at bedtime  calcitriol   Capsule 0.25 MICROGram(s) Oral daily  carvedilol 25 milliGRAM(s) Oral every 12 hours  ceFAZolin  Injectable. 2000 milliGRAM(s) IV Push Once  chlorhexidine 2% Cloths 1 Application(s) Topical daily  cloNIDine 0.1 milliGRAM(s) Oral two times a day  clopidogrel Tablet 75 milliGRAM(s) Oral daily  dextrose 5%. 1000 milliLiter(s) (50 mL/Hr) IV Continuous <Continuous>  dextrose 5%. 1000 milliLiter(s) (100 mL/Hr) IV Continuous <Continuous>  dextrose 50% Injectable 25 Gram(s) IV Push once  dextrose 50% Injectable 25 Gram(s) IV Push once  dextrose 50% Injectable 12.5 Gram(s) IV Push once  glucagon  Injectable 1 milliGRAM(s) IntraMuscular once  heparin   Injectable 5000 Unit(s) SubCutaneous every 8 hours  insulin lispro (ADMELOG) corrective regimen sliding scale   SubCutaneous at bedtime  insulin lispro (ADMELOG) corrective regimen sliding scale   SubCutaneous three times a day before meals  isosorbide   dinitrate Tablet (ISORDIL) 20 milliGRAM(s) Oral three times a day  magnesium oxide 400 milliGRAM(s) Oral daily  pantoprazole    Tablet 40 milliGRAM(s) Oral before breakfast    MEDICATIONS  (PRN):  acetaminophen     Tablet .. 650 milliGRAM(s) Oral every 6 hours PRN Mild Pain (1 - 3), Moderate Pain (4 - 6), Severe Pain (7 - 10)  acetaminophen     Tablet .. 650 milliGRAM(s) Oral every 6 hours PRN Temp greater or equal to 38C (100.4F), Mild Pain (1 - 3)  aluminum hydroxide/magnesium hydroxide/simethicone Suspension 30 milliLiter(s) Oral every 8 hours PRN Dyspepsia  dextrose Oral Gel 15 Gram(s) Oral once PRN Blood Glucose LESS THAN 70 milliGRAM(s)/deciliter  dextrose Oral Gel 15 Gram(s) Oral once PRN Blood Glucose LESS THAN 70 milliGRAM(s)/deciliter  ondansetron Injectable 4 milliGRAM(s) IV Push every 6 hours PRN Nausea  senna 2 Tablet(s) Oral at bedtime PRN Constipation      Vital Signs Last 24 Hrs  T(C): 36.6 (10 Nov 2023 04:20), Max: 36.6 (09 Nov 2023 09:09)  T(F): 97.8 (10 Nov 2023 04:20), Max: 97.9 (09 Nov 2023 09:09)  HR: 74 (10 Nov 2023 04:20) (68 - 88)  BP: 160/75 (10 Nov 2023 04:20) (137/77 - 172/80)  BP(mean): 103 (09 Nov 2023 16:00) (93 - 105)  RR: 17 (10 Nov 2023 04:20) (12 - 20)  SpO2: 92% (10 Nov 2023 04:20) (91% - 100%)    Parameters below as of 10 Nov 2023 04:20  Patient On (Oxygen Delivery Method): room air        Physical exam:  General: alert, patient resting in bed comfortably, in NAD  Resp: equal chest rise bilaterally, nonlabored breathing  Cardio: RRR, No chest wall tenderness or palpable hematoma at R IJ permacath site. Dressing remains clean, dry, and intact.   Extremities: moving all extremities spontaneously, pulses 2+ bilateral radial pulse. Palpable thrill on LUE at AVF site. Some ecchymosis on incision site, Dressing remains, clean, dry, and intact      I&O's Detail    09 Nov 2023 07:01  -  10 Nov 2023 06:53  --------------------------------------------------------  IN:  Total IN: 0 mL    OUT:    Voided (mL): 400 mL  Total OUT: 400 mL    Total NET: -400 mL          LABS:    11-09    139  |  98  |  36.6<H>  ----------------------------<  177<H>  4.3   |  29.0  |  6.20<H>    Ca    9.7      09 Nov 2023 09:00    TPro  6.8  /  Alb  3.7  /  TBili  0.4  /  DBili  x   /  AST  30  /  ALT  43<H>  /  AlkPhos  106  11-09      Urinalysis Basic - ( 09 Nov 2023 09:00 )    Color: x / Appearance: x / SG: x / pH: x  Gluc: 177 mg/dL / Ketone: x  / Bili: x / Urobili: x   Blood: x / Protein: x / Nitrite: x   Leuk Esterase: x / RBC: x / WBC x   Sq Epi: x / Non Sq Epi: x / Bacteria: x        RADIOLOGY & ADDITIONAL STUDIES:
SURGERY  Pager:    STATUS POST:  LUE brachiocephalic AVF and permacath exchange    POST OPERATIVE DAY #2      INTERVAL EVENTS/SUBJECTIVE:   No acute events overnight. Tolerating HD and not in any pain.  Denies changes in sensation or motor function to LUE.    ______________________________________________  OBJECTIVE:   T(C): 36.5 (11-11-23 @ 16:16), Max: 36.8 (11-10-23 @ 22:00)  HR: 62 (11-11-23 @ 16:16) (62 - 68)  BP: 114/65 (11-11-23 @ 16:16) (88/50 - 166/71)  RR: 18 (11-11-23 @ 16:16) (18 - 19)  SpO2: 91% (11-11-23 @ 16:16) (91% - 95%)  Wt(kg): --  CAPILLARY BLOOD GLUCOSE      POCT Blood Glucose.: 177 mg/dL (11 Nov 2023 13:16)  POCT Blood Glucose.: 132 mg/dL (11 Nov 2023 07:41)  POCT Blood Glucose.: 148 mg/dL (10 Nov 2023 21:38)  POCT Blood Glucose.: 148 mg/dL (10 Nov 2023 18:20)    I&O's Detail    10 Nov 2023 07:01  -  11 Nov 2023 07:00  --------------------------------------------------------  IN:    Oral Fluid: 200 mL    Other (mL): 800 mL  Total IN: 1000 mL    OUT:    Other (mL): 2300 mL    Voided (mL): 0 mL  Total OUT: 2300 mL    Total NET: -1300 mL          Physical exam:  Gen: resting in bed comfortably in NAD  Chest: no increased WOB, regular inspiratory effort   Abdomen: Soft, nontender, nondistended with no rebound tenderness or guarding.    Vascular: WWP, KRAUS x4, palpable Lt radial pulse, LUE fistula is soft with a good thrill  NEURO: awake, alert  ______________________________________________  LABS:  CBC Full  -  ( 10 Nov 2023 06:20 )  WBC Count : 13.20 K/uL  RBC Count : 3.99 M/uL  Hemoglobin : 12.2 g/dL  Hematocrit : 36.4 %  Platelet Count - Automated : 228 K/uL  Mean Cell Volume : 91.2 fl  Mean Cell Hemoglobin : 30.6 pg  Mean Cell Hemoglobin Concentration : 33.5 gm/dL  Auto Neutrophil # : 11.40 K/uL  Auto Lymphocyte # : 1.17 K/uL  Auto Monocyte # : 0.57 K/uL  Auto Eosinophil # : 0.00 K/uL  Auto Basophil # : 0.01 K/uL  Auto Neutrophil % : 86.3 %  Auto Lymphocyte % : 8.9 %  Auto Monocyte % : 4.3 %  Auto Eosinophil % : 0.0 %  Auto Basophil % : 0.1 %    11-10    137  |  99  |  50.4<H>  ----------------------------<  217<H>  4.1   |  24.0  |  7.11<H>    Ca    9.2      10 Nov 2023 06:20  Phos  4.6     11-10  Mg     1.7     11-10      _____________________________________________  RADIOLOGY:    
Brief PA note    Concerns for possible CVA by the nursing team due to left facial droop  Patient seen and examined  s/p left upper ext ligation of berna avf outflow ,creation of brachial- cephalic avf; right permacath exchange     on examination, 2cm hematoma noted to the left inner upper lip, when ask to smile, there seem to be a slight facial droop; but when encouraged to smile despite pain elicited.. no facial droop noted  Cranial nerves 2-12 with no noted gross deficiencies    left antecubital incision site with 4cm hematoma, + thrill from the outflow vein     mild pressure dressing and cold pack placed     right permacath dressing clean       --- will report finding to Attending, attending to see prior to discharge   -- no current obstacle for discharge
Patient is a 68y old  Male who presents with a chief complaint of post-op generalized weakness (2023 17:15). Renal consulted for ESRD management        PAST MEDICAL & SURGICAL HISTORY:  DM (diabetes mellitus)      HTN (hypertension)      High cholesterol      Myocardial infarction  (Coronary angiogram )      Acute on chronic congestive heart failure, unspecified congestive heart failure type      Cerebrovascular accident (CVA)      ESRD (end stage renal disease)      S/P coronary artery bypass with three autogenous grafts           FAMILY HISTORY:  Family history of heart disease (Sibling)    NC    Social History:Non smoker    MEDICATIONS  (STANDING):  amLODIPine   Tablet 10 milliGRAM(s) Oral daily  aspirin  chewable 81 milliGRAM(s) Oral daily  atorvastatin 80 milliGRAM(s) Oral at bedtime  calcitriol   Capsule 0.25 MICROGram(s) Oral daily  carvedilol 25 milliGRAM(s) Oral every 12 hours  ceFAZolin  Injectable. 2000 milliGRAM(s) IV Push Once  chlorhexidine 2% Cloths 1 Application(s) Topical daily  cloNIDine 0.1 milliGRAM(s) Oral two times a day  clopidogrel Tablet 75 milliGRAM(s) Oral daily  dextrose 5%. 1000 milliLiter(s) (100 mL/Hr) IV Continuous <Continuous>  dextrose 5%. 1000 milliLiter(s) (50 mL/Hr) IV Continuous <Continuous>  dextrose 50% Injectable 12.5 Gram(s) IV Push once  dextrose 50% Injectable 25 Gram(s) IV Push once  dextrose 50% Injectable 25 Gram(s) IV Push once  glucagon  Injectable 1 milliGRAM(s) IntraMuscular once  heparin   Injectable 5000 Unit(s) SubCutaneous every 8 hours  insulin lispro (ADMELOG) corrective regimen sliding scale   SubCutaneous at bedtime  insulin lispro (ADMELOG) corrective regimen sliding scale   SubCutaneous three times a day before meals  isosorbide   dinitrate Tablet (ISORDIL) 20 milliGRAM(s) Oral three times a day  magnesium oxide 400 milliGRAM(s) Oral daily  pantoprazole    Tablet 40 milliGRAM(s) Oral before breakfast    MEDICATIONS  (PRN):  acetaminophen     Tablet .. 650 milliGRAM(s) Oral every 6 hours PRN Temp greater or equal to 38C (100.4F), Mild Pain (1 - 3)  acetaminophen     Tablet .. 650 milliGRAM(s) Oral every 6 hours PRN Mild Pain (1 - 3), Moderate Pain (4 - 6), Severe Pain (7 - 10)  aluminum hydroxide/magnesium hydroxide/simethicone Suspension 30 milliLiter(s) Oral every 8 hours PRN Dyspepsia  dextrose Oral Gel 15 Gram(s) Oral once PRN Blood Glucose LESS THAN 70 milliGRAM(s)/deciliter  dextrose Oral Gel 15 Gram(s) Oral once PRN Blood Glucose LESS THAN 70 milliGRAM(s)/deciliter  ondansetron Injectable 4 milliGRAM(s) IV Push every 6 hours PRN Nausea  senna 2 Tablet(s) Oral at bedtime PRN Constipation   Meds reviewed    Allergies    No Known Allergies    Intolerances         REVIEW OF SYSTEMS:    CONSTITUTIONAL:  No weight loss   EYES: No eye pain, visual disturbances, or discharge  ENMT:  No difficulty hearing, tinnitus, vertigo; No sinus or throat pain  NECK: No pain or stiffness  BREASTS: No pain, masses, or nipple discharge  RESPIRATORY: No SOB. No wheeze. No SOSA  CARDIOVASCULAR: No chest pain, palpitations, dizziness,   GASTROINTESTINAL: No abdominal or epigastric pain. No nausea, vomiting, or hematemesis; No diarrhea or constipation. No melena or hematochezia.Trunckal obesity  GENITOURINARY: No dysuria, frequency, hematuria, or incontinence  NEUROLOGICAL: No headaches, memory loss, loss of strength, numbness, or tremors  SKIN: Diffuse erythema, no blisters  LYMPH NODES: No enlarged glands  ENDOCRINE: No heat or cold intolerance; No hair loss  MUSCULOSKELETAL: No joint pain or swelling   PSYCHIATRIC: No depression, anxiety, mood swings, or difficulty sleeping  HEME/LYMPH: No easy bruising, or bleeding gums  ALLERY AND IMMUNOLOGIC: No hives or eczema      Vital Signs Last 24 Hrs  T(C): 36.5 (2023 08:52), Max: 36.8 (10 Nov 2023 16:15)  T(F): 97.7 (2023 08:52), Max: 98.2 (10 Nov 2023 16:15)  HR: 68 (2023 08:52) (66 - 71)  BP: 98/60 (2023 08:52) (98/60 - 166/71)  BP(mean): --  RR: 18 (2023 08:52) (16 - 19)  SpO2: 92% (2023 08:52) (92% - 98%)    Parameters below as of 2023 08:52  Patient On (Oxygen Delivery Method): room air          Daily     Daily Weight in k.6 (10 Nov 2023 13:04)    PHYSICAL EXAM:    GENERAL: NAD  HEAD:  Atraumatic, Normocephalic  EYES: EOMI, PERRLA, conjunctiva and sclera clear  ENMT: No tonsillar erythema, exudates, or enlargement; Moist mucous membranes, Good dentition, No lesions  NECK: Supple, neck  veins full  NERVOUS SYSTEM:  Alert & Oriented X3, Good concentration; Motor Strength wnl upper and lower extremities  CHEST/LUNG: Clear to percussion bilaterally; No rales, rhonchi, wheezing, or rubs  HEART: Regular rate and rhythm; No murmurs, rubs, or gallops  ABDOMEN: Soft, Nontender, Nondistended; Bowel sounds present, body wall and flank edema  EXTREMITIES:  Edema  LYMPH: No lymphadenopathy noted  SKIN: No rashes or lesions, pale      LABS:                                   12.2   13.20 )-----------( 228      ( 10 Nov 2023 06:20 )             36.4     11-10    137  |  99  |  50.4<H>  ----------------------------<  217<H>  4.1   |  24.0  |  7.11<H>    Ca    9.2      10 Nov 2023 06:20  Phos  4.6     11-10  Mg     1.7     11-10        Urinalysis Basic - ( 10 Nov 2023 06:20 )    Color: x / Appearance: x / SG: x / pH: x  Gluc: 217 mg/dL / Ketone: x  / Bili: x / Urobili: x   Blood: x / Protein: x / Nitrite: x   Leuk Esterase: x / RBC: x / WBC x   Sq Epi: x / Non Sq Epi: x / Bacteria: x

## 2023-11-11 NOTE — PROGRESS NOTE ADULT - ASSESSMENT
ESRD  HTN  Anemia  MIKEY  S/p PC exchange    -HD MWF. S/p uneventful dialysis on 11/10/23 s/p PC exchange   -UF 2 kg  -3K bath  -Continue home BP medications  -No need for JACK at this time  -Phos binders    D/w HD RN
68M POD2 s/p AVF creation, doing well and tolerated dialysis yesterday.  Not in any current pain or having any issues    Plan  - DC home today  - F/u with Dr. Zayas outpatient
Plan:   - Dialysis this am  - Discharge home after dialysis  - continue renal diet as tolerated  - pain control PRN  - encourage OOB, ambulation  - continue home medications   - DVT ppx: SCDs, HSQ.

## 2023-11-13 ENCOUNTER — NON-APPOINTMENT (OUTPATIENT)
Age: 68
End: 2023-11-13

## 2023-11-21 ENCOUNTER — APPOINTMENT (OUTPATIENT)
Dept: VASCULAR SURGERY | Facility: CLINIC | Age: 68
End: 2023-11-21
Payer: MEDICARE

## 2023-11-21 VITALS
SYSTOLIC BLOOD PRESSURE: 139 MMHG | WEIGHT: 163 LBS | OXYGEN SATURATION: 96 % | HEART RATE: 72 BPM | DIASTOLIC BLOOD PRESSURE: 78 MMHG | HEIGHT: 62 IN | TEMPERATURE: 98.7 F | RESPIRATION RATE: 16 BRPM | BODY MASS INDEX: 30 KG/M2

## 2023-11-21 PROCEDURE — 99024 POSTOP FOLLOW-UP VISIT: CPT

## 2023-12-04 NOTE — H&P PST ADULT - LANGUAGE ASSISTANCE NEEDED
December 4, 2023     Neha Aj MD  [address] [address]    Patient: Marianne Watson   YOB: 1995   Date of Visit: 12/4/2023       Dear Dr. Bryant Lewis:    Thank you for referring Pawan Murphy to me for evaluation. Below are my notes for this consultation. If you have questions, please do not hesitate to call me. I look forward to following your patient along with you.          Sincerely,        Lucy Leroy MD        CC: No Recipients    Lucy Leroy MD  12/4/2023  6:34 PM  Sign when Signing Visit  Please refer to the [facility] ultrasound report in Ob Procedures for additional information regarding today's visit
Yes-Patient/Caregiver accepts free interpretation services...

## 2023-12-07 ENCOUNTER — LABORATORY RESULT (OUTPATIENT)
Age: 68
End: 2023-12-07

## 2023-12-07 ENCOUNTER — APPOINTMENT (OUTPATIENT)
Dept: FAMILY MEDICINE | Facility: CLINIC | Age: 68
End: 2023-12-07
Payer: MEDICARE

## 2023-12-07 VITALS
TEMPERATURE: 97.4 F | RESPIRATION RATE: 15 BRPM | WEIGHT: 160 LBS | DIASTOLIC BLOOD PRESSURE: 72 MMHG | SYSTOLIC BLOOD PRESSURE: 122 MMHG | BODY MASS INDEX: 29.44 KG/M2 | OXYGEN SATURATION: 97 % | HEART RATE: 81 BPM | HEIGHT: 62 IN

## 2023-12-07 DIAGNOSIS — R79.89 OTHER SPECIFIED ABNORMAL FINDINGS OF BLOOD CHEMISTRY: ICD-10-CM

## 2023-12-07 PROCEDURE — 36415 COLL VENOUS BLD VENIPUNCTURE: CPT

## 2023-12-07 PROCEDURE — 83036 HEMOGLOBIN GLYCOSYLATED A1C: CPT | Mod: QW

## 2023-12-07 PROCEDURE — 99214 OFFICE O/P EST MOD 30 MIN: CPT | Mod: 25

## 2023-12-11 LAB
CALCIUM SERPL-MCNC: 11 MG/DL
PARATHYROID HORMONE INTACT: 100 PG/ML
TSH SERPL-ACNC: 0.23 UIU/ML

## 2023-12-14 ENCOUNTER — OFFICE (OUTPATIENT)
Dept: URBAN - METROPOLITAN AREA CLINIC 94 | Facility: CLINIC | Age: 68
Setting detail: OPHTHALMOLOGY
End: 2023-12-14
Payer: MEDICARE

## 2023-12-14 DIAGNOSIS — H35.372: ICD-10-CM

## 2023-12-14 DIAGNOSIS — Z96.1: ICD-10-CM

## 2023-12-14 DIAGNOSIS — H35.033: ICD-10-CM

## 2023-12-14 DIAGNOSIS — E11.3311: ICD-10-CM

## 2023-12-14 DIAGNOSIS — E11.3312: ICD-10-CM

## 2023-12-14 DIAGNOSIS — E11.3313: ICD-10-CM

## 2023-12-14 PROCEDURE — 92134 CPTRZ OPH DX IMG PST SGM RTA: CPT | Performed by: OPHTHALMOLOGY

## 2023-12-14 PROCEDURE — 92012 INTRM OPH EXAM EST PATIENT: CPT | Performed by: OPHTHALMOLOGY

## 2023-12-14 ASSESSMENT — REFRACTION_CURRENTRX
OS_OVR_VA: 20/
OS_CYLINDER: -0.75
OD_ADD: +3.25
OD_SPHERE: +1.00
OS_VPRISM_DIRECTION: PROGS
OD_CYLINDER: -1.50
OS_SPHERE: +1.25
OD_OVR_VA: 20/
OD_VPRISM_DIRECTION: PROGS
OS_ADD: +3.25
OD_AXIS: 100
OS_AXIS: 080

## 2023-12-14 ASSESSMENT — CONFRONTATIONAL VISUAL FIELD TEST (CVF)
OD_FINDINGS: FULL
OS_FINDINGS: FULL

## 2023-12-14 ASSESSMENT — REFRACTION_AUTOREFRACTION
OS_AXIS: 083
OD_CYLINDER: -1.00
OS_SPHERE: -0.50
OD_SPHERE: +0.75
OS_CYLINDER: -1.00
OD_AXIS: 097

## 2023-12-14 ASSESSMENT — SPHEQUIV_DERIVED
OD_SPHEQUIV: 0.25
OS_SPHEQUIV: -1

## 2023-12-19 ENCOUNTER — APPOINTMENT (OUTPATIENT)
Dept: CARDIOLOGY | Facility: CLINIC | Age: 68
End: 2023-12-19
Payer: MEDICARE

## 2023-12-19 VITALS
BODY MASS INDEX: 29.81 KG/M2 | WEIGHT: 162 LBS | SYSTOLIC BLOOD PRESSURE: 130 MMHG | HEIGHT: 62 IN | HEART RATE: 79 BPM | TEMPERATURE: 98 F | OXYGEN SATURATION: 96 % | DIASTOLIC BLOOD PRESSURE: 70 MMHG

## 2023-12-19 DIAGNOSIS — R26.81 UNSTEADINESS ON FEET: ICD-10-CM

## 2023-12-19 DIAGNOSIS — R06.02 SHORTNESS OF BREATH: ICD-10-CM

## 2023-12-19 DIAGNOSIS — R53.83 OTHER FATIGUE: ICD-10-CM

## 2023-12-19 DIAGNOSIS — R26.0 ATAXIC GAIT: ICD-10-CM

## 2023-12-19 DIAGNOSIS — Z86.73 PERSONAL HISTORY OF TRANSIENT ISCHEMIC ATTACK (TIA), AND CEREBRAL INFARCTION W/OUT RESIDUAL DEFICITS: ICD-10-CM

## 2023-12-19 PROCEDURE — 93000 ELECTROCARDIOGRAM COMPLETE: CPT

## 2023-12-19 PROCEDURE — 99215 OFFICE O/P EST HI 40 MIN: CPT | Mod: 25

## 2023-12-19 PROCEDURE — 99214 OFFICE O/P EST MOD 30 MIN: CPT | Mod: 25

## 2023-12-26 ENCOUNTER — APPOINTMENT (OUTPATIENT)
Dept: VASCULAR SURGERY | Facility: CLINIC | Age: 68
End: 2023-12-26
Payer: MEDICARE

## 2023-12-26 VITALS
OXYGEN SATURATION: 97 % | TEMPERATURE: 97.3 F | DIASTOLIC BLOOD PRESSURE: 80 MMHG | WEIGHT: 164 LBS | BODY MASS INDEX: 30.18 KG/M2 | HEIGHT: 62 IN | HEART RATE: 74 BPM | SYSTOLIC BLOOD PRESSURE: 145 MMHG | RESPIRATION RATE: 14 BRPM

## 2023-12-26 PROCEDURE — 99024 POSTOP FOLLOW-UP VISIT: CPT

## 2023-12-26 PROCEDURE — 93990 DOPPLER FLOW TESTING: CPT

## 2023-12-26 NOTE — HISTORY OF PRESENT ILLNESS
[FreeTextEntry1] : patient is status post left brachiocephalic arteriovenous fistula as well as replacement of permacatheter doing well without issues.  Fistula placed on November 9

## 2023-12-26 NOTE — ASSESSMENT
[FreeTextEntry1] :  patient is status post creation of left brachiocephalic arteriovenous fistula.  It is nearly 8 weeks since creation.  His ultrasound shows a patent fistula with sizes around 6 mm.  There is very superficial and skin with good flow volumes.  Patient given the okay to utilize fistula next Wednesday if successful with access with large-bore needles then they will call to schedule appointment for permacatheter removal

## 2024-01-02 NOTE — ED ADULT TRIAGE NOTE - AS TEMP SITE
Mom states she notices a lump on Kalpana 's breast which is hard to the touch.  Baby has a 4 month appointment on Friday and  mom would like to know if it is ok to wait until then to have it checked.   oral

## 2024-01-05 RX ORDER — FLASH GLUCOSE SENSOR
KIT MISCELLANEOUS
Qty: 6 | Refills: 3 | Status: ACTIVE | COMMUNITY
Start: 2024-01-04 | End: 1900-01-01

## 2024-01-12 ENCOUNTER — APPOINTMENT (OUTPATIENT)
Dept: VASCULAR SURGERY | Facility: CLINIC | Age: 69
End: 2024-01-12
Payer: MEDICARE

## 2024-01-12 VITALS
SYSTOLIC BLOOD PRESSURE: 157 MMHG | WEIGHT: 168 LBS | HEART RATE: 72 BPM | BODY MASS INDEX: 30.91 KG/M2 | OXYGEN SATURATION: 98 % | RESPIRATION RATE: 14 BRPM | DIASTOLIC BLOOD PRESSURE: 68 MMHG | HEIGHT: 62 IN | TEMPERATURE: 97.5 F

## 2024-01-12 PROCEDURE — 93990 DOPPLER FLOW TESTING: CPT

## 2024-01-12 PROCEDURE — 99212 OFFICE O/P EST SF 10 MIN: CPT | Mod: 24

## 2024-01-12 NOTE — ASSESSMENT
[FreeTextEntry1] : Patient's status post left brachiocephalic arteriovenous fistula had issues with access secondary to dislodged needle.  Fistula appears patent with no evidence of stenosis.  Advised the wife to allow 1 week for the arm to heal and can reuse once successful can remove permacatheter.

## 2024-01-12 NOTE — PHYSICAL EXAM
[Respiratory Effort] : normal respiratory effort [de-identified] :  Appears well [de-identified] : Left upper extremity fistula with excellent thrill.  There is ecchymosis along the upper arm but no large hematoma.

## 2024-01-12 NOTE — HISTORY OF PRESENT ILLNESS
[FreeTextEntry1] : Patient status post left brachiocephalic arteriovenous fistula with being accessed without issues apparently fell asleep and dislodged one of the needles experiencing ecchymosis and hematoma.  He reports no pain currently.  He is here to evaluate the fistula

## 2024-01-16 ENCOUNTER — OFFICE (OUTPATIENT)
Dept: URBAN - METROPOLITAN AREA CLINIC 94 | Facility: CLINIC | Age: 69
Setting detail: OPHTHALMOLOGY
End: 2024-01-16
Payer: MEDICARE

## 2024-01-16 DIAGNOSIS — E11.3313: ICD-10-CM

## 2024-01-16 DIAGNOSIS — H35.033: ICD-10-CM

## 2024-01-16 DIAGNOSIS — H35.372: ICD-10-CM

## 2024-01-16 PROCEDURE — 92134 CPTRZ OPH DX IMG PST SGM RTA: CPT | Performed by: OPHTHALMOLOGY

## 2024-01-16 PROCEDURE — 92012 INTRM OPH EXAM EST PATIENT: CPT | Performed by: OPHTHALMOLOGY

## 2024-01-16 ASSESSMENT — REFRACTION_AUTOREFRACTION
OS_SPHERE: -0.50
OD_AXIS: 097
OS_AXIS: 083
OD_SPHERE: +0.75
OS_CYLINDER: -1.00
OD_CYLINDER: -1.00

## 2024-01-16 ASSESSMENT — CONFRONTATIONAL VISUAL FIELD TEST (CVF)
OS_FINDINGS: FULL
OD_FINDINGS: FULL

## 2024-01-16 ASSESSMENT — SPHEQUIV_DERIVED
OS_SPHEQUIV: -1
OD_SPHEQUIV: 0.25

## 2024-01-22 RX ORDER — CALCITRIOL 0.25 UG/1
0.25 CAPSULE, LIQUID FILLED ORAL
Qty: 90 | Refills: 0 | Status: ACTIVE | COMMUNITY
Start: 2023-12-07

## 2024-01-25 ENCOUNTER — APPOINTMENT (OUTPATIENT)
Dept: CARDIOLOGY | Facility: CLINIC | Age: 69
End: 2024-01-25
Payer: MEDICARE

## 2024-01-25 PROCEDURE — 93925 LOWER EXTREMITY STUDY: CPT

## 2024-01-25 PROCEDURE — 93923 UPR/LXTR ART STDY 3+ LVLS: CPT

## 2024-01-26 ENCOUNTER — NON-APPOINTMENT (OUTPATIENT)
Age: 69
End: 2024-01-26

## 2024-01-26 DIAGNOSIS — I73.9 PERIPHERAL VASCULAR DISEASE, UNSPECIFIED: ICD-10-CM

## 2024-01-26 NOTE — CARDIOLOGY SUMMARY
[No Ischemia] : no Ischemia [LVEF ___%] : LVEF [unfilled]% [Enlarged] : enlarged LA size [None] : no mitral regurgitation [___] : [unfilled] [de-identified] : 12 19 2023:  Sinus Rhythm -First degree A-V block  Linda = 224 -Old inferior infarct -Incomplete right bundle branch block   -possible true posterior extension of inferior MI.  ABNORMAL 8 30 2022 Sinus  Rhythm  -First degree A-V block  Linda = 252 -Incomplete right bundle branch block.   ABNORMAL      1 11 2022  Sinus  Rhythm  -First degree A-V block  -Incomplete right bundle branch block.     ABNORMAL    10 19 2021   4/28/2021  : Sinus  Rhythm  First degree AV block.  -Incomplete right bundle branch block.   -Old inferior infarct.   ABNORMAL   [de-identified] : apr 2022   30 days no significant events.  [de-identified] : nov 2021: LE angiogrpahy: right sfa 80%. and left SFA moderate stenosis.  [de-identified] : july 2022: Renal Dupelx:  no renal artery stenosis.  [de-identified] : NANETTE: Left 0.7 right : 1.4 \par  US of LE arterial: sub total occlusion right PTA. mdoerate atherosclerosis bilaterally. \par  Carotid DUplexL: sept 2020  moderate atherslceorsi. no stenosis.

## 2024-01-26 NOTE — PHYSICAL EXAM
[General Appearance - Well Developed] : well developed [Normal Appearance] : normal appearance [Well Groomed] : well groomed [General Appearance - Well Nourished] : well nourished [No Deformities] : no deformities [General Appearance - In No Acute Distress] : no acute distress [Normal Conjunctiva] : the conjunctiva exhibited no abnormalities [Eyelids - No Xanthelasma] : the eyelids demonstrated no xanthelasmas [Normal Oral Mucosa] : normal oral mucosa [No Oral Pallor] : no oral pallor [No Oral Cyanosis] : no oral cyanosis [Normal Jugular Venous A Waves Present] : normal jugular venous A waves present [Normal Jugular Venous V Waves Present] : normal jugular venous V waves present [No Jugular Venous Mahan A Waves] : no jugular venous mahan A waves [Respiration, Rhythm And Depth] : normal respiratory rhythm and effort [Exaggerated Use Of Accessory Muscles For Inspiration] : no accessory muscle use [Auscultation Breath Sounds / Voice Sounds] : lungs were clear to auscultation bilaterally [Heart Rate And Rhythm] : heart rate and rhythm were normal [Heart Sounds] : normal S1 and S2 [Murmurs] : no murmurs present [Abdomen Soft] : soft [Abdomen Tenderness] : non-tender [Abdomen Mass (___ Cm)] : no abdominal mass palpated [Abnormal Walk] : normal gait [Gait - Sufficient For Exercise Testing] : the gait was sufficient for exercise testing [Nail Clubbing] : no clubbing of the fingernails [Cyanosis, Localized] : no localized cyanosis [Petechial Hemorrhages (___cm)] : no petechial hemorrhages [Skin Color & Pigmentation] : normal skin color and pigmentation [No Venous Stasis] : no venous stasis [] : no rash [Skin Lesions] : no skin lesions [No Skin Ulcers] : no skin ulcer [No Xanthoma] : no  xanthoma was observed [Oriented To Time, Place, And Person] : oriented to person, place, and time [Affect] : the affect was normal [Mood] : the mood was normal [No Anxiety] : not feeling anxious [FreeTextEntry1] : 2+ LE edema.

## 2024-01-26 NOTE — ADDENDUM
[FreeTextEntry1] : 1 26 2024: Pre-operative cardiovascular risk evaluation and management :  pars plana vitrectomy (PPV) and membrane peel (MP) surgery in eyes.   for ERM no cardiac symptoms. can hold plavix 5 days prior to procedure.  restart after procedure. No further cardiac work up is needed. Proceed for procedure as indicated

## 2024-01-26 NOTE — DISCUSSION/SUMMARY
[Patient] : the patient [Risks] : risks [Benefits] : benefits [Alternatives] : alternatives [With Me] : with me [___ Month(s)] : in [unfilled] month(s) [EKG obtained to assist in diagnosis and management of assessed problem(s)] : EKG obtained to assist in diagnosis and management of assessed problem(s) [FreeTextEntry1] : This is a 68 M with history of smoking, HTN,DM (on insulin), HLD, found to have 3 vessel disease and TIA post cath: s/p CABG recent PNA and now with facial swelling,. 1) unsteady gait and ataxia: orthostatic check today. prirop CVA.  vestibular and gait therapy. neurology referral. aspiirn adn statins.  2)  HFpEF: Stable weight.  history of problesm due to CKD hemodialyses  3. Peripheral vascular disease : bilateral sfa disease. right owrse then left. no intervention due to CKD. if symptoms persist or worsen then will plan fro CO2 angiography or stenting. 4.  LE edema resolved.  CKD and Diastollic heart failure.   : ct diuresis.     5. PVD:   bilateral atherosclerosis right sfa severe stenosis.  Walking and ct antiplatelets. life style modification. diabetes control.  aspirin and lipitor.  did not intervened due to CKD.  now on hemodialyses .  check NANETTE and uS arterial DUplex.  6  Multivessel CAD: s/p CABG2D  Continue Coreg 25 Q12, .ASA 81 mg,  lipitor 20 daily.   7) HTN:  continue ct currnet ,eds    lwo Bp during hemodialyses . orthostatic check today.   holding Bp meds during hemodialyses  8) DM: f/u PCP  9) CKD: creatinine stable on hemodialyses now.  10 ) carotid artery disease: carotid atheroscelrosuis.   aspirin statins.

## 2024-01-26 NOTE — HISTORY OF PRESENT ILLNESS
[FreeTextEntry1] : Multivessel CAD, HTN, CVA, HF pEF    HPI for today: : he mentions his BP is low during hemodialyses .  feels fatigue. they have been  hodling the BP meds during hemodialyses   he also has balance issues. states he sways alike a "drunk" /  feels that he  can fall.  not seen a neurologist . he is coplaitn with meds.    old note:   feels tired and fatigeu. no syncope. no chest pain . no dyspnea on exertion complaitnw ith meds.  has some pain int he legs. but not so bad.   Old note:   no chestpain. no dyspnea.  he was in the ER. for dyspnea.  and he ahd recent covid.  we had give him some diuresis. he was send him on small dose.   he came back with worsening renal funciton and was given iv fluids later.   old niote:   he still have caludfication symptoms.    we never pursued intervention due to CKD.   if symptoms are persistent and worse, then jared plan for CO2 angiography no chest paion. no dyspnea. nfagitue and tired.   old note:  no chest pain. no dyspnea.  feels good. complaitn with meds.  pain in the feet and legs. claudication   old note: patient ran out of norvasc. he has not been taking it for 1 week.   he could not get the prescription no headhecs. no dizziness. No LE edema. He had stopped losartan due to CKD.  we put him on Bidil. he states when he takes his norvasc. his BP is good around 130.  he has not made the appt with nephrologist yet.   old note: : jeanette was in the hospital few days ago. he was fluid overload and LE edaem. responded to Iv lasix. CKD . chornic.   discharged the next day. stress test ordered and he got blood test done at [facility] yesterday.  his echo was unremarkable in hospital    old noteL: patient has been having increaese weight gain. he has been drinking too much water also as he feels its good for his kidneys. He drinks atleast 10 bottles a day. Compliant with  meds.  +_ dyspnea one xertion 2 + LE edema.     old note: does not feel good. Lost balance. Unsteady gait.  diozziness. his primary doctor sent him for the carotid US and TCD ,.  fatigeu and tired.    old noteL: feels  good . no problem. compliant with meds.  no smoking. need script for coreg. patient did not get the stress test done. Will order stress test phillip. checked hospital records. no stress test in [facility].    old note: no chest pain. no dypsnea. no headaches. feels good . complikant with meds.  last time got NANETTE done that was abnormal. but US arterial showed run off disease on the right side,. tibiaperoneal.    old note: No chest pain.  No dyspnea.  No LE edema.  Compliant with meds. Does not exercise. got NANETTE and US of LE.  found to have abnormal; NANETTE on the left and calcification on the right. US of LE arterial shows right PTA occlusion. patient does complain of feet pain on the right side. No pain on the left.

## 2024-01-29 ENCOUNTER — ASC (OUTPATIENT)
Dept: URBAN - METROPOLITAN AREA SURGERY 8 | Facility: SURGERY | Age: 69
Setting detail: OPHTHALMOLOGY
End: 2024-01-29
Payer: MEDICARE

## 2024-01-29 DIAGNOSIS — H35.372: ICD-10-CM

## 2024-01-29 PROCEDURE — 67041 VIT FOR MACULAR PUCKER: CPT | Mod: RT | Performed by: OPHTHALMOLOGY

## 2024-01-30 ENCOUNTER — RX ONLY (RX ONLY)
Age: 69
End: 2024-01-30

## 2024-01-30 ENCOUNTER — OFFICE (OUTPATIENT)
Dept: URBAN - METROPOLITAN AREA CLINIC 94 | Facility: CLINIC | Age: 69
Setting detail: OPHTHALMOLOGY
End: 2024-01-30
Payer: MEDICARE

## 2024-01-30 DIAGNOSIS — H35.372: ICD-10-CM

## 2024-01-30 PROCEDURE — 99024 POSTOP FOLLOW-UP VISIT: CPT | Performed by: OPHTHALMOLOGY

## 2024-01-30 ASSESSMENT — REFRACTION_AUTOREFRACTION
OS_AXIS: 083
OD_CYLINDER: -1.00
OS_SPHERE: -0.50
OD_AXIS: 097
OS_CYLINDER: -1.00
OD_SPHERE: +0.75

## 2024-01-30 ASSESSMENT — SPHEQUIV_DERIVED
OS_SPHEQUIV: -1
OD_SPHEQUIV: 0.25

## 2024-01-30 ASSESSMENT — CONFRONTATIONAL VISUAL FIELD TEST (CVF)
OS_FINDINGS: FULL
OD_FINDINGS: FULL

## 2024-02-06 ENCOUNTER — OFFICE (OUTPATIENT)
Dept: URBAN - METROPOLITAN AREA CLINIC 94 | Facility: CLINIC | Age: 69
Setting detail: OPHTHALMOLOGY
End: 2024-02-06
Payer: MEDICARE

## 2024-02-06 DIAGNOSIS — E11.3311: ICD-10-CM

## 2024-02-06 DIAGNOSIS — H35.033: ICD-10-CM

## 2024-02-06 DIAGNOSIS — E11.3313: ICD-10-CM

## 2024-02-06 PROCEDURE — 67210 TREATMENT OF RETINAL LESION: CPT | Mod: 79,RT | Performed by: OPHTHALMOLOGY

## 2024-02-06 PROCEDURE — 92134 CPTRZ OPH DX IMG PST SGM RTA: CPT | Performed by: OPHTHALMOLOGY

## 2024-02-06 ASSESSMENT — SPHEQUIV_DERIVED
OD_SPHEQUIV: 0.25
OS_SPHEQUIV: -1

## 2024-02-06 ASSESSMENT — REFRACTION_AUTOREFRACTION
OS_AXIS: 083
OD_AXIS: 097
OS_SPHERE: -0.50
OD_SPHERE: +0.75
OD_CYLINDER: -1.00
OS_CYLINDER: -1.00

## 2024-02-06 ASSESSMENT — CONFRONTATIONAL VISUAL FIELD TEST (CVF)
OD_FINDINGS: FULL
OS_FINDINGS: FULL

## 2024-02-15 NOTE — PROGRESS NOTE ADULT - PROBLEM SELECTOR PLAN 4
Patient is here alone today.    Patient is requesting a work excuse.    If any information or results need to be relayed from today's visit, the best way to contact the patient is via [phone] (mobile) - Patient gives verbal permission to leave a detailed voicemail at the number provided.       -  - normal renal sono from 1/22  - continue BB, isordil, nor vasc, BB and hydralazine

## 2024-02-27 ENCOUNTER — OUTPATIENT (OUTPATIENT)
Dept: OUTPATIENT SERVICES | Facility: HOSPITAL | Age: 69
LOS: 1 days | End: 2024-02-27
Payer: MEDICARE

## 2024-02-27 VITALS
RESPIRATION RATE: 17 BRPM | HEIGHT: 65 IN | HEART RATE: 60 BPM | WEIGHT: 167.99 LBS | DIASTOLIC BLOOD PRESSURE: 60 MMHG | OXYGEN SATURATION: 99 % | SYSTOLIC BLOOD PRESSURE: 102 MMHG | TEMPERATURE: 98 F

## 2024-02-27 DIAGNOSIS — Z95.1 PRESENCE OF AORTOCORONARY BYPASS GRAFT: Chronic | ICD-10-CM

## 2024-02-27 DIAGNOSIS — Z98.890 OTHER SPECIFIED POSTPROCEDURAL STATES: Chronic | ICD-10-CM

## 2024-02-27 DIAGNOSIS — Z01.818 ENCOUNTER FOR OTHER PREPROCEDURAL EXAMINATION: ICD-10-CM

## 2024-02-27 LAB
ALBUMIN SERPL ELPH-MCNC: 3.7 G/DL — SIGNIFICANT CHANGE UP (ref 3.3–5.2)
ALP SERPL-CCNC: 99 U/L — SIGNIFICANT CHANGE UP (ref 40–120)
ALT FLD-CCNC: 16 U/L — SIGNIFICANT CHANGE UP
ANION GAP SERPL CALC-SCNC: 12 MMOL/L — SIGNIFICANT CHANGE UP (ref 5–17)
APTT BLD: 30.1 SEC — SIGNIFICANT CHANGE UP (ref 24.5–35.6)
AST SERPL-CCNC: 21 U/L — SIGNIFICANT CHANGE UP
BASOPHILS # BLD AUTO: 0.05 K/UL — SIGNIFICANT CHANGE UP (ref 0–0.2)
BASOPHILS NFR BLD AUTO: 0.5 % — SIGNIFICANT CHANGE UP (ref 0–2)
BILIRUB SERPL-MCNC: 0.5 MG/DL — SIGNIFICANT CHANGE UP (ref 0.4–2)
BLD GP AB SCN SERPL QL: SIGNIFICANT CHANGE UP
BUN SERPL-MCNC: 36.1 MG/DL — HIGH (ref 8–20)
CALCIUM SERPL-MCNC: 9.3 MG/DL — SIGNIFICANT CHANGE UP (ref 8.4–10.5)
CHLORIDE SERPL-SCNC: 96 MMOL/L — SIGNIFICANT CHANGE UP (ref 96–108)
CO2 SERPL-SCNC: 31 MMOL/L — HIGH (ref 22–29)
CREAT SERPL-MCNC: 6.58 MG/DL — HIGH (ref 0.5–1.3)
EGFR: 8 ML/MIN/1.73M2 — LOW
EOSINOPHIL # BLD AUTO: 0.13 K/UL — SIGNIFICANT CHANGE UP (ref 0–0.5)
EOSINOPHIL NFR BLD AUTO: 1.4 % — SIGNIFICANT CHANGE UP (ref 0–6)
GLUCOSE SERPL-MCNC: 245 MG/DL — HIGH (ref 70–99)
HCT VFR BLD CALC: 41.1 % — SIGNIFICANT CHANGE UP (ref 39–50)
HGB BLD-MCNC: 13.7 G/DL — SIGNIFICANT CHANGE UP (ref 13–17)
IMM GRANULOCYTES NFR BLD AUTO: 0.4 % — SIGNIFICANT CHANGE UP (ref 0–0.9)
INR BLD: 1.02 RATIO — SIGNIFICANT CHANGE UP (ref 0.85–1.18)
LYMPHOCYTES # BLD AUTO: 1.61 K/UL — SIGNIFICANT CHANGE UP (ref 1–3.3)
LYMPHOCYTES # BLD AUTO: 16.9 % — SIGNIFICANT CHANGE UP (ref 13–44)
MAGNESIUM SERPL-MCNC: 1.6 MG/DL — SIGNIFICANT CHANGE UP (ref 1.6–2.6)
MCHC RBC-ENTMCNC: 30.6 PG — SIGNIFICANT CHANGE UP (ref 27–34)
MCHC RBC-ENTMCNC: 33.3 GM/DL — SIGNIFICANT CHANGE UP (ref 32–36)
MCV RBC AUTO: 91.7 FL — SIGNIFICANT CHANGE UP (ref 80–100)
MONOCYTES # BLD AUTO: 0.69 K/UL — SIGNIFICANT CHANGE UP (ref 0–0.9)
MONOCYTES NFR BLD AUTO: 7.2 % — SIGNIFICANT CHANGE UP (ref 2–14)
NEUTROPHILS # BLD AUTO: 7 K/UL — SIGNIFICANT CHANGE UP (ref 1.8–7.4)
NEUTROPHILS NFR BLD AUTO: 73.6 % — SIGNIFICANT CHANGE UP (ref 43–77)
PLATELET # BLD AUTO: 301 K/UL — SIGNIFICANT CHANGE UP (ref 150–400)
POTASSIUM SERPL-MCNC: 4.1 MMOL/L — SIGNIFICANT CHANGE UP (ref 3.5–5.3)
POTASSIUM SERPL-SCNC: 4.1 MMOL/L — SIGNIFICANT CHANGE UP (ref 3.5–5.3)
PROT SERPL-MCNC: 6.7 G/DL — SIGNIFICANT CHANGE UP (ref 6.6–8.7)
PROTHROM AB SERPL-ACNC: 11.3 SEC — SIGNIFICANT CHANGE UP (ref 9.5–13)
RBC # BLD: 4.48 M/UL — SIGNIFICANT CHANGE UP (ref 4.2–5.8)
RBC # FLD: 13.4 % — SIGNIFICANT CHANGE UP (ref 10.3–14.5)
SODIUM SERPL-SCNC: 139 MMOL/L — SIGNIFICANT CHANGE UP (ref 135–145)
WBC # BLD: 9.52 K/UL — SIGNIFICANT CHANGE UP (ref 3.8–10.5)
WBC # FLD AUTO: 9.52 K/UL — SIGNIFICANT CHANGE UP (ref 3.8–10.5)

## 2024-02-27 PROCEDURE — 93005 ELECTROCARDIOGRAM TRACING: CPT

## 2024-02-27 PROCEDURE — 93010 ELECTROCARDIOGRAM REPORT: CPT

## 2024-02-27 PROCEDURE — G0463: CPT

## 2024-02-27 NOTE — H&P PST ADULT - NSICDXPASTMEDICALHX_GEN_ALL_CORE_FT
PAST MEDICAL HISTORY:  Acute on chronic congestive heart failure, unspecified congestive heart failure type     CAD (coronary artery disease)     Cerebrovascular accident (CVA)     DM (diabetes mellitus)     ESRD (end stage renal disease)     High cholesterol     HTN (hypertension)     Myocardial infarction (Coronary angiogram 2014)    PAD (peripheral artery disease)

## 2024-02-27 NOTE — H&P PST ADULT - ASSESSMENT
68 year old Nepali speaking male with PMHx significant for CAD s/p 3V CABG (2009- [facility] - LAD reverse SVG to postero- lateral reverse SCG to OM), Anxiety, Carotid artery disease, CHFpEF, HTN, PAD, HLD, T2DM, CVA with left sided weakness; CKD stage 5 on HD MWF, reft Brachiocephalic fistula creation and ligation of radiocephalic fistula with Dr. Zayas 11/2023 with complaints of claudication. Patient underwent b/l LE arterial duplex on 1/25/24 revealing: Right NANETTE: 0.80. Left NANETTE: NANETTE=1.22. Right LE : Moderate diffuse plaque throughout the right lower extremity with severe plaque in the posterior tibial artery. Elevated velocities noted in the distal SFA suggestive of 90-99% stenosis. Absent color flow and doppler signal in the mid and distal posterior tibial artery suggests total occlusion. Blunted monophasic waveforms noted distal to SFA stenosis.  Left LE: Moderate diffuse plaque throughout the left lower extremity with severe plaque noted in the posterior tibial artery. Elevated velocities in the proximal femoral artery and the tibio-peroneal trunk suggestive of 20-49% stenosis. Elevated velocities also noted in the mid anterior tibial artery suggestive of 50-74% stenosis. Absent color flow and doppler signal in the posterior tibial artery suggests total occlusion. Predominately multiphasic waveforms noted throughout. Patient presents to PST today on 2/27/24 for scheduled lower extremity angiogram with possible intervention to Right SFA on 3/7/24 with Dr. Jabari Mohan.    RISK ASSESSMENTS:  ASA:  MALLAMPATI:  Scr:  EGFR:  BRA:    PLAN:  -Patient is scheduled for LE Angiogram +/- Angioplasty +/- Stenting on 3/7/24 with Dr. Jabari Mohan  -Patient seen and examined in PST on 2/27/24   -Patient instructed to remain NPO after midnight on the night prior to procedure  -EKG and labs reviewed  -Patient instructed to take aspirin and plavix on morning of procedure  -Consent to be obtained by attending cardiologist on day of procedure.  68 year old Polish speaking male with PMHx significant for CAD s/p 3V CABG (2009- [facility] - LAD reverse SVG to postero- lateral reverse SCG to OM), Anxiety, Carotid artery disease, CHFpEF, HTN, PAD, HLD, T2DM, CVA with left sided weakness; CKD stage 5 on HD MWF, reft Brachiocephalic fistula creation and ligation of radiocephalic fistula with Dr. Zayas 11/2023 with complaints of claudication. Patient underwent b/l LE arterial duplex on 1/25/24 revealing: Right NANETTE: 0.80. Left NANETTE: NANETTE=1.22. Right LE : Moderate diffuse plaque throughout the right lower extremity with severe plaque in the posterior tibial artery. Elevated velocities noted in the distal SFA suggestive of 90-99% stenosis. Absent color flow and doppler signal in the mid and distal posterior tibial artery suggests total occlusion. Blunted monophasic waveforms noted distal to SFA stenosis.  Left LE: Moderate diffuse plaque throughout the left lower extremity with severe plaque noted in the posterior tibial artery. Elevated velocities in the proximal femoral artery and the tibio-peroneal trunk suggestive of 20-49% stenosis. Elevated velocities also noted in the mid anterior tibial artery suggestive of 50-74% stenosis. Absent color flow and doppler signal in the posterior tibial artery suggests total occlusion. Predominately multiphasic waveforms noted throughout. Patient presents to PST today on 2/27/24 for scheduled lower extremity angiogram with possible intervention to Right SFA on 3/7/24 with Dr. Jabari Mohan.    RISK ASSESSMENTS:  ASA:3  MALLAMPATI: 2  Scr:  EGFR:  BRA:    PLAN:  -Patient is scheduled for LE Angiogram +/- Angioplasty +/- Stenting on 3/7/24 with Dr. Jabari Mohan  -Patient seen and examined in PST on 2/27/24   -Patient instructed to remain NPO after midnight on the night prior to procedure  -EKG and labs reviewed  -Patient instructed to take aspirin and plavix on morning of procedure  -Consent to be obtained by attending cardiologist on day of procedure.  68 year old [place] speaking male with PMHx significant for former smoker; CAD s/p 3V CABG (2009- [facility] - LAD reverse SVG to postero- lateral reverse SCG to OM), Anxiety, Carotid artery disease, CHFpEF, HTN, PAD, HLD, T2DM, CVA with left sided weakness; CKD stage 5 on HD MWF, reft Brachiocephalic fistula creation and ligation of radiocephalic fistula with Dr. Zayas 11/2023 with complaints of claudication. Patient underwent b/l LE arterial duplex on 1/25/24 revealing: Right NANETTE: 0.80. Left NANETTE: NANETTE=1.22. Right LE : Moderate diffuse plaque throughout the right lower extremity with severe plaque in the posterior tibial artery. Elevated velocities noted in the distal SFA suggestive of 90-99% stenosis. Absent color flow and doppler signal in the mid and distal posterior tibial artery suggests total occlusion. Blunted monophasic waveforms noted distal to SFA stenosis.  Left LE: Moderate diffuse plaque throughout the left lower extremity with severe plaque noted in the posterior tibial artery. Elevated velocities in the proximal femoral artery and the tibio-peroneal trunk suggestive of 20-49% stenosis. Elevated velocities also noted in the mid anterior tibial artery suggestive of 50-74% stenosis. Absent color flow and doppler signal in the posterior tibial artery suggests total occlusion. Predominately multiphasic waveforms noted throughout. Patient presents to PST today on 2/27/24 for scheduled lower extremity angiogram with possible intervention to Right SFA on 3/7/24 with Dr. Jabari Mohan.    RISK ASSESSMENTS:  ASA:3  MALLAMPATI: 2  Scr:  EGFR:  BRA:    PLAN:  -Patient is scheduled for LE Angiogram +/- Angioplasty +/- Stenting on 3/7/24 with Dr. Jabari Mohan  -Patient seen and examined in PST on 2/27/24   -Patient instructed to remain NPO after midnight on the night prior to procedure. Ok for small sip of water in AM with medications  -EKG and labs reviewed  -Patient instructed to take aspirin and plavix on morning of procedure  -Consent to be obtained by attending cardiologist on day of procedure.   - [facility] ID [id number] used for interpretation 69 year old Italian speaking male with PMHx significant for former smoker; CAD s/p 3V CABG (2009- [facility] - LAD reverse SVG to postero- lateral reverse SCG to OM), Anxiety, Carotid artery disease, CHFpEF, HTN, PAD, HLD, T2DM, CVA with left sided weakness; CKD stage 5 on HD MWF, reft Brachiocephalic fistula creation and ligation of radiocephalic fistula with Dr. Zayas 11/2023 with complaints of claudication. Patient underwent b/l LE arterial duplex on 1/25/24 revealing: Right NANETTE: 0.80. Left NANETTE: NANETTE=1.22. Right LE : Moderate diffuse plaque throughout the right lower extremity with severe plaque in the posterior tibial artery. Elevated velocities noted in the distal SFA suggestive of 90-99% stenosis. Absent color flow and doppler signal in the mid and distal posterior tibial artery suggests total occlusion. Blunted monophasic waveforms noted distal to SFA stenosis.  Left LE: Moderate diffuse plaque throughout the left lower extremity with severe plaque noted in the posterior tibial artery. Elevated velocities in the proximal femoral artery and the tibio-peroneal trunk suggestive of 20-49% stenosis. Elevated velocities also noted in the mid anterior tibial artery suggestive of 50-74% stenosis. Absent color flow and doppler signal in the posterior tibial artery suggests total occlusion. Predominately multiphasic waveforms noted throughout. Patient presents to PST today on 2/27/24 for scheduled lower extremity angiogram with possible intervention to Right SFA on 3/7/24 with Dr. Jabari Mohan.    RISK ASSESSMENTS:  ASA:3  MALLAMPATI: 2  Scr:  EGFR:  BRA:    PLAN:  -Patient is scheduled for LE Angiogram +/- Angioplasty +/- Stenting on 3/7/24 with Dr. Jabari Mohan  -Patient seen and examined in PST on 2/27/24   -Patient instructed to remain NPO after midnight on the night prior to procedure. Ok for small sip of water in AM with medications  -EKG and labs reviewed  -Patient instructed to take aspirin and plavix on morning of procedure  -Consent to be obtained by attending cardiologist on day of procedure.   - [facility] ID [id number] used for interpretation 69 year old Portuguese speaking male with PMHx significant for former smoker; CAD s/p 3V CABG (2009- [facility] - LAD reverse SVG to postero- lateral reverse SCG to OM), Anxiety, Carotid artery disease, CHFpEF, HTN, PAD, HLD, T2DM, CVA with left sided weakness; CKD stage 5 on HD MWF, reft Brachiocephalic fistula creation and ligation of radiocephalic fistula with Dr. Zayas 11/2023 with complaints of claudication. Patient underwent b/l LE arterial duplex on 1/25/24 revealing: Right NANETTE: 0.80. Left NANETTE: NANETTE=1.22. Right LE : Moderate diffuse plaque throughout the right lower extremity with severe plaque in the posterior tibial artery. Elevated velocities noted in the distal SFA suggestive of 90-99% stenosis. Absent color flow and doppler signal in the mid and distal posterior tibial artery suggests total occlusion. Blunted monophasic waveforms noted distal to SFA stenosis.  Left LE: Moderate diffuse plaque throughout the left lower extremity with severe plaque noted in the posterior tibial artery. Elevated velocities in the proximal femoral artery and the tibio-peroneal trunk suggestive of 20-49% stenosis. Elevated velocities also noted in the mid anterior tibial artery suggestive of 50-74% stenosis. Absent color flow and doppler signal in the posterior tibial artery suggests total occlusion. Predominately multiphasic waveforms noted throughout. Patient presents to PST today on 2/27/24 for scheduled lower extremity angiogram with possible intervention to Right SFA on 3/7/24 with Dr. Jabari Mohan.    RISK ASSESSMENTS:  ASA:3  MALLAMPATI: 2  Scr: 6.58  EGFR: 8  BRA: 1.4%    PLAN:  -Patient is scheduled for LE Angiogram +/- Angioplasty +/- Stenting on 3/7/24 with Dr. Jabari Mohan  -Patient seen and examined in PST on 2/27/24   -Patient instructed to remain NPO after midnight on the night prior to procedure. Ok for small sip of water in AM with medications  -EKG and labs reviewed  -Patient instructed to take aspirin and plavix on morning of procedure  -Consent to be obtained by attending cardiologist on day of procedure.   - [facility] ID [id number] used for interpretation

## 2024-02-27 NOTE — H&P PST ADULT - ADDITIONAL PE
b/l LE acyanotic; warm to touch; motor/sensory function intact; +1 palpable b/l femoral pulses; +doppler left DP/ PT pulse; right + doppler right DP/pT pulse but fainter audibly with doppler     RCW permacath c/d/i without active drainage/ bleeding or hematoma  LUE fistula with some ecchymosis; +bruit/+thrill

## 2024-02-27 NOTE — H&P PST ADULT - NEUROLOGICAL
details… slurred speech; slight weaker LUE 4+/5 compared to RUE/cranial nerves II-XII intact/sensation intact

## 2024-02-27 NOTE — H&P PST ADULT - HISTORY OF PRESENT ILLNESS
68 year old Kiswahili speaking male with PMHx significant for CAD s/p 3V CABG (2009- [facility] - LAD reverse SVG to postero- lateral reverse SCG to OM), Anxiety, Carotid artery disease, CHFpEF, HTN, PAD, HLD, T2DM, CVA with left sided weakness; CKD stage 5 on HD MWF, reft Brachiocephalic fistula creation and ligation of radiocephalic fistula with Dr. Zayas 11/2023 with complaints of claudication. Patient underwent b/l LE arterial duplex on 1/25/24 revealing: Right NANETTE: 0.80. Left NANETTE: NANETTE=1.22. Right LE : Moderate diffuse plaque throughout the right lower extremity with severe plaque in the posterior tibial artery. Elevated velocities noted in the distal SFA suggestive of 90-99% stenosis. Absent color flow and doppler signal in the mid and distal posterior tibial artery suggests total occlusion. Blunted monophasic waveforms noted distal to SFA stenosis.  Left LE: Moderate diffuse plaque throughout the left lower extremity with severe plaque noted in the posterior tibial artery. Elevated velocities in the proximal femoral artery and the tibio-peroneal trunk suggestive of 20-49% stenosis. Elevated velocities also noted in the mid anterior tibial artery suggestive of 50-74% stenosis. Absent color flow and doppler signal in the posterior tibial artery suggests total occlusion. Predominately multiphasic waveforms noted throughout. Patient presents to PST today on 2/27/24 for scheduled lower extremity angiogram with possible intervention to Right SFA on 3/7/24 with Dr. Jabari Mohan.      Risk Factors for PAD       Age: 68       DM: Yes       Smoking History: Yes       Hyperlipidemia: Yes       Hypertension: Yes       Family History of PAD: N/A       Known Atherosclerotic Disease (coronary, carotid, subclavian, renal, mesenteric, AAA):  Coronary Artery Disease; Carotid Artery Disease; ESRD    Subjective Complaints:        Claudication Arvind Class:        Impaired Walking Function: N/A       Ischemic Rest Pain: N/A       Other Non-Joint Related Exertional Lower Extremity Symptoms (not typical of claudication): N/A    Objective Findings:        Lower Extremity Pulses: Unable to palpate DP pulses, doppler pulses noted.       Nonhealing Lower Extremity Wound: N/A       Lower Extremity Gangrene: N/A       Vascular Bruit: N/A       Other Suggestive Lower Extremity Findings (elevation pallor, dependent rubor): N/A    Vascular Testing:        NANETTE (Normal/Borderline/Abnormal/Noncompressable):  b/l LE arterial duplex on 1/25/24 revealing: Right NANETTE: 0.80. Left NANETTE: NANETTE=1.22. Right LE : Moderate diffuse plaque throughout the right lower extremity with severe plaque in the posterior tibial artery. Elevated velocities noted in the distal SFA suggestive of 90-99% stenosis. Absent color flow and doppler signal in the mid and distal posterior tibial artery suggests total occlusion. Blunted monophasic waveforms noted distal to SFA stenosis.  Left LE: Moderate diffuse plaque throughout the left lower extremity with severe plaque noted in the posterior tibial artery. Elevated velocities in the proximal femoral artery and the tibio-peroneal trunk suggestive of 20-49% stenosis. Elevated velocities also noted in the mid anterior tibial artery suggestive of 50-74% stenosis. Absent color flow and doppler signal in the posterior tibial artery suggests total occlusion. Predominately multiphasic waveforms noted throughout.        CTA/MRA:     Medical Management:       Antiplatelets: aspirin/ plavix       Cilostazol: n/a       Statin: atorvastatin    Arvind Claudication Classification:        I: Asymptomatic       IIa: Walking > 200 meters (2.5 blocks)       IIb: Walking < 200 meters (2.5 blocks)       III: Rest/nocturnal pain       IV: Necrosis/gangrene     68 year old Georgian speaking male with PMHx significant for former smoker; CAD s/p 3V CABG (2014- [facility] - LAD reverse SVG to postero- lateral reverse SCG to OM), Anxiety, Carotid artery disease, CHFpEF, HTN, PAD, HLD, T2DM, CVA with left sided weakness (3/2022); CKD stage 5 on HD MWF , [facility] permacath; left Brachiocephalic fistula creation and ligation of radiocephalic fistula with Dr. Zayas 11/2023; hernia surgery with complaints of feeling off balance x1 year since starting dialysis and after his stroke. He reports during cramping in the right leg calf during HD. He reports cramping in b/l calves at night.  He reports feeling tired after working short distances. He reports that he gets a squeezing sensation in his right calf when he walks more than 1 block. Patient underwent b/l LE arterial duplex on 1/25/24 revealing: Right NANETTE: 0.80. Left NANETTE: NANETTE=1.22. Right LE : Moderate diffuse plaque throughout the right lower extremity with severe plaque in the posterior tibial artery. Elevated velocities noted in the distal SFA suggestive of 90-99% stenosis. Absent color flow and doppler signal in the mid and distal posterior tibial artery suggests total occlusion. Blunted monophasic waveforms noted distal to SFA stenosis.  Left LE: Moderate diffuse plaque throughout the left lower extremity with severe plaque noted in the posterior tibial artery. Elevated velocities in the proximal femoral artery and the tibio-peroneal trunk suggestive of 20-49% stenosis. Elevated velocities also noted in the mid anterior tibial artery suggestive of 50-74% stenosis. Absent color flow and doppler signal in the posterior tibial artery suggests total occlusion. Predominately multiphasic waveforms noted throughout.   He denies chest pain, chest pressure, shortness of breath, palpitations, dizziness, falling down, syncope, indigestion, orthopnea, or leg edema.  Patient presents to PST today on 2/27/24 for scheduled lower extremity angiogram with possible intervention to Right SFA on 3/7/24 with Dr. Jabari Mohan.      Risk Factors for PAD       Age: 68       DM: Yes       Smoking History: Yes       Hyperlipidemia: Yes       Hypertension: Yes       Family History of PAD: N/A       Known Atherosclerotic Disease (coronary, carotid, subclavian, renal, mesenteric, AAA):  Coronary Artery Disease; Carotid Artery Disease; ESRD    Subjective Complaints:        Claudication Arvind Class: IIb       Impaired Walking Function: Yes       Ischemic Rest Pain: Yes       Other Non-Joint Related Exertional Lower Extremity Symptoms (not typical of claudication): N/A    Objective Findings:        Lower Extremity Pulses: Unable to palpate DP pulses, doppler pulses noted.       Nonhealing Lower Extremity Wound: N/A       Lower Extremity Gangrene: N/A       Vascular Bruit: N/A       Other Suggestive Lower Extremity Findings (elevation pallor, dependent rubor): N/A    Vascular Testing:        NANETTE (Normal/Borderline/Abnormal/Noncompressable):  b/l LE arterial duplex on 1/25/24 revealing: Right NANETTE: 0.80. Left NANETTE: NANETTE=1.22. Right LE : Moderate diffuse plaque throughout the right lower extremity with severe plaque in the posterior tibial artery. Elevated velocities noted in the distal SFA suggestive of 90-99% stenosis. Absent color flow and doppler signal in the mid and distal posterior tibial artery suggests total occlusion. Blunted monophasic waveforms noted distal to SFA stenosis.  Left LE: Moderate diffuse plaque throughout the left lower extremity with severe plaque noted in the posterior tibial artery. Elevated velocities in the proximal femoral artery and the tibio-peroneal trunk suggestive of 20-49% stenosis. Elevated velocities also noted in the mid anterior tibial artery suggestive of 50-74% stenosis. Absent color flow and doppler signal in the posterior tibial artery suggests total occlusion. Predominately multiphasic waveforms noted throughout.        CTA/MRA:     Medical Management:       Antiplatelets: aspirin/ plavix       Cilostazol: n/a       Statin: atorvastatin    Arvind Claudication Classification:        IIb: Walking < 200 meters (2.5 blocks)     68 year old Icelandic speaking male with PMHx significant for former smoker; CAD s/p 3V CABG (2014- [facility] - LAD reverse SVG to postero- lateral reverse SCG to OM), Anxiety, Carotid artery disease, CHFpEF, HTN, PAD, HLD, T2DM, CVA with left sided weakness (3/2022); CKD stage 5 on HD MWF , [facility] permacath; left Brachiocephalic fistula creation and ligation of radiocephalic fistula with Dr. Zayas 11/2023; hernia surgery with complaints of feeling off balance x1 year since starting dialysis and after his stroke. He reports during cramping in the right leg calf during HD. He reports cramping in b/l calves at night.  He reports feeling tired after working short distances. He reports that he gets a squeezing sensation in his right calf when he walks more than 1 block which usually improves during rest. Patient underwent b/l LE arterial duplex on 1/25/24 revealing: Right NANETTE: 0.80. Left NANETTE: NANETTE=1.22. Right LE : Moderate diffuse plaque throughout the right lower extremity with severe plaque in the posterior tibial artery. Elevated velocities noted in the distal SFA suggestive of 90-99% stenosis. Absent color flow and doppler signal in the mid and distal posterior tibial artery suggests total occlusion. Blunted monophasic waveforms noted distal to SFA stenosis.  Left LE: Moderate diffuse plaque throughout the left lower extremity with severe plaque noted in the posterior tibial artery. Elevated velocities in the proximal femoral artery and the tibio-peroneal trunk suggestive of 20-49% stenosis. Elevated velocities also noted in the mid anterior tibial artery suggestive of 50-74% stenosis. Absent color flow and doppler signal in the posterior tibial artery suggests total occlusion. Predominately multiphasic waveforms noted throughout.   He denies chest pain, chest pressure, shortness of breath, palpitations, dizziness, falling down, syncope, indigestion, orthopnea, or leg edema.  Patient presents to PST today on 2/27/24 for scheduled lower extremity angiogram with possible intervention to Right SFA on 3/7/24 with Dr. Jabari Mohan.      Risk Factors for PAD       Age: 68       DM: Yes       Smoking History: Yes       Hyperlipidemia: Yes       Hypertension: Yes       Family History of PAD: N/A       Known Atherosclerotic Disease (coronary, carotid, subclavian, renal, mesenteric, AAA):  Coronary Artery Disease; Carotid Artery Disease; ESRD    Subjective Complaints:        Claudication Arvind Class: IIb       Impaired Walking Function: Yes       Ischemic Rest Pain: Yes       Other Non-Joint Related Exertional Lower Extremity Symptoms (not typical of claudication): N/A    Objective Findings:        Lower Extremity Pulses: b/l palpable femoral pulses; +doppler b/l DP/PT pulses although more faint on right side       Nonhealing Lower Extremity Wound: N/A       Lower Extremity Gangrene: N/A       Vascular Bruit: N/A       Other Suggestive Lower Extremity Findings (elevation pallor, dependent rubor): N/A    Vascular Testing:        NANETTE (Normal/Borderline/Abnormal/Noncompressable):  b/l LE arterial duplex on 1/25/24 revealing: Right NANETTE: 0.80. Left NANETTE: NANETTE=1.22. Right LE : Moderate diffuse plaque throughout the right lower extremity with severe plaque in the posterior tibial artery. Elevated velocities noted in the distal SFA suggestive of 90-99% stenosis. Absent color flow and doppler signal in the mid and distal posterior tibial artery suggests total occlusion. Blunted monophasic waveforms noted distal to SFA stenosis.  Left LE: Moderate diffuse plaque throughout the left lower extremity with severe plaque noted in the posterior tibial artery. Elevated velocities in the proximal femoral artery and the tibio-peroneal trunk suggestive of 20-49% stenosis. Elevated velocities also noted in the mid anterior tibial artery suggestive of 50-74% stenosis. Absent color flow and doppler signal in the posterior tibial artery suggests total occlusion. Predominately multiphasic waveforms noted throughout.        CTA/MRA:     Medical Management:       Antiplatelets: aspirin/ plavix       Cilostazol: n/a       Statin: atorvastatin    Arvind Claudication Classification:        IIb: Walking < 200 meters (2.5 blocks)     69 year old Urdu speaking male with PMHx significant for former smoker; CAD s/p 3V CABG (2014- [facility] - LAD reverse SVG to postero- lateral reverse SCG to OM), Anxiety, Carotid artery disease, CHFpEF, HTN, PAD, HLD, T2DM, CVA with left sided weakness (3/2022); CKD stage 5 on HD MWF , [facility] permacath; left Brachiocephalic fistula creation and ligation of radiocephalic fistula with Dr. Zayas 11/2023; hernia surgery with complaints of feeling off balance x1 year since starting dialysis and after his stroke. He reports during cramping in the right leg calf during HD. He reports cramping in b/l calves at night.  He reports feeling tired after working short distances. He reports that he gets a squeezing sensation in his right calf when he walks more than 1 block which usually improves during rest. Patient underwent b/l LE arterial duplex on 1/25/24 revealing: Right NANETTE: 0.80. Left NANETTE: NANETTE=1.22. Right LE : Moderate diffuse plaque throughout the right lower extremity with severe plaque in the posterior tibial artery. Elevated velocities noted in the distal SFA suggestive of 90-99% stenosis. Absent color flow and doppler signal in the mid and distal posterior tibial artery suggests total occlusion. Blunted monophasic waveforms noted distal to SFA stenosis.  Left LE: Moderate diffuse plaque throughout the left lower extremity with severe plaque noted in the posterior tibial artery. Elevated velocities in the proximal femoral artery and the tibio-peroneal trunk suggestive of 20-49% stenosis. Elevated velocities also noted in the mid anterior tibial artery suggestive of 50-74% stenosis. Absent color flow and doppler signal in the posterior tibial artery suggests total occlusion. Predominately multiphasic waveforms noted throughout.   He denies chest pain, chest pressure, shortness of breath, palpitations, dizziness, falling down, syncope, indigestion, orthopnea, or leg edema.  Patient presents to PST today on 2/27/24 for scheduled lower extremity angiogram with possible intervention to Right SFA on 3/7/24 with Dr. Jabari Mohan.      Risk Factors for PAD       Age: 68       DM: Yes       Smoking History: Yes       Hyperlipidemia: Yes       Hypertension: Yes       Family History of PAD: N/A       Known Atherosclerotic Disease (coronary, carotid, subclavian, renal, mesenteric, AAA):  Coronary Artery Disease; Carotid Artery Disease; ESRD    Subjective Complaints:        Claudication Arvind Class: IIb       Impaired Walking Function: Yes       Ischemic Rest Pain: Yes       Other Non-Joint Related Exertional Lower Extremity Symptoms (not typical of claudication): N/A    Objective Findings:        Lower Extremity Pulses: b/l palpable femoral pulses; +doppler b/l DP/PT pulses although more faint on right side       Nonhealing Lower Extremity Wound: N/A       Lower Extremity Gangrene: N/A       Vascular Bruit: N/A       Other Suggestive Lower Extremity Findings (elevation pallor, dependent rubor): N/A    Vascular Testing:        NANETTE (Normal/Borderline/Abnormal/Noncompressable):  b/l LE arterial duplex on 1/25/24 revealing: Right NANETTE: 0.80. Left NANETTE: NANETTE=1.22. Right LE : Moderate diffuse plaque throughout the right lower extremity with severe plaque in the posterior tibial artery. Elevated velocities noted in the distal SFA suggestive of 90-99% stenosis. Absent color flow and doppler signal in the mid and distal posterior tibial artery suggests total occlusion. Blunted monophasic waveforms noted distal to SFA stenosis.  Left LE: Moderate diffuse plaque throughout the left lower extremity with severe plaque noted in the posterior tibial artery. Elevated velocities in the proximal femoral artery and the tibio-peroneal trunk suggestive of 20-49% stenosis. Elevated velocities also noted in the mid anterior tibial artery suggestive of 50-74% stenosis. Absent color flow and doppler signal in the posterior tibial artery suggests total occlusion. Predominately multiphasic waveforms noted throughout.        CTA/MRA:     Medical Management:       Antiplatelets: aspirin/ plavix       Cilostazol: n/a       Statin: atorvastatin    Arvind Claudication Classification:        IIb: Walking < 200 meters (2.5 blocks)     69 year old Czech speaking male with PMHx significant for former smoker; CAD s/p 3V CABG (2014- [facility] - LAD reverse SVG to postero- lateral reverse SCG to OM), Anxiety, Carotid artery disease, CHFpEF, HTN, PAD, HLD, T2DM, CVA with left sided weakness (3/2022); CKD stage 5 on HD MWF , [facility] permacath; left Brachiocephalic fistula creation and ligation of radiocephalic fistula with Dr. Zayas 11/2023; hernia surgery with complaints of feeling off balance x1 year since starting dialysis and after his stroke. He reports during cramping in the right leg calf during HD. He reports cramping in b/l calves at night.  He reports feeling tired after working short distances. He reports that he gets a squeezing sensation in his right calf when he walks more than 1 block which usually improves during rest. Patient underwent b/l LE arterial duplex on 1/25/24 revealing: Right NANETTE: 0.80. Left NANETTE: NANETTE=1.22. Right LE : Moderate diffuse plaque throughout the right lower extremity with severe plaque in the posterior tibial artery. Elevated velocities noted in the distal SFA suggestive of 90-99% stenosis. Absent color flow and doppler signal in the mid and distal posterior tibial artery suggests total occlusion. Blunted monophasic waveforms noted distal to SFA stenosis.  Left LE: Moderate diffuse plaque throughout the left lower extremity with severe plaque noted in the posterior tibial artery. Elevated velocities in the proximal femoral artery and the tibio-peroneal trunk suggestive of 20-49% stenosis. Elevated velocities also noted in the mid anterior tibial artery suggestive of 50-74% stenosis. Absent color flow and doppler signal in the posterior tibial artery suggests total occlusion. Predominately multiphasic waveforms noted throughout.   He denies chest pain, chest pressure, shortness of breath, palpitations, dizziness, falling down, syncope, indigestion, orthopnea, or leg edema.  Patient presents to PST today on 2/27/24 for scheduled lower extremity angiogram with possible intervention to Right SFA on 3/7/24 with Dr. Jabari Mohan.      Risk Factors for PAD       Age: 68       DM: Yes       Smoking History: Yes       Hyperlipidemia: Yes       Hypertension: Yes       Family History of PAD: N/A       Known Atherosclerotic Disease (coronary, carotid, subclavian, renal, mesenteric, AAA):  Coronary Artery Disease; Carotid Artery Disease; ESRD    Subjective Complaints:        Claudication Arvind Class: IIb       Impaired Walking Function: Yes       Ischemic Rest Pain: Yes       Other Non-Joint Related Exertional Lower Extremity Symptoms (not typical of claudication): N/A    Objective Findings:        Lower Extremity Pulses: b/l palpable femoral pulses; +doppler b/l DP/PT pulses although more faint on right side       Nonhealing Lower Extremity Wound: N/A       Lower Extremity Gangrene: N/A       Vascular Bruit: N/A       Other Suggestive Lower Extremity Findings (elevation pallor, dependent rubor): N/A    Vascular Testing:        NANETTE (Normal/Borderline/Abnormal/Noncompressable):  b/l LE arterial duplex on 1/25/24 revealing: Right NANETTE: 0.80. Left NANETTE: NANETTE=1.22. Right LE : Moderate diffuse plaque throughout the right lower extremity with severe plaque in the posterior tibial artery. Elevated velocities noted in the distal SFA suggestive of 90-99% stenosis. Absent color flow and doppler signal in the mid and distal posterior tibial artery suggests total occlusion. Blunted monophasic waveforms noted distal to SFA stenosis.  Left LE: Moderate diffuse plaque throughout the left lower extremity with severe plaque noted in the posterior tibial artery. Elevated velocities in the proximal femoral artery and the tibio-peroneal trunk suggestive of 20-49% stenosis. Elevated velocities also noted in the mid anterior tibial artery suggestive of 50-74% stenosis. Absent color flow and doppler signal in the posterior tibial artery suggests total occlusion. Predominately multiphasic waveforms noted throughout.        CTA/MRA:     Medical Management:       Antiplatelets: aspirin/ plavix       Cilostazol: n/a       Statin: atorvastatin    Arvind Claudication Classification:        IIb: Walking < 200 meters (2.5 blocks)    LABS:                        13.7   9.52  )-----------( 301      ( 27 Feb 2024 10:30 )             41.1     02-27    139  |  96  |  36.1<H>  ----------------------------<  245<H>  4.1   |  31.0<H>  |  6.58<H>    Ca    9.3      27 Feb 2024 10:30  Mg     1.6     02-27    TPro  6.7  /  Alb  3.7  /  TBili  0.5  /  DBili  x   /  AST  21  /  ALT  16  /  AlkPhos  99  02-27    PT/INR - ( 27 Feb 2024 10:30 )   PT: 11.3 sec;   INR: 1.02 ratio         PTT - ( 27 Feb 2024 10:30 )  PTT:30.1 sec

## 2024-02-27 NOTE — H&P PST ADULT - NSICDXPASTSURGICALHX_GEN_ALL_CORE_FT
PAST SURGICAL HISTORY:  S/P arteriovenous (AV) fistula repair     S/P coronary artery bypass with three autogenous grafts

## 2024-03-07 ENCOUNTER — INPATIENT (INPATIENT)
Facility: HOSPITAL | Age: 69
LOS: 0 days | Discharge: HOME | DRG: 278 | End: 2024-03-08
Attending: INTERNAL MEDICINE | Admitting: INTERNAL MEDICINE
Payer: MEDICARE

## 2024-03-07 ENCOUNTER — TRANSCRIPTION ENCOUNTER (OUTPATIENT)
Age: 69
End: 2024-03-07

## 2024-03-07 VITALS
DIASTOLIC BLOOD PRESSURE: 61 MMHG | SYSTOLIC BLOOD PRESSURE: 143 MMHG | HEART RATE: 70 BPM | OXYGEN SATURATION: 93 % | TEMPERATURE: 98 F | RESPIRATION RATE: 16 BRPM

## 2024-03-07 DIAGNOSIS — Z98.890 OTHER SPECIFIED POSTPROCEDURAL STATES: Chronic | ICD-10-CM

## 2024-03-07 DIAGNOSIS — I73.9 PERIPHERAL VASCULAR DISEASE, UNSPECIFIED: ICD-10-CM

## 2024-03-07 DIAGNOSIS — Z95.1 PRESENCE OF AORTOCORONARY BYPASS GRAFT: Chronic | ICD-10-CM

## 2024-03-07 LAB
ANION GAP SERPL CALC-SCNC: 15 MMOL/L — SIGNIFICANT CHANGE UP (ref 5–17)
BUN SERPL-MCNC: 33.3 MG/DL — HIGH (ref 8–20)
CALCIUM SERPL-MCNC: 9.6 MG/DL — SIGNIFICANT CHANGE UP (ref 8.4–10.5)
CHLORIDE SERPL-SCNC: 95 MMOL/L — LOW (ref 96–108)
CO2 SERPL-SCNC: 30 MMOL/L — HIGH (ref 22–29)
CREAT SERPL-MCNC: 5.82 MG/DL — HIGH (ref 0.5–1.3)
EGFR: 10 ML/MIN/1.73M2 — LOW
GLUCOSE BLDC GLUCOMTR-MCNC: 129 MG/DL — HIGH (ref 70–99)
GLUCOSE BLDC GLUCOMTR-MCNC: 295 MG/DL — HIGH (ref 70–99)
GLUCOSE SERPL-MCNC: 172 MG/DL — HIGH (ref 70–99)
MAGNESIUM SERPL-MCNC: 1.7 MG/DL — SIGNIFICANT CHANGE UP (ref 1.6–2.6)
POTASSIUM SERPL-MCNC: 3.4 MMOL/L — LOW (ref 3.5–5.3)
POTASSIUM SERPL-SCNC: 3.4 MMOL/L — LOW (ref 3.5–5.3)
SODIUM SERPL-SCNC: 139 MMOL/L — SIGNIFICANT CHANGE UP (ref 135–145)

## 2024-03-07 RX ORDER — AMLODIPINE BESYLATE 2.5 MG/1
1 TABLET ORAL
Qty: 0 | Refills: 0 | DISCHARGE

## 2024-03-07 RX ORDER — DEXTROSE 50 % IN WATER 50 %
25 SYRINGE (ML) INTRAVENOUS ONCE
Refills: 0 | Status: DISCONTINUED | OUTPATIENT
Start: 2024-03-07 | End: 2024-03-08

## 2024-03-07 RX ORDER — SODIUM CHLORIDE 9 MG/ML
1000 INJECTION, SOLUTION INTRAVENOUS
Refills: 0 | Status: DISCONTINUED | OUTPATIENT
Start: 2024-03-07 | End: 2024-03-08

## 2024-03-07 RX ORDER — CARVEDILOL PHOSPHATE 80 MG/1
25 CAPSULE, EXTENDED RELEASE ORAL EVERY 12 HOURS
Refills: 0 | Status: DISCONTINUED | OUTPATIENT
Start: 2024-03-07 | End: 2024-03-08

## 2024-03-07 RX ORDER — ISOSORBIDE DINITRATE 5 MG/1
20 TABLET ORAL THREE TIMES A DAY
Refills: 0 | Status: DISCONTINUED | OUTPATIENT
Start: 2024-03-07 | End: 2024-03-08

## 2024-03-07 RX ORDER — AMLODIPINE BESYLATE 2.5 MG/1
10 TABLET ORAL DAILY
Refills: 0 | Status: DISCONTINUED | OUTPATIENT
Start: 2024-03-07 | End: 2024-03-08

## 2024-03-07 RX ORDER — CALCITRIOL 0.5 UG/1
1 CAPSULE ORAL
Qty: 0 | Refills: 0 | DISCHARGE

## 2024-03-07 RX ORDER — ASPIRIN/CALCIUM CARB/MAGNESIUM 324 MG
81 TABLET ORAL DAILY
Refills: 0 | Status: DISCONTINUED | OUTPATIENT
Start: 2024-03-07 | End: 2024-03-08

## 2024-03-07 RX ORDER — CHLORHEXIDINE GLUCONATE 213 G/1000ML
1 SOLUTION TOPICAL ONCE
Refills: 0 | Status: DISCONTINUED | OUTPATIENT
Start: 2024-03-07 | End: 2024-03-08

## 2024-03-07 RX ORDER — INSULIN LISPRO 100/ML
VIAL (ML) SUBCUTANEOUS AT BEDTIME
Refills: 0 | Status: DISCONTINUED | OUTPATIENT
Start: 2024-03-07 | End: 2024-03-08

## 2024-03-07 RX ORDER — CALCITRIOL 0.5 UG/1
0.25 CAPSULE ORAL DAILY
Refills: 0 | Status: DISCONTINUED | OUTPATIENT
Start: 2024-03-07 | End: 2024-03-08

## 2024-03-07 RX ORDER — INSULIN DEGLUDEC 100 U/ML
10 INJECTION, SOLUTION SUBCUTANEOUS
Qty: 0 | Refills: 0 | DISCHARGE

## 2024-03-07 RX ORDER — CLOPIDOGREL BISULFATE 75 MG/1
75 TABLET, FILM COATED ORAL DAILY
Refills: 0 | Status: DISCONTINUED | OUTPATIENT
Start: 2024-03-08 | End: 2024-03-08

## 2024-03-07 RX ORDER — PANTOPRAZOLE SODIUM 20 MG/1
40 TABLET, DELAYED RELEASE ORAL
Refills: 0 | Status: DISCONTINUED | OUTPATIENT
Start: 2024-03-07 | End: 2024-03-08

## 2024-03-07 RX ORDER — ATORVASTATIN CALCIUM 80 MG/1
80 TABLET, FILM COATED ORAL AT BEDTIME
Refills: 0 | Status: DISCONTINUED | OUTPATIENT
Start: 2024-03-07 | End: 2024-03-08

## 2024-03-07 RX ORDER — DEXTROSE 50 % IN WATER 50 %
12.5 SYRINGE (ML) INTRAVENOUS ONCE
Refills: 0 | Status: DISCONTINUED | OUTPATIENT
Start: 2024-03-07 | End: 2024-03-08

## 2024-03-07 RX ORDER — CLOPIDOGREL BISULFATE 75 MG/1
1 TABLET, FILM COATED ORAL
Refills: 0 | DISCHARGE

## 2024-03-07 RX ORDER — GLUCAGON INJECTION, SOLUTION 0.5 MG/.1ML
1 INJECTION, SOLUTION SUBCUTANEOUS ONCE
Refills: 0 | Status: DISCONTINUED | OUTPATIENT
Start: 2024-03-07 | End: 2024-03-08

## 2024-03-07 RX ORDER — DEXTROSE 50 % IN WATER 50 %
15 SYRINGE (ML) INTRAVENOUS ONCE
Refills: 0 | Status: DISCONTINUED | OUTPATIENT
Start: 2024-03-07 | End: 2024-03-08

## 2024-03-07 RX ADMIN — ATORVASTATIN CALCIUM 80 MILLIGRAM(S): 80 TABLET, FILM COATED ORAL at 22:08

## 2024-03-07 RX ADMIN — Medication 0.1 MILLIGRAM(S): at 17:05

## 2024-03-07 RX ADMIN — CARVEDILOL PHOSPHATE 25 MILLIGRAM(S): 80 CAPSULE, EXTENDED RELEASE ORAL at 17:05

## 2024-03-07 RX ADMIN — ISOSORBIDE DINITRATE 20 MILLIGRAM(S): 5 TABLET ORAL at 17:05

## 2024-03-07 RX ADMIN — Medication 1: at 21:15

## 2024-03-07 NOTE — PROGRESS NOTE ADULT - ASSESSMENT
Pt now sp RLE angiogram, shockwave and DCB to the RSFA with Dr Mohan.    L groin with #7 Fr sheath in place surrounded by large hard hematoma.  Sheath pulled by Fellow George who held the site for 30 minutes followed by me holding the site for 15 minutes.  Hematoma resolved, site soft, no bleeding from the site.  DSD applied and pt maintained flat for approx 3 hours before elevating his head.  L DP pulse non palpable, non dopplerable.   L PT pulse non palpable, positive by doppler.  R DP pulse non palpable, non dopplerable.  R PT pulse non palpable, positive by doppler.    Pt without c/o pain or discomfort.   Lungs CTA   S1S2 no MRG  L groin with DSD    Pt admitted overnight to monitor groin and pedal pulses  Continue all home medications

## 2024-03-07 NOTE — DISCHARGE NOTE PROVIDER - HOSPITAL COURSE
Pt now sp RLE angiogram, shockwave and DCB to the RSFA with Dr Mohan.    L groin with #7 Fr sheath in place surrounded by large hard hematoma.  Sheath pulled by Fellow George who held the site for 30 minutes followed by me holding the site for 15 minutes.  Hematoma resolved, site soft, no bleeding from the site.  DSD applied and pt maintained flat for approx 3 hours before elevating his head.  L DP pulse non palpable, non dopplerable.   L PT pulse non palpable, positive by doppler.  R DP pulse non palpable, non dopplerable.  R PT pulse non palpable, positive by doppler.    Pt without c/o pain or discomfort. Pt now sp RLE angiogram, shockwave and DCB to the RSFA with Dr Mohan.    L groin with #7 Fr sheath in place surrounded by large hard hematoma.  Sheath pulled by Fellow George who held the site for 30 minutes followed by me holding the site for 15 minutes.  Hematoma resolved, site soft, no bleeding from the site.  DSD applied and pt maintained flat for approx 3 hours before elevating his head. Groin remained stable overnight, stable for discharge today. Will receive HD today after discharge.  L DP pulse non palpable, non dopplerable.   L PT pulse non palpable, positive by doppler.  R DP pulse non palpable, non dopplerable.  R PT pulse non palpable, positive by doppler.    Pt without c/o pain or discomfort.

## 2024-03-07 NOTE — DISCHARGE NOTE PROVIDER - NSDCMRMEDTOKEN_GEN_ALL_CORE_FT
amLODIPine 10 mg oral tablet: 1 tab(s) orally once a day  aspirin 81 mg oral tablet, chewable: 1 tab(s) orally once a day  atorvastatin 80 mg oral tablet: 1 tab(s) orally once a day (at bedtime)  calcitriol 0.25 mcg oral capsule: 1 cap(s) orally once a day  carvedilol 25 mg oral tablet: 1 tab(s) orally every 12 hours  cloNIDine 0.1 mg oral tablet: 1 tab(s) orally 2 times a day  clopidogrel 75 mg oral tablet: 1 tab(s) orally once a day  isosorbide dinitrate 20 mg oral tablet: 1 tab(s) orally 3 times a day  magnesium oxide 400 mg oral tablet: 1 tab(s) orally once a day  Protonix 40 mg oral delayed release tablet: 1 tab(s) orally once a day   Tresiba FlexTouch 100 units/mL subcutaneous solution: 10 unit(s) subcutaneous once a day (at bedtime)

## 2024-03-07 NOTE — DISCHARGE NOTE PROVIDER - NSDCFUSCHEDAPPT_GEN_ALL_CORE_FT
Clay Zaysa  [facility]  VASCULAR [address]  Scheduled Appointment: 03/15/2024    [facility]  FAMILYMED [address]  Scheduled Appointment: 03/18/2024    Yoav Tom  [facility]  NEUROLOGY [address]  Scheduled Appointment: 03/19/2024    Ibeth Jo  [facility]  CARDIOLOGY [address]  Scheduled Appointment: 04/16/2024     [facility]  CARDIOLOGY [address]  Scheduled Appointment: 03/12/2024    Clay Zayas  [facility]  VASCULAR [address]  Scheduled Appointment: 03/15/2024    [facility]  FAMILYMED [address]  Scheduled Appointment: 03/18/2024    Yoav Tom  [facility]  NEUROLOGY [address]  Scheduled Appointment: 03/19/2024    Ibeth Jo  [facility]  CARDIOLOGY [address]  Scheduled Appointment: 04/16/2024

## 2024-03-07 NOTE — DISCHARGE NOTE PROVIDER - CARE PROVIDER_API CALL
Selim, Samy Mohsen  Interventional Cardiology  [address], [address]  [place] [address]  Phone: [phone]  Fax: [phone]  Follow Up Time: 1 week   Selim, Samy Mohsen  Interventional Cardiology  [address], [address]  [place] [address]  Phone: [phone]  Fax: [phone]  Scheduled Appointment: 03/12/2024 08:30 AM

## 2024-03-07 NOTE — DISCHARGE NOTE PROVIDER - NSDCCPCAREPLAN_GEN_ALL_CORE_FT
PRINCIPAL DISCHARGE DIAGNOSIS  Diagnosis: Peripheral artery disease  Assessment and Plan of Treatment:

## 2024-03-07 NOTE — DISCHARGE NOTE PROVIDER - PROVIDER TOKENS
PROVIDER:[TOKEN:[[id number]],FOLLOWUP:[1 week]] PROVIDER:[TOKEN:[[id number]],SCHEDULEDAPPT:[03/12/2024],SCHEDULEDAPPTTIME:[08:30 AM]]

## 2024-03-08 ENCOUNTER — TRANSCRIPTION ENCOUNTER (OUTPATIENT)
Age: 69
End: 2024-03-08

## 2024-03-08 VITALS
SYSTOLIC BLOOD PRESSURE: 122 MMHG | OXYGEN SATURATION: 95 % | HEART RATE: 69 BPM | DIASTOLIC BLOOD PRESSURE: 70 MMHG | TEMPERATURE: 98 F | RESPIRATION RATE: 18 BRPM

## 2024-03-08 LAB
A1C WITH ESTIMATED AVERAGE GLUCOSE RESULT: 8.4 % — HIGH (ref 4–5.6)
ESTIMATED AVERAGE GLUCOSE: 194 MG/DL — HIGH (ref 68–114)
GLUCOSE BLDC GLUCOMTR-MCNC: 159 MG/DL — HIGH (ref 70–99)

## 2024-03-08 PROCEDURE — 36415 COLL VENOUS BLD VENIPUNCTURE: CPT

## 2024-03-08 PROCEDURE — C1753: CPT

## 2024-03-08 PROCEDURE — 75716 ARTERY X-RAYS ARMS/LEGS: CPT | Mod: XU

## 2024-03-08 PROCEDURE — C1887: CPT

## 2024-03-08 PROCEDURE — 80048 BASIC METABOLIC PNL TOTAL CA: CPT

## 2024-03-08 PROCEDURE — C1894: CPT

## 2024-03-08 PROCEDURE — 83036 HEMOGLOBIN GLYCOSYLATED A1C: CPT

## 2024-03-08 PROCEDURE — C2623: CPT

## 2024-03-08 PROCEDURE — 82962 GLUCOSE BLOOD TEST: CPT

## 2024-03-08 PROCEDURE — C1769: CPT

## 2024-03-08 PROCEDURE — 83735 ASSAY OF MAGNESIUM: CPT

## 2024-03-08 PROCEDURE — C1725: CPT

## 2024-03-08 PROCEDURE — 37252 INTRVASC US NONCORONARY 1ST: CPT

## 2024-03-08 PROCEDURE — C9764: CPT

## 2024-03-08 RX ADMIN — PANTOPRAZOLE SODIUM 40 MILLIGRAM(S): 20 TABLET, DELAYED RELEASE ORAL at 05:52

## 2024-03-08 RX ADMIN — AMLODIPINE BESYLATE 10 MILLIGRAM(S): 2.5 TABLET ORAL at 05:52

## 2024-03-08 RX ADMIN — ISOSORBIDE DINITRATE 20 MILLIGRAM(S): 5 TABLET ORAL at 05:52

## 2024-03-08 RX ADMIN — Medication 0.1 MILLIGRAM(S): at 05:52

## 2024-03-08 RX ADMIN — CARVEDILOL PHOSPHATE 25 MILLIGRAM(S): 80 CAPSULE, EXTENDED RELEASE ORAL at 05:52

## 2024-03-08 NOTE — DISCHARGE NOTE NURSING/CASE MANAGEMENT/SOCIAL WORK - PATIENT PORTAL LINK FT
You can access the FollowMyHealth Patient Portal offered by [facility] by registering at the following website: [email or web address]. By joining [facility] FollowMyHealth portal, you will also be able to view your health information using other applications (apps) compatible with our system.

## 2024-03-08 NOTE — PROGRESS NOTE ADULT - SUBJECTIVE AND OBJECTIVE BOX
Subjective:  Pt now sp RLE angiogram, shockwave and DCB to the RSFA with Dr Mohan. Via left groin, site benign with +ecchymosis, soft, no hematoma. Patient OOB and no complaints overnight. Stable for discharge today.    PAST MEDICAL & SURGICAL HISTORY:  DM (diabetes mellitus)      HTN (hypertension)      High cholesterol      Myocardial infarction  (Coronary angiogram 2014)      Acute on chronic congestive heart failure, unspecified congestive heart failure type      Cerebrovascular accident (CVA)      ESRD (end stage renal disease)      CAD (coronary artery disease)      PAD (peripheral artery disease)      S/P coronary artery bypass with three autogenous grafts      S/P arteriovenous (AV) fistula repair        FAMILY HISTORY:  Family history of heart disease (Sibling)      MEDICATIONS  (STANDING):  amLODIPine   Tablet 10 milliGRAM(s) Oral daily  aspirin  chewable 81 milliGRAM(s) Chew daily  atorvastatin 80 milliGRAM(s) Oral at bedtime  calcitriol   Capsule 0.25 MICROGram(s) Oral daily  carvedilol 25 milliGRAM(s) Oral every 12 hours  chlorhexidine 4% Liquid 1 Application(s) Topical once  cloNIDine 0.1 milliGRAM(s) Oral two times a day  clopidogrel Tablet 75 milliGRAM(s) Oral daily  dextrose 5%. 1000 milliLiter(s) (100 mL/Hr) IV Continuous <Continuous>  dextrose 5%. 1000 milliLiter(s) (50 mL/Hr) IV Continuous <Continuous>  dextrose 50% Injectable 25 Gram(s) IV Push once  dextrose 50% Injectable 12.5 Gram(s) IV Push once  dextrose 50% Injectable 25 Gram(s) IV Push once  glucagon  Injectable 1 milliGRAM(s) IntraMuscular once  insulin lispro (ADMELOG) corrective regimen sliding scale   SubCutaneous at bedtime  isosorbide   dinitrate Tablet (ISORDIL) 20 milliGRAM(s) Oral three times a day  pantoprazole    Tablet 40 milliGRAM(s) Oral before breakfast    MEDICATIONS  (PRN):  dextrose Oral Gel 15 Gram(s) Oral once PRN Blood Glucose LESS THAN 70 milliGRAM(s)/deciliter    Patient is a 69y old  Male who presents with a chief complaint of RLE angiogram  (07 Mar 2024 15:46)    HEALTH ISSUES - PROBLEM Dx:          General: No fatigue, no fevers/chills  Respiratory: No dyspnea, no cough, no wheeze  CV: No chest pain, no palpitations  Abd: No nausea  Neuro: No headache, no dizziness  No Known Allergies      Objective:  Vital Signs Last 24 Hrs  T(C): 36.5 (08 Mar 2024 08:52), Max: 36.6 (07 Mar 2024 20:53)  T(F): 97.7 (08 Mar 2024 08:52), Max: 97.8 (07 Mar 2024 20:53)  HR: 69 (08 Mar 2024 08:52) (60 - 72)  BP: 122/70 (08 Mar 2024 08:52) (113/71 - 163/75)  BP(mean): --  RR: 18 (08 Mar 2024 08:52) (11 - 19)  SpO2: 95% (08 Mar 2024 08:52) (92% - 98%)    Parameters below as of 08 Mar 2024 08:52  Patient On (Oxygen Delivery Method): room air    Lungs CTA   S1S2 no MRG  L groin soft, no hematoma, no bleeding, +ecchymosis        03-07    139  |  95<L>  |  33.3<H>  ----------------------------<  172<H>  3.4<L>   |  30.0<H>  |  5.82<H>    Ca    9.6      07 Mar 2024 07:30  Mg     1.7     03-07          A/P: PAD s/p shockwave and DCB to the RSFA  1.                 Groin management discussed with patient.  2.                 Reviewed tele no events overnight  3.                 Pt given instructions on importance of taking antiplatelet medication.    4.                 Aspirin/Plavix  5.                 Follow up with cardiologist in 2 weeks or sooner if needed  6.                 Dialysis today as outpatient  7.                 Lifestyle modificition including diet and exercise discussed with patient  8.                 Stable for discharge
                                                         [facility]                                                         CARDIOLOGY AT [facility]                                                                  [address] [facility], [id number] [place] [address]- [facility]                                                         Telephone: [phone]. Fax:[phone]                                                                             PROGRESS NOTE    Reason for follow up: Patient presented this am in the cardiology holding room for a RLE angiogram with possible PCI.  H&P from PsT reviewed, appreciated and without change.  BMP and Magnesium level resent both creatine and magnesium levels improved from PST. Pt NPO, consent obtained, baseline PE done, and pt sent to the lab.  Update:      Review of symptoms:   Cardiac:  No chest pain. No dyspnea. No palpitations.  Respiratory: no cough. No dyspnea  Gastrointestinal: No diarrhea. No abdominal pain. No bleeding.   Neuro: No focal neuro complaints.        Vitals:  T(C): 36.9 (03-07-24 @ 07:43), Max: 36.9 (03-07-24 @ 07:43)  HR: 63 (03-07-24 @ 13:15) (60 - 70)  BP: 132/71 (03-07-24 @ 13:15) (132/71 - 167/77)  RR: 19 (03-07-24 @ 13:15) (11 - 19)  SpO2: 96% (03-07-24 @ 13:15) (92% - 96%)    I&O's Summary          Pre procedure PHYSICAL EXAM:  Appearance: Comfortable. No acute distress  HEENT:  Atraumatic. Normocephalic.  Normal oral mucosa  Neurologic: A & O x 3, no gross focal deficits.  Cardiovascular: RRR S1 S2, No murmur, no rubs/gallops. No JVD  Respiratory: Lungs clear to auscultation, unlabored   Gastrointestinal:  Soft, Non-tender, + BS  Lower Extremities: Vascular: L DP +2 by palp, R&L PT by doppler only, L DP unobtainable by palp or doppler.  Psychiatry: Patient is calm. No agitation.   Skin: warm and dry.                                                                                                                                                                                                         CURRENT OTHER MEDICATIONS:  aspirin  chewable 81 milliGRAM(s) Chew daily  chlorhexidine 4% Liquid 1 Application(s) Topical once, Stop order after: 1 Doses        LABS:	 	        03-07    139  |  95<L>  |  33.3<H>  ----------------------------<  172<H>  3.4<L>   |  30.0<H>  |  5.82<H>    Ca    9.6      07 Mar 2024 07:30  Mg     1.7     03-07      PT/INR/PTT ( 27 Feb 2024 10:30 )                       :                       :      11.3         :       30.1                  .        .                   .              .           .       1.02        .                                         TELEMETRY: Sinus Rhythm no ectopy  ECG: < from: 12 Lead ECG (02.27.24 @ 10:54) >  Diagnosis Line Sinus rhythm with 1st degree A-V block  Right bundle branch block  Minimal voltage criteria for LVH, may be normal variant ( R in aVL )  Inferior infarct , age undetermined  Abnormal ECG    Confirmed by CORNELL ABDULLAHI ([id number]) on 2/29/2024 5:43:45 AM    < end of copied text >

## 2024-03-08 NOTE — CHART NOTE - NSCHARTNOTEFT_GEN_A_CORE
Called by bedside nurse concerning about L groin bruise getting bigger.  Dressing was removed. No hematoma or active bleeding noted. Pt continue having a large bruise  Pulses + in the right and left lower extremity      Plan:  Continue monitoring     Pt. asymptomatic and hemodynamically stable

## 2024-03-08 NOTE — DISCHARGE NOTE NURSING/CASE MANAGEMENT/SOCIAL WORK - NSDCPEFALRISK_GEN_ALL_CORE
For information on Fall & Injury Prevention, visit: [email or web address] OR  [email or web address] OR  https://www.cdc.gov/steadi/patient.html

## 2024-03-11 ENCOUNTER — NON-APPOINTMENT (OUTPATIENT)
Age: 69
End: 2024-03-11

## 2024-03-11 PROBLEM — I73.9 PERIPHERAL VASCULAR DISEASE, UNSPECIFIED: Chronic | Status: ACTIVE | Noted: 2024-02-27

## 2024-03-11 PROBLEM — I25.10 ATHEROSCLEROTIC HEART DISEASE OF NATIVE CORONARY ARTERY WITHOUT ANGINA PECTORIS: Chronic | Status: ACTIVE | Noted: 2024-02-27

## 2024-03-12 ENCOUNTER — APPOINTMENT (OUTPATIENT)
Dept: CARDIOLOGY | Facility: CLINIC | Age: 69
End: 2024-03-12
Payer: MEDICARE

## 2024-03-12 ENCOUNTER — NON-APPOINTMENT (OUTPATIENT)
Age: 69
End: 2024-03-12

## 2024-03-12 ENCOUNTER — APPOINTMENT (OUTPATIENT)
Dept: CARDIOLOGY | Facility: CLINIC | Age: 69
End: 2024-03-12

## 2024-03-12 VITALS
HEART RATE: 66 BPM | WEIGHT: 168 LBS | BODY MASS INDEX: 30.91 KG/M2 | DIASTOLIC BLOOD PRESSURE: 72 MMHG | TEMPERATURE: 98.3 F | HEIGHT: 62 IN | SYSTOLIC BLOOD PRESSURE: 144 MMHG | OXYGEN SATURATION: 95 %

## 2024-03-12 DIAGNOSIS — I77.9 DISORDER OF ARTERIES AND ARTERIOLES, UNSPECIFIED: ICD-10-CM

## 2024-03-12 DIAGNOSIS — T14.8XXA OTHER INJURY OF UNSPECIFIED BODY REGION, INITIAL ENCOUNTER: ICD-10-CM

## 2024-03-12 DIAGNOSIS — G47.10 HYPERSOMNIA, UNSPECIFIED: ICD-10-CM

## 2024-03-12 DIAGNOSIS — I73.9 PERIPHERAL VASCULAR DISEASE, UNSPECIFIED: ICD-10-CM

## 2024-03-12 DIAGNOSIS — I25.10 ATHEROSCLEROTIC HEART DISEASE OF NATIVE CORONARY ARTERY W/OUT ANGINA PECTORIS: ICD-10-CM

## 2024-03-12 PROCEDURE — G2211 COMPLEX E/M VISIT ADD ON: CPT

## 2024-03-12 PROCEDURE — 93000 ELECTROCARDIOGRAM COMPLETE: CPT

## 2024-03-12 PROCEDURE — 99215 OFFICE O/P EST HI 40 MIN: CPT

## 2024-03-12 RX ORDER — PANTOPRAZOLE 40 MG/1
40 TABLET, DELAYED RELEASE ORAL
Refills: 0 | Status: ACTIVE | COMMUNITY
Start: 2024-03-12

## 2024-03-12 NOTE — DISCUSSION/SUMMARY
[Patient] : the patient [Risks] : risks [Benefits] : benefits [Alternatives] : alternatives [With Me] : with me [___ Month(s)] : in [unfilled] month(s) [FreeTextEntry1] : This is a 69 M with history of smoking, HTN,DM (on insulin), HLD, found to have 3 vessel disease and TIA post cath: s/p CABG recent PNA and now with facial swelling,.  1)   s/p PCI to right Pt now sp RLE angiogram, shockwave and DCB to the RSFA    next visit will order angiograpm and Intervention of  the left leg.  2) HFpEF: Stable weight.  history of problesm due to CKD hemodialyses  3. Peripheral vascular disease : bilateral sfa disease. s/p  angiolasty right sfa  4.  LE edema resolved.  CKD and Diastollic heart failure.   : ct diuresis.     5. PVD:   bilateral atherosclerosis right sfa severe stenosis.  Walking and ct antiplatelets. life style modification. diabetes control.  aspirin and lipitor.    6  Multivessel CAD: s/p CABG2D  Continue Coreg 25 Q12, .ASA 81 mg,  lipitor 20 daily.   7) HTN:  continue ct currnet ,eds    lwo Bp during hemodialyses . orthostatic check today.   holding Bp meds during hemodialyses  8) DM: f/u PCP  9) CKD: creatinine stable on hemodialyses now.  10 ) carotid artery disease: carotid atheroscelrosuis.   aspirin statins.       [EKG obtained to assist in diagnosis and management of assessed problem(s)] : EKG obtained to assist in diagnosis and management of assessed problem(s)

## 2024-03-12 NOTE — PHYSICAL EXAM
[General Appearance - Well Developed] : well developed [Normal Appearance] : normal appearance [Well Groomed] : well groomed [No Deformities] : no deformities [General Appearance - Well Nourished] : well nourished [General Appearance - In No Acute Distress] : no acute distress [Normal Conjunctiva] : the conjunctiva exhibited no abnormalities [Eyelids - No Xanthelasma] : the eyelids demonstrated no xanthelasmas [Normal Oral Mucosa] : normal oral mucosa [No Oral Pallor] : no oral pallor [No Oral Cyanosis] : no oral cyanosis [Normal Jugular Venous A Waves Present] : normal jugular venous A waves present [Normal Jugular Venous V Waves Present] : normal jugular venous V waves present [No Jugular Venous Mahan A Waves] : no jugular venous mahan A waves [Exaggerated Use Of Accessory Muscles For Inspiration] : no accessory muscle use [Respiration, Rhythm And Depth] : normal respiratory rhythm and effort [Heart Sounds] : normal S1 and S2 [Auscultation Breath Sounds / Voice Sounds] : lungs were clear to auscultation bilaterally [Heart Rate And Rhythm] : heart rate and rhythm were normal [Murmurs] : no murmurs present [Abdomen Soft] : soft [Abdomen Tenderness] : non-tender [Abdomen Mass (___ Cm)] : no abdominal mass palpated [Gait - Sufficient For Exercise Testing] : the gait was sufficient for exercise testing [Abnormal Walk] : normal gait [Nail Clubbing] : no clubbing of the fingernails [Cyanosis, Localized] : no localized cyanosis [Petechial Hemorrhages (___cm)] : no petechial hemorrhages [Skin Color & Pigmentation] : normal skin color and pigmentation [] : no rash [No Venous Stasis] : no venous stasis [Skin Lesions] : no skin lesions [No Xanthoma] : no  xanthoma was observed [No Skin Ulcers] : no skin ulcer [Oriented To Time, Place, And Person] : oriented to person, place, and time [Affect] : the affect was normal [Mood] : the mood was normal [No Anxiety] : not feeling anxious [FreeTextEntry1] :  left groin hematoma 4 cm ecchymoses.

## 2024-03-12 NOTE — HISTORY OF PRESENT ILLNESS
[FreeTextEntry1] : Multivessel CAD, HTN, CVA, HF pEF    HPI for today: : he had a peripheral angiogrpam done. had a large lleft groin hematoma.   he had a procedure done on 3/7/2024:     redd dnotE:  he mentions his BP is low during hemodialyses .  feels fatigue. they have been  hodling the BP meds during hemodialyses   he also has balance issues. states he sways alike a "drunk" /  feels that he  can fall.  not seen a neurologist . he is coplaitn with meds.     old note: patient ran out of norvasc. he has not been taking it for 1 week.   he could not get the prescription no headhecs. no dizziness. No LE edema. He had stopped losartan due to CKD.  we put him on Bidil. he states when he takes his norvasc. his BP is good around 130.  he has not made the appt with nephrologist yet.    old noteL: patient has been having increaese weight gain. he has been drinking too much water also as he feels its good for his kidneys. He drinks atleast 10 bottles a day. Compliant with  meds.  +_ dyspnea one xertion 2 + LE edema.    old note: no chest pain. no dypsnea. no headaches. feels good . complikant with meds.  last time got NANETTE done that was abnormal. but US arterial showed run off disease on the right side,. tibiaperoneal.    old note: No chest pain.  No dyspnea.  No LE edema.  Compliant with meds. Does not exercise. got NANETTE and US of LE.  found to have abnormal; NANETTE on the left and calcification on the right. US of LE arterial shows right PTA occlusion. patient does complain of feet pain on the right side. No pain on the left.

## 2024-03-12 NOTE — CARDIOLOGY SUMMARY
[No Ischemia] : no Ischemia [LVEF ___%] : LVEF [unfilled]% [Enlarged] : enlarged LA size [None] : no mitral regurgitation [___] : [unfilled] [de-identified] : 12 19 2023:  Sinus Rhythm -First degree A-V block  Linda = 224 -Old inferior infarct -Incomplete right bundle branch block   -possible true posterior extension of inferior MI.  ABNORMAL 8 30 2022 Sinus  Rhythm  -First degree A-V block  Linda = 252 -Incomplete right bundle branch block.   ABNORMAL      1 11 2022  Sinus  Rhythm  -First degree A-V block  -Incomplete right bundle branch block.     ABNORMAL    10 19 2021   4/28/2021  : Sinus  Rhythm  First degree AV block.  -Incomplete right bundle branch block.   -Old inferior infarct.   ABNORMAL   [de-identified] : apr 2022   30 days no significant events.  [de-identified] : nov 2021: LE angiogrpahy: right sfa 80%. and left SFA moderate stenosis.   mar 7 2024: Pt now sp RLE angiogram, shockwave and DCB to the RSFA with Dr Mohan. [de-identified] : july 2022: Renal Dupelx:  no renal artery stenosis.  [de-identified] : NANETTE: Left 0.7 right : 1.4 \par  US of LE arterial: sub total occlusion right PTA. mdoerate atherosclerosis bilaterally. \par  Carotid DUplexL: sept 2020  moderate atherslceorsi. no stenosis.

## 2024-03-14 ENCOUNTER — APPOINTMENT (OUTPATIENT)
Dept: CARDIOLOGY | Facility: CLINIC | Age: 69
End: 2024-03-14
Payer: MEDICARE

## 2024-03-14 PROCEDURE — 93926 LOWER EXTREMITY STUDY: CPT

## 2024-03-15 ENCOUNTER — APPOINTMENT (OUTPATIENT)
Dept: VASCULAR SURGERY | Facility: CLINIC | Age: 69
End: 2024-03-15
Payer: MEDICARE

## 2024-03-15 VITALS
WEIGHT: 168 LBS | RESPIRATION RATE: 14 BRPM | HEART RATE: 67 BPM | DIASTOLIC BLOOD PRESSURE: 69 MMHG | SYSTOLIC BLOOD PRESSURE: 138 MMHG | HEIGHT: 62 IN | TEMPERATURE: 97.9 F | BODY MASS INDEX: 30.91 KG/M2 | OXYGEN SATURATION: 95 %

## 2024-03-15 DIAGNOSIS — T82.858A STENOSIS OF OTHER VASCULAR PROSTHETIC, INITIAL ENCOUNTER: ICD-10-CM

## 2024-03-15 PROCEDURE — 93990 DOPPLER FLOW TESTING: CPT

## 2024-03-15 PROCEDURE — 99212 OFFICE O/P EST SF 10 MIN: CPT

## 2024-03-15 NOTE — ASSESSMENT
[FreeTextEntry1] : Patient with functioning left upper extremity fistula duplex ultrasound shows adequate size and adequate flow volume for access.  Patient was given written approval for attempted access of his AV fistula hopefully if can be accessed successful with large needles we can remove his permacatheter soon.

## 2024-03-15 NOTE — HISTORY OF PRESENT ILLNESS
[FreeTextEntry1] : Just prior to last visit a patient had an infiltration of his AV fistula.  Since they have been using the primary catheter have refused to read access his AV fistula.  He is here for clearance for access of his AV fistula.  He has no complaints doing well on dialysis.  Does not complain of pain in his left arm.

## 2024-03-18 ENCOUNTER — APPOINTMENT (OUTPATIENT)
Dept: FAMILY MEDICINE | Facility: CLINIC | Age: 69
End: 2024-03-18
Payer: MEDICARE

## 2024-03-18 VITALS
BODY MASS INDEX: 31.1 KG/M2 | OXYGEN SATURATION: 95 % | WEIGHT: 169 LBS | SYSTOLIC BLOOD PRESSURE: 130 MMHG | HEIGHT: 62 IN | DIASTOLIC BLOOD PRESSURE: 64 MMHG | RESPIRATION RATE: 14 BRPM

## 2024-03-18 DIAGNOSIS — I10 ESSENTIAL (PRIMARY) HYPERTENSION: ICD-10-CM

## 2024-03-18 DIAGNOSIS — N18.6 END STAGE RENAL DISEASE: ICD-10-CM

## 2024-03-18 DIAGNOSIS — E07.9 DISORDER OF THYROID, UNSPECIFIED: ICD-10-CM

## 2024-03-18 DIAGNOSIS — Z99.2 END STAGE RENAL DISEASE: ICD-10-CM

## 2024-03-18 DIAGNOSIS — D63.1 CHRONIC KIDNEY DISEASE, STAGE 4 (SEVERE): ICD-10-CM

## 2024-03-18 DIAGNOSIS — I50.30 UNSPECIFIED DIASTOLIC (CONGESTIVE) HEART FAILURE: ICD-10-CM

## 2024-03-18 DIAGNOSIS — N18.4 CHRONIC KIDNEY DISEASE, STAGE 4 (SEVERE): ICD-10-CM

## 2024-03-18 DIAGNOSIS — N40.0 BENIGN PROSTATIC HYPERPLASIA WITHOUT LOWER URINARY TRACT SYMPMS: ICD-10-CM

## 2024-03-18 PROCEDURE — 83036 HEMOGLOBIN GLYCOSYLATED A1C: CPT | Mod: QW

## 2024-03-18 PROCEDURE — G2211 COMPLEX E/M VISIT ADD ON: CPT

## 2024-03-18 PROCEDURE — 99214 OFFICE O/P EST MOD 30 MIN: CPT

## 2024-03-18 RX ORDER — OMEGA-3/DHA/EPA/FISH OIL 300-1000MG
400 CAPSULE ORAL DAILY
Qty: 90 | Refills: 0 | Status: ACTIVE | COMMUNITY
Start: 2024-03-12 | End: 1900-01-01

## 2024-03-18 RX ORDER — FOLIC ACID/VIT B COMPLEX AND C 0.8 MG
TABLET ORAL
Qty: 30 | Refills: 3 | Status: ACTIVE | COMMUNITY
Start: 2023-07-03 | End: 1900-01-01

## 2024-03-18 NOTE — PHYSICAL EXAM
[Soft] : abdomen soft [Non Tender] : non-tender [No Hernias] : no hernias [Normal Posterior Cervical Nodes] : no posterior cervical lymphadenopathy [Normal Anterior Cervical Nodes] : no anterior cervical lymphadenopathy [Normal] : no joint swelling and grossly normal strength and tone [de-identified] : AV fistula located left brachiocephalic with good thrill and bruit. Right IJ catheter covered with gauze, no warmth, erythema or tenderness to palpation.

## 2024-03-18 NOTE — ASSESSMENT
[FreeTextEntry1] :  68 y/o male with PMHx significant for CAD s/p 3V CABG (2009- [facility] - LAD reverse SVG to postero- lateral reverse SCG to OM), Anxiety, Carotid artery disease, CKD stage 4, CHFpEF, HTN, PAD, HLD, T2DM, CVA with left sided weakness, presenting for diabetic check and follow-up. Pt c/o fatigue after dialysis.   ENDO: T2DM -A1c 7.5 --> 8.0 --> 7.5 --> 7.3 --> 8.1 --> 8.6 --> 9.2 --> 9.4 POCT today -Continue Tresiba 10 u at bedtime -Humalog changed 5 u before meals AND Sliding scale - Asked pt to jot down blood sugars fasting, before breakfast lunch and dinner and 2 hours after bkfast and lunch and before bedtime, will adjust according to blood sugars readings  NEPHROLOGY: ESRD on HD MWF -s/p AVF creation and right IJ perm a catheter -Continue Magnesium supplementation 400 mg daily e-prescribed, Sodium bicarb 1300 mg tid. -Calcitriol 0.25 mcg. -Nephrology Dr Andersen following. -Pt being considered for Renal transplant  CVS: h/o CAD s/p 3V CABG (2009), Carotid artery disease, CHFpEF, HTN, PAD, HLD, f/w accelerated HTN -Cardiology following Dr Michaud -NST 2/22 showed no evidence of Ischemia -Continue ASA 81 mg, Amlodipine 10 mg daily, Atorvastatin 80 mg, Carvedilol 25 mg bid, Hydralazine 50 mg tid, Isosorbide 20 mg tid, Clonidine 0.1 mg bid. -Discontinued Eliquis secondary to frequent falls, started on Clopidogrel by Cardiology -s/p RIGHT LE Angiogram by Cardiology, s/p PCI to right Pt now sp RLE angiogram, shockwave and DCB to the [facility] next visit with Cardiology will order Angiogram and Intervention of the left leg.  : Microscopic hematuria -Followed by Dr España  NEURO: h/o CVA with LEFT sided residual weakness, positional vertigo -Neurology following Dr Sarabia -Continue ASA, Eliquis discontinued.  HEME: Anemia of CKD -CBC stable  GI: Inguinal hernia -Surgery referral given but provider does not accept his insurance.  HCM: -PFIZER Covid vaccine 3/11/21, 4/2/21, 1/11/22 -Flu vaccine administered 09/07/23 in house -Prevnar 20 Oct 2022 -No record of Colonoscopy, GI referral given, not scheduled yet.

## 2024-03-18 NOTE — HISTORY OF PRESENT ILLNESS
[FreeTextEntry1] : Diabetes check and follow up  [de-identified] : This is a 67 y/o male with PMHx significant for CAD s/p 3V CABG (2009- [facility] - LAD reverse SVG to postero- lateral reverse SCG to OM), Anxiety, Carotid artery disease, CKD stage 5 on HD MWF, CHFpEF, HTN, PAD, HLD, T2DM, CVA with left sided weakness, presenting for diabetes check. Pt was unable to get Endocrinology appointment, reports using 10 u Tresiba and 3-6 units of Humalog before meals

## 2024-03-18 NOTE — HEALTH RISK ASSESSMENT
[Intercurrent ED visits] : went to ED [Never (0 pts)] : Never (0 points) [1 or 2 (0 pts)] : 1 or 2 (0 points) [No] : In the past 12 months have you used drugs other than those required for medical reasons? No [No falls in past year] : Patient reported no falls in the past year [0] : 2) Feeling down, depressed, or hopeless: Not at all (0) [PHQ-2 Negative - No further assessment needed] : PHQ-2 Negative - No further assessment needed [Patient/Caregiver unclear of wishes] : , patient/caregiver unclear of wishes [Reviewed no changes] : Reviewed, no changes [Never] : Never [de-identified] : [facility] for RIGHT LE Angiogram [Audit-CScore] : 0 [de-identified] : none [de-identified] : Nephrology, Vascular [de-identified] : low salt [[id number]] : 0 [AdvancecareDate] : 03/23

## 2024-03-23 ENCOUNTER — OFFICE (OUTPATIENT)
Dept: URBAN - METROPOLITAN AREA CLINIC 94 | Facility: CLINIC | Age: 69
Setting detail: OPHTHALMOLOGY
End: 2024-03-23
Payer: MEDICARE

## 2024-03-23 DIAGNOSIS — E11.3312: ICD-10-CM

## 2024-03-23 PROCEDURE — 67210 TREATMENT OF RETINAL LESION: CPT | Mod: 79,LT | Performed by: OPHTHALMOLOGY

## 2024-04-16 ENCOUNTER — APPOINTMENT (OUTPATIENT)
Dept: CARDIOLOGY | Facility: CLINIC | Age: 69
End: 2024-04-16

## 2024-04-17 ENCOUNTER — APPOINTMENT (OUTPATIENT)
Dept: VASCULAR SURGERY | Facility: CLINIC | Age: 69
End: 2024-04-17
Payer: MEDICARE

## 2024-04-17 VITALS
HEIGHT: 62 IN | DIASTOLIC BLOOD PRESSURE: 73 MMHG | RESPIRATION RATE: 16 BRPM | OXYGEN SATURATION: 95 % | WEIGHT: 173 LBS | SYSTOLIC BLOOD PRESSURE: 133 MMHG | BODY MASS INDEX: 31.83 KG/M2 | HEART RATE: 72 BPM | TEMPERATURE: 97.7 F

## 2024-04-17 DIAGNOSIS — Z45.2 ENCOUNTER FOR ADJUSTMENT AND MANAGEMENT OF VASCULAR ACCESS DEVICE: ICD-10-CM

## 2024-04-17 PROCEDURE — 36589 REMOVAL TUNNELED CV CATH: CPT

## 2024-04-18 PROBLEM — Z45.2 ENCOUNTER FOR REMOVAL OF VASCULAR CATHETER: Status: ACTIVE | Noted: 2024-04-18

## 2024-05-29 NOTE — H&P PST ADULT - PROBLEM SELECTOR PLAN 3
Other
A1C pending, discuss tresiba dosing PM prior to surgery with PCP, patient and wife verbalized understanding.

## 2024-06-11 ENCOUNTER — APPOINTMENT (OUTPATIENT)
Dept: FAMILY MEDICINE | Facility: CLINIC | Age: 69
End: 2024-06-11
Payer: MEDICARE

## 2024-06-11 ENCOUNTER — RESULT CHARGE (OUTPATIENT)
Age: 69
End: 2024-06-11

## 2024-06-11 VITALS
BODY MASS INDEX: 30.91 KG/M2 | HEIGHT: 62 IN | WEIGHT: 168 LBS | DIASTOLIC BLOOD PRESSURE: 70 MMHG | HEART RATE: 80 BPM | OXYGEN SATURATION: 98 % | RESPIRATION RATE: 14 BRPM | SYSTOLIC BLOOD PRESSURE: 130 MMHG

## 2024-06-11 DIAGNOSIS — Z00.00 ENCOUNTER FOR GENERAL ADULT MEDICAL EXAMINATION W/OUT ABNORMAL FINDINGS: ICD-10-CM

## 2024-06-11 DIAGNOSIS — Z12.11 ENCOUNTER FOR SCREENING FOR MALIGNANT NEOPLASM OF COLON: ICD-10-CM

## 2024-06-11 DIAGNOSIS — E11.9 TYPE 2 DIABETES MELLITUS W/OUT COMPLICATIONS: ICD-10-CM

## 2024-06-11 PROCEDURE — 83036 HEMOGLOBIN GLYCOSYLATED A1C: CPT | Mod: QW

## 2024-06-11 PROCEDURE — 99397 PER PM REEVAL EST PAT 65+ YR: CPT

## 2024-06-11 PROCEDURE — G0439: CPT

## 2024-06-11 RX ORDER — INSULIN DEGLUDEC INJECTION 100 U/ML
100 INJECTION, SOLUTION SUBCUTANEOUS DAILY
Qty: 1 | Refills: 3 | Status: ACTIVE | COMMUNITY
Start: 2022-07-06 | End: 1900-01-01

## 2024-06-11 RX ORDER — CLONIDINE HYDROCHLORIDE 0.1 MG/1
0.1 TABLET ORAL TWICE DAILY
Qty: 60 | Refills: 0 | Status: ACTIVE | COMMUNITY
Start: 1900-01-01 | End: 1900-01-01

## 2024-06-11 RX ORDER — INSULIN LISPRO 100 [IU]/ML
100 INJECTION, SOLUTION INTRAVENOUS; SUBCUTANEOUS
Qty: 1 | Refills: 5 | Status: ACTIVE | COMMUNITY
Start: 2023-09-07 | End: 1900-01-01

## 2024-06-11 NOTE — HEALTH RISK ASSESSMENT
[Fair] : ~his/her~ current health as fair  [Good] : ~his/her~  mood as  good [Intercurrent ED visits] : went to ED [1 or 2 (0 pts)] : 1 or 2 (0 points) [Never (0 pts)] : Never (0 points) [No] : In the past 12 months have you used drugs other than those required for medical reasons? No [No falls in past year] : Patient reported no falls in the past year [0] : 2) Feeling down, depressed, or hopeless: Not at all (0) [PHQ-2 Negative - No further assessment needed] : PHQ-2 Negative - No further assessment needed [Never] : Never [None] : None [With Family] : lives with family [Retired] : retired [] :  [Fully functional (bathing, dressing, toileting, transferring, walking, feeding)] : Fully functional (bathing, dressing, toileting, transferring, walking, feeding) [Fully functional (using the telephone, shopping, preparing meals, housekeeping, doing laundry, using] : Fully functional and needs no help or supervision to perform IADLs (using the telephone, shopping, preparing meals, housekeeping, doing laundry, using transportation, managing medications and managing finances) [Smoke Detector] : smoke detector [Carbon Monoxide Detector] : carbon monoxide detector [Seat Belt] :  uses seat belt [Patient/Caregiver unclear of wishes] : , patient/caregiver unclear of wishes [Reviewed no changes] : Reviewed, no changes [de-identified] : [facility] for RIGHT LE Angiogram [de-identified] : Nephrology, Vascular [Audit-CScore] : 0 [de-identified] : none [de-identified] : low salt [QHH2Ylhca] : 0 [Change in mental status noted] : No change in mental status noted [Language] : denies difficulty with language [Behavior] : denies difficulty with behavior [Learning/Retaining New Information] : denies difficulty learning/retaining new information [Handling Complex Tasks] : denies difficulty handling complex tasks [Reasoning] : denies difficulty with reasoning [Spatial Ability and Orientation] : denies difficulty with spatial ability and orientation [Sexually Active] : not sexually active [High Risk Behavior] : no high risk behavior [Reports changes in hearing] : Reports no changes in hearing [Reports changes in vision] : Reports no changes in vision [Reports normal functional visual acuity (ie: able to read med bottle)] : Reports poor functional visual acuity.  [Reports changes in dental health] : Reports no changes in dental health [Sunscreen] : does not use sunscreen [MammogramComments] : n/a [PapSmearComments] : n/a [ColonoscopyComments] : never [HIVDate] : 12/22 [HIVComments] : non reactive [HepatitisCDate] : 12/22 [HepatitisCComments] : non reactive [AdvancecareDate] : 06/24

## 2024-06-11 NOTE — ASSESSMENT
[FreeTextEntry1] :  70 y/o male with PMHx significant for CAD s/p 3V CABG (2009- [facility] - LAD reverse SVG to postero- lateral reverse SCG to OM), Anxiety, Carotid artery disease, CKD stage 4, CHFpEF, HTN, PAD, HLD, T2DM, CVA with left sided weakness, presenting for Wellness exam / CPE.  ENDO: T2DM -A1c 7.5 --> 8.0 --> 7.5 --> 7.3 --> 8.1 --> 8.6 --> 9.2 --> 9.4 --> 8.2 POCT today -Continue Tresiba 10 u at bedtime -Humalog 5 u before meals AND Sliding scale -Asked pt to record blood sugars fasting, before breakfast lunch and dinner and 2 hours after bkfast and lunch and before bedtime, will adjust according to blood sugars readings  NEPHROLOGY: ESRD on HD MWF -Via LUE AVF -Continue Magnesium supplementation 400 mg daily e-prescribed, Sodium bicarb 1300 mg tid. -Calcitriol 0.25 mcg. -Nephrology Dr Andersen following. -Pt being considered for Renal transplant  CVS: h/o CAD s/p 3V CABG (2009), Carotid artery disease, CHFpEF, HTN, PAD, HLD, f/w accelerated HTN -Cardiology following Dr Michaud -NST 2/22 showed no evidence of Ischemia -Continue ASA 81 mg, Amlodipine 10 mg daily, Atorvastatin 80 mg, Carvedilol 25 mg bid, Hydralazine 50 mg tid, Isosorbide 20 mg tid, Clonidine 0.1 mg bid, e-refilled -Discontinued Eliquis secondary to frequent falls, started on Clopidogrel by Cardiology -s/p RIGHT LE Angiogram by Cardiology, s/p PCI to right Pt now sp RLE angiogram, shockwave and DCB to the [facility] next visit with Cardiology will order Angiogram and Intervention of the left leg.  : Microscopic hematuria -Followed by Dr España  NEURO: h/o CVA with LEFT sided residual weakness, positional vertigo -Neurology following Dr Sarabia -Continue ASA, Eliquis discontinued.  HEME: Anemia of CKD -CBC stable  HCM: -Fasting blood work as outpt -PFIZER Covid vaccine 3/11/21, 4/2/21, 1/11/22 -Flu vaccine 09/07/23  -Prevnar 20 Oct 2022 -No record of Colonoscopy, GI referral given, not scheduled yet., FIOBT card provided

## 2024-06-11 NOTE — PHYSICAL EXAM
[No Acute Distress] : no acute distress [Well Nourished] : well nourished [Well Developed] : well developed [Well-Appearing] : well-appearing [Normal Sclera/Conjunctiva] : normal sclera/conjunctiva [PERRL] : pupils equal round and reactive to light [EOMI] : extraocular movements intact [Normal Outer Ear/Nose] : the outer ears and nose were normal in appearance [Normal Oropharynx] : the oropharynx was normal [No JVD] : no jugular venous distention [No Lymphadenopathy] : no lymphadenopathy [Supple] : supple [Thyroid Normal, No Nodules] : the thyroid was normal and there were no nodules present [No Respiratory Distress] : no respiratory distress  [No Accessory Muscle Use] : no accessory muscle use [Clear to Auscultation] : lungs were clear to auscultation bilaterally [Normal Rate] : normal rate  [Regular Rhythm] : with a regular rhythm [Normal S1, S2] : normal S1 and S2 [No Murmur] : no murmur heard [No Carotid Bruits] : no carotid bruits [No Abdominal Bruit] : a ~M bruit was not heard ~T in the abdomen [No Varicosities] : no varicosities [Pedal Pulses Present] : the pedal pulses are present [No Edema] : there was no peripheral edema [No Palpable Aorta] : no palpable aorta [No Extremity Clubbing/Cyanosis] : no extremity clubbing/cyanosis [Soft] : abdomen soft [Non Tender] : non-tender [Non-distended] : non-distended [No Masses] : no abdominal mass palpated [No HSM] : no HSM [Normal Bowel Sounds] : normal bowel sounds [Normal Posterior Cervical Nodes] : no posterior cervical lymphadenopathy [Normal Anterior Cervical Nodes] : no anterior cervical lymphadenopathy [No CVA Tenderness] : no CVA  tenderness [No Spinal Tenderness] : no spinal tenderness [No Joint Swelling] : no joint swelling [Grossly Normal Strength/Tone] : grossly normal strength/tone [No Rash] : no rash [Coordination Grossly Intact] : coordination grossly intact [No Focal Deficits] : no focal deficits [Normal Gait] : normal gait [Deep Tendon Reflexes (DTR)] : deep tendon reflexes were 2+ and symmetric [Normal Affect] : the affect was normal [Normal Insight/Judgement] : insight and judgment were intact [de-identified] : LUE AVF with good thrill [de-identified] : Obese

## 2024-06-11 NOTE — HISTORY OF PRESENT ILLNESS
[FreeTextEntry1] : Wellness visit [de-identified] : This is a 70 y/o male with PMHx significant for CAD s/p 3V CABG (2009- [facility] - LAD reverse SVG to postero- lateral reverse SCG to OM), Anxiety, Carotid artery disease, CKD stage 5 on HD MWF, CHFpEF, HTN, PAD, HLD, T2DM, CVA with left sided weakness, presenting for wellness visit / CPE

## 2024-06-14 ENCOUNTER — APPOINTMENT (OUTPATIENT)
Dept: VASCULAR SURGERY | Facility: CLINIC | Age: 69
End: 2024-06-14

## 2024-06-14 RX ORDER — FLASH GLUCOSE SENSOR
KIT MISCELLANEOUS
Qty: 6 | Refills: 4 | Status: ACTIVE | COMMUNITY
Start: 2022-10-04 | End: 1900-01-01

## 2024-06-18 ENCOUNTER — APPOINTMENT (OUTPATIENT)
Dept: VASCULAR SURGERY | Facility: CLINIC | Age: 69
End: 2024-06-18
Payer: MEDICARE

## 2024-06-18 VITALS
RESPIRATION RATE: 16 BRPM | SYSTOLIC BLOOD PRESSURE: 130 MMHG | HEIGHT: 62 IN | WEIGHT: 174 LBS | DIASTOLIC BLOOD PRESSURE: 75 MMHG | TEMPERATURE: 98.2 F | HEART RATE: 75 BPM | BODY MASS INDEX: 32.02 KG/M2 | OXYGEN SATURATION: 95 %

## 2024-06-18 DIAGNOSIS — I77.0 ARTERIOVENOUS FISTULA, ACQUIRED: ICD-10-CM

## 2024-06-18 PROCEDURE — 99212 OFFICE O/P EST SF 10 MIN: CPT

## 2024-06-18 PROCEDURE — 93990 DOPPLER FLOW TESTING: CPT

## 2024-06-18 NOTE — HISTORY OF PRESENT ILLNESS
[FreeTextEntry1] : Patient's with functioning left upper extremity brachiocephalic arteriovenous fistula.

## 2024-06-18 NOTE — ASSESSMENT
[FreeTextEntry1] : Patient with functioning left upper extremity arteriovenous fistula.  Will continue with 3-month follow-up.

## 2024-06-24 RX ORDER — BLOOD SUGAR DIAGNOSTIC
32G X 6 MM STRIP MISCELLANEOUS
Qty: 3 | Refills: 3 | Status: ACTIVE | COMMUNITY
Start: 2024-06-21 | End: 1900-01-01

## 2024-06-30 LAB — HEMOCCULT STL QL IA: NEGATIVE

## 2024-07-01 LAB
25(OH)D3 SERPL-MCNC: 36.7 NG/ML
ALBUMIN SERPL ELPH-MCNC: 4 G/DL
ALP BLD-CCNC: 116 U/L
ALT SERPL-CCNC: 20 U/L
ANION GAP SERPL CALC-SCNC: 20 MMOL/L
AST SERPL-CCNC: 12 U/L
BILIRUB SERPL-MCNC: 0.5 MG/DL
BUN SERPL-MCNC: 66 MG/DL
CALCIUM SERPL-MCNC: 9.8 MG/DL
CHLORIDE SERPL-SCNC: 97 MMOL/L
CHOLEST SERPL-MCNC: 118 MG/DL
CO2 SERPL-SCNC: 24 MMOL/L
CREAT SERPL-MCNC: 10.27 MG/DL
EGFR: 5 ML/MIN/1.73M2
GLUCOSE SERPL-MCNC: 165 MG/DL
HCT VFR BLD CALC: 43.2 %
HDLC SERPL-MCNC: 24 MG/DL
HGB BLD-MCNC: 14.5 G/DL
LDLC SERPL CALC-MCNC: 70 MG/DL
MCHC RBC-ENTMCNC: 30.3 PG
MCHC RBC-ENTMCNC: 33.6 GM/DL
MCV RBC AUTO: 90.4 FL
NONHDLC SERPL-MCNC: 94 MG/DL
PLATELET # BLD AUTO: 240 K/UL
POTASSIUM SERPL-SCNC: 4.3 MMOL/L
PROT SERPL-MCNC: 6.8 G/DL
PSA SERPL-MCNC: 1.47 NG/ML
RBC # BLD: 4.78 M/UL
RBC # FLD: 13.8 %
SODIUM SERPL-SCNC: 141 MMOL/L
TRIGL SERPL-MCNC: 138 MG/DL
TSH SERPL-ACNC: 0.31 UIU/ML
WBC # FLD AUTO: 9.22 K/UL

## 2024-07-02 ENCOUNTER — APPOINTMENT (OUTPATIENT)
Dept: CARDIOLOGY | Facility: CLINIC | Age: 69
End: 2024-07-02

## 2024-07-02 LAB
APPEARANCE: ABNORMAL
BACTERIA: NEGATIVE /HPF
BILIRUBIN URINE: NEGATIVE
BLOOD URINE: ABNORMAL
CAST: 17 /LPF
COLOR: YELLOW
CREAT SPEC-SCNC: 199 MG/DL
EPITHELIAL CELLS: 9 /HPF
FINE GRANULAR CASTS: PRESENT
GLUCOSE QUALITATIVE U: >=1000 MG/DL
HYALINE CASTS: PRESENT
KETONES URINE: ABNORMAL MG/DL
LEUKOCYTE ESTERASE URINE: NEGATIVE
MICROALBUMIN 24H UR DL<=1MG/L-MCNC: 612.2 MG/DL
MICROALBUMIN/CREAT 24H UR-RTO: 3083 MG/G
MICROSCOPIC-UA: NORMAL
NITRITE URINE: NEGATIVE
PH URINE: 6
PROTEIN URINE: 300 MG/DL
RED BLOOD CELLS URINE: 2 /HPF
REVIEW: NORMAL
SPECIFIC GRAVITY URINE: >1.03
UROBILINOGEN URINE: 1 MG/DL
WHITE BLOOD CELLS URINE: 18 /HPF

## 2024-07-03 NOTE — DISCHARGE NOTE NURSING/CASE MANAGEMENT/SOCIAL WORK - NSCORESITESY/N_GEN_A_CORE_RD
Left Voicemail (1st Attempt) and Sent Mychart (1st Attempt) for the patient to call back and schedule the following:    Appointment type: Return thryoid cancer  Provider: Barney  Return date: 7/17 DO NOT RE-ZAINA   Specialty phone number: [phone]  Additional appointment(s) needed: NA  Additonal Notes: DO NOT RE-ZAINA APPT WE GOT THE OKAY TO KEEP IT FROM THE PROVIDER. THANK YOU.       Mili Lorenzo on 7/10/2024 at 1:59 PM     No

## 2024-07-16 ENCOUNTER — APPOINTMENT (OUTPATIENT)
Dept: VASCULAR SURGERY | Facility: CLINIC | Age: 69
End: 2024-07-16

## 2024-08-15 NOTE — REVIEW OF SYSTEMS
Plastic Surgery History & Physical    SUBJECTIVE:   Chief complaint:  Right breast cancer discussed reconstruction    History of Present Illness:  68 y.o. female with a history of right breast cancer.  Roughly a he was ago she had a right breast cancer.  She underwent lumpectomy and adjuvant radiotherapy.  It was done by Dr. Lakhani.  She also was on an aromatase inhibitor for 5 years.    On screening mammogram she was found to have a new 0.4 cm invasive ductal carcinoma at 1:00 a.m. in the right breast.  She was referred by Dr. Carroll.  Because of the history of breast conserving therapy and radiation she it was not a candidate for breast conserving therapy.  The patient favors bilateral mastectomy and it was interested in autologous reconstruction.  Past medical history includes thyroid cancer in 2006 where she had thyroidectomy.  She was so it was chronic right knee pain and had a hernia repair.    She currently works as an .  She was a remote history of smoking, reportedly quit in 1988    Past Medical History:   Diagnosis Date    Atypical ductal hyperplasia, breast 2008    left side    Breast cancer 08/2016    right side    Breast cyst approx. 40 years ago    Cancer     GERD (gastroesophageal reflux disease)     History of thyroid cancer 2006    Lobular carcinoma in situ not certain of 2016 diagnosis    Malignant neoplasm of upper-outer quadrant of right breast in female, estrogen receptor positive 09/15/2016    Mitral valve prolapse     PONV (postoperative nausea and vomiting)     Psychiatric problem        Past Surgical History:   Procedure Laterality Date    BREAST BIOPSY Right 08/2016    BREAST BIOPSY Left 2008    exc bx    BREAST LUMPECTOMY Right 08/2016    w/radiation    BREAST SURGERY  8/16 AND 2008 OR 2009    CATARACT EXTRACTION W/  INTRAOCULAR LENS IMPLANT Right 04/18/2024    Procedure: EXTRACTION, CATARACT, WITH IOL INSERTION;  Surgeon: Zhane Khan MD;  Location: [facility];  Service:  Ophthalmology;  Laterality: Right;     SECTION      COLONOSCOPY N/A 10/08/2019    Procedure: COLONOSCOPY;  Surgeon: Eliceo Holloway MD;  Location: [facility] ENDO (4TH FLR);  Service: Endoscopy;  Laterality: N/A;  Okay for Freeman or Holloway. However, she needs it done before end , so if they are not available before then, okay for any provided.    CYST REMOVED FROM RIGHT BREAST IN       EXTRACTION OF CATARACT Left 2023    Procedure: EXTRACTION, CATARACT;  Surgeon: Eric Cardona MD;  Location: [facility] OR;  Service: Ophthalmology;  Laterality: Left;  DiB00 +21.0D    HERNIA REPAIR      over C section incision     left breast wire localization excisional biopsy with bracketed wires using 3 wires and excision through 1 incision.       LIPOMA RESECTION N/A 10/04/2023    Procedure: EXCISION, LIPOMA Back;  Surgeon: Kenneth Hernández MD;  Location: [facility] OR 2ND FLR;  Service: General;  Laterality: N/A;    PHACOEMULSIFICATION, CATARACT, WITH IOL INSERTION Left 2023    Procedure: PHACOEMULSIFICATION, CATARACT, WITH IOL INSERTION;  Surgeon: Eric Cardona MD;  Location: [facility] OR;  Service: Ophthalmology;  Laterality: Left;    THYROIDECTOMY      TRANSFORAMINAL EPIDURAL INJECTION OF STEROID Right 2019    Procedure: Injection,steroid,epidural,transforaminal approach LUMBAR TRANSFORAMINAL RIGHT L5 AND S1 TF ARNOLDO;  Surgeon: Nadya Mcfarland MD;  Location: [facility] PAIN MGT;  Service: Pain Management;  Laterality: Right;  NEEDS CONSENT    TUBAL LIGATION         Family History   Problem Relation Name Age of Onset    Osteoporosis Mother Miranda Mesa     Arthritis Mother Miranda Mesa     Dementia Father      Breast cancer Maternal Grandmother Gabriella Simon 88    Cancer Maternal Grandmother Gabriella Simon     Breast cancer Other mat great aunt 70    Colon cancer Neg Hx      Colon polyps Neg Hx      Rectal cancer Neg Hx      Stomach cancer Neg Hx      Esophageal cancer Neg Hx      Liver cancer Neg Hx      Liver disease  Neg Hx      Cirrhosis Neg Hx      Inflammatory bowel disease Neg Hx      Celiac disease Neg Hx      Crohn's disease Neg Hx      Ulcerative colitis Neg Hx      Ovarian cancer Neg Hx      Melanoma Neg Hx         Social History     Socioeconomic History    Marital status:     Number of children: 2   Occupational History     Comment:    Tobacco Use    Smoking status: Former     Current packs/day: 0.00     Average packs/day: 0.5 packs/day for 2.9 years (1.4 ttl pk-yrs)     Types: Cigarettes     Start date: 2/10/1985     Quit date: 1988     Years since quittin.6    Smokeless tobacco: Never    Tobacco comments:     social smoker for several years; 1-2 cigarettes/week.  Quit ,   Substance and Sexual Activity    Alcohol use: Yes     Alcohol/week: 6.0 standard drinks of alcohol     Types: 2 Glasses of wine, 4 Drinks containing 0.5 oz of alcohol per week     Comment: Don't drink much as it aggravates GERD    Drug use: Never    Sexual activity: Not Currently     Partners: Male     Birth control/protection: Post-menopausal   Other Topics Concern    Patient feels they ought to cut down on drinking/drug use No    Patient annoyed by others criticizing their drinking/drug use No    Patient has felt bad or guilty about drinking/drug use No    Patient has had a drink/used drugs as an eye opener in the AM No   Social History Narrative    ,  x 40 years, 2 children, 2 grandchildren, Rastafari     Social Determinants of Health     Financial Resource Strain: Low Risk  (2024)    Overall Financial Resource Strain (CARDIA)     Difficulty of Paying Living Expenses: Not hard at all   Food Insecurity: No Food Insecurity (2024)    Hunger Vital Sign     Worried About Running Out of Food in the Last Year: Never true     Ran Out of Food in the Last Year: Never true   Transportation Needs: No Transportation Needs (2024)    PRAPARE - Transportation     Lack of Transportation (Medical): No      Lack of Transportation (Non-Medical): No   Physical Activity: Insufficiently Active (5/2/2024)    Exercise Vital Sign     Days of Exercise per Week: 3 days     Minutes of Exercise per Session: 30 min   Stress: Stress Concern Present (5/2/2024)    [employer] - Occupational Stress Questionnaire     Feeling of Stress : To some extent   Housing Stability: Low Risk  (2/19/2024)    Housing Stability Vital Sign     Unable to Pay for Housing in the Last Year: No     Number of Places Lived in the Last Year: 1     Unstable Housing in the Last Year: No       Current Outpatient Medications   Medication Sig Dispense Refill    calcium-vitamin D3 (OS-NINA 500 + D3) 500 mg-5 mcg (200 unit) per tablet Take 4 tablets by mouth 2 (two) times daily with meals.       ibuprofen (ADVIL,MOTRIN) 200 MG tablet Take 200 mg by mouth every 6 (six) hours as needed for Pain.      levothyroxine (SYNTHROID) 88 MCG tablet Take 1 tablet (88 mcg total) by mouth before breakfast. 30 tablet 11    loratadine (CLARITIN) 10 mg tablet Take 10 mg by mouth once daily. AS NEEDED FOR ALLERGIES      MAGNESIUM CARBONATE ORAL Take by mouth.      multivitamin capsule Take 2 capsules by mouth once daily.       pantoprazole (PROTONIX) 40 MG tablet Take 1 tablet (40 mg total) by mouth once daily. 90 tablet 3    potassium chloride SA (K-DUR,KLOR-CON) 10 MEQ tablet Take 20 mEq by mouth once daily.      semaglutide (OZEMPIC) 0.25 mg or 0.5 mg (2 mg/3 mL) pen injector Inject 0.5 mg into the skin every 7 days. On tuesday      tretinoin (RETIN-A) 0.025 % cream Compound tretinoin 0.025% / niacinamide 2% cream / hyaluronic acid 0.25%. Apply a pea-sized amount to entire face qhs. 30 g 11     No current facility-administered medications for this visit.     Facility-Administered Medications Ordered in Other Visits   Medication Dose Route Frequency Provider Last Rate Last Admin    0.9%  NaCl infusion   Intravenous Continuous Hakan Garces, PAMarcelinoC 70  mL/hr at 10/04/23 1113 New Bag at 10/04/23 1113    ceFAZolin 2 g in dextrose 5 % in water (D5W) 50 mL IVPB (MB+)  2 g Intravenous On Call Procedure Hakan Garces PA-C        haloperidol lactate injection 0.5 mg  0.5 mg Intravenous Q10 Min PRN Crystal Holloway MD        HYDROmorphone injection 0.2 mg  0.2 mg Intravenous Q5 Min PRN Crystal Holloway MD        LIDOcaine (PF) 10 mg/ml (1%) injection 10 mg  1 mL Intradermal Once Eric Cardona MD        sodium chloride 0.9% flush 10 mL  10 mL Intravenous PRN Crystal Holloway MD           Review of patient's allergies indicates:   Allergen Reactions    Codeine Nausea Only    Sulfa (sulfonamide antibiotics) Nausea Only             OBJECTIVE:     /74 (Patient Position: Sitting, BP Method: Medium (Automatic))   Pulse 87   Resp 20   Wt 61.6 kg (135 lb 12.9 oz)   SpO2 100%   BMI 22.60 kg/m²       Physical Exam:  Gen: NAD, appears stated age  Neuro: normal without focal findings, mental status and speech normal  HEENT: NCAT, neck supple, PEERL  CV: RRR  Pulm: Breathing non-labored, chest wall movement equal bilaterally   Breast:  Ptotic breasts with grade 2 ptosis   Right Left   SN-N 29 27   N-N 19   N-IMF 10 10   Width 13       Abdomen: soft, nontender, no guarding  Adequate abdominal tissue bilateral RODRIGUEZ flaps, the smaller than her breasts.  Gu: genitalia not examined  Extremity:normal strength, no cyanosis or edema  Skin: Skin color, texture, turgor normal. No rashes or lesions  Psych: oriented to time, place and person, mood and affect are within normal limits          ASSESSMENT/PLAN:     Malignant neoplasm of upper-inner quadrant of right breast in female, estrogen receptor positive    Began with discussion of implant based reconstruction. This included both direct-to-implant, tissue expander based reconstruction, the possible use of ADM, drains and expected post-operative course.  Risks of implant based recon including: hematoma, seroma, infection,  extrusion, capsular contracture, risks of rupture, need for replacement later in life and BII/LILIYA-ALCL. Discussed basics of revision procedures including: tailoring of the skin envelope, repositioning of the implant, liposuction and fat grafting. Any questions answered.    Next we discussed autologous reconstruction with the most common donor sites (abdomen, thigh, back). Explained that this procedure will reconstruction the breast with the patients own living tissue, without the need for an implant, but in exchange for a longer initial procedure and on average three day hospital stay afterwards.  Discussed flap monitoring, risks of take back/flap failure/ expected hospital course, use of drains, and recovery.  Risks including flap loss, fat necrosis, infection, wound breakdown, seroma, hematoma, scarring, need for reoperation, blood clots were all covered.  Typical revision procedures including lifting the breast, tailoring the skin, liposuction and fat grafting for contour were also discussed.     Discussed flap options and will plan for immediate bilateral reconstruction  Not a candidate for nipple sparing based on nipple position  Ordered CTA and Medical Photography.   Will coordinate scheduling of surgery with Dr Carroll    We also discussed that she will need to hold the Bovie for 2 weeks.  She would like to coordinate surgery in early September    Bennett Holloway, DO  Plastic and Reconstructive Surgery    I spent a total of 45 minutes on the day of the visit.  This includes face to face time and non-face to face time preparing to see the patient (eg, review of tests), obtaining and/or reviewing separately obtained history, documenting clinical information in the electronic or other health record, independently interpreting results and communicating results to the patient/family/caregiver, or care coordinator.     [Negative] : Integumentary

## 2024-09-10 ENCOUNTER — TRANSCRIPTION ENCOUNTER (OUTPATIENT)
Age: 69
End: 2024-09-10

## 2024-09-10 NOTE — BH CONSULTATION LIAISON ASSESSMENT NOTE - CURRENT ACTIVE IDEATION:
How Severe Are Your Spot(S)?: mild
Have Your Spot(S) Been Treated In The Past?: has not been treated
Hpi Title: Evaluation of Skin Lesions
None known

## 2024-09-12 ENCOUNTER — RESULT REVIEW (OUTPATIENT)
Age: 69
End: 2024-09-12

## 2024-09-12 ENCOUNTER — TRANSCRIPTION ENCOUNTER (OUTPATIENT)
Age: 69
End: 2024-09-12

## 2024-09-16 ENCOUNTER — NON-APPOINTMENT (OUTPATIENT)
Age: 69
End: 2024-09-16

## 2024-09-17 ENCOUNTER — APPOINTMENT (OUTPATIENT)
Dept: VASCULAR SURGERY | Facility: CLINIC | Age: 69
End: 2024-09-17
Payer: MEDICARE

## 2024-09-17 VITALS
WEIGHT: 174 LBS | BODY MASS INDEX: 32.02 KG/M2 | TEMPERATURE: 98 F | RESPIRATION RATE: 16 BRPM | HEIGHT: 62 IN | OXYGEN SATURATION: 96 % | DIASTOLIC BLOOD PRESSURE: 76 MMHG | HEART RATE: 68 BPM | SYSTOLIC BLOOD PRESSURE: 159 MMHG

## 2024-09-17 PROCEDURE — 99213 OFFICE O/P EST LOW 20 MIN: CPT

## 2024-09-17 PROCEDURE — 93990 DOPPLER FLOW TESTING: CPT

## 2024-09-18 NOTE — PHYSICAL EXAM
[Respiratory Effort] : normal respiratory effort [2+] : left 2+ [de-identified] : Pt is on temporary Oxygen due to recent hospitalization for Pneumonia [de-identified] : Pt is on temporary Oxygen due to recent hospitalization for Pneumonia [de-identified] : Left Upper Extremity AVF- No edema, visible wounds. Patient denies pain. + thrill

## 2024-09-18 NOTE — HISTORY OF PRESENT ILLNESS
[FreeTextEntry1] : 68yo Male, hx of CAD, ESRD, DMII, ESRD presents today for his 3 month follow up US of his LUE brachiocephalic AVF. Patient is s/p LUE AVF creation on 11/9/2023.   Patient presents to the office with his wife on O2 after having recently been discharged from [facility] for Pneumonia.

## 2024-09-18 NOTE — PHYSICAL EXAM
[Respiratory Effort] : normal respiratory effort [2+] : left 2+ [de-identified] : Pt is on temporary Oxygen due to recent hospitalization for Pneumonia [de-identified] : Pt is on temporary Oxygen due to recent hospitalization for Pneumonia [de-identified] : Left Upper Extremity AVF- No edema, visible wounds. Patient denies pain. + thrill

## 2024-09-18 NOTE — ASSESSMENT
[FreeTextEntry1] : Patient's LUE AVF US shows no evidence of stenosis with adequate flow.  +2 radial pulse felt on palpation.  Palpable thrill felt upon palpation over LUE AVF.   Plan: F/U 3 month LUE AVF US   Total encounter total time 22 mins >50% of time spent counseling/coordinating care

## 2024-09-20 ENCOUNTER — APPOINTMENT (OUTPATIENT)
Dept: CARDIOLOGY | Facility: CLINIC | Age: 69
End: 2024-09-20

## 2024-09-23 ENCOUNTER — APPOINTMENT (OUTPATIENT)
Dept: FAMILY MEDICINE | Facility: CLINIC | Age: 69
End: 2024-09-23
Payer: MEDICARE

## 2024-09-23 VITALS
RESPIRATION RATE: 14 BRPM | OXYGEN SATURATION: 98 % | WEIGHT: 170 LBS | BODY MASS INDEX: 31.28 KG/M2 | SYSTOLIC BLOOD PRESSURE: 148 MMHG | DIASTOLIC BLOOD PRESSURE: 84 MMHG | HEART RATE: 80 BPM | HEIGHT: 62 IN

## 2024-09-23 VITALS — HEIGHT: 62 IN

## 2024-09-23 DIAGNOSIS — N18.4 CHRONIC KIDNEY DISEASE, STAGE 4 (SEVERE): ICD-10-CM

## 2024-09-23 DIAGNOSIS — U07.1 COVID-19: ICD-10-CM

## 2024-09-23 DIAGNOSIS — H81.10 BENIGN PAROXYSMAL VERTIGO, UNSPECIFIED EAR: ICD-10-CM

## 2024-09-23 DIAGNOSIS — R06.02 SHORTNESS OF BREATH: ICD-10-CM

## 2024-09-23 DIAGNOSIS — T14.8XXA OTHER INJURY OF UNSPECIFIED BODY REGION, INITIAL ENCOUNTER: ICD-10-CM

## 2024-09-23 DIAGNOSIS — Z09 ENCOUNTER FOR FOLLOW-UP EXAMINATION AFTER COMPLETED TREATMENT FOR CONDITIONS OTHER THAN MALIGNANT NEOPLASM: ICD-10-CM

## 2024-09-23 DIAGNOSIS — Z12.5 ENCOUNTER FOR SCREENING FOR MALIGNANT NEOPLASM OF PROSTATE: ICD-10-CM

## 2024-09-23 DIAGNOSIS — N18.6 END STAGE RENAL DISEASE: ICD-10-CM

## 2024-09-23 DIAGNOSIS — I10 ESSENTIAL (PRIMARY) HYPERTENSION: ICD-10-CM

## 2024-09-23 DIAGNOSIS — Z87.898 PERSONAL HISTORY OF OTHER SPECIFIED CONDITIONS: ICD-10-CM

## 2024-09-23 DIAGNOSIS — R29.898 OTHER SYMPTOMS AND SIGNS INVOLVING THE MUSCULOSKELETAL SYSTEM: ICD-10-CM

## 2024-09-23 DIAGNOSIS — R60.0 LOCALIZED EDEMA: ICD-10-CM

## 2024-09-23 DIAGNOSIS — R10.2 PELVIC AND PERINEAL PAIN: ICD-10-CM

## 2024-09-23 DIAGNOSIS — Z87.438 PERSONAL HISTORY OF OTHER DISEASES OF MALE GENITAL ORGANS: ICD-10-CM

## 2024-09-23 DIAGNOSIS — Z86.73 PERSONAL HISTORY OF TRANSIENT ISCHEMIC ATTACK (TIA), AND CEREBRAL INFARCTION W/OUT RESIDUAL DEFICITS: ICD-10-CM

## 2024-09-23 DIAGNOSIS — Z12.11 ENCOUNTER FOR SCREENING FOR MALIGNANT NEOPLASM OF COLON: ICD-10-CM

## 2024-09-23 DIAGNOSIS — R79.89 OTHER SPECIFIED ABNORMAL FINDINGS OF BLOOD CHEMISTRY: ICD-10-CM

## 2024-09-23 DIAGNOSIS — Z00.00 ENCOUNTER FOR GENERAL ADULT MEDICAL EXAMINATION W/OUT ABNORMAL FINDINGS: ICD-10-CM

## 2024-09-23 DIAGNOSIS — R31.21 ASYMPTOMATIC MICROSCOPIC HEMATURIA: ICD-10-CM

## 2024-09-23 DIAGNOSIS — Z01.818 ENCOUNTER FOR OTHER PREPROCEDURAL EXAMINATION: ICD-10-CM

## 2024-09-23 DIAGNOSIS — Z01.810 ENCOUNTER FOR PREPROCEDURAL CARDIOVASCULAR EXAMINATION: ICD-10-CM

## 2024-09-23 DIAGNOSIS — Z87.19 PERSONAL HISTORY OF OTHER DISEASES OF THE DIGESTIVE SYSTEM: ICD-10-CM

## 2024-09-23 DIAGNOSIS — Z87.442 PERSONAL HISTORY OF URINARY CALCULI: ICD-10-CM

## 2024-09-23 DIAGNOSIS — D63.1 CHRONIC KIDNEY DISEASE, STAGE 4 (SEVERE): ICD-10-CM

## 2024-09-23 DIAGNOSIS — R53.83 OTHER FATIGUE: ICD-10-CM

## 2024-09-23 DIAGNOSIS — Z99.2 END STAGE RENAL DISEASE: ICD-10-CM

## 2024-09-23 PROCEDURE — 83036 HEMOGLOBIN GLYCOSYLATED A1C: CPT | Mod: QW

## 2024-09-23 PROCEDURE — 99214 OFFICE O/P EST MOD 30 MIN: CPT

## 2024-09-23 PROCEDURE — G2211 COMPLEX E/M VISIT ADD ON: CPT

## 2024-09-23 NOTE — HISTORY OF PRESENT ILLNESS
[Post-hospitalization from ___ Hospital] : Post-hospitalization from [unfilled] Hospital [Admitted on: ___] : The patient was admitted on [unfilled] [Discharged on ___] : discharged on [unfilled] [Discharge Summary] : discharge summary [Pertinent Labs] : pertinent labs [Radiology Findings] : radiology findings [Discharge Med List] : discharge medication list [Med Reconciliation] : medication reconciliation has been completed [Patient Contacted By: ____] : and contacted by [unfilled] [FreeTextEntry2] : Hospital Course 69 year old with a PMHx significant for former smoker, CAD s/p 3V CABG, Anxiety, Carotid artery disease, CHFpEF, HTN, PAD, HLD, T2DM, CVA with left sided weakness (3/2022), ESRD on HD, who presented to [facility] ED with c/o cough. In the ED, patient febrile to 102.1, and hypoxic requiring supplemental oxygen. RVP positive for COVID. CXR with evidence of pneumonia. Patient was admitted for management of acute hypoxic respiratory failure in the setting of COVID and suspected pneumonia. Treated with IV remdesivir & decadron with improvement in respiratory status. CT Chest negative for PNA. ABX were discontinued. Duplex negative for DVT. BCx negative.. Nephrology was consulted, HD per nephro. Changed to TTS schedule outpatient due to COVID> Patient completed 5 days of IV Remdesivir. Spo2 on RA at rest O2 88%: Follow up chest x-ray negative. Discharged home with home oxygen in stable condition. Patient was otherwise continued on home medications.  Pt states that he is no longer using the oxygen as he was told by the HD team that he was saturating fine and did not need the oxygen. He is here without oxygen. Pt offers no new complains, feels well, no fevers or cough. Pt is here with his wife,

## 2024-09-23 NOTE — HEALTH RISK ASSESSMENT
[Fair] : ~his/her~ current health as fair  [Good] : ~his/her~  mood as  good [1 or 2 (0 pts)] : 1 or 2 (0 points) [Never (0 pts)] : Never (0 points) [No] : In the past 12 months have you used drugs other than those required for medical reasons? No [No falls in past year] : Patient reported no falls in the past year [0] : 2) Feeling down, depressed, or hopeless: Not at all (0) [PHQ-2 Negative - No further assessment needed] : PHQ-2 Negative - No further assessment needed [None] : None [With Family] : lives with family [Retired] : retired [] :  [Fully functional (bathing, dressing, toileting, transferring, walking, feeding)] : Fully functional (bathing, dressing, toileting, transferring, walking, feeding) [Fully functional (using the telephone, shopping, preparing meals, housekeeping, doing laundry, using] : Fully functional and needs no help or supervision to perform IADLs (using the telephone, shopping, preparing meals, housekeeping, doing laundry, using transportation, managing medications and managing finances) [Smoke Detector] : smoke detector [Carbon Monoxide Detector] : carbon monoxide detector [Seat Belt] :  uses seat belt [Patient/Caregiver unclear of wishes] : , patient/caregiver unclear of wishes [Reviewed no changes] : Reviewed, no changes [Never] : Never [Intercurrent hospitalizations] : was admitted to the hospital  [NO] : No [de-identified] : Covid [de-identified] : Nephrology, Vascular [Audit-CScore] : 0 [de-identified] : none [de-identified] : low salt [[id number]] : 0 [Change in mental status noted] : No change in mental status noted [Language] : denies difficulty with language [Behavior] : denies difficulty with behavior [Learning/Retaining New Information] : denies difficulty learning/retaining new information [Handling Complex Tasks] : denies difficulty handling complex tasks [Reasoning] : denies difficulty with reasoning [Spatial Ability and Orientation] : denies difficulty with spatial ability and orientation [Sexually Active] : not sexually active [High Risk Behavior] : no high risk behavior [Reports changes in hearing] : Reports no changes in hearing [Reports changes in vision] : Reports no changes in vision [Reports normal functional visual acuity (ie: able to read med bottle)] : Reports poor functional visual acuity.  [Reports changes in dental health] : Reports no changes in dental health [Sunscreen] : does not use sunscreen [MammogramComments] : n/a [PapSmearComments] : n/a [ColonoscopyComments] : never [HIVDate] : 12/22 [HIVComments] : non reactive [HepatitisCDate] : 12/22 [HepatitisCComments] : non reactive [AdvancecareDate] : 06/24

## 2024-09-23 NOTE — PHYSICAL EXAM
[No Acute Distress] : no acute distress [Well Nourished] : well nourished [Well Developed] : well developed [Well-Appearing] : well-appearing [Normal Sclera/Conjunctiva] : normal sclera/conjunctiva [PERRL] : pupils equal round and reactive to light [EOMI] : extraocular movements intact [Normal Outer Ear/Nose] : the outer ears and nose were normal in appearance [Normal Oropharynx] : the oropharynx was normal [No JVD] : no jugular venous distention [No Lymphadenopathy] : no lymphadenopathy [Supple] : supple [Thyroid Normal, No Nodules] : the thyroid was normal and there were no nodules present [No Respiratory Distress] : no respiratory distress  [No Accessory Muscle Use] : no accessory muscle use [Clear to Auscultation] : lungs were clear to auscultation bilaterally [Normal Rate] : normal rate  [Regular Rhythm] : with a regular rhythm [Normal S1, S2] : normal S1 and S2 [No Murmur] : no murmur heard [No Carotid Bruits] : no carotid bruits [No Abdominal Bruit] : a ~M bruit was not heard ~T in the abdomen [No Varicosities] : no varicosities [Pedal Pulses Present] : the pedal pulses are present [No Edema] : there was no peripheral edema [No Palpable Aorta] : no palpable aorta [No Extremity Clubbing/Cyanosis] : no extremity clubbing/cyanosis [Soft] : abdomen soft [Non Tender] : non-tender [Non-distended] : non-distended [No Masses] : no abdominal mass palpated [No HSM] : no HSM [Normal Bowel Sounds] : normal bowel sounds [Normal Posterior Cervical Nodes] : no posterior cervical lymphadenopathy [Normal Anterior Cervical Nodes] : no anterior cervical lymphadenopathy [No CVA Tenderness] : no CVA  tenderness [No Spinal Tenderness] : no spinal tenderness [No Joint Swelling] : no joint swelling [Grossly Normal Strength/Tone] : grossly normal strength/tone [No Rash] : no rash [Coordination Grossly Intact] : coordination grossly intact [No Focal Deficits] : no focal deficits [Normal Gait] : normal gait [Deep Tendon Reflexes (DTR)] : deep tendon reflexes were 2+ and symmetric [Normal Affect] : the affect was normal [Normal Insight/Judgement] : insight and judgment were intact [de-identified] : LEFT AVF with good thrill

## 2024-09-23 NOTE — ASSESSMENT
[FreeTextEntry1] :  70 y/o male with PMHx significant for CAD s/p 3V CABG (2009- [facility] - LAD reverse SVG to postero- lateral reverse SCG to OM), Anxiety, Carotid artery disease, CKD stage 4, CHFpEF, HTN, PAD, HLD, T2DM, CVA with left sided weakness, presenting s/p Hospitalization secondary to Covid 19 infection  PULM/ID:s/p Hospitalization secondary to Covid 19 infection -s/p Decadron / Remdesivir treatment -Sats > 95% on RA -No further O2 needed -Continue pulse ox PRN  ENDO: T2DM -A1c 7.3 --> 8.1 --> 8.6 --> 9.2 --> 9.4 --> 8.2 --> 9.1 POCT today -Continue Tresiba increase to 35 u at bedtime from 30 u -Humalog 5 u before meals AND Sliding scale -Continue Freestyle 2 monitoring  NEPHROLOGY: ESRD on HD MWF -Via LUE AVF -Continue Magnesium supplementation 400 mg daily e-prescribed, Sodium bicarb 1300 mg tid. -Calcitriol 0.25 mcg. -Nephrology Dr Andersen following. -Pt being considered for Renal transplant  CVS: h/o CAD s/p 3V CABG (2009), Carotid artery disease, CHFpEF, HTN, PAD, HLD, f/w accelerated HTN -Cardiology following Dr Michaud -NST 2/22 showed no evidence of Ischemia -Continue ASA 81 mg, Amlodipine 10 mg daily, Atorvastatin 80 mg, Carvedilol 25 mg bid, Hydralazine 50 mg tid, Isosorbide 20 mg tid, Clonidine 0.1 mg bid, e-refilled -Discontinued Eliquis secondary to frequent falls, started on Clopidogrel by Cardiology -s/p RIGHT LE Angiogram by Cardiology, s/p PCI to right Pt now sp RLE angiogram, shockwave and DCB to the [facility] next visit with Cardiology will order Angiogram and Intervention of the left leg.  : Microscopic hematuria -Followed by Dr España  NEURO: h/o CVA with LEFT sided residual weakness, positional vertigo -Neurology following Dr Sarabia -Continue ASA, Eliquis discontinued.  HEME: Anemia of CKD -CBC stable  HCM: -PFIZER Covid vaccine 3/11/21, 4/2/21, 1/11/22 -Flu vaccine 09/07/23  -Prevnar 20 Oct 2022 -No record of Colonoscopy, GI referral given, not scheduled yet. -FIOBT card negative 6/2024

## 2024-09-27 ENCOUNTER — APPOINTMENT (OUTPATIENT)
Dept: CARDIOLOGY | Facility: CLINIC | Age: 69
End: 2024-09-27
Payer: MEDICARE

## 2024-09-27 ENCOUNTER — NON-APPOINTMENT (OUTPATIENT)
Age: 69
End: 2024-09-27

## 2024-09-27 VITALS
OXYGEN SATURATION: 96 % | HEART RATE: 64 BPM | DIASTOLIC BLOOD PRESSURE: 69 MMHG | WEIGHT: 168 LBS | HEIGHT: 62 IN | SYSTOLIC BLOOD PRESSURE: 110 MMHG | BODY MASS INDEX: 30.91 KG/M2

## 2024-09-27 DIAGNOSIS — I50.30 UNSPECIFIED DIASTOLIC (CONGESTIVE) HEART FAILURE: ICD-10-CM

## 2024-09-27 DIAGNOSIS — G47.10 HYPERSOMNIA, UNSPECIFIED: ICD-10-CM

## 2024-09-27 DIAGNOSIS — I25.10 ATHEROSCLEROTIC HEART DISEASE OF NATIVE CORONARY ARTERY W/OUT ANGINA PECTORIS: ICD-10-CM

## 2024-09-27 DIAGNOSIS — E11.9 TYPE 2 DIABETES MELLITUS W/OUT COMPLICATIONS: ICD-10-CM

## 2024-09-27 DIAGNOSIS — I73.9 PERIPHERAL VASCULAR DISEASE, UNSPECIFIED: ICD-10-CM

## 2024-09-27 DIAGNOSIS — R53.83 OTHER FATIGUE: ICD-10-CM

## 2024-09-27 DIAGNOSIS — I77.9 DISORDER OF ARTERIES AND ARTERIOLES, UNSPECIFIED: ICD-10-CM

## 2024-09-27 DIAGNOSIS — I10 ESSENTIAL (PRIMARY) HYPERTENSION: ICD-10-CM

## 2024-09-27 PROCEDURE — 93000 ELECTROCARDIOGRAM COMPLETE: CPT

## 2024-09-27 PROCEDURE — G2211 COMPLEX E/M VISIT ADD ON: CPT

## 2024-09-27 PROCEDURE — 99215 OFFICE O/P EST HI 40 MIN: CPT

## 2024-09-27 RX ORDER — SEMAGLUTIDE 0.68 MG/ML
2 INJECTION, SOLUTION SUBCUTANEOUS
Qty: 2 | Refills: 2 | Status: ACTIVE | COMMUNITY
Start: 2024-09-27 | End: 1900-01-01

## 2024-09-27 NOTE — PHYSICAL EXAM
[General Appearance - Well Developed] : well developed [Normal Appearance] : normal appearance [Well Groomed] : well groomed [General Appearance - Well Nourished] : well nourished [No Deformities] : no deformities [General Appearance - In No Acute Distress] : no acute distress [Normal Conjunctiva] : the conjunctiva exhibited no abnormalities [Eyelids - No Xanthelasma] : the eyelids demonstrated no xanthelasmas [Normal Oral Mucosa] : normal oral mucosa [No Oral Pallor] : no oral pallor [No Oral Cyanosis] : no oral cyanosis [Normal Jugular Venous A Waves Present] : normal jugular venous A waves present [Normal Jugular Venous V Waves Present] : normal jugular venous V waves present [No Jugular Venous Mahan A Waves] : no jugular venous mahan A waves [Respiration, Rhythm And Depth] : normal respiratory rhythm and effort [Exaggerated Use Of Accessory Muscles For Inspiration] : no accessory muscle use [Auscultation Breath Sounds / Voice Sounds] : lungs were clear to auscultation bilaterally [Heart Rate And Rhythm] : heart rate and rhythm were normal [Heart Sounds] : normal S1 and S2 [Murmurs] : no murmurs present [Abdomen Soft] : soft [Abdomen Tenderness] : non-tender [Abdomen Mass (___ Cm)] : no abdominal mass palpated [Abnormal Walk] : normal gait [Gait - Sufficient For Exercise Testing] : the gait was sufficient for exercise testing [Nail Clubbing] : no clubbing of the fingernails [Cyanosis, Localized] : no localized cyanosis [Petechial Hemorrhages (___cm)] : no petechial hemorrhages [Skin Color & Pigmentation] : normal skin color and pigmentation [] : no rash [No Venous Stasis] : no venous stasis [Skin Lesions] : no skin lesions [No Skin Ulcers] : no skin ulcer [No Xanthoma] : no  xanthoma was observed [Oriented To Time, Place, And Person] : oriented to person, place, and time [Affect] : the affect was normal [Mood] : the mood was normal [No Anxiety] : not feeling anxious [FreeTextEntry1] :  left groin hematoma 4 cm ecchymoses.

## 2024-09-27 NOTE — CARDIOLOGY SUMMARY
[No Ischemia] : no Ischemia [LVEF ___%] : LVEF [unfilled]% [Enlarged] : enlarged LA size [None] : no mitral regurgitation [___] : [unfilled] [de-identified] : apr 2022   30 days no significant events.  [de-identified] : 9 27 2024: Sinus Rhythm -First degree A-V block  Linda = 256 -Right bundle banch block. ABNORMAL 12 19 2023:  Sinus Rhythm -First degree A-V block  Linda = 224 -Old inferior infarct -Incomplete right bundle branch block   -possible true posterior extension of inferior MI.  ABNORMAL 8 30 2022 Sinus  Rhythm  -First degree A-V block  Linda = 252 -Incomplete right bundle branch block.   ABNORMAL      1 11 2022  Sinus  Rhythm  -First degree A-V block  -Incomplete right bundle branch block.     ABNORMAL    10 19 2021   4/28/2021  : Sinus  Rhythm  First degree AV block.  -Incomplete right bundle branch block.   -Old inferior infarct.   ABNORMAL   [de-identified] : sep 2024:  LVEF.  grade   diastolic dysfunction grade I .  normaL Rv rfunciton.  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   [de-identified] : mar 2024:  right SFA stenosis s/p angioplastyc.   nov 2021: LE angiogrpahy: right sfa 80%. and left SFA moderate stenosis.   mar 7 2024: Pt now sp RLE angiogram, shockwave and DCB to the RS[facility] with Dr Mohan. [de-identified] : july 2022: Renal Dupelx:  no renal artery stenosis.  [de-identified] : NANETTE: Left 0.7 right : 1.4 \par  US of LE arterial: sub total occlusion right PTA. mdoerate atherosclerosis bilaterally. \par  Carotid DUplexL: sept 2020  moderate atherslceorsi. no stenosis.

## 2024-09-27 NOTE — HISTORY OF PRESENT ILLNESS
[FreeTextEntry1] : Multivessel CAD, HTN, CVA, HF pEF    HPI for today: :  9 27 2024:  he had covuid on sept 9 2024.  he was very sick.  adn had to be on oxygen. he is feeling better now.  no chest pain . no dyspnea on exertion . no dizizness. glucose not under control.  he has labile control.   old noteL: he had a peripheral angiogrpam done. had a large lleft groin hematoma.   he had a procedure done on 3/7/2024:   ol dnotE:  he mentions his BP is low during hemodialyses .  feels fatigue. they have been  hodling the BP meds during hemodialyses   he also has balance issues. states he sways alike a "drunk" /  feels that he  can fall.  not seen a neurologist . he is coplaitn with meds.     old note: patient ran out of norvasc. he has not been taking it for 1 week.   he could not get the prescription no headhecs. no dizziness. No LE edema. He had stopped losartan due to CKD.  we put him on Bidil. he states when he takes his norvasc. his BP is good around 130.  he has not made the appt with nephrologist yet.    old noteL: patient has been having increaese weight gain. he has been drinking too much water also as he feels its good for his kidneys. He drinks atleast 10 bottles a day. Compliant with  meds.  +_ dyspnea one xertion 2 + LE edema.    old note: no chest pain. no dypsnea. no headaches. feels good . complikant with meds.  last time got NANETTE done that was abnormal. but US arterial showed run off disease on the right side,. tibiaperoneal.    old note: No chest pain.  No dyspnea.  No LE edema.  Compliant with meds. Does not exercise. got NANETTE and US of LE.  found to have abnormal; NANETTE on the left and calcification on the right. US of LE arterial shows right PTA occlusion. patient does complain of feet pain on the right side. No pain on the left.

## 2024-09-27 NOTE — DISCUSSION/SUMMARY
[Patient] : the patient [Risks] : risks [Benefits] : benefits [Alternatives] : alternatives [With Me] : with me [___ Month(s)] : in [unfilled] month(s) [FreeTextEntry1] : This is a 69 M with history of smoking, HTN,DM (on insulin), HLD, found to have 3 vessel disease and TIA post cath: s/p CABG recent PNA and now with facial swelling,.  1)   coronary artery disease . s/p CABG:  aspirin statins LDL goakl <50   alread on hig intensity statins.  will oreder leqvio. stress test . will orer leqvio for LDL goal < 50 if le=qvio not approved, then repatha  also , GLP 1 agonist. discuss wioth endocrinologist if he wouod benefoit. he is already on insulin.  2 s/p PCI to right sfa  Pt now sp RLE angiogram, shockwave and DCB to the [facility]    NANETTE and  US arteril dupelx.  2) HFpEF: Stable weight.  history of problesm due to CKD hemodialyses  3. Peripheral vascular disease : bilateral sfa disease. s/p  angiolasty right sfa  4.  LE edema resolved.  CKD and Diastollic heart failure.   : ct diuresis.     5. PVD:   bilateral atherosclerosis right sfa severe stenosis.  Walking and ct antiplatelets. life style modification. diabetes control.  aspirin and lipitor.    6  Multivessel CAD: s/p CABG2D  Continue Coreg 25 Q12, .ASA 81 mg,  lipitor 20 daily.   7) HTN:  continue ct currnet ,eds    lwo Bp during hemodialyses .   8) DM: f/u PCP  will refer to endocrinology.  labile glucise.  9) CKD: creatinine stable on hemodialyses now.  10 ) carotid artery disease: carotid atheroscelrosuis.   aspirin statins.       [EKG obtained to assist in diagnosis and management of assessed problem(s)] : EKG obtained to assist in diagnosis and management of assessed problem(s)

## 2024-10-03 ENCOUNTER — NON-APPOINTMENT (OUTPATIENT)
Age: 69
End: 2024-10-03

## 2025-03-22 NOTE — END OF VISIT
[place]
[>50% of Time Spent on Counseling and Coordination of Care for  ___] : Greater than 50% of the encounter time was spent on counseling and coordination of care for [unfilled]

## (undated) DEVICE — SUT VICRYL 3-0 27" SH UNDYED

## (undated) DEVICE — GLV 6 PROTEXIS (WHITE)

## (undated) DEVICE — VESSEL LOOP MINI-BLUE 0.075" X 16"

## (undated) DEVICE — DRAPE XL SHEET 77X98"

## (undated) DEVICE — SUT SILK 2-0 30" TIES

## (undated) DEVICE — SUT MONOCRYL 4-0 27" PS-2 UNDYED

## (undated) DEVICE — WARMING BLANKET LOWER ADULT

## (undated) DEVICE — DRSG DERMABOND 0.7ML

## (undated) DEVICE — ELCTR GROUNDING PAD ADULT COVIDIEN

## (undated) DEVICE — GLV 7.5 PROTEXIS (WHITE)

## (undated) DEVICE — DRSG KERLIX ROLL 4.5"

## (undated) DEVICE — PACK VASCULAR

## (undated) DEVICE — SOL IRR POUR NS 0.9% 1000ML

## (undated) DEVICE — SOL IRR POUR H2O 1000ML

## (undated) DEVICE — DRSG STERISTRIPS 0.5 X 4"

## (undated) DEVICE — VENODYNE/SCD SLEEVE CALF MEDIUM

## (undated) DEVICE — GLV 7 PROTEXIS ORTHO (BROWN)

## (undated) DEVICE — COVER ULTRASOUND PROBE T-SHAPED INTRAOP SM

## (undated) DEVICE — SUT SILK 3-0 30" TIES

## (undated) DEVICE — CATH IV INTROCAN SAFETY 16G X 1.25" (GRAY) PUR

## (undated) DEVICE — GLV 6.5 PROTEXIS (WHITE)

## (undated) DEVICE — PREP CHLORAPREP HI-LITE ORANGE 26ML

## (undated) DEVICE — NDL HYPO REGULAR BEVEL 25G X 1.5" (BLUE)

## (undated) DEVICE — DRAPE HAND 77" X 146"

## (undated) DEVICE — DRAPE ISOLATION BAG 20X20"

## (undated) DEVICE — DRSG MASTISOL

## (undated) DEVICE — SYR CONTROL LUER LOK 10CC

## (undated) DEVICE — SUT PROLENE 6-0 30" BV-1

## (undated) DEVICE — STAPLER SKIN PROXIMATE

## (undated) DEVICE — DRAPE TOWEL BLUE 17" X 24"

## (undated) DEVICE — Device

## (undated) DEVICE — SUT SILK 4-0 30" TIES

## (undated) DEVICE — SOL INJ NS 0.9% 500ML 1-PORT

## (undated) DEVICE — BLADE SURGICAL #15 CARBON